# Patient Record
Sex: FEMALE | Race: BLACK OR AFRICAN AMERICAN | Employment: OTHER | URBAN - METROPOLITAN AREA
[De-identification: names, ages, dates, MRNs, and addresses within clinical notes are randomized per-mention and may not be internally consistent; named-entity substitution may affect disease eponyms.]

---

## 2017-01-13 ENCOUNTER — TELEPHONE (OUTPATIENT)
Dept: CT IMAGING | Facility: HOSPITAL | Age: 47
End: 2017-01-13

## 2017-01-13 NOTE — TELEPHONE ENCOUNTER
Spoke w/pt re: inhaler use and upcoming MRI w/contrast. She states she was admitted to the hospital in Oct 2016 and dx with atelectasis and pneumonia but feeling better now.   Presently she is using maintenance inhaler-Dulera and Albuterol neb 2 times/week

## 2017-01-16 ENCOUNTER — HOSPITAL ENCOUNTER (OUTPATIENT)
Dept: PERIOP | Facility: HOSPITAL | Age: 47
End: 2017-01-16
Attending: INTERNAL MEDICINE
Payer: MEDICARE

## 2017-01-16 PROCEDURE — 81001 URINALYSIS AUTO W/SCOPE: CPT | Performed by: UROLOGY

## 2017-01-17 ENCOUNTER — HOSPITAL ENCOUNTER (OUTPATIENT)
Dept: PERIOP | Facility: HOSPITAL | Age: 47
Discharge: HOME | End: 2017-01-17
Attending: INTERNAL MEDICINE
Payer: MEDICARE

## 2017-01-17 ENCOUNTER — TELEPHONE (OUTPATIENT)
Dept: NEPHROLOGY | Facility: CLINIC | Age: 47
End: 2017-01-17

## 2017-01-17 ENCOUNTER — MED REC SCAN ONLY (OUTPATIENT)
Dept: NEPHROLOGY | Facility: CLINIC | Age: 47
End: 2017-01-17

## 2017-01-17 ENCOUNTER — APPOINTMENT (OUTPATIENT)
Dept: LAB | Facility: HOSPITAL | Age: 47
End: 2017-01-17
Attending: INTERNAL MEDICINE
Payer: MEDICARE

## 2017-01-17 VITALS
OXYGEN SATURATION: 99 % | DIASTOLIC BLOOD PRESSURE: 80 MMHG | HEART RATE: 83 BPM | TEMPERATURE: 98 F | RESPIRATION RATE: 16 BRPM | SYSTOLIC BLOOD PRESSURE: 113 MMHG

## 2017-01-17 DIAGNOSIS — N35.9 URETHRAL STRICTURE, UNSPECIFIED STRICTURE TYPE: ICD-10-CM

## 2017-01-17 PROCEDURE — A4216 STERILE WATER/SALINE, 10 ML: HCPCS | Performed by: INTERNAL MEDICINE

## 2017-01-17 PROCEDURE — P9045 ALBUMIN (HUMAN), 5%, 250 ML: HCPCS | Performed by: INTERNAL MEDICINE

## 2017-01-17 PROCEDURE — 36514 APHERESIS PLASMA: CPT | Performed by: INTERNAL MEDICINE

## 2017-01-17 PROCEDURE — 36593 DECLOT VASCULAR DEVICE: CPT

## 2017-01-17 RX ORDER — SODIUM CHLORIDE 9 MG/ML
INJECTION, SOLUTION INTRAVENOUS ONCE
Status: DISCONTINUED | OUTPATIENT
Start: 2017-01-17 | End: 2017-01-21

## 2017-01-17 RX ORDER — ALBUMIN, HUMAN INJ 5% 5 %
3000 SOLUTION INTRAVENOUS ONCE
Status: DISCONTINUED | OUTPATIENT
Start: 2017-01-17 | End: 2017-01-21

## 2017-01-17 RX ORDER — HEPARIN SODIUM 1000 [USP'U]/ML
4000 INJECTION, SOLUTION INTRAVENOUS; SUBCUTANEOUS ONCE
Status: DISCONTINUED | OUTPATIENT
Start: 2017-01-17 | End: 2017-01-21

## 2017-01-17 NOTE — OR PREOP
Pt told jaxon RN that she only gets dressing changes and central line flushes every 3 weeks.  TPA now needed for line

## 2017-01-18 PROCEDURE — 88108 CYTOPATH CONCENTRATE TECH: CPT | Performed by: UROLOGY

## 2017-01-19 ENCOUNTER — HOSPITAL ENCOUNTER (OUTPATIENT)
Dept: MRI IMAGING | Facility: HOSPITAL | Age: 47
Discharge: HOME | End: 2017-01-19
Attending: Other
Payer: MEDICARE

## 2017-01-19 DIAGNOSIS — G37.9 DEMYELINATING DISEASE (HCC): ICD-10-CM

## 2017-01-19 LAB — NON GYNE INTERPRETATION: NEGATIVE

## 2017-01-19 PROCEDURE — 70553 MRI BRAIN STEM W/O & W/DYE: CPT

## 2017-01-19 PROCEDURE — A9575 INJ GADOTERATE MEGLUMI 0.1ML: HCPCS | Performed by: OTHER

## 2017-01-24 ENCOUNTER — HOSPITAL ENCOUNTER (OUTPATIENT)
Dept: CT IMAGING | Facility: HOSPITAL | Age: 47
Discharge: HOME | End: 2017-01-24
Attending: UROLOGY
Payer: MEDICARE

## 2017-01-24 DIAGNOSIS — R10.2 PELVIC PAIN IN FEMALE: ICD-10-CM

## 2017-01-24 PROCEDURE — 72193 CT PELVIS W/DYE: CPT

## 2017-02-06 ENCOUNTER — HOSPITAL ENCOUNTER (OUTPATIENT)
Dept: PERIOP | Facility: HOSPITAL | Age: 47
Discharge: HOME | End: 2017-02-06
Attending: INTERNAL MEDICINE
Payer: MEDICARE

## 2017-02-06 VITALS
SYSTOLIC BLOOD PRESSURE: 123 MMHG | TEMPERATURE: 99 F | OXYGEN SATURATION: 100 % | RESPIRATION RATE: 14 BRPM | HEART RATE: 78 BPM | DIASTOLIC BLOOD PRESSURE: 90 MMHG

## 2017-02-06 PROCEDURE — 36514 APHERESIS PLASMA: CPT | Performed by: INTERNAL MEDICINE

## 2017-02-06 PROCEDURE — P9045 ALBUMIN (HUMAN), 5%, 250 ML: HCPCS | Performed by: INTERNAL MEDICINE

## 2017-02-06 RX ORDER — HEPARIN SODIUM 1000 [USP'U]/ML
INJECTION, SOLUTION INTRAVENOUS; SUBCUTANEOUS
Status: DISPENSED
Start: 2017-02-06 | End: 2017-02-07

## 2017-02-06 RX ORDER — SODIUM CHLORIDE 9 MG/ML
INJECTION, SOLUTION INTRAVENOUS ONCE
Status: DISCONTINUED | OUTPATIENT
Start: 2017-02-06 | End: 2017-02-10

## 2017-02-06 RX ORDER — ALBUMIN, HUMAN INJ 5% 5 %
3000 SOLUTION INTRAVENOUS ONCE
Status: DISCONTINUED | OUTPATIENT
Start: 2017-02-06 | End: 2017-02-10

## 2017-02-06 RX ORDER — HEPARIN SODIUM 1000 [USP'U]/ML
5000 INJECTION, SOLUTION INTRAVENOUS; SUBCUTANEOUS ONCE
Status: DISCONTINUED | OUTPATIENT
Start: 2017-02-06 | End: 2017-02-10

## 2017-02-06 NOTE — OR PREOP
Pt d/c in stable condition. Ambulating without assistance. C/o pelvic pain that is chronic per pt report. Not any worse after plasmapharesis. Pt reports she feels \"good\".

## 2017-02-23 ENCOUNTER — OFFICE VISIT (OUTPATIENT)
Dept: NEUROLOGY | Facility: CLINIC | Age: 47
End: 2017-02-23

## 2017-02-23 VITALS
HEART RATE: 76 BPM | BODY MASS INDEX: 26 KG/M2 | WEIGHT: 159 LBS | DIASTOLIC BLOOD PRESSURE: 78 MMHG | RESPIRATION RATE: 20 BRPM | SYSTOLIC BLOOD PRESSURE: 120 MMHG

## 2017-02-23 DIAGNOSIS — G35 MULTIPLE SCLEROSIS (HCC): Primary | ICD-10-CM

## 2017-02-23 PROCEDURE — 99213 OFFICE O/P EST LOW 20 MIN: CPT | Performed by: OTHER

## 2017-02-23 RX ORDER — LEVOTHYROXINE SODIUM 88 UG/1
88 TABLET ORAL DAILY
COMMUNITY
Start: 2017-01-30

## 2017-02-23 RX ORDER — BACLOFEN 20 MG/1
20 TABLET ORAL
COMMUNITY
Start: 2017-01-04 | End: 2017-03-07

## 2017-02-23 RX ORDER — FLUTICASONE FUROATE AND VILANTEROL TRIFENATATE 100; 25 UG/1; UG/1
1 POWDER RESPIRATORY (INHALATION) DAILY
COMMUNITY
Start: 2017-02-21 | End: 2017-08-22

## 2017-02-23 NOTE — PATIENT INSTRUCTIONS
Refill policies:    • Allow 2 business days for refills; controlled substances may take longer.   • Contact your pharmacy at least 5 days prior to running out of medication and have them send an electronic request or submit request through the “request re your physician has recommended that you have a procedure or additional testing performed. [facility]) will contact your insurance carrier to obtain pre-certification or prior authorization.     Unfortunately, MANUEL has seen an increas

## 2017-02-23 NOTE — PROGRESS NOTES
[employer] with Camille Rooney  3/11/1970  Primary Care Provider:  Mainor Riley MD    2/23/2017    48year old yo patient being seen for:  Demyelinating Disease    Has been on PLEX 2 (two) times daily.  Noon, and bedtime , Disp: , Rfl:   •  denosumab (PROLIA) 60 MG/ML Subcutaneous Solution, Inject 60 mg into the skin every 6 (six) months., Disp: , Rfl:   •  Cholecalciferol (VITAMIN D-3) 5000 UNITS Oral Tab, Take 1 tablet by mouth Afte PLANS:  Multiple sclerosis ([facility]  (primary encounter diagnosis)  History of GIORDANO    Discussion on the case:  Seems to be stabilizing on PLEX.   Her balance and gait are still not good enough for her to freely just ambulate around or even independently be ou

## 2017-02-23 NOTE — PROGRESS NOTES
Patient here for follow up appointment. Currently receiving PLEX treatments every 3 weeks, doing well.

## 2017-02-24 ENCOUNTER — NURSE ONLY (OUTPATIENT)
Dept: HEMATOLOGY/ONCOLOGY | Age: 47
End: 2017-02-24
Attending: Other
Payer: MEDICARE

## 2017-02-24 DIAGNOSIS — G35 MULTIPLE SCLEROSIS (HCC): ICD-10-CM

## 2017-02-24 DIAGNOSIS — G37.9 DEMYELINATING DISEASE OF CENTRAL NERVOUS SYSTEM (HCC): Primary | ICD-10-CM

## 2017-02-24 LAB
ALBUMIN SERPL-MCNC: 4.1 G/DL (ref 3.5–4.8)
ALP LIVER SERPL-CCNC: 106 U/L (ref 39–100)
ALT SERPL-CCNC: 184 U/L (ref 14–54)
AST SERPL-CCNC: 120 U/L (ref 15–41)
BASOPHILS # BLD AUTO: 0.02 X10(3) UL (ref 0–0.1)
BASOPHILS NFR BLD AUTO: 0.4 %
BILIRUB SERPL-MCNC: 0.3 MG/DL (ref 0.1–2)
BUN BLD-MCNC: 11 MG/DL (ref 8–20)
CALCIUM BLD-MCNC: 8.9 MG/DL (ref 8.3–10.3)
CHLORIDE: 106 MMOL/L (ref 101–111)
CO2: 30 MMOL/L (ref 22–32)
CREAT BLD-MCNC: 0.96 MG/DL (ref 0.55–1.02)
EOSINOPHIL # BLD AUTO: 0.11 X10(3) UL (ref 0–0.3)
EOSINOPHIL NFR BLD AUTO: 2 %
ERYTHROCYTE [DISTWIDTH] IN BLOOD BY AUTOMATED COUNT: 16.3 % (ref 11.5–16)
GLUCOSE BLD-MCNC: 149 MG/DL (ref 70–99)
HCT VFR BLD AUTO: 36.9 % (ref 34–50)
HGB BLD-MCNC: 11.4 G/DL (ref 12–16)
IMMATURE GRANULOCYTE COUNT: 0.01 X10(3) UL (ref 0–1)
IMMATURE GRANULOCYTE RATIO %: 0.2 %
LYMPHOCYTES # BLD AUTO: 2.04 X10(3) UL (ref 0.9–4)
LYMPHOCYTES NFR BLD AUTO: 36.9 %
M PROTEIN MFR SERPL ELPH: 7.5 G/DL (ref 6.1–8.3)
MCH RBC QN AUTO: 22.1 PG (ref 27–33.2)
MCHC RBC AUTO-ENTMCNC: 30.9 G/DL (ref 31–37)
MCV RBC AUTO: 71.5 FL (ref 81–100)
MONOCYTES # BLD AUTO: 0.36 X10(3) UL (ref 0.1–0.6)
MONOCYTES NFR BLD AUTO: 6.5 %
NEUTROPHIL ABS PRELIM: 2.99 X10 (3) UL (ref 1.3–6.7)
NEUTROPHILS # BLD AUTO: 2.99 X10(3) UL (ref 1.3–6.7)
NEUTROPHILS NFR BLD AUTO: 54 %
PLATELET # BLD AUTO: 213 10(3)UL (ref 150–450)
POTASSIUM SERPL-SCNC: 3.8 MMOL/L (ref 3.6–5.1)
RBC # BLD AUTO: 5.16 X10(6)UL (ref 3.8–5.1)
RED CELL DISTRIBUTION WIDTH-SD: 41.4 FL (ref 35.1–46.3)
SODIUM SERPL-SCNC: 142 MMOL/L (ref 136–144)
WBC # BLD AUTO: 5.5 X10(3) UL (ref 4–13)

## 2017-02-24 PROCEDURE — 85025 COMPLETE CBC W/AUTO DIFF WBC: CPT

## 2017-02-24 PROCEDURE — 80053 COMPREHEN METABOLIC PANEL: CPT

## 2017-02-24 RX ORDER — SODIUM CHLORIDE 0.9 % (FLUSH) 0.9 %
10 SYRINGE (ML) INJECTION ONCE
Status: COMPLETED | OUTPATIENT
Start: 2017-02-24 | End: 2017-02-24

## 2017-02-24 RX ADMIN — SODIUM CHLORIDE 0.9 % (FLUSH) 10 ML: 0.9 % SYRINGE (ML) INJECTION at 14:01:00

## 2017-02-27 ENCOUNTER — HOSPITAL ENCOUNTER (OUTPATIENT)
Dept: PERIOP | Facility: HOSPITAL | Age: 47
Discharge: HOME | End: 2017-02-27
Attending: INTERNAL MEDICINE
Payer: MEDICARE

## 2017-02-27 ENCOUNTER — TELEPHONE (OUTPATIENT)
Dept: NEUROLOGY | Facility: CLINIC | Age: 47
End: 2017-02-27

## 2017-02-27 VITALS
HEART RATE: 76 BPM | SYSTOLIC BLOOD PRESSURE: 129 MMHG | OXYGEN SATURATION: 100 % | TEMPERATURE: 99 F | RESPIRATION RATE: 14 BRPM | DIASTOLIC BLOOD PRESSURE: 87 MMHG

## 2017-02-27 DIAGNOSIS — G35 MS (MULTIPLE SCLEROSIS) (HCC): Primary | ICD-10-CM

## 2017-02-27 DIAGNOSIS — G35 MULTIPLE SCLEROSIS (HCC): Primary | ICD-10-CM

## 2017-02-27 PROCEDURE — P9045 ALBUMIN (HUMAN), 5%, 250 ML: HCPCS | Performed by: INTERNAL MEDICINE

## 2017-02-27 PROCEDURE — 36514 APHERESIS PLASMA: CPT | Performed by: INTERNAL MEDICINE

## 2017-02-27 RX ORDER — SODIUM CHLORIDE 9 MG/ML
INJECTION, SOLUTION INTRAVENOUS ONCE
Status: DISCONTINUED | OUTPATIENT
Start: 2017-02-27 | End: 2017-03-03

## 2017-02-27 RX ORDER — ALBUMIN, HUMAN INJ 5% 5 %
3000 SOLUTION INTRAVENOUS ONCE
Status: DISCONTINUED | OUTPATIENT
Start: 2017-02-27 | End: 2017-03-03

## 2017-02-27 RX ORDER — HEPARIN SODIUM 1000 [USP'U]/ML
4000 INJECTION, SOLUTION INTRAVENOUS; SUBCUTANEOUS ONCE
Status: DISCONTINUED | OUTPATIENT
Start: 2017-02-27 | End: 2017-03-03

## 2017-02-27 NOTE — OR NURSING
See Jessi flowsheet for tolerance of treatment and vital signs. Patient ok to go home at this time per Jessi CRAWFORD.

## 2017-02-27 NOTE — OR NURSING
Name band checked and applied. Vital signs and weight obtained. Patient in chair in locked position with call light within reach. Consent for plasmapheresis signed and witnessed. Rhonaius nurse in room with patient.  See [facility] documentation for patient

## 2017-02-27 NOTE — TELEPHONE ENCOUNTER
Patient currently receiving PLEX treatment. Stated this is her last treatment on this set of orders. Will need new orders.   Last office visit with Dr. Amanda Fry on  2-23-17 with instructions to: Treatment:Continue PLEX at every 3-4 weeks interval    [facility]

## 2017-02-28 ENCOUNTER — TELEPHONE (OUTPATIENT)
Dept: NEUROLOGY | Facility: CLINIC | Age: 47
End: 2017-02-28

## 2017-02-28 DIAGNOSIS — G35 MULTIPLE SCLEROSIS (HCC): Primary | ICD-10-CM

## 2017-02-28 NOTE — TELEPHONE ENCOUNTER
[facility] calling with concerns for home health care for dressing changes. They do not provide any supplies for dressing changes (infusion center would need to provide that).  Furthermore they are concerned with her homebound status as she has been working and pr

## 2017-03-06 NOTE — TELEPHONE ENCOUNTER
Order for dressing supplies faxed [facility] (along with demographics sheet, insurance card, progress note). Lindsay at [facility] informed (Vivi Solorzano was unavailable at that time).

## 2017-03-06 NOTE — TELEPHONE ENCOUNTER
Received phone call from intake RN Cris Reyes, who notes that [facility] bills [facility][1] for supplies as opposed to billing patient's insurance. Due to the cost, [facility][1] would be unable to accept this case with supplies from [employer].  Julianna Patel

## 2017-03-07 RX ORDER — BACLOFEN 20 MG/1
TABLET ORAL
Qty: 120 TABLET | Refills: 5 | Status: ON HOLD | OUTPATIENT
Start: 2017-03-07 | End: 2017-08-02

## 2017-03-07 NOTE — TELEPHONE ENCOUNTER
Medication: Baclofen    Date of last refill: 01/04/17  [place]     Last office visit: 2/23/2017  Due back to clinic per last office note:  RTN in 6-8 months  Date next office visit scheduled:  No future appointments.

## 2017-03-07 NOTE — TELEPHONE ENCOUNTER
Kalina camara to report that [address] [place] [address] is unwilling to bill patient's insurance. She requests that we fax supply order to [address] [place] [address], who she has spoken with and is agreeable to supply. Their Telephone is [phone] and Fax [phone].     Valerie Larson

## 2017-03-07 NOTE — TELEPHONE ENCOUNTER
Spoke to Mariela Browne at [employer]; she has reached out to Dereck Wright regarding the spend-down issue.  Dereck Wright reports that she has paperwork documenting she has stopped working and is in the process of submitting this to Medicaid; once this has been updated there w

## 2017-03-07 NOTE — TELEPHONE ENCOUNTER
Received a call from [address] [place] [address]. They note that from her benefits check, her spend-down has not yet been met.  Once the spend-down is met, she will have full coverage but she needs to discuss with Medicaid  and provide this information to Cor

## 2017-03-17 RX ORDER — HEPARIN SODIUM 1000 [USP'U]/ML
1000 INJECTION, SOLUTION INTRAVENOUS; SUBCUTANEOUS
Status: CANCELLED | OUTPATIENT
Start: 2017-03-20

## 2017-03-20 ENCOUNTER — HOSPITAL ENCOUNTER (OUTPATIENT)
Facility: HOSPITAL | Age: 47
Setting detail: OBSERVATION
LOS: 1 days | Discharge: HOME | End: 2017-03-21
Attending: EMERGENCY MEDICINE | Admitting: SPECIALIST
Payer: MEDICARE

## 2017-03-20 ENCOUNTER — APPOINTMENT (OUTPATIENT)
Dept: GENERAL RADIOLOGY | Facility: HOSPITAL | Age: 47
End: 2017-03-20
Attending: EMERGENCY MEDICINE
Payer: MEDICARE

## 2017-03-20 ENCOUNTER — HOSPITAL ENCOUNTER (OUTPATIENT)
Dept: PERIOP | Facility: HOSPITAL | Age: 47
Discharge: HOME | End: 2017-03-20
Attending: INTERNAL MEDICINE
Payer: MEDICARE

## 2017-03-20 ENCOUNTER — APPOINTMENT (OUTPATIENT)
Dept: GENERAL RADIOLOGY | Facility: HOSPITAL | Age: 47
End: 2017-03-20
Attending: SURGERY
Payer: MEDICARE

## 2017-03-20 VITALS
HEART RATE: 88 BPM | TEMPERATURE: 98 F | OXYGEN SATURATION: 99 % | SYSTOLIC BLOOD PRESSURE: 125 MMHG | RESPIRATION RATE: 16 BRPM | DIASTOLIC BLOOD PRESSURE: 88 MMHG

## 2017-03-20 DIAGNOSIS — R53.1 WEAKNESS GENERALIZED: ICD-10-CM

## 2017-03-20 DIAGNOSIS — E86.0 DEHYDRATION: ICD-10-CM

## 2017-03-20 DIAGNOSIS — G35 MS (MULTIPLE SCLEROSIS) (HCC): ICD-10-CM

## 2017-03-20 DIAGNOSIS — R73.9 HYPERGLYCEMIA: Primary | ICD-10-CM

## 2017-03-20 LAB
ALBUMIN SERPL-MCNC: 3.8 G/DL (ref 3.5–4.8)
ALP LIVER SERPL-CCNC: 120 U/L (ref 39–100)
ALT SERPL-CCNC: 142 U/L (ref 14–54)
AST SERPL-CCNC: 105 U/L (ref 15–41)
ATRIAL RATE: 82 BPM
BASOPHILS # BLD AUTO: 0.02 X10(3) UL (ref 0–0.1)
BASOPHILS NFR BLD AUTO: 0.4 %
BILIRUB SERPL-MCNC: 0.3 MG/DL (ref 0.1–2)
BILIRUB UR QL STRIP.AUTO: NEGATIVE
BUN BLD-MCNC: 15 MG/DL (ref 8–20)
CALCIUM BLD-MCNC: 9.7 MG/DL (ref 8.3–10.3)
CHLORIDE: 96 MMOL/L (ref 101–111)
CLARITY UR REFRACT.AUTO: CLEAR
CO2: 27 MMOL/L (ref 22–32)
COLOR UR AUTO: YELLOW
CREAT BLD-MCNC: 1.22 MG/DL (ref 0.55–1.02)
EOSINOPHIL # BLD AUTO: 0.08 X10(3) UL (ref 0–0.3)
EOSINOPHIL NFR BLD AUTO: 1.4 %
ERYTHROCYTE [DISTWIDTH] IN BLOOD BY AUTOMATED COUNT: 14.7 % (ref 11.5–16)
GLUCOSE BLD-MCNC: 170 MG/DL (ref 65–99)
GLUCOSE BLD-MCNC: 219 MG/DL (ref 65–99)
GLUCOSE BLD-MCNC: 312 MG/DL (ref 65–99)
GLUCOSE BLD-MCNC: 385 MG/DL (ref 65–99)
GLUCOSE BLD-MCNC: 387 MG/DL (ref 65–99)
GLUCOSE BLD-MCNC: 408 MG/DL (ref 70–99)
GLUCOSE BLD-MCNC: 435 MG/DL (ref 65–99)
GLUCOSE UR STRIP.AUTO-MCNC: >=500 MG/DL
HCT VFR BLD AUTO: 35.6 % (ref 34–50)
HGB BLD-MCNC: 11.4 G/DL (ref 12–16)
IMMATURE GRANULOCYTE COUNT: 0.01 X10(3) UL (ref 0–1)
IMMATURE GRANULOCYTE RATIO %: 0.2 %
LEUKOCYTE ESTERASE UR QL STRIP.AUTO: NEGATIVE
LYMPHOCYTES # BLD AUTO: 1.75 X10(3) UL (ref 0.9–4)
LYMPHOCYTES NFR BLD AUTO: 30.8 %
M PROTEIN MFR SERPL ELPH: 7.1 G/DL (ref 6.1–8.3)
MCH RBC QN AUTO: 22.4 PG (ref 27–33.2)
MCHC RBC AUTO-ENTMCNC: 32 G/DL (ref 31–37)
MCV RBC AUTO: 69.9 FL (ref 81–100)
MONOCYTES # BLD AUTO: 0.32 X10(3) UL (ref 0.1–0.6)
MONOCYTES NFR BLD AUTO: 5.6 %
NEUTROPHIL ABS PRELIM: 3.5 X10 (3) UL (ref 1.3–6.7)
NEUTROPHILS # BLD AUTO: 3.5 X10(3) UL (ref 1.3–6.7)
NEUTROPHILS NFR BLD AUTO: 61.6 %
NITRITE UR QL STRIP.AUTO: NEGATIVE
P AXIS: 57 DEGREES
P-R INTERVAL: 126 MS
PH UR STRIP.AUTO: 6 [PH] (ref 4.5–8)
PLATELET # BLD AUTO: 171 10(3)UL (ref 150–450)
POTASSIUM SERPL-SCNC: 3.5 MMOL/L (ref 3.6–5.1)
PROT UR STRIP.AUTO-MCNC: NEGATIVE MG/DL
Q-T INTERVAL: 396 MS
QRS DURATION: 86 MS
QTC CALCULATION (BEZET): 462 MS
R AXIS: 10 DEGREES
RBC # BLD AUTO: 5.09 X10(6)UL (ref 3.8–5.1)
RBC UR QL AUTO: NEGATIVE
RED CELL DISTRIBUTION WIDTH-SD: 36.6 FL (ref 35.1–46.3)
SODIUM SERPL-SCNC: 134 MMOL/L (ref 136–144)
SP GR UR STRIP.AUTO: 1.03 (ref 1–1.03)
T AXIS: 68 DEGREES
TROPONIN: <0.046 NG/ML (ref ?–0.05)
UROBILINOGEN UR STRIP.AUTO-MCNC: <2 MG/DL
VENOUS BASE EXCESS: 2.2
VENOUS BLOOD GAS HCO3: 27.6 MEQ/L (ref 23–27)
VENOUS O2 SAT CALC: 66 % (ref 72–78)
VENOUS O2 SATURATION: 65 % (ref 72–78)
VENOUS PCO2: 47 MM HG (ref 38–50)
VENOUS PH: 7.39 (ref 7.33–7.43)
VENOUS PO2: 35 MM HG (ref 30–50)
VENTRICULAR RATE: 82 BPM
WBC # BLD AUTO: 5.7 X10(3) UL (ref 4–13)

## 2017-03-20 PROCEDURE — 71010 XR CHEST AP PORTABLE  (CPT=71010): CPT

## 2017-03-20 PROCEDURE — 74020 XR ABDOMEN, OBSTRUCTIVE SERIES (CPT=74020): CPT

## 2017-03-20 PROCEDURE — 99214 OFFICE O/P EST MOD 30 MIN: CPT | Performed by: INTERNAL MEDICINE

## 2017-03-20 PROCEDURE — 82962 GLUCOSE BLOOD TEST: CPT

## 2017-03-20 RX ORDER — INSULIN LISPRO 100 [IU]/ML
INJECTION, SOLUTION INTRAVENOUS; SUBCUTANEOUS
COMMUNITY
End: 2017-11-14

## 2017-03-20 RX ORDER — MORPHINE SULFATE 2 MG/ML
1 INJECTION, SOLUTION INTRAMUSCULAR; INTRAVENOUS EVERY 2 HOUR PRN
Status: DISCONTINUED | OUTPATIENT
Start: 2017-03-20 | End: 2017-03-21

## 2017-03-20 RX ORDER — ZOLPIDEM TARTRATE 5 MG/1
5 TABLET ORAL NIGHTLY PRN
Status: DISCONTINUED | OUTPATIENT
Start: 2017-03-20 | End: 2017-03-21

## 2017-03-20 RX ORDER — CALCIUM GLUCONATE 94 MG/ML
4 INJECTION, SOLUTION INTRAVENOUS ONCE
Status: DISCONTINUED | OUTPATIENT
Start: 2017-03-20 | End: 2017-03-20

## 2017-03-20 RX ORDER — SODIUM CHLORIDE 9 MG/ML
INJECTION, SOLUTION INTRAVENOUS CONTINUOUS
Status: DISCONTINUED | OUTPATIENT
Start: 2017-03-20 | End: 2017-03-21

## 2017-03-20 RX ORDER — ATORVASTATIN CALCIUM 20 MG/1
20 TABLET, FILM COATED ORAL NIGHTLY
Status: DISCONTINUED | OUTPATIENT
Start: 2017-03-20 | End: 2017-03-21

## 2017-03-20 RX ORDER — ALBUMIN, HUMAN INJ 5% 5 %
2500 SOLUTION INTRAVENOUS ONCE
Status: COMPLETED | OUTPATIENT
Start: 2017-03-20 | End: 2017-03-20

## 2017-03-20 RX ORDER — SODIUM CHLORIDE 9 MG/ML
INJECTION, SOLUTION INTRAVENOUS CONTINUOUS
Status: ACTIVE | OUTPATIENT
Start: 2017-03-20 | End: 2017-03-20

## 2017-03-20 RX ORDER — DULOXETIN HYDROCHLORIDE 30 MG/1
30 CAPSULE, DELAYED RELEASE ORAL 2 TIMES DAILY
Status: DISCONTINUED | OUTPATIENT
Start: 2017-03-20 | End: 2017-03-21

## 2017-03-20 RX ORDER — DEXTROSE MONOHYDRATE 25 G/50ML
50 INJECTION, SOLUTION INTRAVENOUS
Status: DISCONTINUED | OUTPATIENT
Start: 2017-03-20 | End: 2017-03-21

## 2017-03-20 RX ORDER — SODIUM CHLORIDE 9 MG/ML
INJECTION, SOLUTION INTRAVENOUS ONCE
Status: DISCONTINUED | OUTPATIENT
Start: 2017-03-20 | End: 2017-03-24

## 2017-03-20 RX ORDER — ALBUTEROL SULFATE 90 UG/1
2 AEROSOL, METERED RESPIRATORY (INHALATION) EVERY 6 HOURS PRN
Status: DISCONTINUED | OUTPATIENT
Start: 2017-03-20 | End: 2017-03-21

## 2017-03-20 RX ORDER — MORPHINE SULFATE 2 MG/ML
2 INJECTION, SOLUTION INTRAMUSCULAR; INTRAVENOUS EVERY 2 HOUR PRN
Status: DISCONTINUED | OUTPATIENT
Start: 2017-03-20 | End: 2017-03-21

## 2017-03-20 RX ORDER — PANTOPRAZOLE SODIUM 40 MG/1
40 TABLET, DELAYED RELEASE ORAL
Status: DISCONTINUED | OUTPATIENT
Start: 2017-03-21 | End: 2017-03-21

## 2017-03-20 RX ORDER — MORPHINE SULFATE 4 MG/ML
4 INJECTION, SOLUTION INTRAMUSCULAR; INTRAVENOUS EVERY 2 HOUR PRN
Status: DISCONTINUED | OUTPATIENT
Start: 2017-03-20 | End: 2017-03-21

## 2017-03-20 RX ORDER — ONDANSETRON 4 MG/1
4 TABLET, FILM COATED ORAL EVERY 8 HOURS PRN
Status: DISCONTINUED | OUTPATIENT
Start: 2017-03-20 | End: 2017-03-21

## 2017-03-20 RX ORDER — LEVOTHYROXINE SODIUM 88 UG/1
88 TABLET ORAL DAILY
Status: DISCONTINUED | OUTPATIENT
Start: 2017-03-21 | End: 2017-03-21

## 2017-03-20 RX ORDER — MONTELUKAST SODIUM 10 MG/1
10 TABLET ORAL NIGHTLY
Status: DISCONTINUED | OUTPATIENT
Start: 2017-03-20 | End: 2017-03-21

## 2017-03-20 RX ORDER — BACLOFEN 10 MG/1
10 TABLET ORAL 3 TIMES DAILY
Status: DISCONTINUED | OUTPATIENT
Start: 2017-03-20 | End: 2017-03-21

## 2017-03-20 RX ORDER — ASPIRIN 81 MG/1
81 TABLET ORAL DAILY
Status: DISCONTINUED | OUTPATIENT
Start: 2017-03-21 | End: 2017-03-21

## 2017-03-20 RX ORDER — SODIUM CHLORIDE 9 MG/ML
INJECTION, SOLUTION INTRAVENOUS ONCE
Status: COMPLETED | OUTPATIENT
Start: 2017-03-20 | End: 2017-03-21

## 2017-03-20 RX ORDER — HEPARIN SODIUM 1000 [USP'U]/ML
4000 INJECTION, SOLUTION INTRAVENOUS; SUBCUTANEOUS ONCE
Status: DISCONTINUED | OUTPATIENT
Start: 2017-03-20 | End: 2017-03-24

## 2017-03-20 RX ORDER — ALBUMIN, HUMAN INJ 5% 5 %
3000 SOLUTION INTRAVENOUS ONCE
Status: DISCONTINUED | OUTPATIENT
Start: 2017-03-20 | End: 2017-03-20 | Stop reason: DRUGHIGH

## 2017-03-20 RX ORDER — LIDOCAINE 50 MG/G
1 OINTMENT TOPICAL AS NEEDED
Status: DISCONTINUED | OUTPATIENT
Start: 2017-03-20 | End: 2017-03-21

## 2017-03-20 RX ORDER — POTASSIUM CHLORIDE 20 MEQ/1
40 TABLET, EXTENDED RELEASE ORAL EVERY 4 HOURS
Status: DISCONTINUED | OUTPATIENT
Start: 2017-03-20 | End: 2017-03-20

## 2017-03-20 RX ORDER — ENOXAPARIN SODIUM 100 MG/ML
40 INJECTION SUBCUTANEOUS EVERY 24 HOURS
Status: DISCONTINUED | OUTPATIENT
Start: 2017-03-20 | End: 2017-03-21

## 2017-03-20 RX ORDER — PREGABALIN 100 MG/1
100 CAPSULE ORAL 2 TIMES DAILY
Status: DISCONTINUED | OUTPATIENT
Start: 2017-03-20 | End: 2017-03-21

## 2017-03-20 RX ORDER — INSULIN ASPART 100 [IU]/ML
0.15 INJECTION, SOLUTION INTRAVENOUS; SUBCUTANEOUS ONCE
Status: COMPLETED | OUTPATIENT
Start: 2017-03-20 | End: 2017-03-20

## 2017-03-20 RX ORDER — MELATONIN
325 2 TIMES DAILY WITH MEALS
Status: DISCONTINUED | OUTPATIENT
Start: 2017-03-20 | End: 2017-03-21

## 2017-03-20 NOTE — ED PROVIDER NOTES
Patient Seen in: [facility] Emergency Department    History   Patient presents with:  Hyperglycemia (metabolic)    Stated Complaint: hyperglycemia    HPI    49-year-old female with history of multiple sclerosis, diabetes mellitus, presents emergency ([facility]    • Glaucoma    • Multiple sclerosis ([facility]    • High blood pressure    • High cholesterol    • PONV (postoperative nausea and vomiting)      WOKE UP DURING PROCEDURE   • Heart attack [facility])    • Anxiety state    • Asthma            Past Surgical Hist DULoxetine HCl (CYMBALTA) 30 MG Oral Cap DR Particles,  Take 30 mg by mouth 2 (two) times daily. Noon, and bedtime    denosumab (PROLIA) 60 MG/ML Subcutaneous Solution,  Inject 60 mg into the skin every 6 (six) months.    Cholecalciferol (VITAMIN D-3) 5000 100%        Physical Exam    GENERAL: Patient is awake, alert, well-appearing, in no acute distress. HEENT: Pupils equal round reactive to light, extraocular muscles are intact, there is no scleral icterus.   Mucous membranes are moderately dry, oropharynx All other components within normal limits   CBC W/ DIFFERENTIAL - Abnormal; Notable for the following:     HGB 11.4 (*)     MCV 69.9 (*)     MCH 22.4 (*)     All other components within normal limits   TROPONIN I - Normal   CBC WITH DIFFERENTIAL WITH PL

## 2017-03-20 NOTE — ED NOTES
Pt gets plasmaphoresis every 3 weeks, was supposed to get it this am but sugars were too high, gets it every 3 weeks

## 2017-03-20 NOTE — OR NURSING
0900 Pt. [place] [facility]. Pt c/o not feeling right. Pt. c/o confusion. Pt blood surgar was 454 and pt used the sliding scale. 0905 blood sugar was   387. Pt also c/o sob. Dr. Gigi Mcdaniel called Plasmapheresis is on hold. Pt sent to ED.   Re

## 2017-03-20 NOTE — TELEPHONE ENCOUNTER
Mari calling to report that Sarah Bianchi continues to have a spend-down she has to meet. She has not been seen or had dressing flushed due to insurance coverage issues. Noting that patient is currently in house with plans to receive PLEX while inpatient.    Taz garcia

## 2017-03-20 NOTE — CONSULTS
[facility]  Report of Consultation    Jenn Adhikari Patient Status:  Inpatient    3/11/1970 MRN [id number]   [facility] 5NW-A Attending Lesly Eason MD   Hosp Day # 0 PCP Johny Quigley MD     Reason for Consultation:  Need for p CHOLECYSTECTOMY      ANGIOGRAM       N/A 9/16/2015    Comment Procedure: ESOPHAGOGASTRODUODENOSCOPY (EGD);   Surgeon: Jenny Can DO;  Location: [facility] ENDOSCOPY    COLONOSCOPY N/A 9/18/2015    Comment Procedure: COLONOSCOPY;  Surgeon: DANIEL Coronel mg, 325 mg, Oral, BID with meals  •  insulin detemir (LEVEMIR) 100 UNIT/ML flextouch 20 Units, 20 Units, Subcutaneous, BID  •  [START ON 3/21/2017] Levothyroxine Sodium (SYNTHROID, LEVOTHROID) tab 88 mcg, 88 mcg, Oral, Daily  •  lidocaine (XYLOCAINE) 5 % o 100%  Temp (24hrs), Av.1 °F (36.7 °C), Min:98 °F (36.7 °C), Max:98.2 °F (36.8 °C)     No intake or output data in the 24 hours ending 17 1458  Last 3 Weights  17 1450 : 156 lb 12.8 oz  17 0948 : 147 lb  17 1337 : 159 lb   PGLU  387*  435*  312*           Impression/Plan:    #1. Hyperglycemia- pt notes severely elevated blood glucose levels of late. Adm for further eval and mgmt per primary service    #2.   MS- refractory to med mgmt and has been on q 3 week PLEX under th

## 2017-03-20 NOTE — PLAN OF CARE
Diabetes/Glucose Control    • Glucose maintained within prescribed range Progressing        Patient/Family Goals    • Patient/Family Long Term Goal Progressing    • Patient/Family Short Term Goal Progressing        DX: HYPERGLYCEMIA  PLAN OF CARE: ACCUCHEC

## 2017-03-21 ENCOUNTER — PRIOR ORIGINAL RECORDS (OUTPATIENT)
Dept: OTHER | Age: 47
End: 2017-03-21

## 2017-03-21 VITALS
DIASTOLIC BLOOD PRESSURE: 85 MMHG | TEMPERATURE: 98 F | WEIGHT: 159.63 LBS | BODY MASS INDEX: 25.66 KG/M2 | HEART RATE: 73 BPM | HEIGHT: 66 IN | SYSTOLIC BLOOD PRESSURE: 114 MMHG | RESPIRATION RATE: 18 BRPM | OXYGEN SATURATION: 100 %

## 2017-03-21 LAB
ALBUMIN SERPL-MCNC: 4 G/DL (ref 3.5–4.8)
ALP LIVER SERPL-CCNC: 59 U/L (ref 39–100)
ALT SERPL-CCNC: 67 U/L (ref 14–54)
AST SERPL-CCNC: 60 U/L (ref 15–41)
BASOPHILS # BLD AUTO: 0.02 X10(3) UL (ref 0–0.1)
BASOPHILS NFR BLD AUTO: 0.4 %
BILIRUB SERPL-MCNC: 0.2 MG/DL (ref 0.1–2)
BUN BLD-MCNC: 10 MG/DL (ref 8–20)
CALCIUM BLD-MCNC: 8.1 MG/DL (ref 8.3–10.3)
CHLORIDE: 105 MMOL/L (ref 101–111)
CO2: 30 MMOL/L (ref 22–32)
CREAT BLD-MCNC: 0.77 MG/DL (ref 0.55–1.02)
EOSINOPHIL # BLD AUTO: 0.13 X10(3) UL (ref 0–0.3)
EOSINOPHIL NFR BLD AUTO: 2.6 %
ERYTHROCYTE [DISTWIDTH] IN BLOOD BY AUTOMATED COUNT: 15.2 % (ref 11.5–16)
GLUCOSE BLD-MCNC: 309 MG/DL (ref 65–99)
GLUCOSE BLD-MCNC: 347 MG/DL (ref 65–99)
GLUCOSE BLD-MCNC: 355 MG/DL (ref 70–99)
HAV IGM SER QL: 2.5 MG/DL (ref 1.7–3)
HCT VFR BLD AUTO: 31.5 % (ref 34–50)
HGB BLD-MCNC: 9.5 G/DL (ref 12–16)
IMMATURE GRANULOCYTE COUNT: 0.01 X10(3) UL (ref 0–1)
IMMATURE GRANULOCYTE RATIO %: 0.2 %
LYMPHOCYTES # BLD AUTO: 1.75 X10(3) UL (ref 0.9–4)
LYMPHOCYTES NFR BLD AUTO: 35.4 %
M PROTEIN MFR SERPL ELPH: 5.2 G/DL (ref 6.1–8.3)
MCH RBC QN AUTO: 21.9 PG (ref 27–33.2)
MCHC RBC AUTO-ENTMCNC: 30.2 G/DL (ref 31–37)
MCV RBC AUTO: 72.7 FL (ref 81–100)
MONOCYTES # BLD AUTO: 0.3 X10(3) UL (ref 0.1–0.6)
MONOCYTES NFR BLD AUTO: 6.1 %
NEUTROPHIL ABS PRELIM: 2.73 X10 (3) UL (ref 1.3–6.7)
NEUTROPHILS # BLD AUTO: 2.73 X10(3) UL (ref 1.3–6.7)
NEUTROPHILS NFR BLD AUTO: 55.3 %
PLATELET # BLD AUTO: 153 10(3)UL (ref 150–450)
POTASSIUM SERPL-SCNC: 4.1 MMOL/L (ref 3.6–5.1)
RBC # BLD AUTO: 4.33 X10(6)UL (ref 3.8–5.1)
RED CELL DISTRIBUTION WIDTH-SD: 39.5 FL (ref 35.1–46.3)
SODIUM SERPL-SCNC: 140 MMOL/L (ref 136–144)
TSI SER-ACNC: 1.59 MIU/ML (ref 0.35–5.5)
WBC # BLD AUTO: 4.9 X10(3) UL (ref 4–13)

## 2017-03-21 PROCEDURE — 99213 OFFICE O/P EST LOW 20 MIN: CPT | Performed by: INTERNAL MEDICINE

## 2017-03-21 RX ORDER — POLYETHYLENE GLYCOL 3350 17 G/17G
17 POWDER, FOR SOLUTION ORAL DAILY
Status: DISCONTINUED | OUTPATIENT
Start: 2017-03-21 | End: 2017-03-21

## 2017-03-21 RX ORDER — POLYETHYLENE GLYCOL 3350 17 G/17G
17 POWDER, FOR SOLUTION ORAL DAILY
Qty: 30 EACH | Refills: 0 | Status: SHIPPED | OUTPATIENT
Start: 2017-03-21 | End: 2018-03-19

## 2017-03-21 RX ORDER — DOCUSATE SODIUM 100 MG/1
100 CAPSULE, LIQUID FILLED ORAL 2 TIMES DAILY
Status: DISCONTINUED | OUTPATIENT
Start: 2017-03-21 | End: 2017-03-21

## 2017-03-21 RX ORDER — DOCUSATE SODIUM 100 MG/1
100 CAPSULE, LIQUID FILLED ORAL 2 TIMES DAILY
COMMUNITY

## 2017-03-21 NOTE — CONSULTS
[facility]  Report of Consultation    Inga Mckenzie Patient Status:  Observation    3/11/1970 MRN [id number]   [facility] 5NW-A Attending Ruy Olvera MD   Hosp Day # 1 PCP Liliana Roque MD     Reason for Consultation:  Lower ab experiencing now is the worst it has been. It is radiating to her back. She does experience nausea with her severe episodes of pain. She denies any fevers or chills.   She denies any blood in the stool, black or tarry stools, or peripheral blood per rect TOTAL ABDOM HYSTERECTOMY      OTHER SURGICAL HISTORY      Comment  x 2    THYROIDECTOMY  2013    Comment benign MNG    UPPER GI ENDOSCOPY PERFORMED  May 2014    CATH ANGIO  May 2014    CHOLECYSTECTOMY      ANGIOGRAM       N/A 2015 MCG/INH inhaler 1 puff, 1 puff, Inhalation, Daily  •  Albuterol Sulfate  (90 Base) MCG/ACT inhaler 2 puff, 2 puff, Inhalation, Q6H PRN  •  aspirin EC tab 81 mg, 81 mg, Oral, Daily  •  baclofen (LIORESAL) tab 10 mg, 10 mg, Oral, TID  •  DULoxetine HC Intravenous, Once  •  calcium gluconate 4 g in sodium chloride 0.9 % 100 mL IVPB, 4 g, Intravenous, Once    Review of Systems:    Allergic/Immuno:  Review of patient's allergies indicates allergies which have been reviewed  Cardiovascular:  Negative for co laparoscopic incisions are all well-healed without appreciable hernia. No rebound or guarding. No signs of peritonitis. Extremities:  No lower extremity edema noted. Without clubbing or cyanosis. Skin: Normal texture and turgor.       Laboratory back as 2015. Also seen on previous x-ray. The joint space does not appear significantly narrowed however. Findings may reflect previous infection, AVN, other etiologies.      =====  CONCLUSION:  Moderate stool in colon. No signs of bowel obstruction.   also has a bowel habit that favors constipation, she has a bowel movement every 3 days. She takes colace daily. Currently, the patient continues to complain of her pain. Her abdominal x-ray demonstrates stool within the colon but no signs of obstruction.  H of my own, if necessary.     Antonia Hopkins MD

## 2017-03-21 NOTE — CM/SW NOTE
Patient was screened during rounds and no needs are identified at this time. RN to contact SW/CM if needs arise. 03/21/17 1200   CM/SW Screening   Referral Source Social Work (self-referral);    [facility] rounds   Patient's

## 2017-03-21 NOTE — PROGRESS NOTES
[facility]  Nephrology Progress Note    Nicole Colon Patient Status:  Observation    3/11/1970 MRN [id number]   [facility] 5NW-A Attending Kwadwo Fagan MD   Hosp Day # 1 PCP Josue Gan MD       SUBJECTIVE:  Feels stronger an Meds:     Current Facility-Administered Medications:  insulin detemir (LEVEMIR) 100 UNIT/ML flextouch 28 Units 28 Units Subcutaneous BID   docusate sodium (COLACE) cap 100 mg 100 mg Oral BID   PEG 3350 (MIRALAX) powder packet 17 g 17 g Oral Daily   F Subcutaneous TID CC and HS     Facility-Administered Medications Ordered in Other Encounters:  0.9%  NaCl infusion  Intravenous Once   Heparin Sodium (Porcine) 1000 UNIT/ML injection 4,000 Units 4,000 Units Intravenous Once   calcium gluconate 4 g in sodiu

## 2017-03-21 NOTE — PROGRESS NOTES
NURSING DISCHARGE NOTE    Discharged Home via Wheelchair. Accompanied by Family member and Support staff  Belongings Taken by patient/family.     Port de-accessed, discharge instructions reviewed with patient, instructed on new medications, instructed

## 2017-03-21 NOTE — H&P
[address] [place]    PATIENT'S NAME: PERICO LUDWIG JULIUS   ATTENDING PHYSICIAN: Johny Quigley M.D.    PATIENT ACCOUNT#:   [de-identified]    LOCATION:  [address] [place] [address]  MEDICAL RECORD #:   [id number]       YOB: 1970  ADMISSION DATE:       03/20/2017 dry.  Her blood sugar is in the 300 range from 400 range. Denies any chest pain. She has chronic shortness of breath and pain in the legs and abdomen. She has some constipation also.       PHYSICAL EXAMINATION:    GENERAL:  She is awake, alert, oriented

## 2017-03-21 NOTE — CM/SW NOTE
Patient failed inpatient criteria. Second level of review completed and supports observation. UR committee in agreement. Discussed with Dr. Landy Anthony  who approves observation status. Observation letter given to the patient and order written.

## 2017-03-23 ENCOUNTER — TELEPHONE (OUTPATIENT)
Dept: NEUROLOGY | Facility: CLINIC | Age: 47
End: 2017-03-23

## 2017-03-23 NOTE — TELEPHONE ENCOUNTER
[facility] pharmacy calling for clarification of heparin dosage. Informed pharmacist that dose is 1000 u/ml/ instill 3 cc per lumen weekly. All questions answered, will provide dressing changes and flushes.

## 2017-03-31 ENCOUNTER — TELEPHONE (OUTPATIENT)
Dept: NEUROLOGY | Facility: CLINIC | Age: 47
End: 2017-03-31

## 2017-03-31 NOTE — TELEPHONE ENCOUNTER
Spoke with patient. Patient states [facility] sent her heparin flushes but did not sent saline flushes. Patient states she does not have any saline flushes for her change on Monday. Patient states [facility] needs a verbal order to ship saline flushes today .

## 2017-04-10 ENCOUNTER — OFFICE VISIT (OUTPATIENT)
Dept: SURGERY | Facility: CLINIC | Age: 47
End: 2017-04-10

## 2017-04-10 ENCOUNTER — TELEPHONE (OUTPATIENT)
Dept: NEUROLOGY | Facility: CLINIC | Age: 47
End: 2017-04-10

## 2017-04-10 ENCOUNTER — HOSPITAL ENCOUNTER (OUTPATIENT)
Dept: PERIOP | Facility: HOSPITAL | Age: 47
Discharge: HOME | End: 2017-04-10
Attending: INTERNAL MEDICINE
Payer: MEDICARE

## 2017-04-10 VITALS — RESPIRATION RATE: 18 BRPM | BODY MASS INDEX: 24.59 KG/M2 | WEIGHT: 153 LBS | HEIGHT: 66 IN

## 2017-04-10 VITALS
SYSTOLIC BLOOD PRESSURE: 120 MMHG | OXYGEN SATURATION: 100 % | DIASTOLIC BLOOD PRESSURE: 86 MMHG | TEMPERATURE: 99 F | HEART RATE: 68 BPM | RESPIRATION RATE: 16 BRPM

## 2017-04-10 DIAGNOSIS — K58.2 IRRITABLE BOWEL SYNDROME WITH CONSTIPATION AND DIARRHEA: ICD-10-CM

## 2017-04-10 DIAGNOSIS — R10.31 ABDOMINAL PAIN, ACUTE, BILATERAL LOWER QUADRANT: Primary | ICD-10-CM

## 2017-04-10 DIAGNOSIS — R10.32 ABDOMINAL PAIN, ACUTE, BILATERAL LOWER QUADRANT: Primary | ICD-10-CM

## 2017-04-10 PROCEDURE — P9045 ALBUMIN (HUMAN), 5%, 250 ML: HCPCS | Performed by: INTERNAL MEDICINE

## 2017-04-10 PROCEDURE — 99215 OFFICE O/P EST HI 40 MIN: CPT | Performed by: COLON & RECTAL SURGERY

## 2017-04-10 PROCEDURE — 82962 GLUCOSE BLOOD TEST: CPT

## 2017-04-10 PROCEDURE — 36514 APHERESIS PLASMA: CPT

## 2017-04-10 RX ORDER — ALBUMIN, HUMAN INJ 5% 5 %
3000 SOLUTION INTRAVENOUS ONCE
Status: DISCONTINUED | OUTPATIENT
Start: 2017-04-10 | End: 2017-04-14

## 2017-04-10 RX ORDER — HEPARIN SODIUM 1000 [USP'U]/ML
4000 INJECTION, SOLUTION INTRAVENOUS; SUBCUTANEOUS ONCE
Status: DISCONTINUED | OUTPATIENT
Start: 2017-04-10 | End: 2017-04-14

## 2017-04-10 RX ORDER — SODIUM CHLORIDE 9 MG/ML
INJECTION, SOLUTION INTRAVENOUS ONCE
Status: DISCONTINUED | OUTPATIENT
Start: 2017-04-10 | End: 2017-04-14

## 2017-04-10 RX ORDER — HEPARIN SODIUM 1000 [USP'U]/ML
INJECTION, SOLUTION INTRAVENOUS; SUBCUTANEOUS
Status: DISPENSED
Start: 2017-04-10 | End: 2017-04-10

## 2017-04-10 NOTE — OR NURSING
Name band checked and applied. Vital signs and weight obtained. Patient in chair in locked position with call light in reach. Consent for plasmapheresis signed and witnessed. Jessi nurse in room with patient.  See Jessi documentation for tolerance a

## 2017-04-10 NOTE — OR NURSING
Treatment completed. See [facility] note for complete treatment details. Pt ambulated off unit without difficulty.

## 2017-04-10 NOTE — TELEPHONE ENCOUNTER
Home health orders received from [facility] for physician review and signature. Patient receives skilled nurses for catheter care. Paperwork signed and faxed with receipt confirmation.

## 2017-04-10 NOTE — H&P
New Patient Visit Note       Active Problems      1. Abdominal pain, acute, bilateral lower quadrant    2.  Irritable bowel syndrome with constipation and diarrhea        Chief Complaint   Patient presents with:  Abdominal Pain: New patient- c/o abdominal p 10/10. Her normal cycle is to go 3 days without a bowel movement. This is followed by diarrhea for 2 days. She points to her lower abdomen. She appears in distress in my office. She winces with crampy abdominal symptoms.     In the past she has tri HISTORY      Comment  x 2    THYROIDECTOMY  2013    Comment benign MNG    UPPER GI ENDOSCOPY PERFORMED  May 2014    CATH ANGIO  May 2014    CHOLECYSTECTOMY      ANGIOGRAM       N/A 2015    Comment Procedure: ESOPHAGOGASTRODUODENOSCOPY (E TAKE ONE TABLET BY MOUTH 4 TIMES DAILY Disp: 120 tablet Rfl: 5   BREO ELLIPTA 100-25 MCG/INH Inhalation Aerosol Powder, Breath Activated Inhale 1 puff into the lungs daily. Disp:  Rfl:    Levothyroxine Sodium 88 MCG Oral Tab Take 88 mcg by mouth daily.  D for fever, chills, diaphoresis, fatigue and unexpected weight change. HENT: Negative for hearing loss, nosebleeds, sore throat and trouble swallowing. Respiratory: Negative for apnea, cough, shortness of breath and wheezing.     Cardiovascular: Negativ lower quadrant. There is guarding. There is no rigidity, no rebound, no CVA tenderness, no tenderness at McBurney's point and negative Hernandez's sign. No hernia.  Hernia confirmed negative in the ventral area, confirmed negative in the right inguinal area an bilateral salpingo-oophorectomy for abnormal uterine bleeding. She has had 6 separate laparoscopy evaluations of her abdomen for her chronic pelvic pain. She has never had endometriosis. Her last colonoscopy was on September 18, 2015.   It was nonrevea pelvic trouble including interstitial cystitis, previous hysterectomy, and irritable bowel, she needs a regular bowel habit, even if they are loose. She should only alter her MiraLAX if she has greater than 6 bowel movements consistently day after day.

## 2017-04-11 PROBLEM — R10.31 ABDOMINAL PAIN, ACUTE, BILATERAL LOWER QUADRANT: Status: ACTIVE | Noted: 2017-04-11

## 2017-04-11 PROBLEM — K58.2 IRRITABLE BOWEL SYNDROME WITH CONSTIPATION AND DIARRHEA: Status: ACTIVE | Noted: 2017-04-11

## 2017-04-11 PROBLEM — R10.32 ABDOMINAL PAIN, ACUTE, BILATERAL LOWER QUADRANT: Status: ACTIVE | Noted: 2017-04-11

## 2017-04-11 NOTE — PATIENT INSTRUCTIONS
I am seeing this patient in consultation from the primary care service for acute and chronic bilateral lower quadrant abdominal pain. This patient has multiple medical problems including multiple sclerosis.   She has a PowerPort in place for chronic veno evidence of ascites. Her liver and spleen are not palpable. This patient is in moderate acute distress. I have asked her to take fleets enemas twice today at least 1 hour apart.   She should also take MiraLAX to adult dose is today, and then once every

## 2017-05-01 ENCOUNTER — HOSPITAL ENCOUNTER (OUTPATIENT)
Dept: PERIOP | Facility: HOSPITAL | Age: 47
Discharge: HOME | End: 2017-05-01
Attending: INTERNAL MEDICINE
Payer: MEDICARE

## 2017-05-01 VITALS
RESPIRATION RATE: 17 BRPM | DIASTOLIC BLOOD PRESSURE: 76 MMHG | OXYGEN SATURATION: 98 % | HEART RATE: 73 BPM | TEMPERATURE: 99 F | SYSTOLIC BLOOD PRESSURE: 109 MMHG

## 2017-05-01 PROCEDURE — P9045 ALBUMIN (HUMAN), 5%, 250 ML: HCPCS | Performed by: INTERNAL MEDICINE

## 2017-05-01 PROCEDURE — 36514 APHERESIS PLASMA: CPT

## 2017-05-01 RX ORDER — ALBUMIN, HUMAN INJ 5% 5 %
3000 SOLUTION INTRAVENOUS ONCE
Status: DISCONTINUED | OUTPATIENT
Start: 2017-05-01 | End: 2017-05-05

## 2017-05-01 RX ORDER — PIOGLITAZONEHYDROCHLORIDE 15 MG/1
15 TABLET ORAL DAILY
Status: ON HOLD | COMMUNITY
End: 2019-07-07

## 2017-05-01 RX ORDER — SODIUM CHLORIDE 9 MG/ML
INJECTION, SOLUTION INTRAVENOUS ONCE
Status: DISCONTINUED | OUTPATIENT
Start: 2017-05-01 | End: 2017-05-05

## 2017-05-01 RX ORDER — HEPARIN SODIUM 1000 [USP'U]/ML
4000 INJECTION, SOLUTION INTRAVENOUS; SUBCUTANEOUS ONCE
Status: DISCONTINUED | OUTPATIENT
Start: 2017-05-01 | End: 2017-05-05

## 2017-05-04 ENCOUNTER — OFFICE VISIT (OUTPATIENT)
Dept: SURGERY | Facility: CLINIC | Age: 47
End: 2017-05-04

## 2017-05-04 VITALS
HEART RATE: 76 BPM | RESPIRATION RATE: 18 BRPM | BODY MASS INDEX: 24.11 KG/M2 | TEMPERATURE: 99 F | SYSTOLIC BLOOD PRESSURE: 120 MMHG | HEIGHT: 66 IN | DIASTOLIC BLOOD PRESSURE: 81 MMHG | WEIGHT: 150 LBS

## 2017-05-04 DIAGNOSIS — G57.93 NEUROPATHIC PAIN OF BOTH LEGS: ICD-10-CM

## 2017-05-04 DIAGNOSIS — M79.7 FIBROMYALGIA SYNDROME: ICD-10-CM

## 2017-05-04 DIAGNOSIS — M87.00 AVN (AVASCULAR NECROSIS OF BONE) (HCC): ICD-10-CM

## 2017-05-04 DIAGNOSIS — G35 MS (MULTIPLE SCLEROSIS) (HCC): ICD-10-CM

## 2017-05-04 DIAGNOSIS — N17.9 AKI (ACUTE KIDNEY INJURY) (HCC): ICD-10-CM

## 2017-05-04 DIAGNOSIS — E11.42 TYPE 2 DIABETES MELLITUS WITH DIABETIC POLYNEUROPATHY, WITH LONG-TERM CURRENT USE OF INSULIN (HCC): ICD-10-CM

## 2017-05-04 DIAGNOSIS — IMO0001 IDDM (INSULIN DEPENDENT DIABETES MELLITUS): ICD-10-CM

## 2017-05-04 DIAGNOSIS — Z79.4 TYPE 2 DIABETES MELLITUS WITH DIABETIC POLYNEUROPATHY, WITH LONG-TERM CURRENT USE OF INSULIN (HCC): ICD-10-CM

## 2017-05-04 DIAGNOSIS — R10.84 GENERALIZED ABDOMINAL PAIN: ICD-10-CM

## 2017-05-04 DIAGNOSIS — R10.30 LOWER ABDOMINAL PAIN: Primary | ICD-10-CM

## 2017-05-04 PROCEDURE — 99214 OFFICE O/P EST MOD 30 MIN: CPT | Performed by: COLON & RECTAL SURGERY

## 2017-05-04 RX ORDER — DICYCLOMINE HYDROCHLORIDE 10 MG/1
10 CAPSULE ORAL 3 TIMES DAILY
Qty: 90 CAPSULE | Refills: 3 | Status: SHIPPED | OUTPATIENT
Start: 2017-05-04 | End: 2017-06-03

## 2017-05-04 NOTE — PROGRESS NOTES
Follow Up Visit Note       Active Problems      1. Lower abdominal pain    2. MS (multiple sclerosis) ([address]    3. IDDM (insulin dependent diabetes mellitus) ([address]    4. Generalized abdominal pain    5. CATIA (acute kidney injury) ([address]    6.  AVN (avascular n reviewed by me today.     Past Medical History   Diagnosis Date   • Extrinsic asthma, unspecified    • Osteoporosis      KAMALJIT in her 35s   • Fibromyalgia    • IBS (irritable bowel syndrome)    • H/O  section    • Unspecified disorder of thyroid 30'S   • Ovarian Cancer Maternal Grandmother      30'S   • Breast Cancer Maternal Aunt      60'S   • Other[other] [OTHER] Sister      rectal CA\     Social History    Marital Status: Single              Spouse Name:                       Years of Educ capsule (100 mg total) by mouth 2 (two) times daily. Disp: 60 capsule Rfl: 1   HYDROcodone-acetaminophen  MG Oral Tab Take 1 tablet by mouth 2 (two) times daily as needed for Pain.  Disp:  Rfl:    Montelukast Sodium (SINGULAIR) 10 MG Oral Tab Take 10 Negative for myalgias and arthralgias. Skin: Negative for color change and rash. Neurological: Negative for tremors, syncope and weakness. Hematological: Negative for adenopathy. Does not bruise/bleed easily.    Psychiatric/Behavioral: Negative for be that she is \"having normal bowel movements\". She has no change in her appetite, she continues to eat well. This patient suffers from multiple sclerosis. She has insulin-dependent diabetes mellitus. She has peripheral neuropathy.   She has fibromyalg in a few weeks to make sure that she is improving on her current regimen of therapy. Obviously if there are findings that are acute noted on the CT scan of the abdomen pelvis, we will react urgently.          No orders of the defined types were placed in

## 2017-05-07 PROBLEM — R10.30 LOWER ABDOMINAL PAIN: Status: ACTIVE | Noted: 2017-05-07

## 2017-05-07 PROBLEM — R10.32 ABDOMINAL PAIN, ACUTE, BILATERAL LOWER QUADRANT: Status: RESOLVED | Noted: 2017-04-11 | Resolved: 2017-05-07

## 2017-05-07 PROBLEM — R10.31 ABDOMINAL PAIN, ACUTE, BILATERAL LOWER QUADRANT: Status: RESOLVED | Noted: 2017-04-11 | Resolved: 2017-05-07

## 2017-05-07 PROBLEM — E86.0 DEHYDRATION: Status: RESOLVED | Noted: 2017-03-20 | Resolved: 2017-05-07

## 2017-05-07 NOTE — PATIENT INSTRUCTIONS
Patient continues with abdominal pain. She was noted to have severe and acute lower abdominal pain 3 weeks ago. It was crampy. It was associated with constipation almost to the point of fecal impaction of the entire colon.     Since her last visit, she r medication. She should no longer rely on her body's ability to tell her when the colon is full. She may have multiple bowel movements in 1 day.   I believe she remains with a large fecal load which is causing her problems of severe beltline crampy abdomin

## 2017-05-19 ENCOUNTER — TELEPHONE (OUTPATIENT)
Dept: NEUROLOGY | Facility: CLINIC | Age: 47
End: 2017-05-19

## 2017-05-19 NOTE — TELEPHONE ENCOUNTER
Orders received for home RN care anticipating to last 9 weeks for IV dressing changes and flushes to central line. Endorsed to Dr. Katty Fernandez for signature.

## 2017-05-22 ENCOUNTER — HOSPITAL ENCOUNTER (OUTPATIENT)
Dept: PERIOP | Facility: HOSPITAL | Age: 47
Discharge: HOME | End: 2017-05-22
Attending: INTERNAL MEDICINE
Payer: MEDICARE

## 2017-05-22 VITALS
DIASTOLIC BLOOD PRESSURE: 83 MMHG | OXYGEN SATURATION: 100 % | SYSTOLIC BLOOD PRESSURE: 111 MMHG | TEMPERATURE: 99 F | RESPIRATION RATE: 20 BRPM | HEART RATE: 75 BPM

## 2017-05-22 PROCEDURE — 82962 GLUCOSE BLOOD TEST: CPT

## 2017-05-22 PROCEDURE — 36514 APHERESIS PLASMA: CPT | Performed by: INTERNAL MEDICINE

## 2017-05-22 PROCEDURE — P9045 ALBUMIN (HUMAN), 5%, 250 ML: HCPCS | Performed by: INTERNAL MEDICINE

## 2017-05-22 RX ORDER — HEPARIN SODIUM 1000 [USP'U]/ML
4000 INJECTION, SOLUTION INTRAVENOUS; SUBCUTANEOUS ONCE
Status: DISCONTINUED | OUTPATIENT
Start: 2017-05-22 | End: 2017-05-26

## 2017-05-22 RX ORDER — ALBUMIN, HUMAN INJ 5% 5 %
3000 SOLUTION INTRAVENOUS ONCE
Status: DISCONTINUED | OUTPATIENT
Start: 2017-05-22 | End: 2017-05-26

## 2017-05-23 ENCOUNTER — HOSPITAL ENCOUNTER (OUTPATIENT)
Dept: CV DIAGNOSTICS | Facility: HOSPITAL | Age: 47
Discharge: HOME | End: 2017-05-23
Attending: INTERNAL MEDICINE

## 2017-05-23 ENCOUNTER — MYAURORA ACCOUNT LINK (OUTPATIENT)
Dept: OTHER | Age: 47
End: 2017-05-23

## 2017-05-23 DIAGNOSIS — I42.9 CARDIOMYOPATHY, IDIOPATHIC (HCC): ICD-10-CM

## 2017-05-23 DIAGNOSIS — R06.00 DYSPNEA, UNSPECIFIED TYPE: ICD-10-CM

## 2017-05-25 ENCOUNTER — PRIOR ORIGINAL RECORDS (OUTPATIENT)
Dept: OTHER | Age: 47
End: 2017-05-25

## 2017-06-01 ENCOUNTER — TELEPHONE (OUTPATIENT)
Dept: NEUROLOGY | Facility: CLINIC | Age: 47
End: 2017-06-01

## 2017-06-01 NOTE — TELEPHONE ENCOUNTER
Plan of care received from [address] for physician review and signature. Patient receives nursing care for catheter care. Patient receives PLEX treatments. Certification period 5/27/2017 to 7/25/2017.   Paperwork signed and faxed with tami

## 2017-06-05 ENCOUNTER — PRIOR ORIGINAL RECORDS (OUTPATIENT)
Dept: OTHER | Age: 47
End: 2017-06-05

## 2017-06-06 ENCOUNTER — PRIOR ORIGINAL RECORDS (OUTPATIENT)
Dept: OTHER | Age: 47
End: 2017-06-06

## 2017-06-06 ENCOUNTER — NURSE ONLY (OUTPATIENT)
Dept: HEMATOLOGY/ONCOLOGY | Facility: HOSPITAL | Age: 47
End: 2017-06-06
Attending: INTERNAL MEDICINE
Payer: MEDICARE

## 2017-06-06 DIAGNOSIS — R42 DIZZINESS AND GIDDINESS: ICD-10-CM

## 2017-06-06 DIAGNOSIS — G37.9 DEMYELINATING DISEASE OF CENTRAL NERVOUS SYSTEM (HCC): ICD-10-CM

## 2017-06-06 DIAGNOSIS — E11.9 DIABETES MELLITUS (HCC): ICD-10-CM

## 2017-06-06 DIAGNOSIS — R06.03 ACUTE RESPIRATORY DISTRESS: Primary | ICD-10-CM

## 2017-06-06 DIAGNOSIS — R53.83 FATIGUE: ICD-10-CM

## 2017-06-06 DIAGNOSIS — Z79.4 TYPE 2 DIABETES MELLITUS WITH DIABETIC POLYNEUROPATHY, WITH LONG-TERM CURRENT USE OF INSULIN (HCC): ICD-10-CM

## 2017-06-06 DIAGNOSIS — E78.00 PURE HYPERCHOLESTEROLEMIA: ICD-10-CM

## 2017-06-06 DIAGNOSIS — E11.42 TYPE 2 DIABETES MELLITUS WITH DIABETIC POLYNEUROPATHY, WITH LONG-TERM CURRENT USE OF INSULIN (HCC): ICD-10-CM

## 2017-06-06 PROCEDURE — 85025 COMPLETE CBC W/AUTO DIFF WBC: CPT

## 2017-06-06 PROCEDURE — 82728 ASSAY OF FERRITIN: CPT

## 2017-06-06 PROCEDURE — 80061 LIPID PANEL: CPT

## 2017-06-06 PROCEDURE — 83036 HEMOGLOBIN GLYCOSYLATED A1C: CPT

## 2017-06-06 PROCEDURE — 84443 ASSAY THYROID STIM HORMONE: CPT

## 2017-06-06 PROCEDURE — 80053 COMPREHEN METABOLIC PANEL: CPT

## 2017-06-06 PROCEDURE — 36591 DRAW BLOOD OFF VENOUS DEVICE: CPT

## 2017-06-06 RX ORDER — SODIUM CHLORIDE 0.9 % (FLUSH) 0.9 %
10 SYRINGE (ML) INJECTION ONCE
Status: COMPLETED | OUTPATIENT
Start: 2017-06-06 | End: 2017-06-06

## 2017-06-06 RX ADMIN — SODIUM CHLORIDE 0.9 % (FLUSH) 10 ML: 0.9 % SYRINGE (ML) INJECTION at 11:30:00

## 2017-06-06 NOTE — PROGRESS NOTES
Lab results faxed to Dr. Rajeev Rogers at [phone] and per patient's request to dr. Josue Gan at [phone].

## 2017-06-08 LAB
ALBUMIN: 4 G/DL
ALKALINE PHOSPHATATE(ALK PHOS): 59 IU/L
BILIRUBIN TOTAL: 0.2 MG/DL
BUN: 10 MG/DL
CALCIUM: 8.1 MG/DL
CHLORIDE: 105 MEQ/L
CREATININE, SERUM: 0.77 MG/DL
GLUCOSE: 355 MG/DL
HEMATOCRIT: 31.5 %
HEMOGLOBIN: 9.5 G/DL
MAGNESIUM: 2.5 MG/DL
PLATELETS: 153 K/UL
POTASSIUM, SERUM: 4.1 MEQ/L
PROTEIN, TOTAL: 5.2 G/DL
RED BLOOD COUNT: 4.33 X 10-6/U
SGOT (AST): 60 IU/L
SGPT (ALT): 67 IU/L
SODIUM: 140 MEQ/L
THYROID STIMULATING HORMONE: 1.59 MLU/L
WHITE BLOOD COUNT: 4.9 X 10-3/U

## 2017-06-12 ENCOUNTER — HOSPITAL ENCOUNTER (OUTPATIENT)
Dept: PERIOP | Facility: HOSPITAL | Age: 47
Discharge: HOME | End: 2017-06-12
Attending: INTERNAL MEDICINE
Payer: MEDICARE

## 2017-06-12 VITALS
OXYGEN SATURATION: 99 % | DIASTOLIC BLOOD PRESSURE: 88 MMHG | RESPIRATION RATE: 16 BRPM | TEMPERATURE: 98 F | HEART RATE: 72 BPM | SYSTOLIC BLOOD PRESSURE: 112 MMHG

## 2017-06-12 LAB
ALBUMIN: 3.6 G/DL
ALKALINE PHOSPHATATE(ALK PHOS): 101 IU/L
BILIRUBIN TOTAL: 0.1 MG/DL
BUN: 15 MG/DL
CALCIUM: 8.8 MG/DL
CHLORIDE: 106 MEQ/L
CHOLESTEROL, TOTAL: 155 MG/DL
CREATININE, SERUM: 1.03 MG/DL
GLUCOSE: 80 MG/DL
HDL CHOLESTEROL: 43 MG/DL
HEMATOCRIT: 33.1 %
HEMOGLOBIN A1C: 8.1 %
HEMOGLOBIN: 10.2 G/DL
LDL CHOLESTEROL: 100 MG/DL
PLATELETS: 185 K/UL
POTASSIUM, SERUM: 4.3 MEQ/L
PROTEIN, TOTAL: 7.3 G/DL
RED BLOOD COUNT: 4.53 X 10-6/U
SGOT (AST): 48 IU/L
SGPT (ALT): 98 IU/L
SODIUM: 142 MEQ/L
THYROID STIMULATING HORMONE: 0.63 MLU/L
TRIGLYCERIDES: 62 MG/DL
WHITE BLOOD COUNT: 6.4 X 10-3/U

## 2017-06-12 PROCEDURE — 82962 GLUCOSE BLOOD TEST: CPT

## 2017-06-12 PROCEDURE — P9045 ALBUMIN (HUMAN), 5%, 250 ML: HCPCS | Performed by: INTERNAL MEDICINE

## 2017-06-12 PROCEDURE — 36514 APHERESIS PLASMA: CPT | Performed by: INTERNAL MEDICINE

## 2017-06-12 RX ORDER — ALBUMIN, HUMAN INJ 5% 5 %
3000 SOLUTION INTRAVENOUS ONCE
Status: DISCONTINUED | OUTPATIENT
Start: 2017-06-12 | End: 2017-06-16

## 2017-06-12 RX ORDER — HEPARIN SODIUM 1000 [USP'U]/ML
4000 INJECTION, SOLUTION INTRAVENOUS; SUBCUTANEOUS ONCE
Status: DISCONTINUED | OUTPATIENT
Start: 2017-06-12 | End: 2017-06-16

## 2017-06-12 RX ORDER — SODIUM CHLORIDE 9 MG/ML
INJECTION, SOLUTION INTRAVENOUS ONCE
Status: DISCONTINUED | OUTPATIENT
Start: 2017-06-12 | End: 2017-06-16

## 2017-06-13 ENCOUNTER — HOSPITAL ENCOUNTER (OUTPATIENT)
Dept: MAMMOGRAPHY | Facility: HOSPITAL | Age: 47
Discharge: HOME | End: 2017-06-13
Attending: SPECIALIST
Payer: MEDICARE

## 2017-06-13 ENCOUNTER — PRIOR ORIGINAL RECORDS (OUTPATIENT)
Dept: OTHER | Age: 47
End: 2017-06-13

## 2017-06-13 DIAGNOSIS — Z12.31 VISIT FOR SCREENING MAMMOGRAM: ICD-10-CM

## 2017-06-13 PROCEDURE — 77067 SCR MAMMO BI INCL CAD: CPT | Performed by: SPECIALIST

## 2017-06-19 ENCOUNTER — PRIOR ORIGINAL RECORDS (OUTPATIENT)
Dept: OTHER | Age: 47
End: 2017-06-19

## 2017-06-19 ENCOUNTER — TELEPHONE (OUTPATIENT)
Dept: NEPHROLOGY | Facility: CLINIC | Age: 47
End: 2017-06-19

## 2017-06-19 NOTE — IMAGING NOTE
DR CARPENTER OFFICE AND DR [facility] OFFICE CONSULTED FOR THIS CASE. PLEASE DO CT ABD PELVIS WITH ORAL AND IV PER DR BARRON   AND DR CARPENTER OFFICE. SPOKE TO TY Dumont CHECKED MD NOTES AND CONSULTED MD. THEY WILL CANCEL CTA ORDER.  ONLY TO DO CT A/P W IV AND ORAL

## 2017-06-20 ENCOUNTER — HOSPITAL ENCOUNTER (OUTPATIENT)
Dept: CT IMAGING | Facility: HOSPITAL | Age: 47
Discharge: HOME | End: 2017-06-20
Attending: INTERNAL MEDICINE
Payer: MEDICARE

## 2017-06-20 DIAGNOSIS — R10.30 LOWER ABDOMINAL PAIN: ICD-10-CM

## 2017-06-20 PROCEDURE — 74177 CT ABD & PELVIS W/CONTRAST: CPT | Performed by: COLON & RECTAL SURGERY

## 2017-06-22 ENCOUNTER — HOSPITAL ENCOUNTER (OUTPATIENT)
Dept: MAMMOGRAPHY | Facility: HOSPITAL | Age: 47
Discharge: HOME | End: 2017-06-22
Attending: SPECIALIST
Payer: MEDICARE

## 2017-06-22 DIAGNOSIS — R92.8 ABNORMAL MAMMOGRAM OF RIGHT BREAST: ICD-10-CM

## 2017-06-22 PROCEDURE — 76642 ULTRASOUND BREAST LIMITED: CPT | Performed by: SPECIALIST

## 2017-06-22 PROCEDURE — 77061 BREAST TOMOSYNTHESIS UNI: CPT | Performed by: SPECIALIST

## 2017-06-22 PROCEDURE — 77065 DX MAMMO INCL CAD UNI: CPT | Performed by: SPECIALIST

## 2017-06-25 NOTE — PROGRESS NOTES
This patient's X-ray has been completed. If patient had an appointment when X-ray results were available, no action is necessary. If there was no appointment when results were obtained, please schedule an appointment.

## 2017-07-03 ENCOUNTER — HOSPITAL ENCOUNTER (OUTPATIENT)
Dept: PERIOP | Facility: HOSPITAL | Age: 47
Discharge: HOME | End: 2017-07-03
Attending: INTERNAL MEDICINE
Payer: MEDICARE

## 2017-07-03 VITALS
TEMPERATURE: 99 F | SYSTOLIC BLOOD PRESSURE: 101 MMHG | HEART RATE: 80 BPM | DIASTOLIC BLOOD PRESSURE: 72 MMHG | RESPIRATION RATE: 16 BRPM | OXYGEN SATURATION: 98 %

## 2017-07-03 LAB — GLUCOSE BLD-MCNC: 134 MG/DL (ref 65–99)

## 2017-07-03 PROCEDURE — 36514 APHERESIS PLASMA: CPT | Performed by: INTERNAL MEDICINE

## 2017-07-03 PROCEDURE — P9045 ALBUMIN (HUMAN), 5%, 250 ML: HCPCS | Performed by: INTERNAL MEDICINE

## 2017-07-03 PROCEDURE — 82962 GLUCOSE BLOOD TEST: CPT

## 2017-07-03 RX ORDER — SODIUM CHLORIDE 9 MG/ML
INJECTION, SOLUTION INTRAVENOUS ONCE
Status: DISCONTINUED | OUTPATIENT
Start: 2017-07-03 | End: 2017-07-07

## 2017-07-03 RX ORDER — HEPARIN SODIUM 1000 [USP'U]/ML
4000 INJECTION, SOLUTION INTRAVENOUS; SUBCUTANEOUS ONCE
Status: DISCONTINUED | OUTPATIENT
Start: 2017-07-03 | End: 2017-07-07

## 2017-07-03 RX ORDER — ALBUMIN, HUMAN INJ 5% 5 %
3000 SOLUTION INTRAVENOUS ONCE
Status: DISCONTINUED | OUTPATIENT
Start: 2017-07-03 | End: 2017-07-07

## 2017-07-17 ENCOUNTER — OFFICE VISIT (OUTPATIENT)
Dept: SURGERY | Facility: CLINIC | Age: 47
End: 2017-07-17

## 2017-07-17 VITALS
WEIGHT: 150 LBS | BODY MASS INDEX: 24.11 KG/M2 | TEMPERATURE: 99 F | SYSTOLIC BLOOD PRESSURE: 126 MMHG | HEART RATE: 76 BPM | HEIGHT: 66 IN | RESPIRATION RATE: 16 BRPM | DIASTOLIC BLOOD PRESSURE: 85 MMHG

## 2017-07-17 DIAGNOSIS — E11.42 TYPE 2 DIABETES MELLITUS WITH DIABETIC POLYNEUROPATHY, WITH LONG-TERM CURRENT USE OF INSULIN (HCC): ICD-10-CM

## 2017-07-17 DIAGNOSIS — K21.9 GASTROESOPHAGEAL REFLUX DISEASE, ESOPHAGITIS PRESENCE NOT SPECIFIED: ICD-10-CM

## 2017-07-17 DIAGNOSIS — G35 MS (MULTIPLE SCLEROSIS) (HCC): ICD-10-CM

## 2017-07-17 DIAGNOSIS — N17.9 AKI (ACUTE KIDNEY INJURY) (HCC): ICD-10-CM

## 2017-07-17 DIAGNOSIS — Z79.4 TYPE 2 DIABETES MELLITUS WITH DIABETIC POLYNEUROPATHY, WITH LONG-TERM CURRENT USE OF INSULIN (HCC): ICD-10-CM

## 2017-07-17 DIAGNOSIS — R10.30 LOWER ABDOMINAL PAIN: Primary | ICD-10-CM

## 2017-07-17 DIAGNOSIS — R10.84 GENERALIZED ABDOMINAL PAIN: ICD-10-CM

## 2017-07-17 DIAGNOSIS — M79.7 FIBROMYALGIA SYNDROME: ICD-10-CM

## 2017-07-17 DIAGNOSIS — R29.898 WEAKNESS OF BOTH LOWER EXTREMITIES: ICD-10-CM

## 2017-07-17 DIAGNOSIS — G57.93 NEUROPATHIC PAIN OF BOTH LEGS: ICD-10-CM

## 2017-07-17 PROCEDURE — 99215 OFFICE O/P EST HI 40 MIN: CPT | Performed by: COLON & RECTAL SURGERY

## 2017-07-17 RX ORDER — METRONIDAZOLE 500 MG/1
TABLET ORAL
Qty: 3 TABLET | Refills: 0 | Status: ON HOLD | OUTPATIENT
Start: 2017-07-17 | End: 2017-08-02

## 2017-07-17 RX ORDER — POLYETHYLENE GLYCOL 3350, SODIUM CHLORIDE, SODIUM BICARBONATE, POTASSIUM CHLORIDE 420; 11.2; 5.72; 1.48 G/4L; G/4L; G/4L; G/4L
POWDER, FOR SOLUTION ORAL
Qty: 1 BOTTLE | Refills: 0 | Status: ON HOLD | OUTPATIENT
Start: 2017-07-17 | End: 2017-08-02

## 2017-07-17 RX ORDER — NEOMYCIN SULFATE 500 MG/1
TABLET ORAL
Qty: 6 TABLET | Refills: 0 | Status: ON HOLD | OUTPATIENT
Start: 2017-07-17 | End: 2017-08-02

## 2017-07-17 NOTE — PATIENT INSTRUCTIONS
This patient presents for further care treatment regarding her lower abdominal pain, right greater than left. The patient's presenting history as listed below: This patient has multiple medical problems including multiple sclerosis.   She has a PowerP of abdominal pain and severe constipation followed by diarrhea.     Since her last visit, she has obtained a CT scan of the abdomen and pelvis with oral and IV contrast.  Reveals some nonspecific inflammatory process in the left colon near the descending si procedure at [facility].    All risks, benefits, complications and alternatives to the proposed operation were fully discussed with the patient. All questions from the patient were answered in detail.  A description of the procedure and it's possible o

## 2017-07-17 NOTE — PROGRESS NOTES
Follow Up Visit Note       Active Problems      1. Lower abdominal pain    2. MS (multiple sclerosis) ([facility] [address]    3. Neuropathic pain of both legs    4. Type 2 diabetes mellitus with diabetic polyneuropathy, with long-term current use of insulin ([address]    5.  G severe and more acute within the last week. She has chronic symptoms as well. It is associated with her irritable bowel syndrome. She gets severe lower abdominal pain after bowel movements. They are worse after she urinates at 7/10.  They are most sever 18:26.  JOCY , CT PELVIS(CONTRAST ONLY) (CPT=72193), 1/24/2017, 8:57.      INDICATIONS:  R10.30 Lower abdominal pain, unspecified     TECHNIQUE:  CT scanning was performed from the dome of the diaphragm to the pubic symphysis with non-ionic intravenous co focal wall thickening, lesion, or calculus. PELVIC NODES:  No adenopathy. PELVIC ORGANS:  Surgical removal of the uterus. No adnexal mass. No free fluid. BONES:  Stable including appearance of the left femoral head suggesting avascular necrosis. trait ([facility]    • Type II or unspecified type diabetes mellitus without mention of complication, not stated as uncontrolled    • Unspecified disorder of thyroid     total thyroidectomy   • Visual impairment     GLASSES     Past Surgical History:  No date: A (FLAGYL) 500 MG Oral Tab Take 1 tablet at 1pm, 2pm and 11pm Disp: 3 tablet Rfl: 0   Pioglitazone HCl 15 MG Oral Tab Take 15 mg by mouth daily. Disp:  Rfl:    MetFORMIN HCl 500 MG Oral Tab Take 500 mg by mouth daily.  Disp:  Rfl:    docusate sodium 100 MG Or by mouth After lunch. Disp:  Rfl:    Pravastatin Sodium (PRAVACHOL) 80 MG Oral Tab Take 80 mg by mouth nightly. Disp:  Rfl:    lidocaine (XYLOCAINE) 5 % Apply Externally Ointment Apply 1 Dose topically as needed (for feet).    Disp:  Rfl:    omeprazole Normocephalic and atraumatic. Eyes: Conjunctivae are normal. No scleral icterus. Neck: Trachea normal. No JVD present. Carotid bruit is not present. No tracheal deviation present. No thyroid mass and no thyromegaly present.    Cardiovascular: Normal rat spleen are not palpable. Patient's BMI is 24.21. Lymphadenopathy:     She has no cervical adenopathy. Right cervical: No superficial cervical and no deep cervical adenopathy present.        Left cervical: No superficial cervical and no deep ce was on June 17, 2016, and was within normal limits. On October 7, 2013 she underwent liver biopsy which showed 1/3 inflammation, and 1/4 fibrosis. This led to her diagnosis of nonalcoholic steatorrheic hepatitis.     This patient states that her abdomina quadrant that extends into the right groin above the right groin crease. There are some findings on the left lower quadrant that are much diminished over the right lower quadrant. Patient's bowel sounds have normal activity normal pitch.   There are no in

## 2017-07-18 ENCOUNTER — TELEPHONE (OUTPATIENT)
Dept: SURGERY | Facility: CLINIC | Age: 47
End: 2017-07-18

## 2017-07-18 DIAGNOSIS — K66.0 ABDOMINAL ADHESIONS: Primary | ICD-10-CM

## 2017-07-24 ENCOUNTER — HOSPITAL ENCOUNTER (OUTPATIENT)
Dept: PERIOP | Facility: HOSPITAL | Age: 47
Setting detail: HOSPITAL OUTPATIENT SURGERY
Discharge: HOME | End: 2017-07-24
Attending: INTERNAL MEDICINE
Payer: MEDICARE

## 2017-07-24 VITALS
OXYGEN SATURATION: 100 % | HEART RATE: 89 BPM | SYSTOLIC BLOOD PRESSURE: 104 MMHG | DIASTOLIC BLOOD PRESSURE: 73 MMHG | TEMPERATURE: 99 F | RESPIRATION RATE: 18 BRPM

## 2017-07-24 DIAGNOSIS — R10.2 PELVIC PAIN: ICD-10-CM

## 2017-07-24 DIAGNOSIS — R10.9 ABDOMINAL PAIN: ICD-10-CM

## 2017-07-24 LAB — GLUCOSE BLD-MCNC: 146 MG/DL (ref 65–99)

## 2017-07-24 PROCEDURE — 36514 APHERESIS PLASMA: CPT

## 2017-07-24 PROCEDURE — 82962 GLUCOSE BLOOD TEST: CPT

## 2017-07-24 PROCEDURE — 6A550Z3 PHERESIS OF PLASMA, SINGLE: ICD-10-PCS | Performed by: INTERNAL MEDICINE

## 2017-07-24 PROCEDURE — P9045 ALBUMIN (HUMAN), 5%, 250 ML: HCPCS | Performed by: INTERNAL MEDICINE

## 2017-07-24 RX ORDER — ALBUMIN, HUMAN INJ 5% 5 %
3000 SOLUTION INTRAVENOUS ONCE
Status: DISCONTINUED | OUTPATIENT
Start: 2017-07-24 | End: 2017-07-28

## 2017-07-24 RX ORDER — SODIUM CHLORIDE 9 MG/ML
INJECTION, SOLUTION INTRAVENOUS ONCE
Status: DISCONTINUED | OUTPATIENT
Start: 2017-07-24 | End: 2017-07-28

## 2017-07-24 RX ORDER — HEPARIN SODIUM 1000 [USP'U]/ML
4000 INJECTION, SOLUTION INTRAVENOUS; SUBCUTANEOUS ONCE
Status: DISCONTINUED | OUTPATIENT
Start: 2017-07-24 | End: 2017-07-28

## 2017-07-24 NOTE — OR NURSING
Pt tolerated treatment well. Ambulated herself out for discharge. Pt made aware that 8/14/17 is her last scheduled treatment and we will need new orders for any further treatments after that date.

## 2017-07-24 NOTE — OR NURSING
Pt here for plasmaphersis treatment. Pt ID'd and appropiate band placed. Consent signed. See [facility] flowsheet/charting for details of tolerance of treatment, vitals obtained and meds administered.

## 2017-07-25 ENCOUNTER — APPOINTMENT (OUTPATIENT)
Dept: LAB | Facility: HOSPITAL | Age: 47
End: 2017-07-25
Payer: MEDICARE

## 2017-07-25 DIAGNOSIS — Z01.818 PREOP EXAMINATION: ICD-10-CM

## 2017-07-25 LAB
ATRIAL RATE: 81 BPM
P AXIS: 47 DEGREES
P-R INTERVAL: 128 MS
Q-T INTERVAL: 384 MS
QRS DURATION: 74 MS
QTC CALCULATION (BEZET): 446 MS
R AXIS: 41 DEGREES
T AXIS: 86 DEGREES
VENTRICULAR RATE: 81 BPM

## 2017-07-25 PROCEDURE — 93010 ELECTROCARDIOGRAM REPORT: CPT | Performed by: INTERNAL MEDICINE

## 2017-07-25 PROCEDURE — 93005 ELECTROCARDIOGRAM TRACING: CPT

## 2017-07-27 ENCOUNTER — ANESTHESIA EVENT (OUTPATIENT)
Dept: SURGERY | Facility: HOSPITAL | Age: 47
End: 2017-07-27
Payer: MEDICARE

## 2017-08-02 ENCOUNTER — ANESTHESIA (OUTPATIENT)
Dept: SURGERY | Facility: HOSPITAL | Age: 47
End: 2017-08-02
Payer: MEDICARE

## 2017-08-02 ENCOUNTER — SURGERY (OUTPATIENT)
Age: 47
End: 2017-08-02

## 2017-08-02 ENCOUNTER — HOSPITAL ENCOUNTER (OUTPATIENT)
Facility: HOSPITAL | Age: 47
Discharge: HOME | End: 2017-08-03
Attending: COLON & RECTAL SURGERY | Admitting: COLON & RECTAL SURGERY
Payer: MEDICARE

## 2017-08-02 DIAGNOSIS — Z01.818 PREOP EXAMINATION: Primary | ICD-10-CM

## 2017-08-02 DIAGNOSIS — K66.0 ABDOMINAL ADHESIONS: ICD-10-CM

## 2017-08-02 PROBLEM — Z98.890 S/P LAPAROSCOPIC SURGERY: Status: ACTIVE | Noted: 2017-08-02

## 2017-08-02 LAB
ERYTHROCYTE [DISTWIDTH] IN BLOOD BY AUTOMATED COUNT: 14.8 % (ref 11.5–16)
EST. AVERAGE GLUCOSE BLD GHB EST-MCNC: 128 MG/DL (ref 68–126)
GLUCOSE BLD-MCNC: 100 MG/DL (ref 65–99)
GLUCOSE BLD-MCNC: 159 MG/DL (ref 65–99)
GLUCOSE BLD-MCNC: 172 MG/DL (ref 65–99)
GLUCOSE BLD-MCNC: 94 MG/DL (ref 65–99)
HBA1C MFR BLD HPLC: 6.1 % (ref ?–5.7)
HCT VFR BLD AUTO: 29.2 % (ref 34–50)
HGB BLD-MCNC: 8.8 G/DL (ref 12–16)
MCH RBC QN AUTO: 21.7 PG (ref 27–33.2)
MCHC RBC AUTO-ENTMCNC: 30.1 G/DL (ref 31–37)
MCV RBC AUTO: 72.1 FL (ref 81–100)
PLATELET # BLD AUTO: 179 10(3)UL (ref 150–450)
RBC # BLD AUTO: 4.05 X10(6)UL (ref 3.8–5.1)
RED CELL DISTRIBUTION WIDTH-SD: 38.8 FL (ref 35.1–46.3)
WBC # BLD AUTO: 8.4 X10(3) UL (ref 4–13)

## 2017-08-02 PROCEDURE — 0DN84ZZ RELEASE SMALL INTESTINE, PERCUTANEOUS ENDOSCOPIC APPROACH: ICD-10-PCS | Performed by: COLON & RECTAL SURGERY

## 2017-08-02 PROCEDURE — 0DNN4ZZ RELEASE SIGMOID COLON, PERCUTANEOUS ENDOSCOPIC APPROACH: ICD-10-PCS | Performed by: COLON & RECTAL SURGERY

## 2017-08-02 PROCEDURE — 82962 GLUCOSE BLOOD TEST: CPT

## 2017-08-02 PROCEDURE — 0DNM4ZZ RELEASE DESCENDING COLON, PERCUTANEOUS ENDOSCOPIC APPROACH: ICD-10-PCS | Performed by: COLON & RECTAL SURGERY

## 2017-08-02 PROCEDURE — 36415 COLL VENOUS BLD VENIPUNCTURE: CPT | Performed by: COLON & RECTAL SURGERY

## 2017-08-02 PROCEDURE — 83036 HEMOGLOBIN GLYCOSYLATED A1C: CPT | Performed by: SPECIALIST

## 2017-08-02 PROCEDURE — 0DNS4ZZ RELEASE GREATER OMENTUM, PERCUTANEOUS ENDOSCOPIC APPROACH: ICD-10-PCS | Performed by: COLON & RECTAL SURGERY

## 2017-08-02 PROCEDURE — 0DNW4ZZ RELEASE PERITONEUM, PERCUTANEOUS ENDOSCOPIC APPROACH: ICD-10-PCS | Performed by: COLON & RECTAL SURGERY

## 2017-08-02 PROCEDURE — 85027 COMPLETE CBC AUTOMATED: CPT | Performed by: COLON & RECTAL SURGERY

## 2017-08-02 RX ORDER — BUPIVACAINE HYDROCHLORIDE 5 MG/ML
INJECTION, SOLUTION EPIDURAL; INTRACAUDAL AS NEEDED
Status: DISCONTINUED | OUTPATIENT
Start: 2017-08-02 | End: 2017-08-02 | Stop reason: HOSPADM

## 2017-08-02 RX ORDER — PANTOPRAZOLE SODIUM 40 MG/1
40 TABLET, DELAYED RELEASE ORAL
Status: DISCONTINUED | OUTPATIENT
Start: 2017-08-03 | End: 2017-08-03

## 2017-08-02 RX ORDER — HYDROCODONE BITARTRATE AND ACETAMINOPHEN 10; 325 MG/1; MG/1
1 TABLET ORAL EVERY 4 HOURS PRN
Status: DISCONTINUED | OUTPATIENT
Start: 2017-08-02 | End: 2017-08-03

## 2017-08-02 RX ORDER — CLINDAMYCIN PHOSPHATE 900 MG/50ML
900 INJECTION INTRAVENOUS ONCE
Status: DISCONTINUED | OUTPATIENT
Start: 2017-08-02 | End: 2017-08-02 | Stop reason: HOSPADM

## 2017-08-02 RX ORDER — SODIUM CHLORIDE, SODIUM LACTATE, POTASSIUM CHLORIDE, CALCIUM CHLORIDE 600; 310; 30; 20 MG/100ML; MG/100ML; MG/100ML; MG/100ML
INJECTION, SOLUTION INTRAVENOUS CONTINUOUS
Status: DISCONTINUED | OUTPATIENT
Start: 2017-08-02 | End: 2017-08-02

## 2017-08-02 RX ORDER — ATORVASTATIN CALCIUM 20 MG/1
20 TABLET, FILM COATED ORAL NIGHTLY
Status: DISCONTINUED | OUTPATIENT
Start: 2017-08-02 | End: 2017-08-03

## 2017-08-02 RX ORDER — HYDROMORPHONE HYDROCHLORIDE 1 MG/ML
0.4 INJECTION, SOLUTION INTRAMUSCULAR; INTRAVENOUS; SUBCUTANEOUS EVERY 5 MIN PRN
Status: DISCONTINUED | OUTPATIENT
Start: 2017-08-02 | End: 2017-08-02 | Stop reason: HOSPADM

## 2017-08-02 RX ORDER — DEXTROSE MONOHYDRATE 25 G/50ML
50 INJECTION, SOLUTION INTRAVENOUS
Status: DISCONTINUED | OUTPATIENT
Start: 2017-08-02 | End: 2017-08-02 | Stop reason: HOSPADM

## 2017-08-02 RX ORDER — DOCUSATE SODIUM 100 MG/1
100 CAPSULE, LIQUID FILLED ORAL 2 TIMES DAILY
Status: DISCONTINUED | OUTPATIENT
Start: 2017-08-02 | End: 2017-08-03

## 2017-08-02 RX ORDER — LEVOTHYROXINE SODIUM 88 UG/1
88 TABLET ORAL
Status: DISCONTINUED | OUTPATIENT
Start: 2017-08-03 | End: 2017-08-03

## 2017-08-02 RX ORDER — MORPHINE SULFATE 4 MG/ML
1 INJECTION, SOLUTION INTRAMUSCULAR; INTRAVENOUS EVERY 2 HOUR PRN
Status: DISCONTINUED | OUTPATIENT
Start: 2017-08-02 | End: 2017-08-03

## 2017-08-02 RX ORDER — HYDROCODONE BITARTRATE AND ACETAMINOPHEN 5; 325 MG/1; MG/1
1-2 TABLET ORAL
Qty: 30 TABLET | Refills: 0 | Status: SHIPPED | OUTPATIENT
Start: 2017-08-02 | End: 2017-08-22

## 2017-08-02 RX ORDER — HYDROMORPHONE HYDROCHLORIDE 1 MG/ML
INJECTION, SOLUTION INTRAMUSCULAR; INTRAVENOUS; SUBCUTANEOUS
Status: COMPLETED
Start: 2017-08-02 | End: 2017-08-02

## 2017-08-02 RX ORDER — MORPHINE SULFATE 4 MG/ML
2 INJECTION, SOLUTION INTRAMUSCULAR; INTRAVENOUS EVERY 2 HOUR PRN
Status: DISCONTINUED | OUTPATIENT
Start: 2017-08-02 | End: 2017-08-03

## 2017-08-02 RX ORDER — LIDOCAINE 50 MG/G
1 OINTMENT TOPICAL AS NEEDED
Status: DISCONTINUED | OUTPATIENT
Start: 2017-08-02 | End: 2017-08-03

## 2017-08-02 RX ORDER — PIOGLITAZONEHYDROCHLORIDE 15 MG/1
15 TABLET ORAL DAILY
Status: DISCONTINUED | OUTPATIENT
Start: 2017-08-02 | End: 2017-08-03

## 2017-08-02 RX ORDER — PREGABALIN 100 MG/1
100 CAPSULE ORAL 2 TIMES DAILY
Status: DISCONTINUED | OUTPATIENT
Start: 2017-08-02 | End: 2017-08-03

## 2017-08-02 RX ORDER — HYDROCODONE BITARTRATE AND ACETAMINOPHEN 5; 325 MG/1; MG/1
2 TABLET ORAL AS NEEDED
Status: DISCONTINUED | OUTPATIENT
Start: 2017-08-02 | End: 2017-08-02 | Stop reason: HOSPADM

## 2017-08-02 RX ORDER — MORPHINE SULFATE 4 MG/ML
4 INJECTION, SOLUTION INTRAMUSCULAR; INTRAVENOUS EVERY 2 HOUR PRN
Status: DISCONTINUED | OUTPATIENT
Start: 2017-08-02 | End: 2017-08-03

## 2017-08-02 RX ORDER — ONDANSETRON 4 MG/1
4 TABLET, FILM COATED ORAL EVERY 8 HOURS PRN
Status: DISCONTINUED | OUTPATIENT
Start: 2017-08-02 | End: 2017-08-03

## 2017-08-02 RX ORDER — METOCLOPRAMIDE HYDROCHLORIDE 5 MG/ML
10 INJECTION INTRAMUSCULAR; INTRAVENOUS AS NEEDED
Status: DISCONTINUED | OUTPATIENT
Start: 2017-08-02 | End: 2017-08-02 | Stop reason: HOSPADM

## 2017-08-02 RX ORDER — ASPIRIN 81 MG/1
81 TABLET ORAL DAILY
Status: DISCONTINUED | OUTPATIENT
Start: 2017-08-02 | End: 2017-08-03

## 2017-08-02 RX ORDER — MIDAZOLAM HYDROCHLORIDE 1 MG/ML
1 INJECTION INTRAMUSCULAR; INTRAVENOUS EVERY 5 MIN PRN
Status: DISCONTINUED | OUTPATIENT
Start: 2017-08-02 | End: 2017-08-02 | Stop reason: HOSPADM

## 2017-08-02 RX ORDER — ALBUTEROL SULFATE 90 UG/1
2 AEROSOL, METERED RESPIRATORY (INHALATION) EVERY 6 HOURS PRN
Status: DISCONTINUED | OUTPATIENT
Start: 2017-08-02 | End: 2017-08-03

## 2017-08-02 RX ORDER — CLINDAMYCIN PHOSPHATE 900 MG/50ML
INJECTION INTRAVENOUS
Status: DISCONTINUED | OUTPATIENT
Start: 2017-08-02 | End: 2017-08-02

## 2017-08-02 RX ORDER — MEPERIDINE HYDROCHLORIDE 25 MG/ML
12.5 INJECTION INTRAMUSCULAR; INTRAVENOUS; SUBCUTANEOUS AS NEEDED
Status: DISCONTINUED | OUTPATIENT
Start: 2017-08-02 | End: 2017-08-02 | Stop reason: HOSPADM

## 2017-08-02 RX ORDER — ONDANSETRON 2 MG/ML
4 INJECTION INTRAMUSCULAR; INTRAVENOUS EVERY 6 HOURS PRN
Status: DISCONTINUED | OUTPATIENT
Start: 2017-08-02 | End: 2017-08-03

## 2017-08-02 RX ORDER — HEPARIN SODIUM 5000 [USP'U]/ML
5000 INJECTION, SOLUTION INTRAVENOUS; SUBCUTANEOUS ONCE
Status: COMPLETED | OUTPATIENT
Start: 2017-08-02 | End: 2017-08-02

## 2017-08-02 RX ORDER — ONDANSETRON 2 MG/ML
4 INJECTION INTRAMUSCULAR; INTRAVENOUS AS NEEDED
Status: DISCONTINUED | OUTPATIENT
Start: 2017-08-02 | End: 2017-08-02 | Stop reason: HOSPADM

## 2017-08-02 RX ORDER — DEXTROSE MONOHYDRATE 25 G/50ML
50 INJECTION, SOLUTION INTRAVENOUS
Status: DISCONTINUED | OUTPATIENT
Start: 2017-08-02 | End: 2017-08-03

## 2017-08-02 RX ORDER — SODIUM CHLORIDE 9 MG/ML
INJECTION, SOLUTION INTRAVENOUS CONTINUOUS
Status: DISCONTINUED | OUTPATIENT
Start: 2017-08-02 | End: 2017-08-03

## 2017-08-02 RX ORDER — DEXTROSE MONOHYDRATE 25 G/50ML
50 INJECTION, SOLUTION INTRAVENOUS
Status: DISCONTINUED | OUTPATIENT
Start: 2017-08-02 | End: 2017-08-02

## 2017-08-02 RX ORDER — MONTELUKAST SODIUM 10 MG/1
10 TABLET ORAL NIGHTLY
Status: DISCONTINUED | OUTPATIENT
Start: 2017-08-02 | End: 2017-08-03

## 2017-08-02 RX ORDER — NALOXONE HYDROCHLORIDE 0.4 MG/ML
80 INJECTION, SOLUTION INTRAMUSCULAR; INTRAVENOUS; SUBCUTANEOUS AS NEEDED
Status: DISCONTINUED | OUTPATIENT
Start: 2017-08-02 | End: 2017-08-02 | Stop reason: HOSPADM

## 2017-08-02 RX ORDER — HYDROCODONE BITARTRATE AND ACETAMINOPHEN 5; 325 MG/1; MG/1
1 TABLET ORAL AS NEEDED
Status: DISCONTINUED | OUTPATIENT
Start: 2017-08-02 | End: 2017-08-02 | Stop reason: HOSPADM

## 2017-08-02 RX ORDER — MELATONIN
325 2 TIMES DAILY WITH MEALS
Status: DISCONTINUED | OUTPATIENT
Start: 2017-08-02 | End: 2017-08-03

## 2017-08-02 RX ORDER — DULOXETIN HYDROCHLORIDE 30 MG/1
30 CAPSULE, DELAYED RELEASE ORAL 2 TIMES DAILY
Status: DISCONTINUED | OUTPATIENT
Start: 2017-08-02 | End: 2017-08-03

## 2017-08-02 RX ORDER — POLYETHYLENE GLYCOL 3350 17 G/17G
17 POWDER, FOR SOLUTION ORAL DAILY
Status: DISCONTINUED | OUTPATIENT
Start: 2017-08-02 | End: 2017-08-03

## 2017-08-02 NOTE — OR NURSING
CHRISTELLE Jay at bedside to place another 2 stitches to umbilical incision. Gauze placed over site. Will monitor. Ok per [employer] for patient to be discharged to home once feels ok to do so. Patient tolerated well, aware of plan of care.

## 2017-08-02 NOTE — OPERATIVE REPORT
[facility]  Operative Note    Esperanza Khanna Location: OR   [facility] [id number] MRN [id number]   Admission Date 8/2/2017 Operation Date 8/2/2017   Attending Physician Ronaldo Encarnacion MD Operating Physician Warrick Dancer, MD     Pre-Operative Diagnosis: Abdomin evidence of ascites. There is no evidence of malignancy throughout the entire examination. The liver appears normal despite a previous history of steatosis. Gallbladder surgically absent.   Anterior surface the intestine omentum all appeared normal. undyed Vicryl in a subcuticular fashion.             EBL: 5 cc    Pathologic Specimen: None    Drains: None    Jorge Luis Arroyo MD  8/2/2017  12:50 PM

## 2017-08-02 NOTE — OR NURSING
Paged and updated Dr Herminio Mckeon regarding moderate amount of sang. Drainage from umbilical incision. Per Dr Herminio Mckeon it is not a concern, reinforce dressing.

## 2017-08-02 NOTE — H&P
Active Problems      1. Lower abdominal pain    2. MS (multiple sclerosis) ([facility] [address]    3. Neuropathic pain of both legs    4. Type 2 diabetes mellitus with diabetic polyneuropathy, with long-term current use of insulin ([facility]    5.  Gastroesophageal reflux dise acute within the last week. She has chronic symptoms as well. It is associated with her irritable bowel syndrome. She gets severe lower abdominal pain after bowel movements. They are worse after she urinates at 7/10.  They are most severe after bowel mo techniques were used. Dose information is   transmitted to the [facility] of [facility]) NRDR ([address]) which includes the Dose Index Registry.      PATIENT STATED HISTORY:(As transcribed by Technologist)  Patient complain surgical changes. 2. Trace amount of nonspecific infiltration of the fat along the anterior lateral aspect of the mid descending colon as detailed above.  This is in similar location to prior epiploic appendigitis noted 9/14/15 with a decrease of inflammat date: CHOLECYSTECTOMY  2015: COLONOSCOPY N/A      Comment: Procedure: COLONOSCOPY;  Surgeon: Alicia Walsh DO;  Location: [facility] ENDOSCOPY  No date: OTHER SURGICAL HISTORY      Comment:  x 2  No date:  [facility] [address] Cap Take 100 mg by mouth 2 (two) times daily. Disp:  Rfl:    Insulin Glargine (TOUJEO SOLOSTAR) 300 UNIT/ML Subcutaneous Solution Pen-injector Inject 55 Units into the skin daily. Disp: 6 pen Rfl: 0   PEG 3350 Oral Powd Pack Take 17 g by mouth daily.  Disp: (PRILOSEC) 20 MG Oral Capsule Delayed Release Take 20 mg by mouth 2 (two) times daily before meals.    Disp:  Rfl:    Albuterol Sulfate HFA (VENTOLIN HFA) 108 (90 BASE) MCG/ACT Inhalation Aero Soln Inhale 2 puffs into the lungs every 6 (six) hours as needed No accessory muscle usage. No tachypnea. No respiratory distress. She has no decreased breath sounds. She has no wheezes. She has no rhonchi. She has no rales. She exhibits no mass. A plasmapheresis catheter is present in the left subclavian region.    Ab adenopathy present. Neurological: She is alert and oriented to person, place, and time. Skin: She is not diaphoretic. Nursing note and vitals reviewed.      Assessment   Lower abdominal pain  (primary encounter diagnosis)  MS (multiple sclerosis) ([facility] [address] severe lower abdominal pain after bowel movements. They are worse after she urinates at 7/10. They are most severe after bowel movement at 10/10. Her normal cycle is to go 3 days without a bowel movement. This is followed by diarrhea for 2 days. and spleen are not palpable. Patient's BMI is 24.21. We will be taking this patient to the operating room for laparoscopic lysis of adhesions. She has had multiple procedures in the past with some good results apparently from lysis of adhesions.   We

## 2017-08-02 NOTE — OR NURSING
Dr. Liyah Valverde called and made aware pt lethargic, weak. She states she \"just doesn't feel right\". Glucose was checked and was 172. Vitals have been stable. Site to umbilicus remains clean and intact, no new bleeding noted.  Orders received to admit patient

## 2017-08-02 NOTE — OR NURSING
Report called to Mary Salguero RN. Awaiting transport to room 310. Patient and family aware. Patient resting comfortably.

## 2017-08-02 NOTE — OR NURSING
Dressing noted to have drainage when pt is in bathroom changing. Dressing changed abdominal binder placed. Pt states she's feeling warm pt brought back to reclining chair. Cool towel placed on neck and forehead, provided with water.  Pt's SBP 97/66 per tren

## 2017-08-02 NOTE — ANESTHESIA POSTPROCEDURE EVALUATION
[facility]:  [facility]   Age/Gender 50year old female MRN [id number]   [facility] SURGERY Attending Haritha Simons MD   Hosp Day # 0 PCP Trent Martines MD       Anesthesia Post-op Note

## 2017-08-02 NOTE — ANESTHESIA PREPROCEDURE EVALUATION
PRE-OP EVALUATION    Patient Name: Inga Mckenzie    Pre-op Diagnosis: Abdominal adhesions [K66.0]    Procedure(s):  LAPAROSCOPIC LYSIS OF ABDOMINAL ADHESIONS    Surgeon(s) and Role:     Richy Gayle MD - Primary    Pre-op vitals reviewed.   Temp: 97.8 Rfl: 0   PEG 3350 Oral Powd Pack Take 17 g by mouth daily. Disp: 30 each Rfl: 0   Insulin Lispro 100 UNIT/ML Subcutaneous Solution Inject into the skin 3 (three) times daily before meals.  Disp:  Rfl:    BACLOFEN 20 MG Oral Tab TAKE ONE TABLET BY MOUTH 4 TI PONV       GI/Hepatic/Renal      (+) GERD          (+) liver disease                 Cardiovascular  Comment: Normal sinus rhythm  T wave abnormality, consider anterior ischemia  Abnormal ECG  When compared with ECG of 20-MAR-2017 10:25,  high lateral t wa 50%).    ----------------------------------------------------------------------------  Measurements     Left ventricle                      Value        Reference   LV ID, ED, PLAX                     4.4   cm     3.9 - 5.3   LV ID, ES, PLAX                ---------   Mitral peak gradient, D             2     mm Hg  ---------   Mitral E/A ratio, peak              1.2          ---------  Legend:  (L)  and  (H)  mary values outside specified reference range.    ------------------------------------------------ GALLBLADDER  JULY 2013: THYROIDECTOMY      Comment: benign MNG  No date: TOTAL ABDOM HYSTERECTOMY  May 2014: UPPER GI ENDOSCOPY PERFORMED     Smoking status: Never Smoker    Smokeless tobacco: Never Used    Alcohol use No       Drug use: No     Available p

## 2017-08-03 VITALS
TEMPERATURE: 99 F | SYSTOLIC BLOOD PRESSURE: 99 MMHG | BODY MASS INDEX: 25.22 KG/M2 | DIASTOLIC BLOOD PRESSURE: 68 MMHG | HEART RATE: 89 BPM | RESPIRATION RATE: 18 BRPM | HEIGHT: 66 IN | WEIGHT: 156.94 LBS | OXYGEN SATURATION: 95 %

## 2017-08-03 LAB
GLUCOSE BLD-MCNC: 111 MG/DL (ref 65–99)
GLUCOSE BLD-MCNC: 119 MG/DL (ref 65–99)

## 2017-08-03 PROCEDURE — 82962 GLUCOSE BLOOD TEST: CPT

## 2017-08-03 NOTE — CM/SW NOTE
08/03/17 1100   /SW Referral Data   Referral Source Physician;Social Work (self-referral)   Reason for Referral Discharge planning   Informant Patient   Pertinent Medical Hx   Primary Care Physician Name [facility]   Patient Info   Patient's Mental Statu

## 2017-08-03 NOTE — PROGRESS NOTES
PT RESTING IN BED, EASY NON LABORED BREATHING ON RA. VS WNL. CPOX AND TELEMETRY IN PLACE. PT STARTED ON CLEAR LIQUIDS. UP WITH ASSIST. LAP SITES X4 WITH DRAINAGE ON THE EBLI LAP SITE, DRESSING INTACT SOME OLD DRY DRAINAGE NOTED.  PORT A CATH ACCESSED FLUIDS

## 2017-08-03 NOTE — PROGRESS NOTES
NURSING DISCHARGE NOTE    Discharged Home via Wheelchair. Accompanied by Family member  Belongings Taken by patient/family. PATIENT DISCHARGED HOME IN STABLE CONDITION. PATIENT LEFT FLOOR PER WHEELCHAIR AND WAS ACCOMPANIED BY FAMILY MEMBER.  Manuel Regalado

## 2017-08-03 NOTE — PROGRESS NOTES
[facility]  Progress Note    Nicole Colon Patient Status:  Inpatient    3/11/1970 MRN [id number]   [facility] 3NW-A Attending Davy Wyatt MD   Hosp Day # 1 PCP Josue Gan MD     Subjective:  No new complaints, incisional pain GERD (gastroesophageal reflux disease)     Fatty liver     IDDM (insulin dependent diabetes mellitus) ([facility] [address]     Generalized abdominal pain     Type 2 diabetes mellitus with diabetic polyneuropathy ([facility] [address]     At risk for falling     Cardiomyopathy in other d

## 2017-08-03 NOTE — PLAN OF CARE
DISCHARGE PLANNING    • Discharge to home or other facility with appropriate resources Completed        PAIN - ADULT    • Verbalizes/displays adequate comfort level or patient's stated pain goal Completed        Patient/Family Goals    • Patient/Family Eladio

## 2017-08-03 NOTE — DISCHARGE SUMMARY
[facility]  Discharge Summary    Valeri Peña Patient Status:  Inpatient    3/11/1970 MRN [id number]   [facility] 3NW-A Attending Maulik [place] MD   Hosp Day # 1 PCP Rajinder Lisa MD     Date of Admission: 2017    Date of Disc erythema.     Consultations: Hospitalist and Deanna    Complications: none     Disposition: Home to self care   Discharge Condition: Good    Discharge Medications: Current Discharge Medication List    START taking these medications    HYDROcodone-acetamino (PRAVACHOL) 80 MG Oral Tab  Take 80 mg by mouth nightly. omeprazole (PRILOSEC) 20 MG Oral Capsule Delayed Release  Take 20 mg by mouth 2 (two) times daily before meals.       Albuterol Sulfate HFA (VENTOLIN HFA) 108 (90 BASE) MCG/ACT Inhalation Aero So

## 2017-08-03 NOTE — PROGRESS NOTES
PAGED DR. ARCE TO INFORM HIM OF DISCHARGE CLEARANCE FROM SURGERY.  STATED PATIENT IS CLEARED FOR DISCHARGE AND WILL NEED TO FOLLOW UP NEXT WEEK.

## 2017-08-03 NOTE — H&P
[address]    PATIENT'S NAME: VANI PERICO A   ATTENDING PHYSICIAN: Kimberly Garcia M.D.    PATIENT ACCOUNT#:   [de-identified]    LOCATION:  [address] [place] [address]  MEDICAL RECORD #:   [id number]       YOB: 1970  ADMISSION DATE:       08/02/2017 Peripheral neuropathy. 4.   Diabetes type 2 with complications. 5.   Gastroesophageal reflux disease. 6.   Fibromyalgia. 7.   Renal insufficiency. 8.   Hypothyroidism. 9.   Osteoporosis. PLAN:  Postoperative care. DVT prophylaxis. Ambulate.

## 2017-08-03 NOTE — PLAN OF CARE
PT RECEIVED FORM SD'S. PT IS AO X4. ON RA. REG HEART SOUNDS. BS HYPO. DENIES FLATUS. 3 LAP SITES WITH SS CDI. 1 SITE BY UMBILICUS WITH SOME SCAT OLD BLOOD,WILL CONTINUE TO MONITOR. PT  C/O ABD DISCOMFORT, DECLINED PAIN MED'S.  POC UPDATED,PT VERBALIZED UNDE

## 2017-08-07 ENCOUNTER — TELEPHONE (OUTPATIENT)
Dept: NEUROLOGY | Facility: CLINIC | Age: 47
End: 2017-08-07

## 2017-08-07 NOTE — TELEPHONE ENCOUNTER
Home health certification and plan of care received from [facility] for physician review and signature. Patient receives therapy and PLEX catheter care. Certification period 7/26/2017 to 9/23/2017.   Paperwork signed and faxed with receipt co

## 2017-08-14 ENCOUNTER — HOSPITAL ENCOUNTER (OUTPATIENT)
Dept: PERIOP | Facility: HOSPITAL | Age: 47
Discharge: HOME | End: 2017-08-14
Attending: INTERNAL MEDICINE
Payer: MEDICARE

## 2017-08-14 VITALS
RESPIRATION RATE: 20 BRPM | OXYGEN SATURATION: 98 % | DIASTOLIC BLOOD PRESSURE: 78 MMHG | HEART RATE: 73 BPM | TEMPERATURE: 99 F | SYSTOLIC BLOOD PRESSURE: 111 MMHG

## 2017-08-14 LAB — GLUCOSE BLD-MCNC: 161 MG/DL (ref 65–99)

## 2017-08-14 PROCEDURE — 82962 GLUCOSE BLOOD TEST: CPT

## 2017-08-14 PROCEDURE — P9045 ALBUMIN (HUMAN), 5%, 250 ML: HCPCS | Performed by: INTERNAL MEDICINE

## 2017-08-14 PROCEDURE — 36514 APHERESIS PLASMA: CPT | Performed by: INTERNAL MEDICINE

## 2017-08-14 RX ORDER — SODIUM CHLORIDE 9 MG/ML
INJECTION, SOLUTION INTRAVENOUS ONCE
Status: DISCONTINUED | OUTPATIENT
Start: 2017-08-14 | End: 2017-08-18

## 2017-08-14 RX ORDER — ALBUMIN, HUMAN INJ 5% 5 %
3000 SOLUTION INTRAVENOUS ONCE
Status: COMPLETED | OUTPATIENT
Start: 2017-08-14 | End: 2017-08-14

## 2017-08-14 RX ORDER — HEPARIN SODIUM 1000 [USP'U]/ML
4000 INJECTION, SOLUTION INTRAVENOUS; SUBCUTANEOUS ONCE
Status: COMPLETED | OUTPATIENT
Start: 2017-08-14 | End: 2017-08-14

## 2017-08-14 RX ORDER — HEPARIN SODIUM 1000 [USP'U]/ML
INJECTION, SOLUTION INTRAVENOUS; SUBCUTANEOUS
Status: COMPLETED
Start: 2017-08-14 | End: 2017-08-14

## 2017-08-14 RX ADMIN — HEPARIN SODIUM 4000 UNITS: 1000 INJECTION, SOLUTION INTRAVENOUS; SUBCUTANEOUS at 09:41:00

## 2017-08-14 RX ADMIN — ALBUMIN, HUMAN INJ 5% 3000 ML: 5 SOLUTION INTRAVENOUS at 09:42:00

## 2017-08-14 NOTE — OR NURSING
Pt here for plasmaphersis treatment. ID band placed. Consent signed. See [facility] flowsheet for treatment details, tolerance of treatment, vitals obtained, medications administered.

## 2017-08-14 NOTE — OR NURSING
Plasmaphersis treatment completed. Pt tolerated well and ambulated herself out for discharge. See [facility] paperwork for details of meds administered, vitals obtained, and tolerance.  Pt aware we will need further orders from her neurologist/nephrologist f

## 2017-08-17 ENCOUNTER — OFFICE VISIT (OUTPATIENT)
Dept: SURGERY | Facility: CLINIC | Age: 47
End: 2017-08-17

## 2017-08-17 VITALS
HEIGHT: 66 IN | BODY MASS INDEX: 25.71 KG/M2 | SYSTOLIC BLOOD PRESSURE: 120 MMHG | HEART RATE: 72 BPM | DIASTOLIC BLOOD PRESSURE: 80 MMHG | WEIGHT: 160 LBS

## 2017-08-17 DIAGNOSIS — R10.30 LOWER ABDOMINAL PAIN: ICD-10-CM

## 2017-08-17 DIAGNOSIS — K58.2 IRRITABLE BOWEL SYNDROME WITH CONSTIPATION AND DIARRHEA: ICD-10-CM

## 2017-08-17 DIAGNOSIS — R10.84 GENERALIZED ABDOMINAL PAIN: ICD-10-CM

## 2017-08-17 DIAGNOSIS — K66.0 ABDOMINAL ADHESIONS: Primary | ICD-10-CM

## 2017-08-17 DIAGNOSIS — Z98.890 S/P LAPAROSCOPIC SURGERY: ICD-10-CM

## 2017-08-17 PROCEDURE — 99024 POSTOP FOLLOW-UP VISIT: CPT | Performed by: PHYSICIAN ASSISTANT

## 2017-08-17 NOTE — PROGRESS NOTES
Post Operative Visit Note       Active Problems  1. Abdominal adhesions    2. Generalized abdominal pain    3. Irritable bowel syndrome with constipation and diarrhea    4. Lower abdominal pain    5.  S/P laparoscopic surgery         Chief Complaint   Patie Multiple sclerosis ([facility] [address]    • Myocardial infarction ([facility] [address]    • Neuropathy (HCC)    • Osteoporosis     KAMALJIT in her 35s   • Other and unspecified hyperlipidemia    • PONV (postoperative nausea and vomiting)     WOKE UP DURING PROCEDURE   • Scoliosis of lumbar Oral Tab Take 1-2 tablets by mouth every 4 to 6 hours as needed. Disp: 30 tablet Rfl: 0   Pioglitazone HCl 15 MG Oral Tab Take 15 mg by mouth daily. Disp:  Rfl:    MetFORMIN HCl 500 MG Oral Tab Take 500 mg by mouth daily.  Disp:  Rfl:    docusate sodium 100 by mouth After lunch. Disp:  Rfl:    Pravastatin Sodium (PRAVACHOL) 80 MG Oral Tab Take 80 mg by mouth nightly. Disp:  Rfl:    lidocaine (XYLOCAINE) 5 % Apply Externally Ointment Apply 1 Dose topically as needed (for feet).    Disp:  Rfl:    omeprazole Neck supple. Pulmonary/Chest: Effort normal and breath sounds normal. No respiratory distress. Abdominal: Soft. Bowel sounds are normal. She exhibits no distension and no mass. There is no tenderness. There is no rebound and no guarding.        Abdomina

## 2017-08-22 ENCOUNTER — OFFICE VISIT (OUTPATIENT)
Dept: NEUROLOGY | Facility: CLINIC | Age: 47
End: 2017-08-22

## 2017-08-22 VITALS — RESPIRATION RATE: 18 BRPM | SYSTOLIC BLOOD PRESSURE: 112 MMHG | DIASTOLIC BLOOD PRESSURE: 66 MMHG | HEART RATE: 80 BPM

## 2017-08-22 DIAGNOSIS — G37.9 DEMYELINATING DISEASE OF CENTRAL NERVOUS SYSTEM (HCC): ICD-10-CM

## 2017-08-22 DIAGNOSIS — G57.93 NEUROPATHIC PAIN OF BOTH LEGS: Primary | ICD-10-CM

## 2017-08-22 PROCEDURE — 99214 OFFICE O/P EST MOD 30 MIN: CPT | Performed by: OTHER

## 2017-08-22 NOTE — PATIENT INSTRUCTIONS
Refill policies:    • Allow 2-3 business days for refills; controlled substances may take longer.   • Contact your pharmacy at least 5 days prior to running out of medication and have them send an electronic request or submit request through the [facility] have a procedure or additional testing performed. [facility]) will contact your insurance carrier to obtain pre-certification or prior authorization.     Unfortunately, MANUEL has seen an increase in denial of payment even though the p

## 2017-08-22 NOTE — PROGRESS NOTES
[facility]  Kai Nascimento  3/11/1970  Primary Care Provider:  Opal Baird MD    8/22/2017    50year old yo patient being seen for:  MS/Demyelinating disease    Last PLEX was (SINGULAIR) 10 MG Oral Tab, Take 10 mg by mouth nightly., Disp: , Rfl:   •  aspirin 81 MG Oral Tab EC, Take 1 tablet by mouth daily.  May use over the counter coated aspirin, Disp: , Rfl: 0  •  DULoxetine HCl (CYMBALTA) 30 MG Oral Cap DR Particles, Take 30 below.      EXAM:  /66   Pulse 80   Resp 18   Looks stated age  Pink conjunctiva anicteric sclerae, moist mucosa  No LAD, no thyromegaly  Lungs clear breath sounds  Heart S1S2, no abnormal sounds  Pink nailbeds no Cyanosis, pulses palpated    Alert w

## 2017-09-05 ENCOUNTER — TELEPHONE (OUTPATIENT)
Dept: NEPHROLOGY | Facility: CLINIC | Age: 47
End: 2017-09-05

## 2017-09-08 ENCOUNTER — TELEPHONE (OUTPATIENT)
Dept: NEPHROLOGY | Facility: CLINIC | Age: 47
End: 2017-09-08

## 2017-09-08 NOTE — TELEPHONE ENCOUNTER
Discussed with pt- to continue pheresis every 3 weeks per neurology indefinitely. Tawanda Quarles- can you send an order to central scheduling / same day surgery?     Jacqueline Deleon

## 2017-09-12 ENCOUNTER — HOSPITAL ENCOUNTER (OUTPATIENT)
Dept: PERIOP | Facility: HOSPITAL | Age: 47
Discharge: HOME | End: 2017-09-12
Attending: INTERNAL MEDICINE
Payer: MEDICARE

## 2017-09-12 VITALS
OXYGEN SATURATION: 100 % | HEART RATE: 69 BPM | RESPIRATION RATE: 16 BRPM | DIASTOLIC BLOOD PRESSURE: 79 MMHG | SYSTOLIC BLOOD PRESSURE: 115 MMHG | TEMPERATURE: 99 F

## 2017-09-12 PROCEDURE — 36514 APHERESIS PLASMA: CPT | Performed by: INTERNAL MEDICINE

## 2017-09-12 PROCEDURE — 80053 COMPREHEN METABOLIC PANEL: CPT | Performed by: INTERNAL MEDICINE

## 2017-09-12 PROCEDURE — P9045 ALBUMIN (HUMAN), 5%, 250 ML: HCPCS | Performed by: INTERNAL MEDICINE

## 2017-09-12 PROCEDURE — 36415 COLL VENOUS BLD VENIPUNCTURE: CPT | Performed by: INTERNAL MEDICINE

## 2017-09-12 PROCEDURE — 85025 COMPLETE CBC W/AUTO DIFF WBC: CPT | Performed by: INTERNAL MEDICINE

## 2017-09-12 PROCEDURE — 90935 HEMODIALYSIS ONE EVALUATION: CPT | Performed by: INTERNAL MEDICINE

## 2017-09-12 RX ORDER — ALBUMIN, HUMAN INJ 5% 5 %
3000 SOLUTION INTRAVENOUS ONCE
Status: DISCONTINUED | OUTPATIENT
Start: 2017-09-12 | End: 2017-09-16

## 2017-09-12 RX ORDER — SODIUM CHLORIDE 9 MG/ML
INJECTION, SOLUTION INTRAVENOUS ONCE
Status: DISCONTINUED | OUTPATIENT
Start: 2017-09-12 | End: 2017-09-16

## 2017-09-12 RX ORDER — HEPARIN SODIUM 1000 [USP'U]/ML
4000 INJECTION, SOLUTION INTRAVENOUS; SUBCUTANEOUS ONCE
Status: DISCONTINUED | OUTPATIENT
Start: 2017-09-12 | End: 2017-09-16

## 2017-09-27 ENCOUNTER — TELEPHONE (OUTPATIENT)
Dept: NEUROLOGY | Facility: CLINIC | Age: 47
End: 2017-09-27

## 2017-09-27 NOTE — TELEPHONE ENCOUNTER
Received home health certification and plan of care. Certification period 9/24/17 - 11/22/17. Patient receiving home health for PLEX access site changed/flushed. Endorsed to Dr. Yasir Blue for review/signature.

## 2017-10-03 ENCOUNTER — TELEPHONE (OUTPATIENT)
Dept: NEUROLOGY | Facility: CLINIC | Age: 47
End: 2017-10-03

## 2017-10-03 ENCOUNTER — HOSPITAL ENCOUNTER (OUTPATIENT)
Dept: PERIOP | Facility: HOSPITAL | Age: 47
Discharge: HOME | End: 2017-10-03
Attending: INTERNAL MEDICINE
Payer: MEDICARE

## 2017-10-03 VITALS
RESPIRATION RATE: 16 BRPM | HEART RATE: 69 BPM | OXYGEN SATURATION: 100 % | DIASTOLIC BLOOD PRESSURE: 83 MMHG | TEMPERATURE: 98 F | SYSTOLIC BLOOD PRESSURE: 112 MMHG

## 2017-10-03 PROCEDURE — 85025 COMPLETE CBC W/AUTO DIFF WBC: CPT | Performed by: INTERNAL MEDICINE

## 2017-10-03 PROCEDURE — P9045 ALBUMIN (HUMAN), 5%, 250 ML: HCPCS | Performed by: INTERNAL MEDICINE

## 2017-10-03 PROCEDURE — 80053 COMPREHEN METABOLIC PANEL: CPT | Performed by: INTERNAL MEDICINE

## 2017-10-03 PROCEDURE — 36514 APHERESIS PLASMA: CPT | Performed by: INTERNAL MEDICINE

## 2017-10-03 PROCEDURE — 82962 GLUCOSE BLOOD TEST: CPT

## 2017-10-03 RX ORDER — HEPARIN SODIUM 1000 [USP'U]/ML
4000 INJECTION, SOLUTION INTRAVENOUS; SUBCUTANEOUS ONCE
Status: DISCONTINUED | OUTPATIENT
Start: 2017-10-03 | End: 2017-10-07

## 2017-10-03 RX ORDER — GLIPIZIDE 10 MG/1
10 TABLET ORAL DAILY
Status: ON HOLD | COMMUNITY
End: 2019-07-07

## 2017-10-03 RX ORDER — ALBUMIN, HUMAN INJ 5% 5 %
3000 SOLUTION INTRAVENOUS ONCE
Status: DISCONTINUED | OUTPATIENT
Start: 2017-10-03 | End: 2017-10-07

## 2017-10-03 RX ORDER — SODIUM CHLORIDE 9 MG/ML
INJECTION, SOLUTION INTRAVENOUS ONCE
Status: DISCONTINUED | OUTPATIENT
Start: 2017-10-03 | End: 2017-10-07

## 2017-10-03 RX ORDER — BACLOFEN 20 MG/1
20 TABLET ORAL 3 TIMES DAILY
COMMUNITY
End: 2018-02-19 | Stop reason: DRUGHIGH

## 2017-10-03 NOTE — OR NURSING
[address] at 9:30 a.m. To schedule p/u for pt. Spoke with Emili Be who told me I needed to schedule p/u with [facility] and Emili Be transferred me to [facility].   I was connected to Joaquin's voice mail and left a message requesting p/u for pt from [facility]

## 2017-10-03 NOTE — TELEPHONE ENCOUNTER
Home health certification and plan of care received from [id number] [place] [address]. group  for physician review and signature. Patient receives skilled nursing services for plasmaphoresis catheter care and improved functioning at home.   Certification period 9/24/2

## 2017-10-24 ENCOUNTER — HOSPITAL ENCOUNTER (OUTPATIENT)
Dept: PERIOP | Facility: HOSPITAL | Age: 47
Discharge: HOME | End: 2017-10-24
Attending: INTERNAL MEDICINE
Payer: MEDICARE

## 2017-10-24 VITALS
HEART RATE: 65 BPM | SYSTOLIC BLOOD PRESSURE: 115 MMHG | OXYGEN SATURATION: 100 % | DIASTOLIC BLOOD PRESSURE: 85 MMHG | RESPIRATION RATE: 16 BRPM | TEMPERATURE: 98 F

## 2017-10-24 PROCEDURE — 85025 COMPLETE CBC W/AUTO DIFF WBC: CPT | Performed by: INTERNAL MEDICINE

## 2017-10-24 PROCEDURE — 80053 COMPREHEN METABOLIC PANEL: CPT | Performed by: INTERNAL MEDICINE

## 2017-10-24 PROCEDURE — P9045 ALBUMIN (HUMAN), 5%, 250 ML: HCPCS | Performed by: INTERNAL MEDICINE

## 2017-10-24 PROCEDURE — 36514 APHERESIS PLASMA: CPT | Performed by: INTERNAL MEDICINE

## 2017-10-24 RX ORDER — SODIUM CHLORIDE 9 MG/ML
INJECTION, SOLUTION INTRAVENOUS ONCE
Status: DISCONTINUED | OUTPATIENT
Start: 2017-10-24 | End: 2017-10-28

## 2017-10-24 RX ORDER — HEPARIN SODIUM 1000 [USP'U]/ML
4000 INJECTION, SOLUTION INTRAVENOUS; SUBCUTANEOUS ONCE
Status: DISCONTINUED | OUTPATIENT
Start: 2017-10-24 | End: 2017-10-28

## 2017-10-24 RX ORDER — HEPARIN SODIUM 1000 [USP'U]/ML
INJECTION, SOLUTION INTRAVENOUS; SUBCUTANEOUS
Status: DISPENSED
Start: 2017-10-24 | End: 2017-10-24

## 2017-10-24 RX ORDER — ALBUMIN, HUMAN INJ 5% 5 %
3000 SOLUTION INTRAVENOUS ONCE
Status: DISCONTINUED | OUTPATIENT
Start: 2017-10-24 | End: 2017-10-28

## 2017-10-24 NOTE — OR NURSING
Name band checked and applied. Vital signs and weight obtained. Patient in chair in locked position with call light within reach. Consent for plasmapheresis signed and witnessed. Awaiting arrival of [facility] nurse.  See [facility] documentation for patient

## 2017-10-31 ENCOUNTER — PRIOR ORIGINAL RECORDS (OUTPATIENT)
Dept: OTHER | Age: 47
End: 2017-10-31

## 2017-10-31 ENCOUNTER — NURSE ONLY (OUTPATIENT)
Dept: HEMATOLOGY/ONCOLOGY | Facility: HOSPITAL | Age: 47
End: 2017-10-31
Attending: SPECIALIST
Payer: MEDICARE

## 2017-10-31 DIAGNOSIS — Z79.4 TYPE 2 DIABETES MELLITUS WITH DIABETIC POLYNEUROPATHY, WITH LONG-TERM CURRENT USE OF INSULIN (HCC): ICD-10-CM

## 2017-10-31 DIAGNOSIS — G37.9 DEMYELINATING DISEASE OF CENTRAL NERVOUS SYSTEM (HCC): ICD-10-CM

## 2017-10-31 DIAGNOSIS — D64.9 ANEMIA: Primary | ICD-10-CM

## 2017-10-31 DIAGNOSIS — I10 ESSENTIAL HYPERTENSION: ICD-10-CM

## 2017-10-31 DIAGNOSIS — E78.2 MIXED HYPERLIPIDEMIA: ICD-10-CM

## 2017-10-31 DIAGNOSIS — E03.9 HYPOTHYROIDISM: ICD-10-CM

## 2017-10-31 DIAGNOSIS — E11.42 TYPE 2 DIABETES MELLITUS WITH DIABETIC POLYNEUROPATHY, WITH LONG-TERM CURRENT USE OF INSULIN (HCC): ICD-10-CM

## 2017-10-31 PROCEDURE — 84439 ASSAY OF FREE THYROXINE: CPT

## 2017-10-31 PROCEDURE — 80053 COMPREHEN METABOLIC PANEL: CPT

## 2017-10-31 PROCEDURE — 80061 LIPID PANEL: CPT

## 2017-10-31 PROCEDURE — 36591 DRAW BLOOD OFF VENOUS DEVICE: CPT

## 2017-10-31 PROCEDURE — 84443 ASSAY THYROID STIM HORMONE: CPT

## 2017-10-31 RX ORDER — SODIUM CHLORIDE 0.9 % (FLUSH) 0.9 %
10 SYRINGE (ML) INJECTION ONCE
Status: CANCELLED | OUTPATIENT
Start: 2017-10-31

## 2017-10-31 RX ORDER — SODIUM CHLORIDE 0.9 % (FLUSH) 0.9 %
10 SYRINGE (ML) INJECTION ONCE
Status: COMPLETED | OUTPATIENT
Start: 2017-10-31 | End: 2017-10-31

## 2017-10-31 RX ADMIN — SODIUM CHLORIDE 0.9 % (FLUSH) 10 ML: 0.9 % SYRINGE (ML) INJECTION at 08:50:00

## 2017-11-02 ENCOUNTER — OFFICE VISIT (OUTPATIENT)
Dept: SLEEP CENTER | Facility: HOSPITAL | Age: 47
End: 2017-11-02
Attending: SPECIALIST
Payer: MEDICARE

## 2017-11-02 PROCEDURE — 95810 POLYSOM 6/> YRS 4/> PARAM: CPT

## 2017-11-07 NOTE — PROCEDURES
[address] [address]       Accredited by the [facility] of Sleep Medicine (AASM)    PATIENT'S NAME:        PERICO LUDWIG  ATTENDING PHYSICIAN:   Steven Santiago M.D. REFERRING PHYSICIAN:   Guanako Mar M.D.   DANK percent. Body position is documented via technician notes every 15 minutes. FINDINGS:  The study had lights out at 10:08 p.m. and lights on at 5:37 a.m. Total sleep time was 422.5 minutes. Sleep efficiency was 94%.   Sleep latency was just 1.7 minute understand the potential dangers associated with reduced daytime vigilance. 6. Consider obtaining a sleep consult. Thank you for your confidence in the [place] [facility]. If you have any questions, please feel free to contact us at [phone].

## 2017-11-14 ENCOUNTER — HOSPITAL ENCOUNTER (OUTPATIENT)
Dept: PERIOP | Facility: HOSPITAL | Age: 47
Discharge: HOME | End: 2017-11-14
Attending: INTERNAL MEDICINE
Payer: MEDICARE

## 2017-11-14 VITALS
TEMPERATURE: 99 F | OXYGEN SATURATION: 100 % | RESPIRATION RATE: 20 BRPM | DIASTOLIC BLOOD PRESSURE: 83 MMHG | SYSTOLIC BLOOD PRESSURE: 118 MMHG | HEART RATE: 68 BPM

## 2017-11-14 PROCEDURE — 82962 GLUCOSE BLOOD TEST: CPT

## 2017-11-14 PROCEDURE — P9045 ALBUMIN (HUMAN), 5%, 250 ML: HCPCS | Performed by: INTERNAL MEDICINE

## 2017-11-14 RX ORDER — HEPARIN SODIUM 1000 [USP'U]/ML
4000 INJECTION, SOLUTION INTRAVENOUS; SUBCUTANEOUS ONCE
Status: DISCONTINUED | OUTPATIENT
Start: 2017-11-14 | End: 2017-11-15

## 2017-11-14 RX ORDER — SODIUM CHLORIDE 9 MG/ML
INJECTION, SOLUTION INTRAVENOUS ONCE
Status: DISCONTINUED | OUTPATIENT
Start: 2017-11-14 | End: 2017-11-15

## 2017-11-14 RX ORDER — ALBUMIN, HUMAN INJ 5% 5 %
3000 SOLUTION INTRAVENOUS ONCE
Status: DISCONTINUED | OUTPATIENT
Start: 2017-11-14 | End: 2017-11-15

## 2017-11-14 NOTE — OR NURSING
Pt here for plasmaphersis treatment. Jessi CRAWFORD has not arrived yet. Spoke with Pau from [address] [place] [address] and she stated she wasn't aware pt was scheduled and they are unable to accommidate pt today. Treatment to be rescheduled for tomorrow at 1300.  Pt is a

## 2017-11-15 ENCOUNTER — HOSPITAL ENCOUNTER (OUTPATIENT)
Dept: PERIOP | Facility: HOSPITAL | Age: 47
Discharge: HOME | End: 2017-11-15
Attending: INTERNAL MEDICINE
Payer: MEDICARE

## 2017-11-15 VITALS
RESPIRATION RATE: 16 BRPM | SYSTOLIC BLOOD PRESSURE: 112 MMHG | DIASTOLIC BLOOD PRESSURE: 81 MMHG | TEMPERATURE: 99 F | OXYGEN SATURATION: 100 % | HEART RATE: 67 BPM

## 2017-11-15 PROCEDURE — 36514 APHERESIS PLASMA: CPT | Performed by: INTERNAL MEDICINE

## 2017-11-15 PROCEDURE — 80053 COMPREHEN METABOLIC PANEL: CPT | Performed by: INTERNAL MEDICINE

## 2017-11-15 PROCEDURE — P9045 ALBUMIN (HUMAN), 5%, 250 ML: HCPCS | Performed by: INTERNAL MEDICINE

## 2017-11-15 PROCEDURE — 85025 COMPLETE CBC W/AUTO DIFF WBC: CPT | Performed by: INTERNAL MEDICINE

## 2017-11-15 RX ORDER — HEPARIN SODIUM 1000 [USP'U]/ML
4000 INJECTION, SOLUTION INTRAVENOUS; SUBCUTANEOUS ONCE
Status: DISCONTINUED | OUTPATIENT
Start: 2017-11-15 | End: 2017-11-16

## 2017-11-15 RX ORDER — ALBUMIN, HUMAN INJ 5% 5 %
3000 SOLUTION INTRAVENOUS ONCE
Status: DISCONTINUED | OUTPATIENT
Start: 2017-11-15 | End: 2017-11-16

## 2017-11-20 ENCOUNTER — TELEPHONE (OUTPATIENT)
Dept: NEUROLOGY | Facility: CLINIC | Age: 47
End: 2017-11-20

## 2017-11-24 ENCOUNTER — APPOINTMENT (OUTPATIENT)
Dept: LAB | Facility: HOSPITAL | Age: 47
End: 2017-11-24
Attending: SPECIALIST
Payer: MEDICARE

## 2017-11-24 PROCEDURE — 85018 HEMOGLOBIN: CPT

## 2017-11-24 PROCEDURE — 82803 BLOOD GASES ANY COMBINATION: CPT

## 2017-11-24 PROCEDURE — 82375 ASSAY CARBOXYHB QUANT: CPT

## 2017-11-24 PROCEDURE — 83050 HGB METHEMOGLOBIN QUAN: CPT

## 2017-11-24 PROCEDURE — 36415 COLL VENOUS BLD VENIPUNCTURE: CPT

## 2017-11-24 PROCEDURE — 36600 WITHDRAWAL OF ARTERIAL BLOOD: CPT

## 2017-11-27 NOTE — TELEPHONE ENCOUNTER
Medicare home health and hospice certification received for signature and review. Signed and faxed with receipt confirmation. Skilled nursing for catheter care for 9 weeks. Patient seen weekly.

## 2017-11-28 NOTE — TELEPHONE ENCOUNTER
Home health certification and plan of care received for patient. Certification period 11/23/2017 through 1/21/2018. Patient receives care through residential home health. Plan of care signed and reviewed, faxed with receipt confirmation.

## 2017-12-05 ENCOUNTER — HOSPITAL ENCOUNTER (OUTPATIENT)
Dept: PERIOP | Facility: HOSPITAL | Age: 47
Discharge: HOME | End: 2017-12-05
Attending: INTERNAL MEDICINE
Payer: MEDICARE

## 2017-12-05 ENCOUNTER — PRIOR ORIGINAL RECORDS (OUTPATIENT)
Dept: OTHER | Age: 47
End: 2017-12-05

## 2017-12-05 VITALS
DIASTOLIC BLOOD PRESSURE: 86 MMHG | OXYGEN SATURATION: 100 % | HEART RATE: 61 BPM | RESPIRATION RATE: 16 BRPM | SYSTOLIC BLOOD PRESSURE: 125 MMHG | TEMPERATURE: 98 F

## 2017-12-05 PROCEDURE — 82962 GLUCOSE BLOOD TEST: CPT

## 2017-12-05 PROCEDURE — 80053 COMPREHEN METABOLIC PANEL: CPT | Performed by: INTERNAL MEDICINE

## 2017-12-05 PROCEDURE — A4216 STERILE WATER/SALINE, 10 ML: HCPCS | Performed by: INTERNAL MEDICINE

## 2017-12-05 PROCEDURE — 36514 APHERESIS PLASMA: CPT | Performed by: INTERNAL MEDICINE

## 2017-12-05 PROCEDURE — P9045 ALBUMIN (HUMAN), 5%, 250 ML: HCPCS | Performed by: INTERNAL MEDICINE

## 2017-12-05 PROCEDURE — 85025 COMPLETE CBC W/AUTO DIFF WBC: CPT | Performed by: INTERNAL MEDICINE

## 2017-12-05 RX ORDER — HEPARIN SODIUM 1000 [USP'U]/ML
4000 INJECTION, SOLUTION INTRAVENOUS; SUBCUTANEOUS ONCE
Status: DISCONTINUED | OUTPATIENT
Start: 2017-12-05 | End: 2017-12-06

## 2017-12-05 RX ORDER — SODIUM CHLORIDE 9 MG/ML
INJECTION, SOLUTION INTRAVENOUS ONCE
Status: DISCONTINUED | OUTPATIENT
Start: 2017-12-05 | End: 2017-12-06

## 2017-12-05 RX ORDER — ALBUMIN, HUMAN INJ 5% 5 %
3000 SOLUTION INTRAVENOUS ONCE
Status: DISCONTINUED | OUTPATIENT
Start: 2017-12-05 | End: 2017-12-06

## 2017-12-11 ENCOUNTER — PRIOR ORIGINAL RECORDS (OUTPATIENT)
Dept: OTHER | Age: 47
End: 2017-12-11

## 2017-12-11 RX ORDER — BACLOFEN 20 MG/1
TABLET ORAL
Qty: 120 TABLET | Refills: 5 | Status: SHIPPED | OUTPATIENT
Start: 2017-12-11 | End: 2018-03-19

## 2017-12-11 NOTE — TELEPHONE ENCOUNTER
Medication: Baclofen 20 mg    Date of last refill: 03/07/17 with 5 addt refills  [place]     Last office visit: 8/22/2017  Due back to clinic per last office note:  RTN in 6-8 months  Date next office visit scheduled:  F Return in about 7 months (around 3/22/2018).

## 2017-12-16 ENCOUNTER — HOSPITAL ENCOUNTER (OUTPATIENT)
Dept: CV DIAGNOSTICS | Facility: HOSPITAL | Age: 47
Discharge: HOME | End: 2017-12-16
Attending: INTERNAL MEDICINE
Payer: MEDICARE

## 2017-12-16 DIAGNOSIS — R00.2 AWARENESS OF HEART BEAT: ICD-10-CM

## 2017-12-16 PROCEDURE — 93227 XTRNL ECG REC<48 HR R&I: CPT | Performed by: INTERNAL MEDICINE

## 2017-12-16 PROCEDURE — 93226 XTRNL ECG REC<48 HR SCAN A/R: CPT | Performed by: INTERNAL MEDICINE

## 2017-12-16 PROCEDURE — 93225 XTRNL ECG REC<48 HRS REC: CPT | Performed by: INTERNAL MEDICINE

## 2017-12-27 ENCOUNTER — HOSPITAL ENCOUNTER (OUTPATIENT)
Dept: PERIOP | Facility: HOSPITAL | Age: 47
Discharge: HOME | End: 2017-12-27
Attending: INTERNAL MEDICINE
Payer: MEDICARE

## 2017-12-27 VITALS
OXYGEN SATURATION: 100 % | RESPIRATION RATE: 16 BRPM | TEMPERATURE: 98 F | SYSTOLIC BLOOD PRESSURE: 112 MMHG | HEART RATE: 71 BPM | DIASTOLIC BLOOD PRESSURE: 79 MMHG

## 2017-12-27 LAB
ALBUMIN SERPL-MCNC: 3.5 G/DL (ref 3.5–4.8)
ALP LIVER SERPL-CCNC: 67 U/L (ref 39–100)
ALT SERPL-CCNC: 43 U/L (ref 14–54)
AST SERPL-CCNC: 29 U/L (ref 15–41)
BASOPHILS # BLD AUTO: 0.02 X10(3) UL (ref 0–0.1)
BASOPHILS NFR BLD AUTO: 0.4 %
BILIRUB SERPL-MCNC: 0.2 MG/DL (ref 0.1–2)
BUN BLD-MCNC: 13 MG/DL (ref 8–20)
CALCIUM BLD-MCNC: 8.7 MG/DL (ref 8.3–10.3)
CHLORIDE: 104 MMOL/L (ref 101–111)
CO2: 29 MMOL/L (ref 22–32)
CREAT BLD-MCNC: 1.16 MG/DL (ref 0.55–1.02)
EOSINOPHIL # BLD AUTO: 0.15 X10(3) UL (ref 0–0.3)
EOSINOPHIL NFR BLD AUTO: 2.9 %
ERYTHROCYTE [DISTWIDTH] IN BLOOD BY AUTOMATED COUNT: 15.6 % (ref 11.5–16)
GLUCOSE BLD-MCNC: 135 MG/DL (ref 65–99)
GLUCOSE BLD-MCNC: 183 MG/DL (ref 70–99)
HCT VFR BLD AUTO: 35 % (ref 34–50)
HGB BLD-MCNC: 10.8 G/DL (ref 12–16)
IMMATURE GRANULOCYTE COUNT: 0.01 X10(3) UL (ref 0–1)
IMMATURE GRANULOCYTE RATIO %: 0.2 %
LYMPHOCYTES # BLD AUTO: 1.8 X10(3) UL (ref 0.9–4)
LYMPHOCYTES NFR BLD AUTO: 34.6 %
M PROTEIN MFR SERPL ELPH: 6.9 G/DL (ref 6.1–8.3)
MCH RBC QN AUTO: 22.3 PG (ref 27–33.2)
MCHC RBC AUTO-ENTMCNC: 30.9 G/DL (ref 31–37)
MCV RBC AUTO: 72.2 FL (ref 81–100)
MONOCYTES # BLD AUTO: 0.29 X10(3) UL (ref 0.1–0.6)
MONOCYTES NFR BLD AUTO: 5.6 %
NEUTROPHIL ABS PRELIM: 2.93 X10 (3) UL (ref 1.3–6.7)
NEUTROPHILS # BLD AUTO: 2.93 X10(3) UL (ref 1.3–6.7)
NEUTROPHILS NFR BLD AUTO: 56.3 %
PLATELET # BLD AUTO: 226 10(3)UL (ref 150–450)
POTASSIUM SERPL-SCNC: 3.7 MMOL/L (ref 3.6–5.1)
RBC # BLD AUTO: 4.85 X10(6)UL (ref 3.8–5.1)
RED CELL DISTRIBUTION WIDTH-SD: 40.1 FL (ref 35.1–46.3)
SODIUM SERPL-SCNC: 139 MMOL/L (ref 136–144)
WBC # BLD AUTO: 5.2 X10(3) UL (ref 4–13)

## 2017-12-27 PROCEDURE — 36514 APHERESIS PLASMA: CPT | Performed by: INTERNAL MEDICINE

## 2017-12-27 PROCEDURE — 85025 COMPLETE CBC W/AUTO DIFF WBC: CPT | Performed by: INTERNAL MEDICINE

## 2017-12-27 PROCEDURE — 82962 GLUCOSE BLOOD TEST: CPT

## 2017-12-27 PROCEDURE — 36514 APHERESIS PLASMA: CPT

## 2017-12-27 PROCEDURE — 80053 COMPREHEN METABOLIC PANEL: CPT | Performed by: INTERNAL MEDICINE

## 2017-12-27 PROCEDURE — P9045 ALBUMIN (HUMAN), 5%, 250 ML: HCPCS | Performed by: INTERNAL MEDICINE

## 2017-12-27 RX ORDER — HEPARIN SODIUM 1000 [USP'U]/ML
4000 INJECTION, SOLUTION INTRAVENOUS; SUBCUTANEOUS ONCE
Status: DISCONTINUED | OUTPATIENT
Start: 2017-12-27 | End: 2017-12-28

## 2017-12-27 RX ORDER — SODIUM CHLORIDE 9 MG/ML
INJECTION, SOLUTION INTRAVENOUS ONCE
Status: DISCONTINUED | OUTPATIENT
Start: 2017-12-27 | End: 2017-12-28

## 2017-12-27 RX ORDER — CALCIUM GLUCONATE 94 MG/ML
4 INJECTION, SOLUTION INTRAVENOUS ONCE
Status: DISCONTINUED | OUTPATIENT
Start: 2017-12-27 | End: 2017-12-27

## 2017-12-27 RX ORDER — ALBUMIN, HUMAN INJ 5% 5 %
3000 SOLUTION INTRAVENOUS ONCE
Status: DISCONTINUED | OUTPATIENT
Start: 2017-12-27 | End: 2017-12-28

## 2017-12-28 LAB
ALBUMIN: 3.5 G/DL
ALKALINE PHOSPHATATE(ALK PHOS): 66 IU/L
BILIRUBIN TOTAL: 0.2 MG/DL
BUN: 14 MG/DL
CALCIUM: 8.9 MG/DL
CHLORIDE: 107 MEQ/L
CHOLESTEROL, TOTAL: 162 MG/DL
CREATININE, SERUM: 0.93 MG/DL
FREE T4: 1 MG/DL
GLUCOSE: 111 MG/DL
HDL CHOLESTEROL: 41 MG/DL
HEMATOCRIT: 35.8 %
HEMOGLOBIN: 10.9 G/DL
LDL CHOLESTEROL: 106 MG/DL
PLATELETS: 218 K/UL
POTASSIUM, SERUM: 3.7 MEQ/L
PROTEIN, TOTAL: 6.7 G/DL
RED BLOOD COUNT: 4.94 X 10-6/U
SGOT (AST): 28 IU/L
SGPT (ALT): 43 IU/L
SODIUM: 142 MEQ/L
THYROID STIMULATING HORMONE: 0.61 MLU/L
TRIGLYCERIDES: 75 MG/DL
WHITE BLOOD COUNT: 5.4 X 10-3/U

## 2018-01-11 ENCOUNTER — PRIOR ORIGINAL RECORDS (OUTPATIENT)
Dept: OTHER | Age: 48
End: 2018-01-11

## 2018-01-15 ENCOUNTER — TELEPHONE (OUTPATIENT)
Dept: NEUROLOGY | Facility: CLINIC | Age: 48
End: 2018-01-15

## 2018-01-16 ENCOUNTER — HOSPITAL ENCOUNTER (OUTPATIENT)
Dept: PERIOP | Facility: HOSPITAL | Age: 48
Discharge: HOME | End: 2018-01-16
Attending: INTERNAL MEDICINE
Payer: MEDICARE

## 2018-01-16 VITALS
DIASTOLIC BLOOD PRESSURE: 78 MMHG | RESPIRATION RATE: 20 BRPM | TEMPERATURE: 99 F | HEART RATE: 70 BPM | SYSTOLIC BLOOD PRESSURE: 111 MMHG | OXYGEN SATURATION: 100 %

## 2018-01-16 LAB
ALBUMIN SERPL-MCNC: 3.3 G/DL (ref 3.5–4.8)
ALP LIVER SERPL-CCNC: 69 U/L (ref 39–100)
ALT SERPL-CCNC: 29 U/L (ref 14–54)
AST SERPL-CCNC: 20 U/L (ref 15–41)
BASOPHILS # BLD AUTO: 0.02 X10(3) UL (ref 0–0.1)
BASOPHILS NFR BLD AUTO: 0.3 %
BILIRUB SERPL-MCNC: 0.2 MG/DL (ref 0.1–2)
BUN BLD-MCNC: 12 MG/DL (ref 8–20)
CALCIUM BLD-MCNC: 8.7 MG/DL (ref 8.3–10.3)
CHLORIDE: 110 MMOL/L (ref 101–111)
CO2: 28 MMOL/L (ref 22–32)
CREAT BLD-MCNC: 1.09 MG/DL (ref 0.55–1.02)
EOSINOPHIL # BLD AUTO: 0.18 X10(3) UL (ref 0–0.3)
EOSINOPHIL NFR BLD AUTO: 2.7 %
ERYTHROCYTE [DISTWIDTH] IN BLOOD BY AUTOMATED COUNT: 15.4 % (ref 11.5–16)
GLUCOSE BLD-MCNC: 97 MG/DL (ref 70–99)
HCT VFR BLD AUTO: 35.1 % (ref 34–50)
HGB BLD-MCNC: 10.6 G/DL (ref 12–16)
IMMATURE GRANULOCYTE COUNT: 0.02 X10(3) UL (ref 0–1)
IMMATURE GRANULOCYTE RATIO %: 0.3 %
LYMPHOCYTES # BLD AUTO: 2.39 X10(3) UL (ref 0.9–4)
LYMPHOCYTES NFR BLD AUTO: 35.8 %
M PROTEIN MFR SERPL ELPH: 6.6 G/DL (ref 6.1–8.3)
MCH RBC QN AUTO: 22.1 PG (ref 27–33.2)
MCHC RBC AUTO-ENTMCNC: 30.2 G/DL (ref 31–37)
MCV RBC AUTO: 73.3 FL (ref 81–100)
MONOCYTES # BLD AUTO: 0.52 X10(3) UL (ref 0.1–0.6)
MONOCYTES NFR BLD AUTO: 7.8 %
NEUTROPHIL ABS PRELIM: 3.55 X10 (3) UL (ref 1.3–6.7)
NEUTROPHILS # BLD AUTO: 3.55 X10(3) UL (ref 1.3–6.7)
NEUTROPHILS NFR BLD AUTO: 53.1 %
PLATELET # BLD AUTO: 201 10(3)UL (ref 150–450)
POTASSIUM SERPL-SCNC: 3.8 MMOL/L (ref 3.6–5.1)
RBC # BLD AUTO: 4.79 X10(6)UL (ref 3.8–5.1)
RED CELL DISTRIBUTION WIDTH-SD: 40.3 FL (ref 35.1–46.3)
SODIUM SERPL-SCNC: 145 MMOL/L (ref 136–144)
WBC # BLD AUTO: 6.7 X10(3) UL (ref 4–13)

## 2018-01-16 PROCEDURE — 85025 COMPLETE CBC W/AUTO DIFF WBC: CPT | Performed by: INTERNAL MEDICINE

## 2018-01-16 PROCEDURE — 80053 COMPREHEN METABOLIC PANEL: CPT | Performed by: INTERNAL MEDICINE

## 2018-01-16 PROCEDURE — 36514 APHERESIS PLASMA: CPT

## 2018-01-16 PROCEDURE — P9045 ALBUMIN (HUMAN), 5%, 250 ML: HCPCS | Performed by: INTERNAL MEDICINE

## 2018-01-16 RX ORDER — ALBUMIN, HUMAN INJ 5% 5 %
3000 SOLUTION INTRAVENOUS ONCE
Status: DISCONTINUED | OUTPATIENT
Start: 2018-01-16 | End: 2018-02-06

## 2018-01-16 RX ORDER — HEPARIN SODIUM 1000 [USP'U]/ML
4000 INJECTION, SOLUTION INTRAVENOUS; SUBCUTANEOUS ONCE
Status: DISCONTINUED | OUTPATIENT
Start: 2018-01-16 | End: 2018-02-16

## 2018-01-16 RX ORDER — SODIUM CHLORIDE 9 MG/ML
INJECTION, SOLUTION INTRAVENOUS ONCE
Status: DISCONTINUED | OUTPATIENT
Start: 2018-01-16 | End: 2018-02-16

## 2018-01-16 NOTE — TELEPHONE ENCOUNTER
Left detailed message on confidential voice mail. Have not yet received fax, please fax again, number provided.

## 2018-01-16 NOTE — OR NURSING
Pt here for plasmaphersis treatment. Pt aware that Jessi CRAWFORD is running late. Pt properly ID'd and band placed. Consent obtained. Pt resting in recliner with call light w/in reach.  See [facility] flowsheet for details of treatment tolerance, vitals obtai

## 2018-01-21 ENCOUNTER — OFFICE VISIT (OUTPATIENT)
Dept: SLEEP CENTER | Facility: HOSPITAL | Age: 48
End: 2018-01-21
Attending: SPECIALIST
Payer: MEDICARE

## 2018-01-21 PROCEDURE — 95811 POLYSOM 6/>YRS CPAP 4/> PARM: CPT

## 2018-01-23 NOTE — PROCEDURES
[address]       Accredited by the [facility] of Sleep Medicine (AASM)    PATIENT'S NAME:        PERICO LUDWIG  ATTENDING PHYSICIAN:   Raul Schilling M.D. REFERRING PHYSICIAN:   Corie Baxter M.D.   DANK saturation more than or equal to 4 percent. Body position is documented via technician notes every 15 minutes. There is an additional channel for measurement of CPAP flow for the titration of positive airway pressure.     FINDINGS:  The study had lights out preserved LV function per recent echocardiogram.  3.   If daytime sleepiness is a complaint, the patient needs to understand the potential dangers associated with reduced daytime vigilance. Thank you for your confidence in the [place] [facility].   If y

## 2018-01-29 ENCOUNTER — HOSPITAL ENCOUNTER (OUTPATIENT)
Dept: MRI IMAGING | Facility: HOSPITAL | Age: 48
Discharge: HOME | End: 2018-01-29
Attending: SPECIALIST
Payer: MEDICARE

## 2018-01-29 ENCOUNTER — TELEPHONE (OUTPATIENT)
Dept: NEUROLOGY | Facility: CLINIC | Age: 48
End: 2018-01-29

## 2018-01-29 DIAGNOSIS — G35 MULTIPLE SCLEROSIS (HCC): ICD-10-CM

## 2018-01-29 PROCEDURE — A9575 INJ GADOTERATE MEGLUMI 0.1ML: HCPCS | Performed by: SPECIALIST

## 2018-01-29 PROCEDURE — 70553 MRI BRAIN STEM W/O & W/DYE: CPT | Performed by: SPECIALIST

## 2018-01-29 NOTE — TELEPHONE ENCOUNTER
Paperwork received for skilled nursing care in conjunction with PLEX therapy and . Certification period 1/22/18-3-22-18. Paperwork signed and returned via fax.

## 2018-02-06 ENCOUNTER — HOSPITAL ENCOUNTER (OUTPATIENT)
Dept: PERIOP | Facility: HOSPITAL | Age: 48
Discharge: HOME | End: 2018-02-06
Attending: INTERNAL MEDICINE
Payer: MEDICARE

## 2018-02-06 VITALS
DIASTOLIC BLOOD PRESSURE: 81 MMHG | HEART RATE: 66 BPM | SYSTOLIC BLOOD PRESSURE: 114 MMHG | RESPIRATION RATE: 16 BRPM | TEMPERATURE: 99 F | OXYGEN SATURATION: 100 %

## 2018-02-06 LAB
ALBUMIN SERPL-MCNC: 3.6 G/DL (ref 3.5–4.8)
ALP LIVER SERPL-CCNC: 71 U/L (ref 39–100)
ALT SERPL-CCNC: 40 U/L (ref 14–54)
AST SERPL-CCNC: 19 U/L (ref 15–41)
BASOPHILS # BLD AUTO: 0.01 X10(3) UL (ref 0–0.1)
BASOPHILS NFR BLD AUTO: 0.2 %
BILIRUB SERPL-MCNC: 0.1 MG/DL (ref 0.1–2)
BUN BLD-MCNC: 15 MG/DL (ref 8–20)
CALCIUM BLD-MCNC: 8.8 MG/DL (ref 8.3–10.3)
CHLORIDE: 105 MMOL/L (ref 101–111)
CO2: 30 MMOL/L (ref 22–32)
CREAT BLD-MCNC: 1.05 MG/DL (ref 0.55–1.02)
EOSINOPHIL # BLD AUTO: 0.14 X10(3) UL (ref 0–0.3)
EOSINOPHIL NFR BLD AUTO: 2.8 %
ERYTHROCYTE [DISTWIDTH] IN BLOOD BY AUTOMATED COUNT: 15.4 % (ref 11.5–16)
GLUCOSE BLD-MCNC: 124 MG/DL (ref 70–99)
HCT VFR BLD AUTO: 34.8 % (ref 34–50)
HGB BLD-MCNC: 10.8 G/DL (ref 12–16)
IMMATURE GRANULOCYTE COUNT: 0.01 X10(3) UL (ref 0–1)
IMMATURE GRANULOCYTE RATIO %: 0.2 %
LYMPHOCYTES # BLD AUTO: 1.85 X10(3) UL (ref 0.9–4)
LYMPHOCYTES NFR BLD AUTO: 37.1 %
M PROTEIN MFR SERPL ELPH: 7 G/DL (ref 6.1–8.3)
MCH RBC QN AUTO: 22.8 PG (ref 27–33.2)
MCHC RBC AUTO-ENTMCNC: 31 G/DL (ref 31–37)
MCV RBC AUTO: 73.4 FL (ref 81–100)
MONOCYTES # BLD AUTO: 0.32 X10(3) UL (ref 0.1–0.6)
MONOCYTES NFR BLD AUTO: 6.4 %
NEUTROPHIL ABS PRELIM: 2.66 X10 (3) UL (ref 1.3–6.7)
NEUTROPHILS # BLD AUTO: 2.66 X10(3) UL (ref 1.3–6.7)
NEUTROPHILS NFR BLD AUTO: 53.3 %
PLATELET # BLD AUTO: 198 10(3)UL (ref 150–450)
POTASSIUM SERPL-SCNC: 3.7 MMOL/L (ref 3.6–5.1)
RBC # BLD AUTO: 4.74 X10(6)UL (ref 3.8–5.1)
RED CELL DISTRIBUTION WIDTH-SD: 39.9 FL (ref 35.1–46.3)
SODIUM SERPL-SCNC: 142 MMOL/L (ref 136–144)
WBC # BLD AUTO: 5 X10(3) UL (ref 4–13)

## 2018-02-06 PROCEDURE — 85025 COMPLETE CBC W/AUTO DIFF WBC: CPT | Performed by: INTERNAL MEDICINE

## 2018-02-06 PROCEDURE — 36514 APHERESIS PLASMA: CPT

## 2018-02-06 PROCEDURE — 80053 COMPREHEN METABOLIC PANEL: CPT | Performed by: INTERNAL MEDICINE

## 2018-02-06 PROCEDURE — P9045 ALBUMIN (HUMAN), 5%, 250 ML: HCPCS | Performed by: INTERNAL MEDICINE

## 2018-02-06 RX ORDER — ALBUMIN, HUMAN INJ 5% 5 %
3000 SOLUTION INTRAVENOUS ONCE
Status: DISCONTINUED | OUTPATIENT
Start: 2018-02-06 | End: 2018-02-16

## 2018-02-06 RX ORDER — SODIUM CHLORIDE 9 MG/ML
INJECTION, SOLUTION INTRAVENOUS ONCE
Status: DISCONTINUED | OUTPATIENT
Start: 2018-02-06 | End: 2018-02-16

## 2018-02-06 RX ORDER — HEPARIN SODIUM 1000 [USP'U]/ML
4000 INJECTION, SOLUTION INTRAVENOUS; SUBCUTANEOUS ONCE
Status: DISCONTINUED | OUTPATIENT
Start: 2018-02-06 | End: 2018-02-16

## 2018-02-12 ENCOUNTER — TELEPHONE (OUTPATIENT)
Dept: NEPHROLOGY | Facility: CLINIC | Age: 48
End: 2018-02-12

## 2018-02-20 ENCOUNTER — HOSPITAL ENCOUNTER (OUTPATIENT)
Dept: INTERVENTIONAL RADIOLOGY/VASCULAR | Facility: HOSPITAL | Age: 48
Discharge: HOME | End: 2018-02-20
Attending: SPECIALIST | Admitting: SPECIALIST
Payer: MEDICARE

## 2018-02-20 VITALS
RESPIRATION RATE: 19 BRPM | WEIGHT: 175 LBS | DIASTOLIC BLOOD PRESSURE: 85 MMHG | HEART RATE: 76 BPM | BODY MASS INDEX: 28.13 KG/M2 | HEIGHT: 66 IN | SYSTOLIC BLOOD PRESSURE: 132 MMHG | OXYGEN SATURATION: 95 % | TEMPERATURE: 98 F

## 2018-02-20 DIAGNOSIS — G35 MULTIPLE SCLEROSIS (HCC): ICD-10-CM

## 2018-02-20 LAB — INR: 0.9 (ref 0.8–1.3)

## 2018-02-20 PROCEDURE — 82962 GLUCOSE BLOOD TEST: CPT

## 2018-02-20 PROCEDURE — 77001 FLUOROGUIDE FOR VEIN DEVICE: CPT

## 2018-02-20 PROCEDURE — 99152 MOD SED SAME PHYS/QHP 5/>YRS: CPT

## 2018-02-20 PROCEDURE — 87070 CULTURE OTHR SPECIMN AEROBIC: CPT | Performed by: RADIOLOGY

## 2018-02-20 PROCEDURE — 87040 BLOOD CULTURE FOR BACTERIA: CPT | Performed by: SPECIALIST

## 2018-02-20 PROCEDURE — 02PY33Z REMOVAL OF INFUSION DEVICE FROM GREAT VESSEL, PERCUTANEOUS APPROACH: ICD-10-PCS | Performed by: RADIOLOGY

## 2018-02-20 PROCEDURE — 36589 REMOVAL TUNNELED CV CATH: CPT

## 2018-02-20 RX ORDER — CLINDAMYCIN PHOSPHATE 150 MG/ML
INJECTION, SOLUTION INTRAVENOUS
Status: COMPLETED
Start: 2018-02-20 | End: 2018-02-20

## 2018-02-20 RX ORDER — MIDAZOLAM HYDROCHLORIDE 1 MG/ML
INJECTION INTRAMUSCULAR; INTRAVENOUS
Status: COMPLETED
Start: 2018-02-20 | End: 2018-02-20

## 2018-02-20 RX ORDER — SODIUM CHLORIDE 9 MG/ML
INJECTION, SOLUTION INTRAVENOUS CONTINUOUS
Status: DISCONTINUED | OUTPATIENT
Start: 2018-02-20 | End: 2018-02-20

## 2018-02-20 RX ORDER — DIPHENHYDRAMINE HYDROCHLORIDE 50 MG/ML
INJECTION INTRAMUSCULAR; INTRAVENOUS
Status: COMPLETED
Start: 2018-02-20 | End: 2018-02-20

## 2018-02-20 RX ORDER — HEPARIN SODIUM 5000 [USP'U]/ML
INJECTION, SOLUTION INTRAVENOUS; SUBCUTANEOUS
Status: DISCONTINUED
Start: 2018-02-20 | End: 2018-02-20 | Stop reason: WASHOUT

## 2018-02-20 RX ORDER — LIDOCAINE HYDROCHLORIDE 10 MG/ML
INJECTION, SOLUTION INFILTRATION; PERINEURAL
Status: COMPLETED
Start: 2018-02-20 | End: 2018-02-20

## 2018-02-20 RX ADMIN — SODIUM CHLORIDE: 9 INJECTION, SOLUTION INTRAVENOUS at 13:15:00

## 2018-02-20 NOTE — PROCEDURES
Lt tunneled HD catheter removed in toto. Tip sent for cx. No signs of infection along tract. Pt also had blood cx. Will await results before proceeding w/ placement of new catheter. Comp-none.

## 2018-02-20 NOTE — PLAN OF CARE
Pt post perm-a-cath removal from left upper chest. Blood cultures x2 drawn from line prior to removal. Site c/d/i on arrival back to unit. Dressing in place and intact. Pt now A&O x4, tolerating PO.  Discharge instructions explained to pt & , Janey hTomas

## 2018-02-21 LAB — GLUCOSE BLD-MCNC: 98 MG/DL (ref 65–99)

## 2018-02-27 ENCOUNTER — HOSPITAL ENCOUNTER (OUTPATIENT)
Dept: INTERVENTIONAL RADIOLOGY/VASCULAR | Facility: HOSPITAL | Age: 48
Discharge: OTHER INSTITUTION | End: 2018-02-27
Attending: SPECIALIST | Admitting: SPECIALIST
Payer: MEDICARE

## 2018-02-27 ENCOUNTER — HOSPITAL ENCOUNTER (OUTPATIENT)
Dept: PERIOP | Facility: HOSPITAL | Age: 48
Discharge: HOME | End: 2018-02-27
Attending: INTERNAL MEDICINE
Payer: MEDICARE

## 2018-02-27 VITALS
HEART RATE: 75 BPM | WEIGHT: 175 LBS | OXYGEN SATURATION: 96 % | TEMPERATURE: 98 F | HEIGHT: 66 IN | DIASTOLIC BLOOD PRESSURE: 98 MMHG | BODY MASS INDEX: 28.13 KG/M2 | SYSTOLIC BLOOD PRESSURE: 167 MMHG | RESPIRATION RATE: 14 BRPM

## 2018-02-27 VITALS
DIASTOLIC BLOOD PRESSURE: 84 MMHG | HEART RATE: 73 BPM | RESPIRATION RATE: 16 BRPM | SYSTOLIC BLOOD PRESSURE: 136 MMHG | OXYGEN SATURATION: 97 % | TEMPERATURE: 98 F

## 2018-02-27 DIAGNOSIS — G35 MULTIPLE SCLEROSIS (HCC): ICD-10-CM

## 2018-02-27 LAB
ALBUMIN SERPL-MCNC: 3.4 G/DL (ref 3.5–4.8)
ALP LIVER SERPL-CCNC: 80 U/L (ref 39–100)
ALT SERPL-CCNC: 77 U/L (ref 14–54)
AST SERPL-CCNC: 37 U/L (ref 15–41)
BASOPHILS # BLD AUTO: 0.01 X10(3) UL (ref 0–0.1)
BASOPHILS NFR BLD AUTO: 0.2 %
BILIRUB SERPL-MCNC: 0.3 MG/DL (ref 0.1–2)
BUN BLD-MCNC: 11 MG/DL (ref 8–20)
CALCIUM BLD-MCNC: 8.6 MG/DL (ref 8.3–10.3)
CHLORIDE: 108 MMOL/L (ref 101–111)
CO2: 27 MMOL/L (ref 22–32)
CREAT BLD-MCNC: 1.07 MG/DL (ref 0.55–1.02)
EOSINOPHIL # BLD AUTO: 0.13 X10(3) UL (ref 0–0.3)
EOSINOPHIL NFR BLD AUTO: 2.5 %
ERYTHROCYTE [DISTWIDTH] IN BLOOD BY AUTOMATED COUNT: 15.1 % (ref 11.5–16)
GLUCOSE BLD-MCNC: 120 MG/DL (ref 65–99)
GLUCOSE BLD-MCNC: 212 MG/DL (ref 70–99)
HCT VFR BLD AUTO: 34.1 % (ref 34–50)
HGB BLD-MCNC: 10.5 G/DL (ref 12–16)
IMMATURE GRANULOCYTE COUNT: 0.01 X10(3) UL (ref 0–1)
IMMATURE GRANULOCYTE RATIO %: 0.2 %
INR: 1 (ref 0.8–1.3)
LYMPHOCYTES # BLD AUTO: 1.74 X10(3) UL (ref 0.9–4)
LYMPHOCYTES NFR BLD AUTO: 33 %
M PROTEIN MFR SERPL ELPH: 6.7 G/DL (ref 6.1–8.3)
MCH RBC QN AUTO: 23 PG (ref 27–33.2)
MCHC RBC AUTO-ENTMCNC: 30.8 G/DL (ref 31–37)
MCV RBC AUTO: 74.6 FL (ref 81–100)
MONOCYTES # BLD AUTO: 0.3 X10(3) UL (ref 0.1–1)
MONOCYTES NFR BLD AUTO: 5.7 %
NEUTROPHIL ABS PRELIM: 3.08 X10 (3) UL (ref 1.3–6.7)
NEUTROPHILS # BLD AUTO: 3.08 X10(3) UL (ref 1.3–6.7)
NEUTROPHILS NFR BLD AUTO: 58.4 %
PLATELET # BLD AUTO: 206 10(3)UL (ref 150–450)
POTASSIUM SERPL-SCNC: 3.3 MMOL/L (ref 3.6–5.1)
RBC # BLD AUTO: 4.57 X10(6)UL (ref 3.8–5.1)
RED CELL DISTRIBUTION WIDTH-SD: 39.9 FL (ref 35.1–46.3)
SODIUM SERPL-SCNC: 141 MMOL/L (ref 136–144)
WBC # BLD AUTO: 5.3 X10(3) UL (ref 4–13)

## 2018-02-27 PROCEDURE — 80053 COMPREHEN METABOLIC PANEL: CPT | Performed by: INTERNAL MEDICINE

## 2018-02-27 PROCEDURE — 77001 FLUOROGUIDE FOR VEIN DEVICE: CPT

## 2018-02-27 PROCEDURE — 85025 COMPLETE CBC W/AUTO DIFF WBC: CPT | Performed by: INTERNAL MEDICINE

## 2018-02-27 PROCEDURE — A4216 STERILE WATER/SALINE, 10 ML: HCPCS | Performed by: INTERNAL MEDICINE

## 2018-02-27 PROCEDURE — P9045 ALBUMIN (HUMAN), 5%, 250 ML: HCPCS | Performed by: INTERNAL MEDICINE

## 2018-02-27 PROCEDURE — 82962 GLUCOSE BLOOD TEST: CPT

## 2018-02-27 PROCEDURE — 85610 PROTHROMBIN TIME: CPT

## 2018-02-27 PROCEDURE — 99152 MOD SED SAME PHYS/QHP 5/>YRS: CPT

## 2018-02-27 PROCEDURE — 36558 INSERT TUNNELED CV CATH: CPT

## 2018-02-27 PROCEDURE — 36514 APHERESIS PLASMA: CPT | Performed by: INTERNAL MEDICINE

## 2018-02-27 PROCEDURE — 05HY33Z INSERTION OF INFUSION DEVICE INTO UPPER VEIN, PERCUTANEOUS APPROACH: ICD-10-PCS | Performed by: RADIOLOGY

## 2018-02-27 RX ORDER — SODIUM CHLORIDE 9 MG/ML
INJECTION, SOLUTION INTRAVENOUS ONCE
Status: DISCONTINUED | OUTPATIENT
Start: 2018-02-27 | End: 2018-02-28

## 2018-02-27 RX ORDER — HEPARIN SODIUM 5000 [USP'U]/ML
INJECTION, SOLUTION INTRAVENOUS; SUBCUTANEOUS
Status: COMPLETED
Start: 2018-02-27 | End: 2018-02-27

## 2018-02-27 RX ORDER — LIDOCAINE HYDROCHLORIDE 10 MG/ML
INJECTION, SOLUTION INFILTRATION; PERINEURAL
Status: COMPLETED
Start: 2018-02-27 | End: 2018-02-27

## 2018-02-27 RX ORDER — HEPARIN SODIUM 1000 [USP'U]/ML
4000 INJECTION, SOLUTION INTRAVENOUS; SUBCUTANEOUS ONCE
Status: DISCONTINUED | OUTPATIENT
Start: 2018-02-27 | End: 2018-02-28

## 2018-02-27 RX ORDER — MIDAZOLAM HYDROCHLORIDE 1 MG/ML
INJECTION INTRAMUSCULAR; INTRAVENOUS
Status: COMPLETED
Start: 2018-02-27 | End: 2018-02-27

## 2018-02-27 RX ORDER — ALBUMIN, HUMAN INJ 5% 5 %
3000 SOLUTION INTRAVENOUS ONCE
Status: DISCONTINUED | OUTPATIENT
Start: 2018-02-27 | End: 2018-02-28

## 2018-02-27 RX ORDER — DIPHENHYDRAMINE HYDROCHLORIDE 50 MG/ML
INJECTION INTRAMUSCULAR; INTRAVENOUS
Status: COMPLETED
Start: 2018-02-27 | End: 2018-02-27

## 2018-02-27 RX ORDER — SODIUM CHLORIDE 9 MG/ML
INJECTION, SOLUTION INTRAVENOUS CONTINUOUS
Status: DISCONTINUED | OUTPATIENT
Start: 2018-02-27 | End: 2018-02-27

## 2018-02-27 RX ADMIN — SODIUM CHLORIDE: 9 INJECTION, SOLUTION INTRAVENOUS at 11:15:00

## 2018-02-27 NOTE — PROGRESS NOTES
Rc'd pt from IR in stable. VSS. Vancomycin infusing. Pt sleepy, but easily arousable. Son at bedside. Denies c/o pain or discomfort. 13:00: Pt awake. Reviewed d/c instructions with pt and son, verbalizes understanding.  Port flushed with 500u heparin an

## 2018-02-27 NOTE — PROCEDURES
[facility]  Procedure Note    Darshan Gudino Patient Status:  Outpatient in a Bed    3/11/1970 MRN [id number]   Location [address] [facility] Attending Ina Marroquin MD   Hosp Day # 0 PCP Katlin Suggs MD     Procedure:  Roberto Logan

## 2018-02-27 NOTE — H&P
[address] Patient Status:  Outpatient in a Bed    3/11/1970 MRN [id number]   Location [facility] [address] [facility] Attending Lauren Pate MD   Hosp Day # 0 PCP Jose Stern MD     Admitting Raisa ENDOSCOPY PERFORMED    Social History:     Smoking status: Never Smoker    Smokeless tobacco: Never Used    Alcohol use No        Family History:  Family History   Problem Relation Age of Onset   • Other [OTHER] Father      COPD, emphysema   • Hypertension achieving goals; and the potential problems that might occur during recuperation.   I discussed reasonable alternatives to the procedure, including risks, benefits and side effects related to the alternatives, and risks related to not receiving this procedu

## 2018-03-19 ENCOUNTER — OFFICE VISIT (OUTPATIENT)
Dept: NEUROLOGY | Facility: CLINIC | Age: 48
End: 2018-03-19

## 2018-03-19 VITALS
DIASTOLIC BLOOD PRESSURE: 77 MMHG | WEIGHT: 175 LBS | RESPIRATION RATE: 16 BRPM | SYSTOLIC BLOOD PRESSURE: 128 MMHG | HEART RATE: 88 BPM | BODY MASS INDEX: 28.13 KG/M2 | HEIGHT: 66 IN

## 2018-03-19 DIAGNOSIS — G57.93 NEUROPATHIC PAIN OF BOTH LEGS: Primary | ICD-10-CM

## 2018-03-19 DIAGNOSIS — G35 MS (MULTIPLE SCLEROSIS) (HCC): ICD-10-CM

## 2018-03-19 DIAGNOSIS — G37.9 DEMYELINATING DISEASE OF CENTRAL NERVOUS SYSTEM (HCC): ICD-10-CM

## 2018-03-19 PROCEDURE — 99214 OFFICE O/P EST MOD 30 MIN: CPT | Performed by: OTHER

## 2018-03-19 RX ORDER — PREGABALIN 150 MG/1
150 CAPSULE ORAL 2 TIMES DAILY
Qty: 60 CAPSULE | Refills: 5 | Status: SHIPPED | OUTPATIENT
Start: 2018-03-19 | End: 2018-11-12

## 2018-03-19 RX ORDER — PREGABALIN 100 MG/1
100 CAPSULE ORAL 2 TIMES DAILY
Qty: 60 CAPSULE | Refills: 1 | Status: ON HOLD | OUTPATIENT
Start: 2018-03-19 | End: 2018-09-07

## 2018-03-19 NOTE — PROGRESS NOTES
[facility] with [facility]  Tiana Callejas  3/11/1970  Primary Care Provider:  Kennedy Das MD    3/19/2018    46year old yo patient being seen for:  Demyelinating disease    Port changed bec (two) times daily as needed for Pain., Disp: , Rfl:   •  Montelukast Sodium (SINGULAIR) 10 MG Oral Tab, Take 10 mg by mouth nightly., Disp: , Rfl:   •  aspirin 81 MG Oral Tab EC, Take 1 tablet by mouth daily.  May use over the counter coated aspirin, Disp: anicteric sclerae, moist mucosa  No LAD, neck supple  Lungs clear breath sounds  Heart S1S2, no abnormal sounds  Pink nailbeds no Cyanosis, pulses palpated    Slightly broad based gait  Cannot tandem      IMPRESSION & PLANS:  Neuropathic pain of both legs

## 2018-03-19 NOTE — PATIENT INSTRUCTIONS
Refill policies:    • Allow 2-3 business days for refills; controlled substances may take longer.   • Contact your pharmacy at least 5 days prior to running out of medication and have them send an electronic request or submit request through the [facility] for the entire amount billed. Precertification and Prior Authorizations  If your physician has recommended that you have a procedure or additional testing performed.   [employer] ([employer]) will contact your insurance carrier to obtain pr

## 2018-03-20 ENCOUNTER — HOSPITAL ENCOUNTER (OUTPATIENT)
Dept: PERIOP | Facility: HOSPITAL | Age: 48
Discharge: HOME | End: 2018-03-20
Attending: INTERNAL MEDICINE
Payer: MEDICARE

## 2018-03-20 VITALS
HEART RATE: 75 BPM | SYSTOLIC BLOOD PRESSURE: 113 MMHG | RESPIRATION RATE: 16 BRPM | OXYGEN SATURATION: 100 % | TEMPERATURE: 98 F | DIASTOLIC BLOOD PRESSURE: 84 MMHG

## 2018-03-20 LAB
ALBUMIN SERPL-MCNC: 3.4 G/DL (ref 3.5–4.8)
ALP LIVER SERPL-CCNC: 83 U/L (ref 39–100)
ALT SERPL-CCNC: 46 U/L (ref 14–54)
AST SERPL-CCNC: 34 U/L (ref 15–41)
BILIRUB SERPL-MCNC: 0.3 MG/DL (ref 0.1–2)
BUN BLD-MCNC: 12 MG/DL (ref 8–20)
CALCIUM BLD-MCNC: 8.8 MG/DL (ref 8.3–10.3)
CHLORIDE: 109 MMOL/L (ref 101–111)
CO2: 27 MMOL/L (ref 22–32)
CREAT BLD-MCNC: 0.88 MG/DL (ref 0.55–1.02)
ERYTHROCYTE [DISTWIDTH] IN BLOOD BY AUTOMATED COUNT: 14.9 % (ref 11.5–16)
GLUCOSE BLD-MCNC: 113 MG/DL (ref 70–99)
HCT VFR BLD AUTO: 34.6 % (ref 34–50)
HGB BLD-MCNC: 10.5 G/DL (ref 12–16)
M PROTEIN MFR SERPL ELPH: 6.7 G/DL (ref 6.1–8.3)
MCH RBC QN AUTO: 22.2 PG (ref 27–33.2)
MCHC RBC AUTO-ENTMCNC: 30.3 G/DL (ref 31–37)
MCV RBC AUTO: 73.3 FL (ref 81–100)
PLATELET # BLD AUTO: 196 10(3)UL (ref 150–450)
POTASSIUM SERPL-SCNC: 3.7 MMOL/L (ref 3.6–5.1)
RBC # BLD AUTO: 4.72 X10(6)UL (ref 3.8–5.1)
RED CELL DISTRIBUTION WIDTH-SD: 39.3 FL (ref 35.1–46.3)
SODIUM SERPL-SCNC: 144 MMOL/L (ref 136–144)
WBC # BLD AUTO: 5.8 X10(3) UL (ref 4–13)

## 2018-03-20 PROCEDURE — P9045 ALBUMIN (HUMAN), 5%, 250 ML: HCPCS | Performed by: INTERNAL MEDICINE

## 2018-03-20 PROCEDURE — 80053 COMPREHEN METABOLIC PANEL: CPT | Performed by: INTERNAL MEDICINE

## 2018-03-20 PROCEDURE — 85027 COMPLETE CBC AUTOMATED: CPT | Performed by: INTERNAL MEDICINE

## 2018-03-20 PROCEDURE — 36514 APHERESIS PLASMA: CPT | Performed by: INTERNAL MEDICINE

## 2018-03-20 PROCEDURE — A4216 STERILE WATER/SALINE, 10 ML: HCPCS | Performed by: INTERNAL MEDICINE

## 2018-03-20 RX ORDER — HEPARIN SODIUM 1000 [USP'U]/ML
4000 INJECTION, SOLUTION INTRAVENOUS; SUBCUTANEOUS
Status: DISCONTINUED | OUTPATIENT
Start: 2018-03-20 | End: 2018-03-23

## 2018-03-20 RX ORDER — HEPARIN SODIUM 1000 [USP'U]/ML
INJECTION, SOLUTION INTRAVENOUS; SUBCUTANEOUS
Status: DISPENSED
Start: 2018-03-20 | End: 2018-03-20

## 2018-03-20 RX ORDER — ALBUMIN, HUMAN INJ 5% 5 %
3000 SOLUTION INTRAVENOUS ONCE
Status: DISCONTINUED | OUTPATIENT
Start: 2018-03-20 | End: 2018-03-23

## 2018-03-27 ENCOUNTER — TELEPHONE (OUTPATIENT)
Dept: NEUROLOGY | Facility: CLINIC | Age: 48
End: 2018-03-27

## 2018-03-27 NOTE — TELEPHONE ENCOUNTER
Pharmacy [facility] infusion services calling to renew Heparin prescription for . Will speak with Dr. Loc Whitlock and call back.

## 2018-03-27 NOTE — TELEPHONE ENCOUNTER
Left message for Neha Lincoln Pharmacist on secure voice mail. Heparin for double lumen central line.       Refer to [address] for delivery of the following supplies (please note patient has a double-lumen central line):                            - hepar

## 2018-03-30 ENCOUNTER — TELEPHONE (OUTPATIENT)
Dept: NEUROLOGY | Facility: CLINIC | Age: 48
End: 2018-03-30

## 2018-03-30 NOTE — TELEPHONE ENCOUNTER
Faxed signed plan of care to [facility], [phone], fax receipt confirmed.

## 2018-04-02 NOTE — TELEPHONE ENCOUNTER
Received fax for patient's permcath care from [facility] specialty infusion services. Form signed and dated by provider, faxed with receipt confirmation.

## 2018-04-10 ENCOUNTER — HOSPITAL ENCOUNTER (OUTPATIENT)
Dept: PERIOP | Facility: HOSPITAL | Age: 48
Discharge: HOME | End: 2018-04-10
Attending: INTERNAL MEDICINE
Payer: MEDICARE

## 2018-04-10 VITALS
HEART RATE: 73 BPM | RESPIRATION RATE: 20 BRPM | OXYGEN SATURATION: 100 % | DIASTOLIC BLOOD PRESSURE: 84 MMHG | TEMPERATURE: 98 F | SYSTOLIC BLOOD PRESSURE: 131 MMHG

## 2018-04-10 PROCEDURE — 85025 COMPLETE CBC W/AUTO DIFF WBC: CPT | Performed by: INTERNAL MEDICINE

## 2018-04-10 PROCEDURE — P9045 ALBUMIN (HUMAN), 5%, 250 ML: HCPCS | Performed by: INTERNAL MEDICINE

## 2018-04-10 PROCEDURE — 80053 COMPREHEN METABOLIC PANEL: CPT | Performed by: INTERNAL MEDICINE

## 2018-04-10 PROCEDURE — 6A550Z3 PHERESIS OF PLASMA, SINGLE: ICD-10-PCS | Performed by: INTERNAL MEDICINE

## 2018-04-10 PROCEDURE — 36514 APHERESIS PLASMA: CPT | Performed by: INTERNAL MEDICINE

## 2018-04-10 RX ORDER — ALBUMIN, HUMAN INJ 5% 5 %
3000 SOLUTION INTRAVENOUS ONCE
Status: COMPLETED | OUTPATIENT
Start: 2018-04-10 | End: 2018-04-10

## 2018-04-10 RX ORDER — HEPARIN SODIUM 1000 [USP'U]/ML
4000 INJECTION, SOLUTION INTRAVENOUS; SUBCUTANEOUS ONCE
Status: COMPLETED | OUTPATIENT
Start: 2018-04-10 | End: 2018-04-10

## 2018-04-10 RX ADMIN — HEPARIN SODIUM 4000 UNITS: 1000 INJECTION, SOLUTION INTRAVENOUS; SUBCUTANEOUS at 10:00:00

## 2018-04-10 RX ADMIN — ALBUMIN, HUMAN INJ 5% 3000 ML: 5 SOLUTION INTRAVENOUS at 10:00:00

## 2018-04-10 NOTE — OR NURSING
Pt here for plasmaphersis treatment. ID band placed and consent signed. See Jessi' separate paperwork for details of tolerance of treatment, medications administered, and vitals obtained. Call light w/in reach.

## 2018-04-10 NOTE — OR NURSING
Treatment completed. See Jessi summary paperwork for details of tolerance of treatment, vitals obtained and meds administered during treatment. Pt ambulated herself out for discharge.

## 2018-04-18 ENCOUNTER — TELEPHONE (OUTPATIENT)
Dept: NEUROLOGY | Facility: CLINIC | Age: 48
End: 2018-04-18

## 2018-05-01 ENCOUNTER — HOSPITAL ENCOUNTER (OUTPATIENT)
Dept: PERIOP | Facility: HOSPITAL | Age: 48
Discharge: HOME | End: 2018-05-01
Attending: INTERNAL MEDICINE
Payer: MEDICARE

## 2018-05-01 VITALS
SYSTOLIC BLOOD PRESSURE: 123 MMHG | HEART RATE: 79 BPM | RESPIRATION RATE: 16 BRPM | DIASTOLIC BLOOD PRESSURE: 79 MMHG | OXYGEN SATURATION: 100 % | TEMPERATURE: 98 F

## 2018-05-01 PROCEDURE — P9045 ALBUMIN (HUMAN), 5%, 250 ML: HCPCS | Performed by: INTERNAL MEDICINE

## 2018-05-01 PROCEDURE — 36514 APHERESIS PLASMA: CPT | Performed by: INTERNAL MEDICINE

## 2018-05-01 PROCEDURE — 80053 COMPREHEN METABOLIC PANEL: CPT | Performed by: INTERNAL MEDICINE

## 2018-05-01 PROCEDURE — 6A550Z3 PHERESIS OF PLASMA, SINGLE: ICD-10-PCS | Performed by: INTERNAL MEDICINE

## 2018-05-01 PROCEDURE — 85025 COMPLETE CBC W/AUTO DIFF WBC: CPT | Performed by: INTERNAL MEDICINE

## 2018-05-01 RX ORDER — SODIUM CHLORIDE 9 MG/ML
INJECTION, SOLUTION INTRAVENOUS ONCE
Status: DISCONTINUED | OUTPATIENT
Start: 2018-05-01 | End: 2018-05-03

## 2018-05-01 RX ORDER — ALBUMIN, HUMAN INJ 5% 5 %
3000 SOLUTION INTRAVENOUS ONCE
Status: DISCONTINUED | OUTPATIENT
Start: 2018-05-01 | End: 2018-05-03

## 2018-05-01 RX ORDER — HEPARIN SODIUM 1000 [USP'U]/ML
4000 INJECTION, SOLUTION INTRAVENOUS; SUBCUTANEOUS ONCE
Status: DISCONTINUED | OUTPATIENT
Start: 2018-05-01 | End: 2018-05-03

## 2018-05-01 NOTE — OR NURSING
Pt c/o pain at catheter insertion site since placement on 2/27/18. Called IR and requested that Dr. Rachael Santiago or someone else come look at site. Spoke with Lona. Dr. Rachael Santiago in room talking with patient and assessing site.

## 2018-05-01 NOTE — PROGRESS NOTES
[facility]  Progress Note      Gely Sanon Patient Status:  Outpatient in a Bed    3/11/1970 MRN [id number]   Location [address] [place] [address] Attending Maureen Graves MD   [facility] Day # 0 PCP Porfirio Fernandez MD     Called b

## 2018-05-15 ENCOUNTER — TELEPHONE (OUTPATIENT)
Dept: NEUROLOGY | Facility: CLINIC | Age: 48
End: 2018-05-15

## 2018-05-17 ENCOUNTER — TELEPHONE (OUTPATIENT)
Dept: NEUROLOGY | Facility: CLINIC | Age: 48
End: 2018-05-17

## 2018-05-17 NOTE — TELEPHONE ENCOUNTER
Fax received from [facility]. Patient re-certification anticipated 5/21/2018. Skilled services per RN,  Times weeks, off 1 week for 9 weeks. Patient with port for PLEX treatments. Follow up from verbal order.     Form signed and fax

## 2018-05-20 ENCOUNTER — OFFICE VISIT (OUTPATIENT)
Dept: SLEEP CENTER | Facility: HOSPITAL | Age: 48
End: 2018-05-20
Attending: INTERNAL MEDICINE
Payer: MEDICARE

## 2018-05-20 DIAGNOSIS — G47.31 PRIMARY CENTRAL SLEEP APNEA: ICD-10-CM

## 2018-05-20 PROCEDURE — 95811 POLYSOM 6/>YRS CPAP 4/> PARM: CPT

## 2018-05-22 ENCOUNTER — HOSPITAL ENCOUNTER (OUTPATIENT)
Dept: PERIOP | Facility: HOSPITAL | Age: 48
Discharge: HOME | End: 2018-05-22
Attending: INTERNAL MEDICINE
Payer: MEDICARE

## 2018-05-22 VITALS
DIASTOLIC BLOOD PRESSURE: 76 MMHG | OXYGEN SATURATION: 100 % | HEART RATE: 88 BPM | TEMPERATURE: 98 F | SYSTOLIC BLOOD PRESSURE: 111 MMHG | RESPIRATION RATE: 16 BRPM

## 2018-05-22 PROCEDURE — 80053 COMPREHEN METABOLIC PANEL: CPT | Performed by: INTERNAL MEDICINE

## 2018-05-22 PROCEDURE — 85025 COMPLETE CBC W/AUTO DIFF WBC: CPT | Performed by: INTERNAL MEDICINE

## 2018-05-22 PROCEDURE — P9045 ALBUMIN (HUMAN), 5%, 250 ML: HCPCS | Performed by: INTERNAL MEDICINE

## 2018-05-22 PROCEDURE — 36514 APHERESIS PLASMA: CPT | Performed by: INTERNAL MEDICINE

## 2018-05-22 PROCEDURE — 6A550Z3 PHERESIS OF PLASMA, SINGLE: ICD-10-PCS | Performed by: INTERNAL MEDICINE

## 2018-05-22 RX ORDER — HEPARIN SODIUM 1000 [USP'U]/ML
4000 INJECTION, SOLUTION INTRAVENOUS; SUBCUTANEOUS ONCE
Status: DISCONTINUED | OUTPATIENT
Start: 2018-05-22 | End: 2018-05-24

## 2018-05-22 RX ORDER — SODIUM CHLORIDE 9 MG/ML
INJECTION, SOLUTION INTRAVENOUS ONCE
Status: DISCONTINUED | OUTPATIENT
Start: 2018-05-22 | End: 2018-05-24

## 2018-05-22 RX ORDER — ALBUMIN, HUMAN INJ 5% 5 %
3000 SOLUTION INTRAVENOUS ONCE
Status: DISCONTINUED | OUTPATIENT
Start: 2018-05-22 | End: 2018-05-24

## 2018-05-23 NOTE — PROCEDURES
[address] [address],[facility] [address]       Accredited by the [facility] of [facility] (AASM)    PATIENT'S NAME:        PERICO LUDWIG  ATTENDING PHYSICIAN:   Rafa Ochoa M.D.   REFERRING PHYSICIAN:   AFUA Chamberlain ECG, chin EMG, left and right tibialis EMG, snore microphone, respiratory inductance plethysmography, and oxygen saturation via continuous pulse oximetry.  In accordance with AASM recommendations, hypopnea events are scored based on an oxygen saturation mor central apneas. DIAGNOSIS:  Central sleep apnea, ICD code G47. 31.    RECOMMENDATIONS:    1. We will confer with the patient regarding the results of the study and make treatment recommendations.   2.   The patient can be initiated on ASV with settings

## 2018-06-12 ENCOUNTER — PRIOR ORIGINAL RECORDS (OUTPATIENT)
Dept: OTHER | Age: 48
End: 2018-06-12

## 2018-06-12 ENCOUNTER — HOSPITAL ENCOUNTER (OUTPATIENT)
Dept: PERIOP | Facility: HOSPITAL | Age: 48
Discharge: HOME | End: 2018-06-12
Attending: INTERNAL MEDICINE
Payer: MEDICARE

## 2018-06-12 VITALS
HEART RATE: 72 BPM | OXYGEN SATURATION: 99 % | DIASTOLIC BLOOD PRESSURE: 93 MMHG | SYSTOLIC BLOOD PRESSURE: 124 MMHG | RESPIRATION RATE: 16 BRPM | TEMPERATURE: 98 F

## 2018-06-12 PROCEDURE — 85025 COMPLETE CBC W/AUTO DIFF WBC: CPT | Performed by: INTERNAL MEDICINE

## 2018-06-12 PROCEDURE — 36514 APHERESIS PLASMA: CPT | Performed by: INTERNAL MEDICINE

## 2018-06-12 PROCEDURE — 6A550Z3 PHERESIS OF PLASMA, SINGLE: ICD-10-PCS | Performed by: INTERNAL MEDICINE

## 2018-06-12 PROCEDURE — 80053 COMPREHEN METABOLIC PANEL: CPT | Performed by: INTERNAL MEDICINE

## 2018-06-12 PROCEDURE — A4216 STERILE WATER/SALINE, 10 ML: HCPCS | Performed by: INTERNAL MEDICINE

## 2018-06-12 PROCEDURE — 82962 GLUCOSE BLOOD TEST: CPT

## 2018-06-12 PROCEDURE — P9045 ALBUMIN (HUMAN), 5%, 250 ML: HCPCS | Performed by: INTERNAL MEDICINE

## 2018-06-12 RX ORDER — ALBUMIN, HUMAN INJ 5% 5 %
3000 SOLUTION INTRAVENOUS ONCE
Status: DISCONTINUED | OUTPATIENT
Start: 2018-06-12 | End: 2018-06-14

## 2018-06-12 RX ORDER — HEPARIN SODIUM 1000 [USP'U]/ML
4000 INJECTION, SOLUTION INTRAVENOUS; SUBCUTANEOUS ONCE
Status: COMPLETED | OUTPATIENT
Start: 2018-06-12 | End: 2018-06-12

## 2018-06-12 RX ORDER — HEPARIN SODIUM 1000 [USP'U]/ML
INJECTION, SOLUTION INTRAVENOUS; SUBCUTANEOUS
Status: COMPLETED
Start: 2018-06-12 | End: 2018-06-12

## 2018-06-12 RX ORDER — SODIUM CHLORIDE 9 MG/ML
INJECTION, SOLUTION INTRAVENOUS ONCE
Status: DISCONTINUED | OUTPATIENT
Start: 2018-06-12 | End: 2018-06-14

## 2018-06-12 RX ADMIN — HEPARIN SODIUM 4200 UNITS: 1000 INJECTION, SOLUTION INTRAVENOUS; SUBCUTANEOUS at 12:34:00

## 2018-06-25 ENCOUNTER — PRIOR ORIGINAL RECORDS (OUTPATIENT)
Dept: OTHER | Age: 48
End: 2018-06-25

## 2018-06-26 LAB
ALBUMIN: 3.8 G/DL
ALKALINE PHOSPHATATE(ALK PHOS): 87 IU/L
BILIRUBIN TOTAL: 0.2 MG/DL
BUN: 13 MG/DL
CALCIUM: 9.1 MG/DL
CHLORIDE: 105 MEQ/L
CREATININE, SERUM: 1.16 MG/DL
GLUCOSE: 161 MG/DL
HEMATOCRIT: 37 %
HEMOGLOBIN: 11.1 G/DL
PLATELETS: 214 K/UL
POTASSIUM, SERUM: 3.8 MEQ/L
PROTEIN, TOTAL: 7.2 G/DL
RED BLOOD COUNT: 5.08 X 10-6/U
SGOT (AST): 44 IU/L
SGPT (ALT): 65 IU/L
SODIUM: 141 MEQ/L
WHITE BLOOD COUNT: 6.2 X 10-3/U

## 2018-07-03 ENCOUNTER — HOSPITAL ENCOUNTER (OUTPATIENT)
Dept: PERIOP | Facility: HOSPITAL | Age: 48
Discharge: HOME | End: 2018-07-03
Attending: INTERNAL MEDICINE
Payer: MEDICARE

## 2018-07-03 VITALS
DIASTOLIC BLOOD PRESSURE: 84 MMHG | TEMPERATURE: 98 F | OXYGEN SATURATION: 99 % | HEART RATE: 80 BPM | SYSTOLIC BLOOD PRESSURE: 120 MMHG | RESPIRATION RATE: 16 BRPM

## 2018-07-03 LAB
ALBUMIN SERPL-MCNC: 3.5 G/DL (ref 3.5–4.8)
ALP LIVER SERPL-CCNC: 101 U/L (ref 39–100)
ALT SERPL-CCNC: 51 U/L (ref 14–54)
AST SERPL-CCNC: 32 U/L (ref 15–41)
BASOPHILS # BLD AUTO: 0.01 X10(3) UL (ref 0–0.1)
BASOPHILS NFR BLD AUTO: 0.2 %
BILIRUB SERPL-MCNC: 0.2 MG/DL (ref 0.1–2)
BUN BLD-MCNC: 15 MG/DL (ref 8–20)
CALCIUM BLD-MCNC: 8.8 MG/DL (ref 8.3–10.3)
CHLORIDE: 107 MMOL/L (ref 101–111)
CO2: 28 MMOL/L (ref 22–32)
CREAT BLD-MCNC: 1.08 MG/DL (ref 0.55–1.02)
EOSINOPHIL # BLD AUTO: 0.14 X10(3) UL (ref 0–0.3)
EOSINOPHIL NFR BLD AUTO: 3 %
ERYTHROCYTE [DISTWIDTH] IN BLOOD BY AUTOMATED COUNT: 15 % (ref 11.5–16)
GLUCOSE BLD-MCNC: 142 MG/DL (ref 70–99)
HCT VFR BLD AUTO: 35.8 % (ref 34–50)
HGB BLD-MCNC: 10.6 G/DL (ref 12–16)
IMMATURE GRANULOCYTE COUNT: 0.01 X10(3) UL (ref 0–1)
IMMATURE GRANULOCYTE RATIO %: 0.2 %
LYMPHOCYTES # BLD AUTO: 1.65 X10(3) UL (ref 0.9–4)
LYMPHOCYTES NFR BLD AUTO: 35.2 %
M PROTEIN MFR SERPL ELPH: 7 G/DL (ref 6.1–8.3)
MCH RBC QN AUTO: 21.8 PG (ref 27–33.2)
MCHC RBC AUTO-ENTMCNC: 29.6 G/DL (ref 31–37)
MCV RBC AUTO: 73.5 FL (ref 81–100)
MONOCYTES # BLD AUTO: 0.34 X10(3) UL (ref 0.1–1)
MONOCYTES NFR BLD AUTO: 7.2 %
NEUTROPHIL ABS PRELIM: 2.54 X10 (3) UL (ref 1.3–6.7)
NEUTROPHILS # BLD AUTO: 2.54 X10(3) UL (ref 1.3–6.7)
NEUTROPHILS NFR BLD AUTO: 54.2 %
PLATELET # BLD AUTO: 163 10(3)UL (ref 150–450)
POTASSIUM SERPL-SCNC: 3.9 MMOL/L (ref 3.6–5.1)
RBC # BLD AUTO: 4.87 X10(6)UL (ref 3.8–5.1)
RED CELL DISTRIBUTION WIDTH-SD: 39.3 FL (ref 35.1–46.3)
SODIUM SERPL-SCNC: 143 MMOL/L (ref 136–144)
WBC # BLD AUTO: 4.7 X10(3) UL (ref 4–13)

## 2018-07-03 PROCEDURE — 6A550Z3 PHERESIS OF PLASMA, SINGLE: ICD-10-PCS | Performed by: INTERNAL MEDICINE

## 2018-07-03 PROCEDURE — P9045 ALBUMIN (HUMAN), 5%, 250 ML: HCPCS | Performed by: INTERNAL MEDICINE

## 2018-07-03 PROCEDURE — 36514 APHERESIS PLASMA: CPT | Performed by: INTERNAL MEDICINE

## 2018-07-03 PROCEDURE — 80053 COMPREHEN METABOLIC PANEL: CPT | Performed by: INTERNAL MEDICINE

## 2018-07-03 PROCEDURE — 85025 COMPLETE CBC W/AUTO DIFF WBC: CPT | Performed by: INTERNAL MEDICINE

## 2018-07-03 RX ORDER — HEPARIN SODIUM 1000 [USP'U]/ML
4000 INJECTION, SOLUTION INTRAVENOUS; SUBCUTANEOUS ONCE
Status: DISCONTINUED | OUTPATIENT
Start: 2018-07-03 | End: 2018-07-05

## 2018-07-03 RX ORDER — ALBUMIN, HUMAN INJ 5% 5 %
3000 SOLUTION INTRAVENOUS ONCE
Status: DISCONTINUED | OUTPATIENT
Start: 2018-07-03 | End: 2018-07-05

## 2018-07-05 ENCOUNTER — HOSPITAL ENCOUNTER (OUTPATIENT)
Dept: GENERAL RADIOLOGY | Facility: HOSPITAL | Age: 48
Discharge: HOME | End: 2018-07-05
Attending: INTERNAL MEDICINE
Payer: MEDICARE

## 2018-07-05 ENCOUNTER — HOSPITAL ENCOUNTER (OUTPATIENT)
Dept: CV DIAGNOSTICS | Facility: HOSPITAL | Age: 48
Discharge: HOME | End: 2018-07-05
Attending: INTERNAL MEDICINE
Payer: MEDICARE

## 2018-07-05 DIAGNOSIS — R06.00 DYSPNEA: ICD-10-CM

## 2018-07-05 DIAGNOSIS — R00.2 PALPITATIONS: ICD-10-CM

## 2018-07-05 PROCEDURE — 71046 X-RAY EXAM CHEST 2 VIEWS: CPT | Performed by: INTERNAL MEDICINE

## 2018-07-05 PROCEDURE — 93225 XTRNL ECG REC<48 HRS REC: CPT | Performed by: INTERNAL MEDICINE

## 2018-07-05 PROCEDURE — 93226 XTRNL ECG REC<48 HR SCAN A/R: CPT | Performed by: INTERNAL MEDICINE

## 2018-07-05 PROCEDURE — 93227 XTRNL ECG REC<48 HR R&I: CPT | Performed by: INTERNAL MEDICINE

## 2018-07-09 ENCOUNTER — NURSE ONLY (OUTPATIENT)
Dept: HEMATOLOGY/ONCOLOGY | Facility: HOSPITAL | Age: 48
End: 2018-07-09
Attending: INTERNAL MEDICINE
Payer: MEDICARE

## 2018-07-09 ENCOUNTER — PRIOR ORIGINAL RECORDS (OUTPATIENT)
Dept: OTHER | Age: 48
End: 2018-07-09

## 2018-07-09 DIAGNOSIS — G37.9 DEMYELINATING DISEASE OF CENTRAL NERVOUS SYSTEM (HCC): Primary | ICD-10-CM

## 2018-07-09 DIAGNOSIS — E11.42 TYPE 2 DIABETES MELLITUS WITH DIABETIC POLYNEUROPATHY, WITH LONG-TERM CURRENT USE OF INSULIN (HCC): ICD-10-CM

## 2018-07-09 DIAGNOSIS — Z79.4 TYPE 2 DIABETES MELLITUS WITH DIABETIC POLYNEUROPATHY, WITH LONG-TERM CURRENT USE OF INSULIN (HCC): ICD-10-CM

## 2018-07-09 LAB
BUN BLD-MCNC: 14 MG/DL (ref 8–20)
CALCIUM BLD-MCNC: 9 MG/DL (ref 8.3–10.3)
CHLORIDE: 107 MMOL/L (ref 101–111)
CO2: 30 MMOL/L (ref 22–32)
CREAT BLD-MCNC: 1.03 MG/DL (ref 0.55–1.02)
GLUCOSE BLD-MCNC: 76 MG/DL (ref 70–99)
POTASSIUM SERPL-SCNC: 3.7 MMOL/L (ref 3.6–5.1)
PRO-BETA NATRIURETIC PEPTIDE: 52 PG/ML (ref ?–125)
SODIUM SERPL-SCNC: 141 MMOL/L (ref 136–144)

## 2018-07-09 PROCEDURE — 36593 DECLOT VASCULAR DEVICE: CPT

## 2018-07-09 PROCEDURE — 83880 ASSAY OF NATRIURETIC PEPTIDE: CPT | Performed by: INTERNAL MEDICINE

## 2018-07-09 PROCEDURE — 80048 BASIC METABOLIC PNL TOTAL CA: CPT | Performed by: INTERNAL MEDICINE

## 2018-07-09 PROCEDURE — 36415 COLL VENOUS BLD VENIPUNCTURE: CPT

## 2018-07-09 RX ORDER — SODIUM CHLORIDE 0.9 % (FLUSH) 0.9 %
10 SYRINGE (ML) INJECTION ONCE
Status: COMPLETED | OUTPATIENT
Start: 2018-07-09 | End: 2018-07-09

## 2018-07-09 RX ORDER — SODIUM CHLORIDE 0.9 % (FLUSH) 0.9 %
10 SYRINGE (ML) INJECTION ONCE
Status: CANCELLED | OUTPATIENT
Start: 2018-07-09

## 2018-07-09 RX ADMIN — SODIUM CHLORIDE 0.9 % (FLUSH) 10 ML: 0.9 % SYRINGE (ML) INJECTION at 13:40:00

## 2018-07-09 NOTE — PROGRESS NOTES
Patient here for CLL - per patient, has not had port used since January or so. No blood return noted - flushed frequently with saline, patient says she can taste the saline. Alteplase administered x1 @1351.  Checked for blood return at 1425 & 1540, no blood

## 2018-07-10 ENCOUNTER — HOSPITAL ENCOUNTER (OUTPATIENT)
Dept: CV DIAGNOSTICS | Facility: HOSPITAL | Age: 48
Discharge: HOME | End: 2018-07-10
Attending: INTERNAL MEDICINE

## 2018-07-10 ENCOUNTER — MYAURORA ACCOUNT LINK (OUTPATIENT)
Dept: OTHER | Age: 48
End: 2018-07-10

## 2018-07-10 ENCOUNTER — PRIOR ORIGINAL RECORDS (OUTPATIENT)
Dept: OTHER | Age: 48
End: 2018-07-10

## 2018-07-10 ENCOUNTER — NURSE ONLY (OUTPATIENT)
Dept: HEMATOLOGY/ONCOLOGY | Facility: HOSPITAL | Age: 48
End: 2018-07-10
Attending: INTERNAL MEDICINE
Payer: MEDICARE

## 2018-07-10 DIAGNOSIS — G37.9 DEMYELINATING DISEASE OF CENTRAL NERVOUS SYSTEM (HCC): Primary | ICD-10-CM

## 2018-07-10 DIAGNOSIS — E11.42 TYPE 2 DIABETES MELLITUS WITH DIABETIC POLYNEUROPATHY, WITH LONG-TERM CURRENT USE OF INSULIN (HCC): ICD-10-CM

## 2018-07-10 DIAGNOSIS — I42.9 CARDIOMYOPATHY, IDIOPATHIC (HCC): ICD-10-CM

## 2018-07-10 DIAGNOSIS — Z79.4 TYPE 2 DIABETES MELLITUS WITH DIABETIC POLYNEUROPATHY, WITH LONG-TERM CURRENT USE OF INSULIN (HCC): ICD-10-CM

## 2018-07-10 DIAGNOSIS — R00.2 PALPITATIONS: ICD-10-CM

## 2018-07-10 DIAGNOSIS — R06.00 DYSPNEA, UNSPECIFIED TYPE: ICD-10-CM

## 2018-07-10 LAB
BUN: 14 MG/DL
CALCIUM: 9 MG/DL
CHLORIDE: 107 MEQ/L
CREATININE, SERUM: 1.03 MG/DL
GLUCOSE: 76 MG/DL
POTASSIUM, SERUM: 3.7 MEQ/L
PROBNP: 52 PG/ML
SODIUM: 141 MEQ/L

## 2018-07-10 PROCEDURE — 96374 THER/PROPH/DIAG INJ IV PUSH: CPT

## 2018-07-10 PROCEDURE — 36593 DECLOT VASCULAR DEVICE: CPT

## 2018-07-10 PROCEDURE — 96376 TX/PRO/DX INJ SAME DRUG ADON: CPT

## 2018-07-10 RX ORDER — SODIUM CHLORIDE 0.9 % (FLUSH) 0.9 %
10 SYRINGE (ML) INJECTION ONCE
Status: CANCELLED | OUTPATIENT
Start: 2018-07-10

## 2018-07-10 RX ORDER — SODIUM CHLORIDE 0.9 % (FLUSH) 0.9 %
10 SYRINGE (ML) INJECTION ONCE
Status: COMPLETED | OUTPATIENT
Start: 2018-07-10 | End: 2018-07-10

## 2018-07-10 RX ADMIN — SODIUM CHLORIDE 0.9 % (FLUSH) 10 ML: 0.9 % SYRINGE (ML) INJECTION at 13:08:00

## 2018-07-10 NOTE — PROGRESS NOTES
After two additional doses of activase instilled and patient waited for one hour after each instillation without blood return noted. Flushes easily without pain or swelling. Instructed patient to notify dr Varghese Riojas for next steps regarding port.   Patient

## 2018-07-12 ENCOUNTER — TELEPHONE (OUTPATIENT)
Dept: NEUROLOGY | Facility: CLINIC | Age: 48
End: 2018-07-12

## 2018-07-12 NOTE — TELEPHONE ENCOUNTER
Spoke with [facility], verbal ok given to continue home health. Verbalized understanding. They will send form later to have signature.

## 2018-07-13 ENCOUNTER — MYAURORA ACCOUNT LINK (OUTPATIENT)
Dept: OTHER | Age: 48
End: 2018-07-13

## 2018-07-13 ENCOUNTER — PRIOR ORIGINAL RECORDS (OUTPATIENT)
Dept: OTHER | Age: 48
End: 2018-07-13

## 2018-07-18 ENCOUNTER — TELEPHONE (OUTPATIENT)
Dept: NEUROLOGY | Facility: CLINIC | Age: 48
End: 2018-07-18

## 2018-07-18 NOTE — TELEPHONE ENCOUNTER
[facility] received for home health order signature. Patient to continue skilled nursing for 3 times weekly for 2 months. Paperwork signed and returned via fax.

## 2018-07-23 ENCOUNTER — TELEPHONE (OUTPATIENT)
Dept: NEUROLOGY | Facility: CLINIC | Age: 48
End: 2018-07-23

## 2018-07-23 NOTE — TELEPHONE ENCOUNTER
Home health certification and plan of care received by office for physician review and signature. Patient seen for dressing changes r/t plasmaphoresis treatments.     Patient receives skilled nursing services for port care/dressing changes and monitoring

## 2018-07-24 ENCOUNTER — HOSPITAL ENCOUNTER (OUTPATIENT)
Dept: PERIOP | Facility: HOSPITAL | Age: 48
Discharge: HOME | End: 2018-07-24
Attending: INTERNAL MEDICINE
Payer: MEDICARE

## 2018-07-24 VITALS
SYSTOLIC BLOOD PRESSURE: 105 MMHG | TEMPERATURE: 99 F | DIASTOLIC BLOOD PRESSURE: 80 MMHG | RESPIRATION RATE: 16 BRPM | HEART RATE: 73 BPM | OXYGEN SATURATION: 100 %

## 2018-07-24 LAB
ALBUMIN SERPL-MCNC: 3.4 G/DL (ref 3.5–4.8)
ALBUMIN/GLOB SERPL: 1 {RATIO} (ref 1–2)
ALP LIVER SERPL-CCNC: 85 U/L (ref 39–100)
ALT SERPL-CCNC: 42 U/L (ref 14–54)
ANION GAP SERPL CALC-SCNC: 8 MMOL/L (ref 0–18)
AST SERPL-CCNC: 28 U/L (ref 15–41)
BASOPHILS # BLD AUTO: 0.02 X10(3) UL (ref 0–0.1)
BASOPHILS NFR BLD AUTO: 0.3 %
BILIRUB SERPL-MCNC: 0.2 MG/DL (ref 0.1–2)
BUN BLD-MCNC: 10 MG/DL (ref 8–20)
BUN/CREAT SERPL: 9.7 (ref 10–20)
CALCIUM BLD-MCNC: 8.1 MG/DL (ref 8.3–10.3)
CHLORIDE SERPL-SCNC: 107 MMOL/L (ref 101–111)
CO2 SERPL-SCNC: 28 MMOL/L (ref 22–32)
CREAT BLD-MCNC: 1.03 MG/DL (ref 0.55–1.02)
EOSINOPHIL # BLD AUTO: 0.15 X10(3) UL (ref 0–0.3)
EOSINOPHIL NFR BLD AUTO: 2.5 %
ERYTHROCYTE [DISTWIDTH] IN BLOOD BY AUTOMATED COUNT: 15.5 % (ref 11.5–16)
GLOBULIN PLAS-MCNC: 3.5 G/DL (ref 2.5–3.7)
GLUCOSE BLD-MCNC: 149 MG/DL (ref 70–99)
HCT VFR BLD AUTO: 35 % (ref 34–50)
HGB BLD-MCNC: 10.6 G/DL (ref 12–16)
IMMATURE GRANULOCYTE COUNT: 0.01 X10(3) UL (ref 0–1)
IMMATURE GRANULOCYTE RATIO %: 0.2 %
LYMPHOCYTES # BLD AUTO: 1.69 X10(3) UL (ref 0.9–4)
LYMPHOCYTES NFR BLD AUTO: 28.6 %
M PROTEIN MFR SERPL ELPH: 6.9 G/DL (ref 6.1–8.3)
MCH RBC QN AUTO: 21.9 PG (ref 27–33.2)
MCHC RBC AUTO-ENTMCNC: 30.3 G/DL (ref 31–37)
MCV RBC AUTO: 72.3 FL (ref 81–100)
MONOCYTES # BLD AUTO: 0.38 X10(3) UL (ref 0.1–1)
MONOCYTES NFR BLD AUTO: 6.4 %
NEUTROPHIL ABS PRELIM: 3.66 X10 (3) UL (ref 1.3–6.7)
NEUTROPHILS # BLD AUTO: 3.66 X10(3) UL (ref 1.3–6.7)
NEUTROPHILS NFR BLD AUTO: 62 %
OSMOLALITY SERPL CALC.SUM OF ELEC: 298 MOSM/KG (ref 275–295)
PLATELET # BLD AUTO: 149 10(3)UL (ref 150–450)
POTASSIUM SERPL-SCNC: 3.7 MMOL/L (ref 3.6–5.1)
RBC # BLD AUTO: 4.84 X10(6)UL (ref 3.8–5.1)
RED CELL DISTRIBUTION WIDTH-SD: 39.8 FL (ref 35.1–46.3)
SODIUM SERPL-SCNC: 143 MMOL/L (ref 136–144)
WBC # BLD AUTO: 5.9 X10(3) UL (ref 4–13)

## 2018-07-24 PROCEDURE — 80053 COMPREHEN METABOLIC PANEL: CPT | Performed by: INTERNAL MEDICINE

## 2018-07-24 PROCEDURE — P9045 ALBUMIN (HUMAN), 5%, 250 ML: HCPCS | Performed by: INTERNAL MEDICINE

## 2018-07-24 PROCEDURE — 6A550Z3 PHERESIS OF PLASMA, SINGLE: ICD-10-PCS | Performed by: INTERNAL MEDICINE

## 2018-07-24 PROCEDURE — 36593 DECLOT VASCULAR DEVICE: CPT

## 2018-07-24 PROCEDURE — A4216 STERILE WATER/SALINE, 10 ML: HCPCS | Performed by: INTERNAL MEDICINE

## 2018-07-24 PROCEDURE — 36514 APHERESIS PLASMA: CPT | Performed by: INTERNAL MEDICINE

## 2018-07-24 PROCEDURE — 85025 COMPLETE CBC W/AUTO DIFF WBC: CPT | Performed by: INTERNAL MEDICINE

## 2018-07-24 RX ORDER — ALBUMIN, HUMAN INJ 5% 5 %
3000 SOLUTION INTRAVENOUS ONCE
Status: DISCONTINUED | OUTPATIENT
Start: 2018-07-24 | End: 2018-07-26

## 2018-07-24 RX ORDER — SODIUM CHLORIDE 9 MG/ML
INJECTION, SOLUTION INTRAVENOUS ONCE
Status: DISCONTINUED | OUTPATIENT
Start: 2018-07-24 | End: 2018-07-26

## 2018-07-24 RX ORDER — HEPARIN SODIUM 1000 [USP'U]/ML
4000 INJECTION, SOLUTION INTRAVENOUS; SUBCUTANEOUS ONCE
Status: DISCONTINUED | OUTPATIENT
Start: 2018-07-24 | End: 2018-07-26

## 2018-07-24 NOTE — OR NURSING
[facility] here. Plasmapheresis done. Meds given and [facility]. TPA requested and given by [facility].

## 2018-07-27 ENCOUNTER — RT VISIT (OUTPATIENT)
Dept: RESPIRATORY THERAPY | Facility: HOSPITAL | Age: 48
End: 2018-07-27
Attending: INTERNAL MEDICINE
Payer: MEDICARE

## 2018-07-27 DIAGNOSIS — R06.00 DYSPNEA, PAROXYSMAL NOCTURNAL: ICD-10-CM

## 2018-07-27 PROCEDURE — 94010 BREATHING CAPACITY TEST: CPT

## 2018-07-27 PROCEDURE — 94729 DIFFUSING CAPACITY: CPT

## 2018-07-27 PROCEDURE — 94726 PLETHYSMOGRAPHY LUNG VOLUMES: CPT

## 2018-08-13 ENCOUNTER — HOSPITAL ENCOUNTER (OUTPATIENT)
Dept: PERIOP | Facility: HOSPITAL | Age: 48
Discharge: HOME | End: 2018-08-13
Attending: INTERNAL MEDICINE
Payer: MEDICARE

## 2018-08-13 VITALS — DIASTOLIC BLOOD PRESSURE: 80 MMHG | TEMPERATURE: 98 F | RESPIRATION RATE: 16 BRPM | SYSTOLIC BLOOD PRESSURE: 108 MMHG

## 2018-08-13 LAB
ALBUMIN SERPL-MCNC: 3.5 G/DL (ref 3.5–4.8)
ALBUMIN/GLOB SERPL: 1.1 {RATIO} (ref 1–2)
ALP LIVER SERPL-CCNC: 95 U/L (ref 39–100)
ALT SERPL-CCNC: 55 U/L (ref 14–54)
ANION GAP SERPL CALC-SCNC: 6 MMOL/L (ref 0–18)
AST SERPL-CCNC: 34 U/L (ref 15–41)
BILIRUB SERPL-MCNC: 0.2 MG/DL (ref 0.1–2)
BUN BLD-MCNC: 11 MG/DL (ref 8–20)
BUN/CREAT SERPL: 10.6 (ref 10–20)
CALCIUM BLD-MCNC: 8.4 MG/DL (ref 8.3–10.3)
CHLORIDE SERPL-SCNC: 108 MMOL/L (ref 101–111)
CO2 SERPL-SCNC: 30 MMOL/L (ref 22–32)
CREAT BLD-MCNC: 1.04 MG/DL (ref 0.55–1.02)
ERYTHROCYTE [DISTWIDTH] IN BLOOD BY AUTOMATED COUNT: 15.9 % (ref 11.5–16)
GLOBULIN PLAS-MCNC: 3.3 G/DL (ref 2.5–3.7)
GLUCOSE BLD-MCNC: 115 MG/DL (ref 65–99)
GLUCOSE BLD-MCNC: 127 MG/DL (ref 70–99)
HCT VFR BLD AUTO: 35.1 % (ref 34–50)
HGB BLD-MCNC: 10.6 G/DL (ref 12–16)
M PROTEIN MFR SERPL ELPH: 6.8 G/DL (ref 6.1–8.3)
MCH RBC QN AUTO: 21.9 PG (ref 27–33.2)
MCHC RBC AUTO-ENTMCNC: 30.2 G/DL (ref 31–37)
MCV RBC AUTO: 72.5 FL (ref 81–100)
OSMOLALITY SERPL CALC.SUM OF ELEC: 299 MOSM/KG (ref 275–295)
PLATELET # BLD AUTO: 163 10(3)UL (ref 150–450)
POTASSIUM SERPL-SCNC: 3.8 MMOL/L (ref 3.6–5.1)
RBC # BLD AUTO: 4.84 X10(6)UL (ref 3.8–5.1)
RED CELL DISTRIBUTION WIDTH-SD: 40.8 FL (ref 35.1–46.3)
SODIUM SERPL-SCNC: 144 MMOL/L (ref 136–144)
WBC # BLD AUTO: 6 X10(3) UL (ref 4–13)

## 2018-08-13 PROCEDURE — 82962 GLUCOSE BLOOD TEST: CPT

## 2018-08-13 PROCEDURE — 85027 COMPLETE CBC AUTOMATED: CPT | Performed by: INTERNAL MEDICINE

## 2018-08-13 PROCEDURE — 36514 APHERESIS PLASMA: CPT | Performed by: INTERNAL MEDICINE

## 2018-08-13 PROCEDURE — P9045 ALBUMIN (HUMAN), 5%, 250 ML: HCPCS | Performed by: INTERNAL MEDICINE

## 2018-08-13 PROCEDURE — 80053 COMPREHEN METABOLIC PANEL: CPT | Performed by: INTERNAL MEDICINE

## 2018-08-13 RX ORDER — HEPARIN SODIUM 1000 [USP'U]/ML
4000 INJECTION, SOLUTION INTRAVENOUS; SUBCUTANEOUS ONCE
Status: DISCONTINUED | OUTPATIENT
Start: 2018-08-13 | End: 2018-08-15

## 2018-08-13 RX ORDER — ALBUMIN, HUMAN INJ 5% 5 %
3000 SOLUTION INTRAVENOUS ONCE
Status: DISCONTINUED | OUTPATIENT
Start: 2018-08-13 | End: 2018-08-15

## 2018-08-13 RX ORDER — SODIUM CHLORIDE 9 MG/ML
INJECTION, SOLUTION INTRAVENOUS ONCE
Status: DISCONTINUED | OUTPATIENT
Start: 2018-08-13 | End: 2018-08-15

## 2018-08-30 ENCOUNTER — HOSPITAL ENCOUNTER (OUTPATIENT)
Age: 48
Discharge: ANOTHER HOSPITAL | End: 2018-08-30
Attending: FAMILY MEDICINE
Payer: MEDICARE

## 2018-08-30 ENCOUNTER — HOSPITAL ENCOUNTER (EMERGENCY)
Facility: HOSPITAL | Age: 48
Discharge: HOME | End: 2018-08-31
Attending: EMERGENCY MEDICINE
Payer: MEDICARE

## 2018-08-30 ENCOUNTER — APPOINTMENT (OUTPATIENT)
Dept: CT IMAGING | Facility: HOSPITAL | Age: 48
End: 2018-08-30
Attending: EMERGENCY MEDICINE
Payer: MEDICARE

## 2018-08-30 VITALS
OXYGEN SATURATION: 99 % | TEMPERATURE: 98 F | HEART RATE: 86 BPM | DIASTOLIC BLOOD PRESSURE: 78 MMHG | WEIGHT: 173 LBS | SYSTOLIC BLOOD PRESSURE: 123 MMHG | BODY MASS INDEX: 28 KG/M2 | RESPIRATION RATE: 17 BRPM

## 2018-08-30 DIAGNOSIS — K59.00 CONSTIPATION, UNSPECIFIED CONSTIPATION TYPE: ICD-10-CM

## 2018-08-30 DIAGNOSIS — R10.31 ABDOMINAL PAIN, RIGHT LOWER QUADRANT: Primary | ICD-10-CM

## 2018-08-30 DIAGNOSIS — R10.9 RIGHT FLANK PAIN: ICD-10-CM

## 2018-08-30 DIAGNOSIS — R10.30 LOWER ABDOMINAL PAIN: ICD-10-CM

## 2018-08-30 LAB
ANION GAP SERPL CALC-SCNC: 4 MMOL/L (ref 0–18)
BASOPHILS # BLD AUTO: 0.03 X10(3) UL (ref 0–0.1)
BASOPHILS NFR BLD AUTO: 0.5 %
BILIRUB UR QL STRIP.AUTO: NEGATIVE
BUN BLD-MCNC: 15 MG/DL (ref 8–20)
BUN/CREAT SERPL: 12.9 (ref 10–20)
CALCIUM BLD-MCNC: 9.3 MG/DL (ref 8.3–10.3)
CHLORIDE SERPL-SCNC: 105 MMOL/L (ref 101–111)
CLARITY UR REFRACT.AUTO: CLEAR
CO2 SERPL-SCNC: 32 MMOL/L (ref 22–32)
COLOR UR AUTO: YELLOW
CREAT BLD-MCNC: 1.16 MG/DL (ref 0.55–1.02)
EOSINOPHIL # BLD AUTO: 0.13 X10(3) UL (ref 0–0.3)
EOSINOPHIL NFR BLD AUTO: 2 %
ERYTHROCYTE [DISTWIDTH] IN BLOOD BY AUTOMATED COUNT: 15.9 % (ref 11.5–16)
GLUCOSE BLD-MCNC: 107 MG/DL (ref 70–99)
GLUCOSE UR STRIP.AUTO-MCNC: NEGATIVE MG/DL
HCT VFR BLD AUTO: 36.5 % (ref 34–50)
HGB BLD-MCNC: 11.2 G/DL (ref 12–16)
IMMATURE GRANULOCYTE COUNT: 0.01 X10(3) UL (ref 0–1)
IMMATURE GRANULOCYTE RATIO %: 0.2 %
KETONES UR STRIP.AUTO-MCNC: NEGATIVE MG/DL
LYMPHOCYTES # BLD AUTO: 2.6 X10(3) UL (ref 0.9–4)
LYMPHOCYTES NFR BLD AUTO: 40.6 %
MCH RBC QN AUTO: 22 PG (ref 27–33.2)
MCHC RBC AUTO-ENTMCNC: 30.7 G/DL (ref 31–37)
MCV RBC AUTO: 71.7 FL (ref 81–100)
MONOCYTES # BLD AUTO: 0.55 X10(3) UL (ref 0.1–1)
MONOCYTES NFR BLD AUTO: 8.6 %
NEUTROPHIL ABS PRELIM: 3.09 X10 (3) UL (ref 1.3–6.7)
NEUTROPHILS # BLD AUTO: 3.09 X10(3) UL (ref 1.3–6.7)
NEUTROPHILS NFR BLD AUTO: 48.1 %
NITRITE UR QL STRIP.AUTO: NEGATIVE
OSMOLALITY SERPL CALC.SUM OF ELEC: 293 MOSM/KG (ref 275–295)
PH UR STRIP.AUTO: 6 [PH] (ref 4.5–8)
PLATELET # BLD AUTO: 190 10(3)UL (ref 150–450)
POCT BILIRUBIN URINE: NEGATIVE
POCT BLOOD URINE: NEGATIVE
POCT GLUCOSE URINE: NEGATIVE MG/DL
POCT KETONE URINE: NEGATIVE MG/DL
POCT NITRITE URINE: NEGATIVE
POCT PH URINE: 6 (ref 5–8)
POCT PROTEIN URINE: NEGATIVE MG/DL
POCT SPECIFIC GRAVITY URINE: 1.02
POCT URINE CLARITY: CLEAR
POCT URINE COLOR: YELLOW
POCT UROBILINOGEN URINE: 0.2 MG/DL
POTASSIUM SERPL-SCNC: 4 MMOL/L (ref 3.6–5.1)
PROT UR STRIP.AUTO-MCNC: NEGATIVE MG/DL
RBC # BLD AUTO: 5.09 X10(6)UL (ref 3.8–5.1)
RBC UR QL AUTO: NEGATIVE
RED CELL DISTRIBUTION WIDTH-SD: 40.1 FL (ref 35.1–46.3)
SODIUM SERPL-SCNC: 141 MMOL/L (ref 136–144)
SP GR UR STRIP.AUTO: 1.01 (ref 1–1.03)
UROBILINOGEN UR STRIP.AUTO-MCNC: <2 MG/DL
WBC # BLD AUTO: 6.4 X10(3) UL (ref 4–13)

## 2018-08-30 PROCEDURE — 87086 URINE CULTURE/COLONY COUNT: CPT | Performed by: FAMILY MEDICINE

## 2018-08-30 PROCEDURE — 99212 OFFICE O/P EST SF 10 MIN: CPT

## 2018-08-30 PROCEDURE — 99285 EMERGENCY DEPT VISIT HI MDM: CPT

## 2018-08-30 PROCEDURE — 80048 BASIC METABOLIC PNL TOTAL CA: CPT | Performed by: EMERGENCY MEDICINE

## 2018-08-30 PROCEDURE — 96374 THER/PROPH/DIAG INJ IV PUSH: CPT

## 2018-08-30 PROCEDURE — 99284 EMERGENCY DEPT VISIT MOD MDM: CPT

## 2018-08-30 PROCEDURE — 85025 COMPLETE CBC W/AUTO DIFF WBC: CPT | Performed by: EMERGENCY MEDICINE

## 2018-08-30 PROCEDURE — 81001 URINALYSIS AUTO W/SCOPE: CPT | Performed by: EMERGENCY MEDICINE

## 2018-08-30 PROCEDURE — 99202 OFFICE O/P NEW SF 15 MIN: CPT

## 2018-08-30 PROCEDURE — 81002 URINALYSIS NONAUTO W/O SCOPE: CPT | Performed by: FAMILY MEDICINE

## 2018-08-30 PROCEDURE — 74176 CT ABD & PELVIS W/O CONTRAST: CPT | Performed by: EMERGENCY MEDICINE

## 2018-08-30 RX ORDER — KETOROLAC TROMETHAMINE 30 MG/ML
30 INJECTION, SOLUTION INTRAMUSCULAR; INTRAVENOUS ONCE
Status: COMPLETED | OUTPATIENT
Start: 2018-08-30 | End: 2018-08-30

## 2018-08-30 NOTE — ED INITIAL ASSESSMENT (HPI)
x1 wk Pt c/o R>L intermittent sharp lower abd pain that radiates to the back and into groin, +freq/urgancy with urination, denies fevers.   +nausea

## 2018-08-31 VITALS
SYSTOLIC BLOOD PRESSURE: 107 MMHG | BODY MASS INDEX: 27.8 KG/M2 | OXYGEN SATURATION: 98 % | WEIGHT: 173 LBS | DIASTOLIC BLOOD PRESSURE: 72 MMHG | HEART RATE: 71 BPM | TEMPERATURE: 99 F | RESPIRATION RATE: 16 BRPM | HEIGHT: 66 IN

## 2018-08-31 NOTE — ED PROVIDER NOTES
Patient Seen in: [facility] Emergency Department    History   Patient presents with:  Abdomen/Flank Pain (GI/)    Stated Complaint: abd pain     HPI    This is a 42-year-old female with past medical history of multiple sclerosis, autoimmune diseas ANGIO  No date: CHOLECYSTECTOMY  9/18/2015: COLONOSCOPY N/A      Comment: Procedure: COLONOSCOPY;  Surgeon: Sunday DO Yves;  Location: [address] [place] [address] ENDOSCOPY  No date: LYSIS OF ADHESIONS  No date: OTHER      Comment: Diabetes catherer  No date: other components within normal limits   URINALYSIS WITH CULTURE REFLEX - Abnormal; Notable for the following:     Leukocyte Esterase Urine Small (*)     WBC Urine 11-20 (*)     Mucous Urine 1+ (*)     All other components within normal limits   CBC W/ DIFF to her back, urinary frequency, and dysuria since yesterday.     FINDINGS:  KIDNEYS:  There is a stable 1 x 1 cm exophytic lesion projecting from the upper pole the right kidney stable in size slightly higher in density suggesting slight hemorrhagic change blood cell count 6.4. Hemoglobin 11.2. Platelet 438. CMP: BUN 15. Creatinine 1.1. Glucose 107. Urinalysis showed small leukocytes with 11-20 WBCs. No nitrites and no bacteria. Would await culture.   CT scan of the abdomen was obtained which showed n

## 2018-08-31 NOTE — ED PROVIDER NOTES
Patient Seen in: [address] [facility]    History   Patient presents with:  Abdomen/Flank Pain (GI/)  Urinary Symptoms (urologic)    Stated Complaint: abdominal pain traveling around right side and down leg    HPI  42-year-old female presents unspecified type diabetes mellitus without mention of complication, not stated as uncontrolled    • Unspecified disorder of thyroid     total thyroidectomy   • Visual impairment     GLASSES       Past Surgical History:  No date: ANGIOGRAM  May 2014: [facility] moist: yes  EYES: PERRLA, EOMI, normal optic disk,conjunctiva are clear  NECK: supple, no adenopathy, no bruits  CHEST: no chest tenderness  LUNGS: clear to auscultation  CARDIO: RRR without murmur  GI: soft, non distended. No visible peristalsis.  No simeon diagnosis)  Right flank pain  Lower abdominal pain    Disposition:   Ic to ed  8/30/2018  7:59 pm    [facility]  [place]  [phone]  Go today  For re-check, for higher level of care and further

## 2018-09-04 ENCOUNTER — HOSPITAL ENCOUNTER (EMERGENCY)
Facility: HOSPITAL | Age: 48
Discharge: LEFT BEFORE DISCHARGE | DRG: 059 | End: 2018-09-04
Payer: MEDICARE

## 2018-09-04 ENCOUNTER — HOSPITAL ENCOUNTER (INPATIENT)
Facility: HOSPITAL | Age: 48
LOS: 3 days | Discharge: HOME HEALTH | DRG: 059 | End: 2018-09-08
Attending: EMERGENCY MEDICINE | Admitting: SPECIALIST
Payer: MEDICARE

## 2018-09-04 ENCOUNTER — HOSPITAL ENCOUNTER (OUTPATIENT)
Dept: PERIOP | Facility: HOSPITAL | Age: 48
Discharge: HOME | DRG: 059 | End: 2018-09-04
Attending: INTERNAL MEDICINE
Payer: MEDICARE

## 2018-09-04 VITALS
OXYGEN SATURATION: 98 % | SYSTOLIC BLOOD PRESSURE: 115 MMHG | HEART RATE: 88 BPM | RESPIRATION RATE: 20 BRPM | DIASTOLIC BLOOD PRESSURE: 76 MMHG | TEMPERATURE: 100 F

## 2018-09-04 DIAGNOSIS — G35 MULTIPLE SCLEROSIS EXACERBATION (HCC): Primary | ICD-10-CM

## 2018-09-04 LAB
ALBUMIN SERPL-MCNC: 4.4 G/DL (ref 3.5–4.8)
ALBUMIN/GLOB SERPL: 2.8 {RATIO} (ref 1–2)
ALP LIVER SERPL-CCNC: 43 U/L (ref 39–100)
ALT SERPL-CCNC: 28 U/L (ref 14–54)
ANION GAP SERPL CALC-SCNC: 9 MMOL/L (ref 0–18)
AST SERPL-CCNC: 15 U/L (ref 15–41)
BASOPHILS # BLD AUTO: 0.02 X10(3) UL (ref 0–0.1)
BASOPHILS NFR BLD AUTO: 0.3 %
BILIRUB SERPL-MCNC: 0.2 MG/DL (ref 0.1–2)
BILIRUB UR QL STRIP.AUTO: NEGATIVE
BUN BLD-MCNC: 11 MG/DL (ref 8–20)
BUN/CREAT SERPL: 10.5 (ref 10–20)
CALCIUM BLD-MCNC: 8.9 MG/DL (ref 8.3–10.3)
CHLORIDE SERPL-SCNC: 109 MMOL/L (ref 101–111)
CLARITY UR REFRACT.AUTO: CLEAR
CO2 SERPL-SCNC: 24 MMOL/L (ref 22–32)
CREAT BLD-MCNC: 1.05 MG/DL (ref 0.55–1.02)
EOSINOPHIL # BLD AUTO: 0.14 X10(3) UL (ref 0–0.3)
EOSINOPHIL NFR BLD AUTO: 2 %
ERYTHROCYTE [DISTWIDTH] IN BLOOD BY AUTOMATED COUNT: 16.1 % (ref 11.5–16)
GLOBULIN PLAS-MCNC: 1.6 G/DL (ref 2.5–4)
GLUCOSE BLD-MCNC: 164 MG/DL (ref 65–99)
GLUCOSE BLD-MCNC: 192 MG/DL (ref 70–99)
GLUCOSE UR STRIP.AUTO-MCNC: NEGATIVE MG/DL
HAV IGM SER QL: 1.9 MG/DL (ref 1.8–2.5)
HCT VFR BLD AUTO: 36.8 % (ref 34–50)
HGB BLD-MCNC: 11.2 G/DL (ref 12–16)
IMMATURE GRANULOCYTE COUNT: 0.02 X10(3) UL (ref 0–1)
IMMATURE GRANULOCYTE RATIO %: 0.3 %
KETONES UR STRIP.AUTO-MCNC: NEGATIVE MG/DL
LYMPHOCYTES # BLD AUTO: 2.57 X10(3) UL (ref 0.9–4)
LYMPHOCYTES NFR BLD AUTO: 36.2 %
M PROTEIN MFR SERPL ELPH: 6 G/DL (ref 6.1–8.3)
MCH RBC QN AUTO: 22.3 PG (ref 27–33.2)
MCHC RBC AUTO-ENTMCNC: 30.4 G/DL (ref 31–37)
MCV RBC AUTO: 73.2 FL (ref 81–100)
MONOCYTES # BLD AUTO: 0.55 X10(3) UL (ref 0.1–1)
MONOCYTES NFR BLD AUTO: 7.8 %
NEUTROPHIL ABS PRELIM: 3.79 X10 (3) UL (ref 1.3–6.7)
NEUTROPHILS # BLD AUTO: 3.79 X10(3) UL (ref 1.3–6.7)
NEUTROPHILS NFR BLD AUTO: 53.4 %
NITRITE UR QL STRIP.AUTO: NEGATIVE
OSMOLALITY SERPL CALC.SUM OF ELEC: 299 MOSM/KG (ref 275–295)
PH UR STRIP.AUTO: 5 [PH] (ref 4.5–8)
PLATELET # BLD AUTO: 193 10(3)UL (ref 150–450)
POTASSIUM SERPL-SCNC: 3.9 MMOL/L (ref 3.6–5.1)
PROT UR STRIP.AUTO-MCNC: NEGATIVE MG/DL
RBC # BLD AUTO: 5.03 X10(6)UL (ref 3.8–5.1)
RBC UR QL AUTO: NEGATIVE
RED CELL DISTRIBUTION WIDTH-SD: 41.6 FL (ref 35.1–46.3)
SODIUM SERPL-SCNC: 142 MMOL/L (ref 136–144)
SP GR UR STRIP.AUTO: 1.01 (ref 1–1.03)
TROPONIN I SERPL-MCNC: <0.046 NG/ML (ref ?–0.05)
UROBILINOGEN UR STRIP.AUTO-MCNC: <2 MG/DL
WBC # BLD AUTO: 7.1 X10(3) UL (ref 4–13)

## 2018-09-04 PROCEDURE — 82962 GLUCOSE BLOOD TEST: CPT

## 2018-09-04 PROCEDURE — P9045 ALBUMIN (HUMAN), 5%, 250 ML: HCPCS | Performed by: INTERNAL MEDICINE

## 2018-09-04 RX ORDER — ALBUMIN, HUMAN INJ 5% 5 %
3000 SOLUTION INTRAVENOUS ONCE
Status: DISCONTINUED | OUTPATIENT
Start: 2018-09-04 | End: 2018-09-06

## 2018-09-04 RX ORDER — LEVOFLOXACIN 5 MG/ML
750 INJECTION, SOLUTION INTRAVENOUS ONCE
Status: COMPLETED | OUTPATIENT
Start: 2018-09-04 | End: 2018-09-05

## 2018-09-04 RX ORDER — HEPARIN SODIUM 1000 [USP'U]/ML
4000 INJECTION, SOLUTION INTRAVENOUS; SUBCUTANEOUS ONCE
Status: DISCONTINUED | OUTPATIENT
Start: 2018-09-04 | End: 2018-09-06

## 2018-09-04 NOTE — OR NURSING
Plasmaphersis treatment completed. Pt assisted to bathroom by Katelyn Lanza RN and pt very weak and shaky, reports she just feels \"so tired\". Pt very emotional and crying.   Stated she has not been feeling good for quite a few weeks and can not get into see

## 2018-09-04 NOTE — PROGRESS NOTES
Pt reports recently went to ER w/ \"severe\" abd pain and it remains unresolved. Pt was told to f/u with her PCP Dr. Moris Byrne which she has yet to call. Re-inforced to pt importance of following up w/ PCP about recent ER visit. Pt verbalizes understanding.

## 2018-09-04 NOTE — OR NURSING
Pt admitted into room, consent for plasmaphersis obtained and signed. ID band placed and call light w/in reach. [facility] for details of vitals obtained, medications administered and tolerance of treatment.

## 2018-09-05 LAB
ANION GAP SERPL CALC-SCNC: 7 MMOL/L (ref 0–18)
ATRIAL RATE: 78 BPM
BILIRUB UR QL STRIP.AUTO: NEGATIVE
BUN BLD-MCNC: 10 MG/DL (ref 8–20)
BUN/CREAT SERPL: 9.9 (ref 10–20)
CALCIUM BLD-MCNC: 8.4 MG/DL (ref 8.3–10.3)
CHLORIDE SERPL-SCNC: 109 MMOL/L (ref 101–111)
CLARITY UR REFRACT.AUTO: CLEAR
CO2 SERPL-SCNC: 28 MMOL/L (ref 22–32)
COLOR UR AUTO: COLORLESS
CREAT BLD-MCNC: 1.01 MG/DL (ref 0.55–1.02)
GLUCOSE BLD-MCNC: 107 MG/DL (ref 65–99)
GLUCOSE BLD-MCNC: 117 MG/DL (ref 65–99)
GLUCOSE BLD-MCNC: 132 MG/DL (ref 65–99)
GLUCOSE BLD-MCNC: 144 MG/DL (ref 65–99)
GLUCOSE BLD-MCNC: 93 MG/DL (ref 65–99)
GLUCOSE BLD-MCNC: 95 MG/DL (ref 70–99)
GLUCOSE UR STRIP.AUTO-MCNC: NEGATIVE MG/DL
KETONES UR STRIP.AUTO-MCNC: NEGATIVE MG/DL
LEUKOCYTE ESTERASE UR QL STRIP.AUTO: NEGATIVE
NITRITE UR QL STRIP.AUTO: NEGATIVE
OSMOLALITY SERPL CALC.SUM OF ELEC: 297 MOSM/KG (ref 275–295)
P AXIS: 55 DEGREES
P-R INTERVAL: 130 MS
PH UR STRIP.AUTO: 6 [PH] (ref 4.5–8)
POTASSIUM SERPL-SCNC: 4 MMOL/L (ref 3.6–5.1)
PROT UR STRIP.AUTO-MCNC: NEGATIVE MG/DL
Q-T INTERVAL: 394 MS
QRS DURATION: 82 MS
QTC CALCULATION (BEZET): 449 MS
R AXIS: 22 DEGREES
RBC UR QL AUTO: NEGATIVE
SODIUM SERPL-SCNC: 144 MMOL/L (ref 136–144)
SP GR UR STRIP.AUTO: <1.005 (ref 1–1.03)
T AXIS: 75 DEGREES
UROBILINOGEN UR STRIP.AUTO-MCNC: <2 MG/DL
VENTRICULAR RATE: 78 BPM

## 2018-09-05 PROCEDURE — 99223 1ST HOSP IP/OBS HIGH 75: CPT | Performed by: OTHER

## 2018-09-05 PROCEDURE — 99222 1ST HOSP IP/OBS MODERATE 55: CPT | Performed by: INTERNAL MEDICINE

## 2018-09-05 PROCEDURE — 6A550Z3 PHERESIS OF PLASMA, SINGLE: ICD-10-PCS | Performed by: INTERNAL MEDICINE

## 2018-09-05 RX ORDER — DOCUSATE SODIUM 100 MG/1
100 CAPSULE, LIQUID FILLED ORAL 2 TIMES DAILY
Status: DISCONTINUED | OUTPATIENT
Start: 2018-09-05 | End: 2018-09-08

## 2018-09-05 RX ORDER — DULOXETIN HYDROCHLORIDE 30 MG/1
30 CAPSULE, DELAYED RELEASE ORAL 2 TIMES DAILY
Status: DISCONTINUED | OUTPATIENT
Start: 2018-09-05 | End: 2018-09-08

## 2018-09-05 RX ORDER — ENOXAPARIN SODIUM 100 MG/ML
40 INJECTION SUBCUTANEOUS DAILY
Status: DISCONTINUED | OUTPATIENT
Start: 2018-09-05 | End: 2018-09-08

## 2018-09-05 RX ORDER — LEVOTHYROXINE SODIUM 88 UG/1
88 TABLET ORAL
Status: DISCONTINUED | OUTPATIENT
Start: 2018-09-05 | End: 2018-09-08

## 2018-09-05 RX ORDER — PRAVASTATIN SODIUM 20 MG
20 TABLET ORAL NIGHTLY
Status: DISCONTINUED | OUTPATIENT
Start: 2018-09-05 | End: 2018-09-05

## 2018-09-05 RX ORDER — LEVOFLOXACIN 5 MG/ML
750 INJECTION, SOLUTION INTRAVENOUS EVERY 24 HOURS
Status: DISCONTINUED | OUTPATIENT
Start: 2018-09-06 | End: 2018-09-06

## 2018-09-05 RX ORDER — MONTELUKAST SODIUM 10 MG/1
10 TABLET ORAL NIGHTLY
Status: DISCONTINUED | OUTPATIENT
Start: 2018-09-05 | End: 2018-09-08

## 2018-09-05 RX ORDER — MELATONIN
325 2 TIMES DAILY WITH MEALS
Status: DISCONTINUED | OUTPATIENT
Start: 2018-09-05 | End: 2018-09-08

## 2018-09-05 RX ORDER — ACETAMINOPHEN 500 MG
500 TABLET ORAL EVERY 6 HOURS PRN
Status: DISCONTINUED | OUTPATIENT
Start: 2018-09-05 | End: 2018-09-08

## 2018-09-05 RX ORDER — KETOROLAC TROMETHAMINE 15 MG/ML
15 INJECTION, SOLUTION INTRAMUSCULAR; INTRAVENOUS EVERY 6 HOURS PRN
Status: DISPENSED | OUTPATIENT
Start: 2018-09-05 | End: 2018-09-07

## 2018-09-05 RX ORDER — PHENAZOPYRIDINE HYDROCHLORIDE 100 MG/1
100 TABLET, FILM COATED ORAL 3 TIMES DAILY PRN
Status: DISCONTINUED | OUTPATIENT
Start: 2018-09-05 | End: 2018-09-08

## 2018-09-05 RX ORDER — ALBUTEROL SULFATE 90 UG/1
2 AEROSOL, METERED RESPIRATORY (INHALATION) EVERY 6 HOURS PRN
Status: DISCONTINUED | OUTPATIENT
Start: 2018-09-05 | End: 2018-09-08

## 2018-09-05 RX ORDER — PREGABALIN 75 MG/1
150 CAPSULE ORAL 2 TIMES DAILY
Status: DISCONTINUED | OUTPATIENT
Start: 2018-09-05 | End: 2018-09-08

## 2018-09-05 RX ORDER — GLIPIZIDE 5 MG/1
10 TABLET ORAL DAILY
Status: DISCONTINUED | OUTPATIENT
Start: 2018-09-05 | End: 2018-09-08

## 2018-09-05 RX ORDER — ATORVASTATIN CALCIUM 20 MG/1
20 TABLET, FILM COATED ORAL NIGHTLY
Status: DISCONTINUED | OUTPATIENT
Start: 2018-09-05 | End: 2018-09-08

## 2018-09-05 RX ORDER — PIOGLITAZONEHYDROCHLORIDE 15 MG/1
15 TABLET ORAL DAILY
Status: DISCONTINUED | OUTPATIENT
Start: 2018-09-05 | End: 2018-09-08

## 2018-09-05 RX ORDER — HYDROCODONE BITARTRATE AND ACETAMINOPHEN 10; 325 MG/1; MG/1
1 TABLET ORAL 2 TIMES DAILY PRN
Status: DISCONTINUED | OUTPATIENT
Start: 2018-09-05 | End: 2018-09-08

## 2018-09-05 RX ORDER — PHENAZOPYRIDINE HYDROCHLORIDE 100 MG/1
100 TABLET, FILM COATED ORAL 3 TIMES DAILY PRN
Status: DISCONTINUED | OUTPATIENT
Start: 2018-09-05 | End: 2018-09-05

## 2018-09-05 RX ORDER — ASPIRIN 81 MG/1
81 TABLET ORAL DAILY
Status: DISCONTINUED | OUTPATIENT
Start: 2018-09-05 | End: 2018-09-08

## 2018-09-05 RX ORDER — CLONAZEPAM 0.5 MG/1
0.25 TABLET ORAL 2 TIMES DAILY PRN
Status: DISCONTINUED | OUTPATIENT
Start: 2018-09-05 | End: 2018-09-08

## 2018-09-05 RX ORDER — MORPHINE SULFATE 4 MG/ML
4 INJECTION, SOLUTION INTRAMUSCULAR; INTRAVENOUS ONCE
Status: COMPLETED | OUTPATIENT
Start: 2018-09-05 | End: 2018-09-05

## 2018-09-05 RX ORDER — ONDANSETRON 4 MG/1
8 TABLET, FILM COATED ORAL ONCE
Status: COMPLETED | OUTPATIENT
Start: 2018-09-05 | End: 2018-09-05

## 2018-09-05 RX ORDER — PANTOPRAZOLE SODIUM 40 MG/1
40 TABLET, DELAYED RELEASE ORAL
Status: DISCONTINUED | OUTPATIENT
Start: 2018-09-05 | End: 2018-09-08

## 2018-09-05 RX ORDER — ALBUMIN, HUMAN INJ 5% 5 %
3000 SOLUTION INTRAVENOUS ONCE
Status: COMPLETED | OUTPATIENT
Start: 2018-09-05 | End: 2018-09-05

## 2018-09-05 RX ORDER — MORPHINE SULFATE 4 MG/ML
INJECTION, SOLUTION INTRAMUSCULAR; INTRAVENOUS
Status: COMPLETED
Start: 2018-09-05 | End: 2018-09-05

## 2018-09-05 RX ORDER — ONDANSETRON 4 MG/1
4 TABLET, FILM COATED ORAL EVERY 8 HOURS PRN
Status: DISCONTINUED | OUTPATIENT
Start: 2018-09-05 | End: 2018-09-08

## 2018-09-05 NOTE — PLAN OF CARE
PAIN - ADULT    • Verbalizes/displays adequate comfort level or patient's stated pain goal Not Progressing          Diabetes/Glucose Control    • Glucose maintained within prescribed range Progressing        Impaired Activities of Daily Living    • Achieve

## 2018-09-05 NOTE — ED NOTES
Report called to [facility]. Pt updated with room number and resting comfortably on stretcher.

## 2018-09-05 NOTE — PLAN OF CARE
NURSING ADMISSION NOTE      Patient admitted via Cart  Oriented to room. Safety precautions initiated. Bed in low position. Call light in reach. Pt A/O x 4. Rm air, cpap worn overnight. Reports pain to BLE, abd and lower back.  Norco given w/moder

## 2018-09-05 NOTE — CONSULTS
[address] Patient Status:  Inpatient    3/11/1970 MRN [id number]   [facility] 4NW-A Attending Bridget Day MD   Hosp Day # 0 PCP Trent Martines MD     Patient Identification  Jeff Marcos is a 46year old fem and make appropriate recommendations. Urinalysis with negative nitrite and negative leukocytes. urology consulted and outpatient follow-up recommended for cystoscopy ureteroscopy and possible hydrodistention.   Her ongoing complaints are of generalized we thyroidectomy   • Visual impairment     GLASSES       Past Surgical History:  No date: ANGIOGRAM  May 2014: CATH ANGIO  No date: CHOLECYSTECTOMY  9/18/2015: COLONOSCOPY N/A      Comment: Procedure: COLONOSCOPY;  Surgeon: Segundo Baxter DO MG/0.4ML injection 40 mg 40 mg Subcutaneous Daily   atorvastatin (LIPITOR) tab 20 mg 20 mg Oral Nightly   [START ON 9/6/2018] LevoFLOXacin in D5W (LEVAQUIN) IVPB premix 750 mg 750 mg Intravenous Q24H   Heparin Lock Flush 100 UNIT/ML lock flush 500 Units 5 test kit.           Lab Results  Component Value Date   WBC 7.1 09/04/2018   HGB 11.2 09/04/2018   HCT 36.8 09/04/2018   .0 09/04/2018   CREATSERUM 1.01 09/05/2018   BUN 10 09/05/2018    09/05/2018   K 4.0 09/05/2018    09/05/2018   CO2 2 Extremity:  Strength  is hip flexors 1 knee extensors 4. Ankle dorsi and plantar flexors were 5. ROM WNL. Left Lower Extremity: Strength  is hip flexors 1, knee extensors 4 and ankle dorsi and plantar flexors were 5.. ROM WNL.           Neuro: [facility] PT and OT. 24 hr rehab nursing also beneficial for medication management, pressure sore prevention, bowel and bladder management, skin management.      Physiatric medical oversight to monitor kidney function, cardiac function, pulmona

## 2018-09-05 NOTE — ED PROVIDER NOTES
Patient Seen in: [facility] Emergency Department    History   Patient presents with:  Abnormal Result (metabolic, cardiac)    Stated Complaint: abnormal labs     HPI    49-year-old woman with MS, autoimmune disease, who presented here about 5 days a catherer  No date: OTHER SURGICAL HISTORY      Comment:  x 2  No date: REMOVAL GALLBLADDER  2013: THYROIDECTOMY      Comment: benign MNG  No date: TOTAL ABDOM HYSTERECTOMY  May 2014: UPPER GI ENDOSCOPY PERFORMED        Smoking status: Never S the following components:       Result Value    Glucose 192 (*)     Creatinine 1.05 (*)     Calculated Osmolality 299 (*)     Total Protein 6.0 (*)     Globulin  1.6 (*)     A/G Ratio 2.8 (*)     All other components within normal limits   URINALYSIS WITH GLUCOSE - Abnormal; Notable for the following components:    POC Glucose 105 (*)     All other components within normal limits   POCT GLUCOSE - Abnormal; Notable for the following components:    POC Glucose 141 (*)     All other components within normal li tests on the individual orders. CBC WITH DIFFERENTIAL WITH PLATELET    Narrative: The following orders were created for panel order CBC WITH DIFFERENTIAL WITH PLATELET.   Procedure                               Abnormality         Status

## 2018-09-05 NOTE — CONSULTS
[facility] Urology   Consultation Note    Johnny Sanchez Patient Status:  Inpatient    3/11/1970 MRN [id number]   [facility] 4NW-A Attending Israel Rojas MD   Hosp Day # 0 PCP Dexter Palmer MD     Reason for Consultation:  B nausea and vomiting)     WOKE UP DURING PROCEDURE   • Scoliosis of lumbar spine    • Sickle cell trait (HCC)    • Type II or unspecified type diabetes mellitus without mention of complication, not stated as uncontrolled    • Unspecified disorder of thyroid puff, Inhalation, Q6H PRN  •  aspirin EC tab 81 mg, 81 mg, Oral, Daily  •  cholecalciferol (VITAMIN D3) cap/tab 5,000 Units, 5,000 Units, Oral, After lunch  •  docusate sodium (COLACE) cap 100 mg, 100 mg, Oral, BID  •  DULoxetine HCl (CYMBALTA) DR contreras 250 mL IVPB, , Intravenous, Once  •  heparin sodium 1000 UNIT/ML injection 4,000 Units, 4,000 Units, Intercatheter, Once    Review of Systems:  Pertinent items are noted in HPI.     Physical Exam:  /61 (BP Location: Left arm)   Pulse 72   Temp 98 °F ( focal mass  BILIARY:  Status post cholecystectomy. No biliary tree dilation. PANCREAS:  No lesion, fluid collection, ductal dilatation, or atrophy. SPLEEN:  No enlargement or focal lesion. AORTA/VASCULAR:  No aneurysm.     RETROPERITONEUM:  No mass ([address]     Hypokalemia     MS (multiple sclerosis) (HCC)     Anemia     Weakness generalized     Essential hypertension     Irritable bowel syndrome with constipation and diarrhea     Lower abdominal pain     S/P laparoscopic surgery     Abdominal adhesions

## 2018-09-05 NOTE — CONSULTS
[address] [facility] with [facility]  9/5/2018    12:26 PM      Patient seen and examined and history reviewed  History of predominantly brain lesion burden who cannot tolerate DMT due to liver problem and is no COLONOSCOPY N/A      Comment: Procedure: COLONOSCOPY;  Surgeon: Madeline Stanton DO;  Location:  ENDOSCOPY  No date: LYSIS OF ADHESIONS  No date: OTHER      Comment: Diabetes catherer  No date: OTHER SURGICAL HISTORY      Comment: C-secti MD  Vascular & General Neurology  Director, Multiple Sclerosis Program  [facility]  9/5/2018, Time completed 12:39 PM

## 2018-09-05 NOTE — H&P
[address]    PATIENT'S NAME: VANI PERICO A   ATTENDING PHYSICIAN: Johny Quigley M.D.    PATIENT ACCOUNT#:   [de-identified]    LOCATION:  [address] [place] [address]  MEDICAL RECORD #:   [id number]       YOB: 1970  ADMISSION DATE:       09/04/2018 levothyroxine, Singulair, pantoprazole, Actos, Lyrica. ALLERGIES:  Epinephrine, latex, penicillin, Solu-Medrol, urea. FAMILY HISTORY:  Noncontributory. SOCIAL HISTORY:  No current smoking, alcohol, or drug abuse.      REVIEW OF SYSTEMS:  Positive worker.     Dictated By Raymond Olguin M.D.  d: 09/05/2018 11:33:32  t: 09/05/2018 11:41:48  Job [id number]  FX/

## 2018-09-05 NOTE — CM/SW NOTE
09/05/18 1000   CM/SW Referral Data   Referral Source    Reason for Referral Discharge planning   Informant Patient   Patient Info   Patient's Mental Status Alert;Oriented   Patient's [address]   Patient lives with Art Anderson

## 2018-09-05 NOTE — OCCUPATIONAL THERAPY NOTE
OCCUPATIONAL THERAPY EVALUATION - INPATIENT     Room Number: 489/602-C  Evaluation Date: 9/5/2018  Type of Evaluation: Initial  Presenting Problem: MS exacerbation; severe abd pain; weakness    Physician Order: IP Consult to Occupational Therapy  Reason fo Procedure: COLONOSCOPY;  Surgeon: Komal Garcia DO;  Location:  ENDOSCOPY  No date: LYSIS OF ADHESIONS  No date: OTHER      Comment: Diabetes catherer  No date: OTHER SURGICAL HISTORY      Comment:  x 2  No date:  5/5  Increased time to release         ADDITIONAL TESTS                                    NEUROLOGICAL FINDINGS  Neurological Findings: Coordination - Finger to Nose;Coordination - Rapid Alternating Movement; Coordination - Finger Opposition  Coordinat hand placement. Pt was educated on safety precautions. Pt was left in chair with questions answered, needs met and call light within reach. Patient End of Session: Up in chair;Needs met;Call light within reach; All patient questions and concerns addressed deficits impacting engagement in ADL/IADL LOW  1 - 3 performance deficits    Client Assessment/Performance Deficits MODERATE - Comorbidities and min to mod modifications of tasks    Clinical Decision Making MODERATE - Analysis of occupational profile, deta

## 2018-09-05 NOTE — PHYSICAL THERAPY NOTE
PHYSICAL THERAPY EVALUATION - INPATIENT     Room Number: 603/276-N  Evaluation Date: 9/5/2018  Type of Evaluation: Initial  Physician Order: PT Eval and Treat    Presenting Problem: Multiple Sclerosis exacerbation  Reason for Therapy: Mobility Dysfun not stated as uncontrolled    • Unspecified disorder of thyroid     total thyroidectomy   • Visual impairment     GLASSES       Past Surgical History  Past Surgical History:  No date: ANGIOGRAM  May 2014: CATH ANGIO  No date: CHOLECYSTECTOMY  9/18/2015: CO NEUROLOGICAL FINDINGS                      ACTIVITY TOLERANCE  Room air    AM-PAC '6-Clicks' INPATIENT SHORT FORM - BASIC MOBILITY  How much difficulty does the patient currently have. ..  -   Turning over in bed (including adjusting bedclothes, staff. Pt was provided with RW for in-house use. Exercise/Education Provided: Body mechanics  Energy conservation  Functional activity tolerated  Gait training  Posture  Transfer training    Patient End of Session: Up in chair; With [address] [facility] staff;Needs met;C independent  met   Goal #2 Patient is able to demonstrate transfers EOB to/from [facility] at assistance level: modified independent     Goal #3 Patient is able to ambulate 100 feet with assist device: walker - rolling at assistance level: supervision     Goal #4

## 2018-09-05 NOTE — ED INITIAL ASSESSMENT (HPI)
Patient arrives with c/o increased weakness and back pain. Patient underwent plasmaphoresis earlier today and was noted to be hypotensive, came to ED for increased weakness but left before being seen.  Patient states she rested at home but has continued to

## 2018-09-05 NOTE — CONSULTS
[facility]  Report of Consultation    Aracelis Cummings Patient Status:  Inpatient    3/11/1970 MRN [id number]   [place] 4NW-A Attending Buddy Amos MD   Hosp Day # 0 PCP Vanessa Norton MD     Reason for Consultation:  MS with ne THYROIDECTOMY      Comment: benign MNG  No date: TOTAL ABDOM HYSTERECTOMY  May 2014: UPPER GI ENDOSCOPY PERFORMED  Family History   Problem Relation Age of Onset   • Other [OTHER] Father      COPD, emphysema   • Hypertension Mother    • Cancer Mother (SYNTHROID, LEVOTHROID) tab 88 mcg, 88 mcg, Oral, Before breakfast  •  Montelukast Sodium (SINGULAIR) tab 10 mg, 10 mg, Oral, Nightly  •  Pantoprazole Sodium (PROTONIX) EC tab 40 mg, 40 mg, Oral, QAM AC  •  Ondansetron HCl (ZOFRAN) tab 4 mg, 4 mg, Oral, Q8 (36.7 °C) (Oral)   Resp 18   Ht 66\"   Wt 178 lb 11.2 oz   SpO2 97%   BMI 28.84 kg/m²   Temp (24hrs), Av.8 °F (36.6 °C), Min:97.3 °F (36.3 °C), Max:98.2 °F (36.8 °C)       Intake/Output Summary (Last 24 hours) at 18 4859  Last data filed at  HGB  11.2*  11.2*   MCV  71.7*  73.2*   PLT  190.0  193.0       Recent Labs   Lab  08/30/18   2206  09/04/18   2324  09/05/18   0626   NA  141  142  144   K  4.0  3.9  4.0   CL  105  109  109   CO2  32.0  24.0  28.0   BUN  15  11  10   CREATSERUM  1.16*

## 2018-09-05 NOTE — PROGRESS NOTES
[facility] Dosing Service  Antibiotic Dosing    Jerel Mancera is a 46year old female for whom pharmacy is dosing Levaquin for treatment of  UTI.      Allergies: is allergic to epinephrine; latex; other; pcn [penicillamine]; penicillins; solu-medrol [met

## 2018-09-05 NOTE — H&P (VIEW-ONLY)
[address] [place]    PATIENT'S NAME: VANI PERICO A   ATTENDING PHYSICIAN: Jun Robles M.D.    PATIENT ACCOUNT#:   [de-identified]    LOCATION:  [address] [place] [address]  MEDICAL RECORD #:   [id number]       YOB: 1970  ADMISSION DATE:       09/04/2018 levothyroxine, Singulair, pantoprazole, Actos, Lyrica. ALLERGIES:  Epinephrine, latex, penicillin, Solu-Medrol, urea. FAMILY HISTORY:  Noncontributory. SOCIAL HISTORY:  No current smoking, alcohol, or drug abuse.      REVIEW OF SYSTEMS:  Positive worker.     Dictated By César Mcguire M.D.  d: 09/05/2018 11:33:32  t: 09/05/2018 11:41:48  Job [id number]  LEVI/

## 2018-09-06 ENCOUNTER — APPOINTMENT (OUTPATIENT)
Dept: ULTRASOUND IMAGING | Facility: HOSPITAL | Age: 48
DRG: 059 | End: 2018-09-06
Attending: PHYSICIAN ASSISTANT
Payer: MEDICARE

## 2018-09-06 LAB
GLUCOSE BLD-MCNC: 105 MG/DL (ref 65–99)
GLUCOSE BLD-MCNC: 138 MG/DL (ref 65–99)
GLUCOSE BLD-MCNC: 141 MG/DL (ref 65–99)
GLUCOSE BLD-MCNC: 167 MG/DL (ref 65–99)
GLUCOSE BLD-MCNC: 61 MG/DL (ref 65–99)
GLUCOSE BLD-MCNC: 91 MG/DL (ref 65–99)

## 2018-09-06 PROCEDURE — 99233 SBSQ HOSP IP/OBS HIGH 50: CPT | Performed by: OTHER

## 2018-09-06 PROCEDURE — 76770 US EXAM ABDO BACK WALL COMP: CPT | Performed by: PHYSICIAN ASSISTANT

## 2018-09-06 PROCEDURE — 99232 SBSQ HOSP IP/OBS MODERATE 35: CPT | Performed by: INTERNAL MEDICINE

## 2018-09-06 PROCEDURE — 05H533Z INSERTION OF INFUSION DEVICE INTO RIGHT SUBCLAVIAN VEIN, PERCUTANEOUS APPROACH: ICD-10-PCS | Performed by: INTERNAL MEDICINE

## 2018-09-06 RX ORDER — DEXTROSE MONOHYDRATE 25 G/50ML
INJECTION, SOLUTION INTRAVENOUS
Status: COMPLETED
Start: 2018-09-06 | End: 2018-09-06

## 2018-09-06 RX ORDER — SODIUM CHLORIDE 0.9 % (FLUSH) 0.9 %
10 SYRINGE (ML) INJECTION EVERY 12 HOURS
Status: DISCONTINUED | OUTPATIENT
Start: 2018-09-06 | End: 2018-09-08

## 2018-09-06 RX ORDER — POLYETHYLENE GLYCOL 3350 17 G/17G
17 POWDER, FOR SOLUTION ORAL DAILY
Status: DISCONTINUED | OUTPATIENT
Start: 2018-09-06 | End: 2018-09-08

## 2018-09-06 NOTE — PROGRESS NOTES
[address] [facility] Neurology Progress Note    Nate S[place]rna Patient Status:  Inpatient    3/11/1970 MRN [id number]   [facility] 4NW-A Attending Ethel Franklin MD   Hosp Day # 1 PCP Cheryle Mulligan MD     CC: MS len Cruz comments/additions/deletions are as below (If none listed-I concur with overall assessment/plan)        S/O:   Legs feels stronger but only complains of generalized fatigue  Had PLEX last night    During today's visit, the following items were reviewed: me Case/studies discussed with other caregivers - 2  (   ) Fam meetings - patient not present --non F2F  Non Face to Face CPT code 12951/88342 applies as documented above    High risk    (   ) Drug monitoring    (   ) PCA    (   ) Organ failure    (   ) De-es

## 2018-09-06 NOTE — CM/SW NOTE
Pt accepted to [facility]. Pt debating [facility] vs returning home w/[facility]. Pt was current w/them prior to admission. Pt wants to see how she does closer to dc. SW to follow p regarding dc plan.

## 2018-09-06 NOTE — PHYSICAL THERAPY NOTE
PHYSICAL THERAPY TREATMENT NOTE - INPATIENT    Room Number: 705/751-C     Session: 1   Number of Visits to Meet Established Goals: 5    Presenting Problem: Multiple Sclerosis exacerbation      History related to current admission: Pt is 46year old female Unspecified disorder of thyroid     total thyroidectomy   • Visual impairment     GLASSES       Past Surgical History  Past Surgical History:  No date: ANGIOGRAM  May 2014: CATH ANGIO  No date: CHOLECYSTECTOMY  9/18/2015: COLONOSCOPY N/A      Comment: Proc Degree of Impairment Score: 54.16%   Standardized Score (AM-PAC Scale): 40.78   CMS Modifier (G-Code): CK    FUNCTIONAL ABILITY STATUS  Gait Assessment   Gait Assistance: Dependent assistance (actual min A)  Distance (ft): 30,15,25  Assistive Device: Rolli mechanics; Coordination; Endurance; Energy conservation;Patient education; Family education;Gait training;Strengthening;Stoop training;Transfer training;Balance training  Rehab Potential : Fair  Frequency (Obs): 5x/week    CURRENT GOALS   Goal #1 Patient is ab

## 2018-09-06 NOTE — PROGRESS NOTES
[facility]  Nephrology Progress Note    Aracelis Cummings Patient Status:  Inpatient    3/11/1970 MRN [id number]   [facility] 4NW-A Attending Buddy Amos MD   Hosp Day # 1 PCP Vanessa Norton MD       SUBJECTIVE:  Feels somewhat bett 09/06/18   1015   [facility]  [facility]  144*  61*  138*  167*       Meds:     Current Facility-Administered Medications:  Normal Saline Flush 0.9 % injection 10 mL 10 mL Intravenous Q12H   Albuterol Sulfate  (90 Base) MCG/ACT inhaler 2 puff 2 puff Inhalation Impression/Plan:       #1. Weakness- pt with hx MS and is on PLEX every 3 weeks. presented with worsening weakness and PLEX requested and was done yesterday. Response to therapy, next PLEX and further eval to be determined by neurology service.

## 2018-09-06 NOTE — PROGRESS NOTES
[facility]  Urology Progress Note    Margy Pickard Patient Status:  Inpatient    3/11/1970 MRN [id number]   [facility] 4NW-A Attending Tom Bee MD   Hosp Day # 1 PCP Espinoza Verma MD     Subjective:  Margy Pickard is a(n) 5 Nonobstructing 5 mm left renal calculus. BLADDER:  No bladder wall thickening. Bilateral ureteral jets are identified. Prevoid bladder volume of 487 mL. Postvoid residual 29 mL.   OTHER:  Negative.     =====  CONCLUSION:  Nonobstructing bilateral mikayla

## 2018-09-06 NOTE — PLAN OF CARE
Diabetes/Glucose Control    • Glucose maintained within prescribed range Progressing        Impaired Activities of Daily Living    • Achieve highest/safest level of independence in self care Progressing        PAIN - ADULT    • Verbalizes/displays adequate

## 2018-09-06 NOTE — PROGRESS NOTES
[facility]  Progress Note    Raven Smith Patient Status:  Inpatient    3/11/1970 MRN [id number]   [facility] 4NW-A Attending Josh Hathaway MD   Hosp Day # 1 PCP Guanako aMr MD         SUBJECTIVE:  Subjective:  Raven Smith GLU  107*  192*  95       Recent Labs   Lab  09/04/18   2324   ALT  28   AST  15   ALB  4.4       Recent Labs   Lab  09/05/18   2147  09/06/18   0835  09/06/18   0908  09/06/18   1015  09/06/18   1219   PGLU  144*  61*  138*  167*  91                   M and/or OT  Mainor Riley MD  9/6/2018  12:42 PM

## 2018-09-07 ENCOUNTER — APPOINTMENT (OUTPATIENT)
Dept: INTERVENTIONAL RADIOLOGY/VASCULAR | Facility: HOSPITAL | Age: 48
DRG: 059 | End: 2018-09-07
Attending: SPECIALIST
Payer: MEDICARE

## 2018-09-07 LAB
GLUCOSE BLD-MCNC: 100 MG/DL (ref 65–99)
GLUCOSE BLD-MCNC: 120 MG/DL (ref 65–99)
GLUCOSE BLD-MCNC: 121 MG/DL (ref 65–99)
GLUCOSE BLD-MCNC: 147 MG/DL (ref 65–99)
GLUCOSE BLD-MCNC: 62 MG/DL (ref 65–99)
GLUCOSE BLD-MCNC: 74 MG/DL (ref 65–99)

## 2018-09-07 PROCEDURE — 99233 SBSQ HOSP IP/OBS HIGH 50: CPT | Performed by: OTHER

## 2018-09-07 PROCEDURE — B5181ZZ FLUOROSCOPY OF SUPERIOR VENA CAVA USING LOW OSMOLAR CONTRAST: ICD-10-PCS | Performed by: RADIOLOGY

## 2018-09-07 NOTE — PHYSICAL THERAPY NOTE
PHYSICAL THERAPY TREATMENT NOTE - INPATIENT    Room Number: 482/048-Y     Session: 2   Number of Visits to Meet Established Goals: 5    Presenting Problem: Multiple Sclerosis exacerbation      History related to current admission: Pt is 46year old female unspecified hyperlipidemia    • PONV (postoperative nausea and vomiting)     WOKE UP DURING PROCEDURE   • Scoliosis of lumbar spine    • Sickle cell trait (HCC)    • Type II or unspecified type diabetes mellitus without mention of complication, not stated the bed?: A Little   How much help from another person does the patient currently need. ..   -   Moving to and from a bed to a chair (including a wheelchair)?: A Little   -   Need to walk in hospital room?: A Lot   -   Climbing 3-5 steps with a railing?: To Discharge Recommendations: Acute rehabilitation     PLAN  PT Treatment Plan: Body mechanics; Coordination; Endurance; Energy conservation;Patient education; Family education;Gait training;Strengthening;Stoop training;Transfer training;Balance training  Rehab P

## 2018-09-07 NOTE — H&P (VIEW-ONLY)
[facility]  Report of Consultation    Durga Soto Patient Status:  Inpatient    3/11/1970 MRN [id number]   [facility] 4NW-A Attending Lisa Ortiz MD   Hosp Day # 2 PCP Danielle Gaines MD     Reason for Consultation:    Surgical [address] [facility] ENDOSCOPY  No date: LYSIS OF ADHESIONS  No date: OTHER      Comment: Diabetes catherer  No date: OTHER SURGICAL HISTORY      Comment:  x 2  No date: REMOVAL GALLBLADDER  2013: THYROIDECTOMY      Comment: benign MNG  No date: TOTAL ABDOM HY meals  •  Fluticasone Furoate-Vilanterol (BREO ELLIPTA) 100-25 MCG/INH inhaler 1 puff, 1 puff, Inhalation, Daily  •  glipiZIDE (GLUCOTROL) tab 10 mg, 10 mg, Oral, Daily  •  HYDROcodone-acetaminophen (NORCO)  MG per tab 1 tablet, 1 tablet, Oral, BID P Take 100 mg by mouth 2 (two) times daily. Disp:  Rfl:    Levothyroxine Sodium 88 MCG Oral Tab Take 88 mcg by mouth daily. Disp:  Rfl:    Ondansetron HCl (ZOFRAN) 4 mg tablet Take 1 tablet (4 mg total) by mouth every 8 (eight) hours as needed for Nausea.  Ankita Donis central line without signs of infection. RESPIRATORY: non labored breathing  CARDIOVASCULAR: RRR  ABDOMEN: soft, NT/ND  LYMPHATIC: no lymphadenopathy  EXTREMITIES: no cyanosis, clubbing or edema    .     Laboratory Data:  Recent Labs   Lab  09/04/18   232 ABDOMEN/PELVIS KIDNEYSTONE 2D RNDR (NO IV,NO ORAL) (CPT=74176)  COMPARISON:  [facility] , XR CHEST PA + LAT CHEST (CPT=71046), 7/05/2018, 9:54. [facility] , CT ABDOMEN PELVIS IV AND ORAL CONTRAST (ER), 6/20/2017, 16:30.   INDICATIONS:  abd pain  TECHNIQUE:  Nikolay Conti No bony lesion or fracture. PELVIC ORGANS:  Status post hysterectomy. LUNG BASES:  No visible pulmonary or pleural disease. Tip of a venous catheter is noted in the right atrium. OTHER:  Negative. CONCLUSION:  1.   No acute intra-abdominal or pelvic procedure well without immediate complication. Routine post procedure instructions were communicated to the patient and nursing staff and documented in the chart. ESTIMATED BLOOD LOSS: Less than 5cc.  FLUOROSCOPY TIME/DOSE:  0.4 minutes AIR KERMA: 13.71 mG questions answered        CHRISTELLE Real [address] [place] [address]  General Surgery  9/7/2018

## 2018-09-07 NOTE — PROGRESS NOTES
[facility]  Progress Note    Tiana Callejas Patient Status:  Inpatient    3/11/1970 MRN [id number]   [facility] 4NW-A Attending Padmini Chen MD   Hosp Day # 2 PCP Kennedy Das MD         SUBJECTIVE:  Subjective:  Tiana Callejas Labs   Lab  09/06/18   1015  09/06/18   1219  09/06/18   1713  09/06/18   2116  09/07/18   0712   PGLU  167*  91  105*  141*  100*                   Meds:     • Normal Saline Flush  10 mL Intravenous Q12H   • PEG 3350  17 g Oral Daily   • aspirin  81 mg Or

## 2018-09-07 NOTE — CONSULTS
[facility]  Report of Consultation    Alfredo Barry Patient Status:  Inpatient    3/11/1970 MRN [id number]   [facility] 4NW-A Attending Carol Anton MD   Hosp Day # 2 PCP Ashley Lofton MD     Reason for Consultation:    Surgical 1404 [facility] ENDOSCOPY  No date: LYSIS OF ADHESIONS  No date: OTHER      Comment: Diabetes catherer  No date: OTHER SURGICAL HISTORY      Comment:  x 2  No date: REMOVAL GALLBLADDER  2013: THYROIDECTOMY      Comment: benign MNG  No date: TOTAL ABDOM HY meals  •  Fluticasone Furoate-Vilanterol (BREO ELLIPTA) 100-25 MCG/INH inhaler 1 puff, 1 puff, Inhalation, Daily  •  glipiZIDE (GLUCOTROL) tab 10 mg, 10 mg, Oral, Daily  •  HYDROcodone-acetaminophen (NORCO)  MG per tab 1 tablet, 1 tablet, Oral, BID P Take 100 mg by mouth 2 (two) times daily. Disp:  Rfl:    Levothyroxine Sodium 88 MCG Oral Tab Take 88 mcg by mouth daily. Disp:  Rfl:    Ondansetron HCl (ZOFRAN) 4 mg tablet Take 1 tablet (4 mg total) by mouth every 8 (eight) hours as needed for Nausea.  Doretha Jack central line without signs of infection. RESPIRATORY: non labored breathing  CARDIOVASCULAR: RRR  ABDOMEN: soft, NT/ND  LYMPHATIC: no lymphadenopathy  EXTREMITIES: no cyanosis, clubbing or edema    .     Laboratory Data:  Recent Labs   Lab  09/04/18   232 ABDOMEN/PELVIS KIDNEYSTONE 2D RNDR (NO IV,NO ORAL) (CPT=74176)  COMPARISON:  [facility] , XR CHEST PA + LAT CHEST (CPT=71046), 7/05/2018, 9:54. [facility] , CT ABDOMEN PELVIS IV AND ORAL CONTRAST (ER), 6/20/2017, 16:30.   INDICATIONS:  abd pain  TECHNIQUE:  Ezra Guidry No bony lesion or fracture. PELVIC ORGANS:  Status post hysterectomy. LUNG BASES:  No visible pulmonary or pleural disease. Tip of a venous catheter is noted in the right atrium. OTHER:  Negative. CONCLUSION:  1.   No acute intra-abdominal or pelvic procedure well without immediate complication. Routine post procedure instructions were communicated to the patient and nursing staff and documented in the chart. ESTIMATED BLOOD LOSS: Less than 5cc.  FLUOROSCOPY TIME/DOSE:  0.4 minutes AIR KERMA: 13.71 mG questions answered        CHRISTELLE Jones [address] [place] [address]  General Surgery  9/7/2018

## 2018-09-07 NOTE — PROGRESS NOTES
00024 S[facility] Le[facility] Neurology Progress Note    Gely Sanon Patient Status:  Inpatient    3/11/1970 MRN [id number]   [facility] 4NW-A Attending Cachorro Amaral MD   Hosp Day # 2 PCP Porfirio Fernandez MD         Subjective:  Gely Sanon hrs:   BP Temp Temp src Pulse Resp SpO2 Weight   09/07/18 1601 96/62 97.9 °F (36.6 °C) Oral 78 16 100 % -   09/07/18 1147 106/65 98.4 °F (36.9 °C) Oral 74 18 - -   09/07/18 0814 104/78 98.7 °F (37.1 °C) Oral 79 17 98 % -   09/07/18 0345 104/64 98.1 °F (36. A1C 8.1 (H) 06/06/2017   A1C 6.7 (H) 10/27/2016    (H) 08/02/2017       Cholesterol:     Lab Results  Component Value Date   CHOLEST 162 10/31/2017   CHOLEST 155 06/06/2017   CHOLEST 158 12/12/2016     Lab Results  Component Value Date   HDL 41 (L

## 2018-09-07 NOTE — PROCEDURES
[facility]  Procedure Note    Honor Dilan Patient Status:  Inpatient    3/11/1970 MRN [id number]   [facility] 4NW-A Attending Gen Mckeon MD   Hosp Day # 2 PCP Ever Teague MD     Procedure: [facility] check    Pre-Procedure Diagn

## 2018-09-07 NOTE — PLAN OF CARE
GASTROINTESTINAL - ADULT    • Maintains or returns to baseline bowel function Not Progressing          Diabetes/Glucose Control    • Glucose maintained within prescribed range Progressing        GENITOURINARY - ADULT    • Absence of urinary retention Progr

## 2018-09-07 NOTE — CM/SW NOTE
RAKESH spoke Brad Section who stated the pt does not want to go to . Eryn Handley stated the pt wants to go home and [facility] w[facility]. RAKESH called Nahed Leonard from [facility] and informed her the pt will dc later today.

## 2018-09-07 NOTE — OCCUPATIONAL THERAPY NOTE
OCCUPATIONAL THERAPY TREATMENT NOTE - INPATIENT     Room Number: 480/759-P  Session: 1   Number of Visits to Meet Established Goals: 5    Presenting Problem: MS exacerbation; severe abd pain; weakness    History related to current admission: Pt is 48 year thyroid     total thyroidectomy   • Visual impairment     GLASSES       Past Surgical History  Past Surgical History:  No date: ANGIOGRAM  May 2014: CATH ANGIO  No date: CHOLECYSTECTOMY  9/18/2015: COLONOSCOPY N/A      Comment: Procedure: COLONOSCOPY;  Nolberto performed a sit><stand with RW with supervision. Pt performed functional mobility with RW and CG assist x approximately 5ft>bathroom. Pt performed a toilet T/F with min assist and grab bar use to a standard toilet.  Pt performed all aspects of toileting inc currently below her PLOF.  Acute rehab would be beneficial to address neuromuscular reeducation including therapeutic exercise to improve range of motion, strength, and coordination; functional mobility training; self-care training; caregiver instruction; a

## 2018-09-08 VITALS
BODY MASS INDEX: 28.84 KG/M2 | SYSTOLIC BLOOD PRESSURE: 109 MMHG | WEIGHT: 179.44 LBS | TEMPERATURE: 98 F | DIASTOLIC BLOOD PRESSURE: 63 MMHG | OXYGEN SATURATION: 100 % | HEIGHT: 66 IN | RESPIRATION RATE: 18 BRPM | HEART RATE: 74 BPM

## 2018-09-08 LAB
ANION GAP SERPL CALC-SCNC: 7 MMOL/L (ref 0–18)
BASOPHILS # BLD AUTO: 0.02 X10(3) UL (ref 0–0.1)
BASOPHILS NFR BLD AUTO: 0.3 %
BUN BLD-MCNC: 16 MG/DL (ref 8–20)
BUN/CREAT SERPL: 15 (ref 10–20)
CALCIUM BLD-MCNC: 8.7 MG/DL (ref 8.3–10.3)
CHLORIDE SERPL-SCNC: 108 MMOL/L (ref 101–111)
CO2 SERPL-SCNC: 28 MMOL/L (ref 22–32)
CREAT BLD-MCNC: 1.07 MG/DL (ref 0.55–1.02)
EOSINOPHIL # BLD AUTO: 0.22 X10(3) UL (ref 0–0.3)
EOSINOPHIL NFR BLD AUTO: 3.8 %
ERYTHROCYTE [DISTWIDTH] IN BLOOD BY AUTOMATED COUNT: 15.3 % (ref 11.5–16)
GLUCOSE BLD-MCNC: 118 MG/DL (ref 70–99)
GLUCOSE BLD-MCNC: 79 MG/DL (ref 65–99)
GLUCOSE BLD-MCNC: 99 MG/DL (ref 65–99)
HCT VFR BLD AUTO: 31.1 % (ref 34–50)
HGB BLD-MCNC: 9.8 G/DL (ref 12–16)
IMMATURE GRANULOCYTE COUNT: 0.01 X10(3) UL (ref 0–1)
IMMATURE GRANULOCYTE RATIO %: 0.2 %
INR BLD: 0.97 (ref 0.9–1.1)
LYMPHOCYTES # BLD AUTO: 1.97 X10(3) UL (ref 0.9–4)
LYMPHOCYTES NFR BLD AUTO: 34 %
MCH RBC QN AUTO: 22.4 PG (ref 27–33.2)
MCHC RBC AUTO-ENTMCNC: 31.5 G/DL (ref 31–37)
MCV RBC AUTO: 71 FL (ref 81–100)
MONOCYTES # BLD AUTO: 0.44 X10(3) UL (ref 0.1–1)
MONOCYTES NFR BLD AUTO: 7.6 %
NEUTROPHIL ABS PRELIM: 3.13 X10 (3) UL (ref 1.3–6.7)
NEUTROPHILS # BLD AUTO: 3.13 X10(3) UL (ref 1.3–6.7)
NEUTROPHILS NFR BLD AUTO: 54.1 %
OSMOLALITY SERPL CALC.SUM OF ELEC: 298 MOSM/KG (ref 275–295)
PLATELET # BLD AUTO: 157 10(3)UL (ref 150–450)
POTASSIUM SERPL-SCNC: 4.1 MMOL/L (ref 3.6–5.1)
PSA SERPL DL<=0.01 NG/ML-MCNC: 13.3 SECONDS (ref 12.4–14.7)
RBC # BLD AUTO: 4.38 X10(6)UL (ref 3.8–5.1)
RED CELL DISTRIBUTION WIDTH-SD: 37.6 FL (ref 35.1–46.3)
SODIUM SERPL-SCNC: 143 MMOL/L (ref 136–144)
WBC # BLD AUTO: 5.8 X10(3) UL (ref 4–13)

## 2018-09-08 PROCEDURE — 99233 SBSQ HOSP IP/OBS HIGH 50: CPT | Performed by: OTHER

## 2018-09-08 RX ORDER — DEXTROSE AND SODIUM CHLORIDE 5; .45 G/100ML; G/100ML
INJECTION, SOLUTION INTRAVENOUS CONTINUOUS
Status: DISCONTINUED | OUTPATIENT
Start: 2018-09-08 | End: 2018-09-08

## 2018-09-08 NOTE — PLAN OF CARE
Pt is alert and oriented  X4. Vitals stable. No C/o pain or discomfort. NPO maintained until this afternoon. Spoke to  about IR consult for port Today. Per  POrt can be placed as outpt on Monday.  Informed  about

## 2018-09-08 NOTE — PLAN OF CARE
Glucose maintained within prescribed range Progressing      Maintains or returns to baseline bowel function Progressing      Absence of urinary retention Progressing      Verbalizes/displays adequate comfort level or patient's stated pain goal Progressing

## 2018-09-08 NOTE — PROGRESS NOTES
[facility]  Progress Note    Bjdiego Toribio Patient Status:  Inpatient    3/11/1970 MRN [id number]   [facility] 4NW-A Attending Julieth Ponce MD   Hosp Day # 3 PCP Mainor Riley MD         SUBJECTIVE:  Subjective:  Nicole Rooney 1.07*   CA  8.9  8.4  8.7   MG  1.9   --    --    GLU  192*  95  118*       Recent Labs   Lab  09/04/18   2324   ALT  28   AST  15   ALB  4.4       Recent Labs   Lab  09/07/18   1717  09/07/18   1741  09/07/18   1851  09/07/18   2057  09/08/18   0651   PGL reviewed  PT and/or Nirmal Negrete MD

## 2018-09-08 NOTE — PROGRESS NOTES
Several add on surgeries today in OR limiting access in timely fashion.  Will ask IR to exchange port in radiology

## 2018-09-08 NOTE — PLAN OF CARE
NURSING DISCHARGE NOTE    Discharged Home via Wheelchair. Accompanied by Support staff  Belongings Taken by patient/family. Midline left in place with good working condition per 's orders.   Midline dressing is clean, dry

## 2018-09-08 NOTE — PROGRESS NOTES
84624 [facility] Neurology Progress Note    Valeri Peña Patient Status:  Inpatient    3/11/1970 MRN [id number]   [facility] 4NW-A Attending Adryan Neal MD   Hosp Day # 3 PCP Rajinder iLsa MD         Subjective:  Valeri Peña Lab Results   Component Value Date    BUN 16 09/08/2018    BUN 10 09/05/2018    BUN 11 09/04/2018     Lab Results   Component Value Date    CREATSERUM 1.07 (H) 09/08/2018    CREATSERUM 1.01 09/05/2018    CREATSERUM 1.05 (H) 09/04/2018       CBC:    Lab [phone]  9/8/2018

## 2018-09-08 NOTE — CM/SW NOTE
Per TY Carlos, pt has been cleared for discharge today. Notified Carito Garduno with [facility] that pt will discharge today.      Renu Charles RN, [facility]  Spectra [phone]

## 2018-09-08 NOTE — PROGRESS NOTES
Patient has access. Will send home today and plan for port exchange as outpatient next Wednesday.  May have diet and be discharged

## 2018-09-10 ENCOUNTER — TELEPHONE (OUTPATIENT)
Dept: NEUROLOGY | Facility: CLINIC | Age: 48
End: 2018-09-10

## 2018-09-10 NOTE — TELEPHONE ENCOUNTER
[facility] will be faxing over a form to extend her P.T. This will be different from the P.T. Request received earlier today.

## 2018-09-10 NOTE — TELEPHONE ENCOUNTER
Received fax from  for physician signature and review. Patient will resume home health services on 9/9/2018. Form faxed with receipt confirmation.

## 2018-09-12 ENCOUNTER — APPOINTMENT (OUTPATIENT)
Dept: GENERAL RADIOLOGY | Facility: HOSPITAL | Age: 48
End: 2018-09-12
Attending: SURGERY
Payer: MEDICARE

## 2018-09-12 ENCOUNTER — HOSPITAL ENCOUNTER (OUTPATIENT)
Facility: HOSPITAL | Age: 48
Setting detail: HOSPITAL OUTPATIENT SURGERY
Discharge: HOME | End: 2018-09-12
Attending: SURGERY | Admitting: SURGERY
Payer: MEDICARE

## 2018-09-12 ENCOUNTER — ANESTHESIA EVENT (OUTPATIENT)
Dept: SURGERY | Facility: HOSPITAL | Age: 48
End: 2018-09-12
Payer: MEDICARE

## 2018-09-12 ENCOUNTER — ANESTHESIA (OUTPATIENT)
Dept: SURGERY | Facility: HOSPITAL | Age: 48
End: 2018-09-12
Payer: MEDICARE

## 2018-09-12 VITALS
BODY MASS INDEX: 28.45 KG/M2 | HEIGHT: 66 IN | RESPIRATION RATE: 18 BRPM | HEART RATE: 67 BPM | WEIGHT: 177 LBS | DIASTOLIC BLOOD PRESSURE: 85 MMHG | SYSTOLIC BLOOD PRESSURE: 114 MMHG | OXYGEN SATURATION: 98 % | TEMPERATURE: 98 F

## 2018-09-12 DIAGNOSIS — G35 MULTIPLE SCLEROSIS (HCC): ICD-10-CM

## 2018-09-12 LAB
ALBUMIN SERPL-MCNC: 4.2 G/DL (ref 3.5–4.8)
ALBUMIN/GLOB SERPL: 1.6 {RATIO} (ref 1–2)
ALP LIVER SERPL-CCNC: 69 U/L (ref 39–100)
ALT SERPL-CCNC: 65 U/L (ref 14–54)
ANION GAP SERPL CALC-SCNC: 5 MMOL/L (ref 0–18)
AST SERPL-CCNC: 44 U/L (ref 15–41)
BILIRUB SERPL-MCNC: 0.2 MG/DL (ref 0.1–2)
BUN BLD-MCNC: 13 MG/DL (ref 8–20)
BUN/CREAT SERPL: 11.5 (ref 10–20)
CALCIUM BLD-MCNC: 9.2 MG/DL (ref 8.3–10.3)
CHLORIDE SERPL-SCNC: 108 MMOL/L (ref 101–111)
CO2 SERPL-SCNC: 32 MMOL/L (ref 22–32)
CREAT BLD-MCNC: 1.13 MG/DL (ref 0.55–1.02)
GLOBULIN PLAS-MCNC: 2.7 G/DL (ref 2.5–4)
GLUCOSE BLD-MCNC: 113 MG/DL (ref 65–99)
GLUCOSE BLD-MCNC: 167 MG/DL (ref 65–99)
GLUCOSE BLD-MCNC: 58 MG/DL (ref 65–99)
GLUCOSE BLD-MCNC: 84 MG/DL (ref 70–99)
GLUCOSE BLD-MCNC: 87 MG/DL (ref 65–99)
M PROTEIN MFR SERPL ELPH: 6.9 G/DL (ref 6.1–8.3)
OSMOLALITY SERPL CALC.SUM OF ELEC: 299 MOSM/KG (ref 275–295)
POTASSIUM SERPL-SCNC: 4.2 MMOL/L (ref 3.6–5.1)
SODIUM SERPL-SCNC: 145 MMOL/L (ref 136–144)

## 2018-09-12 PROCEDURE — 0JH63XZ INSERTION OF TUNNELED VASCULAR ACCESS DEVICE INTO CHEST SUBCUTANEOUS TISSUE AND FASCIA, PERCUTANEOUS APPROACH: ICD-10-PCS | Performed by: SURGERY

## 2018-09-12 PROCEDURE — 76000 FLUOROSCOPY <1 HR PHYS/QHP: CPT | Performed by: SURGERY

## 2018-09-12 PROCEDURE — 80053 COMPREHEN METABOLIC PANEL: CPT

## 2018-09-12 PROCEDURE — B5161ZA FLUOROSCOPY OF RIGHT SUBCLAVIAN VEIN USING LOW OSMOLAR CONTRAST, GUIDANCE: ICD-10-PCS | Performed by: SURGERY

## 2018-09-12 PROCEDURE — 82962 GLUCOSE BLOOD TEST: CPT

## 2018-09-12 RX ORDER — DEXTROSE MONOHYDRATE 25 G/50ML
50 INJECTION, SOLUTION INTRAVENOUS
Status: DISCONTINUED | OUTPATIENT
Start: 2018-09-12 | End: 2018-09-12

## 2018-09-12 RX ORDER — NALOXONE HYDROCHLORIDE 0.4 MG/ML
80 INJECTION, SOLUTION INTRAMUSCULAR; INTRAVENOUS; SUBCUTANEOUS AS NEEDED
Status: DISCONTINUED | OUTPATIENT
Start: 2018-09-12 | End: 2018-09-12

## 2018-09-12 RX ORDER — MIDAZOLAM HYDROCHLORIDE 1 MG/ML
1 INJECTION INTRAMUSCULAR; INTRAVENOUS EVERY 5 MIN PRN
Status: DISCONTINUED | OUTPATIENT
Start: 2018-09-12 | End: 2018-09-12

## 2018-09-12 RX ORDER — ONDANSETRON 2 MG/ML
4 INJECTION INTRAMUSCULAR; INTRAVENOUS AS NEEDED
Status: DISCONTINUED | OUTPATIENT
Start: 2018-09-12 | End: 2018-09-12

## 2018-09-12 RX ORDER — METOCLOPRAMIDE HYDROCHLORIDE 5 MG/ML
10 INJECTION INTRAMUSCULAR; INTRAVENOUS AS NEEDED
Status: DISCONTINUED | OUTPATIENT
Start: 2018-09-12 | End: 2018-09-12

## 2018-09-12 RX ORDER — BUPIVACAINE HYDROCHLORIDE 5 MG/ML
INJECTION, SOLUTION EPIDURAL; INTRACAUDAL AS NEEDED
Status: DISCONTINUED | OUTPATIENT
Start: 2018-09-12 | End: 2018-09-12 | Stop reason: HOSPADM

## 2018-09-12 RX ORDER — ACETAMINOPHEN 500 MG
1000 TABLET ORAL ONCE
COMMUNITY
End: 2018-09-14 | Stop reason: ALTCHOICE

## 2018-09-12 RX ORDER — MORPHINE SULFATE 4 MG/ML
2 INJECTION, SOLUTION INTRAMUSCULAR; INTRAVENOUS EVERY 5 MIN PRN
Status: DISCONTINUED | OUTPATIENT
Start: 2018-09-12 | End: 2018-09-12

## 2018-09-12 RX ORDER — ACETAMINOPHEN 500 MG
1000 TABLET ORAL ONCE
Status: DISCONTINUED | OUTPATIENT
Start: 2018-09-12 | End: 2018-09-12 | Stop reason: HOSPADM

## 2018-09-12 RX ORDER — HYDROCODONE BITARTRATE AND ACETAMINOPHEN 5; 325 MG/1; MG/1
1 TABLET ORAL AS NEEDED
Status: COMPLETED | OUTPATIENT
Start: 2018-09-12 | End: 2018-09-12

## 2018-09-12 RX ORDER — LABETALOL HYDROCHLORIDE 5 MG/ML
5 INJECTION, SOLUTION INTRAVENOUS EVERY 5 MIN PRN
Status: DISCONTINUED | OUTPATIENT
Start: 2018-09-12 | End: 2018-09-12

## 2018-09-12 RX ORDER — SODIUM CHLORIDE, SODIUM LACTATE, POTASSIUM CHLORIDE, CALCIUM CHLORIDE 600; 310; 30; 20 MG/100ML; MG/100ML; MG/100ML; MG/100ML
INJECTION, SOLUTION INTRAVENOUS CONTINUOUS
Status: DISCONTINUED | OUTPATIENT
Start: 2018-09-12 | End: 2018-09-12

## 2018-09-12 RX ORDER — DEXAMETHASONE SODIUM PHOSPHATE 4 MG/ML
4 VIAL (ML) INJECTION AS NEEDED
Status: DISCONTINUED | OUTPATIENT
Start: 2018-09-12 | End: 2018-09-12

## 2018-09-12 RX ORDER — TRAMADOL HYDROCHLORIDE 50 MG/1
TABLET ORAL EVERY 4 HOURS PRN
Qty: 30 TABLET | Refills: 0 | Status: SHIPPED | OUTPATIENT
Start: 2018-09-12 | End: 2018-09-28 | Stop reason: ALTCHOICE

## 2018-09-12 RX ORDER — LIDOCAINE HYDROCHLORIDE AND EPINEPHRINE 10; 10 MG/ML; UG/ML
INJECTION, SOLUTION INFILTRATION; PERINEURAL AS NEEDED
Status: DISCONTINUED | OUTPATIENT
Start: 2018-09-12 | End: 2018-09-12 | Stop reason: HOSPADM

## 2018-09-12 RX ORDER — CLINDAMYCIN PHOSPHATE 900 MG/50ML
900 INJECTION INTRAVENOUS ONCE
Status: COMPLETED | OUTPATIENT
Start: 2018-09-12 | End: 2018-09-12

## 2018-09-12 RX ORDER — CLINDAMYCIN PHOSPHATE 900 MG/50ML
INJECTION INTRAVENOUS
Status: DISCONTINUED
Start: 2018-09-12 | End: 2018-09-12

## 2018-09-12 RX ORDER — IBUPROFEN 200 MG
600 TABLET ORAL ONCE AS NEEDED
Status: DISCONTINUED | OUTPATIENT
Start: 2018-09-12 | End: 2018-09-12

## 2018-09-12 RX ORDER — DEXTROSE MONOHYDRATE 25 G/50ML
50 INJECTION, SOLUTION INTRAVENOUS
Status: DISCONTINUED | OUTPATIENT
Start: 2018-09-12 | End: 2018-09-12 | Stop reason: HOSPADM

## 2018-09-12 RX ORDER — HYDROCODONE BITARTRATE AND ACETAMINOPHEN 5; 325 MG/1; MG/1
2 TABLET ORAL AS NEEDED
Status: COMPLETED | OUTPATIENT
Start: 2018-09-12 | End: 2018-09-12

## 2018-09-12 NOTE — ANESTHESIA PREPROCEDURE EVALUATION
PRE-OP EVALUATION    Patient Name: Aracelis Cummings    Pre-op Diagnosis: Multiple sclerosis ([address] Dora Diaz    Procedure(s):  REMOVAL CATHETER AND  INSERTION OF PORT A CATHETER  WITH FLUOROSCOPY    Surgeon(s) and Role:     Shae Valenzuela MD - Primary    Pre-o (VITAMIN D-3) 5000 UNITS Oral Tab Take 1 tablet by mouth daily. Disp:  Rfl:    Pravastatin Sodium (PRAVACHOL) 80 MG Oral Tab Take 80 mg by mouth nightly.    Disp:  Rfl:    omeprazole (PRILOSEC) 20 MG Oral Capsule Delayed Release Take 20 mg by mouth 2 (two 09/08/2018     09/08/2018    CO2 28.0 09/08/2018    BUN 16 09/08/2018    CREATSERUM 1.07 (H) 09/08/2018     (H) 09/08/2018    CA 8.7 09/08/2018     Lab Results   Component Value Date    INR 0.97 09/08/2018         Airway      Mallampati: II

## 2018-09-12 NOTE — BRIEF OP NOTE
Pre-Operative Diagnosis: Multiple sclerosis ([facility] [G35]     Post-Operative Diagnosis: same as pre-op      Procedure Performed:   Procedure(s):  REMOVAL CATHETER AND  INSERTION OF PORT A CATHETER  WITH FLUOROSCOPY    Surgeon(s) and Role:     Troy Gómez

## 2018-09-12 NOTE — INTERVAL H&P NOTE
Pre-op Diagnosis: Multiple sclerosis ([facility] [G35]    The above referenced H&P was reviewed by Hailey Wooten MD on 9/12/2018, the patient was examined and no significant changes have occurred in the patient's condition since the H&P was performed.   I discus

## 2018-09-12 NOTE — INTERVAL H&P NOTE
Pre-op Diagnosis: Multiple sclerosis ([facility] [G35]    The above referenced H&P was reviewed by Samantha Fischer MD on 9/12/2018, the patient was examined and no significant changes have occurred in the patient's condition since the H&P was performed.   I discus

## 2018-09-12 NOTE — OR NURSING
Patient requesting to have BS tested d/t concerns of running low. BS 58. An amp of D50 given to patient. 15 minutes later BS recheck 167. Patient not symptomatic with either bs.

## 2018-09-12 NOTE — ANESTHESIA POSTPROCEDURE EVALUATION
[facility]    Darshanharry Gudino Patient Status:  Hospital Outpatient Surgery   Age/Gender 46year old female MRN [id number]   Location [address] [facility] Attending Demario Will MD   Hosp Day # 0 PCP MD Tho Simons

## 2018-09-13 ENCOUNTER — TELEPHONE (OUTPATIENT)
Dept: NEUROLOGY | Facility: CLINIC | Age: 48
End: 2018-09-13

## 2018-09-13 NOTE — TELEPHONE ENCOUNTER
Home health orders received from [facility] for physician review and signature. Patient receives Nursing services for port care. Frequency: SN 3 times per week, PT 1 time per week and OT 1 time per week. Plan of Care:  To provide skill

## 2018-09-14 ENCOUNTER — OFFICE VISIT (OUTPATIENT)
Dept: NEUROLOGY | Facility: CLINIC | Age: 48
End: 2018-09-14
Payer: MEDICARE

## 2018-09-14 VITALS
HEIGHT: 66 IN | RESPIRATION RATE: 16 BRPM | WEIGHT: 177 LBS | BODY MASS INDEX: 28.45 KG/M2 | DIASTOLIC BLOOD PRESSURE: 71 MMHG | SYSTOLIC BLOOD PRESSURE: 106 MMHG | HEART RATE: 70 BPM

## 2018-09-14 DIAGNOSIS — G37.9 DEMYELINATING DISEASE OF CENTRAL NERVOUS SYSTEM (HCC): ICD-10-CM

## 2018-09-14 DIAGNOSIS — G57.93 NEUROPATHIC PAIN OF BOTH LEGS: Primary | ICD-10-CM

## 2018-09-14 DIAGNOSIS — M79.7 FIBROMYALGIA SYNDROME: ICD-10-CM

## 2018-09-14 PROCEDURE — 99213 OFFICE O/P EST LOW 20 MIN: CPT | Performed by: OTHER

## 2018-09-14 NOTE — PROGRESS NOTES
[facility] with [facility]  Nieves Brooks  3/11/1970  Primary Care Provider:  Devendra Aragon MD    9/14/2018  Accompanied visit:  (n) No ( ) yes    46year old yo patient being seen for:  MS on aspirin 81 MG Oral Tab EC, Take 1 tablet by mouth daily. May use over the counter coated aspirin, Disp: , Rfl: 0  •  DULoxetine HCl (CYMBALTA) 30 MG Oral Cap DR Particles, Take 30 mg by mouth 2 (two) times daily.  Noon, and bedtime , Disp: , Rfl:   •  carlos different\" to light touch in her legs  Hyporeflexic in legs  Rest of neuro unchanged    Problem/s Identified this visit:   Neuropathic pain of both legs  (primary encounter diagnosis)  Fibromyalgia syndrome  Demyelinating disease of central nervous system

## 2018-09-14 NOTE — PATIENT INSTRUCTIONS
Refill policies:    • Allow 2-3 business days for refills; controlled substances may take longer.   • Contact your pharmacy at least 5 days prior to running out of medication and have them send an electronic request or submit request through the “request re entire amount billed. Precertification and Prior Authorizations: If your physician has recommended that you have a procedure or additional testing performed.   [facility]) will contact your insurance carrier to obtain pre-certi

## 2018-09-15 NOTE — OPERATIVE REPORT
[address]                                                         Operative Note    Tiana [facility] Location: [facility] Perioperative   CSN [id number] MRN [id number]   Admission Date 9/12/2018 Procedure Date 9/15/2018   Attending Physician No att. providers f without difficulty. The wounds were closed with absorbable suture.   Sterile dressings were applied the patient was taken to the recovery room in satisfactory condition        Jono Charlton MD  9/15/2018  12:15 PM

## 2018-09-17 ENCOUNTER — TELEPHONE (OUTPATIENT)
Dept: NEUROLOGY | Facility: CLINIC | Age: 48
End: 2018-09-17

## 2018-09-17 NOTE — TELEPHONE ENCOUNTER
OT plan of care received from [facility] for physician review and signature. Patient receives Skilled nursing and is now adding OT services.     Frequency: OT services weekly    Duration: two weeks    Plan of Care: to be developed    Paperwo

## 2018-09-24 ENCOUNTER — TELEPHONE (OUTPATIENT)
Dept: NEUROLOGY | Facility: CLINIC | Age: 48
End: 2018-09-24

## 2018-09-24 NOTE — TELEPHONE ENCOUNTER
Fax received for physician review and signature for continued home health services. Patient receives  services for permacath care for her plasmapheresis. Forms signed and faxed with receipt confirmation.

## 2018-09-25 ENCOUNTER — HOSPITAL ENCOUNTER (OUTPATIENT)
Dept: PERIOP | Facility: HOSPITAL | Age: 48
Discharge: HOME | End: 2018-09-25
Attending: INTERNAL MEDICINE
Payer: MEDICARE

## 2018-09-25 VITALS
RESPIRATION RATE: 20 BRPM | DIASTOLIC BLOOD PRESSURE: 74 MMHG | HEART RATE: 78 BPM | SYSTOLIC BLOOD PRESSURE: 99 MMHG | TEMPERATURE: 98 F | OXYGEN SATURATION: 98 %

## 2018-09-25 PROCEDURE — 80053 COMPREHEN METABOLIC PANEL: CPT | Performed by: INTERNAL MEDICINE

## 2018-09-25 PROCEDURE — P9045 ALBUMIN (HUMAN), 5%, 250 ML: HCPCS | Performed by: INTERNAL MEDICINE

## 2018-09-25 PROCEDURE — 85025 COMPLETE CBC W/AUTO DIFF WBC: CPT | Performed by: INTERNAL MEDICINE

## 2018-09-25 PROCEDURE — 36514 APHERESIS PLASMA: CPT | Performed by: INTERNAL MEDICINE

## 2018-09-25 RX ORDER — SODIUM CHLORIDE 9 MG/ML
INJECTION, SOLUTION INTRAVENOUS ONCE
Status: DISCONTINUED | OUTPATIENT
Start: 2018-09-25 | End: 2018-09-27

## 2018-09-25 RX ORDER — ALBUMIN, HUMAN INJ 5% 5 %
3000 SOLUTION INTRAVENOUS ONCE
Status: DISCONTINUED | OUTPATIENT
Start: 2018-09-25 | End: 2018-09-27

## 2018-09-25 RX ORDER — HEPARIN SODIUM 1000 [USP'U]/ML
4000 INJECTION, SOLUTION INTRAVENOUS; SUBCUTANEOUS ONCE
Status: DISCONTINUED | OUTPATIENT
Start: 2018-09-25 | End: 2018-09-27

## 2018-09-25 NOTE — OR NURSING
Plasmaphersis treatment completed. See Johny Higuera paperwork for details of treatment and tolerance. Pt ambulated self out accompanied by significant other.

## 2018-09-25 NOTE — OR NURSING
Pt admitted into room, consent for plasmaphersis obtained and signed. ID band placed and call light w/in reach. Significant other at chairside. [facility] for details of vitals obtained, medications administered and tolerance of treatment.  Pt

## 2018-09-26 ENCOUNTER — TELEPHONE (OUTPATIENT)
Dept: NEUROLOGY | Facility: CLINIC | Age: 48
End: 2018-09-26

## 2018-09-26 NOTE — TELEPHONE ENCOUNTER
S: PT alerting of reported fall by patient. B: LOV 9/14 with notes:  Discussion on the case:  Continue with Plasma Exchange every 3 weeks    Diagnostics:  none     Treatment:  PLEX    A: Patient reported fall last week. Called patient to clarify;     Lady Acevedo

## 2018-09-27 ENCOUNTER — TELEPHONE (OUTPATIENT)
Dept: NEUROLOGY | Facility: CLINIC | Age: 48
End: 2018-09-27

## 2018-09-28 PROCEDURE — 87086 URINE CULTURE/COLONY COUNT: CPT | Performed by: UROLOGY

## 2018-10-03 ENCOUNTER — TELEPHONE (OUTPATIENT)
Dept: NEUROLOGY | Facility: CLINIC | Age: 48
End: 2018-10-03

## 2018-10-03 NOTE — DISCHARGE SUMMARY
[facility]  Discharge Summary    Kai Nascimento Patient Status:  Inpatient    3/11/1970 MRN [id number]   [facility] 4NW-A Attending No att. providers found   Hosp Day # 3 PCP Opal Baird MD     Date of Admission: 2018    Date o Solution Pen-injector  Inject 45 Units into the skin nightly., Historical    pregabalin 150 MG Oral Cap  Take 1 capsule (150 mg total) by mouth 2 (two) times daily. , Normal, Disp-60 capsule, R-5    glipiZIDE 10 MG Oral Tab  Take 10 mg by mouth daily.   , Hi Historical          Follow up Visits:  Follow-up with all MD's as per their recommendation/ see pcp in three days    Dexter Palmer  10/3/2018  9:29 AM

## 2018-10-03 NOTE — TELEPHONE ENCOUNTER
Plan of care received from [facility] for physician review and signature. Orders signed and faxed with receipt confirmation.

## 2018-10-08 ENCOUNTER — PRIOR ORIGINAL RECORDS (OUTPATIENT)
Dept: OTHER | Age: 48
End: 2018-10-08

## 2018-10-08 ENCOUNTER — TELEPHONE (OUTPATIENT)
Dept: NEUROLOGY | Facility: CLINIC | Age: 48
End: 2018-10-08

## 2018-10-08 NOTE — TELEPHONE ENCOUNTER
Fax received from [facility] regarding order for shower chair. Order signed and faxed with receipt confirmation.

## 2018-10-15 ENCOUNTER — HOSPITAL ENCOUNTER (OUTPATIENT)
Dept: PERIOP | Facility: HOSPITAL | Age: 48
Discharge: HOME | End: 2018-10-15
Attending: INTERNAL MEDICINE
Payer: MEDICARE

## 2018-10-15 ENCOUNTER — PRIOR ORIGINAL RECORDS (OUTPATIENT)
Dept: OTHER | Age: 48
End: 2018-10-15

## 2018-10-15 VITALS
HEART RATE: 77 BPM | RESPIRATION RATE: 16 BRPM | SYSTOLIC BLOOD PRESSURE: 111 MMHG | OXYGEN SATURATION: 100 % | TEMPERATURE: 98 F | DIASTOLIC BLOOD PRESSURE: 78 MMHG

## 2018-10-15 PROCEDURE — 85027 COMPLETE CBC AUTOMATED: CPT | Performed by: INTERNAL MEDICINE

## 2018-10-15 PROCEDURE — P9045 ALBUMIN (HUMAN), 5%, 250 ML: HCPCS | Performed by: INTERNAL MEDICINE

## 2018-10-15 PROCEDURE — 80053 COMPREHEN METABOLIC PANEL: CPT | Performed by: INTERNAL MEDICINE

## 2018-10-15 PROCEDURE — 36514 APHERESIS PLASMA: CPT | Performed by: INTERNAL MEDICINE

## 2018-10-15 RX ORDER — HEPARIN SODIUM 1000 [USP'U]/ML
INJECTION, SOLUTION INTRAVENOUS; SUBCUTANEOUS
Status: DISPENSED
Start: 2018-10-15 | End: 2018-10-15

## 2018-10-15 RX ORDER — ALBUMIN, HUMAN INJ 5% 5 %
3000 SOLUTION INTRAVENOUS ONCE
Status: DISCONTINUED | OUTPATIENT
Start: 2018-10-15 | End: 2018-10-17

## 2018-10-15 RX ORDER — SODIUM CHLORIDE 9 MG/ML
INJECTION, SOLUTION INTRAVENOUS ONCE
Status: DISCONTINUED | OUTPATIENT
Start: 2018-10-15 | End: 2018-10-17

## 2018-10-15 RX ORDER — HEPARIN SODIUM 1000 [USP'U]/ML
4000 INJECTION, SOLUTION INTRAVENOUS; SUBCUTANEOUS ONCE
Status: DISCONTINUED | OUTPATIENT
Start: 2018-10-15 | End: 2018-10-17

## 2018-10-15 NOTE — OR NURSING
0900 Patient is not feeling well. States she is feeling shaky on her way to the hospital in the car. Would like us to start Plasmapheresis and see how she feels. Page put in for Dr. Rock Tiwari at this time.

## 2018-10-17 NOTE — TELEPHONE ENCOUNTER
Order received for shower chair from [address] [place] [address]. Order signed and faxed with receipt confirmation.

## 2018-10-22 ENCOUNTER — TELEPHONE (OUTPATIENT)
Dept: NEUROLOGY | Facility: CLINIC | Age: 48
End: 2018-10-22

## 2018-10-22 NOTE — TELEPHONE ENCOUNTER
Home health nurse and patient calling to provide Dr. Amanda Sotomayor with a condition update. Patient states she is experiencing extreme bilateral leg and back pain. Feels like she never recovered from her last relapse which was trigger by a UTI.   Has comple

## 2018-10-23 NOTE — TELEPHONE ENCOUNTER
Spoke with Dr. Asuncion López. Would recommend patient present to office for follow up visit to discuss treatment plan and current symptoms. Patient scheduled for appointment 10/25/2018.

## 2018-10-25 ENCOUNTER — OFFICE VISIT (OUTPATIENT)
Dept: NEUROLOGY | Facility: CLINIC | Age: 48
End: 2018-10-25
Payer: MEDICARE

## 2018-10-25 VITALS
SYSTOLIC BLOOD PRESSURE: 127 MMHG | HEART RATE: 74 BPM | HEIGHT: 66 IN | RESPIRATION RATE: 16 BRPM | BODY MASS INDEX: 28.61 KG/M2 | DIASTOLIC BLOOD PRESSURE: 73 MMHG | WEIGHT: 178 LBS

## 2018-10-25 DIAGNOSIS — G35 MULTIPLE SCLEROSIS (HCC): Primary | ICD-10-CM

## 2018-10-25 DIAGNOSIS — R29.898 LEFT LEG WEAKNESS: ICD-10-CM

## 2018-10-25 PROCEDURE — 99214 OFFICE O/P EST MOD 30 MIN: CPT | Performed by: OTHER

## 2018-10-25 NOTE — PATIENT INSTRUCTIONS
Refill policies:    • Allow 2-3 business days for refills; controlled substances may take longer.   • Contact your pharmacy at least 5 days prior to running out of medication and have them send an electronic request or submit request through the “request re entire amount billed. Precertification and Prior Authorizations: If your physician has recommended that you have a procedure or additional testing performed.   [facility]) will contact your insurance carrier to obtain pre-certi

## 2018-10-25 NOTE — PROGRESS NOTES
[facility] with [facility]  Jenn Adhikari  3/11/1970  Primary Care Provider:  Johny Quigley MD    10/25/2018  Accompanied visit:  ( ) No (x) yes, by:      46year old yo patient being seen for (SINGULAIR) 10 MG Oral Tab, Take 10 mg by mouth nightly., Disp: , Rfl:   •  aspirin 81 MG Oral Tab EC, Take 1 tablet by mouth daily.  May use over the counter coated aspirin, Disp: , Rfl: 0  •  DULoxetine HCl (CYMBALTA) 30 MG Oral Cap DR Particles, Take 30 hesitation with gait initiation  Rest of exam same  Can feel with legs  Uses Cane and gait is broad based as well      During today's visit, the following items were reviewed: lab results and radiology results, and the following relevant information are as present --non F2F  Non Face to Face CPT code 74310/09247 applies as documented above    PROCEDURE DONE     (   ) see notes  Visits are done with \"open doors and windows\" exam rooms, except when patient requests privacy

## 2018-10-29 ENCOUNTER — MYAURORA ACCOUNT LINK (OUTPATIENT)
Dept: OTHER | Age: 48
End: 2018-10-29

## 2018-10-29 ENCOUNTER — PRIOR ORIGINAL RECORDS (OUTPATIENT)
Dept: OTHER | Age: 48
End: 2018-10-29

## 2018-10-29 ENCOUNTER — HOSPITAL ENCOUNTER (OUTPATIENT)
Dept: MRI IMAGING | Facility: HOSPITAL | Age: 48
Discharge: HOME | End: 2018-10-29
Attending: Other
Payer: MEDICARE

## 2018-10-29 DIAGNOSIS — G35 MULTIPLE SCLEROSIS (HCC): ICD-10-CM

## 2018-10-29 DIAGNOSIS — R29.898 LEFT LEG WEAKNESS: ICD-10-CM

## 2018-10-29 PROCEDURE — 70553 MRI BRAIN STEM W/O & W/DYE: CPT | Performed by: OTHER

## 2018-10-29 PROCEDURE — A9575 INJ GADOTERATE MEGLUMI 0.1ML: HCPCS | Performed by: OTHER

## 2018-10-30 LAB
ALBUMIN: 3.6 G/DL
ALKALINE PHOSPHATATE(ALK PHOS): 96 IU/L
BILIRUBIN TOTAL: 0.3 MG/DL
BUN: 12 MG/DL
CALCIUM: 8.8 MG/DL
CHLORIDE: 105 MEQ/L
CREATININE, SERUM: 1.24 MG/DL
GLOBULIN: 3.5 G/DL
GLUCOSE: 169 MG/DL
HEMATOCRIT: 35.1 %
HEMOGLOBIN: 10.5 G/DL
PLATELETS: 193 K/UL
POTASSIUM, SERUM: 3.8 MEQ/L
PROTEIN, TOTAL: 7.1 G/DL
RED BLOOD COUNT: 4.71 X 10-6/U
SGOT (AST): 48 IU/L
SGPT (ALT): 74 IU/L
SODIUM: 141 MEQ/L
WHITE BLOOD COUNT: 5 X 10-3/U

## 2018-11-07 ENCOUNTER — HOSPITAL ENCOUNTER (OUTPATIENT)
Dept: PERIOP | Facility: HOSPITAL | Age: 48
Discharge: HOME | End: 2018-11-07
Attending: INTERNAL MEDICINE
Payer: MEDICARE

## 2018-11-07 VITALS
RESPIRATION RATE: 18 BRPM | OXYGEN SATURATION: 100 % | SYSTOLIC BLOOD PRESSURE: 110 MMHG | HEART RATE: 88 BPM | TEMPERATURE: 98 F | DIASTOLIC BLOOD PRESSURE: 75 MMHG

## 2018-11-07 PROCEDURE — 85025 COMPLETE CBC W/AUTO DIFF WBC: CPT | Performed by: INTERNAL MEDICINE

## 2018-11-07 PROCEDURE — 80053 COMPREHEN METABOLIC PANEL: CPT | Performed by: INTERNAL MEDICINE

## 2018-11-07 PROCEDURE — 36514 APHERESIS PLASMA: CPT | Performed by: INTERNAL MEDICINE

## 2018-11-07 PROCEDURE — P9045 ALBUMIN (HUMAN), 5%, 250 ML: HCPCS | Performed by: INTERNAL MEDICINE

## 2018-11-07 RX ORDER — SODIUM CHLORIDE 9 MG/ML
INJECTION, SOLUTION INTRAVENOUS ONCE
Status: DISCONTINUED | OUTPATIENT
Start: 2018-11-07 | End: 2018-11-09

## 2018-11-07 RX ORDER — ALBUMIN, HUMAN INJ 5% 5 %
3000 SOLUTION INTRAVENOUS ONCE
Status: DISCONTINUED | OUTPATIENT
Start: 2018-11-07 | End: 2018-11-09

## 2018-11-07 RX ORDER — HEPARIN SODIUM 1000 [USP'U]/ML
4000 INJECTION, SOLUTION INTRAVENOUS; SUBCUTANEOUS ONCE
Status: DISCONTINUED | OUTPATIENT
Start: 2018-11-07 | End: 2018-11-09

## 2018-11-12 NOTE — TELEPHONE ENCOUNTER
Relayed recommendations to increase Lyrica to 150 mg TID to [facility] APOLINAR PARADA. Recommendations to increase Lyrica given to Fely Horan. She verbalized understanding, requested a new prescription sent to her pharmacy.

## 2018-11-12 NOTE — TELEPHONE ENCOUNTER
Per Dr. Debby Snyder, [facility] to increase to Lyrica 150 mg TID. LMTCB to discuss with Parish Quinn, TY case manager at [facility].

## 2018-11-13 RX ORDER — PREGABALIN 150 MG/1
150 CAPSULE ORAL 3 TIMES DAILY
Qty: 60 CAPSULE | Refills: 5 | Status: SHIPPED
Start: 2018-11-13 | End: 2019-03-12 | Stop reason: DRUGHIGH

## 2018-11-19 ENCOUNTER — HOSPITAL ENCOUNTER (EMERGENCY)
Facility: HOSPITAL | Age: 48
Discharge: HOME | End: 2018-11-19
Attending: EMERGENCY MEDICINE
Payer: MEDICARE

## 2018-11-19 ENCOUNTER — APPOINTMENT (OUTPATIENT)
Dept: GENERAL RADIOLOGY | Facility: HOSPITAL | Age: 48
End: 2018-11-19
Attending: EMERGENCY MEDICINE
Payer: MEDICARE

## 2018-11-19 ENCOUNTER — APPOINTMENT (OUTPATIENT)
Dept: ULTRASOUND IMAGING | Facility: HOSPITAL | Age: 48
End: 2018-11-19
Attending: EMERGENCY MEDICINE
Payer: MEDICARE

## 2018-11-19 VITALS
TEMPERATURE: 97 F | OXYGEN SATURATION: 96 % | HEART RATE: 73 BPM | SYSTOLIC BLOOD PRESSURE: 113 MMHG | DIASTOLIC BLOOD PRESSURE: 77 MMHG | RESPIRATION RATE: 18 BRPM

## 2018-11-19 DIAGNOSIS — M79.601 RIGHT ARM PAIN: Primary | ICD-10-CM

## 2018-11-19 PROCEDURE — 87086 URINE CULTURE/COLONY COUNT: CPT | Performed by: EMERGENCY MEDICINE

## 2018-11-19 PROCEDURE — 93010 ELECTROCARDIOGRAM REPORT: CPT

## 2018-11-19 PROCEDURE — 93005 ELECTROCARDIOGRAM TRACING: CPT

## 2018-11-19 PROCEDURE — 99285 EMERGENCY DEPT VISIT HI MDM: CPT

## 2018-11-19 PROCEDURE — 84484 ASSAY OF TROPONIN QUANT: CPT | Performed by: EMERGENCY MEDICINE

## 2018-11-19 PROCEDURE — 93971 EXTREMITY STUDY: CPT | Performed by: EMERGENCY MEDICINE

## 2018-11-19 PROCEDURE — 81001 URINALYSIS AUTO W/SCOPE: CPT | Performed by: EMERGENCY MEDICINE

## 2018-11-19 PROCEDURE — 80053 COMPREHEN METABOLIC PANEL: CPT | Performed by: EMERGENCY MEDICINE

## 2018-11-19 PROCEDURE — 96374 THER/PROPH/DIAG INJ IV PUSH: CPT

## 2018-11-19 PROCEDURE — 85025 COMPLETE CBC W/AUTO DIFF WBC: CPT | Performed by: EMERGENCY MEDICINE

## 2018-11-19 PROCEDURE — 71046 X-RAY EXAM CHEST 2 VIEWS: CPT | Performed by: EMERGENCY MEDICINE

## 2018-11-19 RX ORDER — HYDROCODONE BITARTRATE AND ACETAMINOPHEN 5; 325 MG/1; MG/1
1-2 TABLET ORAL EVERY 4 HOURS PRN
Qty: 10 TABLET | Refills: 0 | Status: SHIPPED | OUTPATIENT
Start: 2018-11-19 | End: 2018-11-26

## 2018-11-19 RX ORDER — MORPHINE SULFATE 4 MG/ML
4 INJECTION, SOLUTION INTRAMUSCULAR; INTRAVENOUS ONCE
Status: COMPLETED | OUTPATIENT
Start: 2018-11-19 | End: 2018-11-19

## 2018-11-19 RX ORDER — HYDROCODONE BITARTRATE AND ACETAMINOPHEN 5; 325 MG/1; MG/1
2 TABLET ORAL ONCE
Status: COMPLETED | OUTPATIENT
Start: 2018-11-19 | End: 2018-11-19

## 2018-11-19 NOTE — ED PROVIDER NOTES
Patient Seen in: [facility] Emergency Department    History   Patient presents with:  Pain (neurologic)  Dizziness (neurologic)    Stated Complaint: RIGHT ARM PAIN X 1 WEEK, DIZZINESS     HPI    64-year-old female with history of multiple sclerosis, UP DURING PROCEDURE   • Scoliosis of lumbar spine    • Sickle cell trait (HCC)    • Sleep apnea     CPAP   • Type II or unspecified type diabetes mellitus without mention of complication, not stated as uncontrolled    • Unspecified disorder of thyroid °C)   Temp src Temporal   SpO2 97 %   O2 Device None (Room air)       Current:/83   Pulse 76   Temp 96.8 °F (36 °C) (Temporal)   Resp 18   SpO2 100%         Physical Exam    General: Alert, oriented, no apparent distress  HEENT: Atraumatic, normoceph as noted on EKG Report. Rate: 68  Rhythm: Sinus Rhythm  Reading: Small T wave inversions in V1 and V2 are unchanged from September 4, 2018.   No ST elevation or depression               MDM       Patient is neurovascular intact to the right upper extremity

## 2018-11-23 RX ORDER — HEPARIN SODIUM 1000 [USP'U]/ML
4 INJECTION, SOLUTION INTRAVENOUS; SUBCUTANEOUS ONCE
Status: CANCELLED | OUTPATIENT
Start: 2018-11-26

## 2018-11-23 RX ORDER — ALBUMIN, HUMAN INJ 5% 5 %
3000 SOLUTION INTRAVENOUS ONCE
Status: CANCELLED | OUTPATIENT
Start: 2018-11-26

## 2018-11-23 NOTE — TELEPHONE ENCOUNTER
Spoke to Patient and she states that Dr Hailey Mar prescribes Lyrica 200 mg and Dr Nicole Dunne gives 150 mg.  Patient will stayed with Lyrica 150 mg TID

## 2018-11-26 ENCOUNTER — HOSPITAL ENCOUNTER (OUTPATIENT)
Dept: PERIOP | Facility: HOSPITAL | Age: 48
Discharge: HOME | End: 2018-11-26
Attending: INTERNAL MEDICINE
Payer: MEDICARE

## 2018-11-28 ENCOUNTER — TELEPHONE (OUTPATIENT)
Dept: NEUROLOGY | Facility: CLINIC | Age: 48
End: 2018-11-28

## 2018-11-28 NOTE — TELEPHONE ENCOUNTER
Order for pt to have dressing change was sent to office for Dr. Lisa Kessler approval.   Plan of Care was also printed and signed by doctor. All paper order was printed, signed, and faxed to [facility].    Confirmation received

## 2018-12-10 ENCOUNTER — HOSPITAL ENCOUNTER (OUTPATIENT)
Dept: GENERAL RADIOLOGY | Facility: HOSPITAL | Age: 48
Discharge: HOME | End: 2018-12-10
Attending: SPECIALIST
Payer: MEDICARE

## 2018-12-10 ENCOUNTER — HOSPITAL ENCOUNTER (OUTPATIENT)
Dept: BONE DENSITY | Age: 48
Discharge: HOME | End: 2018-12-10
Attending: SPECIALIST
Payer: MEDICARE

## 2018-12-10 DIAGNOSIS — E03.9 MYXEDEMA HEART DISEASE: Primary | ICD-10-CM

## 2018-12-10 DIAGNOSIS — D64.9 ANEMIA, UNSPECIFIED: ICD-10-CM

## 2018-12-10 DIAGNOSIS — M81.0 OSTEOPOROSIS: ICD-10-CM

## 2018-12-10 DIAGNOSIS — N39.0 URINARY TRACT INFECTION, SITE NOT SPECIFIED: ICD-10-CM

## 2018-12-10 DIAGNOSIS — E78.00 PURE HYPERCHOLESTEROLEMIA: ICD-10-CM

## 2018-12-10 DIAGNOSIS — E55.9 VITAMIN D DEFICIENCY: ICD-10-CM

## 2018-12-10 DIAGNOSIS — M81.0 SENILE OSTEOPOROSIS: ICD-10-CM

## 2018-12-10 DIAGNOSIS — E11.65 TYPE II DIABETES MELLITUS, UNCONTROLLED (HCC): ICD-10-CM

## 2018-12-10 DIAGNOSIS — I51.9 MYXEDEMA HEART DISEASE: Primary | ICD-10-CM

## 2018-12-10 LAB
CHOLEST SERPL-MCNC: 201 MG/DL
CHOLEST/HDLC SERPL: NORMAL {RATIO}
HDLC SERPL-MCNC: 41 MG/DL
LDLC SERPL CALC-MCNC: 143 MG/DL
LENGTH OF FAST TIME PATIENT: NORMAL H
NONHDLC SERPL-MCNC: 160 MG/DL
TRIGL SERPL-MCNC: 85 MG/DL
VLDLC SERPL CALC-MCNC: NORMAL MG/DL

## 2018-12-10 PROCEDURE — 77080 DXA BONE DENSITY AXIAL: CPT | Performed by: SPECIALIST

## 2018-12-10 PROCEDURE — 36591 DRAW BLOOD OFF VENOUS DEVICE: CPT

## 2018-12-10 PROCEDURE — 81001 URINALYSIS AUTO W/SCOPE: CPT | Performed by: SPECIALIST

## 2018-12-10 PROCEDURE — 87086 URINE CULTURE/COLONY COUNT: CPT | Performed by: SPECIALIST

## 2018-12-10 PROCEDURE — 84443 ASSAY THYROID STIM HORMONE: CPT | Performed by: SPECIALIST

## 2018-12-10 PROCEDURE — 82306 VITAMIN D 25 HYDROXY: CPT | Performed by: SPECIALIST

## 2018-12-10 PROCEDURE — 84425 ASSAY OF VITAMIN B-1: CPT | Performed by: SPECIALIST

## 2018-12-10 PROCEDURE — 80053 COMPREHEN METABOLIC PANEL: CPT | Performed by: SPECIALIST

## 2018-12-10 PROCEDURE — 80061 LIPID PANEL: CPT | Performed by: SPECIALIST

## 2018-12-10 PROCEDURE — 82746 ASSAY OF FOLIC ACID SERUM: CPT | Performed by: SPECIALIST

## 2018-12-10 PROCEDURE — 85025 COMPLETE CBC W/AUTO DIFF WBC: CPT | Performed by: SPECIALIST

## 2018-12-10 PROCEDURE — 82607 VITAMIN B-12: CPT | Performed by: SPECIALIST

## 2018-12-10 PROCEDURE — 84439 ASSAY OF FREE THYROXINE: CPT | Performed by: SPECIALIST

## 2018-12-17 ENCOUNTER — HOSPITAL ENCOUNTER (OUTPATIENT)
Dept: PERIOP | Facility: HOSPITAL | Age: 48
Discharge: HOME | End: 2018-12-17
Attending: INTERNAL MEDICINE
Payer: MEDICARE

## 2018-12-17 VITALS
HEART RATE: 82 BPM | DIASTOLIC BLOOD PRESSURE: 78 MMHG | OXYGEN SATURATION: 96 % | TEMPERATURE: 97 F | SYSTOLIC BLOOD PRESSURE: 112 MMHG | RESPIRATION RATE: 16 BRPM

## 2018-12-17 PROCEDURE — P9045 ALBUMIN (HUMAN), 5%, 250 ML: HCPCS | Performed by: INTERNAL MEDICINE

## 2018-12-17 PROCEDURE — 36514 APHERESIS PLASMA: CPT | Performed by: INTERNAL MEDICINE

## 2018-12-17 RX ORDER — SODIUM CHLORIDE 9 MG/ML
INJECTION, SOLUTION INTRAVENOUS ONCE
Status: DISCONTINUED | OUTPATIENT
Start: 2018-12-17 | End: 2018-12-19

## 2018-12-17 RX ORDER — ALBUMIN, HUMAN INJ 5% 5 %
3000 SOLUTION INTRAVENOUS ONCE
Status: DISCONTINUED | OUTPATIENT
Start: 2018-12-17 | End: 2018-12-19

## 2018-12-17 RX ORDER — HEPARIN SODIUM 1000 [USP'U]/ML
4000 INJECTION, SOLUTION INTRAVENOUS; SUBCUTANEOUS ONCE
Status: DISCONTINUED | OUTPATIENT
Start: 2018-12-17 | End: 2018-12-19

## 2018-12-17 NOTE — PROGRESS NOTES
Amie CRAWFORD stated she cannot finish treatment d/t issue with the machine. Gave approx. 1/2 of treatment to patient. Dr. Abdias Houser called and notified of this issue. Amie RN will disconnect patient and will send patient home.  Patient knows that if she has

## 2018-12-31 ENCOUNTER — TELEPHONE (OUTPATIENT)
Dept: NEUROLOGY | Facility: CLINIC | Age: 48
End: 2018-12-31

## 2018-12-31 ENCOUNTER — HOSPITAL ENCOUNTER (INPATIENT)
Facility: HOSPITAL | Age: 48
LOS: 6 days | Discharge: HOME | DRG: 059 | End: 2019-01-06
Attending: EMERGENCY MEDICINE | Admitting: SPECIALIST
Payer: MEDICARE

## 2018-12-31 DIAGNOSIS — G35 MULTIPLE SCLEROSIS EXACERBATION (HCC): Primary | ICD-10-CM

## 2018-12-31 LAB
ALBUMIN SERPL-MCNC: 3.7 G/DL (ref 3.1–4.5)
ALBUMIN/GLOB SERPL: 1 {RATIO} (ref 1–2)
ALP LIVER SERPL-CCNC: 88 U/L (ref 39–100)
ALT SERPL-CCNC: 91 U/L (ref 14–54)
ANION GAP SERPL CALC-SCNC: 6 MMOL/L (ref 0–18)
AST SERPL-CCNC: 65 U/L (ref 15–41)
BASOPHILS # BLD AUTO: 0.02 X10(3) UL (ref 0–0.1)
BASOPHILS NFR BLD AUTO: 0.3 %
BILIRUB SERPL-MCNC: 0.2 MG/DL (ref 0.1–2)
BILIRUB UR QL STRIP.AUTO: NEGATIVE
BUN BLD-MCNC: 15 MG/DL (ref 8–20)
BUN/CREAT SERPL: 11.9 (ref 10–20)
CALCIUM BLD-MCNC: 8.6 MG/DL (ref 8.3–10.3)
CHLORIDE SERPL-SCNC: 107 MMOL/L (ref 101–111)
CLARITY UR REFRACT.AUTO: CLEAR
CO2 SERPL-SCNC: 29 MMOL/L (ref 22–32)
COLOR UR AUTO: YELLOW
CREAT BLD-MCNC: 1.26 MG/DL (ref 0.55–1.02)
EOSINOPHIL # BLD AUTO: 0.13 X10(3) UL (ref 0–0.3)
EOSINOPHIL NFR BLD AUTO: 2.2 %
ERYTHROCYTE [DISTWIDTH] IN BLOOD BY AUTOMATED COUNT: 14.8 % (ref 11.5–16)
GLOBULIN PLAS-MCNC: 3.8 G/DL (ref 2.8–4.4)
GLUCOSE BLD-MCNC: 151 MG/DL (ref 65–99)
GLUCOSE BLD-MCNC: 160 MG/DL (ref 70–99)
GLUCOSE BLD-MCNC: 93 MG/DL (ref 65–99)
GLUCOSE UR STRIP.AUTO-MCNC: NEGATIVE MG/DL
HCT VFR BLD AUTO: 38 % (ref 34–50)
HGB BLD-MCNC: 11.7 G/DL (ref 12–16)
IMMATURE GRANULOCYTE COUNT: 0.01 X10(3) UL (ref 0–1)
IMMATURE GRANULOCYTE RATIO %: 0.2 %
KETONES UR STRIP.AUTO-MCNC: NEGATIVE MG/DL
LYMPHOCYTES # BLD AUTO: 2.48 X10(3) UL (ref 0.9–4)
LYMPHOCYTES NFR BLD AUTO: 42.3 %
M PROTEIN MFR SERPL ELPH: 7.5 G/DL (ref 6.4–8.2)
MCH RBC QN AUTO: 22.2 PG (ref 27–33.2)
MCHC RBC AUTO-ENTMCNC: 30.8 G/DL (ref 31–37)
MCV RBC AUTO: 72.2 FL (ref 81–100)
MONOCYTES # BLD AUTO: 0.38 X10(3) UL (ref 0.1–1)
MONOCYTES NFR BLD AUTO: 6.5 %
NEUTROPHIL ABS PRELIM: 2.84 X10 (3) UL (ref 1.3–6.7)
NEUTROPHILS # BLD AUTO: 2.84 X10(3) UL (ref 1.3–6.7)
NEUTROPHILS NFR BLD AUTO: 48.5 %
NITRITE UR QL STRIP.AUTO: NEGATIVE
OSMOLALITY SERPL CALC.SUM OF ELEC: 298 MOSM/KG (ref 275–295)
PH UR STRIP.AUTO: 5 [PH] (ref 4.5–8)
PLATELET # BLD AUTO: 185 10(3)UL (ref 150–450)
POTASSIUM SERPL-SCNC: 3.9 MMOL/L (ref 3.6–5.1)
PROT UR STRIP.AUTO-MCNC: NEGATIVE MG/DL
RBC # BLD AUTO: 5.26 X10(6)UL (ref 3.8–5.1)
RBC UR QL AUTO: NEGATIVE
RED CELL DISTRIBUTION WIDTH-SD: 37.7 FL (ref 35.1–46.3)
SODIUM SERPL-SCNC: 142 MMOL/L (ref 136–144)
SP GR UR STRIP.AUTO: 1.02 (ref 1–1.03)
UROBILINOGEN UR STRIP.AUTO-MCNC: <2 MG/DL
WBC # BLD AUTO: 5.9 X10(3) UL (ref 4–13)

## 2018-12-31 PROCEDURE — 99223 1ST HOSP IP/OBS HIGH 75: CPT | Performed by: OTHER

## 2018-12-31 PROCEDURE — 99222 1ST HOSP IP/OBS MODERATE 55: CPT | Performed by: INTERNAL MEDICINE

## 2018-12-31 PROCEDURE — 6A551Z3 PHERESIS OF PLASMA, MULTIPLE: ICD-10-PCS | Performed by: SPECIALIST

## 2018-12-31 RX ORDER — HYDROCODONE BITARTRATE AND ACETAMINOPHEN 10; 325 MG/1; MG/1
1 TABLET ORAL 2 TIMES DAILY PRN
Status: DISCONTINUED | OUTPATIENT
Start: 2018-12-31 | End: 2019-01-06

## 2018-12-31 RX ORDER — FLUTICASONE FUROATE AND VILANTEROL TRIFENATATE 100; 25 UG/1; UG/1
1 POWDER RESPIRATORY (INHALATION) DAILY
COMMUNITY
Start: 2018-12-03 | End: 2020-11-05 | Stop reason: ALTCHOICE

## 2018-12-31 RX ORDER — PRAVASTATIN SODIUM 20 MG
20 TABLET ORAL NIGHTLY
Status: DISCONTINUED | OUTPATIENT
Start: 2018-12-31 | End: 2019-01-06

## 2018-12-31 RX ORDER — HYDROCODONE BITARTRATE AND ACETAMINOPHEN 5; 325 MG/1; MG/1
2 TABLET ORAL ONCE
Status: DISCONTINUED | OUTPATIENT
Start: 2018-12-31 | End: 2019-01-06

## 2018-12-31 RX ORDER — DIPHENHYDRAMINE HYDROCHLORIDE 50 MG/ML
25 INJECTION INTRAMUSCULAR; INTRAVENOUS ONCE
Status: COMPLETED | OUTPATIENT
Start: 2018-12-31 | End: 2018-12-31

## 2018-12-31 RX ORDER — SODIUM PHOSPHATE, DIBASIC AND SODIUM PHOSPHATE, MONOBASIC 7; 19 G/133ML; G/133ML
1 ENEMA RECTAL ONCE AS NEEDED
Status: DISCONTINUED | OUTPATIENT
Start: 2018-12-31 | End: 2019-01-06

## 2018-12-31 RX ORDER — MELATONIN
325 2 TIMES DAILY WITH MEALS
Status: DISCONTINUED | OUTPATIENT
Start: 2018-12-31 | End: 2019-01-06

## 2018-12-31 RX ORDER — PANTOPRAZOLE SODIUM 40 MG/1
40 TABLET, DELAYED RELEASE ORAL
Status: DISCONTINUED | OUTPATIENT
Start: 2019-01-01 | End: 2019-01-06

## 2018-12-31 RX ORDER — DULOXETIN HYDROCHLORIDE 30 MG/1
30 CAPSULE, DELAYED RELEASE ORAL 2 TIMES DAILY
Status: DISCONTINUED | OUTPATIENT
Start: 2018-12-31 | End: 2019-01-06

## 2018-12-31 RX ORDER — PREGABALIN 75 MG/1
150 CAPSULE ORAL 3 TIMES DAILY
Status: DISCONTINUED | OUTPATIENT
Start: 2018-12-31 | End: 2019-01-06

## 2018-12-31 RX ORDER — CIPROFLOXACIN 500 MG/1
500 TABLET, FILM COATED ORAL EVERY 12 HOURS SCHEDULED
Status: DISCONTINUED | OUTPATIENT
Start: 2018-12-31 | End: 2019-01-02

## 2018-12-31 RX ORDER — MONTELUKAST SODIUM 10 MG/1
10 TABLET ORAL NIGHTLY
Status: DISCONTINUED | OUTPATIENT
Start: 2018-12-31 | End: 2019-01-06

## 2018-12-31 RX ORDER — ALBUMIN, HUMAN INJ 5% 5 %
3000 SOLUTION INTRAVENOUS ONCE
Status: COMPLETED | OUTPATIENT
Start: 2018-12-31 | End: 2018-12-31

## 2018-12-31 RX ORDER — DIPHENHYDRAMINE HCL 25 MG
25 CAPSULE ORAL ONCE
Status: COMPLETED | OUTPATIENT
Start: 2018-12-31 | End: 2018-12-31

## 2018-12-31 RX ORDER — ZOLPIDEM TARTRATE 5 MG/1
5 TABLET ORAL NIGHTLY PRN
Status: DISCONTINUED | OUTPATIENT
Start: 2018-12-31 | End: 2019-01-06

## 2018-12-31 RX ORDER — POLYETHYLENE GLYCOL 3350 17 G/17G
17 POWDER, FOR SOLUTION ORAL DAILY PRN
Status: DISCONTINUED | OUTPATIENT
Start: 2018-12-31 | End: 2019-01-06

## 2018-12-31 RX ORDER — ASPIRIN 81 MG/1
81 TABLET ORAL DAILY
Status: DISCONTINUED | OUTPATIENT
Start: 2018-12-31 | End: 2019-01-06

## 2018-12-31 RX ORDER — ONDANSETRON 2 MG/ML
4 INJECTION INTRAMUSCULAR; INTRAVENOUS EVERY 6 HOURS PRN
Status: DISCONTINUED | OUTPATIENT
Start: 2018-12-31 | End: 2019-01-03

## 2018-12-31 RX ORDER — HEPARIN SODIUM 1000 [USP'U]/ML
1500 INJECTION, SOLUTION INTRAVENOUS; SUBCUTANEOUS
Status: DISCONTINUED | OUTPATIENT
Start: 2018-12-31 | End: 2019-01-06

## 2018-12-31 RX ORDER — ALBUTEROL SULFATE 90 UG/1
2 AEROSOL, METERED RESPIRATORY (INHALATION) EVERY 6 HOURS PRN
Status: DISCONTINUED | OUTPATIENT
Start: 2018-12-31 | End: 2019-01-06

## 2018-12-31 RX ORDER — LEVOTHYROXINE SODIUM 88 UG/1
88 TABLET ORAL
Status: DISCONTINUED | OUTPATIENT
Start: 2019-01-01 | End: 2019-01-06

## 2018-12-31 RX ORDER — DOCUSATE SODIUM 100 MG/1
100 CAPSULE, LIQUID FILLED ORAL 2 TIMES DAILY
Status: DISCONTINUED | OUTPATIENT
Start: 2018-12-31 | End: 2019-01-06

## 2018-12-31 RX ORDER — ENOXAPARIN SODIUM 100 MG/ML
40 INJECTION SUBCUTANEOUS NIGHTLY
Status: DISCONTINUED | OUTPATIENT
Start: 2018-12-31 | End: 2019-01-06

## 2018-12-31 RX ORDER — METOCLOPRAMIDE HYDROCHLORIDE 5 MG/ML
10 INJECTION INTRAMUSCULAR; INTRAVENOUS EVERY 8 HOURS PRN
Status: DISCONTINUED | OUTPATIENT
Start: 2018-12-31 | End: 2019-01-06

## 2018-12-31 RX ORDER — ONDANSETRON 4 MG/1
4 TABLET, FILM COATED ORAL EVERY 8 HOURS PRN
Status: DISCONTINUED | OUTPATIENT
Start: 2018-12-31 | End: 2019-01-03

## 2018-12-31 RX ORDER — LEVOTHYROXINE SODIUM 88 UG/1
88 TABLET ORAL DAILY
Status: DISCONTINUED | OUTPATIENT
Start: 2018-12-31 | End: 2018-12-31 | Stop reason: SDUPTHER

## 2018-12-31 RX ORDER — PIOGLITAZONEHYDROCHLORIDE 15 MG/1
15 TABLET ORAL DAILY
Status: DISCONTINUED | OUTPATIENT
Start: 2018-12-31 | End: 2018-12-31

## 2018-12-31 RX ORDER — DEXTROSE MONOHYDRATE 25 G/50ML
50 INJECTION, SOLUTION INTRAVENOUS
Status: DISCONTINUED | OUTPATIENT
Start: 2018-12-31 | End: 2019-01-06

## 2018-12-31 RX ORDER — BISACODYL 10 MG
10 SUPPOSITORY, RECTAL RECTAL
Status: DISCONTINUED | OUTPATIENT
Start: 2018-12-31 | End: 2019-01-06

## 2018-12-31 NOTE — PROGRESS NOTES
Received call from Dr Oracio Zuniga regarding admitting orders.  stated give him a little while and he will put orders in.

## 2018-12-31 NOTE — CERTIFICATION
Initial Certification      PHYSICIAN Certification of Need for Inpatient Hospitalization - initial     Patient will require inpatient services that will reasonably be expected to span two midnight's based on the clinical documentation in H+P.    Based on pa

## 2018-12-31 NOTE — CONSULTS
[facility]  Report of Consultation    Esperanza Khanna Patient Status:  Emergency    3/11/1970 MRN [id number]   Location [address] [facility] Attending Raymon Jerry MD   Hosp Day # 0 PCP Evelia Rendon MD     Reason for [facility] • COLONOSCOPY N/A 9/18/2015    Performed by Jessica Gonzalez DO at [facility] ENDOSCOPY   • DIAGNOSTIC LAPAROSCOPY-GENERAL N/A 8/2/2017    Performed by Ethan Salcido MD at [facility] MAIN OR   • ESOPHAGOGASTRODUODENOSCOPY (EGD) N/A 6/17/2016    Performed by Woodrow Loja Medications:  No current facility-administered medications for this encounter. Home Medications:    Prior to Admission Medications:  BREO ELLIPTA 100-25 MCG/INH Inhalation Aerosol Powder, Breath Activated Inhale 1 puff into the lungs 2 (two) times daily. Pain.       Review of Systems:  See HPI; A total of 12 systems reviewed and otherwise unremarkable. Physical Exam:  Vital signs: Blood pressure 115/79, pulse 77, temperature 98.2 °F (36.8 °C), temperature source Temporal, resp. rate 16, SpO2 97 %.   Gene neurology service    2. Pyuria - no UTI symptoms - await cultures - will start emperic abx    Thank you for allowing me to participate in this patient's care. Please feel free to call me with any questions or concerns.     Jenny Vincent MD  12/31/2018  1:3

## 2018-12-31 NOTE — ED INITIAL ASSESSMENT (HPI)
Pt here with c/o MS exacerbation, worsening over the last week. States she is having pain to BLE, BUE, weakness, trouble with her speech, stuttering, and a headache. No acute distress noted. Patient alert and oriented X 4 at time of assessment.

## 2018-12-31 NOTE — PROGRESS NOTES
Pt arrived from ER, pt is alert and oriented x 4.has trouble getting words out, and stuttering, pt with MS exacerbation . Pt can ambulate with assist. RA, MOI with c-pap.  marcela called and they are coming to do the plasmaphereses, also Dr Anila Hickey was pa

## 2018-12-31 NOTE — TELEPHONE ENCOUNTER
Nisha Gil received a partial PLEX 2 weeks ago due to machine malfunction. She missed prior infusion due to weather. Headaches, speech, gait abnormality, increased pain. Sherrie Agarwal suspects patient is in need of repeat PLEX.     Per Dr. Tracie Babinski, alert neurology

## 2018-12-31 NOTE — ED PROVIDER NOTES
Patient Seen in: [facility] Emergency Department    History   Patient presents with:  Multiple Sclerosis    Stated Complaint: MS exacerbation. States she cannot take steroids.  Dr Riky Parr RN states they *    HPI      Is a 49-year-old woman with a REMOVAL Right 9/12/2018    Performed by Radha Rodriguez MD at [address] [place] [address]     • COLONOSCOPY N/A 9/18/2015    Performed by Meredith Allred DO at [facility] ENDOSCOPY   • DIAGNOSTIC LAPAROSCOPY-GENERAL N/A 8/2/2017    Performed by Yandel Mendoza, and atraumatic. Eyes: Conjunctivae are normal. No scleral icterus. Neck: Normal range of motion. Neck supple. Cardiovascular: Normal rate and intact distal pulses. Pulmonary/Chest: Effort normal. No respiratory distress. Abdominal: Soft.  She exhi to consult nephrology. Patient stable for the floor upon transfer.         Disposition and Plan     Clinical Impression:  [facility] ([facility]  (primary encounter diagnosis)    Disposition:  Admit  12/31/2018 12:38 pm    Follow-up:  No f

## 2019-01-01 LAB
ANION GAP SERPL CALC-SCNC: 8 MMOL/L (ref 0–18)
BASOPHILS # BLD AUTO: 0.02 X10(3) UL (ref 0–0.1)
BASOPHILS NFR BLD AUTO: 0.3 %
BUN BLD-MCNC: 11 MG/DL (ref 8–20)
BUN/CREAT SERPL: 9 (ref 10–20)
CALCIUM BLD-MCNC: 7.9 MG/DL (ref 8.3–10.3)
CHLORIDE SERPL-SCNC: 111 MMOL/L (ref 101–111)
CO2 SERPL-SCNC: 25 MMOL/L (ref 22–32)
CREAT BLD-MCNC: 1.22 MG/DL (ref 0.55–1.02)
EOSINOPHIL # BLD AUTO: 0.13 X10(3) UL (ref 0–0.3)
EOSINOPHIL NFR BLD AUTO: 2.2 %
ERYTHROCYTE [DISTWIDTH] IN BLOOD BY AUTOMATED COUNT: 15.2 % (ref 11.5–16)
EST. AVERAGE GLUCOSE BLD GHB EST-MCNC: 166 MG/DL (ref 68–126)
GLUCOSE BLD-MCNC: 107 MG/DL (ref 65–99)
GLUCOSE BLD-MCNC: 120 MG/DL (ref 65–99)
GLUCOSE BLD-MCNC: 130 MG/DL (ref 65–99)
GLUCOSE BLD-MCNC: 139 MG/DL (ref 65–99)
GLUCOSE BLD-MCNC: 151 MG/DL (ref 70–99)
HBA1C MFR BLD HPLC: 7.4 % (ref ?–5.7)
HCT VFR BLD AUTO: 39.3 % (ref 34–50)
HGB BLD-MCNC: 11.7 G/DL (ref 12–16)
IMMATURE GRANULOCYTE COUNT: 0.01 X10(3) UL (ref 0–1)
IMMATURE GRANULOCYTE RATIO %: 0.2 %
LYMPHOCYTES # BLD AUTO: 2.22 X10(3) UL (ref 0.9–4)
LYMPHOCYTES NFR BLD AUTO: 37.5 %
MCH RBC QN AUTO: 22.2 PG (ref 27–33.2)
MCHC RBC AUTO-ENTMCNC: 29.8 G/DL (ref 31–37)
MCV RBC AUTO: 74.7 FL (ref 81–100)
MONOCYTES # BLD AUTO: 0.44 X10(3) UL (ref 0.1–1)
MONOCYTES NFR BLD AUTO: 7.4 %
NEUTROPHIL ABS PRELIM: 3.1 X10 (3) UL (ref 1.3–6.7)
NEUTROPHILS # BLD AUTO: 3.1 X10(3) UL (ref 1.3–6.7)
NEUTROPHILS NFR BLD AUTO: 52.4 %
OSMOLALITY SERPL CALC.SUM OF ELEC: 300 MOSM/KG (ref 275–295)
PLATELET # BLD AUTO: 173 10(3)UL (ref 150–450)
POTASSIUM SERPL-SCNC: 4.1 MMOL/L (ref 3.6–5.1)
RBC # BLD AUTO: 5.26 X10(6)UL (ref 3.8–5.1)
RED CELL DISTRIBUTION WIDTH-SD: 40.6 FL (ref 35.1–46.3)
SODIUM SERPL-SCNC: 144 MMOL/L (ref 136–144)
TSI SER-ACNC: 0.43 MIU/ML (ref 0.35–5.5)
WBC # BLD AUTO: 5.9 X10(3) UL (ref 4–13)

## 2019-01-01 PROCEDURE — 99233 SBSQ HOSP IP/OBS HIGH 50: CPT | Performed by: OTHER

## 2019-01-01 PROCEDURE — 99232 SBSQ HOSP IP/OBS MODERATE 35: CPT | Performed by: INTERNAL MEDICINE

## 2019-01-01 PROCEDURE — 5A09357 ASSISTANCE WITH RESPIRATORY VENTILATION, LESS THAN 24 CONSECUTIVE HOURS, CONTINUOUS POSITIVE AIRWAY PRESSURE: ICD-10-PCS | Performed by: SPECIALIST

## 2019-01-01 RX ORDER — DIPHENHYDRAMINE HYDROCHLORIDE 50 MG/ML
25 INJECTION INTRAMUSCULAR; INTRAVENOUS ONCE
Status: DISCONTINUED | OUTPATIENT
Start: 2019-01-02 | End: 2019-01-02 | Stop reason: DRUGHIGH

## 2019-01-01 RX ORDER — ALBUMIN, HUMAN INJ 5% 5 %
3000 SOLUTION INTRAVENOUS ONCE
Status: COMPLETED | OUTPATIENT
Start: 2019-01-02 | End: 2019-01-02

## 2019-01-01 RX ORDER — DIPHENHYDRAMINE HCL 25 MG
25 CAPSULE ORAL ONCE
Status: DISCONTINUED | OUTPATIENT
Start: 2019-01-02 | End: 2019-01-02 | Stop reason: DRUGHIGH

## 2019-01-01 RX ORDER — ALBUMIN, HUMAN INJ 5% 5 %
3000 SOLUTION INTRAVENOUS ONCE
Status: DISCONTINUED | OUTPATIENT
Start: 2019-01-01 | End: 2019-01-01

## 2019-01-01 NOTE — PLAN OF CARE
Diabetes/Glucose Control    • Glucose maintained within prescribed range Progressing        Impaired Communication    • Patient will achieve maximal communication potential Progressing        Impaired Functional Mobility    • Achieve highest/safest level o

## 2019-01-01 NOTE — PHYSICAL THERAPY NOTE
PHYSICAL THERAPY EVALUATION - INPATIENT     Room Number: 522/522-A  Evaluation Date: 1/1/2019  Type of Evaluation: Initial  Physician Order: PT Eval and Treat    Presenting Problem: MS exacerbation  Reason for Therapy: Mobility Dysfunction and Discha Performed by Marj Salinas DO at [facility] ENDOSCOPY   • DIAGNOSTIC LAPAROSCOPY-GENERAL N/A 8/2/2017    Performed by Mouna Resendiz MD at [facility] MAIN OR   • ESOPHAGOGASTRODUODENOSCOPY (EGD) N/A 6/17/2016    Performed by Marj Salinas DO at [facility] ENDOSCOPY   • ES Impaired  · Orientation Level:  oriented x4  · Following Commands:  follows multistep commands with increased time and follows multistep commands with repetition  · Motor Planning: impaired  · Safety Judgement:  decreased awareness of need for assistance a Score:  Raw Score: 15   Approx Degree of Impairment: 57.7%   Standardized Score (AM-PAC Scale): 39.45   CMS Modifier (G-Code): CK    FUNCTIONAL ABILITY STATUS  Gait Assessment   Gait Assistance: Maximum assistance(FIM due to distance actual mod a at times) deficits to assist patient in returning to prior to level of function. DISCHARGE RECOMMENDATIONS  PT Discharge Recommendations: Acute rehabilitation    PLAN  PT Treatment Plan: Bed mobility; Coordination; Endurance; Energy conservation;Patient education; Fami

## 2019-01-01 NOTE — PLAN OF CARE
Impaired Functional Mobility    • Achieve highest/safest level of mobility/gait Not Progressing        MUSCULOSKELETAL - ADULT    • Return mobility to safest level of function Not Progressing        Patient/Family Goals    • Patient/Family Long Term Goal N

## 2019-01-01 NOTE — CONSULTS
[facility]  Report of Consultation    Amna Woodward Patient Status:  Inpatient    3/11/1970 MRN [id number]   [facility] 5NW-A Attending Jan Lennon MD   Hosp Day # 0 PCP Choco Núñez MD     Reason for Consultation:  Multiple s WOKE UP DURING PROCEDURE   • Scoliosis of lumbar spine    • Sickle cell trait (HCC)    • Sleep apnea     CPAP   • Type II or unspecified type diabetes mellitus without mention of complication, not stated as uncontrolled    • Unspecified disorder of thyroid OTHER (SEE COMMENTS)    Comment:Cardiac tripcity  Latex                       Comment:WELTS AND SHORTNESS OF BREATH  Other                   SHORTNESS OF BREATH    Comment:ENVIRONMENTAL,AASTHMA EXACERBATION  Pcn [Penicillamine]       Penicillins Rfl:  12/30/2018 at Unknown time   aspirin 81 MG Oral Tab EC Take 1 tablet by mouth daily.  May use over the counter coated aspirin Disp:  Rfl: 0 12/31/2018 at Unknown time   DULoxetine HCl (CYMBALTA) 30 MG Oral Cap DR Particles Take 30 mg by mouth 2 (two) Oral, Q15 Min PRN  •  Insulin Aspart Pen (NOVOLOG) 100 UNIT/ML flexpen 2-10 Units, 2-10 Units, Subcutaneous, TID CC and HS  •  ondansetron HCl (ZOFRAN) injection 4 mg, 4 mg, Intravenous, Q6H PRN  •  Albuterol Sulfate  (90 Base) MCG/ACT inhaler 2 puf negatives are noted in HPI. Physical Exam:  Blood pressure 120/76, pulse 85, temperature 98.3 °F (36.8 °C), temperature source Oral, resp. rate 16, weight 170 lb 1.6 oz, SpO2 97 %.     Gen: well developed, well nourished, no acute distress  HEENT: norm There are bilateral stable foci of T2/FLAIR hyperintensity in the   periventricular deep white matter bilaterally. The larger foci show low signal intensity on the T1 weighted images.   The largest focus is in the posterior right coronal radiata measuring

## 2019-01-01 NOTE — PROGRESS NOTES
[id number] Progress Note    Randell Ellis Patient Status:  Inpatient    3/11/1970 MRN [id number]   [facility] 5NW-A Attending Ellyn Gross MD   Hosp Day # 1 PCP Pascual Garcia MD     Subjective:  Randell Ellis is deferred           Labs:  Reviewed in EPIC;   Lab Results   Component Value Date    WBC 5.9 01/01/2019    HGB 11.7 01/01/2019    HCT 39.3 01/01/2019    .0 01/01/2019    CREATSERUM 1.22 01/01/2019    BUN 11 01/01/2019     01/01/2019    K 4.1 01 Imaging:   No new imaging    Assessment:       This is a 46year old F patient with PMHx significant for MS, as well as liver disease, with steroid allergy, who presents with slurred speech, weakness and gait instability - chronically on scheduled PLE

## 2019-01-01 NOTE — H&P
[facility]    PATIENT'S NAME: VANI PERICO A   ATTENDING PHYSICIAN: Espinoza Verma M.D.    PATIENT ACCOUNT#:   [de-identified]    LOCATION:  [address] [place] [address]  MEDICAL RECORD #:   [id number]       YOB: 1970  ADMISSION DATE:       12/31/2018 DATA:  Hemoglobin 11.7, creatinine 1.26, platelets 136, glucose 160. MRI of the brain with and without contrast:  Stable exam.    ASSESSMENT:    1.   Multiple sclerosis flare-up. 2.   Fatty liver disease. 3.   Diabetes type 2, uncontrolled.   4.   Hy

## 2019-01-01 NOTE — PROGRESS NOTES
[facility]  Progress Note    Darshan Gudino Patient Status:  Emergency    3/11/1970 MRN [id number]   Location [address] [facility] Attending Thais Blas MD   Hosp Day # 1 PCP Katlin Suggs MD     Feels better today - speech sti Tartrate (AMBIEN) tab 5 mg 5 mg Oral Nightly PRN   Enoxaparin Sodium (LOVENOX) 40 MG/0.4ML injection 40 mg 40 mg Subcutaneous Nightly   docusate sodium (COLACE) cap 100 mg 100 mg Oral BID   PEG 3350 (MIRALAX) powder packet 17 g 17 g Oral Daily PRN   moniquees neurology following   - 2nd PLEX tomorrow- albumin for replacement fluid   - will await further guidance regarding # of treatments/frequency from neurology service    2.  Pyuria - no UTI symptoms - await cultures - will start emperic abx    Thank you for al

## 2019-01-02 ENCOUNTER — TELEPHONE (OUTPATIENT)
Dept: NEUROLOGY | Facility: CLINIC | Age: 49
End: 2019-01-02

## 2019-01-02 LAB
ANION GAP SERPL CALC-SCNC: 7 MMOL/L (ref 0–18)
BUN BLD-MCNC: 13 MG/DL (ref 8–20)
BUN/CREAT SERPL: 11 (ref 10–20)
CALCIUM BLD-MCNC: 7.9 MG/DL (ref 8.3–10.3)
CHLORIDE SERPL-SCNC: 110 MMOL/L (ref 101–111)
CO2 SERPL-SCNC: 26 MMOL/L (ref 22–32)
CREAT BLD-MCNC: 1.18 MG/DL (ref 0.55–1.02)
GLUCOSE BLD-MCNC: 102 MG/DL (ref 70–99)
GLUCOSE BLD-MCNC: 104 MG/DL (ref 65–99)
GLUCOSE BLD-MCNC: 119 MG/DL (ref 65–99)
GLUCOSE BLD-MCNC: 130 MG/DL (ref 65–99)
GLUCOSE BLD-MCNC: 90 MG/DL (ref 65–99)
OSMOLALITY SERPL CALC.SUM OF ELEC: 296 MOSM/KG (ref 275–295)
POTASSIUM SERPL-SCNC: 4 MMOL/L (ref 3.6–5.1)
SODIUM SERPL-SCNC: 143 MMOL/L (ref 136–144)

## 2019-01-02 PROCEDURE — 99233 SBSQ HOSP IP/OBS HIGH 50: CPT | Performed by: OTHER

## 2019-01-02 RX ORDER — DIPHENHYDRAMINE HYDROCHLORIDE 50 MG/ML
25 INJECTION INTRAMUSCULAR; INTRAVENOUS ONCE
Status: COMPLETED | OUTPATIENT
Start: 2019-01-02 | End: 2019-01-02

## 2019-01-02 RX ORDER — DIPHENHYDRAMINE HCL 25 MG
25 CAPSULE ORAL ONCE
Status: COMPLETED | OUTPATIENT
Start: 2019-01-02 | End: 2019-01-02

## 2019-01-02 NOTE — CM/SW NOTE
01/02/19 1400   CM/SW Referral Data   Referral Source Physician;Social Work (self-referral)   Reason for Referral Discharge planning   Informant Patient   Pertinent Medical Hx   Primary Care Physician Name Hernandez Joellen   Patient Info   Patient's Mental Statu

## 2019-01-02 NOTE — PLAN OF CARE
Problem: Impaired Communication  Goal: Patient will achieve maximal communication potential  Interventions:  Encourage patient to relax, speak slowly, and use gentle onsets in speech  Outcome: Progressing

## 2019-01-02 NOTE — PROGRESS NOTES
01/02/19 0947   Clinical Encounter Type   Visited With Patient   Anabaptist Encounters   Anabaptist Needs Prayer   Patient Spiritual Encounters   Spiritual Interventions  provided emotional/spiritual care with patient.   to remain available

## 2019-01-02 NOTE — PROGRESS NOTES
[id number] Progress Note    Verdia Eisenmenger Patient Status:  Inpatient    3/11/1970 MRN [id number]   [facility] 5NW-A Attending Ania Rogers MD   Hosp Day # 2 PCP Marisabel Granados MD         Subjective:  Verdia Eisenmenger

## 2019-01-02 NOTE — PROGRESS NOTES
[id number] Progress Note    Nate Wakefield Patient Status:  Inpatient    3/11/1970 MRN [id number]   [facility] 5NW-A Attending Ethel Franklin MD   Hosp Day # 2 PCP Cheryle Mulligan MD     Subjective:  Nate Wakefield is deferred           Labs:  Reviewed in EPIC;   Lab Results   Component Value Date    CREATSERUM 1.18 01/02/2019    BUN 13 01/02/2019     01/02/2019    K 4.0 01/02/2019     01/02/2019    CO2 26.0 01/02/2019     01/02/2019    CA 7.9 01/02/2 well as liver disease, with steroid allergy, who presents with slurred speech, weakness and gait instability - chronically on scheduled PLEX every 3 weeks with last session incomplete      Plan:  - PLEX x1 session thus far with continued symptoms - session

## 2019-01-02 NOTE — PROGRESS NOTES
[facility]  Progress Note    Nicole Rooney Patient Status:  Emergency    3/11/1970 MRN [id number]   Location [facility] [facility] Attending Tello Jimenez MD   Hosp Day # 2 PCP Mainor Riley MD     No acute events overnight  Repo tablet Oral BID PRN   Montelukast Sodium (SINGULAIR) tab 10 mg 10 mg Oral Nightly   Pantoprazole Sodium (PROTONIX) EC tab 40 mg 40 mg Oral QAM AC   Ondansetron HCl (ZOFRAN) tab 4 mg 4 mg Oral Q8H PRN   Pravastatin Sodium (PRAVACHOL) tab 20 mg 20 mg Oral Ni 01/01/19   0756  01/02/19   0749   NA  142  144  143   K  3.9  4.1  4.0   CL  107  111  110   CO2  29.0  25.0  26.0   BUN  15  11  13   CREATSERUM  1.26*  1.22*  1.18*   CA  8.6  7.9*  7.9*       Recent Labs      12/31/18   1202   ALT  91*   AST  65*   AL

## 2019-01-02 NOTE — SLP NOTE
SPEECH/LANGUAGE/COGNITIVE EVALUATION - INPATIENT    Admission Date: 12/31/2018  Evaluation Date: 01/02/19    Reason for Referral: Other (Comment)(speech disturbance)    ASSESSMENT & PLAN   ASSESSMENT & IMPRESSION  Pt referred for SLP evaluation due to new speaking impairment most similar to neurogenic stuttering and would benefit from skilled SLP services to improve her ability to communicate wants and needs in conversation. Pt would also benefit from evaluation of cognitive-communication skills.      Pt par Plan/Recommendations: Dysarthria therapy(Cognitive communication evaluation)  Number of Visits to Meet Established Goals: 3  Follow Up Needed: Yes  SLP Follow-up Date: 01/03/19    Thank you for your referral.  If you have any questions please contact [address]

## 2019-01-02 NOTE — PLAN OF CARE
Diabetes/Glucose Control    • Glucose maintained within prescribed range Progressing        Impaired Communication    • Patient will achieve maximal communication potential Progressing        Impaired Functional Mobility    • Achieve highest/safest level o [facility])     Generalized abdominal pain     Type 2 diabetes mellitus with diabetic polyneuropathy ([facility]     At risk for falling     Cardiomyopathy in other disease     Weakness of both lower extremities     [facility]acute kidney [facility]) ([facility]     Hypokalemia

## 2019-01-02 NOTE — PROGRESS NOTES
[facility]  Progress Note    Esperanza Khanna Patient Status:  Inpatient    3/11/1970 MRN [id number]   [facility] 5NW-A Attending Fran Colon MD   Hosp Day # 2 PCP Evelia Rendon MD         SUBJECTIVE:  Subjective:  Esperanza Khanna 01/02/19   0750   PGLU  130*  107*  120*  139*  104*                   Meds:     • Electrolyte bag for plasmapheresis   Intravenous Once   • DiphenhydrAMINE HCl  25 mg Intravenous Once    Or   • diphenhydrAMINE  25 mg Oral Once   • Albumin Human  3,000 mL

## 2019-01-02 NOTE — TELEPHONE ENCOUNTER
Per Epic review patient is currently in hospital with PT notes indicating inpatient rehab may be appropriate post-discharge.     LM updating [facility] with this information; any new orders would be coming from EDW discharge planning team.

## 2019-01-02 NOTE — HOME CARE LIAISON
PTNT CURRENT WITH [facility] SN EOE 1/16/19 WILL NEED A CORETTA ORDER ON ORE BEFORE DC    THANKS  Meg Arias

## 2019-01-02 NOTE — PROGRESS NOTES
01/02/19 0352   BiPAP   $ RT Standby Charge (per 15 min) 1   $ MOI Follow up charge Yes   Device RemStar - Omnilab   BiPAP / CPAP CE# OM1   Mode (ASV)   Interface Nasal mask   Mask Size Medium   Control Settings   Set Rate 10 breaths/min   Set EPAP 7

## 2019-01-02 NOTE — CM/SW NOTE
MD orders obtained for \" PM&R eval\" and referral initiated for PM&R evaluation via [facility], liaison alerted. QD completed. SW to follow.

## 2019-01-02 NOTE — PHYSICAL THERAPY NOTE
PHYSICAL THERAPY TREATMENT NOTE - INPATIENT    Room Number: 522/522-A     Session: 1   Number of Visits to Meet Established Goals: 4    Presenting Problem: MS exacerbation    History related to current admission: admitted with MS exacerbation, weakness X impairment     GLASSES       Past Surgical History  Past Surgical History:   Procedure Laterality Date   • CATH ANGIO  May 2014   • CATHETER REMOVAL Right 9/12/2018    Performed by Tam Colon MD at [facility] MAIN OR   • CHOLECYSTECTOMY     • COLONOSCOPY N/A have...  -   Turning over in bed (including adjusting bedclothes, sheets and blankets)?: None   -   Sitting down on and standing up from a chair with arms (e.g., wheelchair, bedside commode, etc.): A Little   -   Moving from lying on back to sitting on the Sitting     Repetitions   10-20     Sets   1       Patient End of Session: Up in chair;Needs met;Call light within reach;RN aware of session/findings; All patient questions and concerns addressed    ASSESSMENT   Pt seen for gait training, active flexibility

## 2019-01-03 ENCOUNTER — APPOINTMENT (OUTPATIENT)
Dept: GENERAL RADIOLOGY | Facility: HOSPITAL | Age: 49
DRG: 059 | End: 2019-01-03
Attending: SPECIALIST
Payer: MEDICARE

## 2019-01-03 LAB
ANION GAP SERPL CALC-SCNC: 4 MMOL/L (ref 0–18)
BUN BLD-MCNC: 13 MG/DL (ref 8–20)
BUN/CREAT SERPL: 12.5 (ref 10–20)
CALCIUM BLD-MCNC: 8.1 MG/DL (ref 8.3–10.3)
CHLORIDE SERPL-SCNC: 114 MMOL/L (ref 101–111)
CO2 SERPL-SCNC: 27 MMOL/L (ref 22–32)
CREAT BLD-MCNC: 1.04 MG/DL (ref 0.55–1.02)
GLUCOSE BLD-MCNC: 100 MG/DL (ref 65–99)
GLUCOSE BLD-MCNC: 108 MG/DL (ref 65–99)
GLUCOSE BLD-MCNC: 115 MG/DL (ref 65–99)
GLUCOSE BLD-MCNC: 130 MG/DL (ref 70–99)
GLUCOSE BLD-MCNC: 145 MG/DL (ref 65–99)
OSMOLALITY SERPL CALC.SUM OF ELEC: 302 MOSM/KG (ref 275–295)
POTASSIUM SERPL-SCNC: 4.5 MMOL/L (ref 3.6–5.1)
SODIUM SERPL-SCNC: 145 MMOL/L (ref 136–144)

## 2019-01-03 PROCEDURE — 99233 SBSQ HOSP IP/OBS HIGH 50: CPT | Performed by: OTHER

## 2019-01-03 PROCEDURE — 99232 SBSQ HOSP IP/OBS MODERATE 35: CPT | Performed by: INTERNAL MEDICINE

## 2019-01-03 PROCEDURE — 74018 RADEX ABDOMEN 1 VIEW: CPT | Performed by: SPECIALIST

## 2019-01-03 RX ORDER — DIPHENHYDRAMINE HYDROCHLORIDE 50 MG/ML
25 INJECTION INTRAMUSCULAR; INTRAVENOUS DAILY
Status: DISCONTINUED | OUTPATIENT
Start: 2019-01-04 | End: 2019-01-05

## 2019-01-03 RX ORDER — ALBUMIN, HUMAN INJ 5% 5 %
3000 SOLUTION INTRAVENOUS DAILY
Status: DISPENSED | OUTPATIENT
Start: 2019-01-04 | End: 2019-01-06

## 2019-01-03 RX ORDER — LACTULOSE 10 G/15ML
20 SOLUTION ORAL 3 TIMES DAILY
Status: DISCONTINUED | OUTPATIENT
Start: 2019-01-03 | End: 2019-01-03

## 2019-01-03 RX ORDER — LACTULOSE 10 G/15ML
20 SOLUTION ORAL EVERY 6 HOURS
Status: DISCONTINUED | OUTPATIENT
Start: 2019-01-03 | End: 2019-01-06

## 2019-01-03 RX ORDER — DIPHENHYDRAMINE HCL 25 MG
25 CAPSULE ORAL DAILY
Status: DISCONTINUED | OUTPATIENT
Start: 2019-01-04 | End: 2019-01-05

## 2019-01-03 RX ORDER — ONDANSETRON 2 MG/ML
4 INJECTION INTRAMUSCULAR; INTRAVENOUS EVERY 6 HOURS PRN
Status: DISCONTINUED | OUTPATIENT
Start: 2019-01-03 | End: 2019-01-06

## 2019-01-03 RX ORDER — ONDANSETRON 4 MG/1
4 TABLET, ORALLY DISINTEGRATING ORAL EVERY 6 HOURS PRN
Status: DISCONTINUED | OUTPATIENT
Start: 2019-01-03 | End: 2019-01-06

## 2019-01-03 NOTE — CONSULTS
Laura [address] JULIUS Crook Patient Status:  Inpatient    3/11/1970 MRN [id number]   [facility] 5NW-A Attending Padmini Chen MD   Hosp Day # 2 PCP Kennedy Das MD     Patient Identification  Tiana Callejas is a 46year old fem and son. Her son is an adult and teaches. Between her significant other and son she has 24-hour supervision. Home Environment / Accessibility: 1-story house/ trailer  Current Functional Status: Minimal assist for gait 10 feet followed by 100 feet.   Sit at [address] [facility] ENDOSCOPY   • ESOPHAGOGASTRODUODENOSCOPY (EGD) N/A 5/3/2014    Performed by Emir Chandler MD at [facility] ENDOSCOPY   • IR PORT A CATH INSERTION [address] [address] [address]  09/12/2018   • LITHOTRIPSY      x 2   • LYSIS OF ADHESIONS     • OTHER      dialysis catheter Units Subcutaneous TID CC and HS   ondansetron HCl (ZOFRAN) injection 4 mg 4 mg Intravenous Q6H PRN   Albuterol Sulfate  (90 Base) MCG/ACT inhaler 2 puff 2 puff Inhalation Q6H PRN   aspirin EC tab 81 mg 81 mg Oral Daily   Fluticasone Furoate-Vilante (Other) Sister         rectal CA\       Allergies:    Epinephrine             OTHER (SEE COMMENTS)    Comment:Cardiac tripcity  Latex                       Comment:WELTS AND SHORTNESS OF BREATH  Other                   SHORTNESS OF BREATH    Comment:ENVIRO Musculoskeletal:     Right Upper Extremity:  Strength is 4–5. ROM WNL. Left Upper Extremity:  Strength is 4–5. ROM WNL. Right Lower Extremity:  Strength  is 4–5. ROM WNL. Left Lower Extremity: Strength  is 4–5. ROM WNL.           Neuro: CNII-XII assistive device needs. If she does require several more days of hospitalization for plasmapheresis then we can reassess closer to discharge.   When she progresses to supervision of level for mobility and self-care skills she can transition to home with JOCY MCFADDEN

## 2019-01-03 NOTE — CM/SW NOTE
Spoke with Sanford Curling from [facility] who confirmed they have accepted pt for admission to their acute unit. Pt is receiving course of plasmapheresis. She confirmed they are unable to provide this at their facility.   Anticipate discharge to [facility]  acute rehab once

## 2019-01-03 NOTE — OCCUPATIONAL THERAPY NOTE
OCCUPATIONAL THERAPY EVALUATION - INPATIENT     Room Number: 522/522-A  Evaluation Date: 1/2/2019  Type of Evaluation: Initial  Presenting Problem: MS exacerbation     Physician Order: IP Consult to Occupational Therapy  Reason for Therapy: ADL/IADL Dysfun complication, not stated as uncontrolled    • Unspecified disorder of thyroid     total thyroidectomy   • Visual impairment     GLASSES       Past Surgical History  Past Surgical History:   Procedure Laterality Date   • CATH ANGIO  May 2014   • CATHETER RE BADL/IADL at home. SUBJECTIVE   \"I want to be able to clean like I could before. \"     Patient self-stated goal is to help out around the house at home      OBJECTIVE  Precautions: None  Fall Risk: High fall risk    WEIGHT BEARING RESTRICTION  Weigh assist    Skilled Therapy Provided: Pt was found sitting up in a chair. Pt was educated on safety. Pt performed a sit><stand with RW and CG assist. Pt performed functional mobility with RW and CG assist x approximately 10ft>bathroom.  Pt performed a toilet review of medical or therapy record   Specific performance deficits impacting engagement in ADL/IADL MODERATE  3 - 5 performance deficits   Client Assessment/Performance Deficits MODERATE - Comorbidities and min to mod modifications of tasks    Clinical Moris Begum

## 2019-01-03 NOTE — PLAN OF CARE
Diabetes/Glucose Control    • Glucose maintained within prescribed range Progressing        HEMATOLOGIC - ADULT    • Free from bleeding injury Progressing        Impaired Communication    • Patient will achieve maximal communication potential Progressing

## 2019-01-03 NOTE — PROGRESS NOTES
[id number] [facility] Neurology Progress Note    Inga Pillar Patient Status:  Inpatient    3/11/1970 MRN [id number]   [facility] 5NW-A Attending Ruy Olvera MD   Hosp Day # 3 PCP Liliana Roque MD     Subjective:  Inga Mckenzie is bilaterally  Romberg:deferred  Gait: deferred           Labs:  Reviewed in EPIC;   Lab Results   Component Value Date    CREATSERUM 1.04 01/03/2019    BUN 13 01/03/2019     01/03/2019    K 4.5 01/03/2019     01/03/2019    CO2 27.0 01/03/2019 with PMHx significant for MS, as well as liver disease, with steroid allergy, who presents with slurred speech, weakness and gait instability - chronically on scheduled PLEX every 3 weeks with last session incomplete      Plan:  - PLEX x2 sessions thus far

## 2019-01-03 NOTE — PROGRESS NOTES
[facility]  Progress Note    Radames Spencer Patient Status:  Inpatient    3/11/1970 MRN [id number]   [facility] 5NW-A Attending Jaden Schofield MD   Hosp Day # 3 PCP Deepak Johnson MD         SUBJECTIVE:  Subjective:  Radames Spencer Recent Labs   Lab  12/31/18   1202   ALT  91*   AST  65*   ALB  3.7       Recent Labs   Lab  01/02/19   0750  01/02/19   1302  01/02/19   1800  01/02/19   2106  01/03/19   0815   PGLU  104*  90  130*  119*  115*                   Meds:     • lactulos

## 2019-01-03 NOTE — PROGRESS NOTES
[facility]  Progress Note    Alisson Garcia Patient Status:  Emergency    3/11/1970 MRN [id number]   Location [address] [facility] Attending Chaim Sosa MD   Hosp Day # 3 PCP Marla Becker MD     Feels better today      Current Enoxaparin Sodium (LOVENOX) 40 MG/0.4ML injection 40 mg 40 mg Subcutaneous Nightly   docusate sodium (COLACE) cap 100 mg 100 mg Oral BID   PEG 3350 (MIRALAX) powder packet 17 g 17 g Oral Daily PRN   magnesium hydroxide (MILK OF MAGNESIA) 400 MG/5ML suspe Slurred spech/weakness  - c/w with prior reported MS exacerbations   - neurology following   - plan for PLEX # 3 and 4 on 1/4, 1/5. Patient has a scheduled o/p PLEX on 1/7  - discussed care with Dr. Quentin Javier    2.  Pyuria - no UTI symptoms - culture negativ

## 2019-01-03 NOTE — PROGRESS NOTES
[id number] [facility] Lei Neurology Progress Note    Arch Banister Patient Status:  Inpatient    3/11/1970 MRN [id number]   [facility] 5NW-A Attending Elías Sabillon MD   Hosp Day # 3 PCP Bryson Gosselin, MD     CC: MS exacerbation    Jessica Rojas day- 2/5 completed  · Nephrology following  · Continue Lyrica   · PT/OT to follow  · Liver disease  · UA with WBC  · Primary following    Patient with MS exacerbation on PLEX. Long discussion with patient regarding missed sessions and her frustration.   Nina Loco

## 2019-01-03 NOTE — PLAN OF CARE
Diabetes/Glucose Control    • Glucose maintained within prescribed range Progressing        HEMATOLOGIC - ADULT    • Free from bleeding injury Progressing        Impaired Activities of Daily Living    • Achieve highest/safest level of independence in self (insulin dependent diabetes mellitus) (HCC)     Generalized abdominal pain     Type 2 diabetes mellitus with diabetic polyneuropathy (HCC)     At risk for falling     Cardiomyopathy in other disease     Weakness of both lower extremities     CATIA (acute kid

## 2019-01-04 LAB
GLUCOSE BLD-MCNC: 118 MG/DL (ref 65–99)
GLUCOSE BLD-MCNC: 152 MG/DL (ref 65–99)
GLUCOSE BLD-MCNC: 155 MG/DL (ref 65–99)
GLUCOSE BLD-MCNC: 86 MG/DL (ref 65–99)

## 2019-01-04 PROCEDURE — 99232 SBSQ HOSP IP/OBS MODERATE 35: CPT | Performed by: INTERNAL MEDICINE

## 2019-01-04 RX ORDER — ALBUMIN, HUMAN INJ 5% 5 %
3000 SOLUTION INTRAVENOUS ONCE
Status: CANCELLED | OUTPATIENT
Start: 2019-01-07

## 2019-01-04 RX ORDER — HEPARIN SODIUM 1000 [USP'U]/ML
4000 INJECTION, SOLUTION INTRAVENOUS; SUBCUTANEOUS ONCE
Status: CANCELLED | OUTPATIENT
Start: 2019-01-07

## 2019-01-04 RX ORDER — MELATONIN
100 DAILY
Status: DISCONTINUED | OUTPATIENT
Start: 2019-01-04 | End: 2019-01-06

## 2019-01-04 NOTE — PROGRESS NOTES
[facility]  Progress Note    Duana Expose Patient Status:  Emergency    3/11/1970 MRN [id number]   Location [address] Attending Aniyah Thomson MD   [facility] Day # 4 PCP Jose Stern MD     Awaiting PLEX tx today  No comp particles cap 30 mg 30 mg Oral BID   ferrous sulfate EC tab 325 mg 325 mg Oral BID with meals   HYDROcodone-acetaminophen (NORCO)  MG per tab 1 tablet 1 tablet Oral BID PRN   Montelukast Sodium (SINGULAIR) tab 10 mg 10 mg Oral Nightly   Pantoprazole last 72 hours.     Invalid input(s): LYM#, MONO#, BASOS#, EOSIN#    Recent Labs      01/02/19   0749  01/03/19   0655   NA  143  145*   K  4.0  4.5   CL  110  114*   CO2  26.0  27.0   BUN  13  13   CREATSERUM  1.18*  1.04*   CA  7.9*  8.1*       No results

## 2019-01-04 NOTE — PHYSICAL THERAPY NOTE
PHYSICAL THERAPY TREATMENT NOTE - INPATIENT    Room Number: 522/522-A     Session: 2  Number of Visits to Meet Established Goals: 4    Presenting Problem: MS exacerbation    History related to current admission: admitted with MS exacerbation, weakness X 1 impairment     GLASSES       Past Surgical History  Past Surgical History:   Procedure Laterality Date   • CATH ANGIO  May 2014   • CATHETER REMOVAL Right 9/12/2018    Performed by Luci Kowalski MD at [facility] MAIN OR   • CHOLECYSTECTOMY     • COLONOSCOPY N/A patient currently have. ..  -   Turning over in bed (including adjusting bedclothes, sheets and blankets)?: None   -   Sitting down on and standing up from a chair with arms (e.g., wheelchair, bedside commode, etc.): None   -   Moving from lying on back to Hospital admission for MS exacerbation. Results of the AM-PAC \"6 clicks\" Inpatient Daily Mobility Short Form for the patient is 35.83% degree of basic mobility impairment.   Research supports that patients with this level of impairment often benefit fr

## 2019-01-04 NOTE — PROGRESS NOTES
01/04/19 0047   Provider Notification   Reason for Communication Critical value  (BP 89/50)   Provider Name Susan Francis MD   Method of Communication Call   Response Phone call   Notification Time [id number]       Doctor aware of blood pressure, no additi

## 2019-01-04 NOTE — PLAN OF CARE
Diabetes/Glucose Control    • Glucose maintained within prescribed range Progressing        HEMATOLOGIC - ADULT    • Free from bleeding injury Progressing        Impaired Activities of Daily Living    • Achieve highest/safest level of independence in self

## 2019-01-04 NOTE — PROGRESS NOTES
Pt is alert and oriented, still stutters, states she feels better. Hand grasps equal and firm. Pt still c/o being weak. RA lungs clear. NSR on tele, right emily cath and left hemodialysis cath intact. Plasmapheresis coming at 230p today.  Will monitor

## 2019-01-04 NOTE — SLP NOTE
SPEECH/LANGUAGE/COGNITIVE EVALUATION - INPATIENT    Admission Date: 12/31/2018  Evaluation Date: 01/02/19    Reason for Referral: Other (Comment)(speech disturbance)    ASSESSMENT & PLAN   ASSESSMENT & IMPRESSION  Pt presents with a speaking impairment mos by completing deductive reasoning puzzles given mild verbal assistance with 80% accuracy across 2 sessions. New goal           Imaging Results: No relevant recent imaging    Patient/Family Goals:  To improve speech and reduce stuttering and increase speed

## 2019-01-04 NOTE — PLAN OF CARE
Problem: Impaired Communication  Goal: Patient will achieve maximal communication potential  Interventions:  - Allow additional time for processing after asking questions or providing instructions   -allow additional time for pt to answer questions and alie

## 2019-01-04 NOTE — PROGRESS NOTES
[facility]  Progress Note    Skip Jc Patient Status:  Inpatient    3/11/1970 MRN [id number]   [facility] 5NW-A Attending Natan Villanueva MD   Hosp Day # 4 PCP Giana Lauren MD         SUBJECTIVE:  Subjective:  Skip Greene 3.7       Recent Labs   Lab  01/03/19   0815  01/03/19   1231  01/03/19   1613  01/03/19   2030  01/04/19   0748   PGLU  115*  100*  108*  145*  86                   Meds:     • Thiamine HCl  100 mg Oral Daily   • lactulose  20 g Oral Q6H   • Albumin Human thiamine   See tests ordered,  Available and radiology reviewed    dvt prophylaxis reviewed  PT and/or OT  Giana Lauren MD

## 2019-01-04 NOTE — PROGRESS NOTES
[id number] [facility] Neurology Progress Note    Karyna Patrick Patient Status:  Inpatient    3/11/1970 MRN [id number]   [facility] 5NW-A Attending Jose Heredia MD   Hosp Day # 4 PCP Elizabeth Montiel MD         Subjective:  Karyna Patrick assist her to arrange transportation to [facility] from rehab     Dr. Cee Washington to follow.     CHRISTELLE Silver  [facility]  1/4/2019, 7:51 AM  Spectre [phone]

## 2019-01-05 LAB
ANION GAP SERPL CALC-SCNC: 6 MMOL/L (ref 0–18)
ANTIBODY SCREEN: NEGATIVE
BUN BLD-MCNC: 10 MG/DL (ref 8–20)
BUN/CREAT SERPL: 11.2 (ref 10–20)
CALCIUM BLD-MCNC: 7.6 MG/DL (ref 8.3–10.3)
CHLORIDE SERPL-SCNC: 112 MMOL/L (ref 101–111)
CO2 SERPL-SCNC: 25 MMOL/L (ref 22–32)
CREAT BLD-MCNC: 0.89 MG/DL (ref 0.55–1.02)
FIBRINOGEN: 88 MG/DL (ref 200–446)
GLUCOSE BLD-MCNC: 116 MG/DL (ref 70–99)
GLUCOSE BLD-MCNC: 123 MG/DL (ref 65–99)
GLUCOSE BLD-MCNC: 176 MG/DL (ref 65–99)
GLUCOSE BLD-MCNC: 87 MG/DL (ref 65–99)
GLUCOSE BLD-MCNC: 97 MG/DL (ref 65–99)
HAV IGM SER QL: 2.5 MG/DL (ref 1.8–2.5)
OSMOLALITY SERPL CALC.SUM OF ELEC: 296 MOSM/KG (ref 275–295)
POTASSIUM SERPL-SCNC: 4.3 MMOL/L (ref 3.6–5.1)
RH BLOOD TYPE: POSITIVE
SODIUM SERPL-SCNC: 143 MMOL/L (ref 136–144)

## 2019-01-05 PROCEDURE — 30233K1 TRANSFUSION OF NONAUTOLOGOUS FROZEN PLASMA INTO PERIPHERAL VEIN, PERCUTANEOUS APPROACH: ICD-10-PCS | Performed by: SPECIALIST

## 2019-01-05 PROCEDURE — 99233 SBSQ HOSP IP/OBS HIGH 50: CPT | Performed by: OTHER

## 2019-01-05 PROCEDURE — 99232 SBSQ HOSP IP/OBS MODERATE 35: CPT | Performed by: INTERNAL MEDICINE

## 2019-01-05 RX ORDER — DIPHENHYDRAMINE HYDROCHLORIDE 50 MG/ML
25 INJECTION INTRAMUSCULAR; INTRAVENOUS EVERY 6 HOURS PRN
Status: DISCONTINUED | OUTPATIENT
Start: 2019-01-05 | End: 2019-01-06

## 2019-01-05 RX ORDER — DIPHENHYDRAMINE HYDROCHLORIDE 50 MG/ML
50 INJECTION INTRAMUSCULAR; INTRAVENOUS ONCE
Status: COMPLETED | OUTPATIENT
Start: 2019-01-06 | End: 2019-01-06

## 2019-01-05 RX ORDER — ALBUMIN, HUMAN INJ 5% 5 %
1500 SOLUTION INTRAVENOUS DAILY
Status: DISCONTINUED | OUTPATIENT
Start: 2019-01-06 | End: 2019-01-06

## 2019-01-05 RX ORDER — DIPHENHYDRAMINE HYDROCHLORIDE 50 MG/ML
50 INJECTION INTRAMUSCULAR; INTRAVENOUS ONCE
Status: DISCONTINUED | OUTPATIENT
Start: 2019-01-05 | End: 2019-01-05

## 2019-01-05 RX ORDER — DIPHENHYDRAMINE HCL 25 MG
25 CAPSULE ORAL EVERY 6 HOURS PRN
Status: DISCONTINUED | OUTPATIENT
Start: 2019-01-05 | End: 2019-01-06

## 2019-01-05 RX ORDER — SODIUM CHLORIDE 9 MG/ML
INJECTION, SOLUTION INTRAVENOUS ONCE
Status: COMPLETED | OUTPATIENT
Start: 2019-01-05 | End: 2019-01-06

## 2019-01-05 NOTE — PROGRESS NOTES
[facility]  Progress Note    Darwin Roman Patient Status:  Emergency    3/11/1970 MRN [id number]   Location [address] [facility] Attending Cesia Valdovinos MD   Hosp Day # 5 PCP Francois Lee MD     [place] ill after Plasmaphresis tx (NOVOLOG) 100 UNIT/ML flexpen 2-10 Units 2-10 Units Subcutaneous TID CC and HS   Albuterol Sulfate  (90 Base) MCG/ACT inhaler 2 puff 2 puff Inhalation Q6H PRN   aspirin EC tab 81 mg 81 mg Oral Daily   Fluticasone Furoate-Vilanterol (BREO ELLIPTA) 10 bowel sounds. No rebound tenderness   Neurologic: general weakness; No focal abnormalities noted ; slurred speech  Musculoskeletal: Full range of motion of all extremities. No swelling noted. Integument: No lesions. No erythema.   Psychiatric: [employer]

## 2019-01-05 NOTE — RESPIRATORY THERAPY NOTE
Patient just received plasmapheresis and stated she feels nauseous and usually feels nauseous until the next day. States she usually ends up throwing up too. Agreed to hold off on BiPAP for the night for risk of aspiration.

## 2019-01-05 NOTE — PROGRESS NOTES
[id number] Sonia Lei Neurology Progress Note    Duana Expose Patient Status:  Inpatient    3/11/1970 MRN [id number]   [facility] 5NW-A Attending Lauren Pate MD   Hosp Day # 5 PCP Jose Stern MD       Neurology Attending note dysfunction, LFTs are mildly elevated  UA with Pyuria, cx NGTD        Assessment:   MS exacerbation, presented with slurred speech, weakness and difficulty walking.     MS, chronically on scheduled PLEX every 3 weeks with last session incomplete   Hx liver

## 2019-01-05 NOTE — PLAN OF CARE
MUSCULOSKELETAL - ADULT    • Return mobility to safest level of function Progressing        PAIN - ADULT    • Verbalizes/displays adequate comfort level or patient's stated pain goal Progressing        Patient/Family Goals    • Patient/Family [address] [facility]

## 2019-01-05 NOTE — CM/SW NOTE
CM received a page from [facility] regarding pt possible D/C. The plan had been for pt to complete treatment and D/C to [facility] 1/8 pending  reeval Monday. Pt may potentially complete treatment and D/C tomorrow.   There are other issue that would factor in t

## 2019-01-05 NOTE — PROGRESS NOTES
[facility]  Progress Note    Inga Mckenzie Patient Status:  Inpatient    3/11/1970 MRN [id number]   [facility] 5NW-A Attending Ruy Olvera MD   Hosp Day # 5 PCP Liliana Roque MD         SUBJECTIVE:  Subjective:  Inga Mckenzie 1.18*  1.04*  0.89   CA  8.6  7.9*  7.9*  8.1*  7.6*   MG   --    --    --    --   2.5   GLU  160*  151*  102*  130*  116*       Recent Labs   Lab  12/31/18   1202   ALT  91*   AST  65*   ALB  3.7       Recent Labs   Lab  01/04/19   0748  01/04/19   1113 Anemia  severe thiamine def         Plan:  Continue present management,plsma phresis today cont ss insulin pt ot b/s better to [facility] at d/c   Supplement thiamine   See tests ordered,  Available and radiology reviewed    dvt prophylaxis reviewed  PT and/or OT

## 2019-01-06 VITALS
TEMPERATURE: 98 F | SYSTOLIC BLOOD PRESSURE: 107 MMHG | RESPIRATION RATE: 20 BRPM | WEIGHT: 180.19 LBS | DIASTOLIC BLOOD PRESSURE: 65 MMHG | BODY MASS INDEX: 29 KG/M2 | OXYGEN SATURATION: 100 % | HEART RATE: 82 BPM

## 2019-01-06 LAB
ANION GAP SERPL CALC-SCNC: 3 MMOL/L (ref 0–18)
BUN BLD-MCNC: 8 MG/DL (ref 8–20)
BUN/CREAT SERPL: 8.9 (ref 10–20)
CALCIUM BLD-MCNC: 7.8 MG/DL (ref 8.3–10.3)
CHLORIDE SERPL-SCNC: 111 MMOL/L (ref 101–111)
CO2 SERPL-SCNC: 29 MMOL/L (ref 22–32)
CREAT BLD-MCNC: 0.9 MG/DL (ref 0.55–1.02)
GLUCOSE BLD-MCNC: 101 MG/DL (ref 70–99)
GLUCOSE BLD-MCNC: 136 MG/DL (ref 65–99)
GLUCOSE BLD-MCNC: 94 MG/DL (ref 65–99)
HAV IGM SER QL: 2.3 MG/DL (ref 1.8–2.5)
OSMOLALITY SERPL CALC.SUM OF ELEC: 294 MOSM/KG (ref 275–295)
POTASSIUM SERPL-SCNC: 3.9 MMOL/L (ref 3.6–5.1)
SODIUM SERPL-SCNC: 143 MMOL/L (ref 136–144)

## 2019-01-06 PROCEDURE — 99233 SBSQ HOSP IP/OBS HIGH 50: CPT | Performed by: OTHER

## 2019-01-06 PROCEDURE — 99232 SBSQ HOSP IP/OBS MODERATE 35: CPT | Performed by: INTERNAL MEDICINE

## 2019-01-06 RX ORDER — MELATONIN
100 DAILY
Qty: 30 TABLET | Refills: 3 | Status: SHIPPED | OUTPATIENT
Start: 2019-01-07

## 2019-01-06 RX ORDER — DIPHENHYDRAMINE HYDROCHLORIDE 50 MG/ML
50 INJECTION INTRAMUSCULAR; INTRAVENOUS ONCE AS NEEDED
Status: DISCONTINUED | OUTPATIENT
Start: 2019-01-06 | End: 2019-01-06

## 2019-01-06 RX ORDER — HEPARIN SODIUM 1000 [USP'U]/ML
5000 INJECTION, SOLUTION INTRAVENOUS; SUBCUTANEOUS
Status: DISCONTINUED | OUTPATIENT
Start: 2019-01-06 | End: 2019-01-06

## 2019-01-06 RX ORDER — LACTULOSE 10 G/15ML
10 SOLUTION ORAL EVERY 6 HOURS
Qty: 300 ML | Refills: 3 | Status: SHIPPED | OUTPATIENT
Start: 2019-01-06 | End: 2019-07-01 | Stop reason: CLARIF

## 2019-01-06 NOTE — PROGRESS NOTES
[id number] Sonia Lei Neurology Progress Note    Johnny Sanchez Patient Status:  Inpatient    3/11/1970 MRN [id number]   [facility] 5NW-A Attending Israel Rojas MD   Hosp Day # 6 PCP Dexter Palmer MD       Neurology Attending note    and difficulty walking.    MS, chronically on scheduled PLEX every 3 weeks with last session incomplete   Hx liver disease     Plan:  PLEX x 5 doses, dose 4 today, can get last dose outpatient on 1/8  Nephrology following for PLEX, appreciate recs  Stopped

## 2019-01-06 NOTE — CM/SW NOTE
01/06/19 1300   Discharge disposition   Expected discharge disposition Home or Self   RAKESH spoke w/TY Peterson regarding pt. She stated PT has changed their recommendation to OPPT. RN stating the pt would like to go home today.  Informed her PT would enter t

## 2019-01-06 NOTE — PLAN OF CARE
MUSCULOSKELETAL - ADULT    • Return mobility to safest level of function Progressing        PAIN - ADULT    • Verbalizes/displays adequate comfort level or patient's stated pain goal Progressing        Patient/Family Goals    • Patient/Family [address]

## 2019-01-06 NOTE — PROGRESS NOTES
[facility]  Progress Note    Alisson Garcia Patient Status:  Inpatient    3/11/1970 MRN [id number]   [facility] 5NW-A Attending Dilshad Tucker MD   Hosp Day # 6 PCP Marla Becker MD         SUBJECTIVE:  Subjective:  Alisson Garcia CREATSERUM  1.22*  1.18*  1.04*  0.89  0.90   CA  7.9*  7.9*  8.1*  7.6*  7.8*   MG   --    --    --   2.5  2.3   GLU  151*  102*  130*  116*  101*       Recent Labs   Lab  12/31/18   1202   ALT  91*   AST  65*   ALB  3.7       Recent Labs   Lab  01/05/1 lower extremities    Anemia  severe thiamine def         Plan:  Continue present management,plsma phresis today cont ss insulin pt ot b/s better home with [facility] today will follow   Supplement thiamine   See tests ordered,  Available and radiology reviewed

## 2019-01-06 NOTE — CERTIFICATION
Initial  Certification      PHYSICIAN Certification of Need for Inpatient Hospitalization - Initial Certification    Patient will require inpatient services that will reasonably be expected to span two midnight's based on the clinical documentation in H+P.

## 2019-01-06 NOTE — PROGRESS NOTES
[facility]  Progress Note    Alfredo Barry Patient Status:  Emergency    3/11/1970 MRN [id number]   Location [address] Attending Ricky Monique MD   Hosp Day # 6 PCP Ashley Lofton MD     [facility] ([facility]TA[facility] DR particles cap 30 mg 30 mg Oral BID   ferrous sulfate EC tab 325 mg 325 mg Oral BID with meals   HYDROcodone-acetaminophen (NORCO)  MG per tab 1 tablet 1 tablet Oral BID PRN   Montelukast Sodium (SINGULAIR) tab 10 mg 10 mg Oral Nightly BAND, INR in the last 72 hours.     Invalid input(s): LYM#, MONO#, BASOS#, EOSIN#    Recent Labs      01/03/19   0655  01/05/19   0513   NA  145*  143   K  4.5  4.3   CL  114*  112*   CO2  27.0  25.0   BUN  13  10   CREATSERUM  1.04*  0.89   CA  8.1*  7.6*

## 2019-01-07 ENCOUNTER — HOSPITAL ENCOUNTER (OUTPATIENT)
Dept: PERIOP | Facility: HOSPITAL | Age: 49
Discharge: HOME | End: 2019-01-07
Payer: MEDICARE

## 2019-01-07 LAB — BLOOD TYPE BARCODE: 5100

## 2019-01-08 ENCOUNTER — HOSPITAL ENCOUNTER (OUTPATIENT)
Dept: PERIOP | Facility: HOSPITAL | Age: 49
Discharge: HOME | End: 2019-01-08
Attending: INTERNAL MEDICINE
Payer: MEDICARE

## 2019-01-08 VITALS
OXYGEN SATURATION: 100 % | RESPIRATION RATE: 16 BRPM | SYSTOLIC BLOOD PRESSURE: 117 MMHG | DIASTOLIC BLOOD PRESSURE: 81 MMHG | TEMPERATURE: 99 F | HEART RATE: 84 BPM

## 2019-01-08 LAB
ALBUMIN SERPL-MCNC: 4 G/DL (ref 3.1–4.5)
ALBUMIN/GLOB SERPL: 1.5 {RATIO} (ref 1–2)
ALP LIVER SERPL-CCNC: 67 U/L (ref 39–100)
ALT SERPL-CCNC: 49 U/L (ref 14–54)
ANION GAP SERPL CALC-SCNC: 5 MMOL/L (ref 0–18)
AST SERPL-CCNC: 30 U/L (ref 15–41)
BASOPHILS # BLD AUTO: 0.02 X10(3) UL (ref 0–0.1)
BASOPHILS NFR BLD AUTO: 0.3 %
BILIRUB SERPL-MCNC: 0.2 MG/DL (ref 0.1–2)
BUN BLD-MCNC: 8 MG/DL (ref 8–20)
BUN/CREAT SERPL: 7.4 (ref 10–20)
CALCIUM BLD-MCNC: 8 MG/DL (ref 8.3–10.3)
CHLORIDE SERPL-SCNC: 106 MMOL/L (ref 101–111)
CO2 SERPL-SCNC: 30 MMOL/L (ref 22–32)
CREAT BLD-MCNC: 1.08 MG/DL (ref 0.55–1.02)
EOSINOPHIL # BLD AUTO: 0.17 X10(3) UL (ref 0–0.3)
EOSINOPHIL NFR BLD AUTO: 2.8 %
ERYTHROCYTE [DISTWIDTH] IN BLOOD BY AUTOMATED COUNT: 15.4 % (ref 11.5–16)
GLOBULIN PLAS-MCNC: 2.6 G/DL (ref 2.8–4.4)
GLUCOSE BLD-MCNC: 208 MG/DL (ref 70–99)
GLUCOSE BLD-MCNC: 212 MG/DL (ref 65–99)
HCT VFR BLD AUTO: 30.5 % (ref 34–50)
HGB BLD-MCNC: 9.6 G/DL (ref 12–16)
IMMATURE GRANULOCYTE COUNT: 0.02 X10(3) UL (ref 0–1)
IMMATURE GRANULOCYTE RATIO %: 0.3 %
LYMPHOCYTES # BLD AUTO: 1.49 X10(3) UL (ref 0.9–4)
LYMPHOCYTES NFR BLD AUTO: 24.3 %
M PROTEIN MFR SERPL ELPH: 6.6 G/DL (ref 6.4–8.2)
MCH RBC QN AUTO: 22.5 PG (ref 27–33.2)
MCHC RBC AUTO-ENTMCNC: 31.5 G/DL (ref 31–37)
MCV RBC AUTO: 71.4 FL (ref 81–100)
MONOCYTES # BLD AUTO: 0.38 X10(3) UL (ref 0.1–1)
MONOCYTES NFR BLD AUTO: 6.2 %
NEUTROPHIL ABS PRELIM: 4.06 X10 (3) UL (ref 1.3–6.7)
NEUTROPHILS # BLD AUTO: 4.06 X10(3) UL (ref 1.3–6.7)
NEUTROPHILS NFR BLD AUTO: 66.1 %
OSMOLALITY SERPL CALC.SUM OF ELEC: 296 MOSM/KG (ref 275–295)
PLATELET # BLD AUTO: 154 10(3)UL (ref 150–450)
POTASSIUM SERPL-SCNC: 3.5 MMOL/L (ref 3.6–5.1)
RBC # BLD AUTO: 4.27 X10(6)UL (ref 3.8–5.1)
RED CELL DISTRIBUTION WIDTH-SD: 38.4 FL (ref 35.1–46.3)
SODIUM SERPL-SCNC: 141 MMOL/L (ref 136–144)
WBC # BLD AUTO: 6.1 X10(3) UL (ref 4–13)

## 2019-01-08 PROCEDURE — P9045 ALBUMIN (HUMAN), 5%, 250 ML: HCPCS | Performed by: INTERNAL MEDICINE

## 2019-01-08 PROCEDURE — 36514 APHERESIS PLASMA: CPT | Performed by: INTERNAL MEDICINE

## 2019-01-08 PROCEDURE — 82962 GLUCOSE BLOOD TEST: CPT

## 2019-01-08 PROCEDURE — 85025 COMPLETE CBC W/AUTO DIFF WBC: CPT | Performed by: INTERNAL MEDICINE

## 2019-01-08 PROCEDURE — 80053 COMPREHEN METABOLIC PANEL: CPT | Performed by: INTERNAL MEDICINE

## 2019-01-08 RX ORDER — SODIUM CHLORIDE 9 MG/ML
INJECTION, SOLUTION INTRAVENOUS ONCE
Status: DISCONTINUED | OUTPATIENT
Start: 2019-01-08 | End: 2019-01-10

## 2019-01-08 RX ORDER — HEPARIN SODIUM 1000 [USP'U]/ML
4000 INJECTION, SOLUTION INTRAVENOUS; SUBCUTANEOUS ONCE
Status: DISCONTINUED | OUTPATIENT
Start: 2019-01-08 | End: 2019-01-10

## 2019-01-08 RX ORDER — ALBUMIN, HUMAN INJ 5% 5 %
3000 SOLUTION INTRAVENOUS ONCE
Status: DISCONTINUED | OUTPATIENT
Start: 2019-01-08 | End: 2019-01-10

## 2019-01-21 ENCOUNTER — TELEPHONE (OUTPATIENT)
Dept: NEUROLOGY | Facility: CLINIC | Age: 49
End: 2019-01-21

## 2019-01-21 DIAGNOSIS — G35 MULTIPLE SCLEROSIS EXACERBATION (HCC): ICD-10-CM

## 2019-01-21 DIAGNOSIS — Z92.89 HISTORY OF PLASMAPHERESIS: ICD-10-CM

## 2019-01-21 DIAGNOSIS — G37.9 DEMYELINATING DISEASE OF CENTRAL NERVOUS SYSTEM (HCC): Primary | ICD-10-CM

## 2019-01-21 DIAGNOSIS — G35 MS (MULTIPLE SCLEROSIS) (HCC): ICD-10-CM

## 2019-01-21 NOTE — TELEPHONE ENCOUNTER
Luh Ackerman from [facility] reports that pt called [facility] looking for Nursing and Speech Therapy home health services. No home health orders currently exist.      B: Pt was hospitalized 12/31/18 through 1/6/19 with MS exacerbation.   Pt presented at that time with

## 2019-01-29 ENCOUNTER — HOSPITAL ENCOUNTER (OUTPATIENT)
Dept: PERIOP | Facility: HOSPITAL | Age: 49
Discharge: HOME | End: 2019-01-29
Attending: INTERNAL MEDICINE
Payer: MEDICARE

## 2019-01-29 VITALS
SYSTOLIC BLOOD PRESSURE: 109 MMHG | RESPIRATION RATE: 16 BRPM | OXYGEN SATURATION: 99 % | TEMPERATURE: 98 F | HEART RATE: 66 BPM | DIASTOLIC BLOOD PRESSURE: 74 MMHG

## 2019-01-29 LAB
ALBUMIN SERPL-MCNC: 3.8 G/DL (ref 3.1–4.5)
ALBUMIN/GLOB SERPL: 1.2 {RATIO} (ref 1–2)
ALP LIVER SERPL-CCNC: 80 U/L (ref 39–100)
ALT SERPL-CCNC: 68 U/L (ref 14–54)
ANION GAP SERPL CALC-SCNC: 6 MMOL/L (ref 0–18)
AST SERPL-CCNC: 43 U/L (ref 15–41)
BILIRUB SERPL-MCNC: 0.2 MG/DL (ref 0.1–2)
BILIRUB UR QL STRIP.AUTO: NEGATIVE
BUN BLD-MCNC: 12 MG/DL (ref 8–20)
BUN/CREAT SERPL: 9 (ref 10–20)
CALCIUM BLD-MCNC: 8.7 MG/DL (ref 8.3–10.3)
CHLORIDE SERPL-SCNC: 108 MMOL/L (ref 101–111)
CLARITY UR REFRACT.AUTO: CLEAR
CO2 SERPL-SCNC: 27 MMOL/L (ref 22–32)
COLOR UR AUTO: COLORLESS
CREAT BLD-MCNC: 1.33 MG/DL (ref 0.55–1.02)
DEPRECATED RDW RBC AUTO: 42.3 FL (ref 35.1–46.3)
ERYTHROCYTE [DISTWIDTH] IN BLOOD BY AUTOMATED COUNT: 15.8 % (ref 11–15)
GLOBULIN PLAS-MCNC: 3.3 G/DL (ref 2.8–4.4)
GLUCOSE BLD-MCNC: 239 MG/DL (ref 70–99)
GLUCOSE UR STRIP.AUTO-MCNC: NEGATIVE MG/DL
HCT VFR BLD AUTO: 35 % (ref 35–48)
HGB BLD-MCNC: 10.8 G/DL (ref 12–16)
KETONES UR STRIP.AUTO-MCNC: NEGATIVE MG/DL
LEUKOCYTE ESTERASE UR QL STRIP.AUTO: NEGATIVE
M PROTEIN MFR SERPL ELPH: 7.1 G/DL (ref 6.4–8.2)
MCH RBC QN AUTO: 23 PG (ref 26–34)
MCHC RBC AUTO-ENTMCNC: 30.9 G/DL (ref 31–37)
MCV RBC AUTO: 74.5 FL (ref 80–100)
NITRITE UR QL STRIP.AUTO: NEGATIVE
OSMOLALITY SERPL CALC.SUM OF ELEC: 300 MOSM/KG (ref 275–295)
PH UR STRIP.AUTO: 5 [PH] (ref 4.5–8)
PLATELET # BLD AUTO: 164 10(3)UL (ref 150–450)
POTASSIUM SERPL-SCNC: 3.5 MMOL/L (ref 3.6–5.1)
PROT UR STRIP.AUTO-MCNC: NEGATIVE MG/DL
RBC # BLD AUTO: 4.7 X10(6)UL (ref 3.8–5.3)
RBC UR QL AUTO: NEGATIVE
SODIUM SERPL-SCNC: 141 MMOL/L (ref 136–144)
SP GR UR STRIP.AUTO: 1.01 (ref 1–1.03)
UROBILINOGEN UR STRIP.AUTO-MCNC: <2 MG/DL
WBC # BLD AUTO: 5.3 X10(3) UL (ref 4–11)

## 2019-01-29 PROCEDURE — 85027 COMPLETE CBC AUTOMATED: CPT | Performed by: INTERNAL MEDICINE

## 2019-01-29 PROCEDURE — 36593 DECLOT VASCULAR DEVICE: CPT

## 2019-01-29 PROCEDURE — A4216 STERILE WATER/SALINE, 10 ML: HCPCS | Performed by: INTERNAL MEDICINE

## 2019-01-29 PROCEDURE — 87086 URINE CULTURE/COLONY COUNT: CPT | Performed by: SPECIALIST

## 2019-01-29 PROCEDURE — P9045 ALBUMIN (HUMAN), 5%, 250 ML: HCPCS | Performed by: INTERNAL MEDICINE

## 2019-01-29 PROCEDURE — 80053 COMPREHEN METABOLIC PANEL: CPT | Performed by: INTERNAL MEDICINE

## 2019-01-29 PROCEDURE — 81003 URINALYSIS AUTO W/O SCOPE: CPT | Performed by: SPECIALIST

## 2019-01-29 RX ORDER — ALBUMIN, HUMAN INJ 5% 5 %
3000 SOLUTION INTRAVENOUS ONCE
Status: DISCONTINUED | OUTPATIENT
Start: 2019-01-29 | End: 2019-01-31

## 2019-01-29 RX ORDER — HEPARIN SODIUM 1000 [USP'U]/ML
4000 INJECTION, SOLUTION INTRAVENOUS; SUBCUTANEOUS ONCE
Status: DISCONTINUED | OUTPATIENT
Start: 2019-01-29 | End: 2019-01-31

## 2019-01-29 NOTE — PROGRESS NOTES
Pt arrived to post op for plasmapheresis, pt ambulating with cane. Speech slightly hesitant with slight stutter when talking. Able to express clear thoughts, but with stutter.  Pt says she is aware of this and was recently hospitalized for exacerbation and

## 2019-01-31 ENCOUNTER — TELEPHONE (OUTPATIENT)
Dept: NEUROLOGY | Facility: CLINIC | Age: 49
End: 2019-01-31

## 2019-01-31 NOTE — TELEPHONE ENCOUNTER
Orders received for patient's home health certification. Skilled nursing services for sternal catheter care. Monitor for signs/symptoms of pain. Orders signed and faxed with receipt confirmation.

## 2019-02-01 ENCOUNTER — TELEPHONE (OUTPATIENT)
Dept: NEUROLOGY | Facility: CLINIC | Age: 49
End: 2019-02-01

## 2019-02-09 NOTE — DISCHARGE SUMMARY
[facility]  Discharge Summary    Darwin Roman Patient Status:  Inpatient    3/11/1970 MRN [id number]   [facility] 5NW-A Attending No att. providers found   Hosp Day # 6 PCP Francois Lee MD     Date of Admission: 2018    Date mouth every 6 (six) hours. , Normal, Disp-300 mL, R-3    Thiamine HCl 100 MG Oral Tab  Take 1 tablet (100 mg total) by mouth daily. , Normal, Disp-30 tablet, R-3      CONTINUE these medications which have NOT CHANGED    BREO ELLIPTA 100-25 MCG/INH Inhalation Historical    Pravastatin Sodium (PRAVACHOL) 80 MG Oral Tab  Take 80 mg by mouth nightly.  , Historical    omeprazole (PRILOSEC) 20 MG Oral Capsule Delayed Release  Take 20 mg by mouth 2 (two) times daily before meals.   , Historical    Albuterol Sulfate HF

## 2019-02-13 ENCOUNTER — TELEPHONE (OUTPATIENT)
Dept: NEUROLOGY | Facility: CLINIC | Age: 49
End: 2019-02-13

## 2019-02-13 NOTE — TELEPHONE ENCOUNTER
PT home evaluation  received from  health for physician review and signature. Patient receives PT for decline in condition. Frequency: 2x/week    Duration: 4 weeks at home    Plan of Care:  Increase strength and endurance, fall prevent

## 2019-02-18 ENCOUNTER — HOSPITAL ENCOUNTER (OUTPATIENT)
Dept: PERIOP | Facility: HOSPITAL | Age: 49
Discharge: HOME | End: 2019-02-18
Attending: INTERNAL MEDICINE
Payer: MEDICARE

## 2019-02-18 ENCOUNTER — TELEPHONE (OUTPATIENT)
Dept: NEUROLOGY | Facility: CLINIC | Age: 49
End: 2019-02-18

## 2019-02-18 VITALS
HEART RATE: 70 BPM | TEMPERATURE: 98 F | SYSTOLIC BLOOD PRESSURE: 118 MMHG | DIASTOLIC BLOOD PRESSURE: 85 MMHG | OXYGEN SATURATION: 100 % | RESPIRATION RATE: 18 BRPM

## 2019-02-18 LAB
ALBUMIN SERPL-MCNC: 3.8 G/DL (ref 3.4–5)
ALBUMIN/GLOB SERPL: 1.2 {RATIO} (ref 1–2)
ALP LIVER SERPL-CCNC: 89 U/L (ref 39–100)
ALT SERPL-CCNC: 81 U/L (ref 13–56)
ANION GAP SERPL CALC-SCNC: 7 MMOL/L (ref 0–18)
AST SERPL-CCNC: 53 U/L (ref 15–37)
BILIRUB SERPL-MCNC: 0.2 MG/DL (ref 0.1–2)
BUN BLD-MCNC: 14 MG/DL (ref 7–18)
BUN/CREAT SERPL: 11.9 (ref 10–20)
CALCIUM BLD-MCNC: 8.3 MG/DL (ref 8.5–10.1)
CHLORIDE SERPL-SCNC: 109 MMOL/L (ref 98–107)
CO2 SERPL-SCNC: 28 MMOL/L (ref 21–32)
CREAT BLD-MCNC: 1.18 MG/DL (ref 0.55–1.02)
DEPRECATED RDW RBC AUTO: 41.7 FL (ref 35.1–46.3)
ERYTHROCYTE [DISTWIDTH] IN BLOOD BY AUTOMATED COUNT: 15.5 % (ref 11–15)
GLOBULIN PLAS-MCNC: 3.2 G/DL (ref 2.8–4.4)
GLUCOSE BLD-MCNC: 213 MG/DL (ref 70–99)
HCT VFR BLD AUTO: 35.7 % (ref 35–48)
HGB BLD-MCNC: 10.7 G/DL (ref 12–16)
M PROTEIN MFR SERPL ELPH: 7 G/DL (ref 6.4–8.2)
MCH RBC QN AUTO: 22.3 PG (ref 26–34)
MCHC RBC AUTO-ENTMCNC: 30 G/DL (ref 31–37)
MCV RBC AUTO: 74.5 FL (ref 80–100)
OSMOLALITY SERPL CALC.SUM OF ELEC: 305 MOSM/KG (ref 275–295)
PLATELET # BLD AUTO: 209 10(3)UL (ref 150–450)
POTASSIUM SERPL-SCNC: 3.8 MMOL/L (ref 3.5–5.1)
RBC # BLD AUTO: 4.79 X10(6)UL (ref 3.8–5.3)
SODIUM SERPL-SCNC: 144 MMOL/L (ref 136–145)
WBC # BLD AUTO: 4.9 X10(3) UL (ref 4–11)

## 2019-02-18 PROCEDURE — 85027 COMPLETE CBC AUTOMATED: CPT | Performed by: INTERNAL MEDICINE

## 2019-02-18 PROCEDURE — A4216 STERILE WATER/SALINE, 10 ML: HCPCS | Performed by: INTERNAL MEDICINE

## 2019-02-18 PROCEDURE — 36514 APHERESIS PLASMA: CPT | Performed by: INTERNAL MEDICINE

## 2019-02-18 PROCEDURE — P9045 ALBUMIN (HUMAN), 5%, 250 ML: HCPCS | Performed by: INTERNAL MEDICINE

## 2019-02-18 PROCEDURE — 80053 COMPREHEN METABOLIC PANEL: CPT | Performed by: INTERNAL MEDICINE

## 2019-02-18 RX ORDER — ALBUMIN, HUMAN INJ 5% 5 %
3000 SOLUTION INTRAVENOUS ONCE
Status: DISCONTINUED | OUTPATIENT
Start: 2019-02-18 | End: 2019-02-20

## 2019-02-18 RX ORDER — HEPARIN SODIUM 1000 [USP'U]/ML
4000 INJECTION, SOLUTION INTRAVENOUS; SUBCUTANEOUS ONCE
Status: DISCONTINUED | OUTPATIENT
Start: 2019-02-18 | End: 2019-02-20

## 2019-02-18 RX ORDER — HEPARIN SODIUM 1000 [USP'U]/ML
INJECTION, SOLUTION INTRAVENOUS; SUBCUTANEOUS
Status: DISPENSED
Start: 2019-02-18 | End: 2019-02-18

## 2019-02-18 NOTE — TELEPHONE ENCOUNTER
Signed by provider. Faxed back to [facility], confirmation received. Also received orders from Garth Ryan for commode order that was reviewed and signed by provider as well.

## 2019-02-27 ENCOUNTER — TELEPHONE (OUTPATIENT)
Dept: NEUROLOGY | Facility: CLINIC | Age: 49
End: 2019-02-27

## 2019-02-28 VITALS
BODY MASS INDEX: 28.45 KG/M2 | WEIGHT: 177 LBS | HEIGHT: 66 IN | HEART RATE: 68 BPM | SYSTOLIC BLOOD PRESSURE: 110 MMHG | DIASTOLIC BLOOD PRESSURE: 60 MMHG

## 2019-02-28 VITALS
SYSTOLIC BLOOD PRESSURE: 104 MMHG | BODY MASS INDEX: 28.45 KG/M2 | WEIGHT: 177 LBS | HEART RATE: 76 BPM | DIASTOLIC BLOOD PRESSURE: 70 MMHG | HEIGHT: 66 IN

## 2019-02-28 VITALS
HEART RATE: 70 BPM | SYSTOLIC BLOOD PRESSURE: 112 MMHG | BODY MASS INDEX: 27.62 KG/M2 | DIASTOLIC BLOOD PRESSURE: 60 MMHG | HEIGHT: 67 IN | WEIGHT: 176 LBS

## 2019-02-28 VITALS
DIASTOLIC BLOOD PRESSURE: 80 MMHG | WEIGHT: 167 LBS | BODY MASS INDEX: 26.84 KG/M2 | HEART RATE: 72 BPM | SYSTOLIC BLOOD PRESSURE: 128 MMHG | HEIGHT: 66 IN

## 2019-02-28 VITALS
DIASTOLIC BLOOD PRESSURE: 60 MMHG | BODY MASS INDEX: 25.76 KG/M2 | SYSTOLIC BLOOD PRESSURE: 102 MMHG | HEIGHT: 68 IN | HEART RATE: 72 BPM | WEIGHT: 170 LBS

## 2019-03-01 VITALS — SYSTOLIC BLOOD PRESSURE: 114 MMHG | DIASTOLIC BLOOD PRESSURE: 68 MMHG | HEIGHT: 66 IN | HEART RATE: 81 BPM

## 2019-03-07 RX ORDER — ALBUMIN, HUMAN INJ 5% 5 %
3000 SOLUTION INTRAVENOUS ONCE
Status: CANCELLED | OUTPATIENT
Start: 2019-03-13

## 2019-03-07 RX ORDER — HEPARIN SODIUM 1000 [USP'U]/ML
4000 INJECTION, SOLUTION INTRAVENOUS; SUBCUTANEOUS
Status: CANCELLED | OUTPATIENT
Start: 2019-03-13

## 2019-03-12 ENCOUNTER — TELEPHONE (OUTPATIENT)
Dept: NEUROLOGY | Facility: CLINIC | Age: 49
End: 2019-03-12

## 2019-03-12 ENCOUNTER — OFFICE VISIT (OUTPATIENT)
Dept: NEUROLOGY | Facility: CLINIC | Age: 49
End: 2019-03-12
Payer: MEDICARE

## 2019-03-12 VITALS
WEIGHT: 180 LBS | RESPIRATION RATE: 16 BRPM | HEIGHT: 66 IN | SYSTOLIC BLOOD PRESSURE: 124 MMHG | DIASTOLIC BLOOD PRESSURE: 74 MMHG | BODY MASS INDEX: 28.93 KG/M2 | HEART RATE: 78 BPM

## 2019-03-12 DIAGNOSIS — G35 MS (MULTIPLE SCLEROSIS) (HCC): Primary | ICD-10-CM

## 2019-03-12 DIAGNOSIS — G37.9 DEMYELINATING DISEASE OF CENTRAL NERVOUS SYSTEM (HCC): ICD-10-CM

## 2019-03-12 PROBLEM — R10.30 LOWER ABDOMINAL PAIN: Status: RESOLVED | Noted: 2017-05-07 | Resolved: 2019-03-12

## 2019-03-12 PROCEDURE — 99214 OFFICE O/P EST MOD 30 MIN: CPT | Performed by: OTHER

## 2019-03-12 RX ORDER — PREGABALIN 150 MG/1
150 CAPSULE ORAL 2 TIMES DAILY
COMMUNITY
End: 2019-05-20

## 2019-03-12 RX ORDER — CARVEDILOL 3.12 MG/1
3.12 TABLET ORAL 2 TIMES DAILY WITH MEALS
Status: ON HOLD | COMMUNITY
End: 2021-03-05

## 2019-03-12 NOTE — TELEPHONE ENCOUNTER
Per Dr. Demarcus Pyle patient to initiate Agosto Satchel. All questions answered, start form signed. Patient will pursue Agosto Satchel through [employer]. Hep B panel ordered, patient states she will be able to get labs with upcoming PLEX.

## 2019-03-12 NOTE — PROGRESS NOTES
[facility]  Angie Aquino  3/11/1970  Primary Care Provider:  Arnoldo Vera MD    3/12/2019  Accompanied visit:  (x) No ( ) yes, by:      44year old yo patient being seen for: 15 mg by mouth daily. , Disp: , Rfl:   •  docusate sodium 100 MG Oral Cap, Take 100 mg by mouth 2 (two) times daily. , Disp: , Rfl:   •  Levothyroxine Sodium 88 MCG Oral Tab, Take 88 mcg by mouth daily. , Disp: , Rfl:   •  Ondansetron HCl (ZOFRAN) 4 mg tablet COMMENTS)    Comment:Swelling of neck, throat and tongue  Urea                    Tightness in Throat    Comment:Urea C-13 in Pranactin for H. Pylori test kit.          EXAM:  /74 (BP Location: Left arm, Patient Position: Sitting, Cuff Size: adult) or authorized [employer]  (  ) other records reviewed --non F2F  (  ) Fam meetings - patient not present --non F2F  Non Face to Face CPT code 10165/96018 applies as documented above    PROCEDURE DONE     (   ) see notes  Visits are done with \"ope

## 2019-03-12 NOTE — TELEPHONE ENCOUNTER
Pt reported to [address] [place] [address] Nick Jones Yoselyn that she fell last Saturday, March 9th. The fall was not witnessed, just reported by pt. Pt also reported her legs seem weaker and \"give out\".

## 2019-03-13 ENCOUNTER — HOSPITAL ENCOUNTER (OUTPATIENT)
Dept: PERIOP | Facility: HOSPITAL | Age: 49
Discharge: HOME | End: 2019-03-13
Attending: INTERNAL MEDICINE
Payer: MEDICARE

## 2019-03-13 VITALS
HEART RATE: 71 BPM | SYSTOLIC BLOOD PRESSURE: 107 MMHG | TEMPERATURE: 99 F | OXYGEN SATURATION: 98 % | RESPIRATION RATE: 20 BRPM | DIASTOLIC BLOOD PRESSURE: 76 MMHG

## 2019-03-13 LAB
ALBUMIN SERPL-MCNC: 3.5 G/DL (ref 3.4–5)
ALBUMIN/GLOB SERPL: 1.1 {RATIO} (ref 1–2)
ALP LIVER SERPL-CCNC: 92 U/L (ref 41–108)
ALT SERPL-CCNC: 74 U/L (ref 13–56)
ANION GAP SERPL CALC-SCNC: 8 MMOL/L (ref 0–18)
AST SERPL-CCNC: 50 U/L (ref 15–37)
BASOPHILS # BLD AUTO: 0.01 X10(3) UL (ref 0–0.2)
BASOPHILS NFR BLD AUTO: 0.2 %
BILIRUB SERPL-MCNC: 0.2 MG/DL (ref 0.1–2)
BUN BLD-MCNC: 15 MG/DL (ref 7–18)
BUN/CREAT SERPL: 12 (ref 10–20)
CALCIUM BLD-MCNC: 8.9 MG/DL (ref 8.5–10.1)
CHLORIDE SERPL-SCNC: 107 MMOL/L (ref 98–107)
CO2 SERPL-SCNC: 26 MMOL/L (ref 21–32)
CREAT BLD-MCNC: 1.25 MG/DL (ref 0.55–1.02)
DEPRECATED RDW RBC AUTO: 39.9 FL (ref 35.1–46.3)
EOSINOPHIL # BLD AUTO: 0.15 X10(3) UL (ref 0–0.7)
EOSINOPHIL NFR BLD AUTO: 2.7 %
ERYTHROCYTE [DISTWIDTH] IN BLOOD BY AUTOMATED COUNT: 14.9 % (ref 11–15)
GLOBULIN PLAS-MCNC: 3.3 G/DL (ref 2.8–4.4)
GLUCOSE BLD-MCNC: 243 MG/DL (ref 70–99)
HBV CORE AB SERPL QL IA: NONREACTIVE
HBV CORE IGM SER QL: NONREACTIVE
HBV SURFACE AB SER QL: REACTIVE
HBV SURFACE AB SERPL IA-ACNC: 14.35 MIU/ML
HBV SURFACE AG SER-ACNC: <0.1 [IU]/L
HBV SURFACE AG SERPL QL IA: NONREACTIVE
HCT VFR BLD AUTO: 35.7 % (ref 35–48)
HGB BLD-MCNC: 10.7 G/DL (ref 12–16)
IMM GRANULOCYTES # BLD AUTO: 0.01 X10(3) UL (ref 0–1)
IMM GRANULOCYTES NFR BLD: 0.2 %
LYMPHOCYTES # BLD AUTO: 1.46 X10(3) UL (ref 1–4)
LYMPHOCYTES NFR BLD AUTO: 26.7 %
M PROTEIN MFR SERPL ELPH: 6.8 G/DL (ref 6.4–8.2)
MCH RBC QN AUTO: 22.3 PG (ref 26–34)
MCHC RBC AUTO-ENTMCNC: 30 G/DL (ref 31–37)
MCV RBC AUTO: 74.4 FL (ref 80–100)
MONOCYTES # BLD AUTO: 0.38 X10(3) UL (ref 0.1–1)
MONOCYTES NFR BLD AUTO: 7 %
NEUTROPHILS # BLD AUTO: 3.45 X10 (3) UL (ref 1.5–7.7)
NEUTROPHILS # BLD AUTO: 3.45 X10(3) UL (ref 1.5–7.7)
NEUTROPHILS NFR BLD AUTO: 63.2 %
OSMOLALITY SERPL CALC.SUM OF ELEC: 301 MOSM/KG (ref 275–295)
PLATELET # BLD AUTO: 147 10(3)UL (ref 150–450)
POTASSIUM SERPL-SCNC: 3.9 MMOL/L (ref 3.5–5.1)
RBC # BLD AUTO: 4.8 X10(6)UL (ref 3.8–5.3)
SODIUM SERPL-SCNC: 141 MMOL/L (ref 136–145)
WBC # BLD AUTO: 5.5 X10(3) UL (ref 4–11)

## 2019-03-13 PROCEDURE — 80053 COMPREHEN METABOLIC PANEL: CPT | Performed by: INTERNAL MEDICINE

## 2019-03-13 PROCEDURE — 86704 HEP B CORE ANTIBODY TOTAL: CPT | Performed by: INTERNAL MEDICINE

## 2019-03-13 PROCEDURE — 86706 HEP B SURFACE ANTIBODY: CPT | Performed by: INTERNAL MEDICINE

## 2019-03-13 PROCEDURE — 85025 COMPLETE CBC W/AUTO DIFF WBC: CPT | Performed by: INTERNAL MEDICINE

## 2019-03-13 PROCEDURE — A4216 STERILE WATER/SALINE, 10 ML: HCPCS | Performed by: INTERNAL MEDICINE

## 2019-03-13 PROCEDURE — 36514 APHERESIS PLASMA: CPT

## 2019-03-13 PROCEDURE — 87340 HEPATITIS B SURFACE AG IA: CPT | Performed by: INTERNAL MEDICINE

## 2019-03-13 PROCEDURE — 86705 HEP B CORE ANTIBODY IGM: CPT | Performed by: INTERNAL MEDICINE

## 2019-03-13 PROCEDURE — P9045 ALBUMIN (HUMAN), 5%, 250 ML: HCPCS | Performed by: INTERNAL MEDICINE

## 2019-03-13 RX ORDER — SODIUM CHLORIDE 9 MG/ML
INJECTION, SOLUTION INTRAVENOUS ONCE
Status: DISCONTINUED | OUTPATIENT
Start: 2019-03-13 | End: 2019-03-15

## 2019-03-13 RX ORDER — HEPARIN SODIUM 1000 [USP'U]/ML
4000 INJECTION, SOLUTION INTRAVENOUS; SUBCUTANEOUS ONCE
Status: DISCONTINUED | OUTPATIENT
Start: 2019-03-13 | End: 2019-03-15

## 2019-03-13 RX ORDER — ALBUMIN, HUMAN INJ 5% 5 %
3000 SOLUTION INTRAVENOUS ONCE
Status: DISCONTINUED | OUTPATIENT
Start: 2019-03-13 | End: 2019-03-15

## 2019-03-19 NOTE — TELEPHONE ENCOUNTER
Start up forms completed and faxed to [facility] with fax confirmation. Referral to [facility] to obtain prior authorization. Case pending. Preliminary labs completed, reflected in Epic.

## 2019-03-25 RX ORDER — FUROSEMIDE 20 MG/1
TABLET ORAL
COMMUNITY
Start: 2018-06-25 | End: 2019-11-01 | Stop reason: ALTCHOICE

## 2019-03-25 RX ORDER — MONTELUKAST SODIUM 10 MG/1
1 TABLET ORAL DAILY
COMMUNITY
Start: 2015-11-30 | End: 2019-05-09 | Stop reason: SDUPTHER

## 2019-03-25 RX ORDER — PIOGLITAZONEHYDROCHLORIDE 15 MG/1
1 TABLET ORAL DAILY
COMMUNITY
Start: 2017-05-06 | End: 2019-05-09 | Stop reason: SDUPTHER

## 2019-03-25 RX ORDER — HYDROCODONE BITARTRATE AND ACETAMINOPHEN 10; 325 MG/1; MG/1
TABLET ORAL
COMMUNITY
Start: 2017-05-06 | End: 2019-05-09 | Stop reason: SDUPTHER

## 2019-03-25 RX ORDER — DOCUSATE SODIUM 100 MG/1
CAPSULE, LIQUID FILLED ORAL
COMMUNITY
Start: 2017-05-06 | End: 2019-05-09 | Stop reason: SDUPTHER

## 2019-03-25 RX ORDER — PREGABALIN 150 MG/1
1 CAPSULE ORAL 2 TIMES DAILY
COMMUNITY
Start: 2018-06-25 | End: 2019-05-09 | Stop reason: SDUPTHER

## 2019-03-25 RX ORDER — DULOXETIN HYDROCHLORIDE 30 MG/1
1 CAPSULE, DELAYED RELEASE ORAL 2 TIMES DAILY
COMMUNITY
Start: 2015-11-30 | End: 2019-05-09 | Stop reason: SDUPTHER

## 2019-03-25 RX ORDER — FLUTICASONE FUROATE AND VILANTEROL 100; 25 UG/1; UG/1
POWDER RESPIRATORY (INHALATION)
COMMUNITY
Start: 2017-05-06 | End: 2019-05-09 | Stop reason: SDUPTHER

## 2019-03-25 RX ORDER — PRAVASTATIN SODIUM 80 MG/1
1 TABLET ORAL DAILY
COMMUNITY
Start: 2015-11-30 | End: 2019-05-09 | Stop reason: SDUPTHER

## 2019-03-25 RX ORDER — LEVOTHYROXINE SODIUM 0.07 MG/1
TABLET ORAL
COMMUNITY
Start: 2016-04-28 | End: 2019-05-09 | Stop reason: SDUPTHER

## 2019-03-25 RX ORDER — FERROUS SULFATE 325(65) MG
1 TABLET ORAL DAILY
COMMUNITY
Start: 2016-11-28 | End: 2019-05-09 | Stop reason: SDUPTHER

## 2019-03-25 RX ORDER — OMEPRAZOLE 20 MG/1
1 CAPSULE, DELAYED RELEASE ORAL DAILY
COMMUNITY
Start: 2017-05-06 | End: 2019-05-09 | Stop reason: SDUPTHER

## 2019-03-26 ENCOUNTER — TELEPHONE (OUTPATIENT)
Dept: NEUROLOGY | Facility: CLINIC | Age: 49
End: 2019-03-26

## 2019-03-26 NOTE — TELEPHONE ENCOUNTER
Fax received from [facility] for continuation of skilled nursing services. Patient to continue to receive nursing once weekly for 9 weeks for catheter care/dressing changes. Faxed with physician signature. Fax confirmation received.

## 2019-03-27 NOTE — TELEPHONE ENCOUNTER
Called and spoke with Lissett Edgar NP at [facility] infusion. When calling to schedule patient for her infusion for Ocrevus, patient voiced that she is allergic to solumedrol. Patient with tongue swelling reaction with solumedrol.     Calling for alternate pre medi

## 2019-03-27 NOTE — TELEPHONE ENCOUNTER
KAREN Costa stated pt has concerns with infusion and worried about allergies. Call Dena Jacob back.     Phone:  [phone]  If not available call:  [phone]

## 2019-03-28 NOTE — TELEPHONE ENCOUNTER
Called and spoke with Nasreen Perry NP. Provided information for premedication. Asked to fax orders to [phone]. Orders printed and faxed as requested.

## 2019-03-28 NOTE — TELEPHONE ENCOUNTER
Discussed allergy with Dr Hang Choudhary. Will premedicate with Benadryl in addition to the dexamethasone. Attempted to call and speak with KAREN Costa concerning premedication.     Provider would like patient to receive:    Benadryl 50 mg IVP    Dexamethason

## 2019-03-29 RX ORDER — DIPHENHYDRAMINE HYDROCHLORIDE 50 MG/ML
INJECTION INTRAMUSCULAR; INTRAVENOUS
Qty: 1 ML | Refills: 2 | Status: SHIPPED
Start: 2019-03-29 | End: 2019-04-01

## 2019-04-01 RX ORDER — DIPHENHYDRAMINE HYDROCHLORIDE 50 MG/ML
INJECTION INTRAMUSCULAR; INTRAVENOUS
Qty: 1 ML | Refills: 2 | Status: SHIPPED
Start: 2019-04-01 | End: 2019-07-01 | Stop reason: CLARIF

## 2019-04-01 NOTE — TELEPHONE ENCOUNTER
Julissa JONES never received premedication orders. Confirmed fax # [phone]. Please resend orders.

## 2019-04-03 ENCOUNTER — NURSE ONLY (OUTPATIENT)
Dept: HEMATOLOGY/ONCOLOGY | Facility: HOSPITAL | Age: 49
End: 2019-04-03
Attending: Other
Payer: MEDICARE

## 2019-04-03 DIAGNOSIS — G37.9 DEMYELINATING DISEASE OF CENTRAL NERVOUS SYSTEM (HCC): Primary | ICD-10-CM

## 2019-04-03 PROCEDURE — 96523 IRRIG DRUG DELIVERY DEVICE: CPT

## 2019-04-03 RX ORDER — SODIUM CHLORIDE 0.9 % (FLUSH) 0.9 %
10 SYRINGE (ML) INJECTION ONCE
Status: CANCELLED | OUTPATIENT
Start: 2019-04-03

## 2019-04-03 RX ORDER — SODIUM CHLORIDE 0.9 % (FLUSH) 0.9 %
10 SYRINGE (ML) INJECTION ONCE
Status: COMPLETED | OUTPATIENT
Start: 2019-04-03 | End: 2019-04-03

## 2019-04-03 RX ADMIN — SODIUM CHLORIDE 0.9 % (FLUSH) 10 ML: 0.9 % SYRINGE (ML) INJECTION at 13:25:00

## 2019-04-04 ENCOUNTER — HOSPITAL ENCOUNTER (OUTPATIENT)
Dept: PERIOP | Facility: HOSPITAL | Age: 49
Discharge: HOME | End: 2019-04-04
Attending: INTERNAL MEDICINE
Payer: MEDICARE

## 2019-04-04 VITALS — OXYGEN SATURATION: 100 % | SYSTOLIC BLOOD PRESSURE: 118 MMHG | HEART RATE: 67 BPM | DIASTOLIC BLOOD PRESSURE: 88 MMHG

## 2019-04-04 PROCEDURE — P9045 ALBUMIN (HUMAN), 5%, 250 ML: HCPCS | Performed by: INTERNAL MEDICINE

## 2019-04-04 PROCEDURE — A4216 STERILE WATER/SALINE, 10 ML: HCPCS | Performed by: INTERNAL MEDICINE

## 2019-04-04 PROCEDURE — 80053 COMPREHEN METABOLIC PANEL: CPT | Performed by: INTERNAL MEDICINE

## 2019-04-04 PROCEDURE — 85027 COMPLETE CBC AUTOMATED: CPT | Performed by: INTERNAL MEDICINE

## 2019-04-04 PROCEDURE — 36514 APHERESIS PLASMA: CPT

## 2019-04-04 RX ORDER — ALBUMIN, HUMAN INJ 5% 5 %
3000 SOLUTION INTRAVENOUS ONCE
Status: DISCONTINUED | OUTPATIENT
Start: 2019-04-04 | End: 2019-04-06

## 2019-04-04 RX ORDER — HEPARIN SODIUM 1000 [USP'U]/ML
4000 INJECTION, SOLUTION INTRAVENOUS; SUBCUTANEOUS ONCE
Status: DISCONTINUED | OUTPATIENT
Start: 2019-04-04 | End: 2019-04-06

## 2019-04-15 ENCOUNTER — TELEPHONE (OUTPATIENT)
Dept: NEUROLOGY | Facility: CLINIC | Age: 49
End: 2019-04-15

## 2019-04-15 DIAGNOSIS — G35 MULTIPLE SCLEROSIS (HCC): Primary | ICD-10-CM

## 2019-04-16 ENCOUNTER — TELEPHONE (OUTPATIENT)
Dept: NEUROLOGY | Facility: CLINIC | Age: 49
End: 2019-04-16

## 2019-04-16 NOTE — TELEPHONE ENCOUNTER
Patient initiated treatment with Samuella Bitters on 4/15/2019. Ocrevus infusion record received from [facility] [place] infusion center for physician review. No signs/symptoms of infusion reaction noted. Patient infused on 4/15/2019 for 1st half of dosage.   Lot Number [id number]

## 2019-04-29 ENCOUNTER — APPOINTMENT (OUTPATIENT)
Dept: CARDIOLOGY | Age: 49
End: 2019-04-29

## 2019-04-29 ENCOUNTER — TELEPHONE (OUTPATIENT)
Dept: NEUROLOGY | Facility: CLINIC | Age: 49
End: 2019-04-29

## 2019-04-29 DIAGNOSIS — G35 MULTIPLE SCLEROSIS (HCC): Primary | ICD-10-CM

## 2019-04-29 NOTE — TELEPHONE ENCOUNTER
Received telephone call from [address] at [facility]. Patient is infusing 2nd 1/2 (loading) dose Ocrevus. She is complaining of SOB, scratchy/itchy throat and feeling of swelling. VS are all WNL.  She has received Ocrevus for approximately 1/2 hour at 30 ml/h

## 2019-04-30 ENCOUNTER — LAB REQUISITION (OUTPATIENT)
Dept: LAB | Facility: HOSPITAL | Age: 49
End: 2019-04-30
Payer: MEDICARE

## 2019-04-30 DIAGNOSIS — N39.0 URINARY TRACT INFECTION: ICD-10-CM

## 2019-04-30 PROCEDURE — 87086 URINE CULTURE/COLONY COUNT: CPT | Performed by: SPECIALIST

## 2019-04-30 PROCEDURE — 81001 URINALYSIS AUTO W/SCOPE: CPT | Performed by: SPECIALIST

## 2019-05-01 NOTE — TELEPHONE ENCOUNTER
Pt stopped Ocrevus per Dr. Driscoll People instructions. She is wants to know what she should do next.

## 2019-05-02 NOTE — TELEPHONE ENCOUNTER
I might consider referring her outside to [facility]. Dr Lilibeth New.   I recall she went to [facility]

## 2019-05-03 NOTE — TELEPHONE ENCOUNTER
D/W Dr. Erna Jain; due to multiple medical issues / comorbidities he suggests tertiary care center may be a better handle on her complicated course.

## 2019-05-08 NOTE — TELEPHONE ENCOUNTER
Per [facility] request, prescription advising to hold Ouroboros until future notice generated and faxed with confirmation receipt.

## 2019-05-09 PROCEDURE — 87086 URINE CULTURE/COLONY COUNT: CPT | Performed by: UROLOGY

## 2019-05-09 RX ORDER — HYDROCODONE BITARTRATE AND ACETAMINOPHEN 10; 325 MG/1; MG/1
1 TABLET ORAL PRN
COMMUNITY

## 2019-05-09 RX ORDER — MONTELUKAST SODIUM 10 MG/1
10 TABLET ORAL NIGHTLY
COMMUNITY

## 2019-05-09 RX ORDER — DIPHENHYDRAMINE HYDROCHLORIDE 50 MG/ML
INJECTION INTRAMUSCULAR; INTRAVENOUS
COMMUNITY
Start: 2019-04-01 | End: 2021-04-12

## 2019-05-09 RX ORDER — PIOGLITAZONEHYDROCHLORIDE 15 MG/1
15 TABLET ORAL
COMMUNITY
End: 2019-11-01 | Stop reason: ALTCHOICE

## 2019-05-09 RX ORDER — TRIMETHOPRIM 100 MG/1
100 TABLET ORAL DAILY
COMMUNITY
Start: 2018-09-28 | End: 2020-11-09

## 2019-05-09 RX ORDER — CARVEDILOL 3.12 MG/1
3.12 TABLET ORAL 2 TIMES DAILY WITH MEALS
COMMUNITY
End: 2021-04-12

## 2019-05-09 RX ORDER — DOCUSATE SODIUM 100 MG/1
100 CAPSULE, LIQUID FILLED ORAL 2 TIMES DAILY
COMMUNITY

## 2019-05-09 RX ORDER — FLUTICASONE FUROATE AND VILANTEROL 100; 25 UG/1; UG/1
1 POWDER RESPIRATORY (INHALATION)
COMMUNITY
Start: 2018-12-03 | End: 2020-11-09

## 2019-05-09 RX ORDER — LEVOTHYROXINE SODIUM 88 UG/1
88 TABLET ORAL DAILY
COMMUNITY
Start: 2017-01-30

## 2019-05-09 RX ORDER — DULOXETIN HYDROCHLORIDE 60 MG/1
60 CAPSULE, DELAYED RELEASE ORAL DAILY
COMMUNITY

## 2019-05-09 RX ORDER — OMEPRAZOLE 20 MG/1
20 CAPSULE, DELAYED RELEASE ORAL 2 TIMES DAILY
COMMUNITY

## 2019-05-09 RX ORDER — ALBUTEROL SULFATE 90 UG/1
2 AEROSOL, METERED RESPIRATORY (INHALATION)
COMMUNITY

## 2019-05-09 RX ORDER — PREGABALIN 150 MG/1
200 CAPSULE ORAL 3 TIMES DAILY
COMMUNITY

## 2019-05-09 RX ORDER — LACTULOSE 10 G/15ML
10 SOLUTION ORAL
COMMUNITY
Start: 2019-01-06 | End: 2019-11-01 | Stop reason: ALTCHOICE

## 2019-05-09 RX ORDER — LANOLIN ALCOHOL/MO/W.PET/CERES
325 CREAM (GRAM) TOPICAL
COMMUNITY

## 2019-05-09 RX ORDER — GLIPIZIDE 10 MG/1
10 TABLET ORAL DAILY
COMMUNITY
End: 2020-05-18

## 2019-05-09 RX ORDER — LANOLIN ALCOHOL/MO/W.PET/CERES
100 CREAM (GRAM) TOPICAL DAILY
COMMUNITY
Start: 2019-01-07

## 2019-05-09 RX ORDER — PRAVASTATIN SODIUM 80 MG/1
80 TABLET ORAL
COMMUNITY
Start: 2014-12-18 | End: 2019-11-18 | Stop reason: ALTCHOICE

## 2019-05-13 ENCOUNTER — OFFICE VISIT (OUTPATIENT)
Dept: CARDIOLOGY | Age: 49
End: 2019-05-13

## 2019-05-13 VITALS
HEART RATE: 76 BPM | BODY MASS INDEX: 28.61 KG/M2 | WEIGHT: 178 LBS | HEIGHT: 66 IN | DIASTOLIC BLOOD PRESSURE: 70 MMHG | SYSTOLIC BLOOD PRESSURE: 108 MMHG

## 2019-05-13 DIAGNOSIS — G35 MULTIPLE SCLEROSIS (CMD): ICD-10-CM

## 2019-05-13 DIAGNOSIS — E78.00 PURE HYPERCHOLESTEROLEMIA: ICD-10-CM

## 2019-05-13 DIAGNOSIS — E03.9 HYPOTHYROIDISM, UNSPECIFIED TYPE: ICD-10-CM

## 2019-05-13 DIAGNOSIS — R06.02 SHORTNESS OF BREATH: ICD-10-CM

## 2019-05-13 DIAGNOSIS — D64.9 ANEMIA, UNSPECIFIED TYPE: ICD-10-CM

## 2019-05-13 DIAGNOSIS — G47.30 SLEEP APNEA, UNSPECIFIED TYPE: ICD-10-CM

## 2019-05-13 DIAGNOSIS — K21.9 GASTROESOPHAGEAL REFLUX DISEASE WITHOUT ESOPHAGITIS: ICD-10-CM

## 2019-05-13 DIAGNOSIS — I42.0 DILATED IDIOPATHIC CARDIOMYOPATHY (CMD): ICD-10-CM

## 2019-05-13 DIAGNOSIS — Z82.49 FAMILY HISTORY OF CORONARY ARTERIOSCLEROSIS: ICD-10-CM

## 2019-05-13 DIAGNOSIS — K58.9 IRRITABLE BOWEL SYNDROME, UNSPECIFIED TYPE: ICD-10-CM

## 2019-05-13 DIAGNOSIS — R53.81 CHRONIC FATIGUE AND MALAISE: ICD-10-CM

## 2019-05-13 DIAGNOSIS — R53.82 CHRONIC FATIGUE AND MALAISE: ICD-10-CM

## 2019-05-13 DIAGNOSIS — D57.3 SICKLE CELL TRAIT (CMD): ICD-10-CM

## 2019-05-13 DIAGNOSIS — M79.7 FIBROMYALGIA: Primary | ICD-10-CM

## 2019-05-13 DIAGNOSIS — E11.9 TYPE 2 DIABETES MELLITUS WITHOUT COMPLICATION, WITHOUT LONG-TERM CURRENT USE OF INSULIN (CMD): ICD-10-CM

## 2019-05-13 DIAGNOSIS — R42 DIZZINESS: ICD-10-CM

## 2019-05-13 PROCEDURE — 99214 OFFICE O/P EST MOD 30 MIN: CPT | Performed by: INTERNAL MEDICINE

## 2019-05-13 SDOH — HEALTH STABILITY: PHYSICAL HEALTH: ON AVERAGE, HOW MANY DAYS PER WEEK DO YOU ENGAGE IN MODERATE TO STRENUOUS EXERCISE (LIKE A BRISK WALK)?: 5 DAYS

## 2019-05-13 SDOH — HEALTH STABILITY: PHYSICAL HEALTH: ON AVERAGE, HOW MANY MINUTES DO YOU ENGAGE IN EXERCISE AT THIS LEVEL?: 20 MIN

## 2019-05-20 NOTE — TELEPHONE ENCOUNTER
Medication: Lyrica 150 mg    Date of last refill: 11/13/18 with 5 addt refills  [place]     Last office visit: 3/12/2019  Due back to clinic per last office note: RTN in 6 months  Date next office visit scheduled:     Fut Female patient visits were with done with NP, PA or MA/PSRs during the entirety of the visit                           Other Notes      All notes         Instructions         Return in about 6 months (around 9/12/2019).

## 2019-05-28 ENCOUNTER — APPOINTMENT (OUTPATIENT)
Dept: CT IMAGING | Facility: HOSPITAL | Age: 49
End: 2019-05-28
Attending: EMERGENCY MEDICINE
Payer: MEDICARE

## 2019-05-28 ENCOUNTER — HOSPITAL ENCOUNTER (EMERGENCY)
Facility: HOSPITAL | Age: 49
Discharge: HOME | End: 2019-05-28
Attending: EMERGENCY MEDICINE
Payer: MEDICARE

## 2019-05-28 ENCOUNTER — TELEPHONE (OUTPATIENT)
Dept: NEUROLOGY | Facility: CLINIC | Age: 49
End: 2019-05-28

## 2019-05-28 VITALS
TEMPERATURE: 97 F | HEIGHT: 66 IN | OXYGEN SATURATION: 98 % | BODY MASS INDEX: 28.61 KG/M2 | SYSTOLIC BLOOD PRESSURE: 120 MMHG | DIASTOLIC BLOOD PRESSURE: 81 MMHG | WEIGHT: 178 LBS | HEART RATE: 78 BPM | RESPIRATION RATE: 18 BRPM

## 2019-05-28 DIAGNOSIS — R30.0 DYSURIA: ICD-10-CM

## 2019-05-28 DIAGNOSIS — R10.9 ABDOMINAL PAIN, ACUTE: Primary | ICD-10-CM

## 2019-05-28 PROCEDURE — 81001 URINALYSIS AUTO W/SCOPE: CPT | Performed by: EMERGENCY MEDICINE

## 2019-05-28 PROCEDURE — 87086 URINE CULTURE/COLONY COUNT: CPT | Performed by: EMERGENCY MEDICINE

## 2019-05-28 PROCEDURE — 85610 PROTHROMBIN TIME: CPT | Performed by: EMERGENCY MEDICINE

## 2019-05-28 PROCEDURE — 96361 HYDRATE IV INFUSION ADD-ON: CPT

## 2019-05-28 PROCEDURE — 96374 THER/PROPH/DIAG INJ IV PUSH: CPT

## 2019-05-28 PROCEDURE — 80053 COMPREHEN METABOLIC PANEL: CPT | Performed by: EMERGENCY MEDICINE

## 2019-05-28 PROCEDURE — 85730 THROMBOPLASTIN TIME PARTIAL: CPT | Performed by: EMERGENCY MEDICINE

## 2019-05-28 PROCEDURE — 74177 CT ABD & PELVIS W/CONTRAST: CPT | Performed by: EMERGENCY MEDICINE

## 2019-05-28 PROCEDURE — 85025 COMPLETE CBC W/AUTO DIFF WBC: CPT | Performed by: EMERGENCY MEDICINE

## 2019-05-28 PROCEDURE — 99284 EMERGENCY DEPT VISIT MOD MDM: CPT

## 2019-05-28 PROCEDURE — 96376 TX/PRO/DX INJ SAME DRUG ADON: CPT

## 2019-05-28 RX ORDER — SODIUM CHLORIDE 9 MG/ML
1000 INJECTION, SOLUTION INTRAVENOUS ONCE
Status: COMPLETED | OUTPATIENT
Start: 2019-05-28 | End: 2019-05-28

## 2019-05-28 RX ORDER — NITROFURANTOIN 25; 75 MG/1; MG/1
100 CAPSULE ORAL 2 TIMES DAILY
Qty: 20 CAPSULE | Refills: 0 | Status: SHIPPED | OUTPATIENT
Start: 2019-05-28 | End: 2019-06-07

## 2019-05-28 RX ORDER — MORPHINE SULFATE 4 MG/ML
2 INJECTION, SOLUTION INTRAMUSCULAR; INTRAVENOUS ONCE
Status: COMPLETED | OUTPATIENT
Start: 2019-05-28 | End: 2019-05-28

## 2019-05-28 NOTE — TELEPHONE ENCOUNTER
[facility] home health orders received for physician review and signature. Medicare home health certification faxed with receipt confirmation. Patient to receive SN services 1 time weekly for 8 weeks.     Home health to monitor condition, medication

## 2019-05-29 NOTE — ED PROVIDER NOTES
Patient Seen in: [facility] Emergency Department    History   Patient presents with:  Abdomen/Flank Pain (GI/)  Back Pain (musculoskeletal)    Stated Complaint: abd and back pain for over 2 wks    HPI    Patient is a 33-year-old female who present of thyroid     total thyroidectomy   • Visual impairment     GLASSES       Past Surgical History:   Procedure Laterality Date   • CATH ANGIO  May 2014   • CATHETER REMOVAL Right 9/12/2018    Performed by Valentín Chandler MD at [facility] Exam    GENERAL: Well-developed, well-nourished female sitting up breathing easily in no apparent distress. Patient is nontoxic in appearance. HEENT: Head is normocephalic, atraumatic. Pupils are 4 mm equally round and reactive to light.  Oropharynx is cl ACTIVATED - Abnormal; Notable for the following components:    PTT 92.3 (*)     All other components within normal limits   CBC W/ DIFFERENTIAL - Abnormal; Notable for the following components:    HGB 10.8 (*)     HCT 34.7 (*)     MCV 70.7 (*)     MCH 22. 0 sitting back in breathing easily in no apparent distress. Patient states she has been seen by urologist multiple times in the past for the symptoms and was told at one point that she had a narrow urethra and may need a procedure done.   Patient has no evid

## 2019-05-29 NOTE — ED INITIAL ASSESSMENT (HPI)
Patient presents to ED with lower to mid abdominal pain for a \"few weeks\". Pt states she was recently seen by doctor for bladder spasms and had to be straight cathed for urine specimen. Pt and  at bedside deny any urinary retention.  Pt states she

## 2019-06-17 ENCOUNTER — TELEPHONE (OUTPATIENT)
Dept: NEUROLOGY | Facility: CLINIC | Age: 49
End: 2019-06-17

## 2019-06-17 NOTE — TELEPHONE ENCOUNTER
Residential home health calling with condition update on patient. Current MS patient who previously received plasmapheresis for her symptom management. Attempted to change DMT to [address] [place] [address] but patient with adverse allergic reaction.     Was referred to

## 2019-06-18 ENCOUNTER — HOSPITAL ENCOUNTER (OUTPATIENT)
Dept: MAMMOGRAPHY | Facility: HOSPITAL | Age: 49
Discharge: HOME | End: 2019-06-18
Attending: SPECIALIST
Payer: MEDICARE

## 2019-06-18 DIAGNOSIS — Z12.31 ENCOUNTER FOR SCREENING MAMMOGRAM FOR MALIGNANT NEOPLASM OF BREAST: ICD-10-CM

## 2019-06-18 PROCEDURE — 77067 SCR MAMMO BI INCL CAD: CPT | Performed by: SPECIALIST

## 2019-06-18 PROCEDURE — 77063 BREAST TOMOSYNTHESIS BI: CPT | Performed by: SPECIALIST

## 2019-06-19 NOTE — TELEPHONE ENCOUNTER
[facility] RN notified. Will update nephrology (Dr. Herbert Millan) who places orders for PLEX.

## 2019-06-21 NOTE — TELEPHONE ENCOUNTER
Pt called to see if orders placed for orders from Dr. Naheed Becerril to Dr. Tanner Whitley. Call pt.

## 2019-06-21 NOTE — TELEPHONE ENCOUNTER
Notified Debbie Shepard, Dr Sheron Rivera okay with doing Plex. Asked Debbie Shepard to call Dr Vicky Leigh office to get started with Plex. All the questions answered and patient verbalized the understanding.

## 2019-06-24 ENCOUNTER — TELEPHONE (OUTPATIENT)
Dept: NEPHROLOGY | Facility: CLINIC | Age: 49
End: 2019-06-24

## 2019-07-01 ENCOUNTER — HOSPITAL ENCOUNTER (INPATIENT)
Facility: HOSPITAL | Age: 49
LOS: 5 days | Discharge: HOME HEALTH | DRG: 392 | End: 2019-07-07
Attending: EMERGENCY MEDICINE | Admitting: SPECIALIST
Payer: MEDICARE

## 2019-07-01 ENCOUNTER — APPOINTMENT (OUTPATIENT)
Dept: CT IMAGING | Facility: HOSPITAL | Age: 49
DRG: 392 | End: 2019-07-01
Attending: EMERGENCY MEDICINE
Payer: MEDICARE

## 2019-07-01 DIAGNOSIS — R10.13 EPIGASTRIC PAIN: ICD-10-CM

## 2019-07-01 DIAGNOSIS — R10.9 ABDOMINAL PAIN OF UNKNOWN ETIOLOGY: Primary | ICD-10-CM

## 2019-07-01 DIAGNOSIS — R73.9 HYPERGLYCEMIA: ICD-10-CM

## 2019-07-01 PROBLEM — E87.1 HYPONATREMIA: Status: ACTIVE | Noted: 2019-07-01

## 2019-07-01 LAB
ACETONE: NEGATIVE
ALBUMIN SERPL-MCNC: 3.4 G/DL (ref 3.4–5)
ALBUMIN/GLOB SERPL: 0.8 {RATIO} (ref 1–2)
ALP LIVER SERPL-CCNC: 101 U/L (ref 41–108)
ALT SERPL-CCNC: 98 U/L (ref 13–56)
ANION GAP SERPL CALC-SCNC: 8 MMOL/L (ref 0–18)
AST SERPL-CCNC: 62 U/L (ref 15–37)
ATRIAL RATE: 76 BPM
BASOPHILS # BLD AUTO: 0.03 X10(3) UL (ref 0–0.2)
BASOPHILS NFR BLD AUTO: 0.5 %
BILIRUB SERPL-MCNC: 0.4 MG/DL (ref 0.1–2)
BILIRUB UR QL STRIP.AUTO: NEGATIVE
BUN BLD-MCNC: 10 MG/DL (ref 7–18)
BUN/CREAT SERPL: 8.3 (ref 10–20)
CALCIUM BLD-MCNC: 8.5 MG/DL (ref 8.5–10.1)
CHLORIDE SERPL-SCNC: 97 MMOL/L (ref 98–112)
CLARITY UR REFRACT.AUTO: CLEAR
CO2 SERPL-SCNC: 23 MMOL/L (ref 21–32)
COLOR UR AUTO: YELLOW
CREAT BLD-MCNC: 1.21 MG/DL (ref 0.55–1.02)
DEPRECATED RDW RBC AUTO: 39.4 FL (ref 35.1–46.3)
EOSINOPHIL # BLD AUTO: 0.08 X10(3) UL (ref 0–0.7)
EOSINOPHIL NFR BLD AUTO: 1.5 %
ERYTHROCYTE [DISTWIDTH] IN BLOOD BY AUTOMATED COUNT: 15.3 % (ref 11–15)
EST. AVERAGE GLUCOSE BLD GHB EST-MCNC: 237 MG/DL (ref 68–126)
GLOBULIN PLAS-MCNC: 4.1 G/DL (ref 2.8–4.4)
GLUCOSE BLD-MCNC: 180 MG/DL (ref 70–99)
GLUCOSE BLD-MCNC: 228 MG/DL (ref 70–99)
GLUCOSE BLD-MCNC: 276 MG/DL (ref 70–99)
GLUCOSE BLD-MCNC: 484 MG/DL (ref 70–99)
GLUCOSE UR STRIP.AUTO-MCNC: >=500 MG/DL
HBA1C MFR BLD HPLC: 9.9 % (ref ?–5.7)
HCT VFR BLD AUTO: 38.8 % (ref 35–48)
HGB BLD-MCNC: 11.8 G/DL (ref 12–16)
IMM GRANULOCYTES # BLD AUTO: 0.02 X10(3) UL (ref 0–1)
IMM GRANULOCYTES NFR BLD: 0.4 %
KETONES UR STRIP.AUTO-MCNC: 80 MG/DL
LEUKOCYTE ESTERASE UR QL STRIP.AUTO: NEGATIVE
LIPASE SERPL-CCNC: 149 U/L (ref 73–393)
LYMPHOCYTES # BLD AUTO: 1.63 X10(3) UL (ref 1–4)
LYMPHOCYTES NFR BLD AUTO: 29.9 %
M PROTEIN MFR SERPL ELPH: 7.5 G/DL (ref 6.4–8.2)
MCH RBC QN AUTO: 22 PG (ref 26–34)
MCHC RBC AUTO-ENTMCNC: 30.4 G/DL (ref 31–37)
MCV RBC AUTO: 72.4 FL (ref 80–100)
MONOCYTES # BLD AUTO: 0.42 X10(3) UL (ref 0.1–1)
MONOCYTES NFR BLD AUTO: 7.7 %
NEUTROPHILS # BLD AUTO: 3.28 X10 (3) UL (ref 1.5–7.7)
NEUTROPHILS # BLD AUTO: 3.28 X10(3) UL (ref 1.5–7.7)
NEUTROPHILS NFR BLD AUTO: 60 %
NITRITE UR QL STRIP.AUTO: NEGATIVE
OSMOLALITY SERPL CALC.SUM OF ELEC: 286 MOSM/KG (ref 275–295)
P AXIS: 59 DEGREES
P-R INTERVAL: 136 MS
PH UR STRIP.AUTO: 6 [PH] (ref 4.5–8)
PLATELET # BLD AUTO: 179 10(3)UL (ref 150–450)
POTASSIUM SERPL-SCNC: 3.4 MMOL/L (ref 3.5–5.1)
PROT UR STRIP.AUTO-MCNC: NEGATIVE MG/DL
Q-T INTERVAL: 424 MS
QRS DURATION: 84 MS
QTC CALCULATION (BEZET): 477 MS
R AXIS: 20 DEGREES
RBC # BLD AUTO: 5.36 X10(6)UL (ref 3.8–5.3)
RBC UR QL AUTO: NEGATIVE
SODIUM SERPL-SCNC: 128 MMOL/L (ref 136–145)
SP GR UR STRIP.AUTO: 1.03 (ref 1–1.03)
T AXIS: 105 DEGREES
TROPONIN I SERPL-MCNC: <0.045 NG/ML (ref ?–0.04)
UROBILINOGEN UR STRIP.AUTO-MCNC: <2 MG/DL
VENTRICULAR RATE: 76 BPM
WBC # BLD AUTO: 5.5 X10(3) UL (ref 4–11)

## 2019-07-01 PROCEDURE — 74177 CT ABD & PELVIS W/CONTRAST: CPT | Performed by: EMERGENCY MEDICINE

## 2019-07-01 RX ORDER — SODIUM CHLORIDE 9 MG/ML
INJECTION, SOLUTION INTRAVENOUS CONTINUOUS
Status: DISCONTINUED | OUTPATIENT
Start: 2019-07-01 | End: 2019-07-07

## 2019-07-01 RX ORDER — HYDROMORPHONE HYDROCHLORIDE 1 MG/ML
1 INJECTION, SOLUTION INTRAMUSCULAR; INTRAVENOUS; SUBCUTANEOUS EVERY 4 HOURS PRN
Status: DISCONTINUED | OUTPATIENT
Start: 2019-07-01 | End: 2019-07-07

## 2019-07-01 RX ORDER — MORPHINE SULFATE 4 MG/ML
4 INJECTION, SOLUTION INTRAMUSCULAR; INTRAVENOUS EVERY 30 MIN PRN
Status: DISCONTINUED | OUTPATIENT
Start: 2019-07-01 | End: 2019-07-01 | Stop reason: HOSPADM

## 2019-07-01 RX ORDER — ONDANSETRON 4 MG/1
4 TABLET, FILM COATED ORAL EVERY 8 HOURS PRN
Status: DISCONTINUED | OUTPATIENT
Start: 2019-07-01 | End: 2019-07-01

## 2019-07-01 RX ORDER — ALBUTEROL SULFATE 90 UG/1
2 AEROSOL, METERED RESPIRATORY (INHALATION) EVERY 6 HOURS PRN
Status: DISCONTINUED | OUTPATIENT
Start: 2019-07-01 | End: 2019-07-07

## 2019-07-01 RX ORDER — MELATONIN
100 DAILY
Status: DISCONTINUED | OUTPATIENT
Start: 2019-07-01 | End: 2019-07-07

## 2019-07-01 RX ORDER — ONDANSETRON 2 MG/ML
4 INJECTION INTRAMUSCULAR; INTRAVENOUS EVERY 6 HOURS PRN
Status: DISCONTINUED | OUTPATIENT
Start: 2019-07-01 | End: 2019-07-07

## 2019-07-01 RX ORDER — PREGABALIN 150 MG/1
150 CAPSULE ORAL 3 TIMES DAILY
COMMUNITY
End: 2019-12-27

## 2019-07-01 RX ORDER — DULOXETIN HYDROCHLORIDE 30 MG/1
30 CAPSULE, DELAYED RELEASE ORAL 2 TIMES DAILY
Status: DISCONTINUED | OUTPATIENT
Start: 2019-07-01 | End: 2019-07-07

## 2019-07-01 RX ORDER — ONDANSETRON 2 MG/ML
4 INJECTION INTRAMUSCULAR; INTRAVENOUS EVERY 6 HOURS PRN
Status: DISCONTINUED | OUTPATIENT
Start: 2019-07-01 | End: 2019-07-01

## 2019-07-01 RX ORDER — PREGABALIN 75 MG/1
150 CAPSULE ORAL 3 TIMES DAILY
Status: DISCONTINUED | OUTPATIENT
Start: 2019-07-01 | End: 2019-07-07

## 2019-07-01 RX ORDER — HYDROMORPHONE HYDROCHLORIDE 1 MG/ML
0.5 INJECTION, SOLUTION INTRAMUSCULAR; INTRAVENOUS; SUBCUTANEOUS EVERY 4 HOURS PRN
Status: DISCONTINUED | OUTPATIENT
Start: 2019-07-01 | End: 2019-07-07

## 2019-07-01 RX ORDER — CARVEDILOL 3.12 MG/1
3.12 TABLET ORAL 2 TIMES DAILY WITH MEALS
Status: DISCONTINUED | OUTPATIENT
Start: 2019-07-01 | End: 2019-07-07

## 2019-07-01 RX ORDER — SODIUM CHLORIDE 9 MG/ML
INJECTION, SOLUTION INTRAVENOUS CONTINUOUS
Status: ACTIVE | OUTPATIENT
Start: 2019-07-01 | End: 2019-07-01

## 2019-07-01 RX ORDER — TRIMETHOPRIM 100 MG/1
100 TABLET ORAL NIGHTLY
Status: DISCONTINUED | OUTPATIENT
Start: 2019-07-01 | End: 2019-07-07

## 2019-07-01 RX ORDER — PANTOPRAZOLE SODIUM 40 MG/1
40 TABLET, DELAYED RELEASE ORAL
Status: DISCONTINUED | OUTPATIENT
Start: 2019-07-02 | End: 2019-07-01

## 2019-07-01 RX ORDER — MELATONIN
325 2 TIMES DAILY WITH MEALS
Status: DISCONTINUED | OUTPATIENT
Start: 2019-07-01 | End: 2019-07-07

## 2019-07-01 RX ORDER — HYDROCODONE BITARTRATE AND ACETAMINOPHEN 10; 325 MG/1; MG/1
1 TABLET ORAL 2 TIMES DAILY PRN
Status: DISCONTINUED | OUTPATIENT
Start: 2019-07-01 | End: 2019-07-07

## 2019-07-01 RX ORDER — SODIUM CHLORIDE 9 MG/ML
125 INJECTION, SOLUTION INTRAVENOUS CONTINUOUS
Status: DISCONTINUED | OUTPATIENT
Start: 2019-07-01 | End: 2019-07-01

## 2019-07-01 RX ORDER — LEVOTHYROXINE SODIUM 88 UG/1
88 TABLET ORAL
Status: DISCONTINUED | OUTPATIENT
Start: 2019-07-01 | End: 2019-07-07

## 2019-07-01 RX ORDER — ALFUZOSIN HYDROCHLORIDE 10 MG/1
10 TABLET, EXTENDED RELEASE ORAL
Status: DISCONTINUED | OUTPATIENT
Start: 2019-07-02 | End: 2019-07-07

## 2019-07-01 RX ORDER — ATORVASTATIN CALCIUM 20 MG/1
20 TABLET, FILM COATED ORAL NIGHTLY
Status: DISCONTINUED | OUTPATIENT
Start: 2019-07-01 | End: 2019-07-07

## 2019-07-01 RX ORDER — ASPIRIN 81 MG/1
81 TABLET ORAL DAILY
Status: DISCONTINUED | OUTPATIENT
Start: 2019-07-01 | End: 2019-07-07

## 2019-07-01 RX ORDER — MONTELUKAST SODIUM 10 MG/1
10 TABLET ORAL NIGHTLY
Status: DISCONTINUED | OUTPATIENT
Start: 2019-07-01 | End: 2019-07-07

## 2019-07-01 RX ORDER — ONDANSETRON 2 MG/ML
4 INJECTION INTRAMUSCULAR; INTRAVENOUS ONCE
Status: COMPLETED | OUTPATIENT
Start: 2019-07-01 | End: 2019-07-01

## 2019-07-01 RX ORDER — ONDANSETRON 2 MG/ML
4 INJECTION INTRAMUSCULAR; INTRAVENOUS EVERY 4 HOURS PRN
Status: DISCONTINUED | OUTPATIENT
Start: 2019-07-01 | End: 2019-07-01 | Stop reason: HOSPADM

## 2019-07-01 RX ORDER — DEXTROSE MONOHYDRATE 25 G/50ML
50 INJECTION, SOLUTION INTRAVENOUS
Status: DISCONTINUED | OUTPATIENT
Start: 2019-07-01 | End: 2019-07-07

## 2019-07-01 RX ORDER — TRIMETHOPRIM 100 MG/1
100 TABLET ORAL NIGHTLY
COMMUNITY
End: 2020-01-07

## 2019-07-01 RX ORDER — METOCLOPRAMIDE HYDROCHLORIDE 5 MG/ML
10 INJECTION INTRAMUSCULAR; INTRAVENOUS ONCE
Status: COMPLETED | OUTPATIENT
Start: 2019-07-01 | End: 2019-07-01

## 2019-07-01 RX ORDER — INSULIN ASPART 100 [IU]/ML
0.2 INJECTION, SOLUTION INTRAVENOUS; SUBCUTANEOUS ONCE
Status: COMPLETED | OUTPATIENT
Start: 2019-07-01 | End: 2019-07-01

## 2019-07-01 RX ORDER — FAMOTIDINE 10 MG/ML
20 INJECTION, SOLUTION INTRAVENOUS ONCE
Status: COMPLETED | OUTPATIENT
Start: 2019-07-01 | End: 2019-07-01

## 2019-07-01 RX ORDER — DOCUSATE SODIUM 100 MG/1
100 CAPSULE, LIQUID FILLED ORAL 2 TIMES DAILY
Status: DISCONTINUED | OUTPATIENT
Start: 2019-07-01 | End: 2019-07-07

## 2019-07-01 NOTE — PROGRESS NOTES
NURSING ADMISSION NOTE      Patient admitted via Cart  Oriented to room. Safety precautions initiated. Bed in low position. Call light in reach. RECEIVED PATIENT FROM ER . ALERT AND ORIENT X 4 , ON ROOM AIR , CPOX , O2 SAT 96% .  MOI , ON TELE WITH SR

## 2019-07-01 NOTE — CONSULTS
[facility]                       Gastroenterology [address] [facility] Gastroenterology    [facility] Nadya Crook Patient Status:  Emergency    3/11/1970 MRN [id number]   Location [address] [facility] Attending Blanche Bustos MD   [facility] • High blood pressure    • High cholesterol    • IBS (irritable bowel syndrome)    • Liver disease 11/2013    GIORDANO and Stage 1 fibrosis   • Migraines    • Multiple sclerosis (HCC)    • Myocardial infarction [facility])    • Neuropathy    • Osteoporosis     [facility] PRN   [COMPLETED] ondansetron HCl (ZOFRAN) injection 4 mg 4 mg Intravenous Once   [COMPLETED] insulin aspart (NOVOLOG) 100 UNIT/ML vial 12 Units 0.2 Units/kg (Ideal) Subcutaneous Once   [COMPLETED] iohexol (OMNIPAQUE) 350 MG/ML injection 91 mL 91 mL [facility] Angles Oncologic: The patient reports no history of prior solid tumor or hematologic malignancy           ENT: The patient reports no hoarseness of voice, hearing loss, sinus congestion, tinnitus           Neurologic: + MS  PE: /75   Pulse 74   Temp PROCEDURE:  CT ABDOMEN PELVIS IV CONTRAST, NO ORAL (ER)     COMPARISON:  JOCY , CT ABDOMEN PELVIS IV CONTRAST, NO ORAL (ER), 5/28/2019, 21:42. INDICATIONS:  Right and left lower quadrant abdominal pain.   History of multiple sclerosis and sickle fletcher appropriate for patient age. BONES:  No acute osseous abnormalities are noted. There is deformity of the left femoral head with sclerosis and subchondral cystic changes, having the appearance of remote AVN with secondary moderate osteoarthritis.   The a architectural distortion, dysplasia, or malignancy. B- Sigmoid colon polyp:  -Two portions of hyperplastic polyp.  -Negative for dysplasia or malignancy.     C- Left colon:  -Strips of superficial colonic mucosa without diagnostic abnormality.  -No evide with a h/o MS, IBS, prior h/o H.pylori, presenting for evaluation of 2 weeks of epigastric pain worse with movement, also associated with leg weakness and other MS symptoms. No change in bowel pattern. CT with IV contrast of abdomen unremarkable.  On exam,

## 2019-07-01 NOTE — ED PROVIDER NOTES
Patient Seen in: [facility] Emergency Department    History   Patient presents with:  Abdomen/Flank Pain (GI/)    Stated Complaint: abd pain    HPI    This is a 12-year-old female presents with abdominal pain that she has had for several weeks.   Jacqueline Méndez Past Surgical History:   Procedure Laterality Date   • CATH ANGIO  May 2014   • CATHETER REMOVAL Right 9/12/2018    Performed by Taras Rinaldi MD at [address]   • CHOLECYSTECTOMY     • COLONOSCOPY N/A 9/18/2015    Performed by Vianey Newell DO distress. The patient is not septic or toxic    HEENT: Atraumatic, conjunctiva are not pale. There is no icterus. Oral mucosa Is wet. No facial trauma. The neck is supple. LUNGS: Clear to auscultation, there is no wheezing or retraction.   No crackle 180 (*)     All other components within normal limits   POCT GLUCOSE - Abnormal; Notable for the following components:    POC Glucose 228 (*)     All other components within normal limits   POCT GLUCOSE - Abnormal; Notable for the following components: 2D+3D SCREENING BILAT (CPT=77067/84822)  COMPARISON:  JESE SANDRA SCREENING BILAT (CPT=77067), 6/13/2017, 11:32. JESE SANDRA GLENN 2D+3D DIAGNOSTIC ADDL VWS RIGHT (QSJ=32183/08704), 6/22/2017, 12:55.   INDICATIONS:  Z12.31 Encounter for screening mammogra , CT ABDOMEN PELVIS IV CONTRAST, NO ORAL (ER), 5/28/2019, 21:42. INDICATIONS:  Right and left lower quadrant abdominal pain. History of multiple sclerosis and sickle cell disease.   TECHNIQUE:  CT scanning was performed from the dome of the diaphragm to t sclerosis and subchondral cystic changes, having the appearance of remote AVN with secondary moderate osteoarthritis. The appearance is stable relative to prior imaging. LUNG BASES:  No visible pulmonary or pleural disease. OTHER:  Negative.        Galindo Oneil breath. Discussed this case with Dr. Grady Zeng we did want GI called Jose Rojas was consulted.         Disposition and Plan     Clinical Impression:  Abdominal pain of unknown etiology  (primary encounter diagnosis)  Hyperglycemia    Disposition:  Admit

## 2019-07-01 NOTE — ED INITIAL ASSESSMENT (HPI)
HX of MS c/o abd pain all over PAIN sitting and laying down is ok once she starts to work it increases in pain. She stopped her colace because she started to have diarrhea   Hx of scar tissue removal to abd.   Seen here on the 25th of May gave her an [place]

## 2019-07-02 ENCOUNTER — ANESTHESIA EVENT (OUTPATIENT)
Dept: ENDOSCOPY | Facility: HOSPITAL | Age: 49
End: 2019-07-02

## 2019-07-02 ENCOUNTER — ANESTHESIA (OUTPATIENT)
Dept: ENDOSCOPY | Facility: HOSPITAL | Age: 49
End: 2019-07-02

## 2019-07-02 LAB
ANION GAP SERPL CALC-SCNC: 9 MMOL/L (ref 0–18)
BASOPHILS # BLD AUTO: 0.03 X10(3) UL (ref 0–0.2)
BASOPHILS NFR BLD AUTO: 0.6 %
BUN BLD-MCNC: 7 MG/DL (ref 7–18)
BUN/CREAT SERPL: 7.4 (ref 10–20)
CALCIUM BLD-MCNC: 7.5 MG/DL (ref 8.5–10.1)
CHLORIDE SERPL-SCNC: 105 MMOL/L (ref 98–112)
CO2 SERPL-SCNC: 26 MMOL/L (ref 21–32)
CREAT BLD-MCNC: 0.94 MG/DL (ref 0.55–1.02)
DEPRECATED RDW RBC AUTO: 40.1 FL (ref 35.1–46.3)
EOSINOPHIL # BLD AUTO: 0.12 X10(3) UL (ref 0–0.7)
EOSINOPHIL NFR BLD AUTO: 2.6 %
ERYTHROCYTE [DISTWIDTH] IN BLOOD BY AUTOMATED COUNT: 15.6 % (ref 11–15)
GLUCOSE BLD-MCNC: 172 MG/DL (ref 70–99)
GLUCOSE BLD-MCNC: 173 MG/DL (ref 70–99)
GLUCOSE BLD-MCNC: 230 MG/DL (ref 70–99)
GLUCOSE BLD-MCNC: 236 MG/DL (ref 70–99)
GLUCOSE BLD-MCNC: 344 MG/DL (ref 70–99)
HAV IGM SER QL: 2.2 MG/DL (ref 1.6–2.6)
HCT VFR BLD AUTO: 35.2 % (ref 35–48)
HGB BLD-MCNC: 10.6 G/DL (ref 12–16)
IMM GRANULOCYTES # BLD AUTO: 0 X10(3) UL (ref 0–1)
IMM GRANULOCYTES NFR BLD: 0 %
LYMPHOCYTES # BLD AUTO: 1.79 X10(3) UL (ref 1–4)
LYMPHOCYTES NFR BLD AUTO: 38.7 %
MCH RBC QN AUTO: 21.9 PG (ref 26–34)
MCHC RBC AUTO-ENTMCNC: 30.1 G/DL (ref 31–37)
MCV RBC AUTO: 72.9 FL (ref 80–100)
MONOCYTES # BLD AUTO: 0.47 X10(3) UL (ref 0.1–1)
MONOCYTES NFR BLD AUTO: 10.2 %
NEUTROPHILS # BLD AUTO: 2.22 X10 (3) UL (ref 1.5–7.7)
NEUTROPHILS # BLD AUTO: 2.22 X10(3) UL (ref 1.5–7.7)
NEUTROPHILS NFR BLD AUTO: 47.9 %
OSMOLALITY SERPL CALC.SUM OF ELEC: 295 MOSM/KG (ref 275–295)
PLATELET # BLD AUTO: 176 10(3)UL (ref 150–450)
POTASSIUM SERPL-SCNC: 3.4 MMOL/L (ref 3.5–5.1)
RBC # BLD AUTO: 4.83 X10(6)UL (ref 3.8–5.3)
SODIUM SERPL-SCNC: 140 MMOL/L (ref 136–145)
WBC # BLD AUTO: 4.6 X10(3) UL (ref 4–11)

## 2019-07-02 PROCEDURE — 0DB68ZX EXCISION OF STOMACH, VIA NATURAL OR ARTIFICIAL OPENING ENDOSCOPIC, DIAGNOSTIC: ICD-10-PCS | Performed by: INTERNAL MEDICINE

## 2019-07-02 PROCEDURE — 0DB98ZX EXCISION OF DUODENUM, VIA NATURAL OR ARTIFICIAL OPENING ENDOSCOPIC, DIAGNOSTIC: ICD-10-PCS | Performed by: INTERNAL MEDICINE

## 2019-07-02 PROCEDURE — 5A09357 ASSISTANCE WITH RESPIRATORY VENTILATION, LESS THAN 24 CONSECUTIVE HOURS, CONTINUOUS POSITIVE AIRWAY PRESSURE: ICD-10-PCS | Performed by: INTERNAL MEDICINE

## 2019-07-02 RX ORDER — POTASSIUM CHLORIDE 20 MEQ/1
40 TABLET, EXTENDED RELEASE ORAL EVERY 4 HOURS
Status: COMPLETED | OUTPATIENT
Start: 2019-07-02 | End: 2019-07-02

## 2019-07-02 RX ORDER — METOCLOPRAMIDE 5 MG/1
5 TABLET ORAL
Status: DISCONTINUED | OUTPATIENT
Start: 2019-07-02 | End: 2019-07-07

## 2019-07-02 NOTE — H&P
[address]    PATIENT'S NAME: CARMENS PERICO A   ATTENDING PHYSICIAN: Laura Watkins M.D.    PATIENT ACCOUNT#:   [de-identified]    LOCATION:  [address] [place] [address]  MEDICAL RECORD #:   [id number]       YOB: 1970  ADMISSION DATE:       07/01/2019 above, otherwise negative. PHYSICAL EXAMINATION:    GENERAL:  Condition fair. Awake, alert. HEENT:  Head atraumatic. Eyes, EOMI. NECK:  Supple. No JVD. LUNGS:  Clear to auscultation. No rhonchi or wheeze. HEART:  Regular rate and rhythm.   No S

## 2019-07-02 NOTE — ANESTHESIA POSTPROCEDURE EVALUATION
[facility] JULIUS Crook Patient Status:  Observation   Age/Gender 44year old female MRN [id number]   Location [address] [facility]. Attending Roel Mariscal MD   Hosp Day # 0 PCP Johny Quigley MD       Anesthesia Post-op Note    Procedure(

## 2019-07-02 NOTE — ANESTHESIA PREPROCEDURE EVALUATION
PRE-OP EVALUATION    Patient Name: Karyna Patrick    Pre-op Diagnosis: Epigastric pain [R10.13]    Procedure(s):  ESOPHAGOGASTRODUODENOSCOPY (EGD)    Surgeon(s) and Role:     Ed Gordon MD - Primary    Pre-op vitals reviewed.   Temp: 98.2 °F (3 docusate sodium (COLACE) cap 100 mg 100 mg Oral BID   Fluticasone Furoate-Vilanterol (BREO ELLIPTA) 100-25 MCG/INH inhaler 1 puff 1 puff Inhalation Daily   Thiamine HCl tab 100 mg 100 mg Oral Daily   carvedilol (COREG) tab 3.125 mg 3.125 mg Oral BID with (eight) hours as needed for Nausea. Disp: 30 tablet Rfl: 0   ferrous sulfate 325 (65 FE) MG Oral Tab EC Take 325 mg by mouth 2 (two) times daily with meals.    Disp:  Rfl:    HYDROcodone-acetaminophen  MG Oral Tab Take 1 tablet by mouth 2 (two) times to 30mm Hg. 4. Pericardium, extracardiac: A prominent pericardial fat pad was present.     There was no pericardial effusion.     Impressions:  This study is compared with previous dated 05/23/2017:  Compared to the prior study, there has been no significa reviewed.   Lab Results   Component Value Date    WBC 4.6 07/02/2019    RBC 4.83 07/02/2019    HGB 10.6 (L) 07/02/2019    HCT 35.2 07/02/2019    MCV 72.9 (L) 07/02/2019    MCH 21.9 (L) 07/02/2019    MCHC 30.1 (L) 07/02/2019    RDW 15.6 (H) 07/02/2019    PLT

## 2019-07-02 NOTE — CONSULTS
[facility]  Endocrinology Consultation    Jordan Wallis Patient Status:  Observation    3/11/1970 MRN [id number]   [facility] 3NW-A Attending Nick Tucker MD   Hosp Day # 0 PCP Corie Baxter MD     Reason for Consultation:  DM2 m ENDOSCOPY   • DIAGNOSTIC LAPAROSCOPY-GENERAL N/A 8/2/2017    Performed by Maulik Washington MD at [address] [place] [address] MAIN OR   • ESOPHAGOGASTRODUODENOSCOPY (EGD) N/A 6/17/2016    Performed by Griselda Zuluaga DO at [address] [place] [address] ENDOSCOPY   • ESOPHAGOGASTRODUODENOSCOPY (EGD) N/A 9/16/ MCG/ACT inhaler 2 puff, 2 puff, Inhalation, Q6H PRN  •  atorvastatin (LIPITOR) tab 20 mg, 20 mg, Oral, Nightly  •  DULoxetine HCl (CYMBALTA) DR particles cap 30 mg, 30 mg, Oral, BID  •  aspirin EC tab 81 mg, 81 mg, Oral, Daily  •  Montelukast Sodium (SINGU EXACERBATION  Pcn [Penicillamine]       Penicillins             OTHER (SEE COMMENTS)  Solu-Medrol [Methyl*    OTHER (SEE COMMENTS)    Comment:Swelling of neck, throat and tongue  Urea                    Tightness in Throat    Comment:Urea C-13 in Pranactin 07/02/2019    HCT 35.2 07/02/2019    .0 07/02/2019    CREATSERUM 0.94 07/02/2019    BUN 7 07/02/2019     07/02/2019    K 3.4 07/02/2019     07/02/2019    CO2 26.0 07/02/2019     07/02/2019    CA 7.5 07/02/2019    MG 2.2 07/02/2019

## 2019-07-02 NOTE — OPERATIVE REPORT
Operative Report-Esophagogastroduodenoscopy      PREOPERATIVE DIAGNOSIS/INDICATION:  1. Abdominal pain  POSTOPERTATIVE DIAGNOSIS:  1. Mild gastritis  2.  Gastric fluid retention  PROCEDURE PERFORMED: EGD with biopsy  INFORMED CONSEN

## 2019-07-02 NOTE — PROGRESS NOTES
RECEIVED PATIENT FROM ENDOSCOPY . ALERT AND ORIENT X 4 , ON ROOM AIR , ON TELE , SR , DENIES  ANY SOB OR CHEST PAIN . VITALS STABLE . TOLERATED DIET WELL , UPDATED WITH PATIENT .  WILL CONTINUE TO MONITOR

## 2019-07-03 LAB
ALBUMIN SERPL-MCNC: 3.1 G/DL (ref 3.4–5)
ALBUMIN/GLOB SERPL: 0.8 {RATIO} (ref 1–2)
ALP LIVER SERPL-CCNC: 90 U/L (ref 41–108)
ALT SERPL-CCNC: 95 U/L (ref 13–56)
ANION GAP SERPL CALC-SCNC: 4 MMOL/L (ref 0–18)
AST SERPL-CCNC: 63 U/L (ref 15–37)
BASOPHILS # BLD AUTO: 0.01 X10(3) UL (ref 0–0.2)
BASOPHILS NFR BLD AUTO: 0.2 %
BILIRUB SERPL-MCNC: 0.3 MG/DL (ref 0.1–2)
BUN BLD-MCNC: 6 MG/DL (ref 7–18)
BUN/CREAT SERPL: 6.4 (ref 10–20)
CALCIUM BLD-MCNC: 7.5 MG/DL (ref 8.5–10.1)
CHLORIDE SERPL-SCNC: 109 MMOL/L (ref 98–112)
CO2 SERPL-SCNC: 27 MMOL/L (ref 21–32)
CREAT BLD-MCNC: 0.94 MG/DL (ref 0.55–1.02)
DEPRECATED RDW RBC AUTO: 41.7 FL (ref 35.1–46.3)
EOSINOPHIL # BLD AUTO: 0.17 X10(3) UL (ref 0–0.7)
EOSINOPHIL NFR BLD AUTO: 3.9 %
ERYTHROCYTE [DISTWIDTH] IN BLOOD BY AUTOMATED COUNT: 15.9 % (ref 11–15)
GLOBULIN PLAS-MCNC: 3.7 G/DL (ref 2.8–4.4)
GLUCOSE BLD-MCNC: 151 MG/DL (ref 70–99)
GLUCOSE BLD-MCNC: 151 MG/DL (ref 70–99)
GLUCOSE BLD-MCNC: 236 MG/DL (ref 70–99)
GLUCOSE BLD-MCNC: 251 MG/DL (ref 70–99)
GLUCOSE BLD-MCNC: 264 MG/DL (ref 70–99)
HCT VFR BLD AUTO: 34.4 % (ref 35–48)
HGB BLD-MCNC: 10.3 G/DL (ref 12–16)
IMM GRANULOCYTES # BLD AUTO: 0.02 X10(3) UL (ref 0–1)
IMM GRANULOCYTES NFR BLD: 0.5 %
LYMPHOCYTES # BLD AUTO: 1.7 X10(3) UL (ref 1–4)
LYMPHOCYTES NFR BLD AUTO: 39 %
M PROTEIN MFR SERPL ELPH: 6.8 G/DL (ref 6.4–8.2)
MCH RBC QN AUTO: 22.2 PG (ref 26–34)
MCHC RBC AUTO-ENTMCNC: 29.9 G/DL (ref 31–37)
MCV RBC AUTO: 74 FL (ref 80–100)
MONOCYTES # BLD AUTO: 0.39 X10(3) UL (ref 0.1–1)
MONOCYTES NFR BLD AUTO: 8.9 %
NEUTROPHILS # BLD AUTO: 2.07 X10 (3) UL (ref 1.5–7.7)
NEUTROPHILS # BLD AUTO: 2.07 X10(3) UL (ref 1.5–7.7)
NEUTROPHILS NFR BLD AUTO: 47.5 %
OSMOLALITY SERPL CALC.SUM OF ELEC: 291 MOSM/KG (ref 275–295)
PLATELET # BLD AUTO: 213 10(3)UL (ref 150–450)
POTASSIUM SERPL-SCNC: 4.2 MMOL/L (ref 3.5–5.1)
POTASSIUM SERPL-SCNC: 4.2 MMOL/L (ref 3.5–5.1)
RBC # BLD AUTO: 4.65 X10(6)UL (ref 3.8–5.3)
SODIUM SERPL-SCNC: 140 MMOL/L (ref 136–145)
WBC # BLD AUTO: 4.4 X10(3) UL (ref 4–11)

## 2019-07-03 PROCEDURE — 99223 1ST HOSP IP/OBS HIGH 75: CPT | Performed by: OTHER

## 2019-07-03 PROCEDURE — 99222 1ST HOSP IP/OBS MODERATE 55: CPT | Performed by: INTERNAL MEDICINE

## 2019-07-03 RX ORDER — DIPHENHYDRAMINE HCL 25 MG
25 CAPSULE ORAL ONCE
Status: COMPLETED | OUTPATIENT
Start: 2019-07-03 | End: 2019-07-03

## 2019-07-03 RX ORDER — HEPARIN SODIUM 1000 [USP'U]/ML
1.5 INJECTION, SOLUTION INTRAVENOUS; SUBCUTANEOUS ONCE
Status: COMPLETED | OUTPATIENT
Start: 2019-07-03 | End: 2019-07-03

## 2019-07-03 RX ORDER — ALBUMIN, HUMAN INJ 5% 5 %
3000 SOLUTION INTRAVENOUS ONCE
Status: COMPLETED | OUTPATIENT
Start: 2019-07-03 | End: 2019-07-03

## 2019-07-03 RX ORDER — DIPHENHYDRAMINE HYDROCHLORIDE 50 MG/ML
25 INJECTION INTRAMUSCULAR; INTRAVENOUS ONCE
Status: COMPLETED | OUTPATIENT
Start: 2019-07-03 | End: 2019-07-03

## 2019-07-03 NOTE — PHYSICAL THERAPY NOTE
PHYSICAL THERAPY EVALUATION - INPATIENT     Room Number: 332/332-A  Evaluation Date: 7/3/2019  Type of Evaluation: Initial  Physician Order: PT Eval and Treat    Presenting Problem: abdominal pain  Reason for Therapy: Mobility Dysfunction and Dischar Ash Sotomayor DO at [facility] ENDOSCOPY   • DIAGNOSTIC LAPAROSCOPY-GENERAL N/A 8/2/2017    Performed by Charly Santana MD at [facility]   • ESOPHAGOGASTRODUODENOSCOPY (EGD) N/A 6/17/2016    Performed by Ash Sotomayor DO at [place] [address] STRENGTH ASSESSMENT  See OT note for UE assessment      Lower extremity ROM is within functional limits     Lower extremity strength is within functional limits     BALANCE  Static Sitting: Good  Dynamic Sitting: Good  Static Standing: Fair -  Dynamic Roxann Fine Session: Up in chair;Needs met;Call light within reach;RN aware of session/findings; All patient questions and concerns addressed    ASSESSMENT   Patient is a 44year old female admitted on 7/1/2019 for abdominal pain.   Pertinent comorbidities and personal

## 2019-07-03 NOTE — PROGRESS NOTES
Gastroenterology Progress Note  S: Able to tolerate diet, still with some fullness and discomfort post meals.   O: /63 (BP Location: Left arm)   Pulse 84   Temp 98 °F (36.7 °C) (Oral)   Resp 18   Ht 66\"   Wt 172 lb   SpO2 96%   BMI 27.76 kg/m²   Gen:

## 2019-07-03 NOTE — RESPIRATORY THERAPY NOTE
MOI - Equipment Use Daily Summary:                  . Set Mode: AUTO CPAP WITH C-FLEX                . Usage in hours: 5:48                . 90% Pressure (EPAP) level: 12                . 90% Insp. Pressure (IPAP): Jo Ann Morales  AHI: 9.13

## 2019-07-03 NOTE — PLAN OF CARE
Problem: PAIN - ADULT  Goal: Verbalizes/displays adequate comfort level or patient's stated pain goal  Description  INTERVENTIONS:  - Encourage pt to monitor pain and request assistance  - Assess pain using appropriate pain scale  - Administer analgesics and lab values  - Obtain nutritional consult as needed  - Optimize oral hygiene and moisture  - Encourage food from home; allow for food preferences  - Enhance eating environment  Outcome: Progressing     Problem: GENITOURINARY - ADULT  Goal: Absence of ur

## 2019-07-03 NOTE — CONSULTS
[facility]  Report of Consultation    Aracelis Slider Patient Status:  Observation    3/11/1970 MRN [id number]   [facility] 3NW-A Attending Jennifer Davis MD   Hosp Day # 0 PCP Vanessa Norton MD     Reason for Consultation:  MS exacer LAPAROSCOPY-GENERAL N/A 8/2/2017    Performed by Jf Quezada MD at [address]   • ESOPHAGOGASTRODUODENOSCOPY (EGD) N/A 6/17/2016    Performed by Velma Kang DO at [facility] ENDOSCOPY   • ESOPHAGOGASTRODUODENOSCOPY (EGD) N/A 9/16/2015    Performed by Florentin Maynard mg, Oral, TID AC  •  insulin detemir (LEVEMIR) 100 UNIT/ML flextouch 30 Units, 30 Units, Subcutaneous, Nightly  •  Insulin Aspart Pen (NOVOLOG) 100 UNIT/ML flexpen 1-30 Units, 1-30 Units, Subcutaneous, TID AC and HS  •  Pantoprazole Sodium (PROTONIX) 40 mg mL, 50 mL, Intravenous, Q15 Min PRN **OR** glucose (DEX4) oral liquid 30 g, 30 g, Oral, Q15 Min PRN **OR** Glucose-Vitamin C (DEX-4) 4-6 GM-MG chewable tab 8 tablet, 8 tablet, Oral, Q15 Min PRN  •  Insulin Aspart Pen (NOVOLOG) 100 UNIT/ML flexpen 1-68 Unit Creatinine (mg/dL)   Date Value   07/03/2019 0.94   07/02/2019 0.94   07/01/2019 1.21 (H)         Imaging:  Reviewed    Impression:  1. MS exacerbation vs. pseudoexacerbation  - per neurology  - will order plasmaphresis today w/ 1x volume exchange usin

## 2019-07-03 NOTE — PROGRESS NOTES
[facility]  Progress Note    Darwin Bonnerivan Patient Status:  Observation    3/11/1970 MRN [id number]   [facility] 3NW-A Attending Radha Luna MD   Hosp Day # 0 PCP Francois Lee MD         SUBJECTIVE:  Subjective:  Darwin Roman 07/03/19  0732 07/03/19  1204   PGLU 173* 172* 344* 151* 264*       No results for input(s): URINE, CULTI, BLDSMR in the last 168 hours.             Meds:     • Albumin Human  3,000 mL Intravenous Once   • Electrolyte bag for plasmapheresis   Intravenous On Neurology consultation. plasmapharesis planned for flare up  3. Insulin-dependent diabetes mellitus, currently uncontrolled with complications with hyperglycemia. Insulin glargine adjusted. Endocrinology following.   4.       Neurogenic bladder/over

## 2019-07-03 NOTE — PROGRESS NOTES
[facility]  Endocrinology Progress Note    Raven Smith Patient Status:  Observation    3/11/1970 MRN [id number]   [facility] 3NW-A Attending Jeremiah Siegel MD   Hosp Day # 0 PCP Guanako Mar MD     CC: Patient presents with:  Ab with meals   • Alfuzosin HCl ER  10 mg Oral Daily with breakfast   • trimethoprim  100 mg Oral Nightly   • pregabalin  150 mg Oral TID   • Insulin Aspart Pen  1-68 Units Subcutaneous TID CC       Labs  Reviewed in EPIC    Component      Latest Ref Rng & Un questions or concerns.      Car Segundo MD  Endocrinology, Diabetes, and Metabolism  [facility]

## 2019-07-03 NOTE — CONSULTS
[facility]    Report of Consultation    Jordan Wallis Patient Status:  Observation    3/11/1970 MRN [id number]   [facility] 3NW-A Attending Nick Tucker MD   Hosp Day # 0 PCP Corie Baxter MD     Date of Admission:  2019  D CATHETER REMOVAL Right 9/12/2018    Performed by Bogdan Foreman MD at [facility]     • COLONOSCOPY N/A 9/18/2015    Performed by Sheila Canchola DO at [facility] ENDOSCOPY   • DIAGNOSTIC LAPAROSCOPY-GENERAL N/A 8/2/2017    Performed by Angela Foreman Tightness in Throat    Comment:Urea C-13 in Pranactin for H. Pylori test kit.     Medications:    Current Facility-Administered Medications:   •  Metoclopramide HCl (REGLAN) tab 5 mg, 5 mg, Oral, TID AC  •  insulin detemir (LEVEMIR) 100 UNIT/ML fle Oral, TID  •  glucose (DEX4) oral liquid 15 g, 15 g, Oral, Q15 Min PRN **OR** Glucose-Vitamin C (DEX-4) 4-6 GM-MG chewable tab 4 tablet, 4 tablet, Oral, Q15 Min PRN **OR** dextrose 50 % injection 50 mL, 50 mL, Intravenous, Q15 Min PRN **OR** glucose (DEX4) work-up:  MRI brain 10/2018  CONCLUSION:  Stable exam.  Stable Oxly of demyelinating plaques within the cerebrum. No enhancing lesions.       Assessment  Patient Active Problem List:     Neuropathic pain of both legs     Elevated liver enzymes     Hyperpar me to participate in the evaluation of your patient,    Tawnya Rivera, DO  Neuromuscular and General Neurology  [facility]   pager [phone]

## 2019-07-03 NOTE — PLAN OF CARE
Problem: Diabetes/Glucose Control  Goal: Glucose maintained within prescribed range  Description  INTERVENTIONS:  - Monitor Blood Glucose as ordered  - Assess for signs and symptoms of hyperglycemia and hypoglycemia  - Administer ordered medications to m effectiveness of ordered antiemetic medications  - Provide nonpharmacologic comfort measures as appropriate  - Advance diet as tolerated, if ordered  - Obtain nutritional consult as needed  - Evaluate fluid balance  Outcome: Progressing  Goal: Maintains or UP IN ROOM AND AMBULATED IN MARTIN . PLAN OF CARE DISCUSSED AND ANSWERED ALL QUESTIONS . VERBALIZED UNDERSTANDING .  WILL CONTINUE TO MONITOR

## 2019-07-03 NOTE — OCCUPATIONAL THERAPY NOTE
OCCUPATIONAL THERAPY EVALUATION - INPATIENT     Room Number: 332/332-A  Evaluation Date: 7/3/2019  Type of Evaluation: Initial  Presenting Problem: Abdominal pain    Physician Order: IP Consult to Occupational Therapy  Reason for Therapy: ADL/IADL Dysfunct Performed by Yolande Robertson MD at [address] [place] [address]     • COLONOSCOPY N/A 9/18/2015    Performed by Beryle Seat, DO at [facility] ENDOSCOPY   • DIAGNOSTIC LAPAROSCOPY-GENERAL N/A 8/2/2017    Performed by Chidi Castrejon MD at [facility] MAIN OR   • ES OBJECTIVE  Precautions: None  Fall Risk: High fall risk    WEIGHT BEARING RESTRICTION  Weight Bearing Restriction: None                PAIN ASSESSMENT  Rating: Unable to rate  Location: back, legs and abdomen  Management Techniques: Repositioning; Activ on OT role, goals, plan of care, recommendations and safety. Sit to stand from chair: CGA  Walked to/from bathroom w/ CGA and use of walker  Patient noted to have instances where it was difficult to advance legs to take a step.   Twitching occasionally B U MODERATE - Analysis of occupational profile, detailed assessments, several treatment options    Overall Complexity MODERATE     OT Discharge Recommendations: Home with home health PT/OT  OT Device Recommendations: Raised toilet seat; Shower chair;Grab bars

## 2019-07-03 NOTE — PROGRESS NOTES
CALLED  DIALYSIS  REGARDING PLASMA PHRESIS , TECH WILL CALL RN AND LET KNOW THE TIME .  WRITER TALKED TO [phone]

## 2019-07-04 LAB
ANION GAP SERPL CALC-SCNC: 5 MMOL/L (ref 0–18)
BASOPHILS # BLD AUTO: 0.01 X10(3) UL (ref 0–0.2)
BASOPHILS NFR BLD AUTO: 0.3 %
BUN BLD-MCNC: 7 MG/DL (ref 7–18)
BUN/CREAT SERPL: 6.5 (ref 10–20)
CALCIUM BLD-MCNC: 8 MG/DL (ref 8.5–10.1)
CHLORIDE SERPL-SCNC: 113 MMOL/L (ref 98–112)
CO2 SERPL-SCNC: 24 MMOL/L (ref 21–32)
CREAT BLD-MCNC: 1.07 MG/DL (ref 0.55–1.02)
DEPRECATED RDW RBC AUTO: 42.9 FL (ref 35.1–46.3)
EOSINOPHIL # BLD AUTO: 0.14 X10(3) UL (ref 0–0.7)
EOSINOPHIL NFR BLD AUTO: 3.6 %
ERYTHROCYTE [DISTWIDTH] IN BLOOD BY AUTOMATED COUNT: 15.9 % (ref 11–15)
GLUCOSE BLD-MCNC: 259 MG/DL (ref 70–99)
GLUCOSE BLD-MCNC: 270 MG/DL (ref 70–99)
GLUCOSE BLD-MCNC: 285 MG/DL (ref 70–99)
GLUCOSE BLD-MCNC: 314 MG/DL (ref 70–99)
GLUCOSE BLD-MCNC: 341 MG/DL (ref 70–99)
HAV IGM SER QL: 2.1 MG/DL (ref 1.6–2.6)
HCT VFR BLD AUTO: 32 % (ref 35–48)
HGB BLD-MCNC: 9.6 G/DL (ref 12–16)
IMM GRANULOCYTES # BLD AUTO: 0.01 X10(3) UL (ref 0–1)
IMM GRANULOCYTES NFR BLD: 0.3 %
LYMPHOCYTES # BLD AUTO: 1.47 X10(3) UL (ref 1–4)
LYMPHOCYTES NFR BLD AUTO: 37.3 %
MCH RBC QN AUTO: 22.2 PG (ref 26–34)
MCHC RBC AUTO-ENTMCNC: 30 G/DL (ref 31–37)
MCV RBC AUTO: 74.1 FL (ref 80–100)
MONOCYTES # BLD AUTO: 0.31 X10(3) UL (ref 0.1–1)
MONOCYTES NFR BLD AUTO: 7.9 %
NEUTROPHILS # BLD AUTO: 2 X10 (3) UL (ref 1.5–7.7)
NEUTROPHILS # BLD AUTO: 2 X10(3) UL (ref 1.5–7.7)
NEUTROPHILS NFR BLD AUTO: 50.6 %
OSMOLALITY SERPL CALC.SUM OF ELEC: 302 MOSM/KG (ref 275–295)
PLATELET # BLD AUTO: 147 10(3)UL (ref 150–450)
POTASSIUM SERPL-SCNC: 4.2 MMOL/L (ref 3.5–5.1)
RBC # BLD AUTO: 4.32 X10(6)UL (ref 3.8–5.3)
SODIUM SERPL-SCNC: 142 MMOL/L (ref 136–145)
WBC # BLD AUTO: 3.9 X10(3) UL (ref 4–11)

## 2019-07-04 PROCEDURE — 99232 SBSQ HOSP IP/OBS MODERATE 35: CPT | Performed by: INTERNAL MEDICINE

## 2019-07-04 PROCEDURE — 99232 SBSQ HOSP IP/OBS MODERATE 35: CPT | Performed by: OTHER

## 2019-07-04 RX ORDER — ALBUMIN, HUMAN INJ 5% 5 %
3000 SOLUTION INTRAVENOUS DAILY
Status: DISPENSED | OUTPATIENT
Start: 2019-07-04 | End: 2019-07-06

## 2019-07-04 NOTE — PLAN OF CARE
PT RESTING IN CHAIR. C/O OF ABD PAIN, DECLINING PAIN MEDS. PT STATES PAIN MEDS MAKE HER SLEEPY. PT ENCOURAGED  TO ATLEAST TRY PAIN MEDS BEFORE BED TIME , PT  STATES SHE WILL TRY.

## 2019-07-04 NOTE — PROGRESS NOTES
Plasma phresis started  Around 5pm , tolerated well , vitals stable , albumin 3000ml,  Ca gluconate 250ml given , output 2788. Patient stats feels better . Updated with patient .  Will continue to monitor

## 2019-07-04 NOTE — PROGRESS NOTES
[facility]    Progress Note    Skip Greene Patient Status:  Inpatient    3/11/1970 MRN [id number]   [facility] 3NW-A Attending Elia Arvizu MD   [facility] Day # 2 PCP Giana Lauren MD     Subjective:  Skip Greene is a(n 46 yea injection 0.5 mg 0.5 mg Intravenous Q4H PRN   Or      HYDROmorphone HCl (DILAUDID) 1 MG/ML injection 1 mg 1 mg Intravenous Q4H PRN   ondansetron HCl (ZOFRAN) injection 4 mg 4 mg Intravenous Q6H PRN   0.9% NaCl infusion  Intravenous Continuous   Albuterol S 07/04/2019    CO2 24.0 07/04/2019     07/04/2019    CA 8.0 07/04/2019    MG 2.1 07/04/2019    PGLU 270 07/04/2019       Assessment:  Patient Active Problem List:     Neuropathic pain of both legs     Elevated liver enzymes     Hyperparathyroidism (H

## 2019-07-04 NOTE — PROGRESS NOTES
[facility]  Progress Note    Nicole Colon Patient Status:  Observation    3/11/1970 MRN [id number]   [facility] 3NW-A Attending Nikko Flores MD   Hosp Day # 2 PCP Josue Gan MD         SUBJECTIVE:  Subjective:  Nicole Colon Labs   Lab 07/01/19  1153 07/03/19  0511   ALT 98* 95*   AST 62* 63*   ALB 3.4 3.1*       Recent Labs   Lab 07/03/19  0732 07/03/19  1204 07/03/19  1643 07/03/19  2146 07/04/19  0719   PGLU 151* 264* 236* 251* 270*       No results for input(s): URINE, CUL Port-A-Cath. noted Neurology consultation. plasmapharesis planned for flare up  3. Insulin-dependent diabetes mellitus, currently uncontrolled with complications with hyperglycemia. Insulin glargine adjusted. Endocrinology following.   4.       Neur

## 2019-07-04 NOTE — PROGRESS NOTES
BP at 0300 is 84/58, pt resting in bed, pt states she \"feels good\" and denies any symptoms. Dr Yessica Amezquita paged regarding BP at 104 137 041 and 3460 with no return call received at this time. Pt blood pressure currently 96/56. Will monitor.

## 2019-07-04 NOTE — PROGRESS NOTES
ENDOCRINOLOGY PROGRESS NOTE    Typed by Katalina Yang MD on 7/4/2019      Happy 4th of July!!      S:  Pt sitting up in the chair. No complaints.       O:  /70 (BP Location: Right arm)   Pulse 83   Temp 98 °F (36.7 °C) (Oral)   Resp 18   Ht with neuropathy and circulatory complication: uncontrolled with hyperglycemia: HgA1C 9.9. Glucoses are elevated   2.  Long term insulin use    · Adjust regimen as follows:         Levemir 36 Units at bedtime      Novolog 1 Unit for every 7 grams of carbs wi

## 2019-07-04 NOTE — PROGRESS NOTES
Pt assisted to stand at side of bed this morning, pt stood and then dangled at bedside. Panda removed per orders, pt given scheduled toradol for pain.  Encouraged pt to ambulate in halls and then sit in chair for breakfast. Pt states she is going to wait un

## 2019-07-04 NOTE — RESPIRATORY THERAPY NOTE
MOI - Equipment Use Daily Summary:  · Set Mode CFLEX  · Usage in hours: 6:18  · 90% Pressure (EPAP) level:   · 90% Insp Pressure (IPAP): 7  · AHI:  5.4  · Supplemental Oxygen:  · Comments:

## 2019-07-04 NOTE — PROGRESS NOTES
[facility]  Progress Note    Nate Kilmarnock Patient Status:  Observation    3/11/1970 MRN [id number]   [facility] 3NW-A Attending Ricky Brown MD   Hosp Day # 2 PCP Cheryle Mulligan MD     No acute events overnight      Current Facil cap 150 mg 150 mg Oral TID   glucose (DEX4) oral liquid 15 g 15 g Oral Q15 Min PRN   Or      Glucose-Vitamin C (DEX-4) 4-6 GM-MG chewable tab 4 tablet 4 tablet Oral Q15 Min PRN   Or      dextrose 50 % injection 50 mL 50 mL Intravenous Q15 Min PRN   Or regarding additional inpatient PLEX treatment today  - anticipate o/p treatment every 3 weeks       Thank you for allowing me to participate in this patient's care. Please feel free to call me with any questions or concerns.     Kathy Roberson MD  7/4/2019

## 2019-07-04 NOTE — PLAN OF CARE
Problem: Diabetes/Glucose Control  Goal: Glucose maintained within prescribed range  Description  INTERVENTIONS:  - Monitor Blood Glucose as ordered  - Assess for signs and symptoms of hyperglycemia and hypoglycemia  - Administer ordered medications to m mobilization and activity  - Obtain nutritional consult as needed  - Establish a toileting routine/schedule  - Consider collaborating with pharmacy to review patient's medication profile  Outcome: Progressing  Goal: Maintains adequate nutritional intake (u ADULT  Goal: Free from bleeding injury  Description  (Example usage: patient with low platelets)  INTERVENTIONS:  - Avoid intramuscular injections, enemas and rectal medication administration  - Ensure safe mobilization of patient  - Hold pressure on venip

## 2019-07-04 NOTE — PLAN OF CARE
Assumed care of pt at 0100, pt resting comfortably in bed, pt states she feels well tonight, currently denies abdominal pain or nausea, tolerating 1800 ada diet. Pt states she is voiding without difficulty.  Discussed fall precautions with pt r/t weakness, diet as tolerated, if ordered  - Obtain nutritional consult as needed  - Evaluate fluid balance  Outcome: Progressing  Goal: Maintains or returns to baseline bowel function  Description  INTERVENTIONS:  - Assess bowel function  - Maintain adequate hydratio care  Description  Interventions:  - Assess ability and encourage patient to participate in ADLs to maximize function  - Promote sitting position while performing ADLs such as feeding, grooming, and bathing  - Educate and encourage patient/family in [place]

## 2019-07-05 PROBLEM — G47.33 OSA ON CPAP: Status: ACTIVE | Noted: 2019-07-05

## 2019-07-05 PROBLEM — Z99.89 OSA ON CPAP: Status: ACTIVE | Noted: 2019-07-05

## 2019-07-05 LAB
ALBUMIN SERPL-MCNC: 3.7 G/DL (ref 3.4–5)
ALBUMIN/GLOB SERPL: 1.7 {RATIO} (ref 1–2)
ALP LIVER SERPL-CCNC: 48 U/L (ref 41–108)
ALT SERPL-CCNC: 49 U/L (ref 13–56)
ANION GAP SERPL CALC-SCNC: 3 MMOL/L (ref 0–18)
AST SERPL-CCNC: 38 U/L (ref 15–37)
BASOPHILS # BLD AUTO: 0.01 X10(3) UL (ref 0–0.2)
BASOPHILS NFR BLD AUTO: 0.2 %
BILIRUB SERPL-MCNC: 0.3 MG/DL (ref 0.1–2)
BUN BLD-MCNC: 8 MG/DL (ref 7–18)
BUN/CREAT SERPL: 8.1 (ref 10–20)
CALCIUM BLD-MCNC: 8.4 MG/DL (ref 8.5–10.1)
CHLORIDE SERPL-SCNC: 112 MMOL/L (ref 98–112)
CO2 SERPL-SCNC: 28 MMOL/L (ref 21–32)
CREAT BLD-MCNC: 0.99 MG/DL (ref 0.55–1.02)
DEPRECATED RDW RBC AUTO: 41 FL (ref 35.1–46.3)
EOSINOPHIL # BLD AUTO: 0.19 X10(3) UL (ref 0–0.7)
EOSINOPHIL NFR BLD AUTO: 4.5 %
ERYTHROCYTE [DISTWIDTH] IN BLOOD BY AUTOMATED COUNT: 16 % (ref 11–15)
GLOBULIN PLAS-MCNC: 2.2 G/DL (ref 2.8–4.4)
GLUCOSE BLD-MCNC: 119 MG/DL (ref 70–99)
GLUCOSE BLD-MCNC: 119 MG/DL (ref 70–99)
GLUCOSE BLD-MCNC: 268 MG/DL (ref 70–99)
GLUCOSE BLD-MCNC: 322 MG/DL (ref 70–99)
GLUCOSE BLD-MCNC: 364 MG/DL (ref 70–99)
HCT VFR BLD AUTO: 32 % (ref 35–48)
HGB BLD-MCNC: 9.6 G/DL (ref 12–16)
IMM GRANULOCYTES # BLD AUTO: 0.01 X10(3) UL (ref 0–1)
IMM GRANULOCYTES NFR BLD: 0.2 %
LYMPHOCYTES # BLD AUTO: 1.83 X10(3) UL (ref 1–4)
LYMPHOCYTES NFR BLD AUTO: 43.2 %
M PROTEIN MFR SERPL ELPH: 5.9 G/DL (ref 6.4–8.2)
MCH RBC QN AUTO: 21.7 PG (ref 26–34)
MCHC RBC AUTO-ENTMCNC: 30 G/DL (ref 31–37)
MCV RBC AUTO: 72.4 FL (ref 80–100)
MONOCYTES # BLD AUTO: 0.38 X10(3) UL (ref 0.1–1)
MONOCYTES NFR BLD AUTO: 9 %
NEUTROPHILS # BLD AUTO: 1.82 X10 (3) UL (ref 1.5–7.7)
NEUTROPHILS # BLD AUTO: 1.82 X10(3) UL (ref 1.5–7.7)
NEUTROPHILS NFR BLD AUTO: 42.9 %
OSMOLALITY SERPL CALC.SUM OF ELEC: 295 MOSM/KG (ref 275–295)
PLATELET # BLD AUTO: 159 10(3)UL (ref 150–450)
POTASSIUM SERPL-SCNC: 3.6 MMOL/L (ref 3.5–5.1)
RBC # BLD AUTO: 4.42 X10(6)UL (ref 3.8–5.3)
SODIUM SERPL-SCNC: 143 MMOL/L (ref 136–145)
WBC # BLD AUTO: 4.2 X10(3) UL (ref 4–11)

## 2019-07-05 PROCEDURE — 99232 SBSQ HOSP IP/OBS MODERATE 35: CPT | Performed by: INTERNAL MEDICINE

## 2019-07-05 PROCEDURE — 99233 SBSQ HOSP IP/OBS HIGH 50: CPT | Performed by: OTHER

## 2019-07-05 RX ORDER — ALBUTEROL SULFATE 2.5 MG/3ML
SOLUTION RESPIRATORY (INHALATION)
Status: DISCONTINUED
Start: 2019-07-05 | End: 2019-07-05 | Stop reason: WASHOUT

## 2019-07-05 RX ORDER — DIPHENHYDRAMINE HYDROCHLORIDE 50 MG/ML
12.5 INJECTION INTRAMUSCULAR; INTRAVENOUS ONCE
Status: COMPLETED | OUTPATIENT
Start: 2019-07-05 | End: 2019-07-05

## 2019-07-05 RX ORDER — ALBUTEROL SULFATE 2.5 MG/3ML
2.5 SOLUTION RESPIRATORY (INHALATION) EVERY 4 HOURS PRN
Status: DISCONTINUED | OUTPATIENT
Start: 2019-07-05 | End: 2019-07-07

## 2019-07-05 NOTE — PROGRESS NOTES
[facility]  Progress Note    Randell Ellis Patient Status:  Observation    3/11/1970 MRN [id number]   [facility] 3NW-A Attending Leon Gupta MD   Hosp Day # 3 PCP Pascual Garcia MD     No acute events overnight; tolerating plasma ELLIPTA) 100-25 MCG/INH inhaler 1 puff 1 puff Inhalation Daily   Thiamine HCl tab 100 mg 100 mg Oral Daily   carvedilol (COREG) tab 3.125 mg 3.125 mg Oral BID with meals   Alfuzosin HCl ER (UROXATRAL) 24 hr tab 10 mg 10 mg Oral Daily with breakfast   trime 7. 5* 8.0* 8.4*   MG  --  2.1  --        Recent Labs     07/03/19  0511 07/05/19  0645   ALT 95* 49   AST 63* 38*   ALB 3.1* 3.7       Impression:  1. MS exacerbation vs. pseudoexacerbation  - per neurology  - # 2/3 PLEX today  - anticipate o/p treatment ev

## 2019-07-05 NOTE — PLAN OF CARE
PT STATES SHE IS MORE FATIGUED TODAY WITH INCREASE IN BACK AND LEG PAIN. STATES ABD PAIN IS MINIMAL. AMBULATING MARTIN. POC UPDATED, PT VERBALIZED UNDERSTANDING.

## 2019-07-05 NOTE — PROGRESS NOTES
[facility]  Progress Note    Aracelis Cummings Patient Status:  Observation    3/11/1970 MRN [id number]   [facility] [place] 3NW-A Attending Jennifer Davis MD   Hosp Day # 3 PCP Vanessa Norton MD         SUBJECTIVE:  Subjective:  Aracelis Cummings 8.5 7.5* 7.5* 8.0* 8.4*   MG  --  2.2  --  2.1  --    * 230* 151* 285* 119*       Recent Labs   Lab 07/01/19  1153 07/03/19  0511 07/05/19  0645   ALT 98* 95* 49   AST 62* 63* 38*   ALB 3.4 3.1* 3.7       Recent Labs   Lab 07/04/19  0719 07/04/19  1 inhibitor. 2.       Multiple sclerosis with possible flare up. In the past, she has had plasmapheresis. She has a Port-A-Cath. Noted   Neurology consultation. plasmapharesis  for flare up    3.        Insulin-dependent diabetes mellitus, currently unco

## 2019-07-05 NOTE — OCCUPATIONAL THERAPY NOTE
OCCUPATIONAL THERAPY TREATMENT NOTE - INPATIENT     Room Number: 332/332-A  Session: 1   Number of Visits to Meet Established Goals: 5    Presenting Problem: Abdominal pain    History related to current admission: Patient is a 49y/o female admitted 7/1/19 9/18/2015    Performed by Dorothy Sadler DO at [facility] ENDOSCOPY   • DIAGNOSTIC LAPAROSCOPY-GENERAL N/A 8/2/2017    Performed by Alejandra No MD at [facility] MAIN OR   • ESOPHAGOGASTRODUODENOSCOPY (EGD) N/A 7/2/2019    Performed by Andres Tariq MD at [facility] 42.8%  Standardized Score (AM-PAC Scale): 40.22  CMS Modifier (G-Code): CK    FUNCTIONAL TRANSFER ASSESSMENT  Supine to Sit : Modified independent  Sit to Stand: Contact guard assist    Skilled Therapy Provided: Patient educated on OT role, goals, plan of sit:  with supervision  Patient will transfer from sit to stand:  with supervision  Patient will transfer to toilet:  with supervision     UE Exercise Program Goal  Patient will be independent with bilateral AROM HEP (home exercise program).

## 2019-07-05 NOTE — PROGRESS NOTES
ENDOCRINOLOGY PROGRESS NOTE    Typed by Merlin Cheshire, MD on 7/5/2019      S:  Pt sleeping but arousable. No complaints. Family at bedside.       O: /71 (BP Location: Left arm)   Pulse 77   Temp 98.1 °F (36.7 °C) (Oral)   Resp 20   Ht 66\" Rng & Units 7/5/2019   WBC      4.0 - 11.0 x10(3) uL 4.2   RBC      3.80 - 5.30 x10(6)uL 4.42   Hemoglobin      12.0 - 16.0 g/dL 9.6 (L)   Hematocrit      35.0 - 48.0 % 32.0 (L)   Platelet Count      209.2 - 450.0 10(3)uL 159.0         ASSESSMENT AND PLAN:

## 2019-07-05 NOTE — PLAN OF CARE
Problem: GASTROINTESTINAL - ADULT  Goal: Minimal or absence of nausea and vomiting  Description  INTERVENTIONS:  - Maintain adequate hydration with IV or PO as ordered and tolerated  - Nasogastric tube to low intermittent suction as ordered  - Evaluat

## 2019-07-05 NOTE — PROGRESS NOTES
[id number] [facility] Neurology Progress Note    Gely Sanon Patient Status:  Inpatient    3/11/1970 MRN [id number]   [facility] 3NW-A Attending May Guzmán MD   Hosp Day # 3 PCP Porfirio Fernandez MD     CC: MS exacerbation      Subject neuroimaging.     Assessment/Plan:  · MS exacerbation vs pseudoexacerbation  · Plan for total of 3 PLEX treatments - 1/3  · Patient reportedly unable to tolerate steroids  · Plan for outpatient PLEX every 3 weeks  · PT/OT recommends home with home health PT

## 2019-07-06 ENCOUNTER — ORDERS FOR ADMISSION (OUTPATIENT)
Dept: NEPHROLOGY | Facility: CLINIC | Age: 49
End: 2019-07-06

## 2019-07-06 LAB
ANION GAP SERPL CALC-SCNC: 4 MMOL/L (ref 0–18)
BASOPHILS # BLD AUTO: 0.02 X10(3) UL (ref 0–0.2)
BASOPHILS NFR BLD AUTO: 0.4 %
BUN BLD-MCNC: 9 MG/DL (ref 7–18)
BUN/CREAT SERPL: 9.5 (ref 10–20)
CALCIUM BLD-MCNC: 8.9 MG/DL (ref 8.5–10.1)
CHLORIDE SERPL-SCNC: 111 MMOL/L (ref 98–112)
CO2 SERPL-SCNC: 28 MMOL/L (ref 21–32)
CREAT BLD-MCNC: 0.95 MG/DL (ref 0.55–1.02)
DEPRECATED RDW RBC AUTO: 40.4 FL (ref 35.1–46.3)
EOSINOPHIL # BLD AUTO: 0.15 X10(3) UL (ref 0–0.7)
EOSINOPHIL NFR BLD AUTO: 3.2 %
ERYTHROCYTE [DISTWIDTH] IN BLOOD BY AUTOMATED COUNT: 16.1 % (ref 11–15)
GLUCOSE BLD-MCNC: 149 MG/DL (ref 70–99)
GLUCOSE BLD-MCNC: 157 MG/DL (ref 70–99)
GLUCOSE BLD-MCNC: 243 MG/DL (ref 70–99)
GLUCOSE BLD-MCNC: 314 MG/DL (ref 70–99)
GLUCOSE BLD-MCNC: 367 MG/DL (ref 70–99)
GLUCOSE BLD-MCNC: 421 MG/DL (ref 70–99)
HCT VFR BLD AUTO: 30.6 % (ref 35–48)
HGB BLD-MCNC: 9.4 G/DL (ref 12–16)
IMM GRANULOCYTES # BLD AUTO: 0.02 X10(3) UL (ref 0–1)
IMM GRANULOCYTES NFR BLD: 0.4 %
LYMPHOCYTES # BLD AUTO: 1.71 X10(3) UL (ref 1–4)
LYMPHOCYTES NFR BLD AUTO: 37 %
MCH RBC QN AUTO: 22.2 PG (ref 26–34)
MCHC RBC AUTO-ENTMCNC: 30.7 G/DL (ref 31–37)
MCV RBC AUTO: 72.2 FL (ref 80–100)
MONOCYTES # BLD AUTO: 0.4 X10(3) UL (ref 0.1–1)
MONOCYTES NFR BLD AUTO: 8.7 %
NEUTROPHILS # BLD AUTO: 2.32 X10 (3) UL (ref 1.5–7.7)
NEUTROPHILS # BLD AUTO: 2.32 X10(3) UL (ref 1.5–7.7)
NEUTROPHILS NFR BLD AUTO: 50.3 %
OSMOLALITY SERPL CALC.SUM OF ELEC: 297 MOSM/KG (ref 275–295)
PLATELET # BLD AUTO: 147 10(3)UL (ref 150–450)
POTASSIUM SERPL-SCNC: 3.6 MMOL/L (ref 3.5–5.1)
POTASSIUM SERPL-SCNC: 4.1 MMOL/L (ref 3.5–5.1)
RBC # BLD AUTO: 4.24 X10(6)UL (ref 3.8–5.3)
SODIUM SERPL-SCNC: 143 MMOL/L (ref 136–145)
WBC # BLD AUTO: 4.6 X10(3) UL (ref 4–11)

## 2019-07-06 PROCEDURE — 99233 SBSQ HOSP IP/OBS HIGH 50: CPT | Performed by: OTHER

## 2019-07-06 PROCEDURE — 99232 SBSQ HOSP IP/OBS MODERATE 35: CPT | Performed by: INTERNAL MEDICINE

## 2019-07-06 RX ORDER — ALBUMIN, HUMAN INJ 5% 5 %
3000 SOLUTION INTRAVENOUS ONCE
Status: CANCELLED | OUTPATIENT
Start: 2019-07-06 | End: 2019-07-06

## 2019-07-06 RX ORDER — POLYETHYLENE GLYCOL 3350 17 G/17G
17 POWDER, FOR SOLUTION ORAL DAILY PRN
Status: DISCONTINUED | OUTPATIENT
Start: 2019-07-06 | End: 2019-07-07

## 2019-07-06 RX ORDER — POTASSIUM CHLORIDE 20 MEQ/1
40 TABLET, EXTENDED RELEASE ORAL EVERY 4 HOURS
Status: COMPLETED | OUTPATIENT
Start: 2019-07-06 | End: 2019-07-06

## 2019-07-06 NOTE — PROGRESS NOTES
INPATIENT NEUROLOGY PROGRESS NOTE    Date: 7/6/2019    Valeri Peña is a 44year old female at Hospital Day ( LOS: 4 days )    Assessment/Plan:  · MS exacerbation vs pseudoexacerbation  ? Plan for total of 3 PLEX treatments 2/3  ?  Patient reportedly unab Nightly   HYDROcodone-acetaminophen (NORCO)  MG per tab 1 tablet 1 tablet Oral BID PRN   ferrous sulfate EC tab 325 mg 325 mg Oral BID with meals   Levothyroxine Sodium (SYNTHROID, LEVOTHROID) tab 88 mcg 88 mcg Oral Before breakfast   docusate sodium 1:33 PM  Devendra Aragon MD

## 2019-07-06 NOTE — PLAN OF CARE
A/o x3   Denies Chest pain, sob, Lightheadedness, dizziness. Up and walking around with stand by assist and walker. In good spirits this am.   Telemetry.    [place]     Verified plasmapheresis treatment with frensious for tomorrow,     Problem: Diabetes/ vomiting  Description  INTERVENTIONS:  - Maintain adequate hydration with IV or PO as ordered and tolerated  - Nasogastric tube to low intermittent suction as ordered  - Evaluate effectiveness of ordered antiemetic medications  - Provide nonpharmacologic c activity/tolerance for mobility and gait  - Educate and engage patient/family in tolerated activity level and precautions  - Up to chair x 3 daily   Outcome: Progressing     Problem: Impaired Activities of Daily Living  Goal: Achieve highest/safest level o

## 2019-07-06 NOTE — PROGRESS NOTES
ENDOCRINOLOGY PROGRESS NOTE    Typed by Ellyn Martin MD on 7/6/2019      S: Again, pt sleeping but arousable. Family at bedside.       O: /76 (BP Location: Left arm)   Pulse 70   Temp 98 °F (36.7 °C) (Oral)   Resp 20   Ht 66\"   Wt 172 lb uncontrolled with hyperglycemia: HgA1C 9.9%.     2. Long term insulin use      · Continue regimen as follows:         Levemir 36 Units at bedtime      Novolog 1 Unit for every 5 grams of carbs with meals     Novolog correction of 1 Unit for every 15 above 1

## 2019-07-06 NOTE — PROGRESS NOTES
[facility]  Progress Note    Verdia Eisenmenger Patient Status:  Observation    3/11/1970 MRN [id number]   [facility] 3NW-A Attending Jin Portillo MD   [address] Day # 4 PCP Marisabel Granados MD         SUBJECTIVE:  Subjective:  Verdia Eisenmenger MG 2.2  --  2.1  --   --    * 151* 285* 119* 149*       Recent Labs   Lab 07/01/19  1153 07/03/19  0511 07/05/19  0645   ALT 98* 95* 49   AST 62* 63* 38*   ALB 3.4 3.1* 3.7       Recent Labs   Lab 07/05/19  0707 07/05/19  1141 07/05/19  1704 07/05 plasmapheresis. She has a Port-A-Cath. Noted   Neurology consultation. plasmapharesis  for flare up    3. Insulin-dependent diabetes mellitus, currently uncontrolled with complications with hyperglycemia. Insulin glargine adjusted.   Endocrinology f

## 2019-07-06 NOTE — PROGRESS NOTES
PT RESTING IN BED, EASY NON LABORED BREATHING ON RA. VS WNL. UP WITH SB ASSIST. STEADY GAIT. VOIDS WITHOUT DIFFICULTY. CPOX AND TELEMETRY IN PLACE. IV HL. PT TOLERATING DM DIET. PORTACATH HL, DRESSING C/D/I. DIALAYSIS PORT CLAMPED DRESSING, C/D/I.  PM MEDS

## 2019-07-06 NOTE — PROGRESS NOTES
[facility]  Progress Note    Symone Méndze Patient Status:  Observation    3/11/1970 MRN [id number]   [facility] 3NW-A Attending Lily Morales MD   Hosp Day # 4 PCP Adin Joy MD     No acute events    Current Facility-Administ Alfuzosin HCl ER (UROXATRAL) 24 hr tab 10 mg 10 mg Oral Daily with breakfast   trimethoprim (TRIMPEX) tab 100 mg 100 mg Oral Nightly   pregabalin (LYRICA) cap 150 mg 150 mg Oral TID   glucose (DEX4) oral liquid 15 g 15 g Oral Q15 Min PRN   Or      Glucos neurology  - plan for 3/3 tx tomorrow  - anticipate o/p treatment every 3 weeks (next tx will be on July 29 2019)  2. Replace K per protocol    Thank you for allowing me to participate in this patient's care.   Please feel free to call me with any questions

## 2019-07-07 VITALS
BODY MASS INDEX: 27.64 KG/M2 | HEIGHT: 66 IN | TEMPERATURE: 98 F | SYSTOLIC BLOOD PRESSURE: 90 MMHG | DIASTOLIC BLOOD PRESSURE: 59 MMHG | RESPIRATION RATE: 18 BRPM | OXYGEN SATURATION: 98 % | WEIGHT: 172 LBS | HEART RATE: 68 BPM

## 2019-07-07 LAB
BASOPHILS # BLD AUTO: 0.01 X10(3) UL (ref 0–0.2)
BASOPHILS NFR BLD AUTO: 0.2 %
DEPRECATED RDW RBC AUTO: 40 FL (ref 35.1–46.3)
EOSINOPHIL # BLD AUTO: 0.17 X10(3) UL (ref 0–0.7)
EOSINOPHIL NFR BLD AUTO: 3.2 %
ERYTHROCYTE [DISTWIDTH] IN BLOOD BY AUTOMATED COUNT: 16.2 % (ref 11–15)
GLUCOSE BLD-MCNC: 160 MG/DL (ref 70–99)
GLUCOSE BLD-MCNC: 226 MG/DL (ref 70–99)
HCT VFR BLD AUTO: 30.6 % (ref 35–48)
HGB BLD-MCNC: 9.4 G/DL (ref 12–16)
IMM GRANULOCYTES # BLD AUTO: 0.01 X10(3) UL (ref 0–1)
IMM GRANULOCYTES NFR BLD: 0.2 %
LYMPHOCYTES # BLD AUTO: 1.75 X10(3) UL (ref 1–4)
LYMPHOCYTES NFR BLD AUTO: 33.3 %
MCH RBC QN AUTO: 22.3 PG (ref 26–34)
MCHC RBC AUTO-ENTMCNC: 30.7 G/DL (ref 31–37)
MCV RBC AUTO: 72.5 FL (ref 80–100)
MONOCYTES # BLD AUTO: 0.55 X10(3) UL (ref 0.1–1)
MONOCYTES NFR BLD AUTO: 10.5 %
NEUTROPHILS # BLD AUTO: 2.76 X10 (3) UL (ref 1.5–7.7)
NEUTROPHILS # BLD AUTO: 2.76 X10(3) UL (ref 1.5–7.7)
NEUTROPHILS NFR BLD AUTO: 52.6 %
PLATELET # BLD AUTO: 152 10(3)UL (ref 150–450)
RBC # BLD AUTO: 4.22 X10(6)UL (ref 3.8–5.3)
WBC # BLD AUTO: 5.3 X10(3) UL (ref 4–11)

## 2019-07-07 PROCEDURE — 99233 SBSQ HOSP IP/OBS HIGH 50: CPT | Performed by: OTHER

## 2019-07-07 PROCEDURE — 99232 SBSQ HOSP IP/OBS MODERATE 35: CPT | Performed by: INTERNAL MEDICINE

## 2019-07-07 RX ORDER — SODIUM CHLORIDE 9 MG/ML
INJECTION, SOLUTION INTRAVENOUS CONTINUOUS
Status: DISCONTINUED | OUTPATIENT
Start: 2019-07-07 | End: 2019-07-07

## 2019-07-07 RX ORDER — ALBUMIN, HUMAN INJ 5% 5 %
3000 SOLUTION INTRAVENOUS ONCE
Status: COMPLETED | OUTPATIENT
Start: 2019-07-07 | End: 2019-07-07

## 2019-07-07 RX ORDER — DIPHENHYDRAMINE HYDROCHLORIDE 50 MG/ML
INJECTION INTRAMUSCULAR; INTRAVENOUS
Status: DISCONTINUED
Start: 2019-07-07 | End: 2019-07-07

## 2019-07-07 RX ORDER — INSULIN GLARGINE 300 U/ML
INJECTION, SOLUTION SUBCUTANEOUS
Qty: 2 PEN | Refills: 0 | Status: ON HOLD | OUTPATIENT
Start: 2019-07-07 | End: 2019-10-18

## 2019-07-07 RX ORDER — ALBUMIN, HUMAN INJ 5% 5 %
250 SOLUTION INTRAVENOUS ONCE
Status: DISCONTINUED | OUTPATIENT
Start: 2019-07-07 | End: 2019-07-07

## 2019-07-07 RX ORDER — PIOGLITAZONEHYDROCHLORIDE 15 MG/1
TABLET ORAL
Qty: 30 TABLET | Refills: 1 | Status: ON HOLD | OUTPATIENT
Start: 2019-07-07 | End: 2019-10-17

## 2019-07-07 RX ORDER — METOCLOPRAMIDE 5 MG/1
5 TABLET ORAL
Qty: 30 TABLET | Refills: 0 | Status: SHIPPED | OUTPATIENT
Start: 2019-07-07 | End: 2019-07-22

## 2019-07-07 RX ORDER — DIPHENHYDRAMINE HYDROCHLORIDE 50 MG/ML
12.5 INJECTION INTRAMUSCULAR; INTRAVENOUS ONCE
Status: COMPLETED | OUTPATIENT
Start: 2019-07-07 | End: 2019-07-07

## 2019-07-07 RX ORDER — GLIPIZIDE 10 MG/1
TABLET ORAL
Qty: 30 TABLET | Refills: 1 | Status: ON HOLD | OUTPATIENT
Start: 2019-07-07 | End: 2019-10-17

## 2019-07-07 NOTE — CM/SW NOTE
07/07/19 1553   Discharge disposition   Expected discharge disposition Home-Health   Name of Facillity/Home Care/Hospice Residential   Discharge transportation Private car

## 2019-07-07 NOTE — PROGRESS NOTES
INPATIENT NEUROLOGY PROGRESS NOTE    Date: 7/7/2019    Darshan Gudino is a 44year old female at Hospital Day ( LOS: 5 days )    Assessment/Plan:  · MS exacerbation vs pseudoexacerbation  ?  Plan for total of 3 PLEX treatments 3/3, ok to dc after last PLEX inhaler 2 puff 2 puff Inhalation Q6H PRN   atorvastatin (LIPITOR) tab 20 mg 20 mg Oral Nightly   DULoxetine HCl (CYMBALTA) DR particles cap 30 mg 30 mg Oral BID   aspirin EC tab 81 mg 81 mg Oral Daily   Montelukast Sodium (SINGULAIR) tab 10 mg 10 mg Oral N triceps o/w 5/5, LE's HF 5- b/k, KE/KF 4, DF 5-  Finger-to-nose coordination is intact.  Gait deferred.     Labs and imaging/Diagnostic studies on 7/7/2019      Devante Golden MD   Neurology  [facility]  7/7/2019, 11:11 AM  Stephen Ennis

## 2019-07-07 NOTE — PLAN OF CARE
A/o x3   Denies Chest pain, sob, Lightheadedness, dizziness. Relaxing in bed this morning. Plasmapheresis at bedside. Frenscious nurse Lazarus Potter spoke with Dr. Joanne Rain regarding b/p prior to starting tx.  (sbp in 90's) orders given for 500ml bolus over 30 min as appropriate  Outcome: Progressing     Problem: GASTROINTESTINAL - ADULT  Goal: Minimal or absence of nausea and vomiting  Description  INTERVENTIONS:  - Maintain adequate hydration with IV or PO as ordered and tolerated  - Nasogastric tube to low interm mobility/gait  Description  Interventions:  - Assess patient's functional ability and stability  - Promote increasing activity/tolerance for mobility and gait  - Educate and engage patient/family in tolerated activity level and precautions  - Up to chair x

## 2019-07-07 NOTE — CM/SW NOTE
Notified Anna Pitts with [facility] of possible discharge today. They have accepted pt.      Woody Record RN, [address] [facility] [address]

## 2019-07-07 NOTE — PLAN OF CARE
Iv removed, telemetry removed, discharge instructions given with verbal understanding. Patient to be transported by wheelchair by transport. artis Johnson bhatka, and alma delia are all ok with discharge.    Patient aware of discharge instr management  - Manage/alleviate anxiety  - Utilize distraction and/or relaxation techniques  - Monitor for opioid side effects  - Notify MD/LIP if interventions unsuccessful or patient reports new pain  - Anticipate increased pain with activity and pre-medi food preferences  - Enhance eating environment  7/7/2019 1453 by Chrisat Quinteros RN  Outcome: Adequate for Discharge  7/7/2019 1021 by Christa Quinteros RN  Outcome: Progressing     Problem: GENITOURINARY - ADULT  Goal: Absence of urinary retention  Descript administration  - Ensure safe mobilization of patient  - Hold pressure on venipuncture sites to achieve adequate hemostasis  - Assess for signs and symptoms of internal bleeding  - Monitor lab trends  7/7/2019 1453 by Jami Mann, RN  Outcome: Adequate

## 2019-07-07 NOTE — PROGRESS NOTES
[facility]  Progress Note    Nate Boody Patient Status:  Observation    3/11/1970 MRN [id number]   [facility] 3NW-A Attending Ricky Brown MD   Hosp Day # 5 PCP Cheryle Mulligan MD     No acute events    Current Facility-Administ HCl tab 100 mg 100 mg Oral Daily   carvedilol (COREG) tab 3.125 mg 3.125 mg Oral BID with meals   Alfuzosin HCl ER (UROXATRAL) 24 hr tab 10 mg 10 mg Oral Daily with breakfast   trimethoprim (TRIMPEX) tab 100 mg 100 mg Oral Nightly   pregabalin (LYRICA) cap Impression:  1. MS exacerbation vs. pseudoexacerbation  - per neurology  - plan for 3/3 today  - anticipate o/p treatment every 3 weeks (next tx will be on July 29 2019)  2. Replace K per protocol  3.  Hypotension - given fluid bolus prior to the PLEX

## 2019-07-07 NOTE — PROGRESS NOTES
[facility]  Progress Note    Aracelis Cummings Patient Status:  Observation    3/11/1970 MRN [id number]   [facility] 3NW-A Attending Jennifer Davis MD   [address] Day # 5 PCP Vanessa Norton MD         SUBJECTIVE:  Subjective:  Aracelis Cummings 0. 99 0.95  --    CA 7.5* 7.5* 8.0* 8.4* 8.9  --    MG 2.2  --  2.1  --   --   --    * 151* 285* 119* 149*  --        Recent Labs   Lab 07/01/19  1153 07/03/19  0511 07/05/19  0645   ALT 98* 95* 49   AST 62* 63* 38*   ALB 3.4 3.1* 3.7       Recent La etiology; rule out gastritis. EGD noted with gastritis mild and gastroparesis. reglan added  On proton pump inhibitor. 2.       Multiple sclerosis with possible flare up. In the past, she has had plasmapheresis. She has a Port-A-Cath.  Noted   Neurolo

## 2019-07-07 NOTE — PROGRESS NOTES
ENDOCRINOLOGY PROGRESS NOTE    Typed by Kristen Burnett MD on 7/7/2019      S: Pt to be discharged home today      O: BP 90/59 (BP Location: Left arm)   Pulse 68   Temp 98.2 °F (36.8 °C) (Oral)   Resp 18   Ht 66\"   Wt 172 lb (78 kg)   SpO2 98% hyperglycemia: HgA1C 9.9%.     2. Long term insulin use      · Continue regimen as follows while hospitalized:         Levemir 36 Units at bedtime      Novolog 1 Unit for every 5 grams of carbs with meals     Novolog correction of 1 Unit for every 15 above

## 2019-07-07 NOTE — RESPIRATORY THERAPY NOTE
MOI : EQUIPMENT USE: DAILY SUMMARY                                            SET MODE: AUTO CPAP WITH CFLEX                                          USAGE IN HOURS: 5:56                                          90

## 2019-07-07 NOTE — PLAN OF CARE
Assumed care of patient at this time. VSS. Lungs clear, but diminished at bases, encouraged deep breathing exercises. Abdomen softly rounded, bowel sounds hypoactive, patient reports passing more flatus after receiving Miralax but denies bowel movement.

## 2019-07-09 NOTE — PROGRESS NOTES
Results reviewed. Biopsies negative for H.pylori, celiac disease.  Letter or [facility] sent

## 2019-07-11 ENCOUNTER — TELEPHONE (OUTPATIENT)
Dept: NEUROLOGY | Facility: CLINIC | Age: 49
End: 2019-07-11

## 2019-07-11 NOTE — TELEPHONE ENCOUNTER
Received call from [facility] infusion services. Will be resuming plex catheter care. Will fax orders for provider signature.

## 2019-07-11 NOTE — TELEPHONE ENCOUNTER
Call received from residential home health physical therapist for plan of care update. Patient discharged from hospital and services resumed 7/7/2019.     Patient to receive in home therapy:    2 times weekly for 4 weeks    Work on strength and balance

## 2019-07-12 NOTE — DISCHARGE SUMMARY
[facility]  Discharge Summary    Karyna Patrick Patient Status:  Inpatient    3/11/1970 MRN [id number]   [facility] 3NW-A Attending No att. providers found   Hosp Day # 5 PCP Elizabeth Montiel MD     Date of Admission: 2019    Date o (36.8 °C)] 97.8 °F (36.6 °C)  Pulse:  [64-77] 77  Resp:  [18-20] 18  BP: ()/(58-76) 92/58     Intake/Output:     Intake/Output Summary (Last 24 hours) at 7/7/2019 0989  Last data filed at 7/7/2019 0854      Gross per 24 hour   Intake 560 ml   Output hours.                 Meds:      • diphenhydrAMINE HCl         • Albumin Human  3,000 mL Intravenous Once   • Electrolyte bag for plasmapheresis   Intravenous Once   • insulin detemir  36 Units Subcutaneous Nightly   • Metoclopramide HCl  5 mg Oral TID AC glargine adjusted.  Endocrinology following.     4.       Neurogenic bladder/overactive bladder/history of urinary tract infection.  Continue Uroxatral.  UA negative for UTI.     5.       Hypothyroidism.  Continue Synthroid.     6.       Asthma.  Continue Normal, Disp-2 pen, R-0, LAURA      CONTINUE these medications which have NOT CHANGED    trimethoprim 100 MG Oral Tab  Take 100 mg by mouth nightly., Historical    pregabalin 150 MG Oral Cap  Take 150 mg by mouth 3 (three) times daily. , Historical    tamsulo Delayed Release  Take 20 mg by mouth 2 (two) times daily before meals. , Historical    Albuterol Sulfate HFA (VENTOLIN HFA) 108 (90 BASE) MCG/ACT Inhalation Aero Soln  Inhale 2 puffs into the lungs every 6 (six) hours as needed.  PRN wheezing/SOB, Historic

## 2019-07-16 NOTE — TELEPHONE ENCOUNTER
Written copy of plan of care received from [facility] for physician review and signature. Paperwork signed and faxed back with receipt confirmation.

## 2019-07-18 ENCOUNTER — TELEPHONE (OUTPATIENT)
Dept: NEPHROLOGY | Facility: CLINIC | Age: 49
End: 2019-07-18

## 2019-07-18 NOTE — TELEPHONE ENCOUNTER
Additional documentation for home health services received for provider review and signature. Patient to receive skilled nursing services twice weekly for one week. Paperwork signed and faxed with receipt confirmation.

## 2019-07-19 ENCOUNTER — TELEPHONE (OUTPATIENT)
Dept: NEUROLOGY | Facility: CLINIC | Age: 49
End: 2019-07-19

## 2019-07-19 NOTE — TELEPHONE ENCOUNTER
Rec'd incoming fax from [facility] with RN recertification orders that require signature from provider, per Medicare Guidelines. Pt to receive skilled nursing services twice weekly for one week, then once weekly for one week.     Signed by

## 2019-07-19 NOTE — TELEPHONE ENCOUNTER
Pt called; provided with o/p krystina andrade phone #.  I told her her orders are in place already. She will call next week and confirm. If any questions, I am available for assistance.  Plan to continue PLEX q 3 weeks

## 2019-07-22 ENCOUNTER — TELEPHONE (OUTPATIENT)
Dept: NEPHROLOGY | Facility: CLINIC | Age: 49
End: 2019-07-22

## 2019-07-22 NOTE — TELEPHONE ENCOUNTER
Home health nurse called stating they called infusion center and they have not yet received order for plasmapherisis. Please put order in epic.

## 2019-07-29 ENCOUNTER — TELEPHONE (OUTPATIENT)
Dept: NEUROLOGY | Facility: CLINIC | Age: 49
End: 2019-07-29

## 2019-07-29 ENCOUNTER — HOSPITAL ENCOUNTER (OUTPATIENT)
Dept: PERIOP | Facility: HOSPITAL | Age: 49
Setting detail: HOSPITAL OUTPATIENT SURGERY
Discharge: HOME | End: 2019-07-29
Attending: INTERNAL MEDICINE
Payer: MEDICARE

## 2019-07-29 VITALS
SYSTOLIC BLOOD PRESSURE: 116 MMHG | HEART RATE: 79 BPM | TEMPERATURE: 98 F | RESPIRATION RATE: 16 BRPM | OXYGEN SATURATION: 100 % | DIASTOLIC BLOOD PRESSURE: 74 MMHG

## 2019-07-29 LAB
ANION GAP SERPL CALC-SCNC: 7 MMOL/L (ref 0–18)
BASOPHILS # BLD AUTO: 0.02 X10(3) UL (ref 0–0.2)
BASOPHILS NFR BLD AUTO: 0.4 %
BUN BLD-MCNC: 19 MG/DL (ref 7–18)
BUN/CREAT SERPL: 15.3 (ref 10–20)
CALCIUM BLD-MCNC: 9 MG/DL (ref 8.5–10.1)
CHLORIDE SERPL-SCNC: 100 MMOL/L (ref 98–112)
CO2 SERPL-SCNC: 28 MMOL/L (ref 21–32)
CREAT BLD-MCNC: 1.24 MG/DL (ref 0.55–1.02)
DEPRECATED RDW RBC AUTO: 41.1 FL (ref 35.1–46.3)
EOSINOPHIL # BLD AUTO: 0.1 X10(3) UL (ref 0–0.7)
EOSINOPHIL NFR BLD AUTO: 1.9 %
ERYTHROCYTE [DISTWIDTH] IN BLOOD BY AUTOMATED COUNT: 15.9 % (ref 11–15)
GLUCOSE BLD-MCNC: 336 MG/DL (ref 70–99)
GLUCOSE BLD-MCNC: 370 MG/DL (ref 70–99)
HAV IGM SER QL: 2.1 MG/DL (ref 1.6–2.6)
HCT VFR BLD AUTO: 35.8 % (ref 35–48)
HGB BLD-MCNC: 11 G/DL (ref 12–16)
IMM GRANULOCYTES # BLD AUTO: 0.02 X10(3) UL (ref 0–1)
IMM GRANULOCYTES NFR BLD: 0.4 %
LYMPHOCYTES # BLD AUTO: 1.89 X10(3) UL (ref 1–4)
LYMPHOCYTES NFR BLD AUTO: 35.6 %
MCH RBC QN AUTO: 22.4 PG (ref 26–34)
MCHC RBC AUTO-ENTMCNC: 30.7 G/DL (ref 31–37)
MCV RBC AUTO: 73.1 FL (ref 80–100)
MONOCYTES # BLD AUTO: 0.55 X10(3) UL (ref 0.1–1)
MONOCYTES NFR BLD AUTO: 10.4 %
NEUTROPHILS # BLD AUTO: 2.73 X10 (3) UL (ref 1.5–7.7)
NEUTROPHILS # BLD AUTO: 2.73 X10(3) UL (ref 1.5–7.7)
NEUTROPHILS NFR BLD AUTO: 51.3 %
OSMOLALITY SERPL CALC.SUM OF ELEC: 295 MOSM/KG (ref 275–295)
PHOSPHATE SERPL-MCNC: 2.9 MG/DL (ref 2.5–4.9)
PLATELET # BLD AUTO: 166 10(3)UL (ref 150–450)
POTASSIUM SERPL-SCNC: 4 MMOL/L (ref 3.5–5.1)
RBC # BLD AUTO: 4.9 X10(6)UL (ref 3.8–5.3)
SODIUM SERPL-SCNC: 135 MMOL/L (ref 136–145)
WBC # BLD AUTO: 5.3 X10(3) UL (ref 4–11)

## 2019-07-29 PROCEDURE — 82962 GLUCOSE BLOOD TEST: CPT

## 2019-07-29 PROCEDURE — 6A550Z3 PHERESIS OF PLASMA, SINGLE: ICD-10-PCS | Performed by: INTERNAL MEDICINE

## 2019-07-29 PROCEDURE — 36514 APHERESIS PLASMA: CPT | Performed by: INTERNAL MEDICINE

## 2019-07-29 PROCEDURE — 80048 BASIC METABOLIC PNL TOTAL CA: CPT | Performed by: INTERNAL MEDICINE

## 2019-07-29 PROCEDURE — P9045 ALBUMIN (HUMAN), 5%, 250 ML: HCPCS | Performed by: INTERNAL MEDICINE

## 2019-07-29 PROCEDURE — 84100 ASSAY OF PHOSPHORUS: CPT | Performed by: INTERNAL MEDICINE

## 2019-07-29 PROCEDURE — 83735 ASSAY OF MAGNESIUM: CPT | Performed by: INTERNAL MEDICINE

## 2019-07-29 PROCEDURE — 85025 COMPLETE CBC W/AUTO DIFF WBC: CPT | Performed by: INTERNAL MEDICINE

## 2019-07-29 RX ORDER — ALBUMIN, HUMAN INJ 5% 5 %
3000 SOLUTION INTRAVENOUS ONCE
Status: DISCONTINUED | OUTPATIENT
Start: 2019-07-29 | End: 2019-07-31

## 2019-07-29 RX ORDER — DIPHENHYDRAMINE HYDROCHLORIDE 50 MG/ML
25 INJECTION INTRAMUSCULAR; INTRAVENOUS ONCE
Status: DISCONTINUED | OUTPATIENT
Start: 2019-07-29 | End: 2019-07-29

## 2019-07-29 RX ORDER — HEPARIN SODIUM 1000 [USP'U]/ML
4000 INJECTION, SOLUTION INTRAVENOUS; SUBCUTANEOUS ONCE
Status: DISCONTINUED | OUTPATIENT
Start: 2019-07-29 | End: 2019-07-31

## 2019-07-29 RX ORDER — INSULIN LISPRO 100 [IU]/ML
INJECTION, SOLUTION INTRAVENOUS; SUBCUTANEOUS
Status: ON HOLD | COMMUNITY
End: 2019-10-18

## 2019-07-29 NOTE — TELEPHONE ENCOUNTER
[facility] POC for OT;    1 visit for week 1    Followed by 2 times per week for 2 weeks. O2 requirements to be managed by PCP. Routed to provider to inform.

## 2019-07-30 ENCOUNTER — TELEPHONE (OUTPATIENT)
Dept: NEUROLOGY | Facility: CLINIC | Age: 49
End: 2019-07-30

## 2019-07-30 NOTE — TELEPHONE ENCOUNTER
Occupation therapy plan of care  received for physician review and signature. Patient receives therapy from [facility]idential home health therapy. Patient to be seen starting 7/23/2019. One time per week with increase to twice weekly.     Medicare certifica

## 2019-08-02 ENCOUNTER — TELEPHONE (OUTPATIENT)
Dept: NEUROLOGY | Facility: CLINIC | Age: 49
End: 2019-08-02

## 2019-08-19 ENCOUNTER — HOSPITAL ENCOUNTER (OUTPATIENT)
Dept: PERIOP | Facility: HOSPITAL | Age: 49
Discharge: HOME | End: 2019-08-19
Attending: INTERNAL MEDICINE
Payer: MEDICARE

## 2019-08-19 VITALS
DIASTOLIC BLOOD PRESSURE: 78 MMHG | SYSTOLIC BLOOD PRESSURE: 107 MMHG | HEART RATE: 81 BPM | RESPIRATION RATE: 18 BRPM | TEMPERATURE: 98 F | OXYGEN SATURATION: 98 %

## 2019-08-19 LAB
ANION GAP SERPL CALC-SCNC: 7 MMOL/L (ref 0–18)
BASOPHILS # BLD AUTO: 0.02 X10(3) UL (ref 0–0.2)
BASOPHILS NFR BLD AUTO: 0.5 %
BUN BLD-MCNC: 14 MG/DL (ref 7–18)
BUN/CREAT SERPL: 11.9 (ref 10–20)
CALCIUM BLD-MCNC: 8.9 MG/DL (ref 8.5–10.1)
CHLORIDE SERPL-SCNC: 103 MMOL/L (ref 98–112)
CO2 SERPL-SCNC: 28 MMOL/L (ref 21–32)
CREAT BLD-MCNC: 1.18 MG/DL (ref 0.55–1.02)
DEPRECATED RDW RBC AUTO: 41.7 FL (ref 35.1–46.3)
EOSINOPHIL # BLD AUTO: 0.08 X10(3) UL (ref 0–0.7)
EOSINOPHIL NFR BLD AUTO: 1.9 %
ERYTHROCYTE [DISTWIDTH] IN BLOOD BY AUTOMATED COUNT: 15.8 % (ref 11–15)
GLUCOSE BLD-MCNC: 326 MG/DL (ref 70–99)
GLUCOSE BLD-MCNC: 334 MG/DL (ref 70–99)
HAV IGM SER QL: 2.2 MG/DL (ref 1.6–2.6)
HCT VFR BLD AUTO: 36.8 % (ref 35–48)
HGB BLD-MCNC: 11.1 G/DL (ref 12–16)
IMM GRANULOCYTES # BLD AUTO: 0.01 X10(3) UL (ref 0–1)
IMM GRANULOCYTES NFR BLD: 0.2 %
LYMPHOCYTES # BLD AUTO: 1.71 X10(3) UL (ref 1–4)
LYMPHOCYTES NFR BLD AUTO: 40.8 %
MCH RBC QN AUTO: 22.5 PG (ref 26–34)
MCHC RBC AUTO-ENTMCNC: 30.2 G/DL (ref 31–37)
MCV RBC AUTO: 74.5 FL (ref 80–100)
MONOCYTES # BLD AUTO: 0.35 X10(3) UL (ref 0.1–1)
MONOCYTES NFR BLD AUTO: 8.4 %
NEUTROPHILS # BLD AUTO: 2.02 X10 (3) UL (ref 1.5–7.7)
NEUTROPHILS # BLD AUTO: 2.02 X10(3) UL (ref 1.5–7.7)
NEUTROPHILS NFR BLD AUTO: 48.2 %
OSMOLALITY SERPL CALC.SUM OF ELEC: 300 MOSM/KG (ref 275–295)
PLATELET # BLD AUTO: 153 10(3)UL (ref 150–450)
POTASSIUM SERPL-SCNC: 4 MMOL/L (ref 3.5–5.1)
RBC # BLD AUTO: 4.94 X10(6)UL (ref 3.8–5.3)
SODIUM SERPL-SCNC: 138 MMOL/L (ref 136–145)
WBC # BLD AUTO: 4.2 X10(3) UL (ref 4–11)

## 2019-08-19 PROCEDURE — 85025 COMPLETE CBC W/AUTO DIFF WBC: CPT | Performed by: INTERNAL MEDICINE

## 2019-08-19 PROCEDURE — 83735 ASSAY OF MAGNESIUM: CPT | Performed by: INTERNAL MEDICINE

## 2019-08-19 PROCEDURE — P9045 ALBUMIN (HUMAN), 5%, 250 ML: HCPCS | Performed by: INTERNAL MEDICINE

## 2019-08-19 PROCEDURE — A4216 STERILE WATER/SALINE, 10 ML: HCPCS | Performed by: INTERNAL MEDICINE

## 2019-08-19 PROCEDURE — 6A550Z3 PHERESIS OF PLASMA, SINGLE: ICD-10-PCS | Performed by: INTERNAL MEDICINE

## 2019-08-19 PROCEDURE — 80048 BASIC METABOLIC PNL TOTAL CA: CPT | Performed by: INTERNAL MEDICINE

## 2019-08-19 PROCEDURE — 36514 APHERESIS PLASMA: CPT | Performed by: INTERNAL MEDICINE

## 2019-08-19 PROCEDURE — 82962 GLUCOSE BLOOD TEST: CPT

## 2019-08-19 RX ORDER — DIPHENHYDRAMINE HYDROCHLORIDE 50 MG/ML
25 INJECTION INTRAMUSCULAR; INTRAVENOUS ONCE AS NEEDED
Status: ACTIVE | OUTPATIENT
Start: 2019-08-19 | End: 2019-08-19

## 2019-08-19 RX ORDER — HEPARIN SODIUM 1000 [USP'U]/ML
4000 INJECTION, SOLUTION INTRAVENOUS; SUBCUTANEOUS ONCE
Status: DISCONTINUED | OUTPATIENT
Start: 2019-08-19 | End: 2019-08-21

## 2019-08-19 RX ORDER — ALBUMIN, HUMAN INJ 5% 5 %
3000 SOLUTION INTRAVENOUS ONCE
Status: DISCONTINUED | OUTPATIENT
Start: 2019-08-19 | End: 2019-08-21

## 2019-08-19 RX ORDER — SODIUM CHLORIDE 9 MG/ML
INJECTION, SOLUTION INTRAVENOUS ONCE
Status: DISCONTINUED | OUTPATIENT
Start: 2019-08-19 | End: 2019-08-21

## 2019-08-19 NOTE — OR NURSING
See [facility] flowsheet for details of treatment, medications given, and vital signs obtained. Patient tolerated treatment well, ambulated self to car.

## 2019-09-09 ENCOUNTER — TELEPHONE (OUTPATIENT)
Dept: NEUROLOGY | Facility: CLINIC | Age: 49
End: 2019-09-09

## 2019-09-09 NOTE — TELEPHONE ENCOUNTER
Received faxed copy of office visit notes from Dr. Benjie Elam MD/PHD.     Copy placed on Dr. Jayashree Sadler desk and copy sent to scanning.

## 2019-09-16 ENCOUNTER — TELEPHONE (OUTPATIENT)
Dept: NEUROLOGY | Facility: CLINIC | Age: 49
End: 2019-09-16

## 2019-09-16 NOTE — TELEPHONE ENCOUNTER
Anticipated nursing frequency for recert episode: weekly for 2 weeks than skip a week and repeat. Number of visits completed this cert: 4 RN; 6 PT; 4 OT.     Patient requires weekly maintenance of left subclavian miguel catheter and assessment of MS sym

## 2019-09-20 ENCOUNTER — NURSE ONLY (OUTPATIENT)
Dept: HEMATOLOGY/ONCOLOGY | Facility: HOSPITAL | Age: 49
End: 2019-09-20
Attending: SPECIALIST
Payer: MEDICARE

## 2019-09-20 DIAGNOSIS — G37.9 DEMYELINATING DISEASE OF CENTRAL NERVOUS SYSTEM (HCC): Primary | ICD-10-CM

## 2019-09-20 PROCEDURE — 96523 IRRIG DRUG DELIVERY DEVICE: CPT

## 2019-09-20 RX ORDER — SODIUM CHLORIDE 0.9 % (FLUSH) 0.9 %
10 SYRINGE (ML) INJECTION ONCE
Status: COMPLETED | OUTPATIENT
Start: 2019-09-20 | End: 2019-09-20

## 2019-09-20 RX ORDER — SODIUM CHLORIDE 0.9 % (FLUSH) 0.9 %
10 SYRINGE (ML) INJECTION ONCE
Status: CANCELLED | OUTPATIENT
Start: 2019-09-20

## 2019-09-20 RX ADMIN — SODIUM CHLORIDE 0.9 % (FLUSH) 10 ML: 0.9 % SYRINGE (ML) INJECTION at 14:45:00

## 2019-09-23 ENCOUNTER — TELEPHONE (OUTPATIENT)
Dept: NEUROLOGY | Facility: CLINIC | Age: 49
End: 2019-09-23

## 2019-09-23 NOTE — TELEPHONE ENCOUNTER
[facility]idential home health certification and plan of care: 09/21/2019 - 11/19/2019    SN effective 09/22/2019 1 Wk9    Paperwork reviewed and signed by Dr Dion Chacon. Faxed documents. Confirmation received.

## 2019-09-25 ENCOUNTER — OFFICE VISIT (OUTPATIENT)
Dept: NEUROLOGY | Facility: CLINIC | Age: 49
End: 2019-09-25
Payer: MEDICARE

## 2019-09-25 VITALS
HEART RATE: 82 BPM | BODY MASS INDEX: 26.36 KG/M2 | SYSTOLIC BLOOD PRESSURE: 106 MMHG | WEIGHT: 164 LBS | RESPIRATION RATE: 16 BRPM | DIASTOLIC BLOOD PRESSURE: 81 MMHG | HEIGHT: 66 IN

## 2019-09-25 DIAGNOSIS — G35 MS (MULTIPLE SCLEROSIS) (HCC): Primary | ICD-10-CM

## 2019-09-25 DIAGNOSIS — G57.93 NEUROPATHIC PAIN OF BOTH LEGS: ICD-10-CM

## 2019-09-25 PROCEDURE — 99214 OFFICE O/P EST MOD 30 MIN: CPT | Performed by: PHYSICIAN ASSISTANT

## 2019-09-25 NOTE — PROGRESS NOTES
[facility]  3/11/1970  Primary Care Provider:  Deepak Johnson MD    9/25/2019  Accompanied visit: Yes      44year old female patient being seen for: Multiple Scl Pen Needle (BD PEN NEEDLE JASKARAN U/F) 32G X 4 MM Does not apply Misc, For insulin use once a day (may substitute with formulary grand), Disp: 50 each, Rfl: 0  •  trimethoprim 100 MG Oral Tab, Take 100 mg by mouth nightly., Disp: , Rfl:   •  pregabalin 150 MG Delayed Release, Take 20 mg by mouth 2 (two) times daily before meals. , Disp: , Rfl:   •  Albuterol Sulfate HFA (VENTOLIN HFA) 108 (90 BASE) MCG/ACT Inhalation Aero Soln, Inhale 2 puffs into the lungs every 6 (six) hours as needed.  PRN wheezing/SOB, Disp

## 2019-09-27 ENCOUNTER — TELEPHONE (OUTPATIENT)
Dept: NEUROLOGY | Facility: CLINIC | Age: 49
End: 2019-09-27

## 2019-09-27 RX ORDER — DIPHENHYDRAMINE HYDROCHLORIDE 50 MG/ML
25 INJECTION INTRAMUSCULAR; INTRAVENOUS ONCE AS NEEDED
Status: CANCELLED | OUTPATIENT
Start: 2019-09-30 | End: 2019-09-30

## 2019-09-27 RX ORDER — HEPARIN SODIUM 1000 [USP'U]/ML
4000 INJECTION, SOLUTION INTRAVENOUS; SUBCUTANEOUS ONCE
Status: CANCELLED | OUTPATIENT
Start: 2019-09-30

## 2019-09-27 RX ORDER — DIPHENHYDRAMINE HCL 25 MG
25 CAPSULE ORAL ONCE AS NEEDED
Status: CANCELLED | OUTPATIENT
Start: 2019-09-30 | End: 2019-09-30

## 2019-09-27 RX ORDER — ALBUMIN, HUMAN INJ 5% 5 %
3000 SOLUTION INTRAVENOUS ONCE
Status: CANCELLED | OUTPATIENT
Start: 2019-09-30

## 2019-09-30 ENCOUNTER — HOSPITAL ENCOUNTER (OUTPATIENT)
Dept: PERIOP | Facility: HOSPITAL | Age: 49
Discharge: HOME | End: 2019-09-30
Attending: INTERNAL MEDICINE
Payer: MEDICARE

## 2019-10-01 ENCOUNTER — HOSPITAL ENCOUNTER (OUTPATIENT)
Dept: PERIOP | Facility: HOSPITAL | Age: 49
Discharge: HOME | End: 2019-10-01
Attending: INTERNAL MEDICINE
Payer: MEDICARE

## 2019-10-01 VITALS
OXYGEN SATURATION: 99 % | HEART RATE: 63 BPM | TEMPERATURE: 98 F | RESPIRATION RATE: 18 BRPM | SYSTOLIC BLOOD PRESSURE: 105 MMHG | DIASTOLIC BLOOD PRESSURE: 78 MMHG

## 2019-10-01 PROCEDURE — 36514 APHERESIS PLASMA: CPT

## 2019-10-01 PROCEDURE — 83735 ASSAY OF MAGNESIUM: CPT | Performed by: INTERNAL MEDICINE

## 2019-10-01 PROCEDURE — 85027 COMPLETE CBC AUTOMATED: CPT | Performed by: INTERNAL MEDICINE

## 2019-10-01 PROCEDURE — P9045 ALBUMIN (HUMAN), 5%, 250 ML: HCPCS | Performed by: INTERNAL MEDICINE

## 2019-10-01 PROCEDURE — 80048 BASIC METABOLIC PNL TOTAL CA: CPT | Performed by: INTERNAL MEDICINE

## 2019-10-01 RX ORDER — DIPHENHYDRAMINE HYDROCHLORIDE 50 MG/ML
25 INJECTION INTRAMUSCULAR; INTRAVENOUS ONCE
Status: DISCONTINUED | OUTPATIENT
Start: 2019-10-01 | End: 2019-10-03

## 2019-10-01 RX ORDER — ALBUMIN, HUMAN INJ 5% 5 %
3000 SOLUTION INTRAVENOUS ONCE
Status: DISCONTINUED | OUTPATIENT
Start: 2019-10-01 | End: 2019-10-03

## 2019-10-01 RX ORDER — HEPARIN SODIUM 1000 [USP'U]/ML
4000 INJECTION, SOLUTION INTRAVENOUS; SUBCUTANEOUS ONCE
Status: DISCONTINUED | OUTPATIENT
Start: 2019-10-01 | End: 2019-10-03

## 2019-10-07 NOTE — TELEPHONE ENCOUNTER
Please call to inform patient that jcv test is negative. Her creatinine was elevated and there was evidence if anemia on cbc that she should follow-up with pcp for.

## 2019-10-08 NOTE — TELEPHONE ENCOUNTER
Betti Homans notified, JCV is negative and Hgb is low, she may have anemia. She will follow up with PCP for Anemia and elevated Creatinine. Verbalized the understanding.

## 2019-10-10 ENCOUNTER — HOSPITAL ENCOUNTER (OUTPATIENT)
Dept: MRI IMAGING | Facility: HOSPITAL | Age: 49
Discharge: HOME | End: 2019-10-10
Attending: SPECIALIST
Payer: MEDICARE

## 2019-10-10 DIAGNOSIS — G35 MULTIPLE SCLEROSIS (HCC): ICD-10-CM

## 2019-10-10 DIAGNOSIS — M54.16 LUMBAR RADICULOPATHY: ICD-10-CM

## 2019-10-10 DIAGNOSIS — M54.12 CERVICAL RADICULOPATHY: ICD-10-CM

## 2019-10-10 DIAGNOSIS — M54.14 THORACIC RADICULOPATHY: ICD-10-CM

## 2019-10-10 PROCEDURE — 70553 MRI BRAIN STEM W/O & W/DYE: CPT | Performed by: SPECIALIST

## 2019-10-10 PROCEDURE — A9575 INJ GADOTERATE MEGLUMI 0.1ML: HCPCS | Performed by: SPECIALIST

## 2019-10-11 ENCOUNTER — HOSPITAL ENCOUNTER (OUTPATIENT)
Dept: MRI IMAGING | Facility: HOSPITAL | Age: 49
Discharge: HOME | End: 2019-10-11
Attending: SPECIALIST
Payer: MEDICARE

## 2019-10-11 DIAGNOSIS — M54.14 THORACIC RADICULOPATHY: ICD-10-CM

## 2019-10-11 DIAGNOSIS — M54.12 CERVICAL RADICULOPATHY: ICD-10-CM

## 2019-10-11 DIAGNOSIS — M54.16 LUMBAR RADICULOPATHY: ICD-10-CM

## 2019-10-11 DIAGNOSIS — G35 MULTIPLE SCLEROSIS (HCC): ICD-10-CM

## 2019-10-11 PROCEDURE — A9575 INJ GADOTERATE MEGLUMI 0.1ML: HCPCS

## 2019-10-11 PROCEDURE — 72157 MRI CHEST SPINE W/O & W/DYE: CPT | Performed by: SPECIALIST

## 2019-10-11 PROCEDURE — 72158 MRI LUMBAR SPINE W/O & W/DYE: CPT | Performed by: SPECIALIST

## 2019-10-11 PROCEDURE — 72156 MRI NECK SPINE W/O & W/DYE: CPT | Performed by: SPECIALIST

## 2019-10-15 ENCOUNTER — HOSPITAL ENCOUNTER (OUTPATIENT)
Facility: HOSPITAL | Age: 49
Setting detail: OBSERVATION
LOS: 1 days | Discharge: HOME HEALTH | End: 2019-10-18
Attending: EMERGENCY MEDICINE | Admitting: SPECIALIST
Payer: MEDICARE

## 2019-10-15 ENCOUNTER — APPOINTMENT (OUTPATIENT)
Dept: CT IMAGING | Facility: HOSPITAL | Age: 49
End: 2019-10-15
Attending: EMERGENCY MEDICINE
Payer: MEDICARE

## 2019-10-15 DIAGNOSIS — R73.9 HYPERGLYCEMIA: Primary | ICD-10-CM

## 2019-10-15 DIAGNOSIS — E86.0 DEHYDRATION: ICD-10-CM

## 2019-10-15 PROCEDURE — 96372 THER/PROPH/DIAG INJ SC/IM: CPT

## 2019-10-15 PROCEDURE — 74176 CT ABD & PELVIS W/O CONTRAST: CPT | Performed by: EMERGENCY MEDICINE

## 2019-10-15 PROCEDURE — 99285 EMERGENCY DEPT VISIT HI MDM: CPT

## 2019-10-15 PROCEDURE — 80053 COMPREHEN METABOLIC PANEL: CPT | Performed by: EMERGENCY MEDICINE

## 2019-10-15 PROCEDURE — 82962 GLUCOSE BLOOD TEST: CPT

## 2019-10-15 PROCEDURE — 85025 COMPLETE CBC W/AUTO DIFF WBC: CPT | Performed by: EMERGENCY MEDICINE

## 2019-10-15 PROCEDURE — 82803 BLOOD GASES ANY COMBINATION: CPT | Performed by: EMERGENCY MEDICINE

## 2019-10-15 PROCEDURE — 96360 HYDRATION IV INFUSION INIT: CPT

## 2019-10-15 PROCEDURE — 83516 IMMUNOASSAY NONANTIBODY: CPT | Performed by: INTERNAL MEDICINE

## 2019-10-15 PROCEDURE — 96361 HYDRATE IV INFUSION ADD-ON: CPT

## 2019-10-15 RX ORDER — LEVOTHYROXINE SODIUM 88 UG/1
88 TABLET ORAL
Status: DISCONTINUED | OUTPATIENT
Start: 2019-10-16 | End: 2019-10-18

## 2019-10-15 RX ORDER — PANTOPRAZOLE SODIUM 40 MG/1
40 TABLET, DELAYED RELEASE ORAL
Status: DISCONTINUED | OUTPATIENT
Start: 2019-10-16 | End: 2019-10-18

## 2019-10-15 RX ORDER — MONTELUKAST SODIUM 10 MG/1
10 TABLET ORAL NIGHTLY
Status: DISCONTINUED | OUTPATIENT
Start: 2019-10-15 | End: 2019-10-18

## 2019-10-15 RX ORDER — DIPHENHYDRAMINE HYDROCHLORIDE 50 MG/ML
25 INJECTION INTRAMUSCULAR; INTRAVENOUS EVERY 6 HOURS PRN
Status: DISCONTINUED | OUTPATIENT
Start: 2019-10-15 | End: 2019-10-18

## 2019-10-15 RX ORDER — INSULIN ASPART 100 [IU]/ML
0.2 INJECTION, SOLUTION INTRAVENOUS; SUBCUTANEOUS ONCE
Status: COMPLETED | OUTPATIENT
Start: 2019-10-15 | End: 2019-10-15

## 2019-10-15 RX ORDER — ONDANSETRON 4 MG/1
4 TABLET, FILM COATED ORAL EVERY 8 HOURS PRN
Status: DISCONTINUED | OUTPATIENT
Start: 2019-10-15 | End: 2019-10-18

## 2019-10-15 RX ORDER — HYDRALAZINE HYDROCHLORIDE 20 MG/ML
10 INJECTION INTRAMUSCULAR; INTRAVENOUS EVERY 6 HOURS PRN
Status: DISCONTINUED | OUTPATIENT
Start: 2019-10-15 | End: 2019-10-18

## 2019-10-15 RX ORDER — ATORVASTATIN CALCIUM 20 MG/1
20 TABLET, FILM COATED ORAL NIGHTLY
Status: DISCONTINUED | OUTPATIENT
Start: 2019-10-15 | End: 2019-10-15

## 2019-10-15 RX ORDER — CARVEDILOL 3.12 MG/1
3.12 TABLET ORAL 2 TIMES DAILY WITH MEALS
Status: DISCONTINUED | OUTPATIENT
Start: 2019-10-16 | End: 2019-10-18

## 2019-10-15 RX ORDER — DULOXETIN HYDROCHLORIDE 30 MG/1
30 CAPSULE, DELAYED RELEASE ORAL 2 TIMES DAILY
Status: DISCONTINUED | OUTPATIENT
Start: 2019-10-15 | End: 2019-10-15 | Stop reason: SDUPTHER

## 2019-10-15 RX ORDER — DOCUSATE SODIUM 100 MG/1
100 CAPSULE, LIQUID FILLED ORAL 2 TIMES DAILY
Status: DISCONTINUED | OUTPATIENT
Start: 2019-10-15 | End: 2019-10-18

## 2019-10-15 RX ORDER — ACETAMINOPHEN 325 MG/1
650 TABLET ORAL EVERY 6 HOURS PRN
Status: DISCONTINUED | OUTPATIENT
Start: 2019-10-15 | End: 2019-10-18

## 2019-10-15 RX ORDER — METOCLOPRAMIDE 10 MG/1
10 TABLET ORAL EVERY 6 HOURS PRN
Status: DISCONTINUED | OUTPATIENT
Start: 2019-10-15 | End: 2019-10-16

## 2019-10-15 RX ORDER — HEPARIN SODIUM 5000 [USP'U]/ML
5000 INJECTION, SOLUTION INTRAVENOUS; SUBCUTANEOUS EVERY 12 HOURS SCHEDULED
Status: DISCONTINUED | OUTPATIENT
Start: 2019-10-16 | End: 2019-10-18

## 2019-10-15 RX ORDER — MELATONIN
100 DAILY
Status: DISCONTINUED | OUTPATIENT
Start: 2019-10-15 | End: 2019-10-18

## 2019-10-15 RX ORDER — ASPIRIN 81 MG/1
81 TABLET ORAL DAILY
Status: DISCONTINUED | OUTPATIENT
Start: 2019-10-15 | End: 2019-10-18

## 2019-10-15 RX ORDER — INSULIN ASPART 100 [IU]/ML
0.15 INJECTION, SOLUTION INTRAVENOUS; SUBCUTANEOUS ONCE
Status: COMPLETED | OUTPATIENT
Start: 2019-10-15 | End: 2019-10-15

## 2019-10-15 RX ORDER — ATORVASTATIN CALCIUM 20 MG/1
20 TABLET, FILM COATED ORAL NIGHTLY
Status: DISCONTINUED | OUTPATIENT
Start: 2019-10-16 | End: 2019-10-18

## 2019-10-15 RX ORDER — ALBUTEROL SULFATE 90 UG/1
2 AEROSOL, METERED RESPIRATORY (INHALATION) EVERY 6 HOURS PRN
Status: DISCONTINUED | OUTPATIENT
Start: 2019-10-15 | End: 2019-10-18

## 2019-10-15 RX ORDER — PREGABALIN 75 MG/1
150 CAPSULE ORAL 2 TIMES DAILY
Status: DISCONTINUED | OUTPATIENT
Start: 2019-10-15 | End: 2019-10-16

## 2019-10-15 RX ORDER — DULOXETIN HYDROCHLORIDE 30 MG/1
30 CAPSULE, DELAYED RELEASE ORAL 2 TIMES DAILY
Status: DISCONTINUED | OUTPATIENT
Start: 2019-10-16 | End: 2019-10-18

## 2019-10-15 RX ORDER — MELATONIN
325 2 TIMES DAILY WITH MEALS
Status: DISCONTINUED | OUTPATIENT
Start: 2019-10-16 | End: 2019-10-18

## 2019-10-15 RX ORDER — HYDROCODONE BITARTRATE AND ACETAMINOPHEN 10; 325 MG/1; MG/1
1 TABLET ORAL EVERY 6 HOURS PRN
Status: DISCONTINUED | OUTPATIENT
Start: 2019-10-15 | End: 2019-10-18

## 2019-10-15 NOTE — ED PROVIDER NOTES
Patient Seen in: [facility] Emergency Department      History   Patient presents with:  Abdomen/Flank Pain (GI/)    Stated Complaint: ABD pian and ketones in urine    HPI    53-year-old female presents emergency department complaining of abdominal uncontrolled    • Unspecified disorder of thyroid     total thyroidectomy   • Visual impairment     GLASSES              Past Surgical History:   Procedure Laterality Date   • CATH ANGIO  May 2014   • CATHETER REMOVAL Right 9/12/2018    Performed by Ingrid Kaiser, Current:BP 96/66   Pulse 79   Temp 98.2 °F (36.8 °C) (Oral)   Resp 16   Ht 167.6 cm (5' 6\")   Wt 71.7 kg   SpO2 98%   BMI 25.50 kg/m²         Physical Exam    Vital signs reviewed  General appearance: Patient is alert and in no acute distress  HEENT RBC 5.56 (*)     MCV 71.4 (*)     MCH 22.3 (*)     All other components within normal limits   CBC WITH DIFFERENTIAL WITH PLATELET    Narrative: The following orders were created for panel order CBC WITH DIFFERENTIAL WITH PLATELET.   Procedure significant stenosis. 4. No significant disc disease of the thoracic spine or lumbar spine no significant stenosis. 5. Multiple bilateral renal cysts are again noted on the lumbar spine portion of the exam, which are incompletely evaluated on this study. ICD-10-CM Noted POA    Hyperglycemia R73.9 7/1/2019 Unknown Patient

## 2019-10-15 NOTE — ED INITIAL ASSESSMENT (HPI)
Pt came in for abdominal pain that has had pain for a month. Pt was seen and insulin changed. Pt seen PMD and notes ketones in the urine. Pt notes nausea. Pt has hx DM.

## 2019-10-16 LAB
ALBUMIN SERPL-MCNC: 3.2 G/DL
ALBUMIN/GLOB SERPL: NORMAL {RATIO}
ALP SERPL-CCNC: 95 U/L
ALT SERPL-CCNC: 85 U/L
ANION GAP SERPL CALC-SCNC: NORMAL MMOL/L
AST SERPL-CCNC: 70 U/L
BILIRUB SERPL-MCNC: 0.2 MG/DL
BUN SERPL-MCNC: 15 MG/DL
BUN/CREAT SERPL: NORMAL
CALCIUM SERPL-MCNC: 8.2 MG/DL
CHLORIDE SERPL-SCNC: 104 MMOL/L
CO2 SERPL-SCNC: NORMAL MMOL/L
CREAT SERPL-MCNC: 0.99 MG/DL
EST. AVERAGE GLUCOSE BLD GHB EST-MCNC: NORMAL MG/DL
GLOBULIN SER-MCNC: 2.9 G/DL
GLUCOSE SERPL-MCNC: 262 MG/DL
HBA1C MFR BLD: 16.4 %
HBA1C MFR BLD: NORMAL % (ref 4.5–5.6)
LENGTH OF FAST TIME PATIENT: NORMAL H
POTASSIUM SERPL-SCNC: 3.6 MMOL/L
PROT SERPL-MCNC: 6.1 G/DL
SODIUM SERPL-SCNC: 137 MMOL/L

## 2019-10-16 PROCEDURE — 97535 SELF CARE MNGMENT TRAINING: CPT

## 2019-10-16 PROCEDURE — 87086 URINE CULTURE/COLONY COUNT: CPT | Performed by: EMERGENCY MEDICINE

## 2019-10-16 PROCEDURE — 96372 THER/PROPH/DIAG INJ SC/IM: CPT

## 2019-10-16 PROCEDURE — 82962 GLUCOSE BLOOD TEST: CPT

## 2019-10-16 PROCEDURE — 85025 COMPLETE CBC W/AUTO DIFF WBC: CPT | Performed by: INTERNAL MEDICINE

## 2019-10-16 PROCEDURE — 96374 THER/PROPH/DIAG INJ IV PUSH: CPT

## 2019-10-16 PROCEDURE — 80053 COMPREHEN METABOLIC PANEL: CPT | Performed by: INTERNAL MEDICINE

## 2019-10-16 PROCEDURE — 84132 ASSAY OF SERUM POTASSIUM: CPT | Performed by: INTERNAL MEDICINE

## 2019-10-16 PROCEDURE — 81001 URINALYSIS AUTO W/SCOPE: CPT | Performed by: EMERGENCY MEDICINE

## 2019-10-16 PROCEDURE — 97166 OT EVAL MOD COMPLEX 45 MIN: CPT

## 2019-10-16 PROCEDURE — 83036 HEMOGLOBIN GLYCOSYLATED A1C: CPT | Performed by: INTERNAL MEDICINE

## 2019-10-16 RX ORDER — PREGABALIN 75 MG/1
150 CAPSULE ORAL 3 TIMES DAILY
Status: DISCONTINUED | OUTPATIENT
Start: 2019-10-16 | End: 2019-10-18

## 2019-10-16 RX ORDER — METOCLOPRAMIDE HYDROCHLORIDE 5 MG/ML
10 INJECTION INTRAMUSCULAR; INTRAVENOUS
Status: DISCONTINUED | OUTPATIENT
Start: 2019-10-16 | End: 2019-10-16

## 2019-10-16 RX ORDER — POTASSIUM CHLORIDE 20 MEQ/1
40 TABLET, EXTENDED RELEASE ORAL EVERY 4 HOURS
Status: COMPLETED | OUTPATIENT
Start: 2019-10-16 | End: 2019-10-16

## 2019-10-16 RX ORDER — METOCLOPRAMIDE HYDROCHLORIDE 5 MG/ML
10 INJECTION INTRAMUSCULAR; INTRAVENOUS
Status: DISCONTINUED | OUTPATIENT
Start: 2019-10-16 | End: 2019-10-18

## 2019-10-16 NOTE — CONSULTS
[address]    PATIENT'S NAME: VANI PERICO JULIUS   ATTENDING PHYSICIAN: YAHAIRA Beal: Rebecca Ferreira M.D.    PATIENT ACCOUNT#:   [de-identified]    LOCATION:  [address] [place] [address]  MEDICAL RECORD #:   [id number]       DATE OF BIR and emphysema. Sister with rectal CA. Maternal grandmother ovarian CA in her 35s. Maternal aunt breast cancer in her 62s. REVIEW OF SYSTEMS:  A 14-point review of systems unremarkable, otherwise as above.       PHYSICAL EXAMINATION:    GENERAL:  No a

## 2019-10-16 NOTE — ED NOTES
Report given to [place], [facility] Eddi Harper. Patient updated with plan of care, no questions at this time.

## 2019-10-16 NOTE — ED NOTES
Patient updated on plan of care. Provided with warm blanket and pillow at this time. Will continue to monitor.

## 2019-10-16 NOTE — OCCUPATIONAL THERAPY NOTE
OCCUPATIONAL THERAPY EVALUATION - INPATIENT     Room Number: 2628/7195-P  Evaluation Date: 10/16/2019  Type of Evaluation: Initial  Presenting Problem: hyperglycemia; ketones in urine    Physician Order: IP Consult to Occupational Therapy  Reason for [facility] REMOVAL Right 9/12/2018    Performed by Valentín Chandler MD at [address] [place] [address]     • COLONOSCOPY N/A 9/18/2015    Performed by John Ashley DO at [facility] ENDOSCOPY   • DIAGNOSTIC LAPAROSCOPY-GENERAL N/A 8/2/2017    Performed by Romain Montalvo, jennifer caught her. Patient repots no other falls. SUBJECTIVE   \" I have so much pain in the lower stomach area. \"    Patient self-stated goal is to go home     OBJECTIVE  Precautions: (left chest port)  Fall Risk: High fall risk    WEIGHT BEARING REST Stand: Contact guard assist    Skilled Therapy Provided: Patient educated on OT role, goals, plan of care, recommendations and safety.   Donned lower body clothing w/ set-up      Performed supine to/from EOB with supervision    Sit to stand from EOB, chair record   Specific performance deficits impacting engagement in ADL/IADL MODERATE  3 - 5 performance deficits   Client Assessment/Performance Deficits MODERATE - Comorbidities and min to mod modifications of tasks    Clinical Decision Making MODERATE - Anal

## 2019-10-16 NOTE — PROGRESS NOTES
NURSING ADMISSION NOTE      Patient admitted via Cart  Oriented to room. Safety precautions initiated. Bed in low position. Call light in reach. Admission navigator completed.    Patient A&O x 4  Complaints of pain to mid upper abdomen that radi

## 2019-10-16 NOTE — PLAN OF CARE
Assumed care at 0730 [email or web address], RA, VSS, no tele, voids, up w/cane, carb 1800, elec protocol, accuQ4, gave tylenol at 12:30 for pain.  Hyperglycemic, novolog given per protocol for sliding scale and carb coverage, extra 1 time dose of levimir given per endo doc

## 2019-10-16 NOTE — CONSULTS
[facility]                       Gastroenterology [address] [facility] Gastroenterology    Carlitachava Haskins Ambreen Patient Status:  Observation    3/11/1970 MRN [id number]   [facility] 3NE-A Attending Mark Gifford MD   Hosp Day # 0 PCP Austin Barrios infection    • H/O  section    • Heart attack ([facility] [address] 2015   • Herniation of cervical intervertebral disc    • High blood pressure    • High cholesterol    • IBS (irritable bowel syndrome)    • Liver disease 2013    GIORDANO and Stage 1 fibrosis   • M • TOTAL ABDOM HYSTERECTOMY     • UPPER GI ENDOSCOPY PERFORMED  May 2014     Medications: pregabalin (LYRICA) cap 150 mg, 150 mg, Oral, TID  Potassium Chloride ER (K-DUR M20) CR tab 40 mEq, 40 mEq, Oral, Q4H  insulin detemir (LEVEMIR) 100 UNIT/ML flextouc chloride 0.9% IV bolus 1,000 mL, 1,000 mL, Intravenous, Once  [COMPLETED] insulin aspart (NOVOLOG) 100 UNIT/ML vial 11 Units, 0.15 Units/kg, Subcutaneous, Once  DULoxetine HCl (CYMBALTA) DR particles cap 30 mg, 30 mg, Oral, BID  atorvastatin (LIPITOR) tab Neurologic: + MS  PE: BP 96/59 (BP Location: Right arm)   Pulse 73   Temp 98 °F (36.7 °C) (Oral)   Resp 16   Ht 5' 6\" (1.676 m)   Wt 156 lb 8 oz (71 kg)   SpO2 99%   BMI 25.26 kg/m²   Gen: AAO x 3, able to speak in complete sentences  HENT: EOMI, PERRL, o CONTRAST, NO ORAL (ER), 7/01/2019, 14:12. INDICATIONS:  ABD pian and ketones in urine     TECHNIQUE:  Unenhanced multislice CT scanning from above the kidneys to below the urinary bladder.   2D rendering are generated on the CT scanner workstation to lo _______________________________________________________________________    EGD: 7/2019: Dr Fide Tipton DIAGNOSIS/INDICATION:  1. Abdominal pain  POSTOPERTATIVE DIAGNOSIS:  1. Mild gastritis  2.  Gastric fluid retention  Final Diagnosis:   A- distortion, dysplasia, or malignancy.   _______________________________________  EGD: 9/2015: Dr Mt Townsend  INDICATION: Abdominal pain, diarrhea  POSTOPERTATIVE DIAGNOSIS: Normal EGD  Final Diagnosis:  A- Duodenum:  -Strips of superficial duodenal mucosa with Pt has nausea as well.      Assessment and Plan:  - pt's sx are all likely 2/2 gastroparesis, worsened by her current poorly controlled disease  - endo consulted for glycemic control; advised pt that improved glycemic control is the most important intervent

## 2019-10-16 NOTE — H&P
[id number] Quique Crook Patient Status:  Observation    3/11/1970 MRN [id number]   [facility] 3NE-A Attending Nikko Flores MD   Hosp Day # 0 PCP Josue Gan MD     Date:  10/16/2019  Date of Admission: WOKE UP DURING PROCEDURE   • Scoliosis of lumbar spine    • Sickle cell trait (HCC)    • Sleep apnea     CPAP   • Type II or unspecified type diabetes mellitus without mention of complication, not stated as uncontrolled    • Unspecified disorder of thyr Smoking status: Never Smoker      Smokeless tobacco: Never Used    Alcohol use: No      Alcohol/week: 0.0 standard drinks    Drug use: No    Allergies/Medications:    Allergies:   Epinephrine             OTHER (SEE COMMENTS)    Comment:Cardiac tripcity  Lat 1 tablet by mouth 2 (two) times daily as needed for Pain. Montelukast Sodium (SINGULAIR) 10 MG Oral Tab, Take 10 mg by mouth nightly. aspirin 81 MG Oral Tab EC, Take 1 tablet by mouth daily.  May use over the counter coated aspirin  DULoxetine HCl (CYMBAL respirations unlabored       Heart:    Regular rate and rhythm, S1 and S2 normal,    Abdomen:     Soft, non-tender, bowel sounds active all four quadrants,                Extremities:    no cyanosis, icterus or edema         Neurologic : M54.14 Radiculopathy, thoracic region G35 Multiple sclerosis  TECHNIQUE:  MRI of the brain was performed with multi-planar T1, T2-weighted images with FLAIR sequences and diffusion weighted images without and with infusion.   PATIENT STATED HISTORY:(As alfaro 12/26/2014, 17:28. JOCY , MRI SPINE CERVICAL (W+WO) (CPT=72156), 2/25/2016, 6:13.   INDICATIONS:  M54.12 Radiculopathy, cervical region M54.16 Radiculopathy, lumbar region M54.14 Radiculopathy, thoracic region G35 Multiple sclerosis  TECHNIQUE:  JULIUS hazel Normal caliber, contour, and signal.  The conus terminates at a normal level. No abnormal contrast enhancement. BONES:  Normal alignment without significant spondylosis or scoliosis.   DISCS:  No significant disc/facet abnormality, spinal stenosis, or for the left kidney appear to have decreased in size. Dictated by: Riccardo Cole DO on 10/11/2019 at 16:55     Approved by:  Riccardo Cole DO            Ct Abdomen+pelvis Kidneystone 2d Rndr(no Iv,no Oral)(cpt=74176)    Result Date: 10/15/2019  PROCEDURE:  C CONCLUSION:  Redemonstration right kidney stone, within the renal parenchyma, but no sign of hydronephrosis, hydroureter or other urinary tract obstruction. No stone in the bladder. Fatty liver changes.    Dictated by: Cortez Cueto MD on 10/15/2019 following.   Hyperglycemia  Due to uncontrolled iddm  Adjust insulin/ endo f/u  4.       Neurogenic bladder/overactive bladder/history of urinary tract infection.  Continue Uroxatral.      5.       Hypothyroidism.  Continue Synthroid.     6.       Asthma.

## 2019-10-16 NOTE — CM/SW NOTE
COND 44: Patient failed Inpatient criteria. Second level of review completed and supports Observation. UR committee in agreement. Discussed with Dr Madison Chauhan approves.

## 2019-10-16 NOTE — PROGRESS NOTES
0515: Patient stating that abdominal pain is not better after Norco. She does not feel that pain medication will help this kind of pain. Heat pack was provided. Patient stating \" Im just sore. \"    Patient also claiming her right hand is swelling.  Upon as

## 2019-10-17 PROCEDURE — 96372 THER/PROPH/DIAG INJ SC/IM: CPT

## 2019-10-17 PROCEDURE — 82962 GLUCOSE BLOOD TEST: CPT

## 2019-10-17 PROCEDURE — 96376 TX/PRO/DX INJ SAME DRUG ADON: CPT

## 2019-10-17 PROCEDURE — 85025 COMPLETE CBC W/AUTO DIFF WBC: CPT | Performed by: INTERNAL MEDICINE

## 2019-10-17 PROCEDURE — 97161 PT EVAL LOW COMPLEX 20 MIN: CPT

## 2019-10-17 PROCEDURE — 97116 GAIT TRAINING THERAPY: CPT

## 2019-10-17 RX ORDER — BLOOD-GLUCOSE METER
EACH MISCELLANEOUS
Qty: 1 KIT | Refills: 0 | Status: SHIPPED
Start: 2019-10-17 | End: 2019-12-17

## 2019-10-17 RX ORDER — DEXTROSE MONOHYDRATE 25 G/50ML
50 INJECTION, SOLUTION INTRAVENOUS
Status: DISCONTINUED | OUTPATIENT
Start: 2019-10-17 | End: 2019-10-18

## 2019-10-17 RX ORDER — BLOOD SUGAR DIAGNOSTIC
STRIP MISCELLANEOUS
Qty: 100 STRIP | Refills: 1 | Status: SHIPPED | OUTPATIENT
Start: 2019-10-17 | End: 2019-12-17

## 2019-10-17 NOTE — PHYSICAL THERAPY NOTE
PHYSICAL THERAPY QUICK EVALUATION - INPATIENT    Room Number: 5962/0656-C  Evaluation Date: 10/17/2019  Presenting Problem: abdominal pain  Physician Order: PT Eval and Treat    Problem List  Principal Problem:    Hyperglycemia  Active Problems:    Neuro thyroid     total thyroidectomy   • Visual impairment     GLASSES       Past Surgical History  Past Surgical History:   Procedure Laterality Date   • CATH ANGIO  May 2014   • CATHETER REMOVAL Right 9/12/2018    Performed by Kwadwo Byrne MD at [address] [place] [address] PAIN ASSESSMENT     Location: denies pain      RANGE OF MOTION AND STRENGTH ASSESSMENT  Upper extremity ROM and strength are within functional limits     Lower extremity ROM is within functional limits     Lower extremity strength is within functional measures completed include AMPA. Based on this evaluation, patient's clinical presentation is stable and overall evaluation complexity is considered low.   PT Discharge Recommendations: Home    PLAN  Patient has been evaluated and presents with no skilled

## 2019-10-17 NOTE — DIETARY NOTE
Nutrition Short Note    Dietitian consult received for gastroparesis education. Appropriate education and handout provided. All questions answered. RD available PRN.     Elda Melendez MS, RD, LDN

## 2019-10-17 NOTE — PROGRESS NOTES
[facility]  Progress Note    Tiana Callejas Patient Status:  Observation    3/11/1970 MRN [id number]   [facility] 3NE-A Attending Padmini Chen MD   Hosp Day # 1 PCP Kennedy Das MD         SUBJECTIVE:  Subjective:  Juli Aguiar 3.9 3.2*       Recent Labs   Lab 10/16/19  0831 10/16/19  1252 10/16/19  1649 10/16/19  2045 10/17/19  0725   PGLU 250* 374* 307* 314* 80                   Meds:     • insulin detemir  25 Units Subcutaneous Q12H   • pregabalin  150 mg Oral TID   • Insulin D/C TODAY WILL NEED CONTNOUS GLUCOSE MOITOR AND PROB INSULIN PUMP AS OUT PT / HER PAINS ALL SEEMS TO BE FROM DIABETES UNCONTEROLLED     See tests ordered,  Available and radiology reviewed  Discussed with nursing on floor  dvt prophylaxis reviewed  PT and/

## 2019-10-17 NOTE — PLAN OF CARE
Recommend Home upon D/C  Problem: Impaired Functional Mobility  Goal: Achieve highest/safest level of mobility/gait  Description  Interventions:  - Assess patient's functional ability and stability  - Promote increasing activity/tolerance for mobility and

## 2019-10-17 NOTE — PLAN OF CARE
A&Ox4. On room air, lungs clear. Abdomen soft and tender, BS hypoactive. Denies N&V. Tolerating diet. States last bowel movement was two days ago. No c/o pain. Up with walker and standby, voids. Accuchecks QID-Insulin given per MAR. Needs met.  Will monitor and tolerated  - Evaluate effectiveness of GI medications  - Encourage mobilization and activity  - Obtain nutritional consult as needed  - Establish a toileting routine/schedule  - Consider collaborating with pharmacy to review patient's medication profil feeding, grooming, and bathing  - Educate and encourage patient/family in tolerated functional activity level and precautions during self-care     Outcome: Progressing

## 2019-10-17 NOTE — PLAN OF CARE
Assumed care @ 0730. Patient denies abdominal pain, denies nausea. Patient's abdomen soft, non-distended, tender, bowel sounds hypoactive. Blood sugar 80 this morning. Patient's vital signs stable.  1805- Blood sugar @ 1444 was 363, Dr. Maura Mitchell notified, new o

## 2019-10-17 NOTE — PROGRESS NOTES
[facility]  Progress Note    Darwin Roman Patient Status:  Observation    3/11/1970 MRN [id number]   [facility] 3NE-A Attending Radha Luna MD   Hosp Day # 1 PCP Francois Lee MD     Assessment/Plan:  Patient Active Problem Kayli Peterson 80  -currently on: Levemir 30 units bid. Decrease to Levemir 25 units bid given FBS today. Novolog 1:5g and 1:10/140  -on discharge will attempt to streamline her meds.   Would stop pioglitazone and glipizide due to erratic appetite and use basal with meal --   --    CO2 25.0  --  26.0  --   --   --   --    BUN 18  --  15  --   --   --   --    CREATSERUM 1.50*  --  0.99  --   --   --   --    A1C  --   --  >16.4*  --   --   --   --    PGLU  --    < >  --    < >  --    < > 80   CA 8.8  --  8.2*  --   --   --

## 2019-10-18 VITALS
HEART RATE: 85 BPM | TEMPERATURE: 98 F | RESPIRATION RATE: 16 BRPM | HEIGHT: 66 IN | BODY MASS INDEX: 25.15 KG/M2 | SYSTOLIC BLOOD PRESSURE: 105 MMHG | DIASTOLIC BLOOD PRESSURE: 68 MMHG | WEIGHT: 156.5 LBS | OXYGEN SATURATION: 98 %

## 2019-10-18 LAB
ABSOLUTE IMMATURE GRANULOCYTES (OFFPRE24): NORMAL
BASO+EOS+MONOS # BLD: NORMAL 10*3/UL
BASO+EOS+MONOS NFR BLD: NORMAL %
BASOPHILS # BLD: NORMAL 10*3/UL
BASOPHILS NFR BLD: NORMAL %
DIFFERENTIAL METHOD BLD: NORMAL
EOSINOPHIL # BLD: NORMAL 10*3/UL
EOSINOPHIL NFR BLD: NORMAL %
ERYTHROCYTE [DISTWIDTH] IN BLOOD: NORMAL %
HCT VFR BLD CALC: 35 %
HGB BLD-MCNC: 10.7 G/DL
IMMATURE GRANULOCYTES (OFFPRE25): NORMAL
LYMPHOCYTES # BLD: NORMAL 10*3/UL
LYMPHOCYTES NFR BLD: NORMAL %
MCH RBC QN AUTO: NORMAL PG
MCHC RBC AUTO-ENTMCNC: NORMAL G/DL
MCV RBC AUTO: NORMAL FL
MONOCYTES # BLD: NORMAL 10*3/UL
MONOCYTES NFR BLD: NORMAL %
MPV (OFFPRE2): NORMAL
NEUTROPHILS # BLD: NORMAL 10*3/UL
NEUTROPHILS NFR BLD: NORMAL %
NRBC BLD MANUAL-RTO: NORMAL %
PLAT MORPH BLD: NORMAL
PLATELET # BLD: 150 10*3/UL
RBC # BLD: 4.88 10*6/UL
RBC MORPH BLD: NORMAL
WBC # BLD: 5 10*3/UL
WBC MORPH BLD: NORMAL

## 2019-10-18 PROCEDURE — 85025 COMPLETE CBC W/AUTO DIFF WBC: CPT | Performed by: INTERNAL MEDICINE

## 2019-10-18 PROCEDURE — 96376 TX/PRO/DX INJ SAME DRUG ADON: CPT

## 2019-10-18 PROCEDURE — 96372 THER/PROPH/DIAG INJ SC/IM: CPT

## 2019-10-18 PROCEDURE — 82962 GLUCOSE BLOOD TEST: CPT

## 2019-10-18 RX ORDER — INSULIN LISPRO 100 [IU]/ML
INJECTION, SOLUTION INTRAVENOUS; SUBCUTANEOUS
Status: SHIPPED | COMMUNITY
Start: 2019-10-18 | End: 2020-06-15

## 2019-10-18 RX ORDER — INSULIN GLARGINE 300 U/ML
50 INJECTION, SOLUTION SUBCUTANEOUS 2 TIMES DAILY
Status: SHIPPED | COMMUNITY
Start: 2019-10-18 | End: 2020-11-16

## 2019-10-18 NOTE — PROGRESS NOTES
Pt had episode of symptomatic hypoglycemia tonight after dose of scheduled levemir  No sliding scale insulin was given per order  Endocrinology was notified and carb coverage was decreased  Pt eating danny crackers at bedside- if that does not bring up sneed

## 2019-10-18 NOTE — PROGRESS NOTES
ENDOCRINOLOGY PROGRESS NOTE    Typed by Caroline Borja MD on 10/18/2019      S:  Pt will be discharged today. Resting in chair - waiting for us to give her the okay to go.       O:  /68   Pulse 85   Temp 97.7 °F (36.5 °C) (Oral)   Resp 16 0.1 - 2.0 mg/dL 0.2   TOTAL PROTEIN      6.4 - 8.2 g/dL 6.1 (L)   Albumin      3.4 - 5.0 g/dL 3.2 (L)   Globulin      2.8 - 4.4 g/dL 2.9   A/G Ratio      1.0 - 2.0 1.1       Component      Latest Ref Rng & Units 10/18/2019   WBC      4.0 - 11.0 x10(3) uL 5 - take per sliding scale as follows based on the pre-meal sugar level     Blood sugar                   Units  <150                               zero  150-200                          8 Units  201-250                          10 Units  251-300

## 2019-10-18 NOTE — PROGRESS NOTES
Pt refused hypoglycemia drink and insisted to have a glass of gatorade. Will recheck sugar in 20 minutes per hypoglycemia protocol and continue to monitor.

## 2019-10-19 NOTE — DISCHARGE SUMMARY
[facility]  Discharge Summary    Kai Nascimento Patient Status:  Observation    3/11/1970 MRN [id number]   [facility] 3NE-A Attending No att. providers found   Hosp Day # 1 PCP Opal Baird MD     Date of Admission: 10/15/2019    Da (avascular necrosis of bone) (HCC)    GERD (gastroesophageal reflux disease)    Fatty liver    Type 2 diabetes mellitus with diabetic polyneuropathy (HCC)    MS (multiple sclerosis) (HCC)    Weakness generalized    Essential hypertension    Irritable bowel Tab  TAKE 1 TABLET BY MOUTH THREE TIMES DAILY BEFORE A MEAL, Normal, Disp-30 tablet, R-0Please consider 90 day supplies to promote better adherence    Insulin Pen Needle (BD PEN NEEDLE JASKARAN U/F) 32G X 4 MM Does not apply Misc  For insulin use once a day (m Capsule Delayed Release  Take 20 mg by mouth 2 (two) times daily before meals. , Historical    Albuterol Sulfate HFA (VENTOLIN HFA) 108 (90 BASE) MCG/ACT Inhalation Aero Soln  Inhale 2 puffs into the lungs every 6 (six) hours as needed.  PRN wheezing/SOB,

## 2019-10-21 ENCOUNTER — TELEPHONE (OUTPATIENT)
Dept: NEUROLOGY | Facility: CLINIC | Age: 49
End: 2019-10-21

## 2019-10-21 ENCOUNTER — HOSPITAL ENCOUNTER (OUTPATIENT)
Dept: PERIOP | Facility: HOSPITAL | Age: 49
Discharge: HOME | End: 2019-10-21
Attending: INTERNAL MEDICINE
Payer: MEDICARE

## 2019-10-21 VITALS
DIASTOLIC BLOOD PRESSURE: 85 MMHG | SYSTOLIC BLOOD PRESSURE: 118 MMHG | OXYGEN SATURATION: 99 % | HEART RATE: 79 BPM | TEMPERATURE: 99 F | RESPIRATION RATE: 18 BRPM

## 2019-10-21 PROCEDURE — 82962 GLUCOSE BLOOD TEST: CPT

## 2019-10-21 PROCEDURE — P9045 ALBUMIN (HUMAN), 5%, 250 ML: HCPCS | Performed by: INTERNAL MEDICINE

## 2019-10-21 PROCEDURE — 36514 APHERESIS PLASMA: CPT | Performed by: INTERNAL MEDICINE

## 2019-10-21 RX ORDER — DIPHENHYDRAMINE HYDROCHLORIDE 50 MG/ML
25 INJECTION INTRAMUSCULAR; INTRAVENOUS ONCE AS NEEDED
Status: ACTIVE | OUTPATIENT
Start: 2019-10-21 | End: 2019-10-21

## 2019-10-21 RX ORDER — ALBUMIN, HUMAN INJ 5% 5 %
3000 SOLUTION INTRAVENOUS ONCE
Status: DISCONTINUED | OUTPATIENT
Start: 2019-10-21 | End: 2019-10-23

## 2019-10-21 RX ORDER — HEPARIN SODIUM 1000 [USP'U]/ML
4000 INJECTION, SOLUTION INTRAVENOUS; SUBCUTANEOUS ONCE
Status: DISCONTINUED | OUTPATIENT
Start: 2019-10-21 | End: 2019-10-23

## 2019-10-21 NOTE — TELEPHONE ENCOUNTER
Received fax from [facility] with service for   SN effective 10/20/19   1 every 2 weeks on Week 2  1 week 3. Cancel SN visit for week 10/20/19 patient will have plasmaphoresis therapy wand weekly cath maintenance will be done there. Faxed to [facility].  Confirmation

## 2019-10-21 NOTE — PROGRESS NOTES
Pt arrived to [facility], vitals taken. Pt c/o severe nausea. Pt reports blood sugar was ~519 at 0800. Pt took 35 units of short acting insulin. Repeat BG here was 451. Dr. Zarina Wilson paged, awaiting call back.      Dr. Magdaleno Carrasco called back, AYAH mann MD agreed denny

## 2019-10-21 NOTE — TELEPHONE ENCOUNTER
Received fax from [facility],   [address] visits due to ER visit at [facility]. SN effective 10/27/19 no visits requested/Scheduled. Resume RN services post observation stay at [facility].     Fax sent to [facility], Confirmation received.

## 2019-10-30 ENCOUNTER — TELEPHONE (OUTPATIENT)
Dept: NEUROLOGY | Facility: CLINIC | Age: 49
End: 2019-10-30

## 2019-10-30 NOTE — TELEPHONE ENCOUNTER
Received Fax from [address] [place] [address] to evaluate patient and develop plan of care. SN effective: 10/20/2019 1 wk 5. Documents reviewed and signed by Dr Clay Lynne. Documents faxed and confirmation received.

## 2019-11-07 ENCOUNTER — DOCUMENTATION (OUTPATIENT)
Dept: CARDIOLOGY | Age: 49
End: 2019-11-07

## 2019-11-18 ENCOUNTER — OFFICE VISIT (OUTPATIENT)
Dept: CARDIOLOGY | Age: 49
End: 2019-11-18

## 2019-11-18 ENCOUNTER — TELEPHONE (OUTPATIENT)
Dept: NEUROLOGY | Facility: CLINIC | Age: 49
End: 2019-11-18

## 2019-11-18 VITALS
HEIGHT: 66 IN | BODY MASS INDEX: 26.2 KG/M2 | SYSTOLIC BLOOD PRESSURE: 100 MMHG | WEIGHT: 163 LBS | HEART RATE: 70 BPM | DIASTOLIC BLOOD PRESSURE: 62 MMHG

## 2019-11-18 DIAGNOSIS — K21.9 GASTROESOPHAGEAL REFLUX DISEASE WITHOUT ESOPHAGITIS: ICD-10-CM

## 2019-11-18 DIAGNOSIS — I42.0 DILATED IDIOPATHIC CARDIOMYOPATHY (CMD): ICD-10-CM

## 2019-11-18 DIAGNOSIS — E03.9 HYPOTHYROIDISM, UNSPECIFIED TYPE: ICD-10-CM

## 2019-11-18 DIAGNOSIS — R53.82 CHRONIC FATIGUE AND MALAISE: ICD-10-CM

## 2019-11-18 DIAGNOSIS — R42 DIZZINESS: ICD-10-CM

## 2019-11-18 DIAGNOSIS — E11.9 TYPE 2 DIABETES MELLITUS WITHOUT COMPLICATION, WITHOUT LONG-TERM CURRENT USE OF INSULIN (CMD): ICD-10-CM

## 2019-11-18 DIAGNOSIS — M79.7 FIBROMYALGIA: Primary | ICD-10-CM

## 2019-11-18 DIAGNOSIS — Z82.49 FAMILY HISTORY OF CORONARY ARTERIOSCLEROSIS: ICD-10-CM

## 2019-11-18 DIAGNOSIS — E78.00 PURE HYPERCHOLESTEROLEMIA: ICD-10-CM

## 2019-11-18 DIAGNOSIS — G47.30 SLEEP APNEA, UNSPECIFIED TYPE: ICD-10-CM

## 2019-11-18 DIAGNOSIS — G35 MULTIPLE SCLEROSIS (CMD): ICD-10-CM

## 2019-11-18 DIAGNOSIS — R06.02 SHORTNESS OF BREATH: ICD-10-CM

## 2019-11-18 DIAGNOSIS — R53.81 CHRONIC FATIGUE AND MALAISE: ICD-10-CM

## 2019-11-18 DIAGNOSIS — K58.9 IRRITABLE BOWEL SYNDROME, UNSPECIFIED TYPE: ICD-10-CM

## 2019-11-18 DIAGNOSIS — D57.3 SICKLE CELL TRAIT (CMD): ICD-10-CM

## 2019-11-18 DIAGNOSIS — D64.9 ANEMIA, UNSPECIFIED TYPE: ICD-10-CM

## 2019-11-18 PROCEDURE — 99214 OFFICE O/P EST MOD 30 MIN: CPT | Performed by: INTERNAL MEDICINE

## 2019-11-18 RX ORDER — INSULIN LISPRO 100 [IU]/ML
INJECTION, SOLUTION INTRAVENOUS; SUBCUTANEOUS
Refills: 3 | COMMUNITY
Start: 2019-08-15 | End: 2021-04-12

## 2019-11-18 RX ORDER — ROSUVASTATIN CALCIUM 10 MG/1
10 TABLET, COATED ORAL DAILY
Qty: 30 TABLET | Refills: 3 | Status: SHIPPED | OUTPATIENT
Start: 2019-11-18 | End: 2019-12-02 | Stop reason: DRUGHIGH

## 2019-11-18 SDOH — HEALTH STABILITY: PHYSICAL HEALTH: ON AVERAGE, HOW MANY MINUTES DO YOU ENGAGE IN EXERCISE AT THIS LEVEL?: 20 MIN

## 2019-11-18 SDOH — HEALTH STABILITY: PHYSICAL HEALTH: ON AVERAGE, HOW MANY DAYS PER WEEK DO YOU ENGAGE IN MODERATE TO STRENUOUS EXERCISE (LIKE A BRISK WALK)?: 7 DAYS

## 2019-11-18 ASSESSMENT — PATIENT HEALTH QUESTIONNAIRE - PHQ9
2. FEELING DOWN, DEPRESSED OR HOPELESS: NOT AT ALL
1. LITTLE INTEREST OR PLEASURE IN DOING THINGS: NOT AT ALL
SUM OF ALL RESPONSES TO PHQ9 QUESTIONS 1 AND 2: 0
SUM OF ALL RESPONSES TO PHQ9 QUESTIONS 1 AND 2: 0

## 2019-11-18 NOTE — TELEPHONE ENCOUNTER
Received fax from [facility] :  SN effective 11/17/2019 1 WK 1. Dr Patricia Beyer reviewed and signed the paperwork. Paperwork faxed to [facility], confirmation received.

## 2019-11-20 ENCOUNTER — HOSPITAL ENCOUNTER (OUTPATIENT)
Dept: CV DIAGNOSTICS | Facility: HOSPITAL | Age: 49
Discharge: HOME | End: 2019-11-20
Attending: INTERNAL MEDICINE
Payer: MEDICARE

## 2019-11-20 DIAGNOSIS — R06.02 SHORTNESS OF BREATH: ICD-10-CM

## 2019-11-20 DIAGNOSIS — I25.5 ISCHEMIC DILATED CARDIOMYOPATHY (HCC): ICD-10-CM

## 2019-11-20 DIAGNOSIS — E11.9 TYPE 2 DIABETES MELLITUS WITHOUT COMPLICATION, WITHOUT LONG-TERM CURRENT USE OF INSULIN (HCC): ICD-10-CM

## 2019-11-20 DIAGNOSIS — I42.0 ISCHEMIC DILATED CARDIOMYOPATHY (HCC): ICD-10-CM

## 2019-11-20 PROCEDURE — 93306 TTE W/DOPPLER COMPLETE: CPT | Performed by: INTERNAL MEDICINE

## 2019-11-21 PROBLEM — N30.10 INTERSTITIAL CYSTITIS: Status: ACTIVE | Noted: 2019-11-21

## 2019-11-21 PROBLEM — G47.30 SLEEP APNEA: Status: ACTIVE | Noted: 2018-01-11

## 2019-11-22 ENCOUNTER — TELEPHONE (OUTPATIENT)
Dept: NEUROLOGY | Facility: CLINIC | Age: 49
End: 2019-11-22

## 2019-11-22 ENCOUNTER — E-ADVICE (OUTPATIENT)
Dept: CARDIOLOGY | Age: 49
End: 2019-11-22

## 2019-11-22 ENCOUNTER — TELEPHONE (OUTPATIENT)
Dept: CARDIOLOGY | Age: 49
End: 2019-11-22

## 2019-11-22 NOTE — TELEPHONE ENCOUNTER
Received faxed from [facility] for MD to review. Paperwork in the MD folder for review.

## 2019-11-27 ENCOUNTER — CLINICAL ABSTRACT (OUTPATIENT)
Dept: CARDIOLOGY | Age: 49
End: 2019-11-27

## 2019-11-29 ENCOUNTER — TELEPHONE (OUTPATIENT)
Dept: CARDIOLOGY | Age: 49
End: 2019-11-29

## 2019-11-29 ENCOUNTER — HOSPITAL ENCOUNTER (OUTPATIENT)
Dept: GENERAL RADIOLOGY | Facility: HOSPITAL | Age: 49
Discharge: HOME | End: 2019-11-29
Attending: INTERNAL MEDICINE
Payer: MEDICARE

## 2019-11-29 DIAGNOSIS — R06.02 SOB (SHORTNESS OF BREATH): ICD-10-CM

## 2019-11-29 DIAGNOSIS — I42.0 DILATED IDIOPATHIC CARDIOMYOPATHY (HCC): ICD-10-CM

## 2019-11-29 DIAGNOSIS — E11.9 TYPE 2 DIABETES MELLITUS (HCC): ICD-10-CM

## 2019-11-29 LAB
ABSOLUTE IMMATURE GRANULOCYTES (OFFPRE24): NORMAL
ALBUMIN SERPL-MCNC: 3.8 G/DL
ALBUMIN/GLOB SERPL: NORMAL {RATIO}
ALP SERPL-CCNC: 147 U/L
ALT SERPL-CCNC: 124 U/L
ANION GAP SERPL CALC-SCNC: NORMAL MMOL/L
AST SERPL-CCNC: 55 U/L
BASO+EOS+MONOS # BLD: NORMAL 10*3/UL
BASO+EOS+MONOS NFR BLD: NORMAL %
BASOPHILS # BLD: NORMAL 10*3/UL
BASOPHILS NFR BLD: NORMAL %
BILIRUB SERPL-MCNC: 0.3 MG/DL
BUN SERPL-MCNC: 13 MG/DL
BUN/CREAT SERPL: NORMAL
CALCIUM SERPL-MCNC: 8.9 MG/DL
CHLORIDE SERPL-SCNC: 103 MMOL/L
CHOLEST SERPL-MCNC: 271 MG/DL
CHOLEST/HDLC SERPL: NORMAL {RATIO}
CO2 SERPL-SCNC: NORMAL MMOL/L
CREAT SERPL-MCNC: 1.27 MG/DL
DIFFERENTIAL METHOD BLD: NORMAL
EOSINOPHIL # BLD: NORMAL 10*3/UL
EOSINOPHIL NFR BLD: NORMAL %
ERYTHROCYTE [DISTWIDTH] IN BLOOD: NORMAL %
EST. AVERAGE GLUCOSE BLD GHB EST-MCNC: NORMAL MG/DL
GLOBULIN SER-MCNC: 3.9 G/DL
GLUCOSE SERPL-MCNC: 402 MG/DL
HBA1C MFR BLD: 13 %
HBA1C MFR BLD: NORMAL % (ref 4.5–5.6)
HCT VFR BLD CALC: 40 %
HDLC SERPL-MCNC: 32 MG/DL
HGB BLD-MCNC: 12 G/DL
IMMATURE GRANULOCYTES (OFFPRE25): NORMAL
LDLC SERPL CALC-MCNC: 211 MG/DL
LENGTH OF FAST TIME PATIENT: NORMAL H
LENGTH OF FAST TIME PATIENT: NORMAL H
LYMPHOCYTES # BLD: NORMAL 10*3/UL
LYMPHOCYTES NFR BLD: NORMAL %
MCH RBC QN AUTO: NORMAL PG
MCHC RBC AUTO-ENTMCNC: NORMAL G/DL
MCV RBC AUTO: NORMAL FL
MONOCYTES # BLD: NORMAL 10*3/UL
MONOCYTES NFR BLD: NORMAL %
MPV (OFFPRE2): NORMAL
NEUTROPHILS # BLD: NORMAL 10*3/UL
NEUTROPHILS NFR BLD: NORMAL %
NONHDLC SERPL-MCNC: NORMAL MG/DL
NRBC BLD MANUAL-RTO: NORMAL %
PLAT MORPH BLD: NORMAL
PLATELET # BLD: 166 10*3/UL
POTASSIUM SERPL-SCNC: 4 MMOL/L
PROT SERPL-MCNC: 7.7 G/DL
RBC # BLD: 5.5 10*6/UL
RBC MORPH BLD: NORMAL
SODIUM SERPL-SCNC: 138 MMOL/L
TRIGL SERPL-MCNC: 141 MG/DL
TSH SERPL-ACNC: 1.27 M[IU]/L
VLDLC SERPL CALC-MCNC: NORMAL MG/DL
WBC # BLD: 5.1 10*3/UL
WBC MORPH BLD: NORMAL

## 2019-11-29 PROCEDURE — 71046 X-RAY EXAM CHEST 2 VIEWS: CPT | Performed by: INTERNAL MEDICINE

## 2019-11-29 PROCEDURE — 36591 DRAW BLOOD OFF VENOUS DEVICE: CPT

## 2019-11-29 RX ORDER — ALBUMIN, HUMAN INJ 5% 5 %
3000 SOLUTION INTRAVENOUS ONCE
Status: CANCELLED | OUTPATIENT
Start: 2019-12-02

## 2019-11-29 RX ORDER — HEPARIN SODIUM 1000 [USP'U]/ML
4000 INJECTION, SOLUTION INTRAVENOUS; SUBCUTANEOUS ONCE
Status: CANCELLED | OUTPATIENT
Start: 2019-12-02

## 2019-11-29 RX ORDER — DIPHENHYDRAMINE HYDROCHLORIDE 50 MG/ML
25 INJECTION INTRAMUSCULAR; INTRAVENOUS ONCE
Status: CANCELLED | OUTPATIENT
Start: 2019-12-02

## 2019-12-02 ENCOUNTER — TELEPHONE (OUTPATIENT)
Dept: CARDIOLOGY | Age: 49
End: 2019-12-02

## 2019-12-02 ENCOUNTER — HOSPITAL ENCOUNTER (OUTPATIENT)
Dept: PERIOP | Facility: HOSPITAL | Age: 49
Discharge: HOME | End: 2019-12-02
Attending: INTERNAL MEDICINE
Payer: MEDICARE

## 2019-12-02 DIAGNOSIS — E78.00 PURE HYPERCHOLESTEROLEMIA: Primary | ICD-10-CM

## 2019-12-02 RX ORDER — ROSUVASTATIN CALCIUM 20 MG/1
20 TABLET, COATED ORAL DAILY
Qty: 90 TABLET | Refills: 1 | Status: SHIPPED | OUTPATIENT
Start: 2019-12-02 | End: 2020-11-09

## 2019-12-09 ENCOUNTER — TELEPHONE (OUTPATIENT)
Dept: NEUROLOGY | Facility: CLINIC | Age: 49
End: 2019-12-09

## 2019-12-09 ENCOUNTER — OFFICE VISIT (OUTPATIENT)
Dept: NEUROLOGY | Facility: CLINIC | Age: 49
End: 2019-12-09
Payer: MEDICARE

## 2019-12-09 VITALS
BODY MASS INDEX: 26.68 KG/M2 | WEIGHT: 166 LBS | DIASTOLIC BLOOD PRESSURE: 78 MMHG | SYSTOLIC BLOOD PRESSURE: 116 MMHG | RESPIRATION RATE: 16 BRPM | HEIGHT: 66 IN | HEART RATE: 74 BPM

## 2019-12-09 DIAGNOSIS — G35 MS (MULTIPLE SCLEROSIS) (HCC): Primary | ICD-10-CM

## 2019-12-09 PROCEDURE — 99214 OFFICE O/P EST MOD 30 MIN: CPT | Performed by: OTHER

## 2019-12-09 NOTE — TELEPHONE ENCOUNTER
Per Dr Amaya Platt and Belen Bell patient to initiate Audrey Sieving. All the questions answered and start form signed.     JCV: 0.22 done at [facility] on 09/20/2019  CBC done 11/29/2019

## 2019-12-09 NOTE — PROGRESS NOTES
[facility] Progress Note    ANDREW Adhikari is a 44year old female with history of multiple sclerosis, who is curerently on PLEX for therapy      Patient was last seen in 9/2019 by CHRISTELLE Kang and 3/2019 by Dr. Susan arshad High cholesterol    • Hoarseness, chronic    • IBS (irritable bowel syndrome)    • Irregular bowel habits    • Leaking of urine 2019   • Liver disease 11/2013    GIORDANO and Stage 1 fibrosis   • Migraines    • Multiple sclerosis ([facility] [address]    • Myocardial infarcti INDWELLING, IMP     • THYROIDECTOMY  JULY 2013    benign MNG   • THYROIDECTOMY N/A 7/18/2013    Performed by Kassi Zimmerman MD at [address]   • TOTAL ABDOM HYSTERECTOMY     • UPPER GI ENDOSCOPY PERFORMED  May 2014     Family History   Problem Relation Tab, Take 10 mg by mouth., Disp: , Rfl:   •  Continuous Blood Gluc Transmit (DEXCOM G6 TRANSMITTER) Does not apply Misc, 1 each by Does not apply route every 3 (three) months., Disp: 1 each, Rfl: 3  •  Continuous Blood Gluc Sensor (DEXCOM G6 SENSOR) Does n mouth 2 (two) times daily with meals.   , Disp: , Rfl:   •  HYDROcodone-acetaminophen  MG Oral Tab, Take 1 tablet by mouth 2 (two) times daily as needed for Pain., Disp: , Rfl:   •  Montelukast Sodium (SINGULAIR) 10 MG Oral Tab, Take 10 mg by mouth ni throughout, tone increased bilateral LE  DTR: 2+ brisk, but symmetric throughout, toes downgoing bilaterally, no clonus  Sensory: intact to light touch throughout  Coord: FNF intact with minimal tremor L hand ; but no dysmetria; rapid alternating movements to [address] [place] [address]. She has continued to have gradual decline in her disease, despite being on multiple disease modifying agents, and her liver disease limits her options for therapy.     After extensive discussion with patient, as well as her primary neurologist

## 2019-12-10 NOTE — TELEPHONE ENCOUNTER
Referral order for Tysabri infusions faxed to [facility] infusion center with receipt confirmation. Awaiting authorization.

## 2019-12-10 NOTE — TELEPHONE ENCOUNTER
Starter paperwork completed. Called and asked patient where she would like to infuse. Patient requesting to infuse at [address] [facility]. Referral to be faxed to [facility] for authorization after reviewed by provider.     Starter paperwork faxed to [facility]

## 2019-12-12 NOTE — TELEPHONE ENCOUNTER
[facility] infusion center calling to clarify infusion instructions. Patient to infuse Tysabri every 8 weeks.

## 2019-12-17 ENCOUNTER — APPOINTMENT (OUTPATIENT)
Dept: CT IMAGING | Facility: HOSPITAL | Age: 49
DRG: 637 | End: 2019-12-17
Attending: EMERGENCY MEDICINE
Payer: MEDICARE

## 2019-12-17 ENCOUNTER — HOSPITAL ENCOUNTER (INPATIENT)
Facility: HOSPITAL | Age: 49
LOS: 3 days | Discharge: HOME HEALTH | DRG: 637 | End: 2019-12-20
Attending: EMERGENCY MEDICINE | Admitting: SPECIALIST
Payer: MEDICARE

## 2019-12-17 DIAGNOSIS — E10.10 TYPE 1 DIABETES MELLITUS WITH KETOACIDOSIS WITHOUT COMA (HCC): Primary | ICD-10-CM

## 2019-12-17 DIAGNOSIS — N39.0 URINARY TRACT INFECTION WITHOUT HEMATURIA, SITE UNSPECIFIED: ICD-10-CM

## 2019-12-17 PROCEDURE — 74176 CT ABD & PELVIS W/O CONTRAST: CPT | Performed by: EMERGENCY MEDICINE

## 2019-12-17 RX ORDER — HEPARIN SODIUM 5000 [USP'U]/ML
5000 INJECTION, SOLUTION INTRAVENOUS; SUBCUTANEOUS EVERY 8 HOURS SCHEDULED
Status: DISCONTINUED | OUTPATIENT
Start: 2019-12-17 | End: 2019-12-20

## 2019-12-17 RX ORDER — DOCUSATE SODIUM 100 MG/1
100 CAPSULE, LIQUID FILLED ORAL 2 TIMES DAILY
Status: DISCONTINUED | OUTPATIENT
Start: 2019-12-17 | End: 2019-12-20

## 2019-12-17 RX ORDER — SODIUM CHLORIDE 9 MG/ML
INJECTION, SOLUTION INTRAVENOUS CONTINUOUS
Status: DISCONTINUED | OUTPATIENT
Start: 2019-12-17 | End: 2019-12-17

## 2019-12-17 RX ORDER — LEVOFLOXACIN 5 MG/ML
750 INJECTION, SOLUTION INTRAVENOUS ONCE
Status: COMPLETED | OUTPATIENT
Start: 2019-12-17 | End: 2019-12-17

## 2019-12-17 RX ORDER — SODIUM CHLORIDE 9 MG/ML
INJECTION, SOLUTION INTRAVENOUS CONTINUOUS
Status: DISCONTINUED | OUTPATIENT
Start: 2019-12-17 | End: 2019-12-19

## 2019-12-17 RX ORDER — MELATONIN
100 DAILY
Status: DISCONTINUED | OUTPATIENT
Start: 2019-12-17 | End: 2019-12-20

## 2019-12-17 RX ORDER — ALBUTEROL SULFATE 90 UG/1
2 AEROSOL, METERED RESPIRATORY (INHALATION) EVERY 6 HOURS PRN
Status: DISCONTINUED | OUTPATIENT
Start: 2019-12-17 | End: 2019-12-20

## 2019-12-17 RX ORDER — METOCLOPRAMIDE HYDROCHLORIDE 5 MG/ML
5 INJECTION INTRAMUSCULAR; INTRAVENOUS EVERY 8 HOURS PRN
Status: DISCONTINUED | OUTPATIENT
Start: 2019-12-17 | End: 2019-12-20

## 2019-12-17 RX ORDER — ASPIRIN 81 MG/1
81 TABLET ORAL DAILY
Status: DISCONTINUED | OUTPATIENT
Start: 2019-12-17 | End: 2019-12-20

## 2019-12-17 RX ORDER — ONDANSETRON 2 MG/ML
4 INJECTION INTRAMUSCULAR; INTRAVENOUS EVERY 6 HOURS PRN
Status: DISCONTINUED | OUTPATIENT
Start: 2019-12-17 | End: 2019-12-20

## 2019-12-17 RX ORDER — SODIUM PHOSPHATE, DIBASIC AND SODIUM PHOSPHATE, MONOBASIC 7; 19 G/133ML; G/133ML
1 ENEMA RECTAL ONCE AS NEEDED
Status: DISCONTINUED | OUTPATIENT
Start: 2019-12-17 | End: 2019-12-20

## 2019-12-17 RX ORDER — PREGABALIN 75 MG/1
150 CAPSULE ORAL 3 TIMES DAILY
Status: DISCONTINUED | OUTPATIENT
Start: 2019-12-17 | End: 2019-12-17

## 2019-12-17 RX ORDER — ENOXAPARIN SODIUM 100 MG/ML
40 INJECTION SUBCUTANEOUS DAILY
Status: DISCONTINUED | OUTPATIENT
Start: 2019-12-17 | End: 2019-12-17

## 2019-12-17 RX ORDER — ROSUVASTATIN CALCIUM 10 MG/1
10 TABLET, COATED ORAL NIGHTLY
Status: DISCONTINUED | OUTPATIENT
Start: 2019-12-17 | End: 2019-12-20

## 2019-12-17 RX ORDER — ONDANSETRON 2 MG/ML
4 INJECTION INTRAMUSCULAR; INTRAVENOUS ONCE
Status: COMPLETED | OUTPATIENT
Start: 2019-12-17 | End: 2019-12-17

## 2019-12-17 RX ORDER — METOCLOPRAMIDE 5 MG/1
5 TABLET ORAL
Status: DISCONTINUED | OUTPATIENT
Start: 2019-12-17 | End: 2019-12-18

## 2019-12-17 RX ORDER — PANTOPRAZOLE SODIUM 40 MG/1
40 TABLET, DELAYED RELEASE ORAL
Status: DISCONTINUED | OUTPATIENT
Start: 2019-12-18 | End: 2019-12-18

## 2019-12-17 RX ORDER — MORPHINE SULFATE 4 MG/ML
4 INJECTION, SOLUTION INTRAMUSCULAR; INTRAVENOUS EVERY 30 MIN PRN
Status: COMPLETED | OUTPATIENT
Start: 2019-12-17 | End: 2019-12-18

## 2019-12-17 RX ORDER — POLYETHYLENE GLYCOL 3350 17 G/17G
17 POWDER, FOR SOLUTION ORAL DAILY PRN
Status: DISCONTINUED | OUTPATIENT
Start: 2019-12-17 | End: 2019-12-20

## 2019-12-17 RX ORDER — MONTELUKAST SODIUM 10 MG/1
10 TABLET ORAL NIGHTLY
Status: DISCONTINUED | OUTPATIENT
Start: 2019-12-17 | End: 2019-12-20

## 2019-12-17 RX ORDER — DEXTROSE MONOHYDRATE 25 G/50ML
50 INJECTION, SOLUTION INTRAVENOUS
Status: DISCONTINUED | OUTPATIENT
Start: 2019-12-17 | End: 2019-12-20

## 2019-12-17 RX ORDER — ZOLPIDEM TARTRATE 5 MG/1
5 TABLET ORAL NIGHTLY PRN
Status: DISCONTINUED | OUTPATIENT
Start: 2019-12-17 | End: 2019-12-20

## 2019-12-17 RX ORDER — DEXTROSE AND SODIUM CHLORIDE 5; .9 G/100ML; G/100ML
INJECTION, SOLUTION INTRAVENOUS CONTINUOUS PRN
Status: DISCONTINUED | OUTPATIENT
Start: 2019-12-17 | End: 2019-12-18

## 2019-12-17 RX ORDER — LEVOFLOXACIN 5 MG/ML
750 INJECTION, SOLUTION INTRAVENOUS
Status: DISCONTINUED | OUTPATIENT
Start: 2019-12-19 | End: 2019-12-19

## 2019-12-17 RX ORDER — DULOXETIN HYDROCHLORIDE 60 MG/1
60 CAPSULE, DELAYED RELEASE ORAL 2 TIMES DAILY
Status: DISCONTINUED | OUTPATIENT
Start: 2019-12-17 | End: 2019-12-20

## 2019-12-17 RX ORDER — TRIMETHOPRIM 100 MG/1
100 TABLET ORAL NIGHTLY
Status: DISCONTINUED | OUTPATIENT
Start: 2019-12-17 | End: 2019-12-17

## 2019-12-17 RX ORDER — CARVEDILOL 3.12 MG/1
3.12 TABLET ORAL 2 TIMES DAILY WITH MEALS
Status: DISCONTINUED | OUTPATIENT
Start: 2019-12-17 | End: 2019-12-20

## 2019-12-17 RX ORDER — PREGABALIN 75 MG/1
75 CAPSULE ORAL 2 TIMES DAILY
Status: DISCONTINUED | OUTPATIENT
Start: 2019-12-17 | End: 2019-12-20

## 2019-12-17 RX ORDER — BISACODYL 10 MG
10 SUPPOSITORY, RECTAL RECTAL
Status: DISCONTINUED | OUTPATIENT
Start: 2019-12-17 | End: 2019-12-20

## 2019-12-17 RX ORDER — LEVOTHYROXINE SODIUM 88 UG/1
88 TABLET ORAL
Status: DISCONTINUED | OUTPATIENT
Start: 2019-12-17 | End: 2019-12-20

## 2019-12-17 RX ORDER — MELATONIN
325 2 TIMES DAILY WITH MEALS
Status: DISCONTINUED | OUTPATIENT
Start: 2019-12-17 | End: 2019-12-20

## 2019-12-17 RX ORDER — OMEPRAZOLE 20 MG/1
20 CAPSULE, DELAYED RELEASE ORAL
COMMUNITY
End: 2021-07-12

## 2019-12-17 NOTE — PROGRESS NOTES
[facility] Pharmacy Note:  Renal Adjustment for levofloxacin (Renita Almazan)    Karyna Patrick is a 44year old female who has been prescribed levofloxacin (LEVAQUIN) 500 mg once.   CrCl is estimated creatinine clearance is 36 mL/min (A) (based on SCr of 1.73 mg/dL (H)

## 2019-12-17 NOTE — ED INITIAL ASSESSMENT (HPI)
Nausea, weakness, mid abdominal pain x2 months, worsening now. Afebrile. Has catheter for plasmaphoresis. Has port d/t being hard stick.

## 2019-12-17 NOTE — ED PROVIDER NOTES
Patient Seen in: [facility] Emergency Department      History   Patient presents with:  Nausea/Vomiting/Diarrhea    Stated Complaint: nv    HPI    The patient is a 47-year-old insulin-dependent diabetic female, presenting to the emergency department movements 2019   • Painful urination    • PONV (postoperative nausea and vomiting)     WOKE UP DURING PROCEDURE   • Scoliosis of lumbar spine    • Shortness of breath    • Sickle cell trait ([facility] [address]    • Sleep apnea     CPAP   • Type II or unspecified type di Alcohol/week: 0.0 standard drinks    Drug use: No             Review of Systems    Positive for stated complaint: nv, and abdominal pain. She said increased thirst.  No chest pain or shortness of breath. No dysuria. Other systems are as noted in HPI.   C Normal interaction, cooperative with exam         ED Course     Labs Reviewed   COMP METABOLIC PANEL (14) - Abnormal; Notable for the following components:       Result Value    Glucose 543 (*)     Sodium 130 (*)     CO2 11.0 (*)     Anion Gap 21 (*)     B Final result                 Please view results for these tests on the individual orders.    RAINBOW DRAW BLUE   RAINBOW DRAW LAVENDER   RAINBOW DRAW LIGHT GREEN   RAINBOW DRAW GOLD   URINE CULTURE, ROUTINE          Blood was obtained and peripheral IV acc inflammatory changes     involving the bowel. ABDOMINAL WALL:  No mass or hernia. URINARY BLADDER:  Distended urinary bladder is noted. PELVIC NODES:  No adenopathy. PELVIC ORGANS:  Sequelae of hysterectomy is noted.     BONES:  No bony lesi AVN (avascular necrosis of bone) ([facility] M87.00 12/23/2013 Yes    Demyelinating disease of central nervous system ([facility] G37.9 11/6/2013 Yes    Essential hypertension I10 Unknown Yes    Fatty liver K76.0 5/2/2014 Yes    Fibromyalgia M79.7 10/5/2013 Yes

## 2019-12-17 NOTE — PROGRESS NOTES
NON FACE 2 FACE  Pt came to er d/w er doctor, and nurse on floor   Old charts reviewed  Orders written   Spent 30 minutes non face to face   [unfilled]  OBJECTIVE:  Temp:  [98 °F (36.7 °C)] 98 °F (36.7 °C)  Pulse:  [90-97] 90  Resp:  [14-26] 16  BP: (108-137 management,endo .  icu consilt /insulin drip and ivf   She needs isnulin pump   Will see pt in am and dictate h and p   See tests ordered,  Available and radiology reviewed  dvt prophylaxis reviewed    Jun Robles MD  12/17/2019  4:45 PM

## 2019-12-18 RX ORDER — METOCLOPRAMIDE HYDROCHLORIDE 5 MG/ML
10 INJECTION INTRAMUSCULAR; INTRAVENOUS EVERY 8 HOURS
Status: DISCONTINUED | OUTPATIENT
Start: 2019-12-18 | End: 2019-12-20

## 2019-12-18 RX ORDER — DEXTROSE MONOHYDRATE 25 G/50ML
50 INJECTION, SOLUTION INTRAVENOUS
Status: DISCONTINUED | OUTPATIENT
Start: 2019-12-18 | End: 2019-12-18

## 2019-12-18 RX ORDER — POTASSIUM CHLORIDE 14.9 MG/ML
20 INJECTION INTRAVENOUS ONCE
Status: COMPLETED | OUTPATIENT
Start: 2019-12-19 | End: 2019-12-19

## 2019-12-18 RX ORDER — POTASSIUM CHLORIDE 29.8 MG/ML
40 INJECTION INTRAVENOUS ONCE
Status: COMPLETED | OUTPATIENT
Start: 2019-12-18 | End: 2019-12-19

## 2019-12-18 RX ORDER — PROCHLORPERAZINE EDISYLATE 5 MG/ML
10 INJECTION INTRAMUSCULAR; INTRAVENOUS EVERY 6 HOURS PRN
Status: DISCONTINUED | OUTPATIENT
Start: 2019-12-18 | End: 2019-12-20

## 2019-12-18 NOTE — DIETARY MALNUTRITION NOTE
[facility]    NUTRITION INITIAL ASSESSMENT    Pt meets severe malnutrition criteria.     CRITERIA FOR MALNUTRITION DIAGNOSIS:  Criteria for severe malnutrition diagnosis: chronic illness related to wt loss greater than 10% in 6 months and energy intake RELATED HISTORY:  Appetite: Poor  Intake: <50%  Intake Meeting Needs: No  Food Allergies: No  Cultural/Ethnic/Mandaen Preferences Addresses: Yes    NUTRITION RELATED PHYSICAL FINDINGS:     1. Body Fat/Muscle Mass: well nourished per visual exam.     2. F

## 2019-12-18 NOTE — PROGRESS NOTES
12/18/19 0828   Clinical Encounter Type   Visited With Patient not available  (Patient was resting.)   Continue Visiting Yes

## 2019-12-18 NOTE — PLAN OF CARE
Pt received this AM alert & oriented x 4, able to follow commands. Insulin gtt d/c per Endo. Sub Q insulin started. Lungs are diminished bilaterally on room air. NSR/ST. BP stable. Pt complaining of nausea & discomfort, Zofran/reglan/morphine given PRN.  GI toileting routine/schedule  - Consider collaborating with pharmacy to review patient's medication profile  Outcome: Progressing  Goal: Maintains adequate nutritional intake (undernourished)  Description  INTERVENTIONS:  - Monitor percentage of each meal co measures as available)  - Encourage oral intake as appropriate  - Instruct patient on fluid and nutrition restrictions as appropriate  Outcome: Progressing

## 2019-12-18 NOTE — CM/SW NOTE
12/18/19 1100   CM/SW Screening   Referral Source Physician   Information Source Chart review;Nursing rounds   Patient's Mental Status Alert;Oriented   Patient's [address]   Patient lives with Children   Patient Status Prior to Admission

## 2019-12-18 NOTE — PLAN OF CARE
Pt received from ED around 1740. Fatigued, oriented x 4, able to follow commands, placed on ICU monitors & oriented to surroundings. Insulin gtt infusing. NSR/ST. BP stable. SCDs on. Dr. Lee Catching paged-DKA orders. Base algorithm followed. Accucheck q1h.  Pt com

## 2019-12-18 NOTE — PHYSICAL THERAPY NOTE
PHYSICAL THERAPY EVALUATION - INPATIENT     Room Number: 456/456-A  Evaluation Date: 12/18/2019  Type of Evaluation: Initial  Physician Order: PT Eval and Treat    Presenting Problem: DKA, UTI  Reason for Therapy: Mobility Dysfunction and Discharge P Hoarseness, chronic    • IBS (irritable bowel syndrome)    • Irregular bowel habits    • Leaking of urine 2019   • Liver disease 11/2013    GIORDANO and Stage 1 fibrosis   • Migraines    • Multiple sclerosis ([facility]    • Myocardial infarction [facility])    • Neuropat PORT, INDWELLING, IMP     • THYROIDECTOMY  JULY 2013    benign MNG   • THYROIDECTOMY N/A 7/18/2013    Performed by Troy Barnes MD at [facility] MAIN OR   • TOTAL ABDOM HYSTERECTOMY     • UPPER GI ENDOSCOPY PERFORMED  May 2014       HOME SITUATION  Type of H Moving to and from a bed to a chair (including a wheelchair)?: A Little   -   Need to walk in hospital room?: A Little   -   Climbing 3-5 steps with a railing?: A Little       AM-PAC Score:  Raw Score: 20   Approx Degree of Impairment: 35.83%   Standardize presentation is stable and overall the evaluation complexity is considered low. These impairments and comorbidities manifest themselves as functional limitations in independent bed mobility, transfers, and gait.  The patient is below baseline and would mihir

## 2019-12-18 NOTE — PLAN OF CARE
Assumed care after RN report; pt resting in bed. A&Ox4; follows commands. Pt is very fatigued. States that her abdomen is \"uncomfrotable\" at times; other times state's she's in pain; but denies pain medication and lays on her side for relief.  Pt has some

## 2019-12-18 NOTE — PROGRESS NOTES
12/18/19 3115   Clinical Encounter Type   Visited With Patient   Routine Visit   (Per consult)   Continue Visiting   (Encouraged Zaida Chandler to call  anytime through her RN)   Patient's Supportive Strategies/Resources Identified her family support denny

## 2019-12-18 NOTE — CONSULTS
[facility]                       Gastroenterology [address] [facility] Gastroenterology    [facility] Patient Status:  Inpatient    3/11/1970 MRN [id number]   [facility] 4SW-A Attending Padmini Chen MD   Hosp Day # 1 DEZ Oliva kidney    • Constipation    • Decorative tattoo    • Diabetes mellitus ([facility]    • Diarrhea, unspecified    • Easy bruising    • Elevated liver enzymes    • Endocrine disorder    • Esophageal reflux    • Extrinsic asthma, unspecified    • Fatigue    • Fibro ESOPHAGOGASTRODUODENOSCOPY (EGD) N/A 6/17/2016    Performed by Dora Brewer DO at [facility] ENDOSCOPY   • ESOPHAGOGASTRODUODENOSCOPY (EGD) N/A 9/16/2015    Performed by Dora Brewer DO at [facility] ENDOSCOPY   • ESOPHAGOGASTRODUODENOSCOPY (EGD) N/A 5/3/2014 Nightly  Thiamine HCl tab 100 mg, 100 mg, Oral, Daily  Zolpidem Tartrate (AMBIEN) tab 5 mg, 5 mg, Oral, Nightly PRN  docusate sodium (COLACE) cap 100 mg, 100 mg, Oral, BID  PEG 3350 (MIRALAX) powder packet 17 g, 17 g, Oral, Daily PRN  magnesium hydroxide ( Jamil for H. Pylori test kit. SocHx:  No history of smoking;  The patient denies alcohol intake; The patient has no history of IV drug use or other illicit substances.   FamHx: + mother dx with gastric cancer;  No family history of ulcer disease, or in cyanosis  Skin: Warm and dry; chest ports intact   Psychiatric: Flat affect   Labs:   Lab Results   Component Value Date    WBC 7.0 12/18/2019    HGB 13.2 12/18/2019    HCT 44.7 12/18/2019    .0 12/18/2019    CREATSERUM 1.41 12/18/2019    BUN 11 12/ dilatation, or atrophy. SPLEEN:  No enlargement or focal lesion. KIDNEYS:  4 mm calculus in the upper pole the right kidney is stable. Exophytic high density lesion off the posterior lateral upper pole the right kidney measuring 9 mm is stable.   The EGD 7/2019 without PUD/neoplasm, did suggest gastric retention   2. Nausea without vomiting: will resume Reglan and PPI  3. Severely uncontrolled DM currently in DKA: non-compliant   4. Poor PO intake with ongoing wt loss   5.  Chronic iron deficiency anemi 12/18/19, 4:51 PM  [facility]  [phone]

## 2019-12-18 NOTE — RESPIRATORY THERAPY NOTE
Patient has cpap at home that she does not wear because it \"messes up her stomach\".  Refusing during her stay with us

## 2019-12-18 NOTE — H&P
[id number] Quique JULIUS Crook Patient Status:  Inpatient    3/11/1970 MRN [id number]   [facility] 4SW-A Attending Jan Lennon MD   Hosp Day # 1 PCP Choco Núñez MD     Date:  2019  Date of Admission: Calculus of kidney    • Constipation    • Decorative tattoo    • Diabetes mellitus ([facility]    • Diarrhea, unspecified    • Easy bruising    • Elevated liver enzymes    • Endocrine disorder    • Esophageal reflux    • Extrinsic asthma, unspecified    • Fatigu ESOPHAGOGASTRODUODENOSCOPY (EGD) N/A 6/17/2016    Performed by Taylor Puente DO at [facility] ENDOSCOPY   • ESOPHAGOGASTRODUODENOSCOPY (EGD) N/A 9/16/2015    Performed by Taylor Puente DO at [facility] ENDOSCOPY   • ESOPHAGOGASTRODUODENOSCOPY (EGD) N/A 5/3/2014 Throat    Comment:Urea C-13 in Pranactin for H. Pylori test kit. omeprazole 20 MG Oral Capsule Delayed Release, Take 20 mg by mouth 2 (two) times daily before meals.   natalizumab (TYSABRI) 300 MG/15ML Intravenous Conc, Tysabri: Dilute one vial (300 mg/15 daily. Albuterol Sulfate HFA (VENTOLIN HFA) 108 (90 BASE) MCG/ACT Inhalation Aero Soln, Inhale 2 puffs into the lungs every 6 (six) hours as needed.  PRN wheezing/SOB  denosumab (PROLIA) 60 MG/ML Subcutaneous Solution, Inject 60 mg into the skin every 6 12/18/2019    ALKPHO 129 (H) 12/18/2019    BILT 0.5 12/18/2019    TP 7.3 12/18/2019    AST 60 (H) 12/18/2019    ALT 77 (H) 12/18/2019    PTT 92.3 (H) 05/28/2019    INR 0.91 05/28/2019    PT 13.4 05/01/2014    T4F 0.9 12/18/2019    TSH 2.610 12/18/2019    L Dictated by: Pamela Taylor MD on 11/29/2019 at 9:28     Approved by: Pamela Taylor MD on 11/29/2019 at 9:29          Ct Abdomen+pelvis(cpt=74176)    Result Date: 12/17/2019  PROCEDURE:  CT ABDOMEN+PELVIS (CPT=74176)  COMPARISON:  [facility] , CT, CT ABDOME pole the right kidney and exophytic high density lesion are stable. Renal collecting systems are not dilated. 3. Marked fatty infiltration of the liver is again noted.     Dictated by: Bryn Way MD on 12/17/2019 at 16:25     Approved by: Nicole Moore cystitis     Family history of coronary arteriosclerosis     Type 1 diabetes mellitus with ketoacidosis without coma (HCC)     Urinary tract infection without hematuria, site unspecified        Type 1 diabetes mellitus with ketoacidosis without coma ([facility]

## 2019-12-19 PROCEDURE — 99232 SBSQ HOSP IP/OBS MODERATE 35: CPT | Performed by: CLINICAL NURSE SPECIALIST

## 2019-12-19 RX ORDER — LEVOFLOXACIN 5 MG/ML
750 INJECTION, SOLUTION INTRAVENOUS EVERY 24 HOURS
Status: DISCONTINUED | OUTPATIENT
Start: 2019-12-19 | End: 2019-12-20

## 2019-12-19 RX ORDER — LACTULOSE 10 G/15ML
10 SOLUTION ORAL 2 TIMES DAILY
Status: DISCONTINUED | OUTPATIENT
Start: 2019-12-19 | End: 2019-12-20

## 2019-12-19 NOTE — PLAN OF CARE
Up with assist  Accuchecks changed to q4hrs  Advance diet as tolerated  BM today  Refuses cpap  Levaquin for UTI  Transfer to medical bed  [facility] at discharge  OP gastric emptying test with GI

## 2019-12-19 NOTE — CONSULTS
[address]    PATIENT'S NAME: PERICO LUDWIG   ATTENDING PHYSICIAN: Devendra Aragon M.D.   Geofm Givens: Crista Antonio M.D.    PATIENT ACCOUNT#:   [de-identified]    LOCATION:  [address] [place] [address]  MEDICAL RECORD #:   [id number]       DATE OF BIRTH: Noncontributory. SOCIAL HISTORY:  Denies tobacco use. REVIEW OF SYSTEMS:  As above. The remainder was reviewed and is negative. PHYSICAL EXAMINATION:    GENERAL:  She is well developed, well nourished, in no acute distress.   VITAL SIGNS:  Temp

## 2019-12-19 NOTE — PROGRESS NOTES
Report called to transfer to room 3608.    Patient stated she wanted to call her family to notify them

## 2019-12-19 NOTE — PLAN OF CARE
RECEIVED PATIENT ALERT AND ORIENTED X4. GENERALIZED FATIGUE. SR WITH PVC'S ON TELE. DENIES CHEST PAIN AND SOB. ACCU-CHECKS EVERY 4 HOURS. HYPOGLYCEMIA 61/74 NOTED THIS NOC. ENDO MD DR MOTT NOTIFIED. RECHECKS PER PROTOCOL.   C/O NAUSEA AND ABDOMINAL P

## 2019-12-19 NOTE — CDS QUERY
Soraya Ruvalcaba        3/11/1970       DOS 19 – current   [phone]   [facility] [phone]  Potential for Impaired Nutritional Status  DOCUMENTATION CLARIFICATION FORM  Dear Doctor Sanaz Burden information suggests potential for impaired nutritional

## 2019-12-19 NOTE — PROGRESS NOTES
Patient received from ICU, alert X 4. Tele applied, patient oriented to room/unit. Patient verbalized tired and would like to sleep, no c/o pain.     @ 1700, Per Dr. Rodger Tolbert- cover with sliding Novolog 10 units and Novolog 6 units, will co

## 2019-12-19 NOTE — PROGRESS NOTES
[facility] Gastroenterology Progress Note    S: Pt with improved nausea and vomiting today. Hungry and eager to eat. Tolerated cld sips yday.      O: /84   Pulse 80   Temp 97.3 °F (36.3 °C) (Temporal)   Resp 12   Ht 65.98\"   Wt 151 lb 14.4 oz (6 in clinic as OP and will consider GES as OP once off reglan to assess gastric motility   - Full liquid diet ; ADAT     GI will sign off at this time. Please call back with any questions or concerns.        Luc Bartlett, 12/19/19, 11:26 AM  Caryle Mattock

## 2019-12-19 NOTE — CONSULTS
[facility]  Diabetes Clinical Nurse Specialist Consult Note    Nieves Chavezles Patient Status:  Inpatient    3/11/1970 MRN [id number]   [facility] 4SW-A Attending Anahy Gamble MD   Hosp Day # 2 PCP Devendra Aragon MD     Reason for diabetes mellitus with ketoacidosis without coma ([facility]     Urinary tract infection without hematuria, site unspecified    Labs:    Recent Labs   Lab 12/19/19  0049 12/19/19  0142 12/19/19  0347 12/19/19  0540 12/19/19  0850   PGLU 116* 152* 223* 207* 240* it used to be higher. I explained she is in a vicious cycle due to her not taking her insulin, which causes abdominal pain, which causes her to not test her blood glucose, which causes her to miss her plasmapheresis appointments.  She does not seem to un

## 2019-12-19 NOTE — PROGRESS NOTES
[facility]  Progress Note    Alfredo Barry Patient Status:  Inpatient    3/11/1970 MRN [id number]   [facility] 4SW-A Attending Carol Anton MD   Hosp Day # 2 PCP Ashley Lofton MD     Subjective:  Alfredoserena Barry is a(n) 44 year o negative    Radiology:      CT reviewed with clear lung bases      Medications reviewed     Assessment and Plan:   Patient Active Problem List:     Neuropathic pain of both legs     Elevated liver enzymes     Hyperparathyroidism ([facility]     Asthma     Osteop mellitus-poor control in association with chronic illness/pain/inability to eat  Acute kidney injury in association with dehydration and acidosis- improved   Chronic abdominal pain of an unknown etiology-associated weight loss described as 40 pounds GI not

## 2019-12-19 NOTE — PROGRESS NOTES
[facility]  Progress Note    Chelita Robles Patient Status:  Inpatient    3/11/1970 MRN [id number]   [facility] 4SW-A Attending Susan Francis MD   Hosp Day # 2 PCP Jun Robles MD       Assessment/Plan:44year-old woman with mult 112 mmol/L    CO2 19.0 (L) 21.0 - 32.0 mmol/L    Anion Gap 6 0 - 18 mmol/L    BUN 11 7 - 18 mg/dL    Creatinine 1.41 (H) 0.55 - 1.02 mg/dL    BUN/CREA Ratio 7.8 (L) 10.0 - 20.0    Calcium, Total 8.4 (L) 8.5 - 10.1 mg/dL    Calculated Osmolality 290 275 - 2 Ratio 6.5 (L) 10.0 - 20.0    Calcium, Total 8.1 (L) 8.5 - 10.1 mg/dL    Calculated Osmolality 296 (H) 275 - 295 mOsm/kg    GFR, Non- 44 (L) >=60    GFR, -American 51 (L) >=60   POCT GLUCOSE    Collection Time: 12/18/19  4:52 PM   Res Creatinine 1.20 (H) 0.55 - 1.02 mg/dL    Calcium, Total 8.7 8.5 - 10.1 mg/dL    GFR, Non- 52 (L) >=60    GFR, -American 60 >=60    Magnesium 2.0 1.6 - 2.6 mg/dL   PHOSPHORUS    Collection Time: 12/19/19  4:48 AM   Result Value Ref Ra Granulocyte Absolute 0.01 0.00 - 1.00 x10(3) uL    Neutrophil % 57.9 %    Lymphocyte % 32.2 %    Monocyte % 8.8 %    Eosinophil % 0.5 %    Basophil % 0.4 %    Immature Granulocyte % 0.2 %   POCT GLUCOSE    Collection Time: 12/19/19  5:40 AM   Result Value

## 2019-12-19 NOTE — PROGRESS NOTES
Pharmacy Note:  Renal Adjustment for levofloxacin (Lashay Orta)         Angie Aquino is a 44year old female who has been prescribed levofloxacin 750mg q48hr.       Est CrCl: ~53 mL/min    The dose has been adjusted to levofloxacin 750mg q24hr per hospital

## 2019-12-19 NOTE — PROGRESS NOTES
[facility]  Progress Note    Angie Aquino Patient Status:  Inpatient    3/11/1970 MRN [id number]   [facility] 4SW-A Attending Stas Aponte MD   Hosp Day # 2 PCP Arnoldo Vera MD         SUBJECTIVE:  Subjective:  Angie Aquino CREATSERUM 1.37* 1.41* 1.38* 1.38* 1.28* 1.20*  1.20*   CA 8.2* 8.4* 8.4* 8.1* 8.7 8.7  8.7   MG  --   --   --   --   --  2.0   PHOS 2.0*  --   --   --   --  1.3*   * 253* 234* 297* 255* 221*  221*       Recent Labs   Lab 12/17/19  1359 12/18/19 ([address]    Gastroparesis    Fatty liver    Type 2 diabetes mellitus with diabetic polyneuropathy (HCC)    MS (multiple sclerosis) (HCC)    Essential hypertension    Abdominal adhesions    GIORDANO (nonalcoholic steatohepatitis)    Urinary tract infection without

## 2019-12-20 VITALS
TEMPERATURE: 98 F | WEIGHT: 150.19 LBS | OXYGEN SATURATION: 100 % | RESPIRATION RATE: 12 BRPM | DIASTOLIC BLOOD PRESSURE: 65 MMHG | SYSTOLIC BLOOD PRESSURE: 94 MMHG | BODY MASS INDEX: 24.14 KG/M2 | HEART RATE: 85 BPM | HEIGHT: 65.98 IN

## 2019-12-20 RX ORDER — POTASSIUM CHLORIDE 20 MEQ/1
40 TABLET, EXTENDED RELEASE ORAL EVERY 4 HOURS
Status: DISCONTINUED | OUTPATIENT
Start: 2019-12-20 | End: 2019-12-20

## 2019-12-20 NOTE — PROGRESS NOTES
[facility]  Progress Note    Duana Expose Patient Status:  Inpatient    3/11/1970 MRN [id number]   [facility] 3NE-A Attending Lauren Pate MD   Hosp Day # 3 PCP Jose Stern MD     Assessment/Plan:  Patient Active Problem List infection without hematuria, site unspecified      49-year-old woman with multiple medical issues including type 2 diabetes with complications of neuropathy and coronary artery disease admitted for diabetic ketoacidosis     1.  DM 2 uncontrolled/DKA  -A1c w --  141  141  --  139  139  --    K 4.4   < >  --    < > 3.5  --  3.8  --  3.1*  --  4.2  4.2  4.2  --  3.1*  3.1*  --    CL 98   < > 109   < > 110  --  108  --  111  --  110  110  --  104  104  --    CO2 11.0*   < > 16.0*   < > 21.0  --  20.0*  --  22.0

## 2019-12-20 NOTE — PLAN OF CARE
Assumed care at 0730, pt A/Ox4, Room air, NSR on tele, K replaced per protocol, sliding scale insulin given per protocol, meal and basal insulin coverage provided. Pt cleared for discharge. Will continue to monitor.    Problem: RESPIRATORY - ADULT  Goal: Ac (undernourished)  Description  INTERVENTIONS:  - Monitor percentage of each meal consumed  - Identify factors contributing to decreased intake, treat as appropriate  - Assist with meals as needed  - Monitor I&O, WT and lab values  - Obtain nutritional cons Impaired Functional Mobility  Goal: Achieve highest/safest level of mobility/gait  Description  Interventions:  - Assess patient's functional ability and stability  - Promote increasing activity/tolerance for mobility and gait  - Educate and engage patient

## 2019-12-20 NOTE — DIETARY NOTE
[facility]    NUTRITION FOLLOW-UP ASSESSMENT    Pt meets severe malnutrition criteria.     CRITERIA FOR MALNUTRITION DIAGNOSIS:  Criteria for severe malnutrition diagnosis: chronic illness related to wt loss greater than 10% in 6 months and energy int oz)  07/01/19 : 78 kg (172 lb)  05/28/19 : 80.7 kg (178 lb)  01/06/19 : 81.7 kg (180 lb 3.2 oz)     NUTRITION:  Diet: Low Fiber, 60 g Carbohydrate per meal diet  Oral Supplements: Vanilla ensure HP     FOOD/NUTRITION RELATED HISTORY:  Appetite: Poor  Intak

## 2019-12-20 NOTE — PROGRESS NOTES
[facility]  Progress Note    Symone Méndez Patient Status:  Inpatient    3/11/1970 MRN [id number]   [facility] 3NE-A Attending Elana Yadav MD   Hosp Day # 3 PCP Adin Joy MD     Subjective:  Symone Méndez is a(n) 44 year o Lab 12/19/19  0448 12/20/19  0712   MG 2.0 2.0       Lab Results   Component Value Date    PHOS 2.5 12/19/2019        Recent Labs   Lab 12/19/19  1128   INR 1.04       No results for input(s): ABGPHT, RXYUJR1N, OGLQJ0V, ABGHCO3, ABGBE, TEMP, ROXI, SITE, Oral, Daily  Zolpidem Tartrate (AMBIEN) tab 5 mg, 5 mg, Oral, Nightly PRN  docusate sodium (COLACE) cap 100 mg, 100 mg, Oral, BID  PEG 3350 (MIRALAX) powder packet 17 g, 17 g, Oral, Daily PRN  magnesium hydroxide (MILK OF MAGNESIA) 400 MG/5ML suspension 30 Will follow peripherally. Please contact if questions or concerns.     Kemi Montiel MD, [place] [address] [facility]

## 2019-12-20 NOTE — PLAN OF CARE
Received patient a/ox4. No new complaints. Denies nausea. States she hopes to be discharged today because her daughter is getting . She was updated on plan of care and verbalizes understanding.

## 2019-12-21 NOTE — DISCHARGE SUMMARY
[facility]  Discharge Summary    Jeff Marcos Patient Status:  Inpatient    3/11/1970 MRN [id number]   [facility] 3NE-A Attending No att. providers found   Hosp Day # 3 PCP Trent Martines MD     Date of Admission: 2019    Date (nonalcoholic steatohepatitis)     Myalgia and myositis, unspecified     Liver lesion     Interstitial cystitis     Family history of coronary arteriosclerosis     Type 1 diabetes mellitus with ketoacidosis without coma ([facility]     Urinary tract infection wi daily with meals. , Historical    Thiamine HCl 100 MG Oral Tab  Take 1 tablet (100 mg total) by mouth daily. , Normal, Disp-30 tablet, R-3    BREO ELLIPTA 100-25 MCG/INH Inhalation Aerosol Powder, Breath Activated  Inhale 1 puff into the lungs daily.   , Hist

## 2019-12-21 NOTE — PROGRESS NOTES
NURSING DISCHARGE NOTE    Discharged Home via [place]. Accompanied by Family member  Belongings taken by patient/family. Discharge paperwork provided and covered. All questions and concerns addressed. Pt discharged with family via wheelchair.

## 2019-12-23 ENCOUNTER — TELEPHONE (OUTPATIENT)
Dept: NEUROLOGY | Facility: CLINIC | Age: 49
End: 2019-12-23

## 2019-12-23 ENCOUNTER — CLINICAL ABSTRACT (OUTPATIENT)
Dept: CARDIOLOGY | Age: 49
End: 2019-12-23

## 2019-12-23 NOTE — TELEPHONE ENCOUNTER
Received fax from [address] [place] [address] after hospital stay. Reviewed and signed by Dr Bridgette Carey. Faxed [facility] and confirmation received.

## 2019-12-23 NOTE — TELEPHONE ENCOUNTER
Received a call from Lizabeth at [facility] wanting to confirm pt can get back on the schedule for Infusion. Pt was just discharged from the hospital with DKA.

## 2019-12-23 NOTE — TELEPHONE ENCOUNTER
RN spoke to MD and received the order to wait a 1-2 weeks and then she can start the infusion. RN called [facility] and spoke to KENDALL Calhoun and notified her of the MD's order. Dior verbalized understanding and did not have any further questions.

## 2019-12-27 ENCOUNTER — TELEPHONE (OUTPATIENT)
Dept: NEUROLOGY | Facility: CLINIC | Age: 49
End: 2019-12-27

## 2019-12-27 DIAGNOSIS — G35 MULTIPLE SCLEROSIS (HCC): Primary | ICD-10-CM

## 2019-12-27 RX ORDER — PREGABALIN 150 MG/1
CAPSULE ORAL
Qty: 60 CAPSULE | Refills: 5 | Status: SHIPPED | OUTPATIENT
Start: 2019-12-27 | End: 2020-05-26

## 2019-12-27 NOTE — TELEPHONE ENCOUNTER
Medication: Pregabalin 150mg    Date of last refill: 11/08/19 (#60/0)  [place] 32[place]       Last office visit: 12/9/19  Due back to clinic per last office note:   Date next office visit scheduled:    Future Appointment

## 2020-01-02 ENCOUNTER — TELEPHONE (OUTPATIENT)
Dept: NEUROLOGY | Facility: CLINIC | Age: 50
End: 2020-01-02

## 2020-01-02 NOTE — TELEPHONE ENCOUNTER
Tysabri infusion record received from [facility] infusion center for physician review. No signs/symptoms of infusion reaction noted. Patient infused on 1/2/2020. This was patient's first infusion of the new medication.   Lot Number [id number], Exp 02/2022  No ord

## 2020-01-06 ENCOUNTER — TELEPHONE (OUTPATIENT)
Dept: NEUROLOGY | Facility: CLINIC | Age: 50
End: 2020-01-06

## 2020-01-10 ENCOUNTER — HOSPITAL ENCOUNTER (OUTPATIENT)
Dept: NUCLEAR MEDICINE | Facility: HOSPITAL | Age: 50
Discharge: HOME | End: 2020-01-10
Attending: NURSE PRACTITIONER
Payer: MEDICARE

## 2020-01-10 DIAGNOSIS — R10.84 GENERALIZED ABDOMINAL PAIN: ICD-10-CM

## 2020-01-10 DIAGNOSIS — K31.84 GASTROPARESIS: ICD-10-CM

## 2020-01-10 PROCEDURE — 78264 GASTRIC EMPTYING IMG STUDY: CPT | Performed by: NURSE PRACTITIONER

## 2020-01-14 ENCOUNTER — TELEPHONE (OUTPATIENT)
Dept: NEUROLOGY | Facility: CLINIC | Age: 50
End: 2020-01-14

## 2020-01-14 NOTE — TELEPHONE ENCOUNTER
Fax received from [facility] for patient's continued nursing services. Form reviewed and signed. Faxed with receipt confirmation. Nursing services effective for once weekly for one week.     This was a verbal order to [facility].

## 2020-01-27 ENCOUNTER — HOSPITAL ENCOUNTER (OUTPATIENT)
Dept: GENERAL RADIOLOGY | Facility: HOSPITAL | Age: 50
Discharge: HOME | End: 2020-01-27
Attending: INTERNAL MEDICINE
Payer: MEDICARE

## 2020-01-27 ENCOUNTER — TELEPHONE (OUTPATIENT)
Dept: NEUROLOGY | Facility: CLINIC | Age: 50
End: 2020-01-27

## 2020-01-27 DIAGNOSIS — Z79.4 TYPE 2 DIABETES MELLITUS WITH DIABETIC POLYNEUROPATHY, WITH LONG-TERM CURRENT USE OF INSULIN (HCC): ICD-10-CM

## 2020-01-27 DIAGNOSIS — E11.42 TYPE 2 DIABETES MELLITUS WITH DIABETIC POLYNEUROPATHY, WITH LONG-TERM CURRENT USE OF INSULIN (HCC): ICD-10-CM

## 2020-01-27 LAB
GLUCOSE BLD-MCNC: 302 MG/DL (ref 70–99)
PATIENT FASTING Y/N/NP: YES

## 2020-01-27 PROCEDURE — 84681 ASSAY OF C-PEPTIDE: CPT

## 2020-01-27 PROCEDURE — 82947 ASSAY GLUCOSE BLOOD QUANT: CPT

## 2020-01-27 NOTE — TELEPHONE ENCOUNTER
Received Fax from [facility]     Certification period: 01/19/2020 to 03/18/2020    SN 1 wk 8     Orders reviewed and signed by Dr Danny Arellano. Orders faxed, confirmation received.

## 2020-01-28 LAB — C-PEPTIDE, SERUM OR PLASMA: 3.6 NG/ML

## 2020-01-29 NOTE — PROGRESS NOTES
Telephone Information:  Home Phone      [phone]  Mobile          [phone]  Patient informed of Dr. Jose A Marshall result note. Patient verbalized understanding and agrees with plan.    Pt confirms that she is consistently taking insulin as directed   Hardik

## 2020-01-29 NOTE — PROGRESS NOTES
Patient informed of Dr. Nico White result note. Patient verbalized understanding and agrees with plan.

## 2020-02-05 ENCOUNTER — APPOINTMENT (OUTPATIENT)
Dept: CARDIOLOGY | Age: 50
End: 2020-02-05

## 2020-02-14 ENCOUNTER — HOSPITAL ENCOUNTER (OUTPATIENT)
Dept: GENERAL RADIOLOGY | Facility: HOSPITAL | Age: 50
Discharge: HOME | End: 2020-02-14
Attending: INTERNAL MEDICINE
Payer: MEDICARE

## 2020-02-14 DIAGNOSIS — IMO0001 IDDM (INSULIN DEPENDENT DIABETES MELLITUS): ICD-10-CM

## 2020-02-14 LAB
GLUCOSE BLD-MCNC: 174 MG/DL (ref 70–99)
PATIENT FASTING Y/N/NP: YES

## 2020-02-14 PROCEDURE — 36591 DRAW BLOOD OFF VENOUS DEVICE: CPT

## 2020-02-14 PROCEDURE — 84681 ASSAY OF C-PEPTIDE: CPT

## 2020-02-14 PROCEDURE — 82947 ASSAY GLUCOSE BLOOD QUANT: CPT

## 2020-02-15 LAB — C-PEPTIDE, SERUM OR PLASMA: 2.1 NG/ML

## 2020-02-27 ENCOUNTER — TELEPHONE (OUTPATIENT)
Dept: NEUROLOGY | Facility: CLINIC | Age: 50
End: 2020-02-27

## 2020-02-27 NOTE — TELEPHONE ENCOUNTER
Tysabri infusion record received from [facility] infusion center for physician review. No signs/symptoms of infusion reaction noted. Patient infused on 2/27/2020  Lot Number [id number], Exp 11/2022  No new orders requested.   Orders active for 10 additional infusi

## 2020-03-04 ENCOUNTER — CASE MANAGEMENT (OUTPATIENT)
Dept: CARE COORDINATION | Age: 50
End: 2020-03-04

## 2020-03-17 ENCOUNTER — TELEPHONE (OUTPATIENT)
Dept: NEUROLOGY | Facility: CLINIC | Age: 50
End: 2020-03-17

## 2020-03-17 NOTE — TELEPHONE ENCOUNTER
[facility] home [facility] skilled nursing services visit report signed and faxed with receipt confirmation. Patient receives skilled nursing visits once weekly for 9 visits.

## 2020-03-20 ENCOUNTER — HOSPITAL ENCOUNTER (OUTPATIENT)
Dept: CT IMAGING | Age: 50
Discharge: HOME | End: 2020-03-20
Attending: SPECIALIST
Payer: MEDICARE

## 2020-03-20 DIAGNOSIS — S09.90XA HEAD INJURY: ICD-10-CM

## 2020-03-20 DIAGNOSIS — G35 MS (MULTIPLE SCLEROSIS) (HCC): ICD-10-CM

## 2020-03-20 PROCEDURE — 70450 CT HEAD/BRAIN W/O DYE: CPT | Performed by: SPECIALIST

## 2020-03-24 ENCOUNTER — TELEPHONE (OUTPATIENT)
Dept: NEUROLOGY | Facility: CLINIC | Age: 50
End: 2020-03-24

## 2020-03-24 NOTE — TELEPHONE ENCOUNTER
Home health certification and plan of care received from [facility] for physician review and signature.     Patient receives skilled nursing services    Frequency: weekly     Duration: 7 weeks    Plan of Care: skilled nursing to perform IV site

## 2020-04-14 ENCOUNTER — LAB REQUISITION (OUTPATIENT)
Dept: LAB | Facility: HOSPITAL | Age: 50
End: 2020-04-14
Payer: MEDICARE

## 2020-04-14 DIAGNOSIS — O04.88: ICD-10-CM

## 2020-04-14 PROCEDURE — 81003 URINALYSIS AUTO W/O SCOPE: CPT | Performed by: SPECIALIST

## 2020-04-14 PROCEDURE — 87086 URINE CULTURE/COLONY COUNT: CPT | Performed by: SPECIALIST

## 2020-04-23 ENCOUNTER — TELEPHONE (OUTPATIENT)
Dept: NEUROLOGY | Facility: CLINIC | Age: 50
End: 2020-04-23

## 2020-04-23 NOTE — TELEPHONE ENCOUNTER
Tysabri infusion record received from [facility] infusion [place] for physician review. No signs/symptoms of  infusion reaction noted. Patient infused on 4/23/2020. Patient infuses every 8 week, extended dosing.   Lot Number [id number], Exp 11/2022  No new orders

## 2020-05-12 ENCOUNTER — TELEPHONE (OUTPATIENT)
Dept: NEUROLOGY | Facility: CLINIC | Age: 50
End: 2020-05-12

## 2020-05-12 NOTE — TELEPHONE ENCOUNTER
RN received from [facility] for physician review and signature. Patient receives RN    Frequency: weekly     Duration: 9 weeks    Plan of Care: Weekly maintenance of left subclavin catheter and assessment of MS symptoms.      Paperwork liz

## 2020-05-18 ENCOUNTER — OFFICE VISIT (OUTPATIENT)
Dept: CARDIOLOGY | Age: 50
End: 2020-05-18

## 2020-05-18 VITALS
SYSTOLIC BLOOD PRESSURE: 88 MMHG | DIASTOLIC BLOOD PRESSURE: 57 MMHG | WEIGHT: 160 LBS | BODY MASS INDEX: 25.71 KG/M2 | HEIGHT: 66 IN | HEART RATE: 89 BPM

## 2020-05-18 DIAGNOSIS — R42 DIZZINESS: ICD-10-CM

## 2020-05-18 DIAGNOSIS — G47.30 SLEEP APNEA, UNSPECIFIED TYPE: ICD-10-CM

## 2020-05-18 DIAGNOSIS — Z82.49 FAMILY HISTORY OF CORONARY ARTERIOSCLEROSIS: ICD-10-CM

## 2020-05-18 DIAGNOSIS — E03.9 HYPOTHYROIDISM, UNSPECIFIED TYPE: ICD-10-CM

## 2020-05-18 DIAGNOSIS — D57.3 SICKLE CELL TRAIT (CMD): ICD-10-CM

## 2020-05-18 DIAGNOSIS — R53.81 CHRONIC FATIGUE AND MALAISE: ICD-10-CM

## 2020-05-18 DIAGNOSIS — E78.00 PURE HYPERCHOLESTEROLEMIA: ICD-10-CM

## 2020-05-18 DIAGNOSIS — I42.0 DILATED IDIOPATHIC CARDIOMYOPATHY (CMD): ICD-10-CM

## 2020-05-18 DIAGNOSIS — R06.02 SHORTNESS OF BREATH: ICD-10-CM

## 2020-05-18 DIAGNOSIS — G35 MULTIPLE SCLEROSIS (CMD): ICD-10-CM

## 2020-05-18 DIAGNOSIS — K21.9 GASTROESOPHAGEAL REFLUX DISEASE WITHOUT ESOPHAGITIS: ICD-10-CM

## 2020-05-18 DIAGNOSIS — M79.7 FIBROMYALGIA: Primary | ICD-10-CM

## 2020-05-18 DIAGNOSIS — D64.9 ANEMIA, UNSPECIFIED TYPE: ICD-10-CM

## 2020-05-18 DIAGNOSIS — R53.82 CHRONIC FATIGUE AND MALAISE: ICD-10-CM

## 2020-05-18 DIAGNOSIS — E11.9 TYPE 2 DIABETES MELLITUS WITHOUT COMPLICATION, WITHOUT LONG-TERM CURRENT USE OF INSULIN (CMD): ICD-10-CM

## 2020-05-18 DIAGNOSIS — K58.9 IRRITABLE BOWEL SYNDROME, UNSPECIFIED TYPE: ICD-10-CM

## 2020-05-18 DIAGNOSIS — R07.2 PRECORDIAL PAIN: ICD-10-CM

## 2020-05-18 DIAGNOSIS — M79.10 MYALGIA: ICD-10-CM

## 2020-05-18 PROCEDURE — 99215 OFFICE O/P EST HI 40 MIN: CPT | Performed by: INTERNAL MEDICINE

## 2020-05-18 RX ORDER — BACLOFEN 10 MG/1
10 TABLET ORAL DAILY
COMMUNITY
Start: 2020-04-25

## 2020-05-18 RX ORDER — PROCHLORPERAZINE 25 MG/1
SUPPOSITORY RECTAL
COMMUNITY
Start: 2020-05-01

## 2020-05-18 SDOH — HEALTH STABILITY: PHYSICAL HEALTH: ON AVERAGE, HOW MANY MINUTES DO YOU ENGAGE IN EXERCISE AT THIS LEVEL?: 60 MIN

## 2020-05-18 SDOH — HEALTH STABILITY: PHYSICAL HEALTH: ON AVERAGE, HOW MANY DAYS PER WEEK DO YOU ENGAGE IN MODERATE TO STRENUOUS EXERCISE (LIKE A BRISK WALK)?: 7 DAYS

## 2020-05-18 ASSESSMENT — ENCOUNTER SYMPTOMS
HEMATOCHEZIA: 0
WEIGHT GAIN: 0
BRUISES/BLEEDS EASILY: 0
WEIGHT LOSS: 0
FEVER: 0
SUSPICIOUS LESIONS: 0
COUGH: 0
CHILLS: 0
HEMOPTYSIS: 0
ALLERGIC/IMMUNOLOGIC COMMENTS: NO NEW FOOD ALLERGIES

## 2020-05-18 ASSESSMENT — PATIENT HEALTH QUESTIONNAIRE - PHQ9
SUM OF ALL RESPONSES TO PHQ9 QUESTIONS 1 AND 2: 0
1. LITTLE INTEREST OR PLEASURE IN DOING THINGS: NOT AT ALL
SUM OF ALL RESPONSES TO PHQ9 QUESTIONS 1 AND 2: 0
2. FEELING DOWN, DEPRESSED OR HOPELESS: NOT AT ALL

## 2020-05-25 DIAGNOSIS — G35 MULTIPLE SCLEROSIS (HCC): ICD-10-CM

## 2020-05-26 ENCOUNTER — HOSPITAL ENCOUNTER (OUTPATIENT)
Dept: CV DIAGNOSTICS | Facility: HOSPITAL | Age: 50
Discharge: HOME | End: 2020-05-26
Attending: INTERNAL MEDICINE
Payer: MEDICARE

## 2020-05-26 DIAGNOSIS — R07.2 PRECORDIAL PAIN: ICD-10-CM

## 2020-05-26 DIAGNOSIS — R06.02 SHORTNESS OF BREATH: ICD-10-CM

## 2020-05-26 PROCEDURE — 93306 TTE W/DOPPLER COMPLETE: CPT | Performed by: INTERNAL MEDICINE

## 2020-05-26 RX ORDER — PREGABALIN 150 MG/1
CAPSULE ORAL
Qty: 60 CAPSULE | Refills: 0 | Status: SHIPPED | OUTPATIENT
Start: 2020-05-26 | End: 2020-06-25

## 2020-05-26 NOTE — TELEPHONE ENCOUNTER
Medication: PREGABALIN 150 MG Oral Cap    Date of last refill: 12/27/2019 (#60/5)  [place] 6/13/8795    Last office visit: 12/9/2019  Due back to clinic per last office note:  6 months  Date next office visit scheduled:

## 2020-06-08 ENCOUNTER — VIRTUAL PHONE E/M (OUTPATIENT)
Dept: NEUROLOGY | Facility: CLINIC | Age: 50
End: 2020-06-08
Payer: MEDICARE

## 2020-06-08 DIAGNOSIS — M79.7 FIBROMYALGIA SYNDROME: ICD-10-CM

## 2020-06-08 DIAGNOSIS — G57.93 NEUROPATHIC PAIN OF BOTH LEGS: ICD-10-CM

## 2020-06-08 DIAGNOSIS — G35 MULTIPLE SCLEROSIS (HCC): Primary | ICD-10-CM

## 2020-06-08 PROCEDURE — 99441 PHONE E/M BY PHYS 5-10 MIN: CPT | Performed by: OTHER

## 2020-06-08 NOTE — PROGRESS NOTES
Virtual Telephone Check-In    Jordan Wallis verbally consents to a Virtual/Telephone Check-In visit on 06/08/20. Patient has been referred to the [facility] website at [email or web address] to review the yearly Consent to Treat document.     Patient Enocanders Ad Take 1 tablet (100 mg total) by mouth daily. , Disp: 30 tablet, Rfl: 3  •  BREO ELLIPTA 100-25 MCG/INH Inhalation Aerosol Powder, Breath Activated, Inhale 1 puff into the lungs daily.   , Disp: , Rfl:   •  docusate sodium 100 MG Oral Cap, Take 100 mg by mout Other Visit Diagnoses     Fibromyalgia syndrome            From the standpoint of the demyelinating disease he is clinically stable if not slightly improved as far as ambulation is concerned.   We are therefore continuing with Tysabri but she is due for J

## 2020-06-18 ENCOUNTER — TELEPHONE (OUTPATIENT)
Dept: NEUROLOGY | Facility: CLINIC | Age: 50
End: 2020-06-18

## 2020-06-18 DIAGNOSIS — G35 MULTIPLE SCLEROSIS (HCC): Primary | ICD-10-CM

## 2020-06-18 NOTE — TELEPHONE ENCOUNTER
Tysabri infusion record received from [facility] infusion center for physician review. No signs/symptoms of infusion reaction noted. Patient infused on 6/18/2020. Lot Number [id number], Exp 12/2022  No new orders requested.   Orders active for one year of infusio

## 2020-06-25 DIAGNOSIS — G35 MULTIPLE SCLEROSIS (HCC): ICD-10-CM

## 2020-06-25 RX ORDER — PREGABALIN 150 MG/1
CAPSULE ORAL
Qty: 60 CAPSULE | Refills: 5 | Status: ON HOLD | OUTPATIENT
Start: 2020-06-25 | End: 2021-03-03 | Stop reason: DRUGHIGH

## 2020-06-25 NOTE — TELEPHONE ENCOUNTER
Medication: Lyrica    Date of last refill: 5/26/20  [place] 3[place]92/99    Last office visit: 6/8/20  Due back to clinic per last office note:  Not addressed  Date next office visit scheduled:    Future Appointments   Date T

## 2020-06-30 ENCOUNTER — TELEPHONE (OUTPATIENT)
Dept: NEUROLOGY | Facility: CLINIC | Age: 50
End: 2020-06-30

## 2020-06-30 NOTE — TELEPHONE ENCOUNTER
FDA mandated Tysabri reauthorization questionnaire completed online. Patient is JCV Negative, Index = 0.22. Last test date unknown, next test date due now. Has completed 4 Tysabri infusions with 0 courses of steroids in last 6 months.  Patient receives in

## 2020-07-14 ENCOUNTER — TELEPHONE (OUTPATIENT)
Dept: NEUROLOGY | Facility: CLINIC | Age: 50
End: 2020-07-14

## 2020-07-14 NOTE — TELEPHONE ENCOUNTER
Home health recertification [facility]. Patient receives nursing visits weekly for 9 more weeks. Monitor vitals, medications and complications from MS. Signed and dated. Faxed back with receipt confirmation.

## 2020-07-21 ENCOUNTER — TELEPHONE (OUTPATIENT)
Dept: NEUROLOGY | Facility: CLINIC | Age: 50
End: 2020-07-21

## 2020-07-21 NOTE — TELEPHONE ENCOUNTER
Received episode summary report for patient from [facility]. Patient receives home health services d/t homebound status. Paperwork was for review only. No signature required. Reauthorization received.   Goals for patient to remain free

## 2020-07-22 ENCOUNTER — HOSPITAL ENCOUNTER (OUTPATIENT)
Dept: GENERAL RADIOLOGY | Facility: HOSPITAL | Age: 50
Discharge: HOME | End: 2020-07-22
Attending: INTERNAL MEDICINE
Payer: MEDICARE

## 2020-07-22 DIAGNOSIS — Z79.4 UNCONTROLLED TYPE 2 DIABETES MELLITUS WITH HYPERGLYCEMIA, WITH LONG-TERM CURRENT USE OF INSULIN (HCC): ICD-10-CM

## 2020-07-22 DIAGNOSIS — E11.65 UNCONTROLLED TYPE 2 DIABETES MELLITUS WITH HYPERGLYCEMIA, WITH LONG-TERM CURRENT USE OF INSULIN (HCC): ICD-10-CM

## 2020-07-22 LAB
ALBUMIN SERPL-MCNC: 3.4 G/DL (ref 3.4–5)
ALBUMIN/GLOB SERPL: 0.8 {RATIO} (ref 1–2)
ALP LIVER SERPL-CCNC: 127 U/L (ref 41–108)
ALT SERPL-CCNC: 128 U/L (ref 13–56)
ANION GAP SERPL CALC-SCNC: 3 MMOL/L (ref 0–18)
AST SERPL-CCNC: 84 U/L (ref 15–37)
BILIRUB SERPL-MCNC: 0.3 MG/DL (ref 0.1–2)
BUN BLD-MCNC: 14 MG/DL (ref 7–18)
BUN/CREAT SERPL: 12.3 (ref 10–20)
CALCIUM BLD-MCNC: 8.7 MG/DL (ref 8.5–10.1)
CHLORIDE SERPL-SCNC: 105 MMOL/L (ref 98–112)
CHOLEST SMN-MCNC: 196 MG/DL (ref ?–200)
CO2 SERPL-SCNC: 32 MMOL/L (ref 21–32)
CREAT BLD-MCNC: 1.14 MG/DL (ref 0.55–1.02)
CREAT UR-SCNC: 249 MG/DL
GLOBULIN PLAS-MCNC: 4.2 G/DL (ref 2.8–4.4)
GLUCOSE BLD-MCNC: 162 MG/DL (ref 70–99)
HDLC SERPL-MCNC: 38 MG/DL (ref 40–59)
LDLC SERPL CALC-MCNC: 135 MG/DL (ref ?–100)
M PROTEIN MFR SERPL ELPH: 7.6 G/DL (ref 6.4–8.2)
MICROALBUMIN UR-MCNC: 1.65 MG/DL
MICROALBUMIN/CREAT 24H UR-RTO: 6.6 UG/MG (ref ?–30)
NONHDLC SERPL-MCNC: 158 MG/DL (ref ?–130)
OSMOLALITY SERPL CALC.SUM OF ELEC: 294 MOSM/KG (ref 275–295)
PATIENT FASTING Y/N/NP: YES
PATIENT FASTING Y/N/NP: YES
POTASSIUM SERPL-SCNC: 4 MMOL/L (ref 3.5–5.1)
SODIUM SERPL-SCNC: 140 MMOL/L (ref 136–145)
T4 FREE SERPL-MCNC: 1 NG/DL (ref 0.8–1.7)
TRIGL SERPL-MCNC: 117 MG/DL (ref 30–149)
TSI SER-ACNC: 0.41 MIU/ML (ref 0.36–3.74)
VIT D+METAB SERPL-MCNC: 58.8 NG/ML (ref 30–100)
VLDLC SERPL CALC-MCNC: 23 MG/DL (ref 0–30)

## 2020-07-22 PROCEDURE — 80053 COMPREHEN METABOLIC PANEL: CPT

## 2020-07-22 PROCEDURE — 82570 ASSAY OF URINE CREATININE: CPT

## 2020-07-22 PROCEDURE — 82043 UR ALBUMIN QUANTITATIVE: CPT

## 2020-07-22 PROCEDURE — 82306 VITAMIN D 25 HYDROXY: CPT

## 2020-07-22 PROCEDURE — 80061 LIPID PANEL: CPT

## 2020-07-22 PROCEDURE — 84443 ASSAY THYROID STIM HORMONE: CPT

## 2020-07-22 PROCEDURE — 84439 ASSAY OF FREE THYROXINE: CPT

## 2020-07-27 LAB
INDEX VALUE: 0.17
JCV ANTIBODY: NEGATIVE

## 2020-08-11 ENCOUNTER — TELEPHONE (OUTPATIENT)
Dept: NEUROLOGY | Facility: CLINIC | Age: 50
End: 2020-08-11

## 2020-08-11 NOTE — TELEPHONE ENCOUNTER
RN spoke to Ethyl Bio- Pharmacist at [facility] and informed him the provider is aware the pt is being prescribed both medications. Terrell verbalized understanding and did not have any further questions.

## 2020-08-11 NOTE — TELEPHONE ENCOUNTER
Kush Millard is aware pt is receiving hydrocodone from another provider due to pt also taking lyrica.

## 2020-08-13 ENCOUNTER — TELEPHONE (OUTPATIENT)
Dept: NEUROLOGY | Facility: CLINIC | Age: 50
End: 2020-08-13

## 2020-08-13 NOTE — TELEPHONE ENCOUNTER
Tysabri infusion record received from [facility] infusion record for physician review. No signs/symptoms infusion reaction noted. Patient infused on 8/13/2020. Patient stayed for 1 hour observation period after infusion. No reaction noted.   Lot Number

## 2020-09-14 ENCOUNTER — TELEPHONE (OUTPATIENT)
Dept: NEUROLOGY | Facility: CLINIC | Age: 50
End: 2020-09-14

## 2020-10-05 ENCOUNTER — TELEPHONE (OUTPATIENT)
Dept: NEUROLOGY | Facility: CLINIC | Age: 50
End: 2020-10-05

## 2020-10-05 NOTE — TELEPHONE ENCOUNTER
RN received from [facility] for physician review and signature. Patient receives SN    Frequency:Weekly    Duration: 8 weeks    Plan of Care: To Perform IV site Care Using [facility] Dressing and change dressing.    Paperwork signed and fa

## 2020-10-08 ENCOUNTER — TELEPHONE (OUTPATIENT)
Dept: NEUROLOGY | Facility: CLINIC | Age: 50
End: 2020-10-08

## 2020-10-08 NOTE — TELEPHONE ENCOUNTER
Tysabri infusion record received from [facility] infusion center for physician review. No signs/symptoms of infusion reaction noted. Patient infused on 10/8/2020  No reactions during the observation period.   Lot Number [id number], Exp 4/2023  No new orders reques

## 2020-10-14 ENCOUNTER — HOSPITAL ENCOUNTER (OUTPATIENT)
Dept: GENERAL RADIOLOGY | Facility: HOSPITAL | Age: 50
Discharge: HOME | End: 2020-10-14
Attending: SPECIALIST
Payer: MEDICARE

## 2020-10-23 NOTE — OR NURSING
0920 Pt checked blood sugar at home it was 117.
0986 Pt here for Plasmapheresis. Denies any pain or discomfort.  Pt states she feels very tired
Duration Of Freeze Thaw-Cycle (Seconds): 0
Consent: The patient's consent was obtained including but not limited to risks of crusting, scabbing, blistering, scarring, darker or lighter pigmentary change, recurrence, incomplete removal and infection.
Render Post-Care Instructions In Note?: no
Detail Level: Detailed
Post-Care Instructions: I reviewed with the patient in detail post-care instructions. Patient is to wear sunprotection, and avoid picking at any of the treated lesions. Pt may apply Vaseline to crusted or scabbing areas.

## 2020-11-04 ENCOUNTER — TELEPHONE (OUTPATIENT)
Dept: CARDIOLOGY | Age: 50
End: 2020-11-04

## 2020-11-09 ENCOUNTER — OFFICE VISIT (OUTPATIENT)
Dept: CARDIOLOGY | Age: 50
End: 2020-11-09

## 2020-11-09 ENCOUNTER — HOSPITAL ENCOUNTER (EMERGENCY)
Facility: HOSPITAL | Age: 50
Discharge: HOME | End: 2020-11-09
Attending: EMERGENCY MEDICINE
Payer: MEDICARE

## 2020-11-09 VITALS
DIASTOLIC BLOOD PRESSURE: 74 MMHG | WEIGHT: 174 LBS | HEART RATE: 82 BPM | BODY MASS INDEX: 27.97 KG/M2 | HEIGHT: 66 IN | SYSTOLIC BLOOD PRESSURE: 108 MMHG

## 2020-11-09 VITALS
SYSTOLIC BLOOD PRESSURE: 106 MMHG | HEART RATE: 88 BPM | WEIGHT: 174 LBS | DIASTOLIC BLOOD PRESSURE: 78 MMHG | TEMPERATURE: 97 F | HEIGHT: 66 IN | BODY MASS INDEX: 27.97 KG/M2 | RESPIRATION RATE: 20 BRPM | OXYGEN SATURATION: 99 %

## 2020-11-09 DIAGNOSIS — R42 DIZZINESS: ICD-10-CM

## 2020-11-09 DIAGNOSIS — R53.82 CHRONIC FATIGUE AND MALAISE: ICD-10-CM

## 2020-11-09 DIAGNOSIS — G47.30 SLEEP APNEA, UNSPECIFIED TYPE: ICD-10-CM

## 2020-11-09 DIAGNOSIS — G35 MULTIPLE SCLEROSIS (CMD): ICD-10-CM

## 2020-11-09 DIAGNOSIS — E78.00 PURE HYPERCHOLESTEROLEMIA: ICD-10-CM

## 2020-11-09 DIAGNOSIS — R53.81 CHRONIC FATIGUE AND MALAISE: ICD-10-CM

## 2020-11-09 DIAGNOSIS — M79.10 MYALGIA: Primary | ICD-10-CM

## 2020-11-09 DIAGNOSIS — K21.9 GASTROESOPHAGEAL REFLUX DISEASE WITHOUT ESOPHAGITIS: ICD-10-CM

## 2020-11-09 DIAGNOSIS — I42.0 DILATED IDIOPATHIC CARDIOMYOPATHY (CMD): ICD-10-CM

## 2020-11-09 DIAGNOSIS — D57.3 SICKLE CELL TRAIT (CMD): ICD-10-CM

## 2020-11-09 DIAGNOSIS — K58.9 IRRITABLE BOWEL SYNDROME, UNSPECIFIED TYPE: ICD-10-CM

## 2020-11-09 DIAGNOSIS — D64.9 ANEMIA, UNSPECIFIED TYPE: ICD-10-CM

## 2020-11-09 DIAGNOSIS — Z82.49 FAMILY HISTORY OF CORONARY ARTERIOSCLEROSIS: ICD-10-CM

## 2020-11-09 DIAGNOSIS — M79.7 FIBROMYALGIA: Primary | ICD-10-CM

## 2020-11-09 DIAGNOSIS — R79.89 ELEVATED LFTS: ICD-10-CM

## 2020-11-09 DIAGNOSIS — R06.02 SHORTNESS OF BREATH: ICD-10-CM

## 2020-11-09 DIAGNOSIS — E11.9 TYPE 2 DIABETES MELLITUS WITHOUT COMPLICATION, WITHOUT LONG-TERM CURRENT USE OF INSULIN (CMD): ICD-10-CM

## 2020-11-09 DIAGNOSIS — E03.9 HYPOTHYROIDISM, UNSPECIFIED TYPE: ICD-10-CM

## 2020-11-09 PROCEDURE — 99283 EMERGENCY DEPT VISIT LOW MDM: CPT | Performed by: EMERGENCY MEDICINE

## 2020-11-09 PROCEDURE — 86140 C-REACTIVE PROTEIN: CPT | Performed by: PHYSICIAN ASSISTANT

## 2020-11-09 PROCEDURE — 80053 COMPREHEN METABOLIC PANEL: CPT | Performed by: PHYSICIAN ASSISTANT

## 2020-11-09 PROCEDURE — 85025 COMPLETE CBC W/AUTO DIFF WBC: CPT | Performed by: PHYSICIAN ASSISTANT

## 2020-11-09 PROCEDURE — 36415 COLL VENOUS BLD VENIPUNCTURE: CPT | Performed by: EMERGENCY MEDICINE

## 2020-11-09 PROCEDURE — 99215 OFFICE O/P EST HI 40 MIN: CPT | Performed by: INTERNAL MEDICINE

## 2020-11-09 PROCEDURE — 82550 ASSAY OF CK (CPK): CPT | Performed by: PHYSICIAN ASSISTANT

## 2020-11-09 RX ORDER — HYDROCODONE BITARTRATE AND ACETAMINOPHEN 5; 325 MG/1; MG/1
2 TABLET ORAL ONCE
Status: DISCONTINUED | OUTPATIENT
Start: 2020-11-09 | End: 2020-11-09

## 2020-11-09 RX ORDER — FUROSEMIDE 20 MG/1
20 TABLET ORAL PRN
COMMUNITY
Start: 2020-07-30 | End: 2021-04-12

## 2020-11-09 RX ORDER — SULFASALAZINE 500 MG/1
TABLET ORAL
COMMUNITY
Start: 2020-10-23 | End: 2021-04-12

## 2020-11-09 RX ORDER — CYCLOBENZAPRINE HCL 10 MG
10 TABLET ORAL 3 TIMES DAILY PRN
Status: DISCONTINUED | OUTPATIENT
Start: 2020-11-09 | End: 2020-11-09

## 2020-11-09 RX ORDER — BUDESONIDE AND FORMOTEROL FUMARATE DIHYDRATE 160; 4.5 UG/1; UG/1
AEROSOL RESPIRATORY (INHALATION)
COMMUNITY
Start: 2020-08-27

## 2020-11-09 SDOH — HEALTH STABILITY: PHYSICAL HEALTH: ON AVERAGE, HOW MANY MINUTES DO YOU ENGAGE IN EXERCISE AT THIS LEVEL?: 60 MIN

## 2020-11-09 SDOH — HEALTH STABILITY: PHYSICAL HEALTH: ON AVERAGE, HOW MANY DAYS PER WEEK DO YOU ENGAGE IN MODERATE TO STRENUOUS EXERCISE (LIKE A BRISK WALK)?: 7 DAYS

## 2020-11-09 ASSESSMENT — PATIENT HEALTH QUESTIONNAIRE - PHQ9
CLINICAL INTERPRETATION OF PHQ2 SCORE: NO FURTHER SCREENING NEEDED
SUM OF ALL RESPONSES TO PHQ9 QUESTIONS 1 AND 2: 0
1. LITTLE INTEREST OR PLEASURE IN DOING THINGS: NOT AT ALL
2. FEELING DOWN, DEPRESSED OR HOPELESS: NOT AT ALL
SUM OF ALL RESPONSES TO PHQ9 QUESTIONS 1 AND 2: 0
CLINICAL INTERPRETATION OF PHQ9 SCORE: NO FURTHER SCREENING NEEDED

## 2020-11-09 NOTE — ED PROVIDER NOTES
Patient Seen in: [facility] Emergency Department      History   Patient presents with:  Pain    Stated Complaint: chronic body pain x one month, sent by [facility] for pain management    HPI    This is a 68-year-old female who arrives here with chron 2017   • Hemorrhoids    • Herniation of cervical intervertebral disc    • High blood pressure    • High cholesterol    • Hoarseness, chronic    • IBS (irritable bowel syndrome)    • Irregular bowel habits    • Leaking of urine 2019   • Liver disease 11/201 • OTHER      dialysis catheter- left chest for plasmaphoresis   • OTHER SURGICAL HISTORY       x 2   • PORT, INDWELLING, IMP     • THYROIDECTOMY  2013    benign MNG   • THYROIDECTOMY N/A 2013    Performed by Troy Barnes MD at [facility] is anywhere in her legs  NEURO: Alert and oriented x3. Muscle strength and sensory exam is grossly normal.  And the patient is neurologically intact with no focal findings.          ED Course     Labs Reviewed   COMP METABOLIC PANEL (14) - Abnormal; Notabl She was able to ambulate. She felt much better on repeat examination. .  The patient is otherwise neurovascular intact. Patient will be discharged home with close follow-up.   The patient's enzymes are liver enzymes are elevated but it has been elevated

## 2020-11-09 NOTE — ED INITIAL ASSESSMENT (HPI)
Pt presents to ED with c/o of pain, states her doctor sent her here to be admitted for \"uncontrollable pain\" pt reports headache, leg pains, and chest pain, back and hand pain.  Pt reports hx of fibromyalgia, states pain feels like \"something is eating a

## 2020-11-10 ENCOUNTER — TELEPHONE (OUTPATIENT)
Dept: NEUROLOGY | Facility: CLINIC | Age: 50
End: 2020-11-10

## 2020-11-10 NOTE — ED PROVIDER NOTES
Patient Seen in: [facility] Emergency Department      History   Patient presents with:  Pain    Stated Complaint: chronic body pain x one month, sent by Sharon Ryder for pain management    HPI    Patient is a 47-year-old female who arrives to the emerg and unspecified hyperlipidemia    • Pain with bowel movements 2019   • Painful urination    • PONV (postoperative nausea and vomiting)     WOKE UP DURING PROCEDURE   • Scoliosis of lumbar spine    • Shortness of breath    • Sickle cell trait (HCC)    • Sle Smokeless tobacco: Never Used    Alcohol use: Never      Alcohol/week: 0.0 standard drinks    Drug use: Never             Review of Systems    Positive for stated complaint: chronic body pain x one month, sent by Isaiah No for pain management  Other system other components within normal limits   C-REACTIVE PROTEIN - Abnormal; Notable for the following components:    C-Reactive Protein 0.82 (*)     All other components within normal limits   CBC W/ DIFFERENTIAL - Abnormal; Notable for the following components Group  [address], [address] [address] [facility] [address] [phone]              Medications Prescribed:  Current Discharge Medication List

## 2020-11-10 NOTE — TELEPHONE ENCOUNTER
RN received from [address] for physician review and signature.     Patient receives SN    Frequency: Weekly    Duration: Once a week    Plan of Care: Weekly Maintenance of left Subclavian Erick Catheter    Paperwork signed and faxed with

## 2020-11-16 ENCOUNTER — OFFICE VISIT (OUTPATIENT)
Dept: PAIN CLINIC | Facility: CLINIC | Age: 50
End: 2020-11-16
Payer: MEDICARE

## 2020-11-16 VITALS
DIASTOLIC BLOOD PRESSURE: 72 MMHG | HEART RATE: 79 BPM | HEIGHT: 66 IN | OXYGEN SATURATION: 97 % | WEIGHT: 175 LBS | RESPIRATION RATE: 16 BRPM | SYSTOLIC BLOOD PRESSURE: 112 MMHG | BODY MASS INDEX: 28.13 KG/M2

## 2020-11-16 DIAGNOSIS — M79.7 FIBROMYALGIA: ICD-10-CM

## 2020-11-16 DIAGNOSIS — G35 MULTIPLE SCLEROSIS (HCC): ICD-10-CM

## 2020-11-16 DIAGNOSIS — G57.93 NEUROPATHIC PAIN OF BOTH LEGS: Primary | ICD-10-CM

## 2020-11-16 PROCEDURE — 99203 OFFICE O/P NEW LOW 30 MIN: CPT | Performed by: ANESTHESIOLOGY

## 2020-11-16 RX ORDER — MORPHINE SULFATE 15 MG/1
15 TABLET ORAL DAILY
Status: ON HOLD | COMMUNITY
Start: 2020-11-10 | End: 2021-03-03

## 2020-11-16 NOTE — PROGRESS NOTES
.   Location of Pain: whole body    Date Pain Began: months ago          Work Related:   No        Receiving Work Comp/Disability:   No    Numeric Rating Scale:  Pain at [facility]

## 2020-11-16 NOTE — H&P
Name: Jordan Wallis   : 3/11/1970   DOS: 2020     Chief complaint: Chronic body pain    History of present illness:  Jordan Wallis is a 44year old female who presents for evaluation of chronic body pain and fibromyalgia.   The patient has an ext Pain with bowel movements 2019   • Painful urination    • PONV (postoperative nausea and vomiting)     WOKE UP DURING PROCEDURE   • Scoliosis of lumbar spine    • Shortness of breath    • Sickle cell trait (HCC)    • Sleep apnea     CPAP   • Type II or uns meals.       • Montelukast Sodium (SINGULAIR) 10 MG Oral Tab Take 10 mg by mouth nightly. • aspirin 81 MG Oral Tab EC Take 1 tablet by mouth daily.  May use over the counter coated aspirin  0   • DULoxetine HCl (CYMBALTA) 60 MG Oral Cap DR Particles Eugene Pancoast Family History   Problem Relation Age of Onset   • Other (Other) Father         COPD, emphysema   • Heart Attack Father    • Hypertension Mother         Needs to be deleted   • Cancer Mother         stomach cancer   • Ovarian Cancer Mother         28'S her pain is well managed but does report exacerbations which has been more frequent as of late.   I had extensive discussion with the patient that from a management standpoint, she is likely optimized on a mixture of long-acting and short acting narcotic me

## 2020-11-23 ENCOUNTER — TELEPHONE (OUTPATIENT)
Dept: NEUROLOGY | Facility: CLINIC | Age: 50
End: 2020-11-23

## 2020-11-23 NOTE — TELEPHONE ENCOUNTER
RN received from [facility] for physician review and signature. Patient receives SN    Frequency: Weekly    Duration: Every 9 Weeks    Plan of Care:  To teach management promoting a heart healthy lifestyle, blood pressure control, decrease s

## 2020-12-03 ENCOUNTER — TELEPHONE (OUTPATIENT)
Dept: NEUROLOGY | Facility: CLINIC | Age: 50
End: 2020-12-03

## 2020-12-03 DIAGNOSIS — G35 MS (MULTIPLE SCLEROSIS) (HCC): ICD-10-CM

## 2020-12-03 RX ORDER — NATALIZUMAB 300 MG/15ML
INJECTION INTRAVENOUS
Qty: 15 ML | Refills: 5 | Status: SHIPPED
Start: 2020-12-03 | End: 2021-01-20

## 2020-12-03 NOTE — TELEPHONE ENCOUNTER
Received call from [facility] requesting clinical information and new orders for Tysabri. Patient infuses every 8 weeks. Requested clinicals faxed to [phone] with receipt confirmation.

## 2020-12-03 NOTE — TELEPHONE ENCOUNTER
Lucita Diez from [employer] called ([employer]), and advised that she spoke with Fely Horan regarding her Tysabri medication. She states that Fely Horan is concerned because she has noticed hair thinning and thinks it may be a side effect of Tysabri.      Please call jason

## 2020-12-03 NOTE — TELEPHONE ENCOUNTER
Tysabri infusion record received from [facility] infusion center for physician review. No signs/symptoms of infusion reaction noted. Patient infused on 12/3/2020  Patient stable during the observation period. Patient infuses every 8 weeks.   Lot Number [id number]

## 2020-12-03 NOTE — TELEPHONE ENCOUNTER
RN spoke to pt and confirmed that we are not aware  that this is a side effect. Pt is not concerned was just talking with her peers that are in a support group for other people with MS.      Pt verbalized understanding and did not have any further questio

## 2020-12-08 ENCOUNTER — LAB REQUISITION (OUTPATIENT)
Dept: LAB | Facility: HOSPITAL | Age: 50
End: 2020-12-08
Payer: MEDICARE

## 2020-12-08 DIAGNOSIS — G37.9 DEMYELINATING DISEASE OF CENTRAL NERVOUS SYSTEM, UNSPECIFIED (HCC): ICD-10-CM

## 2020-12-08 PROCEDURE — 81003 URINALYSIS AUTO W/O SCOPE: CPT | Performed by: SPECIALIST

## 2020-12-11 ENCOUNTER — VIRTUAL PHONE E/M (OUTPATIENT)
Dept: NEUROLOGY | Facility: CLINIC | Age: 50
End: 2020-12-11
Payer: MEDICARE

## 2020-12-11 DIAGNOSIS — M79.7 FIBROMYALGIA SYNDROME: ICD-10-CM

## 2020-12-11 DIAGNOSIS — G57.93 NEUROPATHIC PAIN OF BOTH LEGS: ICD-10-CM

## 2020-12-11 DIAGNOSIS — G35 MS (MULTIPLE SCLEROSIS) (HCC): Primary | ICD-10-CM

## 2020-12-11 PROCEDURE — 99442 PHONE E/M BY PHYS 11-20 MIN: CPT | Performed by: OTHER

## 2020-12-11 NOTE — PROGRESS NOTES
Virtual Telephone Check-In    Margy Pickard verbally consents to a Virtual/Telephone Check-In visit on 12/11/20. Patient has been referred to the [facility] website at [email or web address] to review the yearly Consent to Treat document.     Patient Andre Cherry

## 2020-12-18 ENCOUNTER — TELEPHONE (OUTPATIENT)
Dept: NEUROLOGY | Facility: CLINIC | Age: 50
End: 2020-12-18

## 2020-12-18 NOTE — TELEPHONE ENCOUNTER
Spoke with patient to inform that Dr is encouraging his patients to get the vaccination as the benefits outweigh the risks and referred [facility] to [facility] org for up to date information. Patient verbalized understanding.

## 2020-12-28 ENCOUNTER — TELEPHONE (OUTPATIENT)
Dept: NEUROLOGY | Facility: CLINIC | Age: 50
End: 2020-12-28

## 2020-12-28 NOTE — TELEPHONE ENCOUNTER
Romain Atkins from [employer] called to verify if tysabri prior authroization being sent to [facility] for next infusion on 1/28/21 since expiring on 1/1/21. Tuesday & Wednesday call:  [phone]  Thursday call:  [phone]    Call [facility] at above numbers.

## 2020-12-29 ENCOUNTER — TELEPHONE (OUTPATIENT)
Dept: NEUROLOGY | Facility: CLINIC | Age: 50
End: 2020-12-29

## 2020-12-29 DIAGNOSIS — G35 MS (MULTIPLE SCLEROSIS) (HCC): Primary | ICD-10-CM

## 2020-12-29 NOTE — TELEPHONE ENCOUNTER
FDA mandated Tysabri reauthorization questionnaire completed online. Patient is JCV negative, Index = 0.17. Last test date 7/23/2020, next test date due Jan 2021. Has completed 7 Tysabri infusions with 0 courses of steroids in last 6 months.   Reauthorize

## 2020-12-29 NOTE — TELEPHONE ENCOUNTER
FDA mandated [place] Touch reauthorization form completed online. Patient is approved through the touch program to receive infusions 1/2/2021 - 7/3/2021. [facility] notified of touch authorization.

## 2021-01-04 ENCOUNTER — TELEPHONE (OUTPATIENT)
Dept: NEUROLOGY | Facility: CLINIC | Age: 51
End: 2021-01-04

## 2021-01-04 NOTE — TELEPHONE ENCOUNTER
[facility] and signature. Patient receives SN    Frequency: 1x     Duration: 9 weeks    Plan of Care: Weekly permacath catheter maintenance/flushing.  Education, VS.     Paperwork signed and faxed with

## 2021-01-05 NOTE — PROCEDURES
Judy Hoover is a 61-year-old -American female who stands 5 feet 6 inches tall and weighs 176 pounds. She underwent standard pulmonary function testing on 7/27/18. She carries a diagnosis of dyspnea. No smoking history is recorded.   Results are a
5

## 2021-01-19 ENCOUNTER — TELEPHONE (OUTPATIENT)
Dept: NEUROLOGY | Facility: CLINIC | Age: 51
End: 2021-01-19

## 2021-01-19 NOTE — TELEPHONE ENCOUNTER
RN received from [facility] for physician review and signature.     Patient receives SN     Frequency: 1 every 8 weeks    Duration: Year    Plan of Care: Perform IV Site care using central line kit every week, Change dressing weekly, Flush IV access with 1

## 2021-01-20 RX ORDER — NATALIZUMAB 300 MG/15ML
INJECTION INTRAVENOUS
Qty: 15 ML | Refills: 5 | Status: SHIPPED
Start: 2021-01-20 | End: 2021-09-13

## 2021-01-20 NOTE — TELEPHONE ENCOUNTER
Continued infusion orders for Tysabri faxed to [facility] infusion center with receipt confirmation. Medication updated in Epic.

## 2021-01-20 NOTE — TELEPHONE ENCOUNTER
Nurse Practitioner Cj Ramsey from [facility] is calling to request continued  order for Tysabri to be faxed to them.  Fax# [phone]

## 2021-01-26 ENCOUNTER — TELEPHONE (OUTPATIENT)
Dept: NEUROLOGY | Facility: CLINIC | Age: 51
End: 2021-01-26

## 2021-01-26 NOTE — TELEPHONE ENCOUNTER
Pt had JCV labs drawn at [facility] on 1/19/21 and is looking for results. Advised we have not received them. Pt will call [facility] to fax results, confirmed our fax number.

## 2021-01-27 LAB
INDEX VALUE: 0.18
JCV ANTIBODY: NEGATIVE

## 2021-02-05 ENCOUNTER — APPOINTMENT (OUTPATIENT)
Dept: GENERAL RADIOLOGY | Facility: HOSPITAL | Age: 51
End: 2021-02-05
Attending: EMERGENCY MEDICINE
Payer: MEDICARE

## 2021-02-05 ENCOUNTER — APPOINTMENT (OUTPATIENT)
Dept: CT IMAGING | Facility: HOSPITAL | Age: 51
End: 2021-02-05
Attending: EMERGENCY MEDICINE
Payer: MEDICARE

## 2021-02-05 ENCOUNTER — HOSPITAL ENCOUNTER (EMERGENCY)
Facility: HOSPITAL | Age: 51
Discharge: HOME | End: 2021-02-05
Attending: EMERGENCY MEDICINE
Payer: MEDICARE

## 2021-02-05 VITALS
HEART RATE: 72 BPM | TEMPERATURE: 97 F | RESPIRATION RATE: 18 BRPM | DIASTOLIC BLOOD PRESSURE: 74 MMHG | SYSTOLIC BLOOD PRESSURE: 110 MMHG | HEIGHT: 66 IN | WEIGHT: 175 LBS | OXYGEN SATURATION: 98 % | BODY MASS INDEX: 28.13 KG/M2

## 2021-02-05 DIAGNOSIS — S06.0X9A CONCUSSION WITH LOSS OF CONSCIOUSNESS, INITIAL ENCOUNTER: ICD-10-CM

## 2021-02-05 DIAGNOSIS — S16.1XXA STRAIN OF NECK MUSCLE, INITIAL ENCOUNTER: ICD-10-CM

## 2021-02-05 DIAGNOSIS — S20.229A CONTUSION OF BACK, UNSPECIFIED LATERALITY, INITIAL ENCOUNTER: ICD-10-CM

## 2021-02-05 DIAGNOSIS — S00.03XA CONTUSION OF SCALP, INITIAL ENCOUNTER: Primary | ICD-10-CM

## 2021-02-05 PROCEDURE — 72040 X-RAY EXAM NECK SPINE 2-3 VW: CPT | Performed by: EMERGENCY MEDICINE

## 2021-02-05 PROCEDURE — 70450 CT HEAD/BRAIN W/O DYE: CPT | Performed by: EMERGENCY MEDICINE

## 2021-02-05 PROCEDURE — 99284 EMERGENCY DEPT VISIT MOD MDM: CPT

## 2021-02-05 PROCEDURE — 72100 X-RAY EXAM L-S SPINE 2/3 VWS: CPT | Performed by: EMERGENCY MEDICINE

## 2021-02-06 NOTE — ED PROVIDER NOTES
Patient Seen in: [facility] Emergency Department      History   Patient presents with:  Fall    Stated Complaint: fall, denies LOC    HPI/Subjective:   HPI    Patient is a 13-year-old female who states that 5 PM today she went down to get her mail a Migraines    • Multiple sclerosis ([address]    • My[facility]    • Neuropathy    • Osteoporosis     KAMALJIT in her 35s   • Other and unspecified hyperlipidemia    • Pain with bowel movements 2019   • Painful urination    • PONV (postoperative nausea • UPPER GI ENDOSCOPY PERFORMED  May 2014                Social History    Tobacco Use      Smoking status: Never Smoker      Smokeless tobacco: Never Used    Alcohol use: Never      Alcohol/week: 0.0 standard drinks    Drug use: Never             Revie the emergency department. Patient's CT brain was negative. X-ray is negative. Patient declined pain medicines. Patient felt comfortable going home. Recommend ice to areas of pain. Tylenol ibuprofen if needed. Activity as tolerated.   Follow-up for fu

## 2021-02-06 NOTE — ED INITIAL ASSESSMENT (HPI)
Patient here with c/o fall. Patient was at the door and on the phone when her dog came by her legs and she fell but does not know exactly what happened. Unsure of LOC. Patient having head and back pain. Patient was able to get back up.

## 2021-02-06 NOTE — ED NOTES
Pt has an insulin pump and a continuous glucose monitor. CT notified, pt requesting if both can be left on, per CT they can shield it. Pt notified.

## 2021-02-06 NOTE — ED NOTES
Jenni Todd is a 47 y.o. female  with history of ESRD on HD MWF, HTN, hyperlipidemia, obesity, CAD, neuropathy, and type 2 diabetes who presents to the ED with a complaint of knee injury. Patient reports she was walking outside of her home when she missed a step and twisted her right knee and fell on her buttock. She now has swelling and pain to her right knee. She denies head pain or back pain. No LOC. She was on her way to the hospital for evaluation of clotted dialysis access. She states she started nocturnal dialysis 5 days ago and her access clotted 1 hr before her treatment was over. She has baseline neuropathy of bilateral LE causing her to have frequent falls. She fractured her left hip in past after a fall which was treated surgically on 9/13/18 by Dr Grullon. She takes Plavix at home (last dose 6am today). She walks with a walker at baseline. Her last dialysis was on Wednesday.   Pt to CT

## 2021-02-24 ENCOUNTER — LAB REQUISITION (OUTPATIENT)
Dept: LAB | Facility: HOSPITAL | Age: 51
End: 2021-02-24
Payer: MEDICARE

## 2021-02-24 DIAGNOSIS — G35 MULTIPLE SCLEROSIS (HCC): ICD-10-CM

## 2021-02-24 LAB
ALBUMIN SERPL-MCNC: 4 G/DL (ref 3.4–5)
ALBUMIN/GLOB SERPL: 1 {RATIO} (ref 1–2)
ALP LIVER SERPL-CCNC: 147 U/L
ALT SERPL-CCNC: 105 U/L
ANION GAP SERPL CALC-SCNC: 12 MMOL/L (ref 0–18)
AST SERPL-CCNC: 50 U/L (ref 15–37)
BASOPHILS # BLD AUTO: 0.05 X10(3) UL (ref 0–0.2)
BASOPHILS NFR BLD AUTO: 0.5 %
BILIRUB SERPL-MCNC: 0.5 MG/DL (ref 0.1–2)
BILIRUB UR QL STRIP.AUTO: NEGATIVE
BUN BLD-MCNC: 14 MG/DL (ref 7–18)
BUN/CREAT SERPL: 8.9 (ref 10–20)
CALCIUM BLD-MCNC: 9.3 MG/DL (ref 8.5–10.1)
CHLORIDE SERPL-SCNC: 95 MMOL/L (ref 98–112)
CLARITY UR REFRACT.AUTO: CLEAR
CO2 SERPL-SCNC: 22 MMOL/L (ref 21–32)
COLOR UR AUTO: YELLOW
CREAT BLD-MCNC: 1.58 MG/DL
DEPRECATED RDW RBC AUTO: 44.6 FL (ref 35.1–46.3)
EOSINOPHIL # BLD AUTO: 0.09 X10(3) UL (ref 0–0.7)
EOSINOPHIL NFR BLD AUTO: 0.9 %
ERYTHROCYTE [DISTWIDTH] IN BLOOD BY AUTOMATED COUNT: 16.6 % (ref 11–15)
GLOBULIN PLAS-MCNC: 3.9 G/DL (ref 2.8–4.4)
GLUCOSE BLD-MCNC: 698 MG/DL (ref 70–99)
GLUCOSE UR STRIP.AUTO-MCNC: >=500 MG/DL
HCT VFR BLD AUTO: 45.8 %
HGB BLD-MCNC: 13.6 G/DL
IMM GRANULOCYTES # BLD AUTO: 0.03 X10(3) UL (ref 0–1)
IMM GRANULOCYTES NFR BLD: 0.3 %
KETONES UR STRIP.AUTO-MCNC: 20 MG/DL
LEUKOCYTE ESTERASE UR QL STRIP.AUTO: NEGATIVE
LYMPHOCYTES # BLD AUTO: 4.2 X10(3) UL (ref 1–4)
LYMPHOCYTES NFR BLD AUTO: 43.4 %
M PROTEIN MFR SERPL ELPH: 7.9 G/DL (ref 6.4–8.2)
MCH RBC QN AUTO: 23.8 PG (ref 26–34)
MCHC RBC AUTO-ENTMCNC: 29.7 G/DL (ref 31–37)
MCV RBC AUTO: 80.1 FL
MONOCYTES # BLD AUTO: 0.62 X10(3) UL (ref 0.1–1)
MONOCYTES NFR BLD AUTO: 6.4 %
NEUTROPHILS # BLD AUTO: 4.68 X10 (3) UL (ref 1.5–7.7)
NEUTROPHILS # BLD AUTO: 4.68 X10(3) UL (ref 1.5–7.7)
NEUTROPHILS NFR BLD AUTO: 48.5 %
NITRITE UR QL STRIP.AUTO: NEGATIVE
OSMOLALITY SERPL CALC.SUM OF ELEC: 302 MOSM/KG (ref 275–295)
PH UR STRIP.AUTO: 6 [PH] (ref 5–8)
PLATELET # BLD AUTO: 158 10(3)UL (ref 150–450)
POTASSIUM SERPL-SCNC: 4.7 MMOL/L (ref 3.5–5.1)
PROT UR STRIP.AUTO-MCNC: NEGATIVE MG/DL
RBC # BLD AUTO: 5.72 X10(6)UL
RBC UR QL AUTO: NEGATIVE
SODIUM SERPL-SCNC: 129 MMOL/L (ref 136–145)
SP GR UR STRIP.AUTO: >1.03 (ref 1–1.03)
UROBILINOGEN UR STRIP.AUTO-MCNC: <2 MG/DL
WBC # BLD AUTO: 9.7 X10(3) UL (ref 4–11)

## 2021-02-24 PROCEDURE — 80053 COMPREHEN METABOLIC PANEL: CPT | Performed by: SPECIALIST

## 2021-02-24 PROCEDURE — 85025 COMPLETE CBC W/AUTO DIFF WBC: CPT | Performed by: SPECIALIST

## 2021-02-24 PROCEDURE — 81001 URINALYSIS AUTO W/SCOPE: CPT | Performed by: SPECIALIST

## 2021-02-28 ENCOUNTER — APPOINTMENT (OUTPATIENT)
Dept: GENERAL RADIOLOGY | Facility: HOSPITAL | Age: 51
DRG: 919 | End: 2021-02-28
Attending: EMERGENCY MEDICINE
Payer: MEDICARE

## 2021-02-28 ENCOUNTER — APPOINTMENT (OUTPATIENT)
Dept: CT IMAGING | Facility: HOSPITAL | Age: 51
DRG: 919 | End: 2021-02-28
Attending: EMERGENCY MEDICINE
Payer: MEDICARE

## 2021-02-28 ENCOUNTER — HOSPITAL ENCOUNTER (INPATIENT)
Facility: HOSPITAL | Age: 51
LOS: 5 days | Discharge: HOME HEALTH | DRG: 919 | End: 2021-03-05
Attending: EMERGENCY MEDICINE | Admitting: STUDENT IN AN ORGANIZED HEALTH CARE EDUCATION/TRAINING PROGRAM
Payer: MEDICARE

## 2021-02-28 DIAGNOSIS — E10.10 TYPE 1 DIABETES MELLITUS WITH KETOACIDOSIS WITHOUT COMA (HCC): Primary | ICD-10-CM

## 2021-02-28 LAB
ALBUMIN SERPL-MCNC: 4.3 G/DL (ref 3.4–5)
ALBUMIN/GLOB SERPL: 0.9 {RATIO} (ref 1–2)
ALP LIVER SERPL-CCNC: 142 U/L
ALT SERPL-CCNC: 129 U/L
ANION GAP SERPL CALC-SCNC: 11 MMOL/L (ref 0–18)
ANION GAP SERPL CALC-SCNC: 12 MMOL/L (ref 0–18)
ANION GAP SERPL CALC-SCNC: 17 MMOL/L (ref 0–18)
AST SERPL-CCNC: 60 U/L (ref 15–37)
BASOPHILS # BLD AUTO: 0.05 X10(3) UL (ref 0–0.2)
BASOPHILS NFR BLD AUTO: 0.4 %
BILIRUB SERPL-MCNC: 0.5 MG/DL (ref 0.1–2)
BILIRUB UR QL STRIP.AUTO: NEGATIVE
BUN BLD-MCNC: 10 MG/DL (ref 7–18)
BUN BLD-MCNC: 12 MG/DL (ref 7–18)
BUN BLD-MCNC: 17 MG/DL (ref 7–18)
BUN/CREAT SERPL: 11 (ref 10–20)
BUN/CREAT SERPL: 13.2 (ref 10–20)
BUN/CREAT SERPL: 13.8 (ref 10–20)
CALCIUM BLD-MCNC: 7 MG/DL (ref 8.5–10.1)
CALCIUM BLD-MCNC: 7.2 MG/DL (ref 8.5–10.1)
CALCIUM BLD-MCNC: 9.3 MG/DL (ref 8.5–10.1)
CHLORIDE SERPL-SCNC: 100 MMOL/L (ref 98–112)
CHLORIDE SERPL-SCNC: 110 MMOL/L (ref 98–112)
CHLORIDE SERPL-SCNC: 112 MMOL/L (ref 98–112)
CLARITY UR REFRACT.AUTO: CLEAR
CO2 SERPL-SCNC: 14 MMOL/L (ref 21–32)
CO2 SERPL-SCNC: 16 MMOL/L (ref 21–32)
CO2 SERPL-SCNC: 16 MMOL/L (ref 21–32)
CREAT BLD-MCNC: 0.87 MG/DL
CREAT BLD-MCNC: 0.91 MG/DL
CREAT BLD-MCNC: 1.29 MG/DL
DEPRECATED RDW RBC AUTO: 44.8 FL (ref 35.1–46.3)
EOSINOPHIL # BLD AUTO: 0.09 X10(3) UL (ref 0–0.7)
EOSINOPHIL NFR BLD AUTO: 0.8 %
ERYTHROCYTE [DISTWIDTH] IN BLOOD BY AUTOMATED COUNT: 17.8 % (ref 11–15)
EST. AVERAGE GLUCOSE BLD GHB EST-MCNC: 280 MG/DL (ref 68–126)
GLOBULIN PLAS-MCNC: 4.8 G/DL (ref 2.8–4.4)
GLUCOSE BLD-MCNC: 102 MG/DL (ref 70–99)
GLUCOSE BLD-MCNC: 174 MG/DL (ref 70–99)
GLUCOSE BLD-MCNC: 176 MG/DL (ref 70–99)
GLUCOSE BLD-MCNC: 183 MG/DL (ref 70–99)
GLUCOSE BLD-MCNC: 186 MG/DL (ref 70–99)
GLUCOSE BLD-MCNC: 229 MG/DL (ref 70–99)
GLUCOSE BLD-MCNC: 232 MG/DL (ref 70–99)
GLUCOSE BLD-MCNC: 248 MG/DL (ref 70–99)
GLUCOSE BLD-MCNC: 370 MG/DL (ref 70–99)
GLUCOSE BLD-MCNC: 394 MG/DL (ref 70–99)
GLUCOSE BLD-MCNC: 421 MG/DL (ref 70–99)
GLUCOSE BLD-MCNC: 442 MG/DL (ref 70–99)
GLUCOSE BLD-MCNC: 88 MG/DL (ref 70–99)
GLUCOSE BLD-MCNC: 90 MG/DL (ref 70–99)
GLUCOSE BLD-MCNC: 93 MG/DL (ref 70–99)
GLUCOSE UR STRIP.AUTO-MCNC: >=500 MG/DL
HAV IGM SER QL: 2.2 MG/DL (ref 1.6–2.6)
HAV IGM SER QL: 2.2 MG/DL (ref 1.6–2.6)
HAV IGM SER QL: 2.7 MG/DL (ref 1.6–2.6)
HBA1C MFR BLD HPLC: 11.4 % (ref ?–5.7)
HCT VFR BLD AUTO: 47 %
HGB BLD-MCNC: 14.5 G/DL
IMM GRANULOCYTES # BLD AUTO: 0.02 X10(3) UL (ref 0–1)
IMM GRANULOCYTES NFR BLD: 0.2 %
KETONES UR STRIP.AUTO-MCNC: 80 MG/DL
LEUKOCYTE ESTERASE UR QL STRIP.AUTO: NEGATIVE
LIPASE SERPL-CCNC: 123 U/L (ref 73–393)
LYMPHOCYTES # BLD AUTO: 4.85 X10(3) UL (ref 1–4)
LYMPHOCYTES NFR BLD AUTO: 42.6 %
M PROTEIN MFR SERPL ELPH: 9.1 G/DL (ref 6.4–8.2)
MCH RBC QN AUTO: 23.6 PG (ref 26–34)
MCHC RBC AUTO-ENTMCNC: 30.9 G/DL (ref 31–37)
MCV RBC AUTO: 76.4 FL
MONOCYTES # BLD AUTO: 0.84 X10(3) UL (ref 0.1–1)
MONOCYTES NFR BLD AUTO: 7.4 %
NEUTROPHILS # BLD AUTO: 5.53 X10 (3) UL (ref 1.5–7.7)
NEUTROPHILS # BLD AUTO: 5.53 X10(3) UL (ref 1.5–7.7)
NEUTROPHILS NFR BLD AUTO: 48.6 %
NITRITE UR QL STRIP.AUTO: NEGATIVE
OSMOLALITY SERPL CALC.SUM OF ELEC: 287 MOSM/KG (ref 275–295)
OSMOLALITY SERPL CALC.SUM OF ELEC: 289 MOSM/KG (ref 275–295)
OSMOLALITY SERPL CALC.SUM OF ELEC: 291 MOSM/KG (ref 275–295)
PH UR STRIP.AUTO: 5 [PH] (ref 5–8)
PHOSPHATE SERPL-MCNC: 1.9 MG/DL (ref 2.5–4.9)
PHOSPHATE SERPL-MCNC: 2.4 MG/DL (ref 2.5–4.9)
PHOSPHATE SERPL-MCNC: 3.3 MG/DL (ref 2.5–4.9)
PLATELET # BLD AUTO: 181 10(3)UL (ref 150–450)
POTASSIUM SERPL-SCNC: 3.6 MMOL/L (ref 3.5–5.1)
POTASSIUM SERPL-SCNC: 3.7 MMOL/L (ref 3.5–5.1)
POTASSIUM SERPL-SCNC: 4.5 MMOL/L (ref 3.5–5.1)
PROT UR STRIP.AUTO-MCNC: 30 MG/DL
RBC # BLD AUTO: 6.15 X10(6)UL
SARS-COV-2 RNA RESP QL NAA+PROBE: NOT DETECTED
SODIUM SERPL-SCNC: 131 MMOL/L (ref 136–145)
SODIUM SERPL-SCNC: 138 MMOL/L (ref 136–145)
SODIUM SERPL-SCNC: 139 MMOL/L (ref 136–145)
SP GR UR STRIP.AUTO: >1.03 (ref 1–1.03)
TSI SER-ACNC: 1.99 MIU/ML (ref 0.36–3.74)
UROBILINOGEN UR STRIP.AUTO-MCNC: <2 MG/DL
VENOUS BASE EXCESS: -15.1
VENOUS BLOOD GAS HCO3: 13.3 MEQ/L (ref 23–27)
VENOUS O2 SAT CALC: 82 % (ref 72–78)
VENOUS O2 SATURATION: 85 % (ref 72–78)
VENOUS PCO2: 40 MM HG (ref 38–50)
VENOUS PH: 7.14 (ref 7.33–7.43)
VENOUS PO2: 65 MM HG (ref 30–50)
WBC # BLD AUTO: 11.4 X10(3) UL (ref 4–11)

## 2021-02-28 PROCEDURE — 71045 X-RAY EXAM CHEST 1 VIEW: CPT | Performed by: EMERGENCY MEDICINE

## 2021-02-28 PROCEDURE — 74177 CT ABD & PELVIS W/CONTRAST: CPT | Performed by: EMERGENCY MEDICINE

## 2021-02-28 RX ORDER — ONDANSETRON 2 MG/ML
4 INJECTION INTRAMUSCULAR; INTRAVENOUS ONCE
Status: COMPLETED | OUTPATIENT
Start: 2021-02-28 | End: 2021-02-28

## 2021-02-28 RX ORDER — SODIUM CHLORIDE 450 MG/100ML
INJECTION, SOLUTION INTRAVENOUS ONCE
Status: COMPLETED | OUTPATIENT
Start: 2021-02-28 | End: 2021-02-28

## 2021-02-28 RX ORDER — DEXTROSE AND SODIUM CHLORIDE 5; .45 G/100ML; G/100ML
150 INJECTION, SOLUTION INTRAVENOUS CONTINUOUS PRN
Status: DISCONTINUED | OUTPATIENT
Start: 2021-02-28 | End: 2021-03-02 | Stop reason: ALTCHOICE

## 2021-02-28 RX ORDER — MONTELUKAST SODIUM 10 MG/1
10 TABLET ORAL NIGHTLY
Status: DISCONTINUED | OUTPATIENT
Start: 2021-02-28 | End: 2021-03-05

## 2021-02-28 RX ORDER — PREGABALIN 75 MG/1
150 CAPSULE ORAL 3 TIMES DAILY
Status: DISCONTINUED | OUTPATIENT
Start: 2021-02-28 | End: 2021-03-03

## 2021-02-28 RX ORDER — ONDANSETRON 2 MG/ML
4 INJECTION INTRAMUSCULAR; INTRAVENOUS EVERY 6 HOURS PRN
Status: DISCONTINUED | OUTPATIENT
Start: 2021-02-28 | End: 2021-03-01

## 2021-02-28 RX ORDER — DULOXETIN HYDROCHLORIDE 30 MG/1
60 CAPSULE, DELAYED RELEASE ORAL 2 TIMES DAILY
Status: DISCONTINUED | OUTPATIENT
Start: 2021-02-28 | End: 2021-03-05

## 2021-02-28 RX ORDER — HYDROMORPHONE HYDROCHLORIDE 1 MG/ML
0.5 INJECTION, SOLUTION INTRAMUSCULAR; INTRAVENOUS; SUBCUTANEOUS ONCE
Status: DISCONTINUED | OUTPATIENT
Start: 2021-02-28 | End: 2021-02-28

## 2021-02-28 RX ORDER — DIPHENHYDRAMINE HYDROCHLORIDE 50 MG/ML
25 INJECTION INTRAMUSCULAR; INTRAVENOUS EVERY 6 HOURS PRN
Status: DISCONTINUED | OUTPATIENT
Start: 2021-02-28 | End: 2021-03-05

## 2021-02-28 RX ORDER — MELATONIN
325 2 TIMES DAILY WITH MEALS
Status: DISCONTINUED | OUTPATIENT
Start: 2021-02-28 | End: 2021-03-05

## 2021-02-28 RX ORDER — MELATONIN
100 DAILY
Status: DISCONTINUED | OUTPATIENT
Start: 2021-02-28 | End: 2021-03-05

## 2021-02-28 RX ORDER — LEVOTHYROXINE SODIUM 88 UG/1
88 TABLET ORAL
Status: DISCONTINUED | OUTPATIENT
Start: 2021-03-01 | End: 2021-03-05

## 2021-02-28 RX ORDER — SODIUM CHLORIDE 450 MG/100ML
INJECTION, SOLUTION INTRAVENOUS CONTINUOUS
Status: DISCONTINUED | OUTPATIENT
Start: 2021-02-28 | End: 2021-02-28

## 2021-02-28 RX ORDER — HEPARIN SODIUM 5000 [USP'U]/ML
5000 INJECTION, SOLUTION INTRAVENOUS; SUBCUTANEOUS EVERY 12 HOURS
Status: DISCONTINUED | OUTPATIENT
Start: 2021-02-28 | End: 2021-03-05

## 2021-02-28 RX ORDER — MORPHINE SULFATE 2 MG/ML
2 INJECTION, SOLUTION INTRAMUSCULAR; INTRAVENOUS ONCE
Status: COMPLETED | OUTPATIENT
Start: 2021-02-28 | End: 2021-02-28

## 2021-02-28 RX ORDER — MORPHINE SULFATE 15 MG/1
15 TABLET, FILM COATED, EXTENDED RELEASE ORAL ONCE
Status: DISCONTINUED | OUTPATIENT
Start: 2021-02-28 | End: 2021-03-05

## 2021-02-28 RX ORDER — PANTOPRAZOLE SODIUM 40 MG/1
40 TABLET, DELAYED RELEASE ORAL
Status: DISCONTINUED | OUTPATIENT
Start: 2021-03-01 | End: 2021-03-05

## 2021-02-28 RX ORDER — ALBUTEROL SULFATE 90 UG/1
2 AEROSOL, METERED RESPIRATORY (INHALATION) EVERY 6 HOURS PRN
Status: DISCONTINUED | OUTPATIENT
Start: 2021-02-28 | End: 2021-03-02

## 2021-02-28 RX ORDER — SODIUM CHLORIDE 450 MG/100ML
INJECTION, SOLUTION INTRAVENOUS CONTINUOUS
Status: DISCONTINUED | OUTPATIENT
Start: 2021-02-28 | End: 2021-03-01

## 2021-02-28 RX ORDER — ACETAMINOPHEN 325 MG/1
650 TABLET ORAL EVERY 6 HOURS PRN
Status: DISCONTINUED | OUTPATIENT
Start: 2021-02-28 | End: 2021-03-05

## 2021-02-28 RX ORDER — MORPHINE SULFATE 2 MG/ML
2 INJECTION, SOLUTION INTRAMUSCULAR; INTRAVENOUS EVERY 2 HOUR PRN
Status: DISCONTINUED | OUTPATIENT
Start: 2021-02-28 | End: 2021-03-05

## 2021-02-28 RX ORDER — BISACODYL 10 MG
10 SUPPOSITORY, RECTAL RECTAL
Status: DISCONTINUED | OUTPATIENT
Start: 2021-02-28 | End: 2021-03-05

## 2021-02-28 RX ORDER — DEXTROSE MONOHYDRATE 25 G/50ML
50 INJECTION, SOLUTION INTRAVENOUS
Status: DISCONTINUED | OUTPATIENT
Start: 2021-02-28 | End: 2021-03-05

## 2021-02-28 RX ORDER — CARVEDILOL 3.12 MG/1
3.12 TABLET ORAL 2 TIMES DAILY WITH MEALS
Status: DISCONTINUED | OUTPATIENT
Start: 2021-02-28 | End: 2021-03-01

## 2021-02-28 RX ORDER — SODIUM CHLORIDE 450 MG/100ML
INJECTION, SOLUTION INTRAVENOUS ONCE
Status: DISCONTINUED | OUTPATIENT
Start: 2021-02-28 | End: 2021-02-28

## 2021-02-28 RX ORDER — INSULIN ASPART 100 [IU]/ML
15 INJECTION, SOLUTION INTRAVENOUS; SUBCUTANEOUS ONCE
Status: COMPLETED | OUTPATIENT
Start: 2021-02-28 | End: 2021-02-28

## 2021-02-28 RX ORDER — ASPIRIN 81 MG/1
81 TABLET ORAL DAILY
Status: DISCONTINUED | OUTPATIENT
Start: 2021-02-28 | End: 2021-03-05

## 2021-02-28 RX ORDER — DOCUSATE SODIUM 100 MG/1
100 CAPSULE, LIQUID FILLED ORAL 2 TIMES DAILY
Status: DISCONTINUED | OUTPATIENT
Start: 2021-02-28 | End: 2021-03-05

## 2021-02-28 RX ORDER — MORPHINE SULFATE 4 MG/ML
4 INJECTION, SOLUTION INTRAMUSCULAR; INTRAVENOUS ONCE
Status: COMPLETED | OUTPATIENT
Start: 2021-02-28 | End: 2021-02-28

## 2021-02-28 NOTE — ED INITIAL ASSESSMENT (HPI)
Patient presents with c/o abdominal pain and nausea for several days. Patient is type I diabetic, sugars have been elevated.

## 2021-02-28 NOTE — CONSULTS
[facility] Note  [facility]  Report of Consultation    Darwin Roman Patient Status:  Inpatient    3/11/1970 MRN [id number]   [facility] 4SW-A Attending Colette Hunt MD   [facility] Day # 0 High cholesterol    • Hoarseness, chronic    • IBS (irritable bowel syndrome)    • Irregular bowel habits    • Leaking of urine 2019   • Liver disease 11/2013    GIORDANO and Stage 1 fibrosis   • Migraines    • Multiple sclerosis ([facility]    • Myocardial infarcti • PORT, INDWELLING, IMP     • THYROIDECTOMY  JULY 2013    benign MNG   • THYROIDECTOMY N/A 7/18/2013    Performed by Roxana Flowers MD at [address]   • TOTAL ABDOM HYSTERECTOMY     • UPPER GI ENDOSCOPY PERFORMED  May 2014     Family History   Proble (Porcine)  5,000 Units Subcutaneous Q12H   • insulin (NOVOLIN R) bolus from bag  0.1 Units/kg Intravenous Once   • sodium chloride   Intravenous Once     Albuterol Sulfate HFA, acetaminophen, ondansetron HCl, diphenhydrAMINE HCl, glucose **OR** Glucose-Vit injection/discharge, nares normal  MOUTH: MMM, good dentition  NECK: Trachea midline, symmetric, no visible masses or scars, no crepitus, normal flexion/extension  CHEST:  Right chest with infuse a port, left upper chest plasmapharesis line  PULMONARY:ctab by (CST): Lisa Oswald MD on 2/28/2021 at 12:38 PM       Ct Abdomen Pelvis Iv Contrast, No Oral (er)    Result Date: 2/28/2021  CONCLUSION:  1. No acute findings. 2. Hepatic steatosis.   3. Nonobstructing 4 mm right renal calculus    Dictated by (CST):

## 2021-02-28 NOTE — ED PROVIDER NOTES
Patient Seen in: [facility] Emergency Department      History   Patient presents with:  Hyperglycemia  Nausea  Abdomen/Flank Pain    Stated Complaint: hyperglycemia, abd pain, nausea    HPI/Subjective:   HPI    Patient is a 18-year-old female here f vomiting)     WOKE UP DURING PROCEDURE   • Scoliosis of lumbar spine    • Shortness of breath    • Sickle cell trait (HCC)    • Sleep apnea     CPAP   • Type II or unspecified type diabetes mellitus without mention of complication, not stated as uncontroll Systems    Positive for stated complaint: hyperglycemia, abd pain, nausea  Other systems are as noted in HPI. Constitutional and vital signs reviewed. All other systems reviewed and negative except as noted above.     Physical Exam     ED Triage Vital components within normal limits   POCT GLUCOSE - Abnormal; Notable for the following components:    POC Glucose 442 (*)     All other components within normal limits   POCT GLUCOSE - Abnormal; Notable for the following components:    POC Glucose 394 (*) with plan. [facility]. Patient will be admitted to the PCP, Dr. More Castañeda.       A total of 35 minutes of critical care time (exclusive of billable procedures) was administered to manage the patient's critical lab values and metabolic instability due t

## 2021-03-01 LAB
ANION GAP SERPL CALC-SCNC: 10 MMOL/L (ref 0–18)
ANION GAP SERPL CALC-SCNC: 7 MMOL/L (ref 0–18)
ANION GAP SERPL CALC-SCNC: 8 MMOL/L (ref 0–18)
ANION GAP SERPL CALC-SCNC: 9 MMOL/L (ref 0–18)
BUN BLD-MCNC: 7 MG/DL (ref 7–18)
BUN BLD-MCNC: 8 MG/DL (ref 7–18)
BUN BLD-MCNC: 8 MG/DL (ref 7–18)
BUN BLD-MCNC: 9 MG/DL (ref 7–18)
BUN/CREAT SERPL: 7.3 (ref 10–20)
BUN/CREAT SERPL: 7.6 (ref 10–20)
BUN/CREAT SERPL: 7.8 (ref 10–20)
BUN/CREAT SERPL: 8.9 (ref 10–20)
CALCIUM BLD-MCNC: 7 MG/DL (ref 8.5–10.1)
CALCIUM BLD-MCNC: 7 MG/DL (ref 8.5–10.1)
CALCIUM BLD-MCNC: 7.2 MG/DL (ref 8.5–10.1)
CALCIUM BLD-MCNC: 7.3 MG/DL (ref 8.5–10.1)
CHLORIDE SERPL-SCNC: 107 MMOL/L (ref 98–112)
CHLORIDE SERPL-SCNC: 108 MMOL/L (ref 98–112)
CHLORIDE SERPL-SCNC: 109 MMOL/L (ref 98–112)
CHLORIDE SERPL-SCNC: 109 MMOL/L (ref 98–112)
CO2 SERPL-SCNC: 21 MMOL/L (ref 21–32)
CO2 SERPL-SCNC: 22 MMOL/L (ref 21–32)
CREAT BLD-MCNC: 0.9 MG/DL
CREAT BLD-MCNC: 0.96 MG/DL
CREAT BLD-MCNC: 1.05 MG/DL
CREAT BLD-MCNC: 1.15 MG/DL
DEPRECATED RDW RBC AUTO: 45.7 FL (ref 35.1–46.3)
ERYTHROCYTE [DISTWIDTH] IN BLOOD BY AUTOMATED COUNT: 16.5 % (ref 11–15)
GLUCOSE BLD-MCNC: 162 MG/DL (ref 70–99)
GLUCOSE BLD-MCNC: 172 MG/DL (ref 70–99)
GLUCOSE BLD-MCNC: 179 MG/DL (ref 70–99)
GLUCOSE BLD-MCNC: 183 MG/DL (ref 70–99)
GLUCOSE BLD-MCNC: 186 MG/DL (ref 70–99)
GLUCOSE BLD-MCNC: 189 MG/DL (ref 70–99)
GLUCOSE BLD-MCNC: 193 MG/DL (ref 70–99)
GLUCOSE BLD-MCNC: 196 MG/DL (ref 70–99)
GLUCOSE BLD-MCNC: 196 MG/DL (ref 70–99)
GLUCOSE BLD-MCNC: 205 MG/DL (ref 70–99)
GLUCOSE BLD-MCNC: 227 MG/DL (ref 70–99)
GLUCOSE BLD-MCNC: 228 MG/DL (ref 70–99)
GLUCOSE BLD-MCNC: 262 MG/DL (ref 70–99)
GLUCOSE BLD-MCNC: 267 MG/DL (ref 70–99)
GLUCOSE BLD-MCNC: 272 MG/DL (ref 70–99)
GLUCOSE BLD-MCNC: 283 MG/DL (ref 70–99)
GLUCOSE BLD-MCNC: 286 MG/DL (ref 70–99)
GLUCOSE BLD-MCNC: 315 MG/DL (ref 70–99)
GLUCOSE BLD-MCNC: 320 MG/DL (ref 70–99)
GLUCOSE BLD-MCNC: 326 MG/DL (ref 70–99)
HAV IGM SER QL: 2.1 MG/DL (ref 1.6–2.6)
HAV IGM SER QL: 2.1 MG/DL (ref 1.6–2.6)
HAV IGM SER QL: 2.2 MG/DL (ref 1.6–2.6)
HAV IGM SER QL: 2.2 MG/DL (ref 1.6–2.6)
HCT VFR BLD AUTO: 35.9 %
HGB BLD-MCNC: 10.9 G/DL
MCH RBC QN AUTO: 23.3 PG (ref 26–34)
MCHC RBC AUTO-ENTMCNC: 30.4 G/DL (ref 31–37)
MCV RBC AUTO: 76.7 FL
OSMOLALITY SERPL CALC.SUM OF ELEC: 287 MOSM/KG (ref 275–295)
OSMOLALITY SERPL CALC.SUM OF ELEC: 289 MOSM/KG (ref 275–295)
OSMOLALITY SERPL CALC.SUM OF ELEC: 295 MOSM/KG (ref 275–295)
OSMOLALITY SERPL CALC.SUM OF ELEC: 298 MOSM/KG (ref 275–295)
PHOSPHATE SERPL-MCNC: 1.3 MG/DL (ref 2.5–4.9)
PHOSPHATE SERPL-MCNC: 1.4 MG/DL (ref 2.5–4.9)
PHOSPHATE SERPL-MCNC: 1.6 MG/DL (ref 2.5–4.9)
PHOSPHATE SERPL-MCNC: 2.3 MG/DL (ref 2.5–4.9)
PLATELET # BLD AUTO: 131 10(3)UL (ref 150–450)
POTASSIUM SERPL-SCNC: 3.3 MMOL/L (ref 3.5–5.1)
POTASSIUM SERPL-SCNC: 3.5 MMOL/L (ref 3.5–5.1)
POTASSIUM SERPL-SCNC: 3.5 MMOL/L (ref 3.5–5.1)
POTASSIUM SERPL-SCNC: 4.1 MMOL/L (ref 3.5–5.1)
RBC # BLD AUTO: 4.68 X10(6)UL
SODIUM SERPL-SCNC: 137 MMOL/L (ref 136–145)
SODIUM SERPL-SCNC: 137 MMOL/L (ref 136–145)
SODIUM SERPL-SCNC: 138 MMOL/L (ref 136–145)
SODIUM SERPL-SCNC: 140 MMOL/L (ref 136–145)
WBC # BLD AUTO: 7.9 X10(3) UL (ref 4–11)

## 2021-03-01 PROCEDURE — 99233 SBSQ HOSP IP/OBS HIGH 50: CPT | Performed by: CLINICAL NURSE SPECIALIST

## 2021-03-01 RX ORDER — SODIUM CHLORIDE 450 MG/100ML
INJECTION, SOLUTION INTRAVENOUS CONTINUOUS
Status: DISCONTINUED | OUTPATIENT
Start: 2021-03-01 | End: 2021-03-02

## 2021-03-01 RX ORDER — METOCLOPRAMIDE HYDROCHLORIDE 5 MG/ML
5 INJECTION INTRAMUSCULAR; INTRAVENOUS
Status: DISCONTINUED | OUTPATIENT
Start: 2021-03-01 | End: 2021-03-05

## 2021-03-01 RX ORDER — SENNOSIDES 8.6 MG
8.6 TABLET ORAL 2 TIMES DAILY
Status: DISCONTINUED | OUTPATIENT
Start: 2021-03-01 | End: 2021-03-05

## 2021-03-01 RX ORDER — POTASSIUM CHLORIDE 14.9 MG/ML
20 INJECTION INTRAVENOUS ONCE
Status: COMPLETED | OUTPATIENT
Start: 2021-03-01 | End: 2021-03-01

## 2021-03-01 RX ORDER — POLYETHYLENE GLYCOL 3350 17 G/17G
17 POWDER, FOR SOLUTION ORAL 2 TIMES DAILY
Status: DISCONTINUED | OUTPATIENT
Start: 2021-03-01 | End: 2021-03-05

## 2021-03-01 RX ORDER — ONDANSETRON 2 MG/ML
4 INJECTION INTRAMUSCULAR; INTRAVENOUS ONCE
Status: COMPLETED | OUTPATIENT
Start: 2021-03-01 | End: 2021-03-01

## 2021-03-01 NOTE — CONSULTS
[facility]  Endocrinology Consultation    Randell Ellis Patient Status:  Inpatient    3/11/1970 MRN [id number]   [facility] 4SW-A Attending Sherryle Rhein, MD   Hosp Day # 1 PCP Pascual Garcia MD     Reason for Consultation:  DKA, D • H. pylori infection    • H/O  section    • Heart attack ([facility] 2015   • Heart palpitations 2017   • Hemorrhoids    • Herniation of cervical intervertebral disc    • High blood pressure    • High cholesterol    • Hoarseness, chronic    • IBS (irr 2018   • LITHOTRIPSY      x 2   • LYSIS OF ADHESIONS     • NEEDLE BIOPSY LIVER     • OTHER      dialysis catheter- left chest for plasmaphoresis   • OTHER SURGICAL HISTORY       x 2   • PORT, INDWELLING, IMP     • THYROIDECTOMY   Oral, Daily  •  Montelukast Sodium (SINGULAIR) tab 10 mg, 10 mg, Oral, Nightly  •  ferrous sulfate EC tab 325 mg, 325 mg, Oral, BID with meals  •  Levothyroxine Sodium tab 88 mcg, 88 mcg, Oral, Before breakfast  •  docusate sodium (COLACE) cap 100 mg, 100 Comment:Swelling of neck, throat and tongue  Urea                    Tightness in Throat    Comment:Urea C-13 in Pranactin for H. Pylori test kit.     ROS  GEN: no new fatigue, no new weight changes  HEENT: no new headaches, no new visual changes  Neck: no PHOS 1.4 03/01/2021    PGLU 205 03/01/2021       Recent Labs     02/28/21  1011 02/28/21  1206 02/28/21  1332 02/28/21  1501 02/28/21  1600 02/28/21  1700 02/28/21  1802 02/28/21  1902 02/28/21  2003 02/28/21  2104 02/28/21  2159 02/28/21  2259 03/01/21  0

## 2021-03-01 NOTE — H&P
[address] [facility] A Cheeks Patient Status:  Inpatient    3/11/1970 MRN [id number]   [facility] 4SW-A Attending Diane Oh MD   Hosp Day # 1 PCP Kennedy Das MD     Date:  3/1/202 hyperlipidemia    • Pain with bowel movements 2019   • Painful urination    • PONV (postoperative nausea and vomiting)     WOKE UP DURING PROCEDURE   • Scoliosis of lumbar spine    • Shortness of breath    • Sickle cell trait ([address]    • Sleep apnea     CPA Attack Father    • Hypertension Mother         Needs to be deleted   • Cancer Mother         stomach cancer   • Ovarian Cancer Mother         28'S   • Colon Polyps Mother    • Ovarian Cancer Maternal Grandmother         30'S   • Colon Polyps Maternal [place] TIMES DAILY    •  TRIMETHOPRIM 100 MG Oral Tab, TAKE 1 TABLET BY MOUTH ONCE DAILY    •  omeprazole 20 MG Oral Capsule Delayed Release, Take 20 mg by mouth 2 (two) times daily before meals.     •  carvedilol 3.125 MG Oral Tab, Take 3.125 mg by mouth 2 (two) guarding or organomegaly. Neurologic: No focal neurological deficits. CNII-XII grossly intact. Musculoskeletal: Moves all extremities. Extremities: No edema or cyanosis. Integument: No rashes or lesions. Psychiatric: Appropriate mood and affect. of DKA   -started on insulin gtt, DKA protocol, bridging to levemir as bicarb and anion gap normalized   -no infectious etiology noted   -IVF   -endo consulted to reprogram insulin pump, outpatient endo follow up for next week     Dehydration/CATIA in [facility]

## 2021-03-01 NOTE — PROGRESS NOTES
[facility]  Progress Note    Tiana Callejas Patient Status:  Inpatient    3/11/1970 MRN [id number]   [facility] 4SW-A Attending Diane Oh MD   Hosp Day # 1 PCP Kennedy Das MD     Subjective:  Tiana Callejas is a(n) 44 year o 129* 131*   < > 140 138 137   K 4.7 4.5   < > 3.5 3.5 3.3*   CL 95* 100   < > 109 109 108   CO2 22.0 14.0*   < > 21.0 22.0 21.0   ALKPHO 147* 142*  --   --   --   --    AST 50* 60*  --   --   --   --    * 129*  --   --   --   --    BILT 0.5 0.5  -- Center/[facility]

## 2021-03-01 NOTE — PLAN OF CARE
Received pt this am following RN report. Pt awake, oriented x 4. Tolerating room air. NSR per monitor. Pt transitioned off insulin drip to subcutaneous insulin. Pt with nausea this am, scheduled Reglan given. Pt tolerating ADA diet.  Voiding per BSC without

## 2021-03-01 NOTE — CM/SW NOTE
sw informed that pt is current with residential c for home nursing only. CORETTA order obtained.  sw following

## 2021-03-01 NOTE — PROGRESS NOTES
Crackers given at approximately 0615, with re-assessment patient reports having nausea and not wanting to eat anything further at this point to order breakfast. Zofran given.

## 2021-03-01 NOTE — PLAN OF CARE
Problem: DKA  Data: Patient alert and oriented overnight, reports intermittent nausea and abd pain, declining pain and nausea medication so far this evening. Patient up with assist to [facility] to void, vital signs stable, tele NSR.    Action: Due medications giv with activity based on assessment  - Modify environment to reduce risk of injury  - Provide assistive devices as appropriate  - Consider OT/PT consult to assist with strengthening/mobility  - Encourage toileting schedule  Outcome: Progressing

## 2021-03-01 NOTE — PROGRESS NOTES
03/01/21 1049   Clinical Encounter Type   Visited With Patient   Continue Visiting Yes   Crisis Visit Critical care   Referral From Patient   Referral To    Denominational Encounters   Denominational Needs Prayer   Patient Spiritual Encounters   Spiritual

## 2021-03-01 NOTE — PROGRESS NOTES
Received pt from ED on room air, a&o x4, insulin gtt infusing. Pt c/o epigastric pain, CT negative. Fluids infusing, see mar. Insulin gtt, on base algorithm. Pt and family updated on plan of care, will continue to monitor closely.

## 2021-03-01 NOTE — HOME CARE LIAISON
Pt current with [facility] (RN). Will need CORETTA upon discharge if returning with [facility].

## 2021-03-01 NOTE — DIETARY NOTE
[facility]  NUTRITION INITIAL ASSESSMENT    Pt does not meet malnutrition criteria. NUTRITION DIAGNOSIS/PROBLEM:  Altered nutrition-related lab values related to endocrine dysfunction as evidenced by high A1c of 11.4%.     NUTRITION INTERVENTION:  1 Accumulation: none per RN documentation.     NUTRITION PRESCRIPTION: 59.1 kg IBW  Calories: 0547-0490 calories/day (25-30 calories per kg)  Protein:  grams protein/day (1.5-2.0 grams protein per kg)  Fluid: ~1 ml/kcal or per MD discretion    MONITOR A

## 2021-03-02 ENCOUNTER — APPOINTMENT (OUTPATIENT)
Dept: CV DIAGNOSTICS | Facility: HOSPITAL | Age: 51
DRG: 919 | End: 2021-03-02
Attending: HOSPITALIST
Payer: MEDICARE

## 2021-03-02 ENCOUNTER — APPOINTMENT (OUTPATIENT)
Dept: ULTRASOUND IMAGING | Facility: HOSPITAL | Age: 51
DRG: 919 | End: 2021-03-02
Attending: HOSPITALIST
Payer: MEDICARE

## 2021-03-02 LAB
ALBUMIN SERPL-MCNC: 3.1 G/DL (ref 3.4–5)
ALBUMIN/GLOB SERPL: 1.1 {RATIO} (ref 1–2)
ALP LIVER SERPL-CCNC: 99 U/L
ALT SERPL-CCNC: 97 U/L
ANION GAP SERPL CALC-SCNC: 7 MMOL/L (ref 0–18)
AST SERPL-CCNC: 62 U/L (ref 15–37)
BASOPHILS # BLD AUTO: 0.02 X10(3) UL (ref 0–0.2)
BASOPHILS NFR BLD AUTO: 0.3 %
BILIRUB SERPL-MCNC: 0.5 MG/DL (ref 0.1–2)
BUN BLD-MCNC: 8 MG/DL (ref 7–18)
BUN/CREAT SERPL: 9.8 (ref 10–20)
CALCIUM BLD-MCNC: 7.9 MG/DL (ref 8.5–10.1)
CHLORIDE SERPL-SCNC: 109 MMOL/L (ref 98–112)
CO2 SERPL-SCNC: 25 MMOL/L (ref 21–32)
CREAT BLD-MCNC: 0.82 MG/DL
DEPRECATED RDW RBC AUTO: 45.3 FL (ref 35.1–46.3)
EOSINOPHIL # BLD AUTO: 0.15 X10(3) UL (ref 0–0.7)
EOSINOPHIL NFR BLD AUTO: 2.4 %
ERYTHROCYTE [DISTWIDTH] IN BLOOD BY AUTOMATED COUNT: 16.1 % (ref 11–15)
GLOBULIN PLAS-MCNC: 2.8 G/DL (ref 2.8–4.4)
GLUCOSE BLD-MCNC: 147 MG/DL (ref 70–99)
GLUCOSE BLD-MCNC: 208 MG/DL (ref 70–99)
GLUCOSE BLD-MCNC: 300 MG/DL (ref 70–99)
GLUCOSE BLD-MCNC: 77 MG/DL (ref 70–99)
GLUCOSE BLD-MCNC: 89 MG/DL (ref 70–99)
HCT VFR BLD AUTO: 36.6 %
HGB BLD-MCNC: 11.2 G/DL
IMM GRANULOCYTES # BLD AUTO: 0.01 X10(3) UL (ref 0–1)
IMM GRANULOCYTES NFR BLD: 0.2 %
LYMPHOCYTES # BLD AUTO: 3.88 X10(3) UL (ref 1–4)
LYMPHOCYTES NFR BLD AUTO: 60.8 %
M PROTEIN MFR SERPL ELPH: 5.9 G/DL (ref 6.4–8.2)
MCH RBC QN AUTO: 23.7 PG (ref 26–34)
MCHC RBC AUTO-ENTMCNC: 30.6 G/DL (ref 31–37)
MCV RBC AUTO: 77.4 FL
MONOCYTES # BLD AUTO: 0.58 X10(3) UL (ref 0.1–1)
MONOCYTES NFR BLD AUTO: 9.1 %
NEUTROPHILS # BLD AUTO: 1.74 X10 (3) UL (ref 1.5–7.7)
NEUTROPHILS # BLD AUTO: 1.74 X10(3) UL (ref 1.5–7.7)
NEUTROPHILS NFR BLD AUTO: 27.2 %
OSMOLALITY SERPL CALC.SUM OF ELEC: 289 MOSM/KG (ref 275–295)
PHOSPHATE SERPL-MCNC: 2 MG/DL (ref 2.5–4.9)
PLATELET # BLD AUTO: 122 10(3)UL (ref 150–450)
POTASSIUM SERPL-SCNC: 3.1 MMOL/L (ref 3.5–5.1)
RBC # BLD AUTO: 4.73 X10(6)UL
SODIUM SERPL-SCNC: 141 MMOL/L (ref 136–145)
WBC # BLD AUTO: 6.4 X10(3) UL (ref 4–11)

## 2021-03-02 PROCEDURE — 93306 TTE W/DOPPLER COMPLETE: CPT | Performed by: HOSPITALIST

## 2021-03-02 PROCEDURE — 93970 EXTREMITY STUDY: CPT | Performed by: HOSPITALIST

## 2021-03-02 RX ORDER — POTASSIUM CHLORIDE 20 MEQ/1
40 TABLET, EXTENDED RELEASE ORAL ONCE
Status: COMPLETED | OUTPATIENT
Start: 2021-03-02 | End: 2021-03-02

## 2021-03-02 RX ORDER — ALBUTEROL SULFATE 90 UG/1
2 AEROSOL, METERED RESPIRATORY (INHALATION) 4 TIMES DAILY
Status: DISCONTINUED | OUTPATIENT
Start: 2021-03-02 | End: 2021-03-02

## 2021-03-02 RX ORDER — ALBUTEROL SULFATE 90 UG/1
2 AEROSOL, METERED RESPIRATORY (INHALATION)
Status: DISCONTINUED | OUTPATIENT
Start: 2021-03-02 | End: 2021-03-03

## 2021-03-02 NOTE — PROGRESS NOTES
[facility]  Progress Note    Margy Pickard Patient Status:  Inpatient    3/11/1970 MRN [id number]   [facility] 4NW-A Attending Randell Mcgee MD   Hosp Day # 2 PCP Espinoza Verma MD         SUBJECTIVE:  Subjective:  Margy Pickard 138 137 137 141   K 3.7 3.5 3.5 3.3* 4.1 3.1*    109 109 108 107 109   CO2 16.0* 21.0 22.0 21.0 21.0 25.0   BUN 10 9 8 7 8 8   CREATSERUM 0.91 1.15* 0.90 0.96 1.05* 0.82   CA 7.0* 7.0* 7.3* 7.0* 7.2* 7.9*   MG 2.2 2.2 2.2 2.1 2.1  --    PHOS 2.4* 1.6 Mitra Johns MD on 2/05/2021 at 9:40 PM       Xr Lumbar Spine (min 2 Views) (cpt=72100)    Result Date: 2/5/2021  PROCEDURE:  XR LUMBAR SPINE (MIN 2 VIEWS) (CPT=72100)  TECHNIQUE:  AP, lateral, and coned down L5-S1 views were obtained.   COMPARISON:  [facility] , XR, XR CHEST PA + LAT CHEST (AYR=79010), 11/29/2019, 9:02 AM.  [facility] , XR, XR CHEST PA + LAT CHEST (CDD=06852), 11/19/2018, 5:33 PM.  INDICATIONS:  hyperglycemia, abd pain, nausea  PATIENT STATED HISTORY: (As transcribed by Technologist)  Patient here w Bilateral renal cysts, largest measuring 1.4 x 1.3 cm. Nonobstructing right renal calculus measuring 4 mm. BOWEL/MESENTERY:  Bowel is normal in caliber. No evidence of obstruction. The appendix is normal in appearance.  PELVIS:  Bladder is unremarkable in Fibromyalgia     Goiter     Hypervitaminosis D     Demyelinating disease of central nervous system (HCC)     Tachycardia     Encephalopathy     AVN (avascular necrosis of bone) (HCC)     Gastroparesis     Fatty liver     IDDM (insulin dependent diabetes me noted   -IVF   -endo consulted–Now stable and transferred to floor and endocrinology managing blood sugars       Dehydration/CATIA in setting of DKA  -c/w IVF, control sugars, daily bmp    Now much improved    Dyspnea–multifactorial, deconditioning, rule out

## 2021-03-02 NOTE — PROGRESS NOTES
Pt arrived to floor and is alert and oriented. Pt has a emily cath that is heplocked and has no complaints of pain. Pt is up to bathroom and placed on tele. Pt has diminished lungs and bowel sounds present.

## 2021-03-02 NOTE — PROGRESS NOTES
03/02/21 0733   Clinical Encounter Type   Visited With Patient   Routine Visit Follow-up   Continue Visiting No  (unless requested)   Patient's Supportive Strategies/Resources Patient stated that she has a strong supportive family and she is supported b

## 2021-03-02 NOTE — PLAN OF CARE
Pt. Transferred to room 432 in wheel chair. She left with belongings, charts and meds. She stated she would notify her family of the transfer.

## 2021-03-02 NOTE — PROGRESS NOTES
[facility]  Endocrinology Progress Note    Lasha Collins Patient Status:  Inpatient    3/11/1970 MRN [id number]   [facility] 4NW-A Attending aSndi Tabor MD   Hosp Day # 2 PCP Dav Hough MD     CC: Patient presents with:  Hyp breakfast   • docusate sodium  100 mg Oral BID   • Thiamine HCl  100 mg Oral Daily   • Pantoprazole Sodium  40 mg Oral QAM AC   • pregabalin  150 mg Oral TID   • morphINE Sulfate ER  15 mg Oral Once   • Heparin Sodium (Porcine)  5,000 Units Subcutaneous Q1 novolog changed to 1 unit for every 4g carbs plus correction 1:10>140 QID   - will continue to monitor and adjust as needed  - upon discharge, pt can continue her current pump settings and she already has follow-up appt with RENE and Dr. Anisha Rousseau of

## 2021-03-02 NOTE — CONSULTS
[facility] Note  [facility]  Report of Consultation    Alfredo Barry Patient Status:  Inpatient    3/11/1970 MRN [id number]   [facility] 4NW-A Attending Artie Maxwell MD   [facility] Day # 2 2019   • Painful urination    • PONV (postoperative nausea and vomiting)     WOKE UP DURING PROCEDURE   • Scoliosis of lumbar spine    • Shortness of breath    • Sickle cell trait (HCC)    • Sleep apnea     CPAP   • Type II or unspecified type diabetes daniel Needs to be deleted   • Cancer Mother         stomach cancer   • Ovarian Cancer Mother         28'S   • Colon Polyps Mother    • Ovarian Cancer Maternal Grandmother         30'S   • Colon Polyps Maternal Grandmother    • Diabetes Maternal Grandmother    • acetaminophen, diphenhydrAMINE HCl, glucose **OR** Glucose-Vitamin C **OR** dextrose **OR** glucose **OR** Glucose-Vitamin C, morphINE sulfate, bisacodyl    Review of Systems:   Constitutional: Negative for anorexia, chills, fatigue, fevers, malaise, n (36.8 °C) Temporal 82 13 93 %   03/01/21 1500 90/58 — — 84 14 94 %   03/01/21 1400 93/48 — — 86 16 95 %   03/01/21 1300 (!) 87/53 — — 75 14 95 %       Intake/Output:  [unfilled]  @IOTHIS SHIFT@         PHYSICAL EXAM  GEN: Appears comfortable on room ai [id number], DSZAH5G, ABGHCO3, ABGBE, TEMP, ROXI, SITE, DEV, THGB in the last 168 hours. Invalid input(s): MRI33TSO, CHOB    Cultures:     No results found for this visit on 02/28/21.   Recent Labs   Lab 02/28/21  1422   COLORUR Straw   CLARITY Clear   SPE

## 2021-03-02 NOTE — PLAN OF CARE
Page to Dr. David Bhakta at approx 0915 as AM glucose 89, pt ate 100% meal (carb coverage = 9units) Levemir 36u ordered. Order on [facility] states to start D5 0.45 @ 100 if glucose less than 150. Writer requesting clarification.  Return call received from Dr. Rm sifuentes

## 2021-03-02 NOTE — PHYSICAL THERAPY NOTE
PHYSICAL THERAPY QUICK EVALUATION - INPATIENT    Room Number: 432/432-A  Evaluation Date: 3/2/2021  Presenting Problem: DKA  Physician Order: PT Eval and Treat    Problem List  Principal Problem:    Type 1 diabetes mellitus with ketoacidosis without coma impairment     GLASSES   • Wears glasses    • Weight loss 2019   • Wheezing        Past Surgical History  Past Surgical History:   Procedure Laterality Date   • CATH ANGIO  May 2014   • CATHETER REMOVAL Right 9/12/2018    Performed by Dwain Paulson MD at PAIN ASSESSMENT  Ratin  Location: head  Management Techniques: Activity promotion; Body mechanics   RANGE OF MOTION AND STRENGTH ASSESSMENT  Upper extremity ROM and strength are within functional limits     Lower extremity ROM is within functional l Patient End of Session: In bed;Needs met;Call light within reach;RN aware of session/findings; All patient questions and concerns addressed    ASSESSMENT   Patient is a 44year old female admitted on 2/28/2021 for abdominal pain and hyperglycemia.  Pt pr

## 2021-03-03 LAB
GLUCOSE BLD-MCNC: 138 MG/DL (ref 70–99)
GLUCOSE BLD-MCNC: 139 MG/DL (ref 70–99)
GLUCOSE BLD-MCNC: 277 MG/DL (ref 70–99)
GLUCOSE BLD-MCNC: 295 MG/DL (ref 70–99)
POTASSIUM SERPL-SCNC: 2.9 MMOL/L (ref 3.5–5.1)

## 2021-03-03 PROCEDURE — 99221 1ST HOSP IP/OBS SF/LOW 40: CPT | Performed by: INTERNAL MEDICINE

## 2021-03-03 RX ORDER — PREGABALIN 100 MG/1
200 CAPSULE ORAL 3 TIMES DAILY
Status: DISCONTINUED | OUTPATIENT
Start: 2021-03-03 | End: 2021-03-05

## 2021-03-03 RX ORDER — ALBUTEROL SULFATE 90 UG/1
2 AEROSOL, METERED RESPIRATORY (INHALATION) EVERY 6 HOURS PRN
Status: DISCONTINUED | OUTPATIENT
Start: 2021-03-03 | End: 2021-03-05

## 2021-03-03 RX ORDER — HYDROCODONE BITARTRATE AND ACETAMINOPHEN 10; 325 MG/1; MG/1
1 TABLET ORAL 2 TIMES DAILY PRN
COMMUNITY

## 2021-03-03 RX ORDER — SODIUM CHLORIDE 450 MG/100ML
INJECTION, SOLUTION INTRAVENOUS CONTINUOUS
Status: DISCONTINUED | OUTPATIENT
Start: 2021-03-04 | End: 2021-03-05

## 2021-03-03 RX ORDER — HYDROCODONE BITARTRATE AND ACETAMINOPHEN 10; 325 MG/1; MG/1
1 TABLET ORAL EVERY 4 HOURS PRN
Status: DISCONTINUED | OUTPATIENT
Start: 2021-03-03 | End: 2021-03-05

## 2021-03-03 RX ORDER — BUDESONIDE AND FORMOTEROL FUMARATE DIHYDRATE 160; 4.5 UG/1; UG/1
2 AEROSOL RESPIRATORY (INHALATION) 2 TIMES DAILY
COMMUNITY

## 2021-03-03 RX ORDER — MORPHINE SULFATE 30 MG/1
30 TABLET, FILM COATED, EXTENDED RELEASE ORAL 2 TIMES DAILY PRN
COMMUNITY
End: 2021-04-27

## 2021-03-03 RX ORDER — BACLOFEN 10 MG/1
10 TABLET ORAL 3 TIMES DAILY PRN
COMMUNITY
End: 2021-04-27

## 2021-03-03 RX ORDER — SODIUM CHLORIDE 9 MG/ML
INJECTION, SOLUTION INTRAVENOUS
Status: ACTIVE | OUTPATIENT
Start: 2021-03-04 | End: 2021-03-04

## 2021-03-03 RX ORDER — PREGABALIN 200 MG/1
200 CAPSULE ORAL 2 TIMES DAILY
COMMUNITY

## 2021-03-03 RX ORDER — POTASSIUM CHLORIDE 14.9 MG/ML
20 INJECTION INTRAVENOUS ONCE
Status: COMPLETED | OUTPATIENT
Start: 2021-03-03 | End: 2021-03-03

## 2021-03-03 NOTE — HISTORICAL OFFICE NOTE
Progress Notes  - documented in this encounter  Gilson Bains MD - 11/09/2020 1:00 PM CST  Formatting of this note might be different from the original.  Subjective   Patient ID: Ms. Randell Ellis is a pleasant 44year old lady    Chief Complaint   Patien Never Used   • Tobacco comment: Never used tobacco. denies smoking   Substance Use Topics   • Alcohol use: Not Currently   • Drug use: Not on file       Allergies:  ALLERGIES:   Allergen Reactions   • Urea THROAT SWELLING   Urea C-13 in Pranactin for H. Py omeprazole (PRILOSEC) 20 MG capsule Take 20 mg by mouth 2 times daily. • pregabalin (LYRICA) 150 MG capsule Take 150 mg by mouth 3 times daily. • montelukast (SINGULAIR) 10 MG tablet Take 10 mg by mouth nightly.    • docusate sodium (COLACE) 100 MG caps hypercholesterolemia   10. Hypothyroidism, unspecified type   11. Anemia, unspecified type   12. Sickle cell trait (CMS/HCC)   13. Dizziness   14. Family history of coronary arteriosclerosis   13.  Chronic fatigue and malaise     Assessment / Plan:    Ms. Nishant Mackenzie

## 2021-03-03 NOTE — PROGRESS NOTES
[facility]  Progress Note    Johnny Sanchez Patient Status:  Inpatient    3/11/1970 MRN [id number]   [facility] 4NW-A Attending Ryder Velazquez MD   Hosp Day # 3 PCP Dexter Palmer MD         SUBJECTIVE:  Subjective:  Johnny Sanchez Abdomen:     Soft, non-tender, bowel sounds active all four quadrants,                Extremities:    no cyanosis, icterus or edema                                  Neurologic :  General weakness, no focal deficits                                      Da consciousness. Complains of head pain. FINDINGS:  VENTRICLES/SULCI:  Ventricles and sulci are normal in size. No extra-axial fluid collection or midline shift. INTRACRANIAL:  Stable mild patchy low attenuation within the cerebral white matter.   No intr PATIENT STATED HISTORY: (As transcribed by Technologist)  Patient fell from standing, posterior head pain radiating down, low back pain. FINDINGS:  No fracture, subluxation or disc space narrowing.   Loss of lordosis and mild scoliosis convex to the rig transmitted to the [facility] [address] [facility]) [address]) which includes the Dose Index Registry. PATIENT STATED HISTORY:(As transcribed by Technologist)  Patient with hyperglycemia, epigastric abdomen pain and nausea. • Montelukast Sodium  10 mg Oral Nightly   • ferrous sulfate  325 mg Oral BID with meals   • Levothyroxine Sodium  88 mcg Oral Before breakfast   • docusate sodium  100 mg Oral BID   • Thiamine HCl  100 mg Oral Daily   • Pantoprazole Sodium  40 mg Oral Q (nonalcoholic steatohepatitis)     Myalgia and myositis, unspecified     Liver lesion     Interstitial cystitis     Family history of coronary arteriosclerosis     Type 1 diabetes mellitus with ketoacidosis without coma ([facility] [address]     Urinary tract infection wi

## 2021-03-03 NOTE — RESPIRATORY THERAPY NOTE
breo given and signed over to rn. Patient refused albuterol states she does not need it.   Change albuterol to prn

## 2021-03-03 NOTE — PLAN OF CARE
Patient received this evening alert and oriented x 4, lungs clear on room air, abdomen soft, bowel sounds present, passing flatus, voiding adequate amounts. Accu checks QID, insulin per scale, patient denies pain.

## 2021-03-03 NOTE — PROGRESS NOTES
[facility]  Endocrinology Progress Note    Tiana Callejas Patient Status:  Inpatient    3/11/1970 MRN [id number]   [facility] 4NW-A Attending Diane Oh MD   Hosp Day # 3 PCP Kennedy Das MD     CC: Patient presents with:  Hyp ferrous sulfate  325 mg Oral BID with meals   • Levothyroxine Sodium  88 mcg Oral Before breakfast   • docusate sodium  100 mg Oral BID   • Thiamine HCl  100 mg Oral Daily   • Pantoprazole Sodium  40 mg Oral QAM AC   • pregabalin  150 mg Oral TID   • morph structure, possibly thrombus  - management per cardiology       Plan of care discussed with patient/family at bedside. All questions/concerns addressed as fully as possible.      Endocrine service will continue to follow closely while the patient is in the

## 2021-03-03 NOTE — CM/SW NOTE
03/03/21 1400   Discharge disposition   Expected discharge disposition Home or Self   Name of Facillity/Home Care/Hospice Residential   Informed [facility] the pt will dc in the next day or two

## 2021-03-03 NOTE — PROGRESS NOTES
03/02/21 1915   Clinical Encounter Type   Visited With Patient   Routine Visit Follow-up   Continue Visiting No   Patient's Supportive Strategies/Resources  shared prayer with the pt   Sacramental Encounters   Sacrament Other  provided e

## 2021-03-03 NOTE — CONSULTS
[facility]  Report of Consultation    Johnny Solomonambrosio Patient Status:  Inpatient    3/11/1970 MRN [id number]   [facility] 4NW-A Attending Ryder Velazquez MD   Hosp Day # 3 PCP Dexter Palmer MD     Reason for Consultation:  Abnormal e (postoperative nausea and vomiting)     WOKE UP DURING PROCEDURE   • Scoliosis of lumbar spine    • Shortness of breath    • Sickle cell trait (HCC)    • Sleep apnea     CPAP   • Type II or unspecified type diabetes mellitus without mention of complication stomach cancer   • Ovarian Cancer Mother         28'S   • Colon Polyps Mother    • Ovarian Cancer Maternal Grandmother         30'S   • Colon Polyps Maternal Grandmother    • Diabetes Maternal Grandmother    • Breast Cancer Maternal Aunt         02'I cholecalciferol (VITAMIN D3) cap/tab 5,000 Units, 5,000 Units, Oral, Daily  •  aspirin EC tab 81 mg, 81 mg, Oral, Daily  •  Montelukast Sodium (SINGULAIR) tab 10 mg, 10 mg, Oral, Nightly  •  ferrous sulfate EC tab 325 mg, 325 mg, Oral, BID with meals  •  L in no apparent distress. HEENT: No focal deficits. Neck: No JVD, carotids 2+ no bruits. Cardiac: Regular rate and rhythm, S1, S2 normal, no murmur, rub or gallop. Lungs: Clear without wheezes, rales, rhonchi or dullness. Normal excursions and effort. CATIA (acute kidney injury) ([facility] [address]     Hypokalemia     MS (multiple sclerosis) (HCC)     Anemia     Weakness generalized     Essential hypertension     Irritable bowel syndrome (IBS)     S/P laparoscopic surgery     Abdominal adhesions     Dyspnea, paroxysma ago).    She denies chest pain, dyspnea, orthopnea, PND or LE swelling. She reports occasional palpitations and has had episodes of lightheadedness, dizziness, presyncope and syncope likely due to hypoglycemic episodes.   She denies fever, chills, nausea/v

## 2021-03-03 NOTE — DIETARY NOTE
[facility]  NUTRITION ASSESSMENT    Pt does not meet malnutrition criteria. NUTRITION DIAGNOSIS/PROBLEM:  Altered nutrition-related lab values related to endocrine dysfunction as evidenced by high A1c of 11.4%. NUTRITION INTERVENTION:  1.  Meal a 03/01/21 0400 76.9 kg (169 lb 8.5 oz)     NUTRITION:  Diet: Orders Placed This Encounter      Carbohydrate controlled diet 1800 kcal/60 grams; Is Patient on Accuchecks?  Yes  Oral Supplements: None at this time    FOOD/NUTRITION RELATED HISTORY:  Appetite

## 2021-03-03 NOTE — PROGRESS NOTES
[facility] Pulmonary/Critical Care Progress Note     SUBJECTIVE/Interval history: All events, procedures, notes reviewed. Pt reports improvement in sob after using inhalers. Blood sugars improved.        Review of System CO2 21.0 21.0 25.0  --      Recent Labs   Lab 02/28/21  1125 03/01/21  0406 03/02/21  0543   RBC 6.15* 4.68 4.73   HGB 14.5 10.9* 11.2*   HCT 47.0 35.9 36.6   MCV 76.4* 76.7* 77.4*   MCH 23.6* 23.3* 23.7*   MCHC 30.9* 30.4* 30.6*   RDW 17.8* 16.5* 16.1* Unremarkable bilateral lower extremity Doppler ultrasound.     Dictated by (CST): Shukri Davies MD on 3/02/2021 at 3:44 PM     Finalized by (CST): Shukri Davies MD on 3/02/2021 at 3:45 PM         • pregabalin  200 mg Oral TID   • [START ON 3/4/2021] sodium

## 2021-03-04 ENCOUNTER — APPOINTMENT (OUTPATIENT)
Dept: INTERVENTIONAL RADIOLOGY/VASCULAR | Facility: HOSPITAL | Age: 51
DRG: 919 | End: 2021-03-04
Attending: INTERNAL MEDICINE
Payer: MEDICARE

## 2021-03-04 ENCOUNTER — APPOINTMENT (OUTPATIENT)
Dept: CV DIAGNOSTICS | Facility: HOSPITAL | Age: 51
DRG: 919 | End: 2021-03-04
Attending: INTERNAL MEDICINE
Payer: MEDICARE

## 2021-03-04 LAB
ALBUMIN SERPL-MCNC: 3 G/DL (ref 3.4–5)
ALBUMIN/GLOB SERPL: 1 {RATIO} (ref 1–2)
ALP LIVER SERPL-CCNC: 118 U/L
ALT SERPL-CCNC: 99 U/L
ANION GAP SERPL CALC-SCNC: 6 MMOL/L (ref 0–18)
AST SERPL-CCNC: 57 U/L (ref 15–37)
BASOPHILS # BLD AUTO: 0.04 X10(3) UL (ref 0–0.2)
BASOPHILS NFR BLD AUTO: 0.6 %
BILIRUB SERPL-MCNC: 0.3 MG/DL (ref 0.1–2)
BUN BLD-MCNC: 12 MG/DL (ref 7–18)
BUN/CREAT SERPL: 12.5 (ref 10–20)
CALCIUM BLD-MCNC: 9 MG/DL (ref 8.5–10.1)
CHLORIDE SERPL-SCNC: 105 MMOL/L (ref 98–112)
CO2 SERPL-SCNC: 31 MMOL/L (ref 21–32)
CREAT BLD-MCNC: 0.96 MG/DL
DEPRECATED RDW RBC AUTO: 42.9 FL (ref 35.1–46.3)
EOSINOPHIL # BLD AUTO: 0.2 X10(3) UL (ref 0–0.7)
EOSINOPHIL NFR BLD AUTO: 3 %
ERYTHROCYTE [DISTWIDTH] IN BLOOD BY AUTOMATED COUNT: 15.9 % (ref 11–15)
GLOBULIN PLAS-MCNC: 3 G/DL (ref 2.8–4.4)
GLUCOSE BLD-MCNC: 140 MG/DL (ref 70–99)
GLUCOSE BLD-MCNC: 174 MG/DL (ref 70–99)
GLUCOSE BLD-MCNC: 175 MG/DL (ref 70–99)
GLUCOSE BLD-MCNC: 181 MG/DL (ref 70–99)
GLUCOSE BLD-MCNC: 305 MG/DL (ref 70–99)
HCT VFR BLD AUTO: 31.9 %
HGB BLD-MCNC: 10 G/DL
IMM GRANULOCYTES # BLD AUTO: 0.03 X10(3) UL (ref 0–1)
IMM GRANULOCYTES NFR BLD: 0.5 %
LYMPHOCYTES # BLD AUTO: 4 X10(3) UL (ref 1–4)
LYMPHOCYTES NFR BLD AUTO: 60.5 %
M PROTEIN MFR SERPL ELPH: 6 G/DL (ref 6.4–8.2)
MCH RBC QN AUTO: 23.8 PG (ref 26–34)
MCHC RBC AUTO-ENTMCNC: 31.3 G/DL (ref 31–37)
MCV RBC AUTO: 75.8 FL
MONOCYTES # BLD AUTO: 0.57 X10(3) UL (ref 0.1–1)
MONOCYTES NFR BLD AUTO: 8.6 %
NEUTROPHILS # BLD AUTO: 1.77 X10 (3) UL (ref 1.5–7.7)
NEUTROPHILS # BLD AUTO: 1.77 X10(3) UL (ref 1.5–7.7)
NEUTROPHILS NFR BLD AUTO: 26.8 %
OSMOLALITY SERPL CALC.SUM OF ELEC: 298 MOSM/KG (ref 275–295)
PLATELET # BLD AUTO: 139 10(3)UL (ref 150–450)
POTASSIUM SERPL-SCNC: 3.2 MMOL/L (ref 3.5–5.1)
POTASSIUM SERPL-SCNC: 3.2 MMOL/L (ref 3.5–5.1)
RBC # BLD AUTO: 4.21 X10(6)UL
SODIUM SERPL-SCNC: 142 MMOL/L (ref 136–145)
WBC # BLD AUTO: 6.6 X10(3) UL (ref 4–11)

## 2021-03-04 PROCEDURE — 99152 MOD SED SAME PHYS/QHP 5/>YRS: CPT | Performed by: INTERNAL MEDICINE

## 2021-03-04 PROCEDURE — 93325 DOPPLER ECHO COLOR FLOW MAPG: CPT | Performed by: INTERNAL MEDICINE

## 2021-03-04 PROCEDURE — B24BZZ4 ULTRASONOGRAPHY OF HEART WITH AORTA, TRANSESOPHAGEAL: ICD-10-PCS | Performed by: INTERNAL MEDICINE

## 2021-03-04 PROCEDURE — 76376 3D RENDER W/INTRP POSTPROCES: CPT | Performed by: INTERNAL MEDICINE

## 2021-03-04 PROCEDURE — 93320 DOPPLER ECHO COMPLETE: CPT | Performed by: INTERNAL MEDICINE

## 2021-03-04 PROCEDURE — 93312 ECHO TRANSESOPHAGEAL: CPT | Performed by: INTERNAL MEDICINE

## 2021-03-04 RX ORDER — MIDAZOLAM HYDROCHLORIDE 1 MG/ML
INJECTION INTRAMUSCULAR; INTRAVENOUS
Status: COMPLETED
Start: 2021-03-04 | End: 2021-03-04

## 2021-03-04 RX ORDER — POTASSIUM CHLORIDE 20 MEQ/1
40 TABLET, EXTENDED RELEASE ORAL ONCE
Status: DISCONTINUED | OUTPATIENT
Start: 2021-03-04 | End: 2021-03-05

## 2021-03-04 RX ORDER — ONDANSETRON 2 MG/ML
4 INJECTION INTRAMUSCULAR; INTRAVENOUS EVERY 6 HOURS PRN
Status: DISCONTINUED | OUTPATIENT
Start: 2021-03-04 | End: 2021-03-05

## 2021-03-04 NOTE — PLAN OF CARE
Update    MANDEEP findings. Catheter noted from SVC into right atrium.   There was suggestion of possible echodensity near the tip of the catheter tip but upon many views and 3D reconstructive views it is actually ultrasound reverberation artifact from the l

## 2021-03-04 NOTE — PROGRESS NOTES
Please have another provider sign in my absence.    attemtped to see pt. She has been away at [place],.  Will followup  Carlos Enrique Ambrocio

## 2021-03-04 NOTE — PROCEDURES
Cardiology 3D Transesophageal Echo Note    PRE-PROCEDURE DIAGNOSIS:  Abnormal echocardiogram    PROCEDURE: Transesophageal Echocardiogram.    SEDATION:   Topical spray x 1  Versed: 4 mg  Fentanyl: 100 mcg  I personally supervised the intravenous administra for evaluation of echodensity in right atrium.     Catie Colon MD  3/4/2021  4:28 PM

## 2021-03-04 NOTE — PLAN OF CARE
Patient had MANDEEP with Dr. Ugo Garzon today. Patient tolerated procedure well. Patient received versed and fentanyl. See bedside procedural flowsheet for specifics. Patient awake, resting comfortably. VSS. Report called to [facility], RN.  Patient transferred back to

## 2021-03-04 NOTE — PROGRESS NOTES
[facility]  Progress Note    Kvng Tai Patient Status:  Inpatient    3/11/1970 MRN [id number]   [facility] 4NW-A Attending Kavita Maher MD   Hosp Day # 4 PCP Bryson Gosselin, MD         SUBJECTIVE:  Subjective:  Kvng Tai symmetrical, trachea midline,        thyroid:      no JVD   Lungs:     Clear to auscultation bilaterally, respirations unlabored       Heart:    Regular rate and rhythm, S1 and S2 normal,     Abdomen:     Soft, non-tender, bowel sounds active all four quad transmitted to the  [facility]) [address]) which includes the Dose Index Registry. PATIENT STATED HISTORY: (As transcribed by Technologist)  Patient fell backwards hitting back of head.  Unknown loss of c Xr Cervical Spine (trauma) Portable  (cpt=72040)    Result Date: 2/5/2021  PROCEDURE:  XR CERVICAL SPINE (TRAUMA) PORTABLE  (CPT=72040)  TECHNIQUE:  AP, lateral and coned down C1-2 views were obtained. COMPARISON:  None.   INDICATIONS:  fall, denies LO pain, nausea  TECHNIQUE:  CT scanning was performed from the dome of the diaphragm to the pubic symphysis with non-ionic intravenous contrast material. Post contrast coronal MPR imaging was performed. Dose reduction techniques were used.  Dose information Insulin Aspart Pen  1-40 Units Subcutaneous TID AC and HS   • PEG 3350  17 g Oral BID   • Metoclopramide HCl  5 mg Intravenous TID AC   • Senna  8.6 mg Oral BID   • DULoxetine HCl  60 mg Oral BID   • cholecalciferol  5,000 Units Oral Daily   • aspirin  81 right upper quadrant     Allergic rhinitis     Chronic fatigue and malaise     Dilated idiopathic cardiomyopathy (HCC)     Dizziness     Dyspnea     Chest pain, unspecified     Urinary calculus, unspecified     Undiagnosed cardiac murmurs     Sickle cell t study, CPAP         Disposition per   DVT prophylaxis–hsq  See tests ordered,  Available and radiology reviewed  All consultant notes reviewed  Discussed with nursing on floor  dvt prophylaxis reviewed  PT and/or OT  Vivian Castañeda MD

## 2021-03-04 NOTE — PLAN OF CARE
Patient received this evening alert and oriented x 4, lungs clear on room air, no cough or SOB noted, abdomen soft, bowel sounds present, passing flatus, voiding adequate amounts, levemir and novolog per [facility], patient denies headache, NPO midnight and 0.45

## 2021-03-04 NOTE — PLAN OF CARE
A&Ox4. VSS, afebrile. No complaints of pain, states headache resolved. NPO for MANDEEP. Accuchecks QID with Novolog and Levemir coverage. Novolog held this AM, Levemir adjusted due to procedure. MANDEEP completed.  Patient tolerated procedure well, returned t on patient safety including physical limitations  - Instruct pt to call for assistance with activity based on assessment  - Modify environment to reduce risk of injury  - Provide assistive devices as appropriate  - Consider OT/PT consult to assist with str

## 2021-03-04 NOTE — PLAN OF CARE
A&Ox4. VSS, afebrile. Patient reporting headache- PRN Crosby, home dose reordered and administered with mild relief. Patient declined Tylenol. She states she needs to get some sleep.    Accuchecks QID with Levemir & Novolog sliding scale along with carb cove

## 2021-03-05 VITALS
TEMPERATURE: 98 F | HEART RATE: 83 BPM | OXYGEN SATURATION: 93 % | RESPIRATION RATE: 21 BRPM | SYSTOLIC BLOOD PRESSURE: 97 MMHG | WEIGHT: 174.81 LBS | BODY MASS INDEX: 28.09 KG/M2 | DIASTOLIC BLOOD PRESSURE: 52 MMHG | HEIGHT: 66 IN

## 2021-03-05 LAB
GLUCOSE BLD-MCNC: 116 MG/DL (ref 70–99)
GLUCOSE BLD-MCNC: 249 MG/DL (ref 70–99)
POTASSIUM SERPL-SCNC: 3.1 MMOL/L (ref 3.5–5.1)

## 2021-03-05 RX ORDER — POTASSIUM CHLORIDE 20 MEQ/1
40 TABLET, EXTENDED RELEASE ORAL ONCE
Status: COMPLETED | OUTPATIENT
Start: 2021-03-05 | End: 2021-03-05

## 2021-03-05 NOTE — PROGRESS NOTES
[facility]  Endocrinology Progress Note    Symone Méndez Patient Status:  Inpatient    3/11/1970 MRN [id number]   [facility] 4NW-A Attending Destiney Lou MD   Hosp Day # 5 PCP Adin Joy MD     CC: Patient presents with:  Hyp 10.4 (A) 07/16/2020    A1C 13.0 (H) 11/29/2019    A1C >16.4 (H) 10/16/2019       Lab Results   Component Value Date    K 3.1 03/05/2021    PGLU 116 03/05/2021       Recent Labs     02/28/21  1011 02/28/21  1206 02/28/21  1332 02/28/21  1501 02/28/21  1600

## 2021-03-05 NOTE — PROGRESS NOTES
NURSING DISCHARGE NOTE    Discharged Home via Wheelchair. Accompanied by Family member  Belongings Taken by patient/family. Pt AOx4. VSS. Ok'd for discharge by all services. Tolerating regular diet. No c/o pain.  Discharge reviewed with pt by yoandy

## 2021-03-05 NOTE — PROGRESS NOTES
[facility]  Progress Note    Jenn Adhikari Patient Status:  Inpatient    3/11/1970 MRN [id number]   [facility] 4NW-A Attending Julius Pedraza MD   Hosp Day # 5 PCP Johny Quigley MD         SUBJECTIVE:  Subjective:  Jenn Adhikari 81  Resp:  [9-21] 21  BP: ()/(46-69) 97/52    Intake/Output:  No intake or output data in the 24 hours ending 03/05/21 1152    Exam  General Appearance:    Alert, cooperative, no distress, appears stated age   Head:    Normocephalic,  atraumatic   Ne 02/28/21.     Ct Brain Or Head (78740)    Result Date: 2/5/2021  PROCEDURE:  CT BRAIN OR HEAD (59588)  COMPARISON:  [facility], CT, CT BRAIN OR HEAD (18976), 3/20/2020, 11:30 AM.  INDICATIONS:  fall, denies LOC  TECHNIQUE:  Noncontrast CT scanning is perfor calcifications correspond to gonadal vein calcifications seen on previous CT scan. Right renal calculus and right upper quadrant surgical clips again noted. CONCLUSION:  Mild dextroscoliosis.    Dictated by (CST): Jennifer Blue MD on 2/05/2021 a Abdomen Pelvis Iv Contrast, No Oral (er)    Result Date: 2/28/2021  PROCEDURE:  CT ABDOMEN PELVIS IV CONTRAST, NO ORAL (ER)  COMPARISON:  [facility] , CT, CT ABDOMEN+PELVIS(CPT=74176), 12/17/2019, 4:15 PM.  [facility] , CT, CT ABDOMEN+PELVIS KIDNEYSTONE 2D RNDR(NO 2/28/2021 at 1:48 PM           Meds:     • pregabalin  200 mg Oral TID   • Fluticasone Furoate-Vilanterol  1 puff Inhalation Daily   • insulin detemir  40 Units Subcutaneous [email or web address]"   • Insulin Aspart Pen  1-68 Units Subcutaneous TID CC   • Insulin A Dyspnea, paroxysmal nocturnal     Multiple sclerosis exacerbation (HCC)     Dysuria     History of plasmapheresis     Hyponatremia     Abdominal pain     Hyperglycemia     Sleep apnea     Dehydration     Abdominal pain, right upper quadrant     Allergic rh improved    Dyspnea–multifactorial, deconditioning, rule out acute pulmonary etiology, chest x-ray negative, pulmonary follow-up    Hypokalemia–replaced per protocol    GIORDANO- Patient GI follow-up    Multiple sclerosis  -well controlled, outpatient follow u

## 2021-03-05 NOTE — PLAN OF CARE
Alert and oriented x 4. VSS. Afebrile. No c/o pain or SOB. C/o nausea when drinking water. Zofran given with adequate relief. States she feels like whatever she eats is getting stuck at the back of her throat.  Able to eat italian ice and swallow pills with

## 2021-03-05 NOTE — DISCHARGE SUMMARY
[facility]  Discharge Summary    Darshan Rogers Patient Status:  Inpatient    3/11/1970 MRN [id number]   [facility] 4NW-A Attending Edwin Hammond MD   Hosp Day # 5 PCP Susannah Arenas MD     Date of Admission: 2021    Date of D without coma ([facility] [address]     Urinary tract infection without hematuria, site unspecified    Ct Brain Or Head (82486)    Result Date: 2/5/2021  PROCEDURE:  CT BRAIN OR HEAD (48952)  COMPARISON:  [place], CT, CT BRAIN OR HEAD (80552), 3/20/2020, 11:30 AM.  Heidi Aguilera posterior tibial veins. PATIENT STATED HISTORY: (As transcribed by Technologist)  Patient offered no additional history at this time. FINDINGS:  SAPHENOFEMORAL JUNCTION:  No reflux. THROMBI:  None visible.  COMPRESSION:  Normal compressibility, phasicit subluxation or disc space narrowing. Loss of lordosis and mild scoliosis convex to the right. Normal prevertebral soft tissues. CONCLUSION:  Curvature straightening consistent with spasm.    Dictated by (CST): Rosalva Celaya MD on 2/05/2021 at 1 ---------------------------------------------------------------------------- Conclusions: 1. Left ventricle: The cavity size was below normal. Wall thickness was    normal. Systolic function was hyperdynamic. The estimated ejection    fraction was 70-75%. normal range. There was mild regurgitation. Pulmonic valve:    Structurally normal valve. Cusp separation was normal. Doppler:  Transvalvular velocity was within the normal range. There was no significant regurgitation.  Pericardium:  There was no pericar peak velocity                     0.55  m/sec  ---------  Mitral A-wave peak velocity                     0.91  m/sec  ---------  Mitral E/A ratio, peak                          0.6          ---------   Pulmonary arteries                              Value (CST): Rosetta Huynh MD on 2/28/2021 at 12:38 PM       Cath Transesophageal Echocardiogram (yessy)    Result Date: 3/4/2021  Yoly Harding MD     3/4/2021  4:34 PM Cardiology 3D Transesophageal Echo Note PRE-PROCEDURE DIAGNOSIS:  Abnormal echocardiogram PROC without evidence for mobile atheromata or thrombus. · No pericardial effusion. 3-Dimensional Echocardiography performed for evaluation of echodensity in right atrium.  Belen Rivers MD 3/4/2021 4:28 PM     Ct Abdomen Pelvis Iv Contrast, No Oral (er)    Result D 1. No acute findings. 2. Hepatic steatosis.   3. Nonobstructing 4 mm right renal calculus    Dictated by (CST): Malachi Holstein, MD on 2/28/2021 at 1:45 PM     Finalized by (CST): Malachi Holstein, MD on 2/28/2021 at 1:48 PM       No results found for this transesophageal echocardiographic evidence of thrombus or vegetation  OBJECTIVE:  Temp:  [97.8 °F (36.6 °C)-98.2 °F (36.8 °C)] 97.8 °F (36.6 °C)  Pulse:  [78-95] 83  Resp:  [9-21] 21  BP: ()/(46-69) 97/52     Intake/Output:  No intake or output data Lab 03/04/21  0721 03/04/21  1200 03/04/21  1622 03/04/21  2125 03/05/21  0801   PGLU 174* 175* 140* 305* 116*         No results for input(s): URINE, CULTI, BLDSMR in the last 168 hours.     Recent Results   No results found for this visit on 02/28/21. STATED HISTORY: (As transcribed by Technologist)  Patient fell from standing, posterior head pain radiating down, low back pain. FINDINGS:  No fracture, subluxation or disc space narrowing. Mild scoliosis convex to the right.   Multiple right paraspinal vasculature are within normal limits. No pleural effusions. No pneumothorax.              CONCLUSION:  No acute findings.     Dictated by (CST): Darwin West MD on 2/28/2021 at 12:38 PM     Finalized by (CST): Darwin West MD on 2/28/2021 at 12:38 P of the aorta. BONES:  No acute fractures.             CONCLUSION:  1. No acute findings. 2. Hepatic steatosis.   3. Nonobstructing 4 mm right renal calculus    Dictated by (CST): Gerri Fleming MD on 2/28/2021 at 1:45 PM     Finalized by (CST): Abel Weakness of both lower extremities     CATIA (acute kidney injury) ([facility] [address]     Hypokalemia     MS (multiple sclerosis) (HCC)     Anemia     Weakness generalized     Essential hypertension     Irritable bowel syndrome (IBS)     S/P laparoscopic surgery     Abdo bridging to levemir as bicarb and anion gap normalized   -no infectious etiology noted   -IVF   -endo consulted–Now stable and transferred to floor and endocrinology managing blood sugars        Dehydration/CATIA in setting of DKA  -c/w IVF, control sugars, needed. aspirin 81 MG Oral Tab EC  Take 1 tablet by mouth daily.  May use over the counter coated aspirin  Refills: 0    DEXCOM G6 SENSOR Does not apply Misc  USE AS DIRECTED EVERY 10 DAYS  Qty: 9 each Refills: 3  Associated Diagnoses:Type 2 diabetes daniel Soln  Inhale 2 puffs into the lungs every 6 (six) hours as needed.  PRN wheezing/SOB      STOP taking these medications    morphINE Sulfate IR 15 MG Oral Tab    Morphine Sulfate ER 30 MG Oral Tablet Extended Release 12 hour Abuse-Deterrent          Follow u

## 2021-03-10 ENCOUNTER — TELEPHONE (OUTPATIENT)
Dept: NEUROLOGY | Facility: CLINIC | Age: 51
End: 2021-03-10

## 2021-03-10 NOTE — TELEPHONE ENCOUNTER
Home health re-certification paperwork received for provider signature. Patient receives home health services for nursing. Anticipated nursing frequency 2 times per week, then 1 time per week for 8 weeks.     Nursing monitoring Agustin catheter and pr

## 2021-03-15 NOTE — TELEPHONE ENCOUNTER
RN faxed reauthorization paperwork to [facility] at [phone]. Fax confirmation received.

## 2021-03-16 PROBLEM — E10.65 CONTROLLED TYPE 1 DIABETES MELLITUS WITH HYPERGLYCEMIA (HCC): Status: ACTIVE | Noted: 2021-03-16

## 2021-03-17 ENCOUNTER — TELEPHONE (OUTPATIENT)
Dept: CARDIOLOGY | Age: 51
End: 2021-03-17

## 2021-03-17 DIAGNOSIS — Z23 NEED FOR VACCINATION: ICD-10-CM

## 2021-03-23 ENCOUNTER — OFFICE VISIT (OUTPATIENT)
Dept: RHEUMATOLOGY | Facility: CLINIC | Age: 51
End: 2021-03-23
Payer: MEDICARE

## 2021-03-23 VITALS
HEIGHT: 66 IN | BODY MASS INDEX: 28.61 KG/M2 | HEART RATE: 84 BPM | SYSTOLIC BLOOD PRESSURE: 114 MMHG | TEMPERATURE: 97 F | RESPIRATION RATE: 16 BRPM | DIASTOLIC BLOOD PRESSURE: 72 MMHG | WEIGHT: 178 LBS

## 2021-03-23 DIAGNOSIS — R53.82 CHRONIC FATIGUE: ICD-10-CM

## 2021-03-23 DIAGNOSIS — R79.89 ABNORMAL LFTS: ICD-10-CM

## 2021-03-23 DIAGNOSIS — M79.7 FIBROMYALGIA: Primary | ICD-10-CM

## 2021-03-23 DIAGNOSIS — G35 HISTORY OF MULTIPLE SCLEROSIS (HCC): ICD-10-CM

## 2021-03-23 DIAGNOSIS — Z82.69 FAMILY HISTORY OF SYSTEMIC LUPUS ERYTHEMATOSUS: ICD-10-CM

## 2021-03-23 DIAGNOSIS — D64.9 ANEMIA, UNSPECIFIED TYPE: ICD-10-CM

## 2021-03-23 PROCEDURE — 99204 OFFICE O/P NEW MOD 45 MIN: CPT | Performed by: INTERNAL MEDICINE

## 2021-03-23 NOTE — PROGRESS NOTES
?  RHEUMATOLOGY NEW PATIENT   Date of visit: 3/23/2021  ? Patient presents with:  Establish Care: new pt. Dr. Sundeep Welch referral. Diagnosed with MS, neuropathy, and fibro. Having overall body pain. Went to ER in november that recommended rheum eval as well. patient/family/caregiver, ordering medications or testing, referring and communicating with other healthcare providers, documenting clinical information in the E HR, independently interpreting results and communicating results to the patient/family/caregiv November to see a rheumatology. Does have pain in her neck, low back and her arms/legs. Did suffer a fall in Feb from a sensation of weakness in there legs and she fell. Has had some worsened neck pain since that time. Thinks she loss consciousness.  Was an allergic reaction.    + dry eyes with burning sensation, saw eye doctor but told exam normal, using OTC eye drops- Refresh   + dry mouth, using OTC mouthwash   + reflux, controlled with omeprazole   + hx of IBS with both constipation and diarrhea, no blo • Frequent urination    • Frequent UTI    • Glaucoma    • H. pylori infection    • H/O  section    • [facility]   • Heart palpitations 2017   • Hemorrhoids    • Herniation of cervical intervertebral disc    • High blood pressure    • Princess Wallace MD at [facility] ENDOSCOPY   • IR PORT A CATH INSERTION [address] [facility] [address]  09/12/2018   • LITHOTRIPSY      x 2   • LYSIS OF ADHESIONS     • NEEDLE BIOPSY LIVER  2016   • OTHER      dialysis catheter- left chest for plasmaphoresis   • OTHER SURGICAL H not apply Misc, USE AS DIRECTED EVERY 10 DAYS, Disp: 9 each, Rfl: 3  DEXCOM G6 TRANSMITTER Does not apply Misc, USE AS DIRECTED EVERY 90 DAYS, Disp: 1 each, Rfl: 3  natalizumab (TYSABRI) 300 MG/15ML Intravenous Conc, Tysabri: Dilute one vial (300 mg/15 ml) AND SHORTNESS OF BREATH  Other                   SHORTNESS OF BREATH    Comment:ENVIRONMENTAL,AASTHMA EXACERBATION  Pcn [Penicillamine]       Penicillins             OTHER (SEE COMMENTS)  Solu-Medrol [Methyl*    OTHER (SEE COMMENTS)    Comment:Swelling of diaphoretic. HENT:      Head: Normocephalic. Right Ear: External ear normal.      Left Ear: External ear normal.   Eyes:      General: No scleral icterus. Extraocular Movements: Extraocular movements intact.       Conjunctiva/sclera: Conjunctivae  EDWARD , CT, CT ABDOMEN+PELVIS KIDNEYSTONE 2D RNDR(NO IV,NO ORAL)(CPT=74176), 10/15/2019, 7:56 PM.       INDICATIONS:  hyperglycemia, abd pain, nausea       TECHNIQUE:  CT scanning was performed from the dome of the diaphragm to the pubic symphysis with n  None.       INDICATIONS:  fall, denies LOC       PATIENT STATED HISTORY: (As transcribed by Technologist)  Patient fell from standing, posterior head pain radiating down, low back pain.            FINDINGS:     No fracture, subluxation or disc space narro 03/04/2021    MOABSO 0.57 03/04/2021    EOABSO 0.20 03/04/2021    BAABSO 0.04 03/04/2021     Lab Results   Component Value Date     (H) 03/04/2021    BUN 12 03/04/2021    BUNCREA 12.5 03/04/2021    CREATSERUM 0.96 03/04/2021    ANIONGAP 6 03/04/2021

## 2021-03-24 ENCOUNTER — HOSPITAL ENCOUNTER (OUTPATIENT)
Dept: GENERAL RADIOLOGY | Facility: HOSPITAL | Age: 51
Discharge: HOME | End: 2021-03-24
Attending: INTERNAL MEDICINE
Payer: MEDICARE

## 2021-03-24 LAB
C3 SERPL-MCNC: 176 MG/DL (ref 90–180)
C4 SERPL-MCNC: 36.8 MG/DL (ref 10–40)
CRP SERPL-MCNC: 1.3 MG/DL (ref ?–0.3)
IGA SERPL-MCNC: 230 MG/DL (ref 70–312)
IGM SERPL-MCNC: 75.9 MG/DL (ref 43–279)
IMMUNOGLOBULIN PNL SER-MCNC: 1000 MG/DL (ref 791–1643)
SED RATE-ML: 25 MM/HR

## 2021-03-24 PROCEDURE — 86140 C-REACTIVE PROTEIN: CPT | Performed by: INTERNAL MEDICINE

## 2021-03-24 PROCEDURE — 83516 IMMUNOASSAY NONANTIBODY: CPT | Performed by: INTERNAL MEDICINE

## 2021-03-24 PROCEDURE — 36415 COLL VENOUS BLD VENIPUNCTURE: CPT | Performed by: INTERNAL MEDICINE

## 2021-03-24 PROCEDURE — 85652 RBC SED RATE AUTOMATED: CPT | Performed by: INTERNAL MEDICINE

## 2021-03-24 PROCEDURE — 86038 ANTINUCLEAR ANTIBODIES: CPT | Performed by: INTERNAL MEDICINE

## 2021-03-24 PROCEDURE — 82784 ASSAY IGA/IGD/IGG/IGM EACH: CPT | Performed by: INTERNAL MEDICINE

## 2021-03-24 PROCEDURE — 86160 COMPLEMENT ANTIGEN: CPT | Performed by: INTERNAL MEDICINE

## 2021-03-25 ENCOUNTER — TELEPHONE (OUTPATIENT)
Dept: NEUROLOGY | Facility: CLINIC | Age: 51
End: 2021-03-25

## 2021-03-25 NOTE — TELEPHONE ENCOUNTER
Received home health certification and plan of care for certification period 3/14/2021 - 5/12/2021. Patient receives home health services for nursing services and management of diabetes and care/flushing of central line weekly.     Forms signed and faxed

## 2021-03-25 NOTE — TELEPHONE ENCOUNTER
Tysabri infusion record received from [facility] infusion [place] for physician review. No signs/symptoms of infusion reaction noted.   Patient infused on 3/25/2021  Patient stable during the observation post infusion period  Lot Number [id number] x1, Exp 04/2024  N

## 2021-03-26 ENCOUNTER — TELEPHONE (OUTPATIENT)
Dept: NEUROLOGY | Facility: CLINIC | Age: 51
End: 2021-03-26

## 2021-03-26 LAB
F-ACTIN (SMOOTH MUSCLE) AB: 5 UNITS
MITOCHONDRIAL M2 AB, IGG: 2.7 UNITS

## 2021-03-26 NOTE — TELEPHONE ENCOUNTER
RN called the patient and informed her that she can get the Covid vaccine. Pt verbalized understanding and did not have any further questions.

## 2021-03-28 ENCOUNTER — IMMUNIZATION (OUTPATIENT)
Dept: LAB | Facility: HOSPITAL | Age: 51
End: 2021-03-28
Attending: HOSPITALIST
Payer: MEDICARE

## 2021-03-28 DIAGNOSIS — Z23 NEED FOR VACCINATION: Primary | ICD-10-CM

## 2021-03-28 PROCEDURE — 0011A SARSCOV2 VAC 100MCG/0.5ML IM: CPT

## 2021-03-29 LAB — ANA SCREEN: NEGATIVE

## 2021-03-31 ENCOUNTER — ORDER TRANSCRIPTION (OUTPATIENT)
Dept: ADMINISTRATIVE | Facility: HOSPITAL | Age: 51
End: 2021-03-31

## 2021-03-31 DIAGNOSIS — Z01.818 PREPROCEDURAL EXAMINATION: ICD-10-CM

## 2021-03-31 DIAGNOSIS — R06.00 DYSPNEA ON EXERTION: Primary | ICD-10-CM

## 2021-03-31 DIAGNOSIS — Z11.59 ENCOUNTER FOR SCREENING FOR OTHER VIRAL DISEASES: ICD-10-CM

## 2021-04-08 ENCOUNTER — ORDER TRANSCRIPTION (OUTPATIENT)
Dept: SLEEP CENTER | Age: 51
End: 2021-04-08

## 2021-04-08 DIAGNOSIS — Z11.59 SCREENING FOR VIRAL DISEASE: ICD-10-CM

## 2021-04-08 DIAGNOSIS — Z01.818 PREOP EXAMINATION: Primary | ICD-10-CM

## 2021-04-08 DIAGNOSIS — G47.31 CENTRAL SLEEP APNEA: Primary | ICD-10-CM

## 2021-04-09 ENCOUNTER — HOSPITAL ENCOUNTER (OUTPATIENT)
Dept: GENERAL RADIOLOGY | Facility: HOSPITAL | Age: 51
Discharge: HOME | End: 2021-04-09
Attending: SPECIALIST
Payer: MEDICARE

## 2021-04-09 ENCOUNTER — TELEPHONE (OUTPATIENT)
Dept: NEUROLOGY | Facility: CLINIC | Age: 51
End: 2021-04-09

## 2021-04-09 RX ORDER — ONDANSETRON 4 MG/1
TABLET, FILM COATED ORAL
COMMUNITY
Start: 2021-01-25

## 2021-04-09 RX ORDER — DAPAGLIFLOZIN 10 MG/1
10 TABLET, FILM COATED ORAL DAILY
COMMUNITY
Start: 2021-03-10

## 2021-04-09 RX ORDER — AMITRIPTYLINE HYDROCHLORIDE 25 MG/1
25 TABLET, FILM COATED ORAL NIGHTLY
COMMUNITY
Start: 2021-03-16

## 2021-04-09 RX ORDER — MORPHINE SULFATE 30 MG/1
30 TABLET, FILM COATED, EXTENDED RELEASE ORAL EVERY 12 HOURS
COMMUNITY
Start: 2021-02-11

## 2021-04-09 NOTE — TELEPHONE ENCOUNTER
Received 4/6/21 OV notes from Dr. David Cramer, allergist.  Placed in Dr. Xin ortiz for review. Copy sent to be scanned.

## 2021-04-12 ENCOUNTER — OFFICE VISIT (OUTPATIENT)
Dept: CARDIOLOGY | Age: 51
End: 2021-04-12

## 2021-04-12 VITALS
BODY MASS INDEX: 28.45 KG/M2 | WEIGHT: 177 LBS | HEIGHT: 66 IN | HEART RATE: 88 BPM | SYSTOLIC BLOOD PRESSURE: 104 MMHG | DIASTOLIC BLOOD PRESSURE: 76 MMHG

## 2021-04-12 DIAGNOSIS — K58.9 IRRITABLE BOWEL SYNDROME, UNSPECIFIED TYPE: ICD-10-CM

## 2021-04-12 DIAGNOSIS — K21.9 GASTROESOPHAGEAL REFLUX DISEASE WITHOUT ESOPHAGITIS: ICD-10-CM

## 2021-04-12 DIAGNOSIS — M79.7 FIBROMYALGIA: Primary | ICD-10-CM

## 2021-04-12 DIAGNOSIS — E03.9 HYPOTHYROIDISM, UNSPECIFIED TYPE: ICD-10-CM

## 2021-04-12 DIAGNOSIS — I42.0 DILATED IDIOPATHIC CARDIOMYOPATHY (CMD): ICD-10-CM

## 2021-04-12 DIAGNOSIS — E11.9 TYPE 2 DIABETES MELLITUS WITHOUT COMPLICATION, WITHOUT LONG-TERM CURRENT USE OF INSULIN (CMD): ICD-10-CM

## 2021-04-12 DIAGNOSIS — G47.30 SLEEP APNEA, UNSPECIFIED TYPE: ICD-10-CM

## 2021-04-12 DIAGNOSIS — R53.82 CHRONIC FATIGUE AND MALAISE: ICD-10-CM

## 2021-04-12 DIAGNOSIS — E78.00 PURE HYPERCHOLESTEROLEMIA: ICD-10-CM

## 2021-04-12 DIAGNOSIS — Z82.49 FAMILY HISTORY OF CORONARY ARTERIOSCLEROSIS: ICD-10-CM

## 2021-04-12 DIAGNOSIS — D64.9 ANEMIA, UNSPECIFIED TYPE: ICD-10-CM

## 2021-04-12 DIAGNOSIS — G35 MULTIPLE SCLEROSIS (CMD): ICD-10-CM

## 2021-04-12 DIAGNOSIS — D57.3 SICKLE CELL TRAIT (CMD): ICD-10-CM

## 2021-04-12 DIAGNOSIS — R53.81 CHRONIC FATIGUE AND MALAISE: ICD-10-CM

## 2021-04-12 DIAGNOSIS — R06.02 SHORTNESS OF BREATH: ICD-10-CM

## 2021-04-12 PROCEDURE — 99214 OFFICE O/P EST MOD 30 MIN: CPT | Performed by: INTERNAL MEDICINE

## 2021-04-12 RX ORDER — NATALIZUMAB 300 MG/15ML
INJECTION INTRAVENOUS
COMMUNITY
Start: 2021-01-20

## 2021-04-12 ASSESSMENT — PATIENT HEALTH QUESTIONNAIRE - PHQ9
1. LITTLE INTEREST OR PLEASURE IN DOING THINGS: NOT AT ALL
SUM OF ALL RESPONSES TO PHQ9 QUESTIONS 1 AND 2: 0
SUM OF ALL RESPONSES TO PHQ9 QUESTIONS 1 AND 2: 0
CLINICAL INTERPRETATION OF PHQ9 SCORE: NO FURTHER SCREENING NEEDED
2. FEELING DOWN, DEPRESSED OR HOPELESS: NOT AT ALL
CLINICAL INTERPRETATION OF PHQ2 SCORE: NO FURTHER SCREENING NEEDED

## 2021-04-15 ENCOUNTER — TELEPHONE (OUTPATIENT)
Dept: NEUROLOGY | Facility: CLINIC | Age: 51
End: 2021-04-15

## 2021-04-15 NOTE — TELEPHONE ENCOUNTER
Patient calling because patient was recently in the hospital and the doctors there stated she should consider having the catheter removed.     Patient advised that she used it for plasmapharesis, but has not used it in order a year because tysabri infusions

## 2021-04-16 ENCOUNTER — LAB ENCOUNTER (OUTPATIENT)
Dept: LAB | Age: 51
End: 2021-04-16
Attending: HOSPITALIST
Payer: MEDICARE

## 2021-04-16 DIAGNOSIS — Z01.818 PREPROCEDURAL EXAMINATION: ICD-10-CM

## 2021-04-16 DIAGNOSIS — Z11.59 ENCOUNTER FOR SCREENING FOR OTHER VIRAL DISEASES: ICD-10-CM

## 2021-04-19 ENCOUNTER — RT VISIT (OUTPATIENT)
Dept: RESPIRATORY THERAPY | Facility: HOSPITAL | Age: 51
End: 2021-04-19
Attending: HOSPITALIST
Payer: MEDICARE

## 2021-04-19 ENCOUNTER — TELEPHONE (OUTPATIENT)
Dept: NEPHROLOGY | Facility: CLINIC | Age: 51
End: 2021-04-19

## 2021-04-19 DIAGNOSIS — G35 MULTIPLE SCLEROSIS (HCC): Primary | ICD-10-CM

## 2021-04-19 DIAGNOSIS — R06.00 DYSPNEA ON EXERTION: ICD-10-CM

## 2021-04-19 PROCEDURE — 94726 PLETHYSMOGRAPHY LUNG VOLUMES: CPT

## 2021-04-19 PROCEDURE — 94010 BREATHING CAPACITY TEST: CPT

## 2021-04-19 PROCEDURE — 94729 DIFFUSING CAPACITY: CPT

## 2021-04-19 NOTE — TELEPHONE ENCOUNTER
Patient called in stating that in 2018 Dr. Ro Brand had put in orders for patient to have a Dialysis catheter placed, however she no longer needs it.  Patient is inquiring if Dr. Ro Brand can put in orders for it to be removed or if he has recommendation of where s

## 2021-04-20 ENCOUNTER — OFFICE VISIT (OUTPATIENT)
Dept: SLEEP CENTER | Age: 51
End: 2021-04-20
Attending: HOSPITALIST
Payer: MEDICARE

## 2021-04-20 DIAGNOSIS — G47.31 CENTRAL SLEEP APNEA: ICD-10-CM

## 2021-04-20 PROCEDURE — 95810 POLYSOM 6/> YRS 4/> PARAM: CPT

## 2021-04-22 ENCOUNTER — HOSPITAL ENCOUNTER (OUTPATIENT)
Dept: CV DIAGNOSTICS | Facility: HOSPITAL | Age: 51
Discharge: HOME | End: 2021-04-22
Attending: INTERNAL MEDICINE
Payer: MEDICARE

## 2021-04-22 DIAGNOSIS — R53.81 CHRONIC FATIGUE AND MALAISE: ICD-10-CM

## 2021-04-22 DIAGNOSIS — D57.3 SICKLE CELL TRAIT (HCC): ICD-10-CM

## 2021-04-22 DIAGNOSIS — R06.02 SHORTNESS OF BREATH: ICD-10-CM

## 2021-04-22 DIAGNOSIS — E03.9 HYPOTHYROIDISM, UNSPECIFIED TYPE: ICD-10-CM

## 2021-04-22 DIAGNOSIS — E78.00 PURE HYPERCHOLESTEROLEMIA: ICD-10-CM

## 2021-04-22 DIAGNOSIS — R53.82 CHRONIC FATIGUE AND MALAISE: ICD-10-CM

## 2021-04-22 DIAGNOSIS — I42.0 DILATED IDIOPATHIC CARDIOMYOPATHY (HCC): ICD-10-CM

## 2021-04-22 DIAGNOSIS — D64.9 ANEMIA, UNSPECIFIED TYPE: ICD-10-CM

## 2021-04-22 DIAGNOSIS — G35 MULTIPLE SCLEROSIS (HCC): ICD-10-CM

## 2021-04-22 DIAGNOSIS — E11.9 TYPE 2 DIABETES MELLITUS WITHOUT COMPLICATION, WITHOUT LONG-TERM CURRENT USE OF INSULIN (HCC): ICD-10-CM

## 2021-04-22 PROCEDURE — 78452 HT MUSCLE IMAGE SPECT MULT: CPT | Performed by: INTERNAL MEDICINE

## 2021-04-22 PROCEDURE — 93018 CV STRESS TEST I&R ONLY: CPT | Performed by: INTERNAL MEDICINE

## 2021-04-22 PROCEDURE — 93017 CV STRESS TEST TRACING ONLY: CPT | Performed by: INTERNAL MEDICINE

## 2021-04-25 ENCOUNTER — LAB ENCOUNTER (OUTPATIENT)
Dept: LAB | Facility: HOSPITAL | Age: 51
End: 2021-04-25
Attending: HOSPITALIST
Payer: MEDICARE

## 2021-04-25 ENCOUNTER — IMMUNIZATION (OUTPATIENT)
Dept: LAB | Facility: HOSPITAL | Age: 51
End: 2021-04-25
Attending: EMERGENCY MEDICINE
Payer: MEDICARE

## 2021-04-25 DIAGNOSIS — Z11.59 SCREENING FOR VIRAL DISEASE: ICD-10-CM

## 2021-04-25 DIAGNOSIS — Z23 NEED FOR VACCINATION: Primary | ICD-10-CM

## 2021-04-25 DIAGNOSIS — Z01.818 PREOP EXAMINATION: ICD-10-CM

## 2021-04-25 PROCEDURE — 0012A SARSCOV2 VAC 100MCG/0.5ML IM: CPT

## 2021-04-26 NOTE — PROCEDURES
[address] [address],[facility] [address]       Accredited by the [facility] of [facility] ([facility])    PATIENT'S NAME:        PERICO LUDWIG  ATTENDING PHYSICIAN:   Duane Hall DO  REFERRING PHYSICIAN:   Diallo Peraza MD  PATIENT ACC oxygen saturation more than or equal to 4 percent. Body position is documented via technician notes every 15 minutes. FINDINGS:  The study had lights out at 10:22 p.m. and lights on at 5:26 a.m. for a total sleep time of 394 minutes.   Her sleep efficie Evaluation of the upper airway by Ear, Nose, Throat specialist and oral appliance therapy. Weight loss would likely have an ameliorating effect on the degree of snoring detected as well.   3.   As daytime vigilance is a complaint, the patient needs to unde

## 2021-04-27 ENCOUNTER — TELEPHONE (OUTPATIENT)
Dept: CARDIOLOGY | Age: 51
End: 2021-04-27

## 2021-04-27 DIAGNOSIS — R07.9 CHEST PAIN, UNSPECIFIED TYPE: Primary | ICD-10-CM

## 2021-04-27 DIAGNOSIS — R94.39 ABNORMAL STRESS TEST: ICD-10-CM

## 2021-04-28 ENCOUNTER — HOSPITAL ENCOUNTER (OUTPATIENT)
Dept: INTERVENTIONAL RADIOLOGY/VASCULAR | Facility: HOSPITAL | Age: 51
Discharge: HOME | End: 2021-04-28
Attending: INTERNAL MEDICINE | Admitting: INTERNAL MEDICINE
Payer: MEDICARE

## 2021-04-28 ENCOUNTER — ORDER TRANSCRIPTION (OUTPATIENT)
Dept: ADMINISTRATIVE | Facility: HOSPITAL | Age: 51
End: 2021-04-28

## 2021-04-28 VITALS
TEMPERATURE: 97 F | BODY MASS INDEX: 28.45 KG/M2 | OXYGEN SATURATION: 95 % | HEART RATE: 78 BPM | RESPIRATION RATE: 9 BRPM | WEIGHT: 177 LBS | HEIGHT: 66 IN | SYSTOLIC BLOOD PRESSURE: 107 MMHG | DIASTOLIC BLOOD PRESSURE: 73 MMHG

## 2021-04-28 DIAGNOSIS — G35 MULTIPLE SCLEROSIS (HCC): ICD-10-CM

## 2021-04-28 DIAGNOSIS — G35 MS (MULTIPLE SCLEROSIS) (HCC): Primary | ICD-10-CM

## 2021-04-28 DIAGNOSIS — R07.9 CHEST PAIN: ICD-10-CM

## 2021-04-28 DIAGNOSIS — R94.39 ABNORMAL STRESS TEST: Primary | ICD-10-CM

## 2021-04-28 PROCEDURE — 99152 MOD SED SAME PHYS/QHP 5/>YRS: CPT

## 2021-04-28 PROCEDURE — 77001 FLUOROGUIDE FOR VEIN DEVICE: CPT

## 2021-04-28 PROCEDURE — 82962 GLUCOSE BLOOD TEST: CPT

## 2021-04-28 PROCEDURE — 0JPT0XZ REMOVAL OF TUNNELED VASCULAR ACCESS DEVICE FROM TRUNK SUBCUTANEOUS TISSUE AND FASCIA, OPEN APPROACH: ICD-10-PCS | Performed by: RADIOLOGY

## 2021-04-28 PROCEDURE — 85610 PROTHROMBIN TIME: CPT

## 2021-04-28 PROCEDURE — 36589 REMOVAL TUNNELED CV CATH: CPT

## 2021-04-28 RX ORDER — MIDAZOLAM HYDROCHLORIDE 1 MG/ML
INJECTION INTRAMUSCULAR; INTRAVENOUS
Status: DISCONTINUED
Start: 2021-04-28 | End: 2021-04-28 | Stop reason: WASHOUT

## 2021-04-28 RX ORDER — DIPHENHYDRAMINE HYDROCHLORIDE 50 MG/ML
INJECTION INTRAMUSCULAR; INTRAVENOUS
Status: DISCONTINUED
Start: 2021-04-28 | End: 2021-04-28 | Stop reason: WASHOUT

## 2021-04-28 RX ORDER — MIDAZOLAM HYDROCHLORIDE 1 MG/ML
INJECTION INTRAMUSCULAR; INTRAVENOUS
Status: COMPLETED
Start: 2021-04-28 | End: 2021-04-28

## 2021-04-28 RX ORDER — METOPROLOL TARTRATE 100 MG/1
TABLET ORAL
Qty: 2 TABLET | Refills: 0 | Status: SHIPPED | OUTPATIENT
Start: 2021-04-28

## 2021-04-28 RX ORDER — SODIUM CHLORIDE 9 MG/ML
INJECTION, SOLUTION INTRAVENOUS CONTINUOUS
Status: DISCONTINUED | OUTPATIENT
Start: 2021-04-28 | End: 2021-04-28

## 2021-04-28 RX ORDER — CLINDAMYCIN PHOSPHATE 150 MG/ML
INJECTION, SOLUTION INTRAVENOUS
Status: COMPLETED
Start: 2021-04-28 | End: 2021-04-28

## 2021-04-28 RX ORDER — LIDOCAINE HYDROCHLORIDE 10 MG/ML
INJECTION, SOLUTION INFILTRATION; PERINEURAL
Status: COMPLETED
Start: 2021-04-28 | End: 2021-04-28

## 2021-05-11 ENCOUNTER — TELEPHONE (OUTPATIENT)
Dept: NEUROLOGY | Facility: CLINIC | Age: 51
End: 2021-05-11

## 2021-05-11 NOTE — TELEPHONE ENCOUNTER
[facility] requesting signature from provider for continued home health services. Patient is receiving PT/SN services for MS. Patient to receive services 1 time weekly for 4 weeks, one time every other week for 4 weeks.     Number of visi

## 2021-05-14 ENCOUNTER — TELEPHONE (OUTPATIENT)
Dept: NEUROLOGY | Facility: CLINIC | Age: 51
End: 2021-05-14

## 2021-05-18 ENCOUNTER — TELEPHONE (OUTPATIENT)
Dept: CARDIOLOGY | Age: 51
End: 2021-05-18

## 2021-05-18 ENCOUNTER — TELEPHONE (OUTPATIENT)
Dept: NEUROLOGY | Facility: CLINIC | Age: 51
End: 2021-05-18

## 2021-05-18 NOTE — TELEPHONE ENCOUNTER
Per Medicare guidelines:  Plan of care: P.T. 1 x/wk for 3 weeks starting today. For lower & upper extremity strengthening and balance to decrease risk of fall. Copy of orders will be faxed for provider signature. No callback required unless there are questions.
lactated ringers

## 2021-05-19 ENCOUNTER — CASE MANAGEMENT (OUTPATIENT)
Dept: CARE COORDINATION | Age: 51
End: 2021-05-19

## 2021-05-19 ENCOUNTER — LAB REQUISITION (OUTPATIENT)
Dept: LAB | Facility: HOSPITAL | Age: 51
End: 2021-05-19
Payer: MEDICARE

## 2021-05-19 DIAGNOSIS — N39.0 URINARY TRACT INFECTION, SITE NOT SPECIFIED: ICD-10-CM

## 2021-05-19 PROCEDURE — 81003 URINALYSIS AUTO W/O SCOPE: CPT | Performed by: SPECIALIST

## 2021-05-19 NOTE — IMAGING NOTE
Call placed to pt regarding CTA Gated coronary arteries study on 05/21/2021. Instructed to arrive at 08:45am. Instructed to drink plenty of fluids a day prior to procedure and day of procedure. May eat a light breakfast/lunch.  Advised to hold caffeine 12 h

## 2021-05-20 ENCOUNTER — TELEPHONE (OUTPATIENT)
Dept: NEUROLOGY | Facility: CLINIC | Age: 51
End: 2021-05-20

## 2021-05-20 SDOH — HEALTH STABILITY: MENTAL HEALTH: DEPRESSION SCREENING SCORE: 0

## 2021-05-20 SDOH — HEALTH STABILITY: MENTAL HEALTH: HOW OFTEN DO YOU HAVE A DRINK CONTAINING ALCOHOL?: NEVER

## 2021-05-20 SDOH — HEALTH STABILITY: MENTAL HEALTH: AUDIT TOTAL SCORE: 0

## 2021-05-20 SDOH — HEALTH STABILITY: MENTAL HEALTH: FEELING DOWN, DEPRESSED OR HOPELESS?: NOT AT ALL

## 2021-05-20 SDOH — ECONOMIC STABILITY: HOUSING INSECURITY: ARE YOU WORRIED ABOUT LOSING YOUR HOUSING?: NO

## 2021-05-20 SDOH — ECONOMIC STABILITY: TRANSPORTATION INSECURITY
IN THE PAST 12 MONTHS, HAS THE LACK OF TRANSPORTATION KEPT YOU FROM MEDICAL APPOINTMENTS OR FROM GETTING MEDICATIONS?: NO

## 2021-05-20 SDOH — ECONOMIC STABILITY: TRANSPORTATION INSECURITY
IN THE PAST 12 MONTHS, HAS LACK OF TRANSPORTATION KEPT YOU FROM MEETINGS, WORK, OR FROM GETTING THINGS NEEDED FOR DAILY LIVING?: NO

## 2021-05-20 SDOH — HEALTH STABILITY: PHYSICAL HEALTH: ON AVERAGE, HOW MANY MINUTES DO YOU ENGAGE IN EXERCISE AT THIS LEVEL?: 20 MIN

## 2021-05-20 SDOH — HEALTH STABILITY: PHYSICAL HEALTH: ON AVERAGE, HOW MANY DAYS PER WEEK DO YOU ENGAGE IN MODERATE TO STRENUOUS EXERCISE (LIKE A BRISK WALK)?: 4 DAYS

## 2021-05-20 SDOH — ECONOMIC STABILITY: FOOD INSECURITY: HOW OFTEN IN THE PAST 12 MONTHS WERE YOU WORRIED OR STRESSED ABOUT HAVING ENOUGH MONEY TO BUY NUTRITIOUS MEALS?: NEVER

## 2021-05-20 SDOH — HEALTH STABILITY: MENTAL HEALTH
STRESS IS WHEN SOMEONE FEELS TENSE, NERVOUS, ANXIOUS, OR CAN'T SLEEP AT NIGHT BECAUSE THEIR MIND IS TROUBLED. HOW STRESSED ARE YOU?: NOT AT ALL

## 2021-05-20 SDOH — HEALTH STABILITY: MENTAL HEALTH: HOW OFTEN DO YOU HAVE 6 OR MORE DRINKS ON ONE OCCASION?: NEVER

## 2021-05-20 SDOH — ECONOMIC STABILITY: GENERAL

## 2021-05-20 SDOH — SOCIAL STABILITY: SOCIAL NETWORK
HOW OFTEN DO YOU SEE OR TALK TO PEOPLE THAT YOU CARE ABOUT AND FEEL CLOSE TO? (FOR EXAMPLE: TALKING TO FRIENDS ON THE PHONE, VISITING FRIENDS OR FAMILY, GOING TO CHURCH OR CLUB MEETINGS): 5 OR MORE TIMES A WEEK

## 2021-05-20 SDOH — ECONOMIC STABILITY: HOUSING INSECURITY: WHAT IS YOUR LIVING SITUATION TODAY?: I HAVE HOUSING

## 2021-05-20 SDOH — HEALTH STABILITY: MENTAL HEALTH: HOW MANY STANDARD DRINKS CONTAINING ALCOHOL DO YOU HAVE ON A TYPICAL DAY?: 0,1 OR 2

## 2021-05-20 SDOH — HEALTH STABILITY: MENTAL HEALTH: LITTLE INTEREST OR PLEASURE IN ACTIVITY?: NOT AT ALL

## 2021-05-20 SDOH — HEALTH STABILITY: MENTAL HEALTH: PHQ2 INTERPRETATION: NO FURTHER SCREENING NEEDED

## 2021-05-20 ASSESSMENT — ACTIVITIES OF DAILY LIVING (ADL)
DME_IN_USE: YES
EATING: INDEPENDENT
DRIVING: INDEPENDENT
GROOMING: INDEPENDENT
TOILETING: INDEPENDENT
BATHING: INDEPENDENT
FUNCTIONAL_EVALUATION: PERFORMS ADLS INDEPENDENTLY
DRESSING: INDEPENDENT
AMBULATION: INDEPENDENT

## 2021-05-20 ASSESSMENT — SLEEP AND FATIGUE QUESTIONNAIRES
SLEEP DESCRIPTORS: MIDDLE OF NIGHT AWAKENING
HOURS OF UNINTERRUPTED SLEEP: 5

## 2021-05-20 ASSESSMENT — PATIENT HEALTH QUESTIONNAIRE - PHQ9
2. FEELING DOWN, DEPRESSED OR HOPELESS: NOT AT ALL
CLINICAL INTERPRETATION OF PHQ2 SCORE: NO FURTHER SCREENING NEEDED
1. LITTLE INTEREST OR PLEASURE IN DOING THINGS: NOT AT ALL
2. FEELING DOWN, DEPRESSED OR HOPELESS: NOT AT ALL
CLINICAL INTERPRETATION OF PHQ2 SCORE: NO FURTHER SCREENING NEEDED
SUM OF ALL RESPONSES TO PHQ9 QUESTIONS 1 AND 2: 0
CLINICAL INTERPRETATION OF PHQ9 SCORE: NO FURTHER SCREENING NEEDED
SUM OF ALL RESPONSES TO PHQ9 QUESTIONS 1 AND 2: 0
SUM OF ALL RESPONSES TO PHQ9 QUESTIONS 1 AND 2: 0
1. LITTLE INTEREST OR PLEASURE IN DOING THINGS: NOT AT ALL

## 2021-05-20 NOTE — TELEPHONE ENCOUNTER
Tysabri infusion record received from [facility] infusion [place] for physician review. No signs/symptoms of infusion reaction noted.   Patient infused on 5/20/2021  Stable without reaction during the post infusion observation period  Lot Number [id number], Exp 04/2

## 2021-05-21 ENCOUNTER — HOSPITAL ENCOUNTER (OUTPATIENT)
Dept: CT IMAGING | Facility: HOSPITAL | Age: 51
Discharge: HOME | End: 2021-05-21
Attending: INTERNAL MEDICINE
Payer: MEDICARE

## 2021-05-21 VITALS — SYSTOLIC BLOOD PRESSURE: 96 MMHG | HEART RATE: 55 BPM | DIASTOLIC BLOOD PRESSURE: 70 MMHG | RESPIRATION RATE: 17 BRPM

## 2021-05-21 DIAGNOSIS — R94.39 ABNORMAL STRESS TEST: ICD-10-CM

## 2021-05-21 DIAGNOSIS — R07.9 CHEST PAIN: ICD-10-CM

## 2021-05-21 PROCEDURE — 75574 CT ANGIO HRT W/3D IMAGE: CPT | Performed by: INTERNAL MEDICINE

## 2021-05-21 PROCEDURE — 82565 ASSAY OF CREATININE: CPT

## 2021-05-21 RX ORDER — SODIUM CHLORIDE 9 MG/ML
INJECTION, SOLUTION INTRAVENOUS ONCE
Status: DISCONTINUED | OUTPATIENT
Start: 2021-05-21 | End: 2021-05-23

## 2021-05-21 RX ORDER — SODIUM CHLORIDE 9 MG/ML
INJECTION, SOLUTION INTRAVENOUS ONCE
Status: COMPLETED | OUTPATIENT
Start: 2021-05-21 | End: 2021-05-21

## 2021-05-21 RX ORDER — NITROGLYCERIN 0.4 MG/1
TABLET SUBLINGUAL
Status: COMPLETED
Start: 2021-05-21 | End: 2021-05-21

## 2021-05-21 RX ADMIN — SODIUM CHLORIDE 500 ML: 9 INJECTION, SOLUTION INTRAVENOUS at 11:15:00

## 2021-05-21 NOTE — IMAGING NOTE
Pt scheduled for CT gated coronary. Denies any contrast allergies. Room # 4 used for scanning. O2 2L NC  HR 58 during scan at 1051  85 of 0.9 NS given. 85 cc of contrast given. Tolerated exam well.  Instructed to hydrate well to help clear contrast.

## 2021-05-21 NOTE — IMAGING NOTE
Called Dr Martha Otero and  notified him of the Vitals post hydration as  Patient 87/69 to 96/70. As per Dr Martha Otero patient can be  Discharged home. Port decannulated  and reassessed BP which was 93/68. Pt discharged home accompanied by her daughter.  Denies

## 2021-05-21 NOTE — IMAGING NOTE
Angie Aquino received at [facility]. For post CTA Gated monitoring. SBP 80s; HR 50s. Patient is a diabetic and was provided some snacks and water to drink. VS was monitored.  IVF hydration was started for sBP Dr Judith Cardoso was here at the bedside an

## 2021-05-24 ENCOUNTER — E-ADVICE (OUTPATIENT)
Dept: CARDIOLOGY | Age: 51
End: 2021-05-24

## 2021-05-24 ENCOUNTER — TELEPHONE (OUTPATIENT)
Dept: NEUROLOGY | Facility: CLINIC | Age: 51
End: 2021-05-24

## 2021-05-28 NOTE — TELEPHONE ENCOUNTER
Received fax from [facility] to sign VO regarding moving initial PT evaluation. Faxed signed paperwork back with confirmation to [facility].

## 2021-06-02 ENCOUNTER — TELEPHONE (OUTPATIENT)
Dept: NEUROLOGY | Facility: CLINIC | Age: 51
End: 2021-06-02

## 2021-06-02 ENCOUNTER — HOSPITAL ENCOUNTER (OUTPATIENT)
Dept: GENERAL RADIOLOGY | Facility: HOSPITAL | Age: 51
Discharge: HOME | End: 2021-06-02
Attending: SPECIALIST
Payer: MEDICARE

## 2021-06-02 DIAGNOSIS — R07.9 CHEST PAIN: ICD-10-CM

## 2021-06-02 PROCEDURE — 71046 X-RAY EXAM CHEST 2 VIEWS: CPT | Performed by: SPECIALIST

## 2021-06-02 NOTE — TELEPHONE ENCOUNTER
Duane Cal Dr. Nena Dickinson  DOS:  6/1/21  OV Notes received  Placed copy in Dr. Parth Dozier bin  Copy to scanning

## 2021-06-10 ENCOUNTER — CASE MANAGEMENT (OUTPATIENT)
Dept: CARE COORDINATION | Age: 51
End: 2021-06-10

## 2021-06-11 ENCOUNTER — OFFICE VISIT (OUTPATIENT)
Dept: NEUROLOGY | Facility: CLINIC | Age: 51
End: 2021-06-11
Payer: MEDICARE

## 2021-06-11 VITALS
BODY MASS INDEX: 28.13 KG/M2 | HEART RATE: 78 BPM | RESPIRATION RATE: 16 BRPM | DIASTOLIC BLOOD PRESSURE: 65 MMHG | SYSTOLIC BLOOD PRESSURE: 101 MMHG | WEIGHT: 175 LBS | HEIGHT: 66 IN

## 2021-06-11 DIAGNOSIS — R74.8 ELEVATED LIVER ENZYMES: ICD-10-CM

## 2021-06-11 DIAGNOSIS — G35 MS (MULTIPLE SCLEROSIS) (HCC): Primary | ICD-10-CM

## 2021-06-11 DIAGNOSIS — G57.93 NEUROPATHIC PAIN OF BOTH LEGS: ICD-10-CM

## 2021-06-11 DIAGNOSIS — M79.7 FIBROMYALGIA SYNDROME: ICD-10-CM

## 2021-06-11 PROCEDURE — 99214 OFFICE O/P EST MOD 30 MIN: CPT | Performed by: OTHER

## 2021-06-11 RX ORDER — BENRALIZUMAB 30 MG/ML
30 INJECTION, SOLUTION SUBCUTANEOUS
COMMUNITY

## 2021-06-11 NOTE — PROGRESS NOTES
[facility] with [facility] [place]  Alisson Garcia  3/11/1970  Primary Care Provider:  Marla Becker MD    6/11/2021  Accompanied visit:      (x) No.      46year old yo patient being seen for:  MS Sodium Chloride and infuse over 1 hour every 8 weeks for 6 infusions. , Disp: 15 mL, Rfl: 5  •  omeprazole 20 MG Oral Capsule Delayed Release, Take 20 mg by mouth 2 (two) times daily before meals. , Disp: , Rfl:   •  Thiamine HCl 100 MG Oral Tab, Take 1 tabl EXAM:  /65 (BP Location: Left arm, Patient Position: Sitting, Cuff Size: adult)   Pulse 78   Resp 16   Ht 66\"   Wt 175 lb (79.4 kg)   BMI 28.25 kg/m²   Looks stated age  General Exam:  HENT:  pink conjunctiva anicteric sclerae  Neck no adenopath process and instructions to the check out desk.   No additional issues relevant to visit were raised to staff at this time interval.        This document is to be interpreted as my current opinion regarding the case as of the stated date of service based on

## 2021-06-29 ENCOUNTER — TELEPHONE (OUTPATIENT)
Dept: NEUROLOGY | Facility: CLINIC | Age: 51
End: 2021-06-29

## 2021-06-29 NOTE — TELEPHONE ENCOUNTER
FDA mandated Tysabri reauthorization questionnaire completed online. Patient is JCV Negative, Index = 0.18. Last test date 1/19/2021, next test date due July 2021. Has completed 10 Tysabri infusions with 0 courses of steroids in last 6 months.   Melany Alcocer

## 2021-06-30 ENCOUNTER — OFFICE VISIT (OUTPATIENT)
Dept: SLEEP CENTER | Age: 51
End: 2021-06-30
Attending: Other
Payer: MEDICARE

## 2021-06-30 DIAGNOSIS — G47.10 HYPERSOMNIA: ICD-10-CM

## 2021-06-30 PROCEDURE — 95810 POLYSOM 6/> YRS 4/> PARAM: CPT

## 2021-07-01 ENCOUNTER — OFFICE VISIT (OUTPATIENT)
Dept: SLEEP CENTER | Age: 51
End: 2021-07-01
Attending: Other
Payer: MEDICARE

## 2021-07-01 ENCOUNTER — ORDER TRANSCRIPTION (OUTPATIENT)
Dept: SLEEP CENTER | Age: 51
End: 2021-07-01

## 2021-07-01 DIAGNOSIS — G47.10 HYPERSOMNIA: Primary | ICD-10-CM

## 2021-07-01 DIAGNOSIS — G47.10 HYPERSOMNIA: ICD-10-CM

## 2021-07-01 LAB
AMPHET UR QL SCN: NEGATIVE
BENZODIAZ UR QL SCN: NEGATIVE
CANNABINOIDS UR QL SCN: NEGATIVE
COCAINE UR QL: NEGATIVE
CREAT UR-SCNC: 99.4 MG/DL
MDMA UR QL SCN: NEGATIVE
OPIATES UR QL SCN: NEGATIVE
OXYCODONE UR QL SCN: NEGATIVE

## 2021-07-01 PROCEDURE — 95805 MULTIPLE SLEEP LATENCY TEST: CPT

## 2021-07-01 PROCEDURE — 80307 DRUG TEST PRSMV CHEM ANLYZR: CPT

## 2021-07-06 ENCOUNTER — TELEPHONE (OUTPATIENT)
Dept: NEUROLOGY | Facility: CLINIC | Age: 51
End: 2021-07-06

## 2021-07-06 NOTE — TELEPHONE ENCOUNTER
Paperwork received from [facility] for certification services for nursing. Patient is seen once weekly. Date of re-certification is 3/4/4161. Faxed with receipt confirmation. Patient seen for her multiple sclerosis.

## 2021-07-13 ENCOUNTER — TELEPHONE (OUTPATIENT)
Dept: NEUROLOGY | Facility: CLINIC | Age: 51
End: 2021-07-13

## 2021-07-15 ENCOUNTER — TELEPHONE (OUTPATIENT)
Dept: NEUROLOGY | Facility: CLINIC | Age: 51
End: 2021-07-15

## 2021-07-15 NOTE — TELEPHONE ENCOUNTER
Tysabri infusion record received from [facility] for physician review. No signs/symptoms of infusion reaction noted. Patient infused on 7/15/2021. Stable during the post infusion observation period.   Lot Number [id number], Exp 07/2024 x 1  No new

## 2021-07-16 ENCOUNTER — APPOINTMENT (OUTPATIENT)
Dept: CT IMAGING | Facility: HOSPITAL | Age: 51
End: 2021-07-16
Attending: EMERGENCY MEDICINE
Payer: MEDICARE

## 2021-07-16 ENCOUNTER — HOSPITAL ENCOUNTER (OUTPATIENT)
Facility: HOSPITAL | Age: 51
Setting detail: OBSERVATION
LOS: 2 days | Discharge: HOME HEALTH | End: 2021-07-20
Attending: EMERGENCY MEDICINE | Admitting: SPECIALIST
Payer: MEDICARE

## 2021-07-16 DIAGNOSIS — R07.9 CHEST PAIN OF UNCERTAIN ETIOLOGY: Primary | ICD-10-CM

## 2021-07-16 DIAGNOSIS — R10.9 ABDOMINAL PAIN OF UNKNOWN ETIOLOGY: ICD-10-CM

## 2021-07-16 LAB
ALBUMIN SERPL-MCNC: 3.6 G/DL (ref 3.4–5)
ALBUMIN/GLOB SERPL: 0.9 {RATIO} (ref 1–2)
ALP LIVER SERPL-CCNC: 106 U/L
ALT SERPL-CCNC: 115 U/L
ANION GAP SERPL CALC-SCNC: 3 MMOL/L (ref 0–18)
AST SERPL-CCNC: 73 U/L (ref 15–37)
BASOPHILS # BLD AUTO: 0.01 X10(3) UL (ref 0–0.2)
BASOPHILS NFR BLD AUTO: 0.1 %
BILIRUB SERPL-MCNC: 0.3 MG/DL (ref 0.1–2)
BILIRUB UR QL STRIP.AUTO: NEGATIVE
BUN BLD-MCNC: 11 MG/DL (ref 7–18)
BUN/CREAT SERPL: 9.9 (ref 10–20)
CALCIUM BLD-MCNC: 9.3 MG/DL (ref 8.5–10.1)
CHLORIDE SERPL-SCNC: 106 MMOL/L (ref 98–112)
CLARITY UR REFRACT.AUTO: CLEAR
CO2 SERPL-SCNC: 30 MMOL/L (ref 21–32)
COLOR UR AUTO: YELLOW
CREAT BLD-MCNC: 1.11 MG/DL
DEPRECATED RDW RBC AUTO: 41.3 FL (ref 35.1–46.3)
EOSINOPHIL # BLD AUTO: 0 X10(3) UL (ref 0–0.7)
EOSINOPHIL NFR BLD AUTO: 0 %
ERYTHROCYTE [DISTWIDTH] IN BLOOD BY AUTOMATED COUNT: 15.2 % (ref 11–15)
GLOBULIN PLAS-MCNC: 3.9 G/DL (ref 2.8–4.4)
GLUCOSE BLD-MCNC: 179 MG/DL (ref 70–99)
GLUCOSE UR STRIP.AUTO-MCNC: >=500 MG/DL
HCT VFR BLD AUTO: 41.2 %
HGB BLD-MCNC: 12.5 G/DL
IMM GRANULOCYTES # BLD AUTO: 0.02 X10(3) UL (ref 0–1)
IMM GRANULOCYTES NFR BLD: 0.2 %
KETONES UR STRIP.AUTO-MCNC: NEGATIVE MG/DL
LEUKOCYTE ESTERASE UR QL STRIP.AUTO: NEGATIVE
LIPASE SERPL-CCNC: 95 U/L (ref 73–393)
LYMPHOCYTES # BLD AUTO: 3.92 X10(3) UL (ref 1–4)
LYMPHOCYTES NFR BLD AUTO: 48.3 %
M PROTEIN MFR SERPL ELPH: 7.5 G/DL (ref 6.4–8.2)
MCH RBC QN AUTO: 22.9 PG (ref 26–34)
MCHC RBC AUTO-ENTMCNC: 30.3 G/DL (ref 31–37)
MCV RBC AUTO: 75.3 FL
MONOCYTES # BLD AUTO: 0.56 X10(3) UL (ref 0.1–1)
MONOCYTES NFR BLD AUTO: 6.9 %
MORPHOLOGY: NORMAL
NEUTROPHILS # BLD AUTO: 3.61 X10 (3) UL (ref 1.5–7.7)
NEUTROPHILS # BLD AUTO: 3.61 X10(3) UL (ref 1.5–7.7)
NEUTROPHILS NFR BLD AUTO: 44.5 %
NITRITE UR QL STRIP.AUTO: NEGATIVE
OSMOLALITY SERPL CALC.SUM OF ELEC: 292 MOSM/KG (ref 275–295)
PH UR STRIP.AUTO: 6 [PH] (ref 5–8)
PLATELET # BLD AUTO: 213 10(3)UL (ref 150–450)
PLATELET MORPHOLOGY: NORMAL
POTASSIUM SERPL-SCNC: 3.9 MMOL/L (ref 3.5–5.1)
PROT UR STRIP.AUTO-MCNC: NEGATIVE MG/DL
RBC # BLD AUTO: 5.47 X10(6)UL
RBC UR QL AUTO: NEGATIVE
SARS-COV-2 RNA RESP QL NAA+PROBE: NOT DETECTED
SODIUM SERPL-SCNC: 139 MMOL/L (ref 136–145)
SP GR UR STRIP.AUTO: 1.03 (ref 1–1.03)
TROPONIN I SERPL-MCNC: <0.045 NG/ML (ref ?–0.04)
TROPONIN I SERPL-MCNC: <0.045 NG/ML (ref ?–0.04)
UROBILINOGEN UR STRIP.AUTO-MCNC: <2 MG/DL
WBC # BLD AUTO: 8.1 X10(3) UL (ref 4–11)

## 2021-07-16 PROCEDURE — 83690 ASSAY OF LIPASE: CPT | Performed by: EMERGENCY MEDICINE

## 2021-07-16 PROCEDURE — 99285 EMERGENCY DEPT VISIT HI MDM: CPT

## 2021-07-16 PROCEDURE — 84484 ASSAY OF TROPONIN QUANT: CPT | Performed by: EMERGENCY MEDICINE

## 2021-07-16 PROCEDURE — 81003 URINALYSIS AUTO W/O SCOPE: CPT | Performed by: EMERGENCY MEDICINE

## 2021-07-16 PROCEDURE — 85025 COMPLETE CBC W/AUTO DIFF WBC: CPT | Performed by: EMERGENCY MEDICINE

## 2021-07-16 PROCEDURE — 93010 ELECTROCARDIOGRAM REPORT: CPT

## 2021-07-16 PROCEDURE — 71275 CT ANGIOGRAPHY CHEST: CPT | Performed by: EMERGENCY MEDICINE

## 2021-07-16 PROCEDURE — 96374 THER/PROPH/DIAG INJ IV PUSH: CPT

## 2021-07-16 PROCEDURE — 80053 COMPREHEN METABOLIC PANEL: CPT | Performed by: EMERGENCY MEDICINE

## 2021-07-16 PROCEDURE — 96361 HYDRATE IV INFUSION ADD-ON: CPT

## 2021-07-16 PROCEDURE — 96360 HYDRATION IV INFUSION INIT: CPT

## 2021-07-16 PROCEDURE — 74177 CT ABD & PELVIS W/CONTRAST: CPT | Performed by: EMERGENCY MEDICINE

## 2021-07-16 PROCEDURE — 93005 ELECTROCARDIOGRAM TRACING: CPT

## 2021-07-16 RX ORDER — HYDROCODONE BITARTRATE AND ACETAMINOPHEN 5; 325 MG/1; MG/1
1 TABLET ORAL ONCE
Status: COMPLETED | OUTPATIENT
Start: 2021-07-16 | End: 2021-07-16

## 2021-07-16 RX ORDER — ASPIRIN 81 MG/1
81 TABLET, CHEWABLE ORAL ONCE
Status: COMPLETED | OUTPATIENT
Start: 2021-07-16 | End: 2021-07-16

## 2021-07-16 RX ORDER — MORPHINE SULFATE 4 MG/ML
4 INJECTION, SOLUTION INTRAMUSCULAR; INTRAVENOUS ONCE
Status: DISCONTINUED | OUTPATIENT
Start: 2021-07-16 | End: 2021-07-20

## 2021-07-16 NOTE — ED PROVIDER NOTES
Patient Seen in: [facility] Emergency Department      History   Patient presents with:  Abdomen/Flank Pain    Stated Complaint: abdominal pain    HPI/Subjective:   HPI    This is a 38-year-old female who states that she has been having abdominal sundeep [facility])    • Myocardial infarction [facility])    • Neuropathy    • Night sweats    • Osteoporosis     KAMALJIT in her 30s   • Other and unspecified hyperlipidemia    • Pain with bowel movements 2019   • Painful urination    • PONV (postoperative nausea and vomiting) src Temporal   SpO2 96 %   O2 Device None (Room air)       Current:/80   Pulse 70   Temp 98.2 °F (36.8 °C) (Temporal)   Resp 16   Ht 167.6 cm (5' 6\")   Wt 79.8 kg   SpO2 98%   BMI 28.41 kg/m²         Physical Exam  General: .   Patient is in no respi normal limits   LIPASE - Normal   TROPONIN I - Normal   TROPONIN I - Normal   RAPID SARS-COV-2 BY PCR - Normal   CBC WITH DIFFERENTIAL WITH PLATELET    Narrative: The following orders were created for panel order CBC With Differential With Platelet.   P reexamined she is still having some lower abdominal pain but there is no rebound, guarding has had this for several weeks and etiology is not 100% known at this time but you still says that the pain is not tolerable at home she is also had this chest pain

## 2021-07-17 PROBLEM — R10.9 ABDOMINAL PAIN OF UNKNOWN ETIOLOGY: Status: ACTIVE | Noted: 2021-07-17

## 2021-07-17 LAB
GLUCOSE BLD-MCNC: 107 MG/DL (ref 70–99)
GLUCOSE BLD-MCNC: 110 MG/DL (ref 70–99)
GLUCOSE BLD-MCNC: 190 MG/DL (ref 70–99)
GLUCOSE BLD-MCNC: 91 MG/DL (ref 70–99)
GLUCOSE BLD-MCNC: 93 MG/DL (ref 70–99)

## 2021-07-17 PROCEDURE — 96375 TX/PRO/DX INJ NEW DRUG ADDON: CPT

## 2021-07-17 PROCEDURE — 96376 TX/PRO/DX INJ SAME DRUG ADON: CPT

## 2021-07-17 PROCEDURE — 94640 AIRWAY INHALATION TREATMENT: CPT

## 2021-07-17 PROCEDURE — 82962 GLUCOSE BLOOD TEST: CPT

## 2021-07-17 RX ORDER — HYDROCODONE BITARTRATE AND ACETAMINOPHEN 10; 325 MG/1; MG/1
1 TABLET ORAL 2 TIMES DAILY PRN
Status: DISCONTINUED | OUTPATIENT
Start: 2021-07-17 | End: 2021-07-20

## 2021-07-17 RX ORDER — LEVOTHYROXINE SODIUM 88 UG/1
88 TABLET ORAL
Status: DISCONTINUED | OUTPATIENT
Start: 2021-07-17 | End: 2021-07-20

## 2021-07-17 RX ORDER — DOCUSATE SODIUM 100 MG/1
100 CAPSULE, LIQUID FILLED ORAL 2 TIMES DAILY
Status: DISCONTINUED | OUTPATIENT
Start: 2021-07-17 | End: 2021-07-20

## 2021-07-17 RX ORDER — ASPIRIN 81 MG/1
81 TABLET ORAL DAILY
Status: DISCONTINUED | OUTPATIENT
Start: 2021-07-17 | End: 2021-07-20

## 2021-07-17 RX ORDER — CARVEDILOL 3.12 MG/1
3.12 TABLET ORAL 2 TIMES DAILY WITH MEALS
Status: DISCONTINUED | OUTPATIENT
Start: 2021-07-17 | End: 2021-07-20

## 2021-07-17 RX ORDER — MELATONIN
325 2 TIMES DAILY WITH MEALS
Status: DISCONTINUED | OUTPATIENT
Start: 2021-07-17 | End: 2021-07-20

## 2021-07-17 RX ORDER — SODIUM CHLORIDE 9 MG/ML
INJECTION, SOLUTION INTRAVENOUS CONTINUOUS
Status: ACTIVE | OUTPATIENT
Start: 2021-07-17 | End: 2021-07-17

## 2021-07-17 RX ORDER — MELATONIN
100 DAILY
Status: DISCONTINUED | OUTPATIENT
Start: 2021-07-17 | End: 2021-07-20

## 2021-07-17 RX ORDER — PANTOPRAZOLE SODIUM 40 MG/1
40 TABLET, DELAYED RELEASE ORAL
Status: DISCONTINUED | OUTPATIENT
Start: 2021-07-17 | End: 2021-07-20

## 2021-07-17 RX ORDER — CALCIUM CARBONATE/VITAMIN D3 600 MG-10
1 TABLET ORAL DAILY
Status: DISCONTINUED | OUTPATIENT
Start: 2021-07-17 | End: 2021-07-17

## 2021-07-17 RX ORDER — EPINEPHRINE 0.15 MG/.3ML
0.15 INJECTION INTRAMUSCULAR AS NEEDED
Status: DISCONTINUED | OUTPATIENT
Start: 2021-07-17 | End: 2021-07-18

## 2021-07-17 RX ORDER — DEXTROSE MONOHYDRATE 25 G/50ML
50 INJECTION, SOLUTION INTRAVENOUS
Status: DISCONTINUED | OUTPATIENT
Start: 2021-07-17 | End: 2021-07-20

## 2021-07-17 RX ORDER — ONDANSETRON 4 MG/1
4 TABLET, FILM COATED ORAL 3 TIMES DAILY PRN
Status: DISCONTINUED | OUTPATIENT
Start: 2021-07-17 | End: 2021-07-20

## 2021-07-17 RX ORDER — MONTELUKAST SODIUM 10 MG/1
10 TABLET ORAL NIGHTLY
Status: DISCONTINUED | OUTPATIENT
Start: 2021-07-17 | End: 2021-07-20

## 2021-07-17 RX ORDER — DULOXETIN HYDROCHLORIDE 30 MG/1
60 CAPSULE, DELAYED RELEASE ORAL 2 TIMES DAILY
Status: DISCONTINUED | OUTPATIENT
Start: 2021-07-17 | End: 2021-07-20

## 2021-07-17 RX ORDER — ALBUTEROL SULFATE 90 UG/1
2 AEROSOL, METERED RESPIRATORY (INHALATION) EVERY 6 HOURS PRN
Status: DISCONTINUED | OUTPATIENT
Start: 2021-07-17 | End: 2021-07-20

## 2021-07-17 RX ORDER — ONDANSETRON 2 MG/ML
4 INJECTION INTRAMUSCULAR; INTRAVENOUS EVERY 4 HOURS PRN
Status: DISPENSED | OUTPATIENT
Start: 2021-07-17 | End: 2021-07-17

## 2021-07-17 RX ORDER — MORPHINE SULFATE 4 MG/ML
4 INJECTION, SOLUTION INTRAMUSCULAR; INTRAVENOUS EVERY 30 MIN PRN
Status: ACTIVE | OUTPATIENT
Start: 2021-07-17 | End: 2021-07-17

## 2021-07-17 RX ORDER — HYDROMORPHONE HYDROCHLORIDE 1 MG/ML
0.5 INJECTION, SOLUTION INTRAMUSCULAR; INTRAVENOUS; SUBCUTANEOUS EVERY 4 HOURS PRN
Status: DISCONTINUED | OUTPATIENT
Start: 2021-07-17 | End: 2021-07-20

## 2021-07-17 RX ORDER — SUMATRIPTAN 50 MG/1
50 TABLET, FILM COATED ORAL ONCE
Status: COMPLETED | OUTPATIENT
Start: 2021-07-17 | End: 2021-07-18

## 2021-07-17 NOTE — PROGRESS NOTES
47 y/o with 2 months of abdominal pain. Hx sicle cell trait, MS, myalgias. Now with some lower CP, atypical.  Recent CTA FFR normal. Enzyme normal.Doubt cardiac. Will check echo for LV fxn.      Thanks  Darleen

## 2021-07-17 NOTE — PROGRESS NOTES
Pt. Alert and o x 4 , not in any form of distress , still c/o lower abdominal pain / pelvic area ; Norco given. Denies any chest pain or discomfort. Tele shows SR on the monitor. Meds given. Cont. Monitor per tele/labs/v/s.  Instructed to call s

## 2021-07-17 NOTE — CONSULTS
[facility]    PATIENT'S NAME: PERICO LUDWIG   ATTENDING PHYSICIAN: YAHAIRA Lynch: Nereida Ahuja M.D.    PATIENT ACCOUNT#:   [de-identified]    LOCATION:  [address] [place] [address]  MEDICAL RECORD #:   [id number]       DATE OF BIRTH: pain.  I really cannot explain her leg pain at all. Will get lower extremity arterial Dopplers to make sure there is no abnormal flow. 4.   Hypertension. 5.   History of multiple sclerosis. 6.   History of sickle cell trait.     Dictated By Dwayne Obrien

## 2021-07-17 NOTE — H&P
[address] [facility] JULIUS Ambreen Patient Status:  Observation    3/11/1970 MRN [id number]   [facility] 8NE-A Attending Ethel Franklin MD   Hosp Day # 0 PCP Cheryle Mulligan MD     Date:  2021  Date of Admission: 2017   • Hemorrhoids    • Herniation of cervical intervertebral disc    • High blood pressure    • High cholesterol    • History of depression    • Hoarseness, chronic    • IBS (irritable bowel syndrome)    • Irregular bowel habits    • Leaking of urine 20 cancer   • Ovarian Cancer Mother         28'S   • Colon Polyps Mother    • Ovarian Cancer Maternal Grandmother         30'S   • Colon Polyps Maternal Grandmother    • Diabetes Maternal Grandmother    • Breast Cancer Maternal Aunt         60'S   • Other (Ot HYDROcodone-acetaminophen  MG Oral Tab, Take 1 tablet by mouth 2 (two) times daily as needed for Pain. Budesonide-Formoterol Fumarate 160-4.5 MCG/ACT Inhalation Aerosol, Inhale 2 puffs into the lungs 2 (two) times daily.   pregabalin 200 MG Oral Ca %.        OBJECTIVE:  Temp:  [98 °F (36.7 °C)-98.2 °F (36.8 °C)] 98.2 °F (36.8 °C)  Pulse:  [70-82] 75  Resp:  [16-20] 18  BP: ()/(64-80) 113/76    Intake/Output:    Intake/Output Summary (Last 24 hours) at 7/17/2021 0951  Last data filed at 7/16/2021 NEGATIVE 10/04/2013    RPR NONREACTIVE 11/05/2013    B12 558 12/10/2018    POCGLU 100 (H) 05/04/2014       CTA CHEST + CT ABD (W) + CT PEL (W) (CPT=71275/07741)    Result Date: 7/16/2021  CONCLUSION:   1. No evidence of pulmonary embolus.   2. Calyceal di ground-glass density in the lingula measures 5 mm (image 128). MEDIASTINUM:  No mass or adenopathy. CECILY:  No mass or adenopathy. CARDIAC:  No enlargement, pericardial thickening, or significant calcification. PLEURA:  No mass or effusion.   CHEST WALL: Hyperparathyroidism ([facility] [address]     Asthma     Osteoporosis     Fibromyalgia     Goiter     Hypervitaminosis D     Demyelinating disease of central nervous system ([address]     Tachycardia     Encephalopathy     AVN (avascular necrosis of bone) ([facility] [address]     Gastropares pointing towards lower abdomen for pain site    Diabetes type 2–farxiga–sliding scale    MS–Tysabri for neurology as outpatient    Sickle cell trait–outpatient rheumatology and PCP management    Reactive airway disease/COPD–Breo Ellipta inhaler, Singulair

## 2021-07-17 NOTE — ED QUICK NOTES
Orders for admission, patient is aware of plan and ready to go upstairs. Any questions, please call ED RN Rose Samuel  at extension [phone]. Vaccinated?    Type of COVID test sent:Rapid  COVID Suspicion level: Low/High LOW      Titratable drug(s) infusing:  Rat

## 2021-07-17 NOTE — PLAN OF CARE
Problem: PAIN - ADULT  Goal: Verbalizes/displays adequate comfort level or patient's stated pain goal  Description: INTERVENTIONS:  - Encourage pt to monitor pain and request assistance  - Assess pain using appropriate pain scale  - Administer analgesic

## 2021-07-18 ENCOUNTER — APPOINTMENT (OUTPATIENT)
Dept: ULTRASOUND IMAGING | Facility: HOSPITAL | Age: 51
End: 2021-07-18
Attending: INTERNAL MEDICINE
Payer: MEDICARE

## 2021-07-18 ENCOUNTER — APPOINTMENT (OUTPATIENT)
Dept: GENERAL RADIOLOGY | Facility: HOSPITAL | Age: 51
End: 2021-07-18
Attending: UROLOGY
Payer: MEDICARE

## 2021-07-18 LAB
ALBUMIN SERPL-MCNC: 2.7 G/DL (ref 3.4–5)
ALBUMIN/GLOB SERPL: 0.9 {RATIO} (ref 1–2)
ALP LIVER SERPL-CCNC: 75 U/L
ALT SERPL-CCNC: 92 U/L
ANION GAP SERPL CALC-SCNC: 4 MMOL/L (ref 0–18)
AST SERPL-CCNC: 57 U/L (ref 15–37)
BASOPHILS # BLD AUTO: 0.02 X10(3) UL (ref 0–0.2)
BASOPHILS NFR BLD AUTO: 0.3 %
BILIRUB SERPL-MCNC: 0.3 MG/DL (ref 0.1–2)
BUN BLD-MCNC: 7 MG/DL (ref 7–18)
BUN/CREAT SERPL: 9.3 (ref 10–20)
CALCIUM BLD-MCNC: 6.9 MG/DL (ref 8.5–10.1)
CHLORIDE SERPL-SCNC: 110 MMOL/L (ref 98–112)
CO2 SERPL-SCNC: 28 MMOL/L (ref 21–32)
CREAT BLD-MCNC: 0.75 MG/DL
DEPRECATED RDW RBC AUTO: 41.7 FL (ref 35.1–46.3)
EOSINOPHIL # BLD AUTO: 0 X10(3) UL (ref 0–0.7)
EOSINOPHIL NFR BLD AUTO: 0 %
ERYTHROCYTE [DISTWIDTH] IN BLOOD BY AUTOMATED COUNT: 15.4 % (ref 11–15)
GLOBULIN PLAS-MCNC: 3.1 G/DL (ref 2.8–4.4)
GLUCOSE BLD-MCNC: 100 MG/DL (ref 70–99)
GLUCOSE BLD-MCNC: 104 MG/DL (ref 70–99)
GLUCOSE BLD-MCNC: 109 MG/DL (ref 70–99)
GLUCOSE BLD-MCNC: 139 MG/DL (ref 70–99)
GLUCOSE BLD-MCNC: 94 MG/DL (ref 70–99)
HCT VFR BLD AUTO: 39.7 %
HGB BLD-MCNC: 12 G/DL
IMM GRANULOCYTES # BLD AUTO: 0.08 X10(3) UL (ref 0–1)
IMM GRANULOCYTES NFR BLD: 1.1 %
LYMPHOCYTES # BLD AUTO: 3.73 X10(3) UL (ref 1–4)
LYMPHOCYTES NFR BLD AUTO: 51.7 %
M PROTEIN MFR SERPL ELPH: 5.8 G/DL (ref 6.4–8.2)
MCH RBC QN AUTO: 23.1 PG (ref 26–34)
MCHC RBC AUTO-ENTMCNC: 30.2 G/DL (ref 31–37)
MCV RBC AUTO: 76.5 FL
MONOCYTES # BLD AUTO: 0.59 X10(3) UL (ref 0.1–1)
MONOCYTES NFR BLD AUTO: 8.2 %
NEUTROPHILS # BLD AUTO: 2.8 X10 (3) UL (ref 1.5–7.7)
NEUTROPHILS # BLD AUTO: 2.8 X10(3) UL (ref 1.5–7.7)
NEUTROPHILS NFR BLD AUTO: 38.7 %
OSMOLALITY SERPL CALC.SUM OF ELEC: 292 MOSM/KG (ref 275–295)
PLATELET # BLD AUTO: 152 10(3)UL (ref 150–450)
POTASSIUM SERPL-SCNC: 3.3 MMOL/L (ref 3.5–5.1)
RBC # BLD AUTO: 5.19 X10(6)UL
SODIUM SERPL-SCNC: 142 MMOL/L (ref 136–145)
WBC # BLD AUTO: 7.2 X10(3) UL (ref 4–11)

## 2021-07-18 PROCEDURE — 83520 IMMUNOASSAY QUANT NOS NONAB: CPT | Performed by: STUDENT IN AN ORGANIZED HEALTH CARE EDUCATION/TRAINING PROGRAM

## 2021-07-18 PROCEDURE — 86161 COMPLEMENT/FUNCTION ACTIVITY: CPT | Performed by: STUDENT IN AN ORGANIZED HEALTH CARE EDUCATION/TRAINING PROGRAM

## 2021-07-18 PROCEDURE — 83497 ASSAY OF 5-HIAA: CPT | Performed by: STUDENT IN AN ORGANIZED HEALTH CARE EDUCATION/TRAINING PROGRAM

## 2021-07-18 PROCEDURE — 74018 RADEX ABDOMEN 1 VIEW: CPT | Performed by: UROLOGY

## 2021-07-18 PROCEDURE — A4216 STERILE WATER/SALINE, 10 ML: HCPCS | Performed by: STUDENT IN AN ORGANIZED HEALTH CARE EDUCATION/TRAINING PROGRAM

## 2021-07-18 PROCEDURE — 80053 COMPREHEN METABOLIC PANEL: CPT | Performed by: STUDENT IN AN ORGANIZED HEALTH CARE EDUCATION/TRAINING PROGRAM

## 2021-07-18 PROCEDURE — 93975 VASCULAR STUDY: CPT | Performed by: INTERNAL MEDICINE

## 2021-07-18 PROCEDURE — 96376 TX/PRO/DX INJ SAME DRUG ADON: CPT

## 2021-07-18 PROCEDURE — 36593 DECLOT VASCULAR DEVICE: CPT

## 2021-07-18 PROCEDURE — 82962 GLUCOSE BLOOD TEST: CPT

## 2021-07-18 PROCEDURE — 76770 US EXAM ABDO BACK WALL COMP: CPT | Performed by: INTERNAL MEDICINE

## 2021-07-18 PROCEDURE — 85025 COMPLETE CBC W/AUTO DIFF WBC: CPT | Performed by: STUDENT IN AN ORGANIZED HEALTH CARE EDUCATION/TRAINING PROGRAM

## 2021-07-18 PROCEDURE — 86160 COMPLEMENT ANTIGEN: CPT | Performed by: STUDENT IN AN ORGANIZED HEALTH CARE EDUCATION/TRAINING PROGRAM

## 2021-07-18 NOTE — PROGRESS NOTES
[facility]     Cardiology Progress Note    Subjective:  No chest pain or shortness of breath.     Objective:  BP 92/67 (BP Location: Left arm)   Pulse 72   Temp 98.4 °F (36.9 °C)   Resp 17   Ht 5' 6\" (1.676 m)   Wt 175 lb 7.8 oz (79.6 kg)   SpO2 91%

## 2021-07-18 NOTE — CONSULTS
[address]  Report of GI Consultation    Alisson Nilayoralia Patient Status:  Observation    3/11/1970 MRN [id number]   [facility] 8NE-A Attending Dilshad Tucker MD   Hosp Day # 0 PCP Marla Becker MD     Date of Admission:  2021 • Calculus of kidney    • Chest pain    • Constipation    • Decorative tattoo    • Diabetes mellitus [facility]    • Diarrhea, unspecified    • Dizziness    • Easy bruising    • Elevated liver enzymes    • Endocrine disorder    • Esophageal reflux    • Extrin LITHOTRIPSY      x 2   • LYSIS OF ADHESIONS     • NEEDLE BIOPSY LIVER  2016   • OTHER      dialysis catheter- left chest for plasmaphoresis   • OTHER SURGICAL HISTORY       x 2   • PORT, INDWELLING, IMP     • THYROIDECTOMY  2013    benign MNG 4 mg, Oral, TID PRN  pregabalin (LYRICA) cap 200 mg, 200 mg, Oral, BID  thiamine (Vitamin B-1) tab 100 mg, 100 mg, Oral, Daily  aspirin EC tab 81 mg, 81 mg, Oral, Daily  INSULIN VIA INSULIN PUMP, , Subcutaneous, TID AC and HS  glucose (DEX4) oral liquid 15 Regular, no murmurs, 2+ radial pulses  ABD: Reproducible focal pain in the stone-umbilical area with guarding. She notes diffuse pain with palpation with even light palpation across the entire abdomen. Normoactive bowel sounds.   The abdomen is soft and no cancer    Pain is non-specific to any particular pathology. CT scan is unrevealing. IBS flare? Neuropathic? Recommendations:  1. Fecal calprotectin  2. Pancreatic elastase  3. EGD and colonoscopy on Monday  4.  If no evidence of inflammation and endos

## 2021-07-18 NOTE — PROGRESS NOTES
[facility]  Report of GI Progress Note    Radames Spencer Patient Status:  Observation    3/11/1970 MRN [id number]   [facility] 8NE-A Attending Jaden Schofield MD   Hosp Day # 0 PCP Deepak Johnson MD     Date of Admission:  2021 INSULIN PUMP, , Subcutaneous, TID AC and HS  glucose (DEX4) oral liquid 15 g, 15 g, Oral, Q15 Min PRN   Or  Glucose-Vitamin C (DEX-4) chewable tab 4 tablet, 4 tablet, Oral, Q15 Min PRN   Or  dextrose 50 % injection 50 mL, 50 mL, Intravenous, Q15 Min PRN 12/19/2019    TSH 0.184 (L) 04/09/2021    LIP 95 07/16/2021    ESRML 25 03/24/2021    CRP 1.29 (H) 04/09/2021    MG 2.1 03/01/2021    PHOS 2.0 (L) 03/02/2021    TROP <0.045 07/16/2021    POCGLU 100 (H) 05/04/2014         No results for input(s): URINE, CUL obtained during colon prep  6.  If no evidence of inflammation and endoscopic eval is unrevealing, consider increasing Cymbalta    Rodger Salguero MD  7/18/2021

## 2021-07-18 NOTE — PLAN OF CARE
Denies c/o cardiac symptoms. C/o headache treated with Imitrex and c/o lower quadrant abdominal pain in addition to a slightly LUQ rigid band. RUQ softer to touch. Dr. Sean Avery aware of pt. Complaints.   Pt. Declines anything further to treat the abdominal

## 2021-07-18 NOTE — PROGRESS NOTES
[address] [facility] INTERNIST  Progress Note     Jenn Adhikari Patient Status:  Observation    3/11/1970 MRN [id number]   [facility] 8NE-A Attending Julius Pedraza MD   Hosp Day # 0 PCP Johny Quiglye MD     Chief Complaint: diffuse pain    S: P 7/18/2021  CONCLUSION:  1. Diffuse increased echogenicity of the liver unchanged. Unremarkable color Doppler imaging as described above.      Dictated by (CST): Sunny Justin MD on 7/18/2021 at 11:50 AM     Finalized by (CST): Sunny Justin MD on planning for EGD and C scope tomorrow     Diabetes type 2–farxiga–sliding scale     MS–Tysabri for neurology as outpatient     Sickle cell trait–outpatient rheumatology and PCP management     Reactive airway disease/COPD–Breo Ellipta inhalerJaguarulair

## 2021-07-18 NOTE — PLAN OF CARE
Assumed care 1900  Patient alert, oriented x4  Placed on 2 L nasal cannula- physician aware per patient request  BP low at times- cardiology aware  Severe headache- imitrex ordered per provider, patient refused dose- headache resolving  Abdomen still tende

## 2021-07-19 ENCOUNTER — APPOINTMENT (OUTPATIENT)
Dept: CV DIAGNOSTICS | Facility: HOSPITAL | Age: 51
End: 2021-07-19
Attending: NURSE PRACTITIONER
Payer: MEDICARE

## 2021-07-19 ENCOUNTER — ANESTHESIA (OUTPATIENT)
Dept: ENDOSCOPY | Facility: HOSPITAL | Age: 51
End: 2021-07-19
Payer: MEDICARE

## 2021-07-19 ENCOUNTER — ANESTHESIA EVENT (OUTPATIENT)
Dept: ENDOSCOPY | Facility: HOSPITAL | Age: 51
End: 2021-07-19
Payer: MEDICARE

## 2021-07-19 LAB
ANION GAP SERPL CALC-SCNC: 4 MMOL/L (ref 0–18)
BASOPHILS # BLD AUTO: 0.02 X10(3) UL (ref 0–0.2)
BASOPHILS NFR BLD AUTO: 0.3 %
BUN BLD-MCNC: 7 MG/DL (ref 7–18)
BUN/CREAT SERPL: 8.9 (ref 10–20)
CALCIUM BLD-MCNC: 7.7 MG/DL (ref 8.5–10.1)
CHLORIDE SERPL-SCNC: 104 MMOL/L (ref 98–112)
CO2 SERPL-SCNC: 31 MMOL/L (ref 21–32)
CREAT BLD-MCNC: 0.79 MG/DL
DEPRECATED HBV CORE AB SER IA-ACNC: 426.3 NG/ML
DEPRECATED RDW RBC AUTO: 43.6 FL (ref 35.1–46.3)
EOSINOPHIL # BLD AUTO: 0 X10(3) UL (ref 0–0.7)
EOSINOPHIL NFR BLD AUTO: 0 %
ERYTHROCYTE [DISTWIDTH] IN BLOOD BY AUTOMATED COUNT: 15.2 % (ref 11–15)
GLUCOSE BLD-MCNC: 104 MG/DL (ref 70–99)
GLUCOSE BLD-MCNC: 113 MG/DL (ref 70–99)
GLUCOSE BLD-MCNC: 114 MG/DL (ref 70–99)
GLUCOSE BLD-MCNC: 169 MG/DL (ref 70–99)
GLUCOSE BLD-MCNC: 82 MG/DL (ref 70–99)
HCT VFR BLD AUTO: 38.7 %
HGB BLD-MCNC: 11.3 G/DL
IMM GRANULOCYTES # BLD AUTO: 0.02 X10(3) UL (ref 0–1)
IMM GRANULOCYTES NFR BLD: 0.3 %
IRON SATURATION: 35 %
IRON SERPL-MCNC: 91 UG/DL
LYMPHOCYTES # BLD AUTO: 3.72 X10(3) UL (ref 1–4)
LYMPHOCYTES NFR BLD AUTO: 52.2 %
MCH RBC QN AUTO: 23.2 PG (ref 26–34)
MCHC RBC AUTO-ENTMCNC: 29.2 G/DL (ref 31–37)
MCV RBC AUTO: 79.5 FL
MONOCYTES # BLD AUTO: 0.59 X10(3) UL (ref 0.1–1)
MONOCYTES NFR BLD AUTO: 8.3 %
NEUTROPHILS # BLD AUTO: 2.78 X10 (3) UL (ref 1.5–7.7)
NEUTROPHILS # BLD AUTO: 2.78 X10(3) UL (ref 1.5–7.7)
NEUTROPHILS NFR BLD AUTO: 38.9 %
OSMOLALITY SERPL CALC.SUM OF ELEC: 287 MOSM/KG (ref 275–295)
PLATELET # BLD AUTO: 176 10(3)UL (ref 150–450)
POTASSIUM SERPL-SCNC: 3.5 MMOL/L (ref 3.5–5.1)
RBC # BLD AUTO: 4.87 X10(6)UL
SODIUM SERPL-SCNC: 139 MMOL/L (ref 136–145)
TOTAL IRON BINDING CAPACITY: 258 UG/DL (ref 240–450)
TRANSFERRIN SERPL-MCNC: 173 MG/DL (ref 200–360)
WBC # BLD AUTO: 7.1 X10(3) UL (ref 4–11)

## 2021-07-19 PROCEDURE — 0DB68ZX EXCISION OF STOMACH, VIA NATURAL OR ARTIFICIAL OPENING ENDOSCOPIC, DIAGNOSTIC: ICD-10-PCS | Performed by: INTERNAL MEDICINE

## 2021-07-19 PROCEDURE — 85025 COMPLETE CBC W/AUTO DIFF WBC: CPT | Performed by: STUDENT IN AN ORGANIZED HEALTH CARE EDUCATION/TRAINING PROGRAM

## 2021-07-19 PROCEDURE — 93306 TTE W/DOPPLER COMPLETE: CPT | Performed by: NURSE PRACTITIONER

## 2021-07-19 PROCEDURE — 0DBP8ZX EXCISION OF RECTUM, VIA NATURAL OR ARTIFICIAL OPENING ENDOSCOPIC, DIAGNOSTIC: ICD-10-PCS | Performed by: INTERNAL MEDICINE

## 2021-07-19 PROCEDURE — 0DB98ZX EXCISION OF DUODENUM, VIA NATURAL OR ARTIFICIAL OPENING ENDOSCOPIC, DIAGNOSTIC: ICD-10-PCS | Performed by: INTERNAL MEDICINE

## 2021-07-19 PROCEDURE — 82728 ASSAY OF FERRITIN: CPT | Performed by: STUDENT IN AN ORGANIZED HEALTH CARE EDUCATION/TRAINING PROGRAM

## 2021-07-19 PROCEDURE — 80048 BASIC METABOLIC PNL TOTAL CA: CPT | Performed by: STUDENT IN AN ORGANIZED HEALTH CARE EDUCATION/TRAINING PROGRAM

## 2021-07-19 PROCEDURE — 83550 IRON BINDING TEST: CPT | Performed by: STUDENT IN AN ORGANIZED HEALTH CARE EDUCATION/TRAINING PROGRAM

## 2021-07-19 PROCEDURE — 83540 ASSAY OF IRON: CPT | Performed by: STUDENT IN AN ORGANIZED HEALTH CARE EDUCATION/TRAINING PROGRAM

## 2021-07-19 PROCEDURE — 88305 TISSUE EXAM BY PATHOLOGIST: CPT | Performed by: INTERNAL MEDICINE

## 2021-07-19 PROCEDURE — 0DBE8ZX EXCISION OF LARGE INTESTINE, VIA NATURAL OR ARTIFICIAL OPENING ENDOSCOPIC, DIAGNOSTIC: ICD-10-PCS | Performed by: INTERNAL MEDICINE

## 2021-07-19 PROCEDURE — 82962 GLUCOSE BLOOD TEST: CPT

## 2021-07-19 RX ORDER — ONDANSETRON 2 MG/ML
4 INJECTION INTRAMUSCULAR; INTRAVENOUS AS NEEDED
Status: DISCONTINUED | OUTPATIENT
Start: 2021-07-19 | End: 2021-07-19 | Stop reason: HOSPADM

## 2021-07-19 RX ORDER — LIDOCAINE HYDROCHLORIDE 10 MG/ML
INJECTION, SOLUTION EPIDURAL; INFILTRATION; INTRACAUDAL; PERINEURAL AS NEEDED
Status: DISCONTINUED | OUTPATIENT
Start: 2021-07-19 | End: 2021-07-19 | Stop reason: SURG

## 2021-07-19 RX ORDER — NALOXONE HYDROCHLORIDE 0.4 MG/ML
80 INJECTION, SOLUTION INTRAMUSCULAR; INTRAVENOUS; SUBCUTANEOUS AS NEEDED
Status: DISCONTINUED | OUTPATIENT
Start: 2021-07-19 | End: 2021-07-19 | Stop reason: HOSPADM

## 2021-07-19 RX ORDER — SODIUM CHLORIDE, SODIUM LACTATE, POTASSIUM CHLORIDE, CALCIUM CHLORIDE 600; 310; 30; 20 MG/100ML; MG/100ML; MG/100ML; MG/100ML
INJECTION, SOLUTION INTRAVENOUS CONTINUOUS
Status: DISCONTINUED | OUTPATIENT
Start: 2021-07-19 | End: 2021-07-20

## 2021-07-19 RX ORDER — DEXTROSE MONOHYDRATE 25 G/50ML
50 INJECTION, SOLUTION INTRAVENOUS
Status: DISCONTINUED | OUTPATIENT
Start: 2021-07-19 | End: 2021-07-19 | Stop reason: HOSPADM

## 2021-07-19 RX ADMIN — LIDOCAINE HYDROCHLORIDE 10 MG: 10 INJECTION, SOLUTION EPIDURAL; INFILTRATION; INTRACAUDAL; PERINEURAL at 15:30:00

## 2021-07-19 NOTE — ANESTHESIA POSTPROCEDURE EVALUATION
Lisa Crook Patient Status:  Inpatient   Age/Gender 46year old female MRN [id number]   Location [id number] [place] [address] Attending Khai Juan, [facility] Se Day # 1 PCP Guanako Mar MD       Anesthesia Post-op Note

## 2021-07-19 NOTE — PROGRESS NOTES
[address] [facility] INTERNIST  Progress Note     Darshan Gudino Patient Status:  Observation    3/11/1970 MRN [id number]   [facility] 8NE-A Attending Minnie Soliman MD   Hosp Day # 0 PCP Katlin Suggs MD     Chief Complaint: diffuse pain    S: P Date: 7/18/2021  CONCLUSION:  1. There are suspected bilateral nonobstructing renal calculi. There is no hydronephrosis. 2. There appear to be a couple of small right renal cysts.   The right upper pole cyst is known to be hyperdense on previous CT which c blood and urine workup requested by GI   -if workup negative will optimize her IBS regimen      Diabetes type 2–farxiga–sliding scale, she shut her insulin pump off, will do low dose levemir 7 units bid with ISS as she has hx of severe hyperglycemia withou

## 2021-07-19 NOTE — PLAN OF CARE
Received patient at 1. Patient A/O x 4. Tele Rhythm NSR/SB. O2 Saturation 98%. On RA. No C/O chest pain or SOB. C/o abdominal pain, more so on the Right Lower Quadrant. Pain controlled with prn pain meds and heat packs.  Although pt complains it mil ADULT  Goal: Free from bleeding injury  Description: (Example usage: patient with low platelets)  INTERVENTIONS:  - Avoid intramuscular injections, enemas and rectal medication administration  - Ensure safe mobilization of patient  - Hold pressure on venip

## 2021-07-19 NOTE — OPERATIVE REPORT
Operative Report-Esophagogastroduodenoscopy      PREOPERATIVE DIAGNOSIS/INDICATION:  Abdominal pain    POSTOPERTATIVE DIAGNOSIS: Gastritis, gastric polyp    PROCEDURE PERFORMED: EGD    INFORMED CONSENT: Once a brief history and phy

## 2021-07-19 NOTE — CONSULTS
Urology consultation     Admitted with abdominal pain. She has had chronic pain over the years and is followed by my partner Dr Michelle Green regarding renal cysts, nonobstructing renal stone and mild incontinence. She uses 3 to 4 pads a day for the incontinence.  Sh

## 2021-07-19 NOTE — HOME CARE LIAISON
[facility] for RN and ST services. Patient will need [facility] order entered on or before date of discharge if returning home with home health.

## 2021-07-19 NOTE — PLAN OF CARE
Attempted to see patient several time this afternoon, but is currently in endoscopy getting procedure. Her echo has reviewed, with preserved lvef.  Dr. Marylen Celestine note reviewed with plans for discharge from cardiac standpoint with diagnosis of atypical ches

## 2021-07-19 NOTE — ANESTHESIA PREPROCEDURE EVALUATION
PRE-OP EVALUATION    Patient Name: Symone Méndez    Admit Diagnosis: Abdominal pain of unknown etiology [R10.9]  Chest pain of uncertain etiology [X97.3]    Pre-op Diagnosis: Abdominal pain    ESOPHAGOGASTRODUODENOSCOPY (EGD) AND COLONOSCOPY    Anesthesia tablet, Oral, BID PRN  Levothyroxine Sodium tab 88 mcg, 88 mcg, Oral, Before breakfast  Montelukast Sodium (SINGULAIR) tab 10 mg, 10 mg, Oral, Nightly  Pantoprazole Sodium (PROTONIX) EC tab 40 mg, 40 mg, Oral, QAM AC  Ondansetron HCl (ZOFRAN) tab 4 mg, 4 m 60 capsule, Rfl: 2, Taking  Benralizumab (FASENRA PEN) 30 MG/ML Subcutaneous Solution Auto-injector, Inject 30 mL into the skin.  Every other month, Disp: , Rfl: , Taking  INSULIN LISPRO 100 UNIT/ML Subcutaneous Solution, USE UP TO 70 UNITS DAILY VIA INSULI , Rfl: , Taking  denosumab (PROLIA) 60 MG/ML Subcutaneous Solution, Inject 60 mg into the skin every 6 (six) months., Disp: , Rfl: , Taking  Cholecalciferol (VITAMIN D-3) 5000 UNITS Oral Tab, Take 5,000 Units by mouth daily.   , Disp: , Rfl: , Taking  Albut (fibromyalgia)             MS      Past Surgical History:   Procedure Laterality Date   • CATH ANGIO  May 2014   • CHOLECYSTECTOMY     • COLONOSCOPY N/A 9/18/2015    Procedure: COLONOSCOPY;  Surgeon: Radha Jane DO;  Location: [address] [place] [address] ENDOSCOPY   • EGD

## 2021-07-19 NOTE — PROGRESS NOTES
[facility]  Urology Progress Note    Jeff Marcos Patient Status:  Observation    3/11/1970 MRN [id number]   [facility] 8NE-A Attending Nain Owen MD   Hosp Day # 0 PCP Trent Martines MD     Subjective:  Jeff Marcos is a(n)

## 2021-07-19 NOTE — OPERATIVE REPORT
Operative Report-Colonoscopy    PREOPERATIVE DIAGNOSIS/INDICATION: Abdominal pain, surveillance    POSTOPERTATIVE DIAGNOSIS: Poor preparation, colon polyp, diverticulosis     PROCEDURE PERFORMED: COLONOSCOPY colonoscopy with 2 day prep as OP for surveillance given poor preparation.      Haroon Ragsdale  7/19/2021  4:14 PM

## 2021-07-20 ENCOUNTER — APPOINTMENT (OUTPATIENT)
Dept: ULTRASOUND IMAGING | Facility: HOSPITAL | Age: 51
End: 2021-07-20
Attending: NURSE PRACTITIONER
Payer: MEDICARE

## 2021-07-20 VITALS
BODY MASS INDEX: 28.21 KG/M2 | HEIGHT: 66 IN | TEMPERATURE: 98 F | OXYGEN SATURATION: 97 % | DIASTOLIC BLOOD PRESSURE: 80 MMHG | WEIGHT: 175.5 LBS | SYSTOLIC BLOOD PRESSURE: 117 MMHG | RESPIRATION RATE: 18 BRPM | HEART RATE: 69 BPM

## 2021-07-20 LAB
ANION GAP SERPL CALC-SCNC: 3 MMOL/L (ref 0–18)
ATRIAL RATE: 72 BPM
BASOPHILS # BLD AUTO: 0.01 X10(3) UL (ref 0–0.2)
BASOPHILS NFR BLD AUTO: 0.2 %
BUN BLD-MCNC: 6 MG/DL (ref 7–18)
BUN/CREAT SERPL: 6.9 (ref 10–20)
CALCIUM BLD-MCNC: 8.1 MG/DL (ref 8.5–10.1)
CHLORIDE SERPL-SCNC: 106 MMOL/L (ref 98–112)
CO2 SERPL-SCNC: 30 MMOL/L (ref 21–32)
CREAT BLD-MCNC: 0.87 MG/DL
DEPRECATED RDW RBC AUTO: 41.5 FL (ref 35.1–46.3)
EOSINOPHIL # BLD AUTO: 0 X10(3) UL (ref 0–0.7)
EOSINOPHIL NFR BLD AUTO: 0 %
ERYTHROCYTE [DISTWIDTH] IN BLOOD BY AUTOMATED COUNT: 15.2 % (ref 11–15)
GLUCOSE BLD-MCNC: 116 MG/DL (ref 70–99)
GLUCOSE BLD-MCNC: 119 MG/DL (ref 70–99)
GLUCOSE BLD-MCNC: 190 MG/DL (ref 70–99)
HCT VFR BLD AUTO: 37.6 %
HGB BLD-MCNC: 11.5 G/DL
IMM GRANULOCYTES # BLD AUTO: 0.04 X10(3) UL (ref 0–1)
IMM GRANULOCYTES NFR BLD: 0.6 %
LYMPHOCYTES # BLD AUTO: 3.13 X10(3) UL (ref 1–4)
LYMPHOCYTES NFR BLD AUTO: 48.3 %
MCH RBC QN AUTO: 23.6 PG (ref 26–34)
MCHC RBC AUTO-ENTMCNC: 30.6 G/DL (ref 31–37)
MCV RBC AUTO: 77.2 FL
MONOCYTES # BLD AUTO: 0.53 X10(3) UL (ref 0.1–1)
MONOCYTES NFR BLD AUTO: 8.2 %
NEUTROPHILS # BLD AUTO: 2.77 X10 (3) UL (ref 1.5–7.7)
NEUTROPHILS # BLD AUTO: 2.77 X10(3) UL (ref 1.5–7.7)
NEUTROPHILS NFR BLD AUTO: 42.7 %
OSMOLALITY SERPL CALC.SUM OF ELEC: 287 MOSM/KG (ref 275–295)
P AXIS: 54 DEGREES
P-R INTERVAL: 134 MS
PLATELET # BLD AUTO: 165 10(3)UL (ref 150–450)
POTASSIUM SERPL-SCNC: 3.5 MMOL/L (ref 3.5–5.1)
Q-T INTERVAL: 436 MS
QRS DURATION: 88 MS
QTC CALCULATION (BEZET): 477 MS
R AXIS: 2 DEGREES
RBC # BLD AUTO: 4.87 X10(6)UL
SODIUM SERPL-SCNC: 139 MMOL/L (ref 136–145)
T AXIS: 56 DEGREES
VENTRICULAR RATE: 72 BPM
WBC # BLD AUTO: 6.5 X10(3) UL (ref 4–11)

## 2021-07-20 PROCEDURE — 86161 COMPLEMENT/FUNCTION ACTIVITY: CPT | Performed by: STUDENT IN AN ORGANIZED HEALTH CARE EDUCATION/TRAINING PROGRAM

## 2021-07-20 PROCEDURE — 93005 ELECTROCARDIOGRAM TRACING: CPT

## 2021-07-20 PROCEDURE — 93010 ELECTROCARDIOGRAM REPORT: CPT | Performed by: INTERNAL MEDICINE

## 2021-07-20 PROCEDURE — 93970 EXTREMITY STUDY: CPT | Performed by: NURSE PRACTITIONER

## 2021-07-20 PROCEDURE — 82962 GLUCOSE BLOOD TEST: CPT

## 2021-07-20 PROCEDURE — 80048 BASIC METABOLIC PNL TOTAL CA: CPT | Performed by: STUDENT IN AN ORGANIZED HEALTH CARE EDUCATION/TRAINING PROGRAM

## 2021-07-20 PROCEDURE — 85025 COMPLETE CBC W/AUTO DIFF WBC: CPT | Performed by: STUDENT IN AN ORGANIZED HEALTH CARE EDUCATION/TRAINING PROGRAM

## 2021-07-20 RX ORDER — NORTRIPTYLINE HYDROCHLORIDE 25 MG/1
25 CAPSULE ORAL NIGHTLY
Qty: 60 CAPSULE | Refills: 0 | Status: ON HOLD | OUTPATIENT
Start: 2021-07-20 | End: 2021-12-08

## 2021-07-20 NOTE — PLAN OF CARE
Assumed care of pt at 0700. Pt A&Ox4. NSR on tele. Lung sounds clear and diminished bilaterally on room air. Pt denies pain and SOB. US BLE completed, negative for DVT. Pt ambulating in room and hallways independently without difficulty.  Pt updated on plan symptoms of internal bleeding  - Monitor lab trends  - Patient is to report abnormal signs of bleeding to staff  - Avoid use of toothpicks and dental floss  - Use electric shaver for shaving  - Use soft bristle tooth brush  - Limit straining and forceful n

## 2021-07-20 NOTE — PROGRESS NOTES
[facility] Gastroenterology Progress Note    CC: abdominal pain    S:  Pt with stable abd pain; wants to go home    O: BP 92/55 (BP Location: Left arm)   Pulse 62   Temp 97.9 °F (36.6 °C) (Oral)   Resp 18   Ht 5' 6\" (1.676 m)   Wt 175 lb 7.8 oz (79.6

## 2021-07-20 NOTE — DISCHARGE SUMMARY
[facility]  Discharge Summary    Cruz Leigh Patient Status:  Observation    3/11/1970 MRN [id number]   [facility] 8NE-A Attending Teresita Lim MD   Hosp Day # 2 PCP Janice Weller MD     Date of Admission: 2021    Date of ketoacidosis without coma (HCC)     Urinary tract infection without hematuria, site unspecified     Controlled type 1 diabetes mellitus with hyperglycemia (HCC)     Chest pain of uncertain etiology     Abdominal pain of unknown etiology    No results found Common, deep, and superficial femoral, popliteal, sapheno-femoral junction, and posterior tibial veins. PATIENT STATED HISTORY: (As transcribed by Technologist)     FINDINGS:  SAPHENOFEMORAL JUNCTION:  No reflux. THROMBI:  None visible.  COMPRESSION:  Norm mm.  LEFT KIDNEY MEASUREMENTS:  9.2 x 5.5 x 5.0 cm ECHOGENICITY:  Normal. HYDRONEPHROSIS:  None. CYSTS/STONES/MASSES:  There is a 6 x 2 x 5 mm echogenic focus seen in the midpole of the left kidney which could reflect a nonobstructing calculus.   BLADDER: ---------------------------------------------------------------------------- Procedure information:  A transthoracic complete 2D study was performed. Additional evaluation included M-mode, complete spectral Doppler, and color Doppler. Inpatient. Bedside.  C velocity was within the normal range. There was no evidence for stenosis. There was no significant regurgitation. Peak gradient (D): 2mm Hg. Aortic valve:   Structurally normal valve. Trileaflet.  Cusp separation was normal.  Doppler:  Transvalvular velo Reference  IVS thickness, ED, PLAX                         0.8   cm     0.6 - 0.9   Aorta                                           Value        Reference  Aortic root ID, ED                              3.0   cm     <4.1  Ascending aorta ID, A-P, ED 07/19/2021 13:27     US ABDOMEN DOPPLER ONLY (CPT=93975)    Result Date: 7/18/2021  PROCEDURE:  US ABDOMEN DOPPLER ONLY (CPT=93975)  COMPARISON:  [facility] , CT, CTA CHEST + CT ABD (W) + CT PEL (W) SH(CPT=71275/13471), 7/16/2021, 7:42 PM.  [facility] , CT, CT ABD pelvis were obtained post- injection of non-ionic intravenous contrast material. Multi-planar reformatted/3-D images were created to optimize visualization of vascular anatomy. Dose reduction techniques were used.  Dose information is transmitted to the [facility] spine.  Mild levoscoliosis of the thoracolumbar junction. CONCLUSION:   1. No evidence of pulmonary embolus. 2. Calyceal diverticulum in the upper pole the right kidney with a 5 mm calcification is again noted.   3. No evidence of an acute infla List    START taking these medications    Nortriptyline HCl 25 MG Oral Cap  Take 1 capsule (25 mg total) by mouth nightly.  Take one tab nightly for 1 week, if tolerating well increase to 2 tabs at bedtime  Qty: 60 capsule Refills: 0      CONTINUE these med mellitus with diabetic polyneuropathy, with long-term current use of insulin (HCC)    natalizumab (TYSABRI) 300 MG/15ML Intravenous Conc  Tysabri: Dilute one vial (300 mg/15 ml) in 100 ml of 0.9% Sodium Chloride and infuse over 1 hour every 8 weeks for 6 i

## 2021-07-20 NOTE — DISCHARGE PLANNING
D/C orders received. Port flushed with heparin per protocol, then removed. Pt tolerated well, site CDI. Needle and tubing intact. Follow up appointments discussed. New meds prescribed to pharmacy. Discharge paperwork given and discussed.    Patient t

## 2021-07-20 NOTE — PLAN OF CARE
Received patient at 1. Patient A/O x 4. Tele Rhythm NSR/SB. O2 Saturation 97%. On RA. Breath sounds clear. No C/O chest pain or SOB. Right lower abdomen pain and back pain noted. Managed with norco and heat packs. Patient voiding with no issue.  A venipuncture sites to achieve adequate hemostasis  - Assess for signs and symptoms of internal bleeding  - Monitor lab trends  - Patient is to report abnormal signs of bleeding to staff  - Avoid use of toothpicks and dental floss  - Use electric shaver for

## 2021-07-20 NOTE — PROGRESS NOTES
232 [facility] INTERNIST  Progress Note     Jerel Mancera Patient Status:  Observation    3/11/1970 MRN [id number]   [facility] 8NE-A Attending Rachel Miller MD   Hosp Day # 2 PCP Svetlana Copeland MD     Chief Complaint: diffuse pain    S: P [unfilled]    Imaging: XR ABDOMEN (1 VIEW) (CPT=74018)    Result Date: 7/18/2021  CONCLUSION:   1. Right-sided 4 mm kidney stone projects over the right kidney.    Dictated by (CST): Alena Rehman MD on 7/18/2021 at 9:05 PM     Finalized by (CST): Bernadette units bid with ISS as she has hx of severe hyperglycemia without basal insulin, hypoglycemia protocol      MS–Tysabri for neurology as outpatient     Sickle cell trait–outpatient rheumatology and PCP management     Reactive airway disease/COPD–Breo Ellipta

## 2021-07-21 LAB
ATRIAL RATE: 61 BPM
P AXIS: 57 DEGREES
P-R INTERVAL: 138 MS
Q-T INTERVAL: 474 MS
QRS DURATION: 86 MS
QTC CALCULATION (BEZET): 477 MS
R AXIS: 21 DEGREES
T AXIS: 69 DEGREES
VENTRICULAR RATE: 61 BPM

## 2021-07-22 LAB
5-HIAA URINE - PER VOLUME: 2.1 MG/L
5-HIAA URINE - RATIO TO CRT: 4 MG/GCR
CREATININE, URINE - PER VOLUME: 47 MG/DL
TOTAL VOLUME: 650 ML

## 2021-07-23 LAB — TRYPTASE: 5.3 UG/L

## 2021-07-24 LAB
C-1-ESTERASE INHIBITOR, FUNCTI: 84 %
C-1-ESTERASE INHIBITOR: 23 MG/DL

## 2021-07-31 ENCOUNTER — HOSPITAL ENCOUNTER (OUTPATIENT)
Age: 51
Discharge: OTHER INSTITUTION | End: 2021-07-31
Payer: MEDICARE

## 2021-07-31 VITALS
HEART RATE: 77 BPM | TEMPERATURE: 97 F | WEIGHT: 174.19 LBS | OXYGEN SATURATION: 99 % | SYSTOLIC BLOOD PRESSURE: 101 MMHG | DIASTOLIC BLOOD PRESSURE: 60 MMHG | BODY MASS INDEX: 28 KG/M2 | RESPIRATION RATE: 26 BRPM

## 2021-07-31 DIAGNOSIS — R06.82 TACHYPNEA: ICD-10-CM

## 2021-07-31 DIAGNOSIS — R07.9 CHEST PAIN OF UNCERTAIN ETIOLOGY: ICD-10-CM

## 2021-07-31 DIAGNOSIS — R06.2 WHEEZING: Primary | ICD-10-CM

## 2021-07-31 DIAGNOSIS — R00.0 TACHYCARDIA: ICD-10-CM

## 2021-07-31 PROCEDURE — 99215 OFFICE O/P EST HI 40 MIN: CPT | Performed by: NURSE PRACTITIONER

## 2021-07-31 PROCEDURE — 94640 AIRWAY INHALATION TREATMENT: CPT | Performed by: NURSE PRACTITIONER

## 2021-07-31 RX ORDER — ALBUTEROL SULFATE 90 UG/1
4 AEROSOL, METERED RESPIRATORY (INHALATION) ONCE
Status: COMPLETED | OUTPATIENT
Start: 2021-07-31 | End: 2021-07-31

## 2021-07-31 RX ORDER — ALBUTEROL SULFATE 90 UG/1
8 AEROSOL, METERED RESPIRATORY (INHALATION) ONCE
Status: DISCONTINUED | OUTPATIENT
Start: 2021-07-31 | End: 2021-07-31

## 2021-07-31 NOTE — ED INITIAL ASSESSMENT (HPI)
Pt c/o intermittant cp for 2 months, just released from hospital, pt sob, struggling to finish sentences, audibly wheezing

## 2021-07-31 NOTE — ED QUICK NOTES
911 called, pt unable to lie flat to obtain ekg, pt becoming less responsive, audibly wheezing, 2 rn's and np at bedside

## 2021-07-31 NOTE — ED PROVIDER NOTES
Patient Seen in: Immediate [facility]      History   Patient presents with:  Chest Pain Angina    Stated Complaint: chest pain/dizziness    HPI/Subjective:   38-year-old female who presents to the IC with complaints of worsening wheezing and chest pain. • Loss of appetite    • Migraines    • Multiple sclerosis (HCC)    • Myocardial infarction (HCC)    • Neuropathy    • Night sweats    • Osteoporosis     KAMALJIT in her 30s   • Other and unspecified hyperlipidemia    • Pain with bowel movements 2019   • Painf of breath and wheezing. Cardiovascular: Positive for chest pain. All other systems reviewed and are negative. Positive for stated complaint: chest pain/dizziness  Other systems are as noted in HPI. Constitutional and vital signs reviewed.       A emergency physician on call for the IC is a new assessment plan that patient should be transferred via 911.          MDM   Patient will go to [facility] emergency room for further evaluation and high-level care                         Disposition and Plan

## 2021-08-05 ENCOUNTER — TELEPHONE (OUTPATIENT)
Dept: NEUROLOGY | Facility: CLINIC | Age: 51
End: 2021-08-05

## 2021-08-05 NOTE — TELEPHONE ENCOUNTER
Patient calling, requesting that we re-fax [facility] orders for jcv labs to [facility]. Confirmed fax number [phone]. Received confirmation.  Nothing further needed

## 2021-08-09 PROBLEM — E11.42 POLYNEUROPATHY DUE TO TYPE 2 DIABETES MELLITUS (HCC): Status: ACTIVE | Noted: 2021-08-09

## 2021-08-09 PROBLEM — G98.8 DISORDER OF NERVOUS SYSTEM DUE TO TYPE 2 DIABETES MELLITUS: Status: ACTIVE | Noted: 2021-08-09

## 2021-08-09 PROBLEM — R51.9 HEADACHE: Status: ACTIVE | Noted: 2021-08-09

## 2021-08-09 PROBLEM — K59.00 CONSTIPATION: Status: ACTIVE | Noted: 2021-08-09

## 2021-08-09 PROBLEM — G98.8 DISORDER OF NERVOUS SYSTEM DUE TO TYPE 2 DIABETES MELLITUS (HCC): Status: ACTIVE | Noted: 2021-08-09

## 2021-08-09 PROBLEM — K21.9 ESOPHAGEAL REFLUX: Status: ACTIVE | Noted: 2021-08-09

## 2021-08-09 PROBLEM — H93.13 BILATERAL TINNITUS: Status: ACTIVE | Noted: 2021-08-09

## 2021-08-09 PROBLEM — R10.11 RIGHT UPPER QUADRANT PAIN: Status: ACTIVE | Noted: 2021-08-09

## 2021-08-09 PROBLEM — J45.31 MILD PERSISTENT ASTHMA WITH ACUTE EXACERBATION (HCC): Status: ACTIVE | Noted: 2021-08-09

## 2021-08-09 PROBLEM — E78.5 HYPERLIPIDEMIA: Status: ACTIVE | Noted: 2021-08-09

## 2021-08-09 PROBLEM — R53.83 FATIGUE: Status: ACTIVE | Noted: 2021-08-09

## 2021-08-09 PROBLEM — E11.69 DISORDER OF NERVOUS SYSTEM DUE TO TYPE 2 DIABETES MELLITUS  (HCC): Status: ACTIVE | Noted: 2021-08-09

## 2021-08-09 PROBLEM — M79.10 MYALGIA: Status: ACTIVE | Noted: 2021-08-09

## 2021-08-09 PROBLEM — D50.0 ANEMIA DUE TO CHRONIC BLOOD LOSS: Status: ACTIVE | Noted: 2021-08-09

## 2021-08-09 PROBLEM — M81.8 IDIOPATHIC OSTEOPOROSIS: Status: ACTIVE | Noted: 2021-08-09

## 2021-08-09 PROBLEM — E11.51 PERIPHERAL VASCULAR DISORDER DUE TO DIABETES MELLITUS (HCC): Status: ACTIVE | Noted: 2021-08-09

## 2021-08-09 PROBLEM — J45.31 MILD PERSISTENT ASTHMA WITH ACUTE EXACERBATION: Status: ACTIVE | Noted: 2021-08-09

## 2021-08-09 PROBLEM — R60.1 GENERALIZED EDEMA: Status: ACTIVE | Noted: 2021-08-09

## 2021-08-09 PROBLEM — G89.4 CHRONIC PAIN DISORDER: Status: ACTIVE | Noted: 2021-08-09

## 2021-08-09 PROBLEM — M48.00 SPINAL STENOSIS: Status: ACTIVE | Noted: 2021-08-09

## 2021-08-09 PROBLEM — R06.02 SHORTNESS OF BREATH: Status: ACTIVE | Noted: 2021-08-09

## 2021-08-09 PROBLEM — M62.81 MUSCLE WEAKNESS: Status: ACTIVE | Noted: 2021-08-09

## 2021-08-09 PROBLEM — E03.9 HYPOTHYROIDISM: Status: ACTIVE | Noted: 2021-08-09

## 2021-08-09 PROBLEM — E11.69 DISORDER OF NERVOUS SYSTEM DUE TO TYPE 2 DIABETES MELLITUS (HCC): Status: ACTIVE | Noted: 2021-08-09

## 2021-08-09 PROBLEM — E11.69 TYPE 2 DIABETES MELLITUS WITH OTHER SPECIFIED COMPLICATION (HCC): Status: ACTIVE | Noted: 2021-08-09

## 2021-08-09 PROBLEM — E11.69 DISORDER OF NERVOUS SYSTEM DUE TO TYPE 2 DIABETES MELLITUS: Status: ACTIVE | Noted: 2021-08-09

## 2021-08-09 PROBLEM — G98.8 DISORDER OF NERVOUS SYSTEM DUE TO TYPE 2 DIABETES MELLITUS  (HCC): Status: ACTIVE | Noted: 2021-08-09

## 2021-08-09 PROBLEM — R10.817 GENERALIZED ABDOMINAL TENDERNESS: Status: ACTIVE | Noted: 2021-08-09

## 2021-08-09 PROBLEM — E55.9 VITAMIN D DEFICIENCY: Status: ACTIVE | Noted: 2021-08-09

## 2021-08-09 PROBLEM — M87.059 ASEPTIC NECROSIS OF HEAD OR NECK OF FEMUR: Status: ACTIVE | Noted: 2021-08-09

## 2021-08-09 LAB
INDEX VALUE: 0.15
JCV ANTIBODY: NEGATIVE

## 2021-08-11 ENCOUNTER — HOSPITAL ENCOUNTER (OUTPATIENT)
Dept: MAMMOGRAPHY | Age: 51
Discharge: HOME | End: 2021-08-11
Attending: SPECIALIST
Payer: MEDICARE

## 2021-08-11 DIAGNOSIS — Z12.31 BREAST CANCER SCREENING BY MAMMOGRAM: ICD-10-CM

## 2021-08-11 PROCEDURE — 77063 BREAST TOMOSYNTHESIS BI: CPT | Performed by: SPECIALIST

## 2021-08-11 PROCEDURE — 77067 SCR MAMMO BI INCL CAD: CPT | Performed by: SPECIALIST

## 2021-08-17 ENCOUNTER — LAB ENCOUNTER (OUTPATIENT)
Dept: LAB | Age: 51
End: 2021-08-17
Attending: INTERNAL MEDICINE
Payer: MEDICARE

## 2021-08-17 DIAGNOSIS — R10.2 PELVIC PAIN: ICD-10-CM

## 2021-08-19 LAB — SARS-COV-2 RNA RESP QL NAA+PROBE: NOT DETECTED

## 2021-09-03 ENCOUNTER — TELEPHONE (OUTPATIENT)
Dept: NEUROLOGY | Facility: CLINIC | Age: 51
End: 2021-09-03

## 2021-09-03 NOTE — TELEPHONE ENCOUNTER
RN received from [facility] for physician review and signature.     Patient receives SN    Frequency: Biweekly    Duration: month    Plan of Care: Requires ongoing assessment of MS/Fibromyalgia symptoms including increased pain, unsteady balance and impaired mobil

## 2021-09-09 ENCOUNTER — TELEPHONE (OUTPATIENT)
Dept: NEUROLOGY | Facility: CLINIC | Age: 51
End: 2021-09-09

## 2021-09-09 DIAGNOSIS — G35 MS (MULTIPLE SCLEROSIS) (HCC): Primary | ICD-10-CM

## 2021-09-09 RX ORDER — NATALIZUMAB 300 MG/15ML
INJECTION INTRAVENOUS
Qty: 15 ML | Refills: 5 | Status: SHIPPED
Start: 2021-09-09 | End: 2022-01-27

## 2021-09-15 ENCOUNTER — TELEPHONE (OUTPATIENT)
Dept: NEUROLOGY | Facility: CLINIC | Age: 51
End: 2021-09-15

## 2021-09-15 NOTE — TELEPHONE ENCOUNTER
[facility] orders and certification papers received for patient home health services. Paperwork signed by provider and faxed back with receipt confirmation. Patient certification period: 9/10/2021 - 11/8/2021.     Patient is considered ho

## 2021-10-05 ENCOUNTER — ANESTHESIA EVENT (OUTPATIENT)
Dept: ENDOSCOPY | Facility: HOSPITAL | Age: 51
End: 2021-10-05
Payer: MEDICARE

## 2021-10-05 ENCOUNTER — LAB ENCOUNTER (OUTPATIENT)
Dept: LAB | Age: 51
End: 2021-10-05
Attending: INTERNAL MEDICINE
Payer: MEDICARE

## 2021-10-05 DIAGNOSIS — Z86.010 PERSONAL HISTORY OF COLONIC POLYPS: ICD-10-CM

## 2021-10-07 ENCOUNTER — CASE MANAGEMENT (OUTPATIENT)
Dept: CARE COORDINATION | Age: 51
End: 2021-10-07

## 2021-10-08 ENCOUNTER — ANESTHESIA (OUTPATIENT)
Dept: ENDOSCOPY | Facility: HOSPITAL | Age: 51
End: 2021-10-08
Payer: MEDICARE

## 2021-10-08 ENCOUNTER — HOSPITAL ENCOUNTER (OUTPATIENT)
Facility: HOSPITAL | Age: 51
Setting detail: HOSPITAL OUTPATIENT SURGERY
Discharge: HOME | End: 2021-10-08
Attending: INTERNAL MEDICINE | Admitting: INTERNAL MEDICINE
Payer: MEDICARE

## 2021-10-08 VITALS
SYSTOLIC BLOOD PRESSURE: 114 MMHG | DIASTOLIC BLOOD PRESSURE: 79 MMHG | TEMPERATURE: 98 F | OXYGEN SATURATION: 99 % | RESPIRATION RATE: 18 BRPM | BODY MASS INDEX: 28.28 KG/M2 | WEIGHT: 176 LBS | HEIGHT: 66 IN | HEART RATE: 79 BPM

## 2021-10-08 DIAGNOSIS — Z01.812 ENCOUNTER FOR PREOPERATIVE SCREENING LABORATORY TESTING FOR COVID-19 VIRUS: Primary | ICD-10-CM

## 2021-10-08 DIAGNOSIS — Z80.0 FAMILY HISTORY OF COLON CANCER: ICD-10-CM

## 2021-10-08 DIAGNOSIS — R10.13 EPIGASTRIC ABDOMINAL PAIN: ICD-10-CM

## 2021-10-08 DIAGNOSIS — Z86.010 PERSONAL HISTORY OF COLONIC POLYPS: ICD-10-CM

## 2021-10-08 DIAGNOSIS — K31.7 GASTRIC POLYP: ICD-10-CM

## 2021-10-08 DIAGNOSIS — Z20.822 ENCOUNTER FOR PREOPERATIVE SCREENING LABORATORY TESTING FOR COVID-19 VIRUS: Primary | ICD-10-CM

## 2021-10-08 PROCEDURE — 0DBL8ZX EXCISION OF TRANSVERSE COLON, VIA NATURAL OR ARTIFICIAL OPENING ENDOSCOPIC, DIAGNOSTIC: ICD-10-PCS | Performed by: INTERNAL MEDICINE

## 2021-10-08 PROCEDURE — 0DB68ZX EXCISION OF STOMACH, VIA NATURAL OR ARTIFICIAL OPENING ENDOSCOPIC, DIAGNOSTIC: ICD-10-PCS | Performed by: INTERNAL MEDICINE

## 2021-10-08 PROCEDURE — 88305 TISSUE EXAM BY PATHOLOGIST: CPT | Performed by: INTERNAL MEDICINE

## 2021-10-08 PROCEDURE — 82962 GLUCOSE BLOOD TEST: CPT

## 2021-10-08 RX ORDER — LIDOCAINE HYDROCHLORIDE 10 MG/ML
INJECTION, SOLUTION EPIDURAL; INFILTRATION; INTRACAUDAL; PERINEURAL AS NEEDED
Status: DISCONTINUED | OUTPATIENT
Start: 2021-10-08 | End: 2021-10-08 | Stop reason: SURG

## 2021-10-08 RX ORDER — SODIUM CHLORIDE, SODIUM LACTATE, POTASSIUM CHLORIDE, CALCIUM CHLORIDE 600; 310; 30; 20 MG/100ML; MG/100ML; MG/100ML; MG/100ML
INJECTION, SOLUTION INTRAVENOUS CONTINUOUS
Status: DISCONTINUED | OUTPATIENT
Start: 2021-10-08 | End: 2021-10-08

## 2021-10-08 RX ORDER — DEXTROSE MONOHYDRATE 25 G/50ML
50 INJECTION, SOLUTION INTRAVENOUS
Status: DISCONTINUED | OUTPATIENT
Start: 2021-10-08 | End: 2021-10-08

## 2021-10-08 RX ADMIN — LIDOCAINE HYDROCHLORIDE 50 MG: 10 INJECTION, SOLUTION EPIDURAL; INFILTRATION; INTRACAUDAL; PERINEURAL at 09:45:00

## 2021-10-08 NOTE — H&P
[id number] Juan Crook Patient Status:  Hospital Outpatient Surgery    3/11/1970 MRN [id number]   Location [address] [place] [address] Attending Karina Gómez MD   Hosp Day # 0 PCP Cheryle Mulligan MD     H Scoliosis of lumbar spine    • Shortness of breath    • Sickle cell trait (HCC)    • Sleep apnea     no cpap   • Stress    • Type II or unspecified type diabetes mellitus without mention of complication, not stated as uncontrolled    • Uncomfortable fullne Comment:WELTS  Methylprednisolone      SWELLING    Comment:Swelling of neck, throat and tongue             Injection, throat and tongue swelling  Other                   SHORTNESS OF BREATH    Comment:ENVIRONMENTAL, ASTHMA EXACERBATION  Epinephrine heart rate/palpitations, high cholesterol or triglycerides, coronary stents. Respiratory: Denies shortness of breath, chronic/frequent hoarseness, wheezing, exposure to tuberculosis, chronic cough, spitting up blood, cough up sputum, sleep apnea.   Genitou rebound, positive BS. Extremity: no edema, no cyanosis   Skin: No rashes or lesions. Neurological: Alert, interactive, no focal deficits    ASA Score: 3-Patient with severe systemic disease    Plan: EGD and Colonoscopy  Risk/Benefits:   The risks a

## 2021-10-08 NOTE — OPERATIVE REPORT
Colon operative report  Patient Name: Cruz Leigh  Procedure: Colonoscopy with snare polypectomy  Date of procedure: 10/8/2021    Indication: Personal history of adenomatous colon polyps, and previously inadequate preparation with an incomplete colonosc endoscope reached the rectum, it was placed in a retroflexed position, and the rectal bulb was thus visualized. The endoscope was righted, and air was suctioned from the colon to the best of my ability, as it was during withdrawn from the colon.   The endo

## 2021-10-08 NOTE — ANESTHESIA PREPROCEDURE EVALUATION
PRE-OP EVALUATION    Patient Name: Martinez Crook    Admit Diagnosis: Personal history of colonic polyps [Z86.010]  Family history of colon cancer [Z80.0]  Gastric polyp [K31.7]  Epigastric abdominal pain [R10.13]    Pre-op Diagnosis: Personal history of c mouth 3 (three) times daily as needed. , Disp: , Rfl:   Benralizumab (FASENRA PEN) 30 MG/ML Subcutaneous Solution Auto-injector, Inject 30 mg into the skin.  Every other month, Disp: , Rfl:   dapagliflozin 5 MG Oral Tab, Take 10 mg by mouth nightly.  , Disp: TRANSMITTER Does not apply Misc, USE AS DIRECTED EVERY 90 DAYS, Disp: 1 each, Rfl: 3        Allergies: Immune Globulin, Latex, Methylprednisolone, Other, Epinephrine, Ocrelizumab, Pcn [Penicillamine], Penicillins, and Urea      Anesthesia Evaluation    Angelique De La Cruz • EGD     • IR PORT A CATH INSERTION EXCHNGE CHECK  2018   • LITHOTRIPSY      x 2   • LYSIS OF ADHESIONS     • NEEDLE BIOPSY LIVER  2016   • OTHER      dialysis catheter- left chest for plasmaphoresis   • OTHER SURGICAL HISTORY       x 2

## 2021-10-08 NOTE — ANESTHESIA POSTPROCEDURE EVALUATION
[facility] Noberto Mayans Patient Status:  Hospital Outpatient Surgery   Age/Gender 46year old female MRN [id number]   Location [id number] [place] [address] Attending Vincent Brown MD   Hosp Day # 0 PCP MD Jose Miguel Jhonson [facility]

## 2021-10-08 NOTE — OPERATIVE REPORT
EGD operative report  Patient Name: Jordan Wallis  Procedure: Esophagogastroduodenoscopy with endoscopic mucosal resection  Date of procedure: 10/8/2021    Indication: Gastric polyp  Attending: Bernardo Naik M.D.   Consent:  The risks, benefits, and al and angularis were normal, without masses, ulcers, erosions, diverticula, or varices. In the antrum, along the lesser curvature, was a 1 cm inflammatory polyp.   After lifting with ORISE, snare cautery resection was performed, using the 13 mm hexagonal sna

## 2021-10-09 ENCOUNTER — CASE MANAGEMENT (OUTPATIENT)
Dept: CARE COORDINATION | Age: 51
End: 2021-10-09

## 2021-10-10 ENCOUNTER — IMMUNIZATION (OUTPATIENT)
Dept: LAB | Facility: HOSPITAL | Age: 51
End: 2021-10-10
Attending: EMERGENCY MEDICINE
Payer: MEDICARE

## 2021-10-10 DIAGNOSIS — Z23 NEED FOR VACCINATION: Primary | ICD-10-CM

## 2021-10-10 PROCEDURE — 0013A SARSCOV2 VAC 100MCG/0.5ML IM: CPT

## 2021-10-11 ENCOUNTER — APPOINTMENT (OUTPATIENT)
Dept: CARDIOLOGY | Age: 51
End: 2021-10-11

## 2021-10-13 ENCOUNTER — CASE MANAGEMENT (OUTPATIENT)
Dept: CARE COORDINATION | Age: 51
End: 2021-10-13

## 2021-10-21 NOTE — PROGRESS NOTES
[facility] Pharmacy Note:  Renal Dose Adjustment for Metoclopramide (REGLAN)    Nate Wakefield has been prescribed Metoclopramide (REGLAN) 10 mg oral 3 times daily before meals. Estimated Creatinine Clearance: 36 mL/min (A) (based on SCr of 1.73 mg/dL (H)). denies pain/discomfort

## 2021-10-30 ENCOUNTER — CASE MANAGEMENT (OUTPATIENT)
Dept: CARE COORDINATION | Age: 51
End: 2021-10-30

## 2021-10-30 NOTE — CONSULTS
[facility]  Report of Consultation    Nicole Rooney Patient Status:  Inpatient    3/11/1970 MRN [id number]   [facility] 4SW-A Attending Julieth Ponce MD   Hosp Day # 1 PCP Mainor Riley MD     Reason for Consultation: Intensive sclerosis previously treated with plasma exchange  · Essential hypertension  · Hyperlipidemia  · GERD/reflux  · MOI-patient refuses therapy  · Status post total thyroidectomy/iatrogenic hypothyroidism  · Woody/fatty liver    Past Surgical History:   Procedu (Other) Sister         rectal CA\      reports that she has never smoked. She has never used smokeless tobacco. She reports that she does not drink alcohol or use drugs.     Medications:  omeprazole 20 MG Oral Capsule Delayed Release, Take 20 mg by mouth 2 Take 100 mg by mouth 2 (two) times daily. , Disp: , Rfl: , Past Week at 2000  Levothyroxine Sodium 88 MCG Oral Tab, Take 88 mcg by mouth daily. , Disp: , Rfl: , Past Week at 0900  ferrous sulfate 325 (65 FE) MG Oral Tab EC, Take 325 mg by mouth 2 (two) times normal.   Throat: Lips, mucosa, and tongue normal.  No thrush noted. Neck: Soft, supple neck; trachea midline, no adenopathy, no thyromegaly. Lungs: Clear to auscultation   Chest wall: No tenderness or deformity.    Heart: Regular rate and rhythm, neo trait  Essential hypertension  Hyperlipidemia  GERD/reflux  MOI-patient refuses therapy  Status post total thyroidectomy/iatrogenic hypothyroidism  Woody/fatty liver    Plan: Continue diabetic/insulin protocol. Follow serial renal function studies.     Lissett Vigil all other ROS negative except as per HPI

## 2021-11-03 ENCOUNTER — CASE MANAGEMENT (OUTPATIENT)
Dept: CARE COORDINATION | Age: 51
End: 2021-11-03

## 2021-11-04 ENCOUNTER — TELEPHONE (OUTPATIENT)
Dept: NEUROLOGY | Facility: CLINIC | Age: 51
End: 2021-11-04

## 2021-11-04 ENCOUNTER — CASE MANAGEMENT (OUTPATIENT)
Dept: CARE COORDINATION | Age: 51
End: 2021-11-04

## 2021-11-04 NOTE — TELEPHONE ENCOUNTER
Tysabri infusion record received from [facility] infusion [place] for physician review. No signs/symptoms of infusion reaction noted. Patient infused on 11/4/2021. Stable during the post infusion observation period.   Lot Number [id number] x1, Exp 08/2024  No new o

## 2021-11-10 ENCOUNTER — TELEPHONE (OUTPATIENT)
Dept: NEUROLOGY | Facility: CLINIC | Age: 51
End: 2021-11-10

## 2021-11-10 NOTE — TELEPHONE ENCOUNTER
Home health certification and plan of care received from [facility] home [facility] for patient. Patient is seen weekly x 1 week, then 1 weekly every 2 weeks for 8 weeks. Patient is considered homebound.  Takes a taxing effort to leave the home due to me

## 2021-11-30 ENCOUNTER — ORDER TRANSCRIPTION (OUTPATIENT)
Dept: ADMINISTRATIVE | Facility: HOSPITAL | Age: 51
End: 2021-11-30

## 2021-11-30 DIAGNOSIS — Z01.818 PREOP EXAMINATION: Primary | ICD-10-CM

## 2021-11-30 DIAGNOSIS — Z11.59 ENCOUNTER FOR SCREENING FOR VIRAL DISEASE: ICD-10-CM

## 2021-12-07 ENCOUNTER — HOSPITAL ENCOUNTER (OUTPATIENT)
Dept: GENERAL RADIOLOGY | Facility: HOSPITAL | Age: 51
Discharge: HOME | End: 2021-12-07
Attending: INTERNAL MEDICINE
Payer: MEDICARE

## 2021-12-07 ENCOUNTER — HOSPITAL ENCOUNTER (OUTPATIENT)
Dept: GENERAL RADIOLOGY | Facility: HOSPITAL | Age: 51
Discharge: HOME | End: 2021-12-07
Attending: HOSPITALIST
Payer: MEDICARE

## 2021-12-07 DIAGNOSIS — M54.9 MID BACK PAIN: ICD-10-CM

## 2021-12-07 DIAGNOSIS — R61 DIAPHORESIS: ICD-10-CM

## 2021-12-07 DIAGNOSIS — I27.21 PULMONARY ARTERIAL HYPERTENSION (HCC): ICD-10-CM

## 2021-12-07 DIAGNOSIS — E10.65 CONTROLLED TYPE 1 DIABETES MELLITUS WITH HYPERGLYCEMIA (HCC): ICD-10-CM

## 2021-12-07 DIAGNOSIS — R10.13 EPIGASTRIC PAIN: ICD-10-CM

## 2021-12-07 DIAGNOSIS — E21.3 HYPERPARATHYROIDISM (HCC): ICD-10-CM

## 2021-12-07 PROCEDURE — 84443 ASSAY THYROID STIM HORMONE: CPT

## 2021-12-07 PROCEDURE — 80053 COMPREHEN METABOLIC PANEL: CPT

## 2021-12-07 PROCEDURE — 80061 LIPID PANEL: CPT

## 2021-12-07 PROCEDURE — 71046 X-RAY EXAM CHEST 2 VIEWS: CPT | Performed by: HOSPITALIST

## 2021-12-07 PROCEDURE — 85025 COMPLETE CBC W/AUTO DIFF WBC: CPT

## 2021-12-07 PROCEDURE — 80061 LIPID PANEL: CPT | Performed by: HOSPITALIST

## 2021-12-07 PROCEDURE — 83690 ASSAY OF LIPASE: CPT

## 2021-12-07 PROCEDURE — 83036 HEMOGLOBIN GLYCOSYLATED A1C: CPT

## 2021-12-08 ENCOUNTER — APPOINTMENT (OUTPATIENT)
Dept: CV DIAGNOSTICS | Facility: HOSPITAL | Age: 51
End: 2021-12-08
Attending: INTERNAL MEDICINE
Payer: MEDICARE

## 2021-12-08 ENCOUNTER — APPOINTMENT (OUTPATIENT)
Dept: ULTRASOUND IMAGING | Facility: HOSPITAL | Age: 51
End: 2021-12-08
Attending: INTERNAL MEDICINE
Payer: MEDICARE

## 2021-12-08 ENCOUNTER — APPOINTMENT (OUTPATIENT)
Dept: GENERAL RADIOLOGY | Facility: HOSPITAL | Age: 51
End: 2021-12-08
Attending: EMERGENCY MEDICINE
Payer: MEDICARE

## 2021-12-08 ENCOUNTER — HOSPITAL ENCOUNTER (OUTPATIENT)
Facility: HOSPITAL | Age: 51
Setting detail: OBSERVATION
LOS: 1 days | Discharge: HOME | End: 2021-12-09
Attending: EMERGENCY MEDICINE | Admitting: HOSPITALIST
Payer: MEDICARE

## 2021-12-08 ENCOUNTER — APPOINTMENT (OUTPATIENT)
Dept: CT IMAGING | Facility: HOSPITAL | Age: 51
End: 2021-12-08
Attending: EMERGENCY MEDICINE
Payer: MEDICARE

## 2021-12-08 DIAGNOSIS — I26.99 PULMONARY EMBOLISM ON LEFT (HCC): ICD-10-CM

## 2021-12-08 DIAGNOSIS — I26.93 SINGLE SUBSEGMENTAL PULMONARY EMBOLISM WITHOUT ACUTE COR PULMONALE (HCC): Primary | ICD-10-CM

## 2021-12-08 PROCEDURE — 93306 TTE W/DOPPLER COMPLETE: CPT | Performed by: INTERNAL MEDICINE

## 2021-12-08 PROCEDURE — 99223 1ST HOSP IP/OBS HIGH 75: CPT | Performed by: INTERNAL MEDICINE

## 2021-12-08 PROCEDURE — 71045 X-RAY EXAM CHEST 1 VIEW: CPT | Performed by: EMERGENCY MEDICINE

## 2021-12-08 PROCEDURE — 71275 CT ANGIOGRAPHY CHEST: CPT | Performed by: EMERGENCY MEDICINE

## 2021-12-08 PROCEDURE — 93970 EXTREMITY STUDY: CPT | Performed by: INTERNAL MEDICINE

## 2021-12-08 PROCEDURE — 99220 INITIAL OBSERVATION CARE,LEVL III: CPT | Performed by: INTERNAL MEDICINE

## 2021-12-08 RX ORDER — HEPARIN SODIUM AND DEXTROSE 10000; 5 [USP'U]/100ML; G/100ML
18 INJECTION INTRAVENOUS ONCE
Status: DISCONTINUED | OUTPATIENT
Start: 2021-12-08 | End: 2021-12-09

## 2021-12-08 RX ORDER — DEXTROSE MONOHYDRATE 25 G/50ML
50 INJECTION, SOLUTION INTRAVENOUS
Status: DISCONTINUED | OUTPATIENT
Start: 2021-12-08 | End: 2021-12-09

## 2021-12-08 RX ORDER — HYDROMORPHONE HYDROCHLORIDE 1 MG/ML
0.2 INJECTION, SOLUTION INTRAMUSCULAR; INTRAVENOUS; SUBCUTANEOUS EVERY 2 HOUR PRN
Status: DISCONTINUED | OUTPATIENT
Start: 2021-12-08 | End: 2021-12-09

## 2021-12-08 RX ORDER — PREGABALIN 100 MG/1
200 CAPSULE ORAL 2 TIMES DAILY
Status: DISCONTINUED | OUTPATIENT
Start: 2021-12-08 | End: 2021-12-09

## 2021-12-08 RX ORDER — SENNOSIDES 8.6 MG
17.2 TABLET ORAL NIGHTLY PRN
Status: DISCONTINUED | OUTPATIENT
Start: 2021-12-08 | End: 2021-12-09

## 2021-12-08 RX ORDER — HEPARIN SODIUM 5000 [USP'U]/ML
80 INJECTION INTRAVENOUS; SUBCUTANEOUS ONCE
Status: COMPLETED | OUTPATIENT
Start: 2021-12-08 | End: 2021-12-08

## 2021-12-08 RX ORDER — MELATONIN
100 DAILY
Status: DISCONTINUED | OUTPATIENT
Start: 2021-12-08 | End: 2021-12-09

## 2021-12-08 RX ORDER — HYDROMORPHONE HYDROCHLORIDE 1 MG/ML
0.4 INJECTION, SOLUTION INTRAMUSCULAR; INTRAVENOUS; SUBCUTANEOUS EVERY 2 HOUR PRN
Status: DISCONTINUED | OUTPATIENT
Start: 2021-12-08 | End: 2021-12-09

## 2021-12-08 RX ORDER — ACETAMINOPHEN 325 MG/1
650 TABLET ORAL EVERY 4 HOURS PRN
Status: DISCONTINUED | OUTPATIENT
Start: 2021-12-08 | End: 2021-12-09

## 2021-12-08 RX ORDER — PROCHLORPERAZINE EDISYLATE 5 MG/ML
5 INJECTION INTRAMUSCULAR; INTRAVENOUS EVERY 8 HOURS PRN
Status: DISCONTINUED | OUTPATIENT
Start: 2021-12-08 | End: 2021-12-09

## 2021-12-08 RX ORDER — NORTRIPTYLINE HYDROCHLORIDE 25 MG/1
25 CAPSULE ORAL NIGHTLY
Status: DISCONTINUED | OUTPATIENT
Start: 2021-12-08 | End: 2021-12-09

## 2021-12-08 RX ORDER — HYDROCODONE BITARTRATE AND ACETAMINOPHEN 5; 325 MG/1; MG/1
1 TABLET ORAL EVERY 4 HOURS PRN
Status: DISCONTINUED | OUTPATIENT
Start: 2021-12-08 | End: 2021-12-09

## 2021-12-08 RX ORDER — PANTOPRAZOLE SODIUM 20 MG/1
20 TABLET, DELAYED RELEASE ORAL
Status: DISCONTINUED | OUTPATIENT
Start: 2021-12-08 | End: 2021-12-09

## 2021-12-08 RX ORDER — ALBUTEROL SULFATE 90 UG/1
2 AEROSOL, METERED RESPIRATORY (INHALATION) EVERY 6 HOURS PRN
Status: DISCONTINUED | OUTPATIENT
Start: 2021-12-08 | End: 2021-12-09

## 2021-12-08 RX ORDER — POLYETHYLENE GLYCOL 3350 17 G/17G
17 POWDER, FOR SOLUTION ORAL DAILY PRN
Status: DISCONTINUED | OUTPATIENT
Start: 2021-12-08 | End: 2021-12-09

## 2021-12-08 RX ORDER — HEPARIN SODIUM AND DEXTROSE 10000; 5 [USP'U]/100ML; G/100ML
INJECTION INTRAVENOUS CONTINUOUS
Status: DISCONTINUED | OUTPATIENT
Start: 2021-12-08 | End: 2021-12-09

## 2021-12-08 RX ORDER — ONDANSETRON 2 MG/ML
4 INJECTION INTRAMUSCULAR; INTRAVENOUS EVERY 6 HOURS PRN
Status: DISCONTINUED | OUTPATIENT
Start: 2021-12-08 | End: 2021-12-09

## 2021-12-08 RX ORDER — CYCLOBENZAPRINE HCL 5 MG
5 TABLET ORAL 3 TIMES DAILY PRN
Status: DISCONTINUED | OUTPATIENT
Start: 2021-12-08 | End: 2021-12-09

## 2021-12-08 RX ORDER — MELATONIN
3 NIGHTLY PRN
Status: DISCONTINUED | OUTPATIENT
Start: 2021-12-08 | End: 2021-12-09

## 2021-12-08 RX ORDER — CARVEDILOL 3.12 MG/1
3.12 TABLET ORAL 2 TIMES DAILY WITH MEALS
Status: DISCONTINUED | OUTPATIENT
Start: 2021-12-08 | End: 2021-12-09

## 2021-12-08 RX ORDER — HYDROCODONE BITARTRATE AND ACETAMINOPHEN 5; 325 MG/1; MG/1
2 TABLET ORAL EVERY 4 HOURS PRN
Status: DISCONTINUED | OUTPATIENT
Start: 2021-12-08 | End: 2021-12-09

## 2021-12-08 RX ORDER — MONTELUKAST SODIUM 10 MG/1
10 TABLET ORAL NIGHTLY
Status: DISCONTINUED | OUTPATIENT
Start: 2021-12-08 | End: 2021-12-09

## 2021-12-08 RX ORDER — HYDROMORPHONE HYDROCHLORIDE 1 MG/ML
0.8 INJECTION, SOLUTION INTRAMUSCULAR; INTRAVENOUS; SUBCUTANEOUS EVERY 2 HOUR PRN
Status: DISCONTINUED | OUTPATIENT
Start: 2021-12-08 | End: 2021-12-09

## 2021-12-08 RX ORDER — ASPIRIN 81 MG/1
81 TABLET ORAL DAILY
Status: DISCONTINUED | OUTPATIENT
Start: 2021-12-08 | End: 2021-12-08

## 2021-12-08 RX ORDER — LEVOTHYROXINE SODIUM 88 UG/1
88 TABLET ORAL
Status: DISCONTINUED | OUTPATIENT
Start: 2021-12-08 | End: 2021-12-09

## 2021-12-08 RX ORDER — DULOXETIN HYDROCHLORIDE 20 MG/1
10 CAPSULE, DELAYED RELEASE ORAL 2 TIMES DAILY
COMMUNITY

## 2021-12-08 NOTE — DIETARY NOTE
[address] [facility]     Admitting diagnosis:  Single subsegmental pulmonary embolism without acute cor pulmonale ([facility] [I26.93]    Ht:  5'6\"  Wt: 76.9 kg (169 lb 9.6 oz). Body mass index is 27.37 kg/m².   IBW: 59.1 kg

## 2021-12-08 NOTE — ED PROVIDER NOTES
Patient Seen in: [facility] Emergency Department      History   Patient presents with:  Chest Pain Angina    Stated Complaint: c/o CP    Subjective:   Patient is a 29-year-old female presents today with chest pain has been going on for a week she wa Constipation    • Decorative tattoo    • Diabetes mellitus (HCC)    • Diarrhea, unspecified    • Dizziness    • Easy bruising    • Elevated liver enzymes    • Endocrine disorder    • Esophageal reflux    • Extrinsic asthma, unspecified    • Fatigue    • Fi Location: [facility] ENDOSCOPY   • EGD     • IR PORT A CATH INSERTION EXCHNGE CHECK  09/12/2018   • LITHOTRIPSY      x 2   • LYSIS OF ADHESIONS     • NEEDLE BIOPSY LIVER  2016   • OTHER      dialysis catheter- left chest for plasmaphoresis   • OTHER SURGICAL HISTO distress. Appearance: She is well-developed. She is obese. She is not ill-appearing or toxic-appearing. HENT:      Head: Normocephalic and atraumatic. Cardiovascular:      Rate and Rhythm: Normal rate and regular rhythm.       Heart sounds: Normal h Abnormal            Final result                 Please view results for these tests on the individual orders.    RAINBOW DRAW LAVENDER   RAINBOW DRAW LIGHT GREEN   RAINBOW DRAW BLUE   RAINBOW DRAW GOLD   RAPID SARS-COV-2 BY PCR     EKG    Rate, inte 11/30/2021          ICD-10-CM Noted POA    Single subsegmental pulmonary embolism without acute cor pulmonale ([facility] I26.93 12/8/2021 Unknown

## 2021-12-08 NOTE — PROGRESS NOTES
NURSING ADMISSION NOTE      Patient admitted via Cart  Oriented to room. Safety precautions initiated. Bed in low position. Call light in reach. Pt received from the ED. Alert, oriented x4. On RA. Heparin gtt infusing.    Reports chest pain a

## 2021-12-08 NOTE — H&P
[facility] HOSPITALIST  History and Physical     Gely Sanon Patient Status:  Emergency    3/11/1970 MRN [id number]   Location [address] [facility] [address] Attending Patt Resendiz, [address] [facility] Day # 0 PCP Porfirio Fernandez MD     Chief Complaint: Letha Bue disorder    • Heart attack ([facility] [address] 2015   • Heart palpitations 2017   • Hemorrhoids    • Herniation of cervical intervertebral disc    • High blood pressure    • High cholesterol    • History of depression    • Hoarseness, chronic    • IBS (irritable bowel s ENDOSCOPY PERFORMED  May 2014     Social History:  reports that she has never smoked. She has never used smokeless tobacco. She reports that she does not drink alcohol and does not use drugs.     Family History:   Family History   Problem Relation Age of On hour before breakfast and dinner., Disp: 60 capsule, Rfl: 2  Nortriptyline HCl 25 MG Oral Cap, Take 1 capsule (25 mg total) by mouth nightly.  Take one tab nightly for 1 week, if tolerating well increase to 2 tabs at bedtime, Disp: 60 capsule, Rfl: 0  ced Subcutaneous Solution, Inject 60 mg into the skin every 6 (six) months., Disp: , Rfl:   Cholecalciferol (VITAMIN D-3) 5000 UNITS Oral Tab, Take 5,000 Units by mouth daily.   , Disp: , Rfl:   Albuterol Sulfate  (90 Base) MCG/ACT Inhalation Aero Soln, Component Value Date    COVID19 Not Detected 12/08/2021    COVID19 Not Detected 10/05/2021    COVID19 Not Detected 08/17/2021     Pro-Calcitonin  No results for input(s): PCT in the last 168 hours.     Cardiac  No results for input(s): TROP, PBNP in the l

## 2021-12-08 NOTE — CONSULTS
Hem/Onc Report of Consultation    Patient Name: Tiana Callejas   YOB: 1970   Medical Record Number: [id number]   CSN: [id number]   Consulting Physician: Dr. Belkis Chavez   Referring Provider(s): Dr. Digna Lopez  Date of Consultation: 12/8/2021     Reason • Elevated liver enzymes    • Endocrine disorder    • Esophageal reflux    • Extrinsic asthma, unspecified    • Fatigue    • Fibromyalgia    • Food intolerance    • Frequent urination    • Frequent UTI    • Glaucoma    • H. pylori infection    • H/O cesa x 2   • LYSIS OF ADHESIONS     • NEEDLE BIOPSY LIVER  2016   • OTHER      dialysis catheter- left chest for plasmaphoresis   • OTHER SURGICAL HISTORY       x 2   • PORT, INDWELLING, IMP     • THYROIDECTOMY  2013    benign MNG   • TOTAL ABDOM Strain: Not on file  Food Insecurity: Not on file  Transportation Needs: Not on file  Physical Activity: Not on file  Stress: Not on file  Social Connections: Not on file  Intimate Partner Violence: Not on file  Housing Stability: Not on file    Allergies: chewable tab 4 tablet, 4 tablet, Oral, Q15 Min PRN   Or  dextrose 50 % injection 50 mL, 50 mL, Intravenous, Q15 Min PRN   Or  glucose (DEX4) oral liquid 30 g, 30 g, Oral, Q15 Min PRN   Or  glucose-vitamin C (DEX-4) chewable tab 8 tablet, 8 tablet, Oral, Q1 every 8 weeks for 6 infusions. omeprazole 20 MG Oral Capsule Delayed Release, Take 1 capsule (20 mg total) by mouth 2 (two) times daily before meals. Please take 1/2 hour before breakfast and dinner.   carvedilol 3.125 MG Oral Tab, Take 3.125 mg by mouth 2 Mirabegron ER 25 MG Oral Tablet 24 Hr, Take 1 tablet (25 mg total) by mouth daily. Review of Systems:  A comprehensive 14 point review of systems was completed. Pertinent positives and negatives noted in the the HPI.     Allergies:  Immune Globulin Q-T Interval 392 ms    QTC Calculation (Bezet) 455 ms    P Axis 69 degrees    R Axis 7 degrees    T Axis 80 degrees   RAINBOW DRAW LAVENDER    Collection Time: 12/08/21 12:35 AM   Result Value Ref Range    Hold Lavender Auto Resulted    RAINBOW DRAW LIGHT .0 150.0 - 450.0 10(3)uL    MCV 75.4 (L) 80.0 - 100.0 fL    MCH 23.1 (L) 26.0 - 34.0 pg    MCHC 30.6 (L) 31.0 - 37.0 g/dL    RDW 15.3 %    Neutrophil Absolute Prelim 4.19 1.50 - 7.70 x10 (3) uL    Neutrophil Absolute 4.19 1.50 - 7.70 x10(3) uL Collection Time: 12/08/21 12:05 PM   Result Value Ref Range    POC Glucose 177 (H) 70 - 99 mg/dL     CBC:    Lab Results   Component Value Date    WBC 8.5 12/08/2021    WBC 9.1 12/08/2021    WBC 10.4 12/07/2021     Lab Results   Component Value Date    HGB Genotype: -a/-a     INTERPRETATION   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  This individual is predicted to   have a single fu started on IV heparin. She reports still feeling dyspneic and with leg pains but no longer with pleuritic chest pain. She also reports history of thalassemia, takes oral iron for possible prior iron deficiency.   She also required blood transfusions for

## 2021-12-08 NOTE — ED INITIAL ASSESSMENT (HPI)
Pt states her lung doctor called her due to abnormal results on blood work and chest xray that was done today. Pt unsure what those results were.  Complains of chest pain that has been there x1 week

## 2021-12-08 NOTE — ED QUICK NOTES
Orders for admission, patient is aware of plan and ready to go upstairs. Any questions, please call ED RN Quan Mckeon  at extension [phone]. Vaccinated?   Type of COVID test sent: Rapid  COVID Suspicion level: Low      Titratable drug(s) infusing: Heparin   Ra

## 2021-12-09 VITALS
OXYGEN SATURATION: 97 % | SYSTOLIC BLOOD PRESSURE: 104 MMHG | HEART RATE: 75 BPM | DIASTOLIC BLOOD PRESSURE: 64 MMHG | RESPIRATION RATE: 18 BRPM | TEMPERATURE: 98 F | BODY MASS INDEX: 27 KG/M2 | WEIGHT: 169.63 LBS

## 2021-12-09 DIAGNOSIS — D50.9 MICROCYTIC ANEMIA: Primary | ICD-10-CM

## 2021-12-09 PROCEDURE — 99232 SBSQ HOSP IP/OBS MODERATE 35: CPT | Performed by: INTERNAL MEDICINE

## 2021-12-09 RX ORDER — NORTRIPTYLINE HYDROCHLORIDE 25 MG/1
25 CAPSULE ORAL NIGHTLY
Qty: 60 CAPSULE | Refills: 0 | Status: SHIPPED | OUTPATIENT
Start: 2021-12-09 | End: 2021-12-17

## 2021-12-09 RX ORDER — HYDROCODONE BITARTRATE AND ACETAMINOPHEN 5; 325 MG/1; MG/1
1-2 TABLET ORAL EVERY 6 HOURS PRN
Qty: 20 TABLET | Refills: 0 | Status: SHIPPED | OUTPATIENT
Start: 2021-12-09 | End: 2021-12-17

## 2021-12-09 NOTE — PLAN OF CARE
NURSING DISCHARGE NOTE    Discharged Home via Wheelchair. Accompanied by Family member  Belongings Taken by patient/family.   Monet deaccessed, tele dc'd  Medication changes, follow up appointments, and DC instructions reviewed  Eliquis education provid

## 2021-12-09 NOTE — CM/SW NOTE
Patient failed inpatient criteria. Second level of review completed and supports observation. UR committee in agreement. Francisco HAMEED RN discussed with Dr. Riccardo Sexton who approves observation status. Observation letter given to the patient and order written.

## 2021-12-09 NOTE — DISCHARGE SUMMARY
[facility] HOSPITAL[facility]  DISCHARGE SUMMARY     Elainerosisusie Adhikari Patient Status:  Inpatient    3/11/1970 MRN [id number]   [facility] 7NE-A Attending Hanny Miranda MD   Hosp Day # 1 PCP Johny Quigley MD     Date of Admission: 2021  Date of [facility].  **Certification    Admission date was 12/8/2021. Inpatient stay was shorter than expected.   Patient's Single subsegmental pulmonary embolism without acute cor pulmonale (HCC) was initially serious enough to expect a more lengthy hospitalization as: COREG      Take 3.125 mg by mouth 2 (two) times daily. Refills: 0     Cholecalciferol 125 MCG (5000 UT) Tabs  Commonly known as: VITAMIN D-3      Take 5,000 Units by mouth daily.    Refills: 0     Cymbalta 20 MG Cpep  Generic drug: DULoxetine      Hang Daniel by mouth daily. Quantity: 30 tablet  Refills: 3     Tysabri 300 MG/15ML Conc  Generic drug: natalizumab      Tysabri: Dilute one vial (300 mg/15 ml) in 100 ml of 0.9% Sodium Chloride and infuse over 1 hour every 8 weeks for 6 infusions.    Quantity: 15 mL with you. Without the order, your test may be delayed or postponed. It is important to bring a list of any medications you are taking, including vitamins and supplements.  For a helpful medication card to carry to your medical visits, please visit [email or web address]    Physical Exam:    General: No acute distress. Respiratory: Clear to auscultation bilaterally. No wheezes. No rhonchi. Cardiovascular: S1, S2. Regular rate and rhythm. No murmurs, rubs or gallops. Abdomen: Soft, nontender, nondistended.   Posit

## 2021-12-09 NOTE — PLAN OF CARE
Assumed care at [address] [facility].    A&Ox4, RA, NSR on tele   Denying pain overnight   Heparin gtt infusing at 6.5 ml/hr  Call light within reach    Patient updated on plan of care

## 2021-12-09 NOTE — PLAN OF CARE
Assumed care at 0730  A&Ox4, VSS, NSR on tele  Pt reporting chest pain with inspiration. Some relief with norco  Echo and venous doppler completed  Pt still reporting severe pain to BLE. Hospitalist notified.  Flexeril added and Mg checked  Heparin infusing

## 2021-12-09 NOTE — PROGRESS NOTES
Hematology/Oncology Progress Note    Patient Name: Darwin Roman  Medical Record Number: [id number]    YOB: 1970     Reason for Consultation:  Darwin Roman was seen today for the diagnosis of pulmonary embolism    Interval events: Less lenora in Conde for H. Pylori test kit.     Vital Signs:  Height: --  Weight: 76.9 kg (169 lb 9.6 oz) (12/08 1500)  BSA (Calculated - sq m): --  Pulse: 72 (12/09 0430)  BP: 95/66 (12/09 0430)  Temp: 97.7 °F (36.5 °C) (12/09 0430)  Do Not Use - Resp Rate: --  S in this interval not displayed.      Lab Results   Component Value Date    REITCPERCENT 1.3 12/08/2021     Lab Results   Component Value Date    RETHE 27.4 (L) 12/08/2021     Lab Results   Component Value Date    DARSHANA 598.6 (H) 12/08/2021    DARSHANA 426.3 (H) 07

## 2021-12-09 NOTE — PROGRESS NOTES
12/09/21 0900   Mobility   O2 walk?  Yes   SPO2% on Room Air at Rest 97   SPO2% Ambulation on Room Air 93

## 2021-12-13 ENCOUNTER — PATIENT OUTREACH (OUTPATIENT)
Dept: CASE MANAGEMENT | Age: 51
End: 2021-12-13

## 2021-12-13 NOTE — PROGRESS NOTES
1st Attempt at scheduling       Fran Colon MD Internal Medicine, IP Consult to Primary Care In 1 week  [address] [phone]        Unable to contact patient     Left voice mail for call back

## 2021-12-14 NOTE — PROGRESS NOTES
2nd Attempt at scheduling       Buddy Amos MD Internal Medicine, IP Consult to Primary Care In 1 week  [facility] [phone]        Unable to contact patient     Left voice mail for call back

## 2021-12-16 NOTE — PROGRESS NOTES
3RD Attempt at scheduling       Adryan Neal MD Internal Medicine, IP Consult to Primary Care In 1 week  [facility] [phone]        Unable to contact patient     Left voice mail for call back

## 2021-12-17 ENCOUNTER — OFFICE VISIT (OUTPATIENT)
Dept: NEUROLOGY | Facility: CLINIC | Age: 51
End: 2021-12-17
Payer: MEDICARE

## 2021-12-17 VITALS
RESPIRATION RATE: 16 BRPM | WEIGHT: 169 LBS | HEART RATE: 82 BPM | DIASTOLIC BLOOD PRESSURE: 74 MMHG | SYSTOLIC BLOOD PRESSURE: 126 MMHG | BODY MASS INDEX: 27.16 KG/M2 | HEIGHT: 66 IN

## 2021-12-17 DIAGNOSIS — G35 MS (MULTIPLE SCLEROSIS) (HCC): Primary | ICD-10-CM

## 2021-12-17 DIAGNOSIS — G57.93 NEUROPATHIC PAIN OF BOTH LEGS: ICD-10-CM

## 2021-12-17 DIAGNOSIS — K76.0 FATTY LIVER: ICD-10-CM

## 2021-12-17 DIAGNOSIS — M79.7 FIBROMYALGIA SYNDROME: ICD-10-CM

## 2021-12-17 PROCEDURE — 99214 OFFICE O/P EST MOD 30 MIN: CPT | Performed by: OTHER

## 2021-12-17 NOTE — PROGRESS NOTES
[facility] with [facility]  Inga Jonah  3/11/1970  Primary Care Provider:  Liliana Roque MD    12/17/2021  Accompanied visit:     (x) No.      46year old yo patient being seen for:  MS carvedilol 3.125 MG Oral Tab, Take 3.125 mg by mouth 2 (two) times daily. , Disp: , Rfl:   •  EPINEPHrine 0.15 MG/0.3ML Injection Solution Auto-injector, USE AS DIRECTED FOR LIFE THREATENING REACTION.  CALL 911 IF USED, Disp: , Rfl:   •  Ondansetron HCl (ZO SHORTNESS OF BREATH, OTHER (SEE                            COMMENTS)    Comment:WELTS  Methylprednisolone      SWELLING    Comment:Swelling of neck, throat and tongue             Injection, throat and tongue swelling  Other                   SHORTNESS OF B wait      (x) Discussed potential side effects of any treatment relevant to above. Includes explanation of tests as necessary. Return in about 6 weeks (around 1/28/2022) for Follow up with Sumanth Coyne.       Patient understands that if needed, based on condit

## 2021-12-23 ENCOUNTER — TELEPHONE (OUTPATIENT)
Dept: NEUROLOGY | Facility: CLINIC | Age: 51
End: 2021-12-23

## 2021-12-23 NOTE — TELEPHONE ENCOUNTER
Paperwork received from [facility] for provider signature and review. Patient receives PT at home. Is seen once weekly for 4 weeks. Paperwork faxed with receipt confirmation.

## 2021-12-28 ENCOUNTER — TELEPHONE (OUTPATIENT)
Dept: NEUROLOGY | Facility: CLINIC | Age: 51
End: 2021-12-28

## 2021-12-28 NOTE — TELEPHONE ENCOUNTER
FDA mandated Tysabri reauthorization questionnaire completed online. Patient is JCV negative, Index = 0.15. Last test date 8/5/2022, next test date due Feb 2022. Patient given JCV index request form at last office visit on 12/17/2021. Zhang Flannery   Has completed 15 T

## 2021-12-29 NOTE — TELEPHONE ENCOUNTER
Spoke with pt she only has one more week left in her started pack. Confirmed dosing with pt. Pt does not have follow up until 1/10. Will send RX to pharmacy pt requested.

## 2021-12-30 ENCOUNTER — TELEPHONE (OUTPATIENT)
Dept: NEUROLOGY | Facility: CLINIC | Age: 51
End: 2021-12-30

## 2021-12-30 NOTE — TELEPHONE ENCOUNTER
Tysabri infusion record received from [facility] infusion center in [facility] for physician review. No signs/symptoms of infusion reaction noted. Patient infused on 12/30/2021. Stable during the post infusion observation period.    Lot Number [id number], Exp 03

## 2022-01-03 ENCOUNTER — TELEPHONE (OUTPATIENT)
Dept: NEUROLOGY | Facility: CLINIC | Age: 52
End: 2022-01-03

## 2022-01-03 NOTE — TELEPHONE ENCOUNTER
Received orders from [facility] for signature and return by 1/10/2021. Order description;   Received verbal order from Dr Nila Clark via Sebastián Rene Nurse on 52/7/9983 @ [facility] to re-certify patient from   1/8/2022-3/8/2022 for diagnosis of Multi

## 2022-01-05 ENCOUNTER — TELEPHONE (OUTPATIENT)
Dept: ORTHOPEDICS CLINIC | Facility: CLINIC | Age: 52
End: 2022-01-05

## 2022-01-05 DIAGNOSIS — M25.552 LEFT HIP PAIN: Primary | ICD-10-CM

## 2022-01-05 NOTE — TELEPHONE ENCOUNTER
Future Appointments   Date Time Provider Monet Talavera   2/9/2022 11:40 AM Zeeshan Cates MD [facility]     Patient is coming for LT Hip Avascular Necrosis. Patient states that she took a CT scan last year.  Please advise if additional vi

## 2022-01-05 NOTE — TELEPHONE ENCOUNTER
X-rays ordered for upcoming appointment. called and informed patient to come 15-20 minutes earlier to have this test completed.  Received no answer

## 2022-01-06 ENCOUNTER — TELEPHONE (OUTPATIENT)
Dept: NEUROLOGY | Facility: CLINIC | Age: 52
End: 2022-01-06

## 2022-01-06 NOTE — TELEPHONE ENCOUNTER
RN received from [address] [place] [address]  for physician review and signature. Patient receives SN and PT     Frequency: Weekly,Weekly    Duration: 9 weeks    Plan of Care: Conduct a comprehensive Assessment, Medication Management, Nutrition.  Instruct

## 2022-01-08 LAB
INDEX VALUE: 0.13
JCV ANTIBODY: NEGATIVE

## 2022-01-10 ENCOUNTER — LAB ENCOUNTER (OUTPATIENT)
Dept: LAB | Facility: HOSPITAL | Age: 52
End: 2022-01-10
Attending: INTERNAL MEDICINE
Payer: MEDICARE

## 2022-01-10 ENCOUNTER — OFFICE VISIT (OUTPATIENT)
Dept: HEMATOLOGY/ONCOLOGY | Facility: HOSPITAL | Age: 52
End: 2022-01-10
Attending: INTERNAL MEDICINE
Payer: MEDICARE

## 2022-01-10 VITALS
SYSTOLIC BLOOD PRESSURE: 112 MMHG | DIASTOLIC BLOOD PRESSURE: 72 MMHG | WEIGHT: 171.19 LBS | BODY MASS INDEX: 28 KG/M2 | OXYGEN SATURATION: 94 % | TEMPERATURE: 97 F | RESPIRATION RATE: 18 BRPM | HEART RATE: 81 BPM

## 2022-01-10 DIAGNOSIS — R91.8 PULMONARY INFILTRATE: ICD-10-CM

## 2022-01-10 DIAGNOSIS — I26.99 PULMONARY EMBOLISM ON LEFT (HCC): Primary | ICD-10-CM

## 2022-01-10 DIAGNOSIS — D50.9 MICROCYTIC ANEMIA: ICD-10-CM

## 2022-01-10 DIAGNOSIS — D56.0 ALPHA-THALASSEMIA (HCC): ICD-10-CM

## 2022-01-10 LAB
BASOPHILS # BLD AUTO: 0.06 X10(3) UL (ref 0–0.2)
BASOPHILS NFR BLD AUTO: 0.7 %
EOSINOPHIL # BLD AUTO: 0.03 X10(3) UL (ref 0–0.7)
EOSINOPHIL NFR BLD AUTO: 0.3 %
ERYTHROCYTE [DISTWIDTH] IN BLOOD BY AUTOMATED COUNT: 16.4 %
HCT VFR BLD AUTO: 41.6 %
HGB BLD-MCNC: 12.5 G/DL
IMM GRANULOCYTES # BLD AUTO: 0.04 X10(3) UL (ref 0–1)
IMM GRANULOCYTES NFR BLD: 0.4 %
LYMPHOCYTES # BLD AUTO: 3.94 X10(3) UL (ref 1–4)
LYMPHOCYTES NFR BLD AUTO: 44.3 %
MCH RBC QN AUTO: 22.8 PG (ref 26–34)
MCHC RBC AUTO-ENTMCNC: 30 G/DL (ref 31–37)
MONOCYTES # BLD AUTO: 0.62 X10(3) UL (ref 0.1–1)
MONOCYTES NFR BLD AUTO: 7 %
NEUTROPHILS # BLD AUTO: 4.21 X10 (3) UL (ref 1.5–7.7)
NEUTROPHILS # BLD AUTO: 4.21 X10(3) UL (ref 1.5–7.7)
NEUTROPHILS NFR BLD AUTO: 47.3 %
PLATELET # BLD AUTO: 197 10(3)UL (ref 150–450)
RBC # BLD AUTO: 5.49 X10(6)UL
WBC # BLD AUTO: 8.9 X10(3) UL (ref 4–11)

## 2022-01-10 PROCEDURE — 85025 COMPLETE CBC W/AUTO DIFF WBC: CPT

## 2022-01-10 PROCEDURE — 36415 COLL VENOUS BLD VENIPUNCTURE: CPT

## 2022-01-10 PROCEDURE — 99215 OFFICE O/P EST HI 40 MIN: CPT | Performed by: INTERNAL MEDICINE

## 2022-01-10 NOTE — PROGRESS NOTES
Education Record    Learner:  Patient    Disease / Diagnosis:PE    Barriers / Limitations:  None   Comments:    Method:  Discussion   Comments:    General Topics:  Plan of care reviewed   Comments:    Outcome:  Shows understanding   Comments:PHFU dx with STAN

## 2022-01-25 ENCOUNTER — TELEPHONE (OUTPATIENT)
Dept: NEUROLOGY | Facility: CLINIC | Age: 52
End: 2022-01-25

## 2022-01-25 NOTE — TELEPHONE ENCOUNTER
RN received from [facility] for physician review and signature. Patient receives PT    Frequency: Weekly    Duration: 1x a week    Plan of Care: Improve understanding of measures that will result in decreased fall risk and a safe home environment. Improve safety, and ability with gait, balance, and functional activities. Paperwork signed and faxed with receipt confirmation.

## 2022-01-27 ENCOUNTER — OFFICE VISIT (OUTPATIENT)
Dept: NEUROLOGY | Facility: CLINIC | Age: 52
End: 2022-01-27
Payer: MEDICARE

## 2022-01-27 ENCOUNTER — TELEPHONE (OUTPATIENT)
Dept: NEUROLOGY | Facility: CLINIC | Age: 52
End: 2022-01-27

## 2022-01-27 VITALS
SYSTOLIC BLOOD PRESSURE: 100 MMHG | DIASTOLIC BLOOD PRESSURE: 72 MMHG | RESPIRATION RATE: 16 BRPM | WEIGHT: 169 LBS | BODY MASS INDEX: 27.16 KG/M2 | HEIGHT: 66 IN | HEART RATE: 77 BPM

## 2022-01-27 DIAGNOSIS — G35 MS (MULTIPLE SCLEROSIS) (HCC): ICD-10-CM

## 2022-01-27 DIAGNOSIS — G35 MS (MULTIPLE SCLEROSIS) (HCC): Primary | ICD-10-CM

## 2022-01-27 DIAGNOSIS — G57.93 NEUROPATHIC PAIN OF BOTH LEGS: ICD-10-CM

## 2022-01-27 PROCEDURE — 99213 OFFICE O/P EST LOW 20 MIN: CPT | Performed by: PHYSICIAN ASSISTANT

## 2022-01-27 RX ORDER — NATALIZUMAB 300 MG/15ML
INJECTION INTRAVENOUS
Qty: 15 ML | Refills: 5 | Status: SHIPPED
Start: 2022-01-27

## 2022-01-27 NOTE — TELEPHONE ENCOUNTER
Patient with follow up appointment today. Provider would like patient to change her Tysabri infusions to every 6 weeks. Patient instructed to call and schedule her next infusions for 6 weeks which would be around 2/14/2022.     Orders to be sent to [facility]

## 2022-01-27 NOTE — PROGRESS NOTES
[facility] Rochelle Bernheim  3/11/1970  Primary Care Provider:  Karyna Barajas MD    1/27/2022  Accompanied visit: No  Previous visit and existing record notes reviewed in prepara 2  •  DULoxetine 20 MG Oral Cap DR Particles, Take 10 mg by mouth 2 (two) times daily. , Disp: , Rfl:   •  Insulin Lispro (HUMALOG) 100 UNIT/ML Subcutaneous Solution, INJECT UP TO 70 UNITS VIA INSULIN PUMP DAILY, Disp: 70 mL, Rfl: 2  •  omeprazole 20 MG Ora Subcutaneous Solution, Inject 60 mg into the skin every 6 (six) months., Disp: , Rfl:   •  Cholecalciferol (VITAMIN D-3) 5000 UNITS Oral Tab, Take 5,000 Units by mouth daily.   , Disp: , Rfl:   •  Albuterol Sulfate  (90 Base) MCG/ACT Inhalation Aero visit: [facility]  (primary encounter diagnosis)      Discussion plus Diagnostics & Treatment Orders:    Patient is a 46year old female that we follow for MS.  She is unable to do solu-medrol or IVIG for MS flare-ups due to severe blu

## 2022-02-03 ENCOUNTER — HOSPITAL ENCOUNTER (EMERGENCY)
Facility: HOSPITAL | Age: 52
Discharge: HOME | End: 2022-02-03
Attending: EMERGENCY MEDICINE
Payer: MEDICARE

## 2022-02-03 ENCOUNTER — APPOINTMENT (OUTPATIENT)
Dept: GENERAL RADIOLOGY | Facility: HOSPITAL | Age: 52
End: 2022-02-03
Attending: EMERGENCY MEDICINE
Payer: MEDICARE

## 2022-02-03 VITALS
TEMPERATURE: 97 F | SYSTOLIC BLOOD PRESSURE: 107 MMHG | OXYGEN SATURATION: 91 % | HEART RATE: 74 BPM | RESPIRATION RATE: 16 BRPM | DIASTOLIC BLOOD PRESSURE: 70 MMHG

## 2022-02-03 DIAGNOSIS — R07.89 ATYPICAL CHEST PAIN: Primary | ICD-10-CM

## 2022-02-03 DIAGNOSIS — R53.83 OTHER FATIGUE: ICD-10-CM

## 2022-02-03 LAB
ALBUMIN SERPL-MCNC: 3 G/DL (ref 3.4–5)
ALBUMIN/GLOB SERPL: 0.8 {RATIO} (ref 1–2)
ALP LIVER SERPL-CCNC: 99 U/L
ALT SERPL-CCNC: 87 U/L
ANION GAP SERPL CALC-SCNC: 6 MMOL/L (ref 0–18)
AST SERPL-CCNC: 50 U/L (ref 15–37)
BASOPHILS # BLD AUTO: 0.04 X10(3) UL (ref 0–0.2)
BASOPHILS NFR BLD AUTO: 0.5 %
BILIRUB SERPL-MCNC: 0.3 MG/DL (ref 0.1–2)
BUN BLD-MCNC: 17 MG/DL (ref 7–18)
CALCIUM BLD-MCNC: 8.9 MG/DL (ref 8.5–10.1)
CHLORIDE SERPL-SCNC: 106 MMOL/L (ref 98–112)
CO2 SERPL-SCNC: 29 MMOL/L (ref 21–32)
CREAT BLD-MCNC: 1.13 MG/DL
D DIMER PPP FEU-MCNC: 0.35 UG/ML FEU (ref ?–0.53)
EOSINOPHIL # BLD AUTO: 0.23 X10(3) UL (ref 0–0.7)
EOSINOPHIL NFR BLD AUTO: 2.7 %
ERYTHROCYTE [DISTWIDTH] IN BLOOD BY AUTOMATED COUNT: 16.1 %
GLOBULIN PLAS-MCNC: 3.9 G/DL (ref 2.8–4.4)
HCT VFR BLD AUTO: 37.1 %
HGB BLD-MCNC: 11.4 G/DL
IMM GRANULOCYTES # BLD AUTO: 0.04 X10(3) UL (ref 0–1)
IMM GRANULOCYTES NFR BLD: 0.5 %
LYMPHOCYTES # BLD AUTO: 4.17 X10(3) UL (ref 1–4)
LYMPHOCYTES NFR BLD AUTO: 49.4 %
MCH RBC QN AUTO: 23.2 PG (ref 26–34)
MCHC RBC AUTO-ENTMCNC: 30.7 G/DL (ref 31–37)
MCV RBC AUTO: 75.6 FL
MONOCYTES # BLD AUTO: 0.59 X10(3) UL (ref 0.1–1)
NEUTROPHILS # BLD AUTO: 3.37 X10 (3) UL (ref 1.5–7.7)
NEUTROPHILS # BLD AUTO: 3.37 X10(3) UL (ref 1.5–7.7)
NEUTROPHILS NFR BLD AUTO: 39.9 %
OSMOLALITY SERPL CALC.SUM OF ELEC: 301 MOSM/KG (ref 275–295)
P-R INTERVAL: 128 MS
PLATELET # BLD AUTO: 151 10(3)UL (ref 150–450)
POTASSIUM SERPL-SCNC: 3.9 MMOL/L (ref 3.5–5.1)
PROT SERPL-MCNC: 6.9 G/DL (ref 6.4–8.2)
Q-T INTERVAL: 366 MS
QRS DURATION: 76 MS
QTC CALCULATION (BEZET): 406 MS
R AXIS: 14 DEGREES
RBC # BLD AUTO: 4.91 X10(6)UL
SARS-COV-2 RNA RESP QL NAA+PROBE: NOT DETECTED
SODIUM SERPL-SCNC: 141 MMOL/L (ref 136–145)
T AXIS: 147 DEGREES
TROPONIN I HIGH SENSITIVITY: 9 NG/L
VENTRICULAR RATE: 74 BPM
WBC # BLD AUTO: 8.4 X10(3) UL (ref 4–11)

## 2022-02-03 PROCEDURE — 96374 THER/PROPH/DIAG INJ IV PUSH: CPT | Performed by: EMERGENCY MEDICINE

## 2022-02-03 PROCEDURE — 85379 FIBRIN DEGRADATION QUANT: CPT | Performed by: EMERGENCY MEDICINE

## 2022-02-03 PROCEDURE — 84484 ASSAY OF TROPONIN QUANT: CPT | Performed by: EMERGENCY MEDICINE

## 2022-02-03 PROCEDURE — 71045 X-RAY EXAM CHEST 1 VIEW: CPT | Performed by: EMERGENCY MEDICINE

## 2022-02-03 PROCEDURE — 96375 TX/PRO/DX INJ NEW DRUG ADDON: CPT | Performed by: EMERGENCY MEDICINE

## 2022-02-03 PROCEDURE — 96376 TX/PRO/DX INJ SAME DRUG ADON: CPT | Performed by: EMERGENCY MEDICINE

## 2022-02-03 PROCEDURE — 99284 EMERGENCY DEPT VISIT MOD MDM: CPT | Performed by: EMERGENCY MEDICINE

## 2022-02-03 PROCEDURE — 85025 COMPLETE CBC W/AUTO DIFF WBC: CPT | Performed by: EMERGENCY MEDICINE

## 2022-02-03 PROCEDURE — 93005 ELECTROCARDIOGRAM TRACING: CPT

## 2022-02-03 PROCEDURE — 80053 COMPREHEN METABOLIC PANEL: CPT | Performed by: EMERGENCY MEDICINE

## 2022-02-03 PROCEDURE — 93010 ELECTROCARDIOGRAM REPORT: CPT | Performed by: EMERGENCY MEDICINE

## 2022-02-03 RX ORDER — ONDANSETRON 2 MG/ML
4 INJECTION INTRAMUSCULAR; INTRAVENOUS ONCE
Status: COMPLETED | OUTPATIENT
Start: 2022-02-03 | End: 2022-02-03

## 2022-02-03 RX ORDER — HYDROCODONE BITARTRATE AND ACETAMINOPHEN 5; 325 MG/1; MG/1
1-2 TABLET ORAL EVERY 6 HOURS PRN
Qty: 20 TABLET | Refills: 0 | Status: SHIPPED | OUTPATIENT
Start: 2022-02-03 | End: 2022-02-08

## 2022-02-03 RX ORDER — HYDROMORPHONE HYDROCHLORIDE 1 MG/ML
0.5 INJECTION, SOLUTION INTRAMUSCULAR; INTRAVENOUS; SUBCUTANEOUS EVERY 30 MIN PRN
Status: DISCONTINUED | OUTPATIENT
Start: 2022-02-03 | End: 2022-02-03

## 2022-02-03 NOTE — ED INITIAL ASSESSMENT (HPI)
Pt has chest pain which she has had intermittently since early December. Pain is currently 8/10 and feels like someone is sitting on her chest, but she has shooting pains in her chest when she talks. Reports she was diagnosed with a PE in early December.

## 2022-02-07 NOTE — TELEPHONE ENCOUNTER
RN received a call from [facility] at [address] our office of change in Tysabri observation. Per [employer] if a patient has had more than 12 Tysabri infusions, the patient does not need to be observed for the post hour observation.      [facility] is goi

## 2022-02-09 ENCOUNTER — HOSPITAL ENCOUNTER (OUTPATIENT)
Dept: GENERAL RADIOLOGY | Age: 52
Discharge: HOME | End: 2022-02-09
Attending: ORTHOPAEDIC SURGERY
Payer: MEDICARE

## 2022-02-09 ENCOUNTER — OFFICE VISIT (OUTPATIENT)
Dept: ORTHOPEDICS CLINIC | Facility: CLINIC | Age: 52
End: 2022-02-09
Payer: MEDICARE

## 2022-02-09 VITALS — OXYGEN SATURATION: 99 % | WEIGHT: 169.38 LBS | BODY MASS INDEX: 26.58 KG/M2 | HEART RATE: 77 BPM | HEIGHT: 67 IN

## 2022-02-09 DIAGNOSIS — M16.12 PRIMARY OSTEOARTHRITIS OF LEFT HIP: Primary | ICD-10-CM

## 2022-02-09 DIAGNOSIS — M25.552 LEFT HIP PAIN: ICD-10-CM

## 2022-02-09 PROCEDURE — 99203 OFFICE O/P NEW LOW 30 MIN: CPT | Performed by: ORTHOPAEDIC SURGERY

## 2022-02-09 PROCEDURE — 73502 X-RAY EXAM HIP UNI 2-3 VIEWS: CPT | Performed by: ORTHOPAEDIC SURGERY

## 2022-02-10 ENCOUNTER — TELEPHONE (OUTPATIENT)
Dept: NEUROLOGY | Facility: CLINIC | Age: 52
End: 2022-02-10

## 2022-02-10 RX ORDER — NATALIZUMAB 300 MG/15ML
INJECTION INTRAVENOUS
Qty: 15 ML | Refills: 5 | Status: SHIPPED
Start: 2022-02-10

## 2022-02-10 NOTE — TELEPHONE ENCOUNTER
Tysabri infusion record received from Jeremy Ortiz in [facility] for physician review. No signs/symptoms of infusion reaction noted. Patient infused on 2/10/2022. Stable during the post infusion observation period of 1 hour. Requesting order to have these observation periods discontinued. Lot Number [id number] x1, Exp 10/2025  New orders requested. Orders to discontinue observation period to be faxed to [facility] as requested. Patient infuses every 6 weeks. Next infusion scheduled for 3/24/2022. Placed in physician's box for review. Per new package insert, patients that have received 12 infusions of Tysabri or more, no longer are required to remain at the infusion center for the one hour post infusion observation period. Orders are required to reflect change.

## 2022-03-07 ENCOUNTER — APPOINTMENT (OUTPATIENT)
Dept: HEMATOLOGY/ONCOLOGY | Facility: HOSPITAL | Age: 52
End: 2022-03-07
Attending: INTERNAL MEDICINE
Payer: MEDICARE

## 2022-03-10 ENCOUNTER — TELEPHONE (OUTPATIENT)
Dept: NEUROLOGY | Facility: CLINIC | Age: 52
End: 2022-03-10

## 2022-03-10 ENCOUNTER — HOSPITAL ENCOUNTER (OUTPATIENT)
Dept: CT IMAGING | Facility: HOSPITAL | Age: 52
Discharge: HOME | End: 2022-03-10
Attending: INTERNAL MEDICINE
Payer: MEDICARE

## 2022-03-10 DIAGNOSIS — R91.8 PULMONARY INFILTRATE: ICD-10-CM

## 2022-03-10 LAB — CREAT BLD-MCNC: 0.9 MG/DL

## 2022-03-10 PROCEDURE — 71260 CT THORAX DX C+: CPT | Performed by: INTERNAL MEDICINE

## 2022-03-10 PROCEDURE — 82565 ASSAY OF CREATININE: CPT

## 2022-03-10 RX ORDER — IOHEXOL 350 MG/ML
75 INJECTION, SOLUTION INTRAVENOUS
Status: COMPLETED | OUTPATIENT
Start: 2022-03-10 | End: 2022-03-10

## 2022-03-10 RX ADMIN — IOHEXOL 75 ML: 350 INJECTION, SOLUTION INTRAVENOUS at 15:33:00

## 2022-03-10 NOTE — TELEPHONE ENCOUNTER
Home Health Certification and plan of care received from [facility]ke[facility] for physician review and signature. Patient receives SN services for deficiencies related to MS. Frequency: 1 time per week    Duration: 8 weeks    Plan of Care: Please see attached forms for details on plan of care for patient. Paperwork to scanning. Paperwork signed and faxed with receipt confirmation.

## 2022-03-11 ENCOUNTER — TELEPHONE (OUTPATIENT)
Dept: NEUROLOGY | Facility: CLINIC | Age: 52
End: 2022-03-11

## 2022-03-11 NOTE — TELEPHONE ENCOUNTER
RN received from [facility] for physician review and signature. Patient receives PT    Frequency: Weekly    Duration: 8 weeks    Plan of Care: Improve Safety and ability with gait, balance, and functional activities. Paperwork signed and faxed with receipt confirmation. Paperwork sent to scan.

## 2022-03-11 NOTE — OR NURSING
Spoke to Dr Laurita Serrano office to clarify if he did indeed want pt to be scheduled for PLEX treatments as per Dr Vinay Eddy recommendations as per Dr Vinay Eddy last note.  RN from Dr Laurita Serrano office stated she spoke w/ Dr Herbert Millan and pt is to continue PLEX treatme FAMILY HISTORY:  Father  Still living? Unknown  Family history of diabetes mellitus, Age at diagnosis: Age Unknown  Family history of prostate cancer, Age at diagnosis: Age Unknown    Mother  Still living? Unknown  Family history of hypertension, Age at diagnosis: Age Unknown  Family history of hypertension, Age at diagnosis: Age Unknown

## 2022-03-21 ENCOUNTER — HOSPITAL ENCOUNTER (OUTPATIENT)
Dept: CT IMAGING | Age: 52
Discharge: HOME | End: 2022-03-21
Attending: INTERNAL MEDICINE
Payer: MEDICARE

## 2022-03-21 DIAGNOSIS — R53.81 CHRONIC FATIGUE AND MALAISE: ICD-10-CM

## 2022-03-21 DIAGNOSIS — R42 DIZZINESS: ICD-10-CM

## 2022-03-21 DIAGNOSIS — R06.00 DYSPNEA: ICD-10-CM

## 2022-03-21 DIAGNOSIS — R53.82 CHRONIC FATIGUE AND MALAISE: ICD-10-CM

## 2022-03-21 DIAGNOSIS — R07.1 CHEST PAIN ON RESPIRATION: ICD-10-CM

## 2022-03-21 DIAGNOSIS — I26.99 PULMONARY EMBOLISM (HCC): ICD-10-CM

## 2022-03-23 ENCOUNTER — OFFICE VISIT (OUTPATIENT)
Dept: HEMATOLOGY/ONCOLOGY | Facility: HOSPITAL | Age: 52
End: 2022-03-23
Attending: INTERNAL MEDICINE
Payer: MEDICARE

## 2022-03-23 VITALS
SYSTOLIC BLOOD PRESSURE: 113 MMHG | DIASTOLIC BLOOD PRESSURE: 72 MMHG | WEIGHT: 179 LBS | RESPIRATION RATE: 18 BRPM | BODY MASS INDEX: 28 KG/M2 | TEMPERATURE: 97 F | OXYGEN SATURATION: 97 % | HEART RATE: 79 BPM

## 2022-03-23 DIAGNOSIS — D56.0 ALPHA-THALASSEMIA (HCC): ICD-10-CM

## 2022-03-23 DIAGNOSIS — D50.9 MICROCYTIC ANEMIA: ICD-10-CM

## 2022-03-23 DIAGNOSIS — Z71.89 ENCOUNTER FOR ANTICOAGULATION DISCUSSION AND COUNSELING: ICD-10-CM

## 2022-03-23 DIAGNOSIS — I26.99 PULMONARY EMBOLISM ON LEFT (HCC): Primary | ICD-10-CM

## 2022-03-23 PROBLEM — I26.93 SINGLE SUBSEGMENTAL PULMONARY EMBOLISM WITHOUT ACUTE COR PULMONALE (HCC): Status: RESOLVED | Noted: 2021-12-08 | Resolved: 2022-03-23

## 2022-03-23 LAB
ALBUMIN SERPL-MCNC: 3.3 G/DL (ref 3.4–5)
ALBUMIN/GLOB SERPL: 0.8 {RATIO} (ref 1–2)
ALP LIVER SERPL-CCNC: 118 U/L
ALT SERPL-CCNC: 114 U/L
ANION GAP SERPL CALC-SCNC: 4 MMOL/L (ref 0–18)
AST SERPL-CCNC: 68 U/L (ref 15–37)
BASOPHILS NFR BLD AUTO: 0.5 %
BILIRUB SERPL-MCNC: 0.4 MG/DL (ref 0.1–2)
BUN BLD-MCNC: 10 MG/DL (ref 7–18)
CALCIUM BLD-MCNC: 9.3 MG/DL (ref 8.5–10.1)
CHLORIDE SERPL-SCNC: 108 MMOL/L (ref 98–112)
CO2 SERPL-SCNC: 28 MMOL/L (ref 21–32)
CREAT BLD-MCNC: 1.14 MG/DL
D DIMER PPP FEU-MCNC: 0.37 UG/ML FEU (ref ?–0.54)
EOSINOPHIL # BLD AUTO: 0.27 X10(3) UL (ref 0–0.7)
EOSINOPHIL NFR BLD AUTO: 3.4 %
ERYTHROCYTE [DISTWIDTH] IN BLOOD BY AUTOMATED COUNT: 17.8 %
FASTING STATUS PATIENT QL REPORTED: NO
GLOBULIN PLAS-MCNC: 4 G/DL (ref 2.8–4.4)
GLUCOSE BLD-MCNC: 239 MG/DL (ref 70–99)
HCT VFR BLD AUTO: 40.2 %
HGB BLD-MCNC: 11.9 G/DL
IMM GRANULOCYTES # BLD AUTO: 0.05 X10(3) UL (ref 0–1)
IMM GRANULOCYTES NFR BLD: 0.6 %
LYMPHOCYTES # BLD AUTO: 3.36 X10(3) UL (ref 1–4)
LYMPHOCYTES NFR BLD AUTO: 42.7 %
MCH RBC QN AUTO: 23.2 PG (ref 26–34)
MCHC RBC AUTO-ENTMCNC: 29.6 G/DL (ref 31–37)
MCV RBC AUTO: 78.4 FL
MONOCYTES # BLD AUTO: 0.52 X10(3) UL (ref 0.1–1)
MONOCYTES NFR BLD AUTO: 6.6 %
NEUTROPHILS # BLD AUTO: 3.63 X10 (3) UL (ref 1.5–7.7)
NEUTROPHILS # BLD AUTO: 3.63 X10(3) UL (ref 1.5–7.7)
NEUTROPHILS NFR BLD AUTO: 46.2 %
OSMOLALITY SERPL CALC.SUM OF ELEC: 297 MOSM/KG (ref 275–295)
PLATELET # BLD AUTO: 167 10(3)UL (ref 150–450)
POTASSIUM SERPL-SCNC: 3.7 MMOL/L (ref 3.5–5.1)
PROT SERPL-MCNC: 7.3 G/DL (ref 6.4–8.2)
RBC # BLD AUTO: 5.13 X10(6)UL
SODIUM SERPL-SCNC: 140 MMOL/L (ref 136–145)
WBC # BLD AUTO: 7.9 X10(3) UL (ref 4–11)

## 2022-03-23 PROCEDURE — 99214 OFFICE O/P EST MOD 30 MIN: CPT | Performed by: INTERNAL MEDICINE

## 2022-03-23 RX ORDER — MELOXICAM 7.5 MG/1
7.5 TABLET ORAL DAILY
COMMUNITY
Start: 2022-03-17

## 2022-03-24 ENCOUNTER — TELEPHONE (OUTPATIENT)
Dept: NEUROLOGY | Facility: CLINIC | Age: 52
End: 2022-03-24

## 2022-03-24 NOTE — TELEPHONE ENCOUNTER
Tysabri infusion record received from [facility] center for physician review. No signs/symptoms of infusion reaction noted. Patient infused on 3/24/2022. Patient is no longer required to stay for the post infusion observation period. Lot Number [id number] x 1, Exp 09/2025  No orders requested. Orders active for 5 additional infusions. Patient infuses every 6 weeks. Next infusion scheduled for 5/5/2022. Placed in physician's box for review.

## 2022-04-05 ENCOUNTER — LAB REQUISITION (OUTPATIENT)
Dept: LAB | Facility: HOSPITAL | Age: 52
End: 2022-04-05
Payer: MEDICARE

## 2022-04-05 LAB
BILIRUB UR QL STRIP.AUTO: NEGATIVE
CLARITY UR REFRACT.AUTO: CLEAR
COLOR UR AUTO: YELLOW
GLUCOSE UR STRIP.AUTO-MCNC: >=500 MG/DL
KETONES UR STRIP.AUTO-MCNC: NEGATIVE MG/DL
LEUKOCYTE ESTERASE UR QL STRIP.AUTO: NEGATIVE
NITRITE UR QL STRIP.AUTO: NEGATIVE
PH UR STRIP.AUTO: 6 [PH] (ref 5–8)
PROT UR STRIP.AUTO-MCNC: NEGATIVE MG/DL
RBC UR QL AUTO: NEGATIVE
SP GR UR STRIP.AUTO: 1.01 (ref 1–1.03)
UROBILINOGEN UR STRIP.AUTO-MCNC: <2 MG/DL

## 2022-04-05 PROCEDURE — 81003 URINALYSIS AUTO W/O SCOPE: CPT | Performed by: SPECIALIST

## 2022-04-05 PROCEDURE — 87086 URINE CULTURE/COLONY COUNT: CPT | Performed by: SPECIALIST

## 2022-04-19 ENCOUNTER — TELEPHONE (OUTPATIENT)
Dept: NEUROLOGY | Facility: CLINIC | Age: 52
End: 2022-04-19

## 2022-04-19 NOTE — TELEPHONE ENCOUNTER
Karie Fleming Abts office visit note dated 43/19/22. Placed in Dr. Jessy Villarreal for review.   Sent to scanning on 4/19/22

## 2022-04-20 ENCOUNTER — OFFICE VISIT (OUTPATIENT)
Dept: NEPHROLOGY | Facility: CLINIC | Age: 52
End: 2022-04-20
Payer: MEDICARE

## 2022-04-20 VITALS — DIASTOLIC BLOOD PRESSURE: 78 MMHG | SYSTOLIC BLOOD PRESSURE: 122 MMHG | BODY MASS INDEX: 28 KG/M2 | WEIGHT: 176 LBS

## 2022-04-20 DIAGNOSIS — N20.0 NEPHROLITHIASIS: ICD-10-CM

## 2022-04-20 DIAGNOSIS — R79.89 ELEVATED SERUM CREATININE: Primary | ICD-10-CM

## 2022-04-20 DIAGNOSIS — N28.1 RENAL CYST: ICD-10-CM

## 2022-04-20 DIAGNOSIS — R10.2 SUPRAPUBIC PAIN: ICD-10-CM

## 2022-04-28 ENCOUNTER — OFFICE VISIT (OUTPATIENT)
Dept: NEUROLOGY | Facility: CLINIC | Age: 52
End: 2022-04-28
Payer: MEDICARE

## 2022-04-28 VITALS
DIASTOLIC BLOOD PRESSURE: 74 MMHG | HEART RATE: 83 BPM | SYSTOLIC BLOOD PRESSURE: 125 MMHG | WEIGHT: 176 LBS | RESPIRATION RATE: 16 BRPM | BODY MASS INDEX: 27.95 KG/M2 | HEIGHT: 66.5 IN

## 2022-04-28 DIAGNOSIS — G57.93 NEUROPATHIC PAIN OF BOTH LEGS: ICD-10-CM

## 2022-04-28 DIAGNOSIS — G35 MS (MULTIPLE SCLEROSIS) (HCC): Primary | ICD-10-CM

## 2022-04-28 DIAGNOSIS — R13.12 OROPHARYNGEAL DYSPHAGIA: ICD-10-CM

## 2022-04-28 PROCEDURE — 99214 OFFICE O/P EST MOD 30 MIN: CPT | Performed by: OTHER

## 2022-05-03 ENCOUNTER — TELEPHONE (OUTPATIENT)
Dept: NEUROLOGY | Facility: CLINIC | Age: 52
End: 2022-05-03

## 2022-05-03 NOTE — TELEPHONE ENCOUNTER
RN received the signed paperwork from the provider and faxed to [facility] at [phone]. Fax confirmation received. Paperwork sent to scan.

## 2022-05-05 ENCOUNTER — TELEPHONE (OUTPATIENT)
Dept: NEUROLOGY | Facility: CLINIC | Age: 52
End: 2022-05-05

## 2022-05-05 NOTE — TELEPHONE ENCOUNTER
Tysabri infusion record received from [address] [address] for physician review. No signs/symptoms of infusion reaction noted. Patient infused on 5/5/2022. No longer required to stay for the 1 hour post infusion observation period. Lot Number [id number] x1, Exp 10/2025  No new orders requested. Orders active for 4 additional infusions. Next infusion scheduled for 6/16/2022. Patient infuses every 6 weeks. Placed in physician's box for review.

## 2022-05-08 ENCOUNTER — HOSPITAL ENCOUNTER (OUTPATIENT)
Dept: MRI IMAGING | Age: 52
End: 2022-05-08
Attending: Other
Payer: MEDICARE

## 2022-05-08 ENCOUNTER — HOSPITAL ENCOUNTER (OUTPATIENT)
Dept: MRI IMAGING | Age: 52
Discharge: HOME | End: 2022-05-08
Attending: Other
Payer: MEDICARE

## 2022-05-08 DIAGNOSIS — G35 MS (MULTIPLE SCLEROSIS) (HCC): ICD-10-CM

## 2022-05-08 PROCEDURE — 70553 MRI BRAIN STEM W/O & W/DYE: CPT | Performed by: OTHER

## 2022-05-08 PROCEDURE — A9575 INJ GADOTERATE MEGLUMI 0.1ML: HCPCS | Performed by: OTHER

## 2022-05-09 ENCOUNTER — TELEPHONE (OUTPATIENT)
Dept: NEUROLOGY | Facility: CLINIC | Age: 52
End: 2022-05-09

## 2022-05-09 NOTE — TELEPHONE ENCOUNTER
[facility] home health certification and plan of care dated 5/8/22 to 7/6/22.  Placed in nurse tray for completion

## 2022-05-13 NOTE — TELEPHONE ENCOUNTER
Signed and dated forms faxed to [facility], confirmation rec'd, copy to scan.

## 2022-05-17 ENCOUNTER — ORDER TRANSCRIPTION (OUTPATIENT)
Dept: ADMINISTRATIVE | Facility: HOSPITAL | Age: 52
End: 2022-05-17

## 2022-05-17 DIAGNOSIS — Z11.59 ENCOUNTER FOR SCREENING FOR OTHER VIRAL DISEASES: ICD-10-CM

## 2022-05-17 DIAGNOSIS — Z01.818 PREOP EXAMINATION: Primary | ICD-10-CM

## 2022-05-19 ENCOUNTER — OFFICE VISIT (OUTPATIENT)
Dept: SURGERY | Facility: CLINIC | Age: 52
End: 2022-05-19
Payer: MEDICARE

## 2022-05-19 ENCOUNTER — HOSPITAL ENCOUNTER (EMERGENCY)
Facility: HOSPITAL | Age: 52
Discharge: HOME | End: 2022-05-20
Attending: STUDENT IN AN ORGANIZED HEALTH CARE EDUCATION/TRAINING PROGRAM
Payer: MEDICARE

## 2022-05-19 VITALS
TEMPERATURE: 97 F | HEIGHT: 66 IN | BODY MASS INDEX: 28.28 KG/M2 | HEART RATE: 73 BPM | WEIGHT: 176 LBS | SYSTOLIC BLOOD PRESSURE: 101 MMHG | DIASTOLIC BLOOD PRESSURE: 68 MMHG

## 2022-05-19 DIAGNOSIS — M79.10 MYALGIA: Primary | ICD-10-CM

## 2022-05-19 DIAGNOSIS — K58.2 IRRITABLE BOWEL SYNDROME WITH BOTH CONSTIPATION AND DIARRHEA: ICD-10-CM

## 2022-05-19 DIAGNOSIS — D57.3 SICKLE CELL TRAIT (HCC): ICD-10-CM

## 2022-05-19 DIAGNOSIS — I10 ESSENTIAL HYPERTENSION: ICD-10-CM

## 2022-05-19 DIAGNOSIS — I42.0 DILATED IDIOPATHIC CARDIOMYOPATHY (HCC): ICD-10-CM

## 2022-05-19 DIAGNOSIS — E03.9 HYPOTHYROIDISM, UNSPECIFIED TYPE: ICD-10-CM

## 2022-05-19 DIAGNOSIS — R10.31 RIGHT LOWER QUADRANT PAIN: Primary | ICD-10-CM

## 2022-05-19 DIAGNOSIS — G35 MULTIPLE SCLEROSIS (HCC): ICD-10-CM

## 2022-05-19 DIAGNOSIS — R07.9 CHEST PAIN OF UNCERTAIN ETIOLOGY: ICD-10-CM

## 2022-05-19 DIAGNOSIS — D56.0 ALPHA-THALASSEMIA (HCC): ICD-10-CM

## 2022-05-19 DIAGNOSIS — E10.65 CONTROLLED TYPE 1 DIABETES MELLITUS WITH HYPERGLYCEMIA (HCC): ICD-10-CM

## 2022-05-19 DIAGNOSIS — K66.0 INTRA-ABDOMINAL ADHESIONS: ICD-10-CM

## 2022-05-19 DIAGNOSIS — J45.909 ASTHMA, UNSPECIFIED ASTHMA SEVERITY, UNSPECIFIED WHETHER COMPLICATED, UNSPECIFIED WHETHER PERSISTENT: ICD-10-CM

## 2022-05-19 DIAGNOSIS — K75.81 NASH (NONALCOHOLIC STEATOHEPATITIS): ICD-10-CM

## 2022-05-19 PROCEDURE — 99284 EMERGENCY DEPT VISIT MOD MDM: CPT

## 2022-05-19 PROCEDURE — 93005 ELECTROCARDIOGRAM TRACING: CPT

## 2022-05-19 PROCEDURE — 99205 OFFICE O/P NEW HI 60 MIN: CPT | Performed by: COLON & RECTAL SURGERY

## 2022-05-19 PROCEDURE — 99285 EMERGENCY DEPT VISIT HI MDM: CPT

## 2022-05-19 PROCEDURE — 93010 ELECTROCARDIOGRAM REPORT: CPT

## 2022-05-19 PROCEDURE — 36415 COLL VENOUS BLD VENIPUNCTURE: CPT

## 2022-05-19 RX ORDER — SODIUM, POTASSIUM,MAG SULFATES 17.5-3.13G
SOLUTION, RECONSTITUTED, ORAL ORAL
Qty: 1 EACH | Refills: 0 | Status: SHIPPED | OUTPATIENT
Start: 2022-05-19

## 2022-05-20 ENCOUNTER — APPOINTMENT (OUTPATIENT)
Dept: CT IMAGING | Facility: HOSPITAL | Age: 52
End: 2022-05-20
Attending: STUDENT IN AN ORGANIZED HEALTH CARE EDUCATION/TRAINING PROGRAM
Payer: MEDICARE

## 2022-05-20 ENCOUNTER — APPOINTMENT (OUTPATIENT)
Dept: GENERAL RADIOLOGY | Facility: HOSPITAL | Age: 52
End: 2022-05-20
Attending: STUDENT IN AN ORGANIZED HEALTH CARE EDUCATION/TRAINING PROGRAM
Payer: MEDICARE

## 2022-05-20 VITALS
BODY MASS INDEX: 29 KG/M2 | RESPIRATION RATE: 11 BRPM | DIASTOLIC BLOOD PRESSURE: 67 MMHG | OXYGEN SATURATION: 95 % | TEMPERATURE: 98 F | WEIGHT: 178 LBS | HEART RATE: 65 BPM | SYSTOLIC BLOOD PRESSURE: 97 MMHG

## 2022-05-20 LAB
ALBUMIN SERPL-MCNC: 3.1 G/DL (ref 3.4–5)
ALBUMIN/GLOB SERPL: 0.8 {RATIO} (ref 1–2)
ALP LIVER SERPL-CCNC: 122 U/L
ALT SERPL-CCNC: 115 U/L
ANION GAP SERPL CALC-SCNC: 5 MMOL/L (ref 0–18)
AST SERPL-CCNC: 54 U/L (ref 15–37)
BASOPHILS # BLD: 0 X10(3) UL (ref 0–0.2)
BASOPHILS NFR BLD: 0 %
BILIRUB SERPL-MCNC: 0.3 MG/DL (ref 0.1–2)
BUN BLD-MCNC: 13 MG/DL (ref 7–18)
CALCIUM BLD-MCNC: 8.4 MG/DL (ref 8.5–10.1)
CHLORIDE SERPL-SCNC: 107 MMOL/L (ref 98–112)
CO2 SERPL-SCNC: 29 MMOL/L (ref 21–32)
CREAT BLD-MCNC: 1.35 MG/DL
EOSINOPHIL # BLD: 0.26 X10(3) UL (ref 0–0.7)
EOSINOPHIL NFR BLD: 3 %
ERYTHROCYTE [DISTWIDTH] IN BLOOD BY AUTOMATED COUNT: 15.9 %
GLOBULIN PLAS-MCNC: 3.7 G/DL (ref 2.8–4.4)
GLUCOSE BLD-MCNC: 233 MG/DL (ref 70–99)
HCT VFR BLD AUTO: 35.2 %
HGB BLD-MCNC: 10.6 G/DL
LYMPHOCYTES NFR BLD: 4.34 X10(3) UL (ref 1–4)
LYMPHOCYTES NFR BLD: 51 %
MCH RBC QN AUTO: 23.3 PG (ref 26–34)
MCHC RBC AUTO-ENTMCNC: 30.1 G/DL (ref 31–37)
MCV RBC AUTO: 77.4 FL
MONOCYTES # BLD: 0.43 X10(3) UL (ref 0.1–1)
MONOCYTES NFR BLD: 5 %
NEUTROPHILS # BLD AUTO: 3.23 X10 (3) UL (ref 1.5–7.7)
NEUTROPHILS NFR BLD: 41 %
NEUTS HYPERSEG # BLD: 3.49 X10(3) UL (ref 1.5–7.7)
NRBC BLD MANUAL-RTO: 10 %
NT-PROBNP SERPL-MCNC: 47 PG/ML (ref ?–125)
OSMOLALITY SERPL CALC.SUM OF ELEC: 300 MOSM/KG (ref 275–295)
PLATELET # BLD AUTO: 160 10(3)UL (ref 150–450)
PLATELET MORPHOLOGY: NORMAL
POTASSIUM SERPL-SCNC: 4 MMOL/L (ref 3.5–5.1)
PROT SERPL-MCNC: 6.8 G/DL (ref 6.4–8.2)
RBC # BLD AUTO: 4.55 X10(6)UL
SARS-COV-2 RNA RESP QL NAA+PROBE: NOT DETECTED
SODIUM SERPL-SCNC: 141 MMOL/L (ref 136–145)
TOTAL CELLS COUNTED BLD: 100
TROPONIN I HIGH SENSITIVITY: 10 NG/L
WBC # BLD AUTO: 8.5 X10(3) UL (ref 4–11)

## 2022-05-20 PROCEDURE — 85025 COMPLETE CBC W/AUTO DIFF WBC: CPT | Performed by: STUDENT IN AN ORGANIZED HEALTH CARE EDUCATION/TRAINING PROGRAM

## 2022-05-20 PROCEDURE — 80053 COMPREHEN METABOLIC PANEL: CPT | Performed by: STUDENT IN AN ORGANIZED HEALTH CARE EDUCATION/TRAINING PROGRAM

## 2022-05-20 PROCEDURE — 85007 BL SMEAR W/DIFF WBC COUNT: CPT | Performed by: STUDENT IN AN ORGANIZED HEALTH CARE EDUCATION/TRAINING PROGRAM

## 2022-05-20 PROCEDURE — 83880 ASSAY OF NATRIURETIC PEPTIDE: CPT | Performed by: STUDENT IN AN ORGANIZED HEALTH CARE EDUCATION/TRAINING PROGRAM

## 2022-05-20 PROCEDURE — 85027 COMPLETE CBC AUTOMATED: CPT | Performed by: STUDENT IN AN ORGANIZED HEALTH CARE EDUCATION/TRAINING PROGRAM

## 2022-05-20 PROCEDURE — 84484 ASSAY OF TROPONIN QUANT: CPT | Performed by: STUDENT IN AN ORGANIZED HEALTH CARE EDUCATION/TRAINING PROGRAM

## 2022-05-20 PROCEDURE — 71045 X-RAY EXAM CHEST 1 VIEW: CPT | Performed by: STUDENT IN AN ORGANIZED HEALTH CARE EDUCATION/TRAINING PROGRAM

## 2022-05-20 PROCEDURE — 71260 CT THORAX DX C+: CPT | Performed by: STUDENT IN AN ORGANIZED HEALTH CARE EDUCATION/TRAINING PROGRAM

## 2022-05-20 RX ORDER — IOHEXOL 350 MG/ML
100 INJECTION, SOLUTION INTRAVENOUS
Status: COMPLETED | OUTPATIENT
Start: 2022-05-20 | End: 2022-05-20

## 2022-05-20 RX ORDER — HYDROCODONE BITARTRATE AND ACETAMINOPHEN 5; 325 MG/1; MG/1
2 TABLET ORAL ONCE
Status: COMPLETED | OUTPATIENT
Start: 2022-05-20 | End: 2022-05-20

## 2022-05-20 NOTE — ED INITIAL ASSESSMENT (HPI)
52YF c/c pain Pt state she been having chest pain and SOB for the last month Pt state that she having swelling in her ankle and hx of heart failure

## 2022-05-23 LAB
ATRIAL RATE: 76 BPM
P AXIS: 58 DEGREES
P-R INTERVAL: 128 MS
Q-T INTERVAL: 404 MS
QRS DURATION: 72 MS
QTC CALCULATION (BEZET): 454 MS
R AXIS: 9 DEGREES
T AXIS: 67 DEGREES
VENTRICULAR RATE: 76 BPM

## 2022-05-25 RX ORDER — PANTOPRAZOLE SODIUM 40 MG/1
40 TABLET, DELAYED RELEASE ORAL
COMMUNITY
End: 2022-06-08

## 2022-05-28 ENCOUNTER — LAB ENCOUNTER (OUTPATIENT)
Dept: LAB | Facility: HOSPITAL | Age: 52
End: 2022-05-28
Attending: UROLOGY
Payer: MEDICARE

## 2022-05-28 DIAGNOSIS — Z01.812 PRE-OPERATIVE LABORATORY EXAMINATION: ICD-10-CM

## 2022-05-28 DIAGNOSIS — N35.919 URETHRAL STRICTURE: ICD-10-CM

## 2022-05-28 DIAGNOSIS — R13.12 OROPHARYNGEAL DYSPHAGIA: ICD-10-CM

## 2022-05-28 DIAGNOSIS — N39.0 URINARY TRACT INFECTION, SITE NOT SPECIFIED: Primary | ICD-10-CM

## 2022-05-28 LAB
ALBUMIN SERPL-MCNC: 3.5 G/DL (ref 3.4–5)
ALBUMIN/GLOB SERPL: 0.9 {RATIO} (ref 1–2)
ALP LIVER SERPL-CCNC: 90 U/L
ALT SERPL-CCNC: 146 U/L
ANION GAP SERPL CALC-SCNC: 5 MMOL/L (ref 0–18)
AST SERPL-CCNC: 85 U/L (ref 15–37)
BASOPHILS # BLD AUTO: 0.05 X10(3) UL (ref 0–0.2)
BASOPHILS NFR BLD AUTO: 0.7 %
BILIRUB SERPL-MCNC: 0.5 MG/DL (ref 0.1–2)
BILIRUB UR QL STRIP.AUTO: NEGATIVE
BUN BLD-MCNC: 10 MG/DL (ref 7–18)
CALCIUM BLD-MCNC: 8.9 MG/DL (ref 8.5–10.1)
CHLORIDE SERPL-SCNC: 106 MMOL/L (ref 98–112)
CLARITY UR REFRACT.AUTO: CLEAR
CO2 SERPL-SCNC: 30 MMOL/L (ref 21–32)
COLOR UR AUTO: YELLOW
CREAT BLD-MCNC: 1.12 MG/DL
EOSINOPHIL # BLD AUTO: 0.24 X10(3) UL (ref 0–0.7)
EOSINOPHIL NFR BLD AUTO: 3.2 %
ERYTHROCYTE [DISTWIDTH] IN BLOOD BY AUTOMATED COUNT: 15.9 %
FASTING STATUS PATIENT QL REPORTED: YES
GLOBULIN PLAS-MCNC: 3.9 G/DL (ref 2.8–4.4)
GLUCOSE BLD-MCNC: 163 MG/DL (ref 70–99)
GLUCOSE UR STRIP.AUTO-MCNC: >=500 MG/DL
HCT VFR BLD AUTO: 40.2 %
HGB BLD-MCNC: 12 G/DL
IMM GRANULOCYTES # BLD AUTO: 0.03 X10(3) UL (ref 0–1)
IMM GRANULOCYTES NFR BLD: 0.4 %
KETONES UR STRIP.AUTO-MCNC: NEGATIVE MG/DL
LEUKOCYTE ESTERASE UR QL STRIP.AUTO: NEGATIVE
LYMPHOCYTES # BLD AUTO: 3.51 X10(3) UL (ref 1–4)
LYMPHOCYTES NFR BLD AUTO: 47 %
MCH RBC QN AUTO: 23.1 PG (ref 26–34)
MCHC RBC AUTO-ENTMCNC: 29.9 G/DL (ref 31–37)
MCV RBC AUTO: 77.3 FL
MONOCYTES # BLD AUTO: 0.5 X10(3) UL (ref 0.1–1)
MONOCYTES NFR BLD AUTO: 6.7 %
NEUTROPHILS # BLD AUTO: 3.14 X10 (3) UL (ref 1.5–7.7)
NEUTROPHILS # BLD AUTO: 3.14 X10(3) UL (ref 1.5–7.7)
NEUTROPHILS NFR BLD AUTO: 42 %
NITRITE UR QL STRIP.AUTO: NEGATIVE
OSMOLALITY SERPL CALC.SUM OF ELEC: 295 MOSM/KG (ref 275–295)
PH UR STRIP.AUTO: 6 [PH] (ref 5–8)
PLATELET # BLD AUTO: 190 10(3)UL (ref 150–450)
POTASSIUM SERPL-SCNC: 3.7 MMOL/L (ref 3.5–5.1)
PROT SERPL-MCNC: 7.4 G/DL (ref 6.4–8.2)
PROT UR STRIP.AUTO-MCNC: NEGATIVE MG/DL
RBC # BLD AUTO: 5.2 X10(6)UL
RBC UR QL AUTO: NEGATIVE
SODIUM SERPL-SCNC: 141 MMOL/L (ref 136–145)
SP GR UR STRIP.AUTO: 1.02 (ref 1–1.03)
UROBILINOGEN UR STRIP.AUTO-MCNC: <2 MG/DL
WBC # BLD AUTO: 7.5 X10(3) UL (ref 4–11)

## 2022-05-28 PROCEDURE — 36415 COLL VENOUS BLD VENIPUNCTURE: CPT

## 2022-05-28 PROCEDURE — 85025 COMPLETE CBC W/AUTO DIFF WBC: CPT

## 2022-05-28 PROCEDURE — 84238 ASSAY NONENDOCRINE RECEPTOR: CPT

## 2022-05-28 PROCEDURE — 81003 URINALYSIS AUTO W/O SCOPE: CPT

## 2022-05-28 PROCEDURE — 83519 RIA NONANTIBODY: CPT

## 2022-05-28 PROCEDURE — 87086 URINE CULTURE/COLONY COUNT: CPT

## 2022-05-28 PROCEDURE — 86255 FLUORESCENT ANTIBODY SCREEN: CPT

## 2022-05-28 PROCEDURE — 80053 COMPREHEN METABOLIC PANEL: CPT

## 2022-05-28 PROCEDURE — 83516 IMMUNOASSAY NONANTIBODY: CPT

## 2022-05-29 LAB — SARS-COV-2 RNA RESP QL NAA+PROBE: NOT DETECTED

## 2022-05-30 ENCOUNTER — ANESTHESIA EVENT (OUTPATIENT)
Dept: SURGERY | Facility: HOSPITAL | Age: 52
End: 2022-05-30
Payer: MEDICARE

## 2022-05-31 ENCOUNTER — HOSPITAL ENCOUNTER (OUTPATIENT)
Facility: HOSPITAL | Age: 52
Setting detail: HOSPITAL OUTPATIENT SURGERY
Discharge: HOME | End: 2022-05-31
Attending: UROLOGY | Admitting: UROLOGY
Payer: MEDICARE

## 2022-05-31 ENCOUNTER — ANESTHESIA (OUTPATIENT)
Dept: SURGERY | Facility: HOSPITAL | Age: 52
End: 2022-05-31
Payer: MEDICARE

## 2022-05-31 VITALS
OXYGEN SATURATION: 95 % | HEART RATE: 64 BPM | DIASTOLIC BLOOD PRESSURE: 74 MMHG | BODY MASS INDEX: 28.15 KG/M2 | WEIGHT: 175.13 LBS | HEIGHT: 66 IN | RESPIRATION RATE: 18 BRPM | SYSTOLIC BLOOD PRESSURE: 111 MMHG | TEMPERATURE: 98 F

## 2022-05-31 DIAGNOSIS — N35.919 URETHRAL STRICTURE: Primary | ICD-10-CM

## 2022-05-31 LAB
ACETYLCHOLINE BINDING AB: 0 NMOL/L
ACETYLCHOLINE BLOCKING AB: 21 %
B-HCG UR QL: NEGATIVE
GLUCOSE BLD-MCNC: 219 MG/DL (ref 70–99)
GLUCOSE BLD-MCNC: 273 MG/DL (ref 70–99)
GLUCOSE BLD-MCNC: 288 MG/DL (ref 70–99)
GLUCOSE BLD-MCNC: 365 MG/DL (ref 70–99)
TITIN ANTIBODY: <0.09 IV

## 2022-05-31 PROCEDURE — 82962 GLUCOSE BLOOD TEST: CPT

## 2022-05-31 PROCEDURE — 0T7D8ZZ DILATION OF URETHRA, VIA NATURAL OR ARTIFICIAL OPENING ENDOSCOPIC: ICD-10-PCS | Performed by: UROLOGY

## 2022-05-31 PROCEDURE — 81025 URINE PREGNANCY TEST: CPT

## 2022-05-31 RX ORDER — INSULIN ASPART 100 [IU]/ML
INJECTION, SOLUTION INTRAVENOUS; SUBCUTANEOUS
Status: DISCONTINUED
Start: 2022-05-31 | End: 2022-05-31

## 2022-05-31 RX ORDER — SODIUM CHLORIDE, SODIUM LACTATE, POTASSIUM CHLORIDE, CALCIUM CHLORIDE 600; 310; 30; 20 MG/100ML; MG/100ML; MG/100ML; MG/100ML
INJECTION, SOLUTION INTRAVENOUS CONTINUOUS
Status: DISCONTINUED | OUTPATIENT
Start: 2022-05-31 | End: 2022-05-31

## 2022-05-31 RX ORDER — ONDANSETRON 2 MG/ML
INJECTION INTRAMUSCULAR; INTRAVENOUS AS NEEDED
Status: DISCONTINUED | OUTPATIENT
Start: 2022-05-31 | End: 2022-05-31 | Stop reason: SURG

## 2022-05-31 RX ORDER — MIDAZOLAM HYDROCHLORIDE 1 MG/ML
INJECTION INTRAMUSCULAR; INTRAVENOUS AS NEEDED
Status: DISCONTINUED | OUTPATIENT
Start: 2022-05-31 | End: 2022-05-31 | Stop reason: SURG

## 2022-05-31 RX ORDER — HYDROMORPHONE HYDROCHLORIDE 1 MG/ML
0.2 INJECTION, SOLUTION INTRAMUSCULAR; INTRAVENOUS; SUBCUTANEOUS EVERY 5 MIN PRN
Status: DISCONTINUED | OUTPATIENT
Start: 2022-05-31 | End: 2022-05-31

## 2022-05-31 RX ORDER — INSULIN ASPART 100 [IU]/ML
INJECTION, SOLUTION INTRAVENOUS; SUBCUTANEOUS ONCE
Status: COMPLETED | OUTPATIENT
Start: 2022-05-31 | End: 2022-05-31

## 2022-05-31 RX ORDER — LIDOCAINE HYDROCHLORIDE 10 MG/ML
INJECTION, SOLUTION EPIDURAL; INFILTRATION; INTRACAUDAL; PERINEURAL AS NEEDED
Status: DISCONTINUED | OUTPATIENT
Start: 2022-05-31 | End: 2022-05-31 | Stop reason: SURG

## 2022-05-31 RX ORDER — CLINDAMYCIN PHOSPHATE 900 MG/50ML
900 INJECTION INTRAVENOUS ONCE
Status: COMPLETED | OUTPATIENT
Start: 2022-05-31 | End: 2022-05-31

## 2022-05-31 RX ORDER — LIDOCAINE HYDROCHLORIDE 20 MG/ML
JELLY TOPICAL AS NEEDED
Status: DISCONTINUED | OUTPATIENT
Start: 2022-05-31 | End: 2022-05-31 | Stop reason: HOSPADM

## 2022-05-31 RX ORDER — MEPERIDINE HYDROCHLORIDE 25 MG/ML
12.5 INJECTION INTRAMUSCULAR; INTRAVENOUS; SUBCUTANEOUS AS NEEDED
Status: DISCONTINUED | OUTPATIENT
Start: 2022-05-31 | End: 2022-05-31

## 2022-05-31 RX ORDER — PROCHLORPERAZINE EDISYLATE 5 MG/ML
5 INJECTION INTRAMUSCULAR; INTRAVENOUS EVERY 8 HOURS PRN
Status: DISCONTINUED | OUTPATIENT
Start: 2022-05-31 | End: 2022-05-31

## 2022-05-31 RX ORDER — ONDANSETRON 2 MG/ML
4 INJECTION INTRAMUSCULAR; INTRAVENOUS EVERY 6 HOURS PRN
Status: DISCONTINUED | OUTPATIENT
Start: 2022-05-31 | End: 2022-05-31

## 2022-05-31 RX ORDER — HYDROMORPHONE HYDROCHLORIDE 1 MG/ML
0.6 INJECTION, SOLUTION INTRAMUSCULAR; INTRAVENOUS; SUBCUTANEOUS EVERY 5 MIN PRN
Status: DISCONTINUED | OUTPATIENT
Start: 2022-05-31 | End: 2022-05-31

## 2022-05-31 RX ORDER — NICOTINE POLACRILEX 4 MG
30 LOZENGE BUCCAL
Status: DISCONTINUED | OUTPATIENT
Start: 2022-05-31 | End: 2022-05-31 | Stop reason: HOSPADM

## 2022-05-31 RX ORDER — INSULIN ASPART 100 [IU]/ML
8 INJECTION, SOLUTION INTRAVENOUS; SUBCUTANEOUS ONCE
Status: COMPLETED | OUTPATIENT
Start: 2022-05-31 | End: 2022-05-31

## 2022-05-31 RX ORDER — NALOXONE HYDROCHLORIDE 0.4 MG/ML
80 INJECTION, SOLUTION INTRAMUSCULAR; INTRAVENOUS; SUBCUTANEOUS AS NEEDED
Status: DISCONTINUED | OUTPATIENT
Start: 2022-05-31 | End: 2022-05-31

## 2022-05-31 RX ORDER — MIDAZOLAM HYDROCHLORIDE 1 MG/ML
1 INJECTION INTRAMUSCULAR; INTRAVENOUS EVERY 5 MIN PRN
Status: DISCONTINUED | OUTPATIENT
Start: 2022-05-31 | End: 2022-05-31

## 2022-05-31 RX ORDER — INSULIN ASPART 100 [IU]/ML
INJECTION, SOLUTION INTRAVENOUS; SUBCUTANEOUS
Status: COMPLETED
Start: 2022-05-31 | End: 2022-05-31

## 2022-05-31 RX ORDER — ALBUTEROL SULFATE 2.5 MG/3ML
2.5 SOLUTION RESPIRATORY (INHALATION) AS NEEDED
Status: DISCONTINUED | OUTPATIENT
Start: 2022-05-31 | End: 2022-05-31

## 2022-05-31 RX ORDER — METOCLOPRAMIDE HYDROCHLORIDE 5 MG/ML
INJECTION INTRAMUSCULAR; INTRAVENOUS AS NEEDED
Status: DISCONTINUED | OUTPATIENT
Start: 2022-05-31 | End: 2022-05-31 | Stop reason: SURG

## 2022-05-31 RX ORDER — NICOTINE POLACRILEX 4 MG
15 LOZENGE BUCCAL
Status: DISCONTINUED | OUTPATIENT
Start: 2022-05-31 | End: 2022-05-31 | Stop reason: HOSPADM

## 2022-05-31 RX ORDER — HYDROMORPHONE HYDROCHLORIDE 1 MG/ML
0.4 INJECTION, SOLUTION INTRAMUSCULAR; INTRAVENOUS; SUBCUTANEOUS EVERY 5 MIN PRN
Status: DISCONTINUED | OUTPATIENT
Start: 2022-05-31 | End: 2022-05-31

## 2022-05-31 RX ORDER — DEXAMETHASONE SODIUM PHOSPHATE 4 MG/ML
VIAL (ML) INJECTION AS NEEDED
Status: DISCONTINUED | OUTPATIENT
Start: 2022-05-31 | End: 2022-05-31 | Stop reason: SURG

## 2022-05-31 RX ORDER — DEXTROSE MONOHYDRATE 25 G/50ML
50 INJECTION, SOLUTION INTRAVENOUS
Status: DISCONTINUED | OUTPATIENT
Start: 2022-05-31 | End: 2022-05-31 | Stop reason: HOSPADM

## 2022-05-31 RX ORDER — SCOLOPAMINE TRANSDERMAL SYSTEM 1 MG/1
1 PATCH, EXTENDED RELEASE TRANSDERMAL ONCE
Status: DISCONTINUED | OUTPATIENT
Start: 2022-05-31 | End: 2022-05-31 | Stop reason: HOSPADM

## 2022-05-31 RX ADMIN — ONDANSETRON 4 MG: 2 INJECTION INTRAMUSCULAR; INTRAVENOUS at 11:35:00

## 2022-05-31 RX ADMIN — DEXAMETHASONE SODIUM PHOSPHATE 4 MG: 4 MG/ML VIAL (ML) INJECTION at 11:35:00

## 2022-05-31 RX ADMIN — LIDOCAINE HYDROCHLORIDE 50 MG: 10 INJECTION, SOLUTION EPIDURAL; INFILTRATION; INTRACAUDAL; PERINEURAL at 11:35:00

## 2022-05-31 RX ADMIN — METOCLOPRAMIDE HYDROCHLORIDE 10 MG: 5 INJECTION INTRAMUSCULAR; INTRAVENOUS at 11:35:00

## 2022-05-31 RX ADMIN — MIDAZOLAM HYDROCHLORIDE 1 MG: 1 INJECTION INTRAMUSCULAR; INTRAVENOUS at 11:25:00

## 2022-05-31 RX ADMIN — SODIUM CHLORIDE, SODIUM LACTATE, POTASSIUM CHLORIDE, CALCIUM CHLORIDE: 600; 310; 30; 20 INJECTION, SOLUTION INTRAVENOUS at 11:25:00

## 2022-05-31 RX ADMIN — MIDAZOLAM HYDROCHLORIDE 1 MG: 1 INJECTION INTRAMUSCULAR; INTRAVENOUS at 11:30:00

## 2022-05-31 RX ADMIN — SODIUM CHLORIDE, SODIUM LACTATE, POTASSIUM CHLORIDE, CALCIUM CHLORIDE: 600; 310; 30; 20 INJECTION, SOLUTION INTRAVENOUS at 12:16:00

## 2022-05-31 RX ADMIN — CLINDAMYCIN PHOSPHATE 900 MG: 900 INJECTION INTRAVENOUS at 11:40:00

## 2022-05-31 NOTE — ANESTHESIA PROCEDURE NOTES
Airway  Date/Time: 5/31/2022 11:36 AM  Urgency: elective      General Information and Staff    Patient location during procedure: OR  Anesthesiologist: Colleen Wolf MD  Performed: anesthesiologist     Indications and Patient Condition  Indications for airway management: anesthesia  Sedation level: deep  Preoxygenated: yes  Patient position: sniffing  Mask difficulty assessment: 0 - not attempted    Final Airway Details  Final airway type: supraglottic airway      Successful airway: classic  Size 4      Number of attempts at approach: 1  Number of other approaches attempted: 0

## 2022-05-31 NOTE — OPERATIVE REPORT
[facility]  Urology Procedure Note  Katerina Persaud Patient Status:  Hospital Outpatient Surgery    3/11/1970 MRN [id number]   [facility] SURGERY Attending Presley Jackson MD   Hosp Day # 0 PCP Kenan Jimenes MD     Procedure: Cystoscopy and urethral dilation    Pre-Op Diagnosis:  Urethral stenosis and pelvic pain    Post-Op Diagnosis: Same    Procedure: The patient was brought to the operating room where satisfactory general anesthesia was obtained. The airway was controlled with the laryngeal mask. The patient was placed in lithotomy position and was prepped and draped in the usual fashion. A time out was performed per protocol. The urethra was dilated with sounds from 14 to 30 [place]. The 25 Khmer cystoscope was placed and the bladder was inspected. The ureteral orifices were normal in position and configuration bilaterally. There were no tumors, stones, or mucosal lesions in the bladder. The bladder was emptied and the cystoscope was removed. Xylocaine jelly was then injected per urethra. The patient tolerated the procedure well. Surgeon: Jerry Cheng    Anesthesia:  General    Findings:  Urethral stricture. Normal bladder and ureteral orifices.     Specimens: None    Blood Loss:  0 mL    Complications:  None    Drains:  None    Secondary Diagnosis:  None    Franchesca Looney MD  2022

## 2022-06-04 ENCOUNTER — HOSPITAL ENCOUNTER (OUTPATIENT)
Dept: CT IMAGING | Facility: HOSPITAL | Age: 52
Discharge: HOME | End: 2022-06-04
Attending: COLON & RECTAL SURGERY
Payer: MEDICARE

## 2022-06-04 DIAGNOSIS — R10.31 RIGHT LOWER QUADRANT PAIN: ICD-10-CM

## 2022-06-04 DIAGNOSIS — K66.0 INTRA-ABDOMINAL ADHESIONS: ICD-10-CM

## 2022-06-04 PROCEDURE — 74177 CT ABD & PELVIS W/CONTRAST: CPT | Performed by: COLON & RECTAL SURGERY

## 2022-06-06 ENCOUNTER — OFFICE VISIT (OUTPATIENT)
Dept: SURGERY | Facility: CLINIC | Age: 52
End: 2022-06-06
Payer: MEDICARE

## 2022-06-06 ENCOUNTER — ANESTHESIA EVENT (OUTPATIENT)
Dept: SURGERY | Facility: HOSPITAL | Age: 52
End: 2022-06-06
Payer: MEDICARE

## 2022-06-06 VITALS
SYSTOLIC BLOOD PRESSURE: 114 MMHG | HEART RATE: 80 BPM | DIASTOLIC BLOOD PRESSURE: 77 MMHG | TEMPERATURE: 97 F | BODY MASS INDEX: 28.57 KG/M2 | WEIGHT: 177.81 LBS | HEIGHT: 66 IN

## 2022-06-06 DIAGNOSIS — R10.31 RIGHT LOWER QUADRANT PAIN: Primary | ICD-10-CM

## 2022-06-06 NOTE — PATIENT INSTRUCTIONS
This patient presents for further care and treatment regarding severe right lower quadrant abdominal pain. She has had multiple abdominal operations, please see her original consultation. Since her last visit she obtained a CT scan of the abdomen and pelvis with oral and IV contrast.  The procedure was performed at [facility] on June 4, 2022. I personally viewed the CT scan myself and provided my own interpretation. There are no current signs of bowel obstruction, free fluid, free air, complications within the pelvis, or any complications within the right lower quadrant. She still has 7/10 pain in the right lower quadrant. She has no nausea, vomiting, constipation, or diarrhea. Clinical exam reveals her abdomen to be soft, tender to right lower quadrant, slight guarding, no rebound. No evidence of peritonitis. Bowel sounds have normal activity normal pitch. There are no inguinal or umbilical hernias present. Liver and spleen are not palpable. We will be performing laparoscopic lysis of adhesions on this patient. The procedure will take place on Eunice 10, 2022. I fully reviewed the operation, its indications, and the conduct of the procedure. All risks, benefits, complications and alternatives to the proposed procedure(s) were fully discussed with the patient. All questions from the patient were answered in detail. A description of the procedure(s) and possible outcomes was fully discussed. The patient seemed to understand the conversation and its details. Consent for the procedure(s) was confirmed with the patient.

## 2022-06-07 ENCOUNTER — LAB ENCOUNTER (OUTPATIENT)
Dept: LAB | Age: 52
End: 2022-06-07
Attending: COLON & RECTAL SURGERY
Payer: MEDICARE

## 2022-06-07 DIAGNOSIS — K66.0 INTRA-ABDOMINAL ADHESIONS: ICD-10-CM

## 2022-06-08 LAB — SARS-COV-2 RNA RESP QL NAA+PROBE: NOT DETECTED

## 2022-06-10 ENCOUNTER — ANESTHESIA (OUTPATIENT)
Dept: SURGERY | Facility: HOSPITAL | Age: 52
End: 2022-06-10
Payer: MEDICARE

## 2022-06-10 ENCOUNTER — HOSPITAL ENCOUNTER (OUTPATIENT)
Facility: HOSPITAL | Age: 52
Setting detail: HOSPITAL OUTPATIENT SURGERY
Discharge: HOME | End: 2022-06-10
Attending: COLON & RECTAL SURGERY | Admitting: COLON & RECTAL SURGERY
Payer: MEDICARE

## 2022-06-10 VITALS
BODY MASS INDEX: 28.23 KG/M2 | WEIGHT: 175.69 LBS | SYSTOLIC BLOOD PRESSURE: 111 MMHG | RESPIRATION RATE: 16 BRPM | TEMPERATURE: 97 F | DIASTOLIC BLOOD PRESSURE: 87 MMHG | OXYGEN SATURATION: 93 % | HEART RATE: 80 BPM | HEIGHT: 66 IN

## 2022-06-10 DIAGNOSIS — R10.31 RIGHT LOWER QUADRANT PAIN: ICD-10-CM

## 2022-06-10 DIAGNOSIS — K66.0 INTRA-ABDOMINAL ADHESIONS: Primary | ICD-10-CM

## 2022-06-10 LAB
GLUCOSE BLD-MCNC: 142 MG/DL (ref 70–99)
GLUCOSE BLD-MCNC: 170 MG/DL (ref 70–99)

## 2022-06-10 PROCEDURE — 82962 GLUCOSE BLOOD TEST: CPT

## 2022-06-10 PROCEDURE — 0DNU4ZZ RELEASE OMENTUM, PERCUTANEOUS ENDOSCOPIC APPROACH: ICD-10-PCS | Performed by: COLON & RECTAL SURGERY

## 2022-06-10 RX ORDER — NICOTINE POLACRILEX 4 MG
30 LOZENGE BUCCAL
Status: DISCONTINUED | OUTPATIENT
Start: 2022-06-10 | End: 2022-06-10 | Stop reason: HOSPADM

## 2022-06-10 RX ORDER — HYDROCODONE BITARTRATE AND ACETAMINOPHEN 5; 325 MG/1; MG/1
1 TABLET ORAL ONCE AS NEEDED
Status: COMPLETED | OUTPATIENT
Start: 2022-06-10 | End: 2022-06-10

## 2022-06-10 RX ORDER — DEXTROSE MONOHYDRATE 25 G/50ML
50 INJECTION, SOLUTION INTRAVENOUS
Status: DISCONTINUED | OUTPATIENT
Start: 2022-06-10 | End: 2022-06-10 | Stop reason: HOSPADM

## 2022-06-10 RX ORDER — SCOLOPAMINE TRANSDERMAL SYSTEM 1 MG/1
1 PATCH, EXTENDED RELEASE TRANSDERMAL ONCE
Status: DISCONTINUED | OUTPATIENT
Start: 2022-06-10 | End: 2022-06-10

## 2022-06-10 RX ORDER — DEXAMETHASONE SODIUM PHOSPHATE 4 MG/ML
VIAL (ML) INJECTION AS NEEDED
Status: DISCONTINUED | OUTPATIENT
Start: 2022-06-10 | End: 2022-06-10 | Stop reason: SURG

## 2022-06-10 RX ORDER — METOPROLOL TARTRATE 5 MG/5ML
2.5 INJECTION INTRAVENOUS ONCE
Status: DISCONTINUED | OUTPATIENT
Start: 2022-06-10 | End: 2022-06-10

## 2022-06-10 RX ORDER — CLINDAMYCIN PHOSPHATE 900 MG/50ML
900 INJECTION INTRAVENOUS ONCE
Status: COMPLETED | OUTPATIENT
Start: 2022-06-10 | End: 2022-06-10

## 2022-06-10 RX ORDER — BUPIVACAINE HYDROCHLORIDE AND EPINEPHRINE 5; 5 MG/ML; UG/ML
INJECTION, SOLUTION EPIDURAL; INTRACAUDAL; PERINEURAL AS NEEDED
Status: DISCONTINUED | OUTPATIENT
Start: 2022-06-10 | End: 2022-06-10 | Stop reason: HOSPADM

## 2022-06-10 RX ORDER — HYDROMORPHONE HYDROCHLORIDE 1 MG/ML
0.6 INJECTION, SOLUTION INTRAMUSCULAR; INTRAVENOUS; SUBCUTANEOUS EVERY 5 MIN PRN
Status: ACTIVE | OUTPATIENT
Start: 2022-06-10 | End: 2022-06-10

## 2022-06-10 RX ORDER — GLYCOPYRROLATE 0.2 MG/ML
INJECTION, SOLUTION INTRAMUSCULAR; INTRAVENOUS AS NEEDED
Status: DISCONTINUED | OUTPATIENT
Start: 2022-06-10 | End: 2022-06-10 | Stop reason: SURG

## 2022-06-10 RX ORDER — NICOTINE POLACRILEX 4 MG
15 LOZENGE BUCCAL
Status: DISCONTINUED | OUTPATIENT
Start: 2022-06-10 | End: 2022-06-10 | Stop reason: HOSPADM

## 2022-06-10 RX ORDER — PHENYLEPHRINE HCL 10 MG/ML
VIAL (ML) INJECTION AS NEEDED
Status: DISCONTINUED | OUTPATIENT
Start: 2022-06-10 | End: 2022-06-10 | Stop reason: SURG

## 2022-06-10 RX ORDER — ONDANSETRON 2 MG/ML
4 INJECTION INTRAMUSCULAR; INTRAVENOUS EVERY 6 HOURS PRN
Status: DISCONTINUED | OUTPATIENT
Start: 2022-06-10 | End: 2022-06-10

## 2022-06-10 RX ORDER — ACETAMINOPHEN 500 MG
1000 TABLET ORAL ONCE
Status: DISCONTINUED | OUTPATIENT
Start: 2022-06-10 | End: 2022-06-10 | Stop reason: HOSPADM

## 2022-06-10 RX ORDER — KETAMINE HYDROCHLORIDE 50 MG/ML
INJECTION, SOLUTION, CONCENTRATE INTRAMUSCULAR; INTRAVENOUS AS NEEDED
Status: DISCONTINUED | OUTPATIENT
Start: 2022-06-10 | End: 2022-06-10 | Stop reason: SURG

## 2022-06-10 RX ORDER — METOCLOPRAMIDE HYDROCHLORIDE 5 MG/ML
INJECTION INTRAMUSCULAR; INTRAVENOUS AS NEEDED
Status: DISCONTINUED | OUTPATIENT
Start: 2022-06-10 | End: 2022-06-10 | Stop reason: SURG

## 2022-06-10 RX ORDER — HYDROMORPHONE HYDROCHLORIDE 1 MG/ML
0.4 INJECTION, SOLUTION INTRAMUSCULAR; INTRAVENOUS; SUBCUTANEOUS EVERY 5 MIN PRN
Status: ACTIVE | OUTPATIENT
Start: 2022-06-10 | End: 2022-06-10

## 2022-06-10 RX ORDER — HYDROCODONE BITARTRATE AND ACETAMINOPHEN 5; 325 MG/1; MG/1
2 TABLET ORAL ONCE AS NEEDED
Status: COMPLETED | OUTPATIENT
Start: 2022-06-10 | End: 2022-06-10

## 2022-06-10 RX ORDER — ROCURONIUM BROMIDE 10 MG/ML
INJECTION, SOLUTION INTRAVENOUS AS NEEDED
Status: DISCONTINUED | OUTPATIENT
Start: 2022-06-10 | End: 2022-06-10 | Stop reason: SURG

## 2022-06-10 RX ORDER — SODIUM CHLORIDE, SODIUM LACTATE, POTASSIUM CHLORIDE, CALCIUM CHLORIDE 600; 310; 30; 20 MG/100ML; MG/100ML; MG/100ML; MG/100ML
INJECTION, SOLUTION INTRAVENOUS CONTINUOUS
Status: DISCONTINUED | OUTPATIENT
Start: 2022-06-10 | End: 2022-06-10

## 2022-06-10 RX ORDER — NEOSTIGMINE METHYLSULFATE 1 MG/ML
INJECTION INTRAVENOUS AS NEEDED
Status: DISCONTINUED | OUTPATIENT
Start: 2022-06-10 | End: 2022-06-10 | Stop reason: SURG

## 2022-06-10 RX ORDER — PROCHLORPERAZINE EDISYLATE 5 MG/ML
5 INJECTION INTRAMUSCULAR; INTRAVENOUS EVERY 8 HOURS PRN
Status: DISCONTINUED | OUTPATIENT
Start: 2022-06-10 | End: 2022-06-10

## 2022-06-10 RX ORDER — NALOXONE HYDROCHLORIDE 0.4 MG/ML
80 INJECTION, SOLUTION INTRAMUSCULAR; INTRAVENOUS; SUBCUTANEOUS AS NEEDED
Status: ACTIVE | OUTPATIENT
Start: 2022-06-10 | End: 2022-06-10

## 2022-06-10 RX ORDER — ACETAMINOPHEN 500 MG
1000 TABLET ORAL ONCE AS NEEDED
Status: COMPLETED | OUTPATIENT
Start: 2022-06-10 | End: 2022-06-10

## 2022-06-10 RX ORDER — LIDOCAINE HYDROCHLORIDE 10 MG/ML
INJECTION, SOLUTION EPIDURAL; INFILTRATION; INTRACAUDAL; PERINEURAL AS NEEDED
Status: DISCONTINUED | OUTPATIENT
Start: 2022-06-10 | End: 2022-06-10 | Stop reason: SURG

## 2022-06-10 RX ORDER — HYDROMORPHONE HYDROCHLORIDE 1 MG/ML
0.2 INJECTION, SOLUTION INTRAMUSCULAR; INTRAVENOUS; SUBCUTANEOUS EVERY 5 MIN PRN
Status: ACTIVE | OUTPATIENT
Start: 2022-06-10 | End: 2022-06-10

## 2022-06-10 RX ORDER — HEPARIN SODIUM 5000 [USP'U]/ML
5000 INJECTION, SOLUTION INTRAVENOUS; SUBCUTANEOUS ONCE
Status: COMPLETED | OUTPATIENT
Start: 2022-06-10 | End: 2022-06-10

## 2022-06-10 RX ORDER — HYDROMORPHONE HYDROCHLORIDE 1 MG/ML
INJECTION, SOLUTION INTRAMUSCULAR; INTRAVENOUS; SUBCUTANEOUS
Status: COMPLETED
Start: 2022-06-10 | End: 2022-06-10

## 2022-06-10 RX ADMIN — KETAMINE HYDROCHLORIDE 25 MG: 50 INJECTION, SOLUTION, CONCENTRATE INTRAMUSCULAR; INTRAVENOUS at 08:15:00

## 2022-06-10 RX ADMIN — NEOSTIGMINE METHYLSULFATE 4 MG: 1 INJECTION INTRAVENOUS at 08:28:00

## 2022-06-10 RX ADMIN — GLYCOPYRROLATE 0.8 MG: 0.2 INJECTION, SOLUTION INTRAMUSCULAR; INTRAVENOUS at 08:28:00

## 2022-06-10 RX ADMIN — PHENYLEPHRINE HCL 100 MCG: 10 MG/ML VIAL (ML) INJECTION at 07:49:00

## 2022-06-10 RX ADMIN — ROCURONIUM BROMIDE 40 MG: 10 INJECTION, SOLUTION INTRAVENOUS at 07:37:00

## 2022-06-10 RX ADMIN — LIDOCAINE HYDROCHLORIDE 50 MG: 10 INJECTION, SOLUTION EPIDURAL; INFILTRATION; INTRACAUDAL; PERINEURAL at 07:37:00

## 2022-06-10 RX ADMIN — DEXAMETHASONE SODIUM PHOSPHATE 4 MG: 4 MG/ML VIAL (ML) INJECTION at 07:50:00

## 2022-06-10 RX ADMIN — METOCLOPRAMIDE HYDROCHLORIDE 10 MG: 5 INJECTION INTRAMUSCULAR; INTRAVENOUS at 07:50:00

## 2022-06-10 RX ADMIN — CLINDAMYCIN PHOSPHATE 900 MG: 900 INJECTION INTRAVENOUS at 07:46:00

## 2022-06-10 NOTE — PACU NOTE
Patient able to doze to sleep; wakes up with c/o pain. Medicated IVP per Anesthesia's orders. Patient sleepy, arousable. Denies any pain relief. States she is normally at a pain level of 10 @ home, and takes [place]  daily (can't remember the frequency per day). Anesthesia Dr. Anne Pelletier notified. Continue to safely medicat for pain as ordered. VS stable. On 3L o2 NC will wean to RA.

## 2022-06-10 NOTE — ANESTHESIA PROCEDURE NOTES
Airway  Date/Time: 6/10/2022 7:39 AM  Urgency: elective    Airway not difficult    General Information and Staff    Patient location during procedure: OR  Anesthesiologist: Emily Prakash DO  Performed: anesthesiologist     Indications and Patient Condition  Indications for airway management: anesthesia  Spontaneous ventilation: present  Sedation level: deep  Preoxygenated: yes  Patient position: sniffing  Mask difficulty assessment: 1 - vent by mask    Final Airway Details  Final airway type: endotracheal airway      Successful airway: ETT  Cuffed: yes   Successful intubation technique: direct laryngoscopy  Endotracheal tube insertion site: oral  Blade: Bhavik  Blade size: #3  ETT size (mm): 7.5    Cormack-Lehane Classification: grade I - full view of glottis  Placement verified by: chest auscultation and capnometry   Measured from: lips  ETT to lips (cm): 21  Number of attempts at approach: 1    Additional Comments  Atraumatic insertion of ETT  Dentition intact as pre-induction

## 2022-06-10 NOTE — OPERATIVE REPORT
[facility]  Op Note    Emily Chow Location: OR   ADIN [id number] MRN [id number]   Admission Date 6/10/2022 Operation Date 6/10/2022   Attending Physician Sandra Mchugh MD Operating Physician Andrea Bethea MD     Pre-Operative Diagnosis: Right lower quadrant pain [R10.31]  Intra-abdominal adhesions [K66.0]    Post-Operative Diagnosis:   Right lower quadrant pain [R10.31]  Intra-abdominal adhesions [K66.0]       Procedure Performed: Laparoscopic lysis of Adhesions    Surgeon(s) and Role:     Pipo Rodriguez MD - Primary  Assistant: Rebeca Jean-Baptiste PA-C, her assistance was absolute essential to the proper conduct of this case. This involved significant lysis of adhesions in the right lower quadrant and the pelvis. Anesthesia: General    History of Present Illness: This patient is known to have abdominal adhesions. She has had a previous laparoscopic evaluation and had extensive adhesions throughout the entire abdomen. She now has severe right-sided abdominal pain. It is centered in the right lower quadrant. The patient presents at this time for diagnostic laparoscopy, laparoscopic lysis of adhesions. Operative Findings:    Liver within normal limits  Gallbladder surgically absent  No significant adhesions to the small bowel  The appendix had some adhesions within the pelvis to the previous hysterectomy site. These were relieved. There were some right lower quadrant adhesions of the cecum to the right pelvic sidewall. These were lysed. The patient had extensive adhesions to the anterior abdominal wall to the right of the anterior midline. These were lysed without complication, involving only the omentum. Once all of these adhesions were freed, the small intestine flowed freely throughout the entire abdomen. The omentum also flowed freely and was easily manipulated without adhesions. There were no interloop adhesions of the small bowel. The sigmoid colon was free.   The entire appendix was well visualized and normal once it was freed from some pelvic adhesions. The patient was delivered to recovery room in stable condition    Operative Summary:  Following adequate general anesthesia, the patient was placed in the supine position on the operating room table. The abdomen was scrubbed with chlorhexidine sterile drapes were placed with wide exposure of the abdomen from xiphoid to pubis. Timeout was performed confirming the patient's identifying data, the procedure to be performed, and the administration of antibiotics. We began with a left upper quadrant puncture site through left subcostal incision. The Veress needle was inserted into the abdominal cavity and allowed insufflate with CO2. A 5 mm trochar was placed via this incision. We have the above listed findings on diagnostic laparoscopy. We placed 2 other ports under direct visualization in the left side of the abdomen in the anterior axillary line, 1 at the level of the umbilicus, the other in the left lower quadrant. We began with lysing adhesions that were stuck to the anterior abdominal wall with the omentum involved. There was an extensive patch of these adhesions in that site. These were easily freed up without complication using scissors dissection. This allowed the omentum and then to flow freely into the upper abdomen. There were significant adhesions of the cecum to the right pelvic sidewall from the prior hysterectomy site. The appendix tip was also adhesed into the pelvis. The appendix itself was normal.  We performed extensive lysis in this region under direct visualization using combination of blunt and sharp scissors dissection. At the conclusion of the procedure, the cecum flowed freely, the appendix flowed freely. Each quadrant of the abdomen was approached separately.  The pelvis was also approached with all portions of the small intestine found to be free from all adhesions and surrounding structures. Following the lysis of adhesions, small intestine was evaluated into the central abdomen. The colon was inspected in its intra-abdominal locations and free from all adhesions. The liver, stomach, and duodenum, were also carefully inspected and free from all adhesions. All instrument counts were found to be correct. The three 5 mm port trocar sites were closed at the skin level with 5-0 undyed Vicryl in a subcuticular fashion. Marcaine 0.5% with epinephrine was injected into the wound margins. Steri-Strips were placed over benzoin adhesive. Sterile dressings were placed, and the patient was delivered to recovery room in stable postoperative condition.             Pathologic Specimen: none  Drains: None      EBL: 5 cc           Kwadwo Esteban MD  6/10/2022  8:34 AM

## 2022-06-10 NOTE — INTERVAL H&P NOTE
Pre-op Diagnosis: Right lower quadrant pain [R10.31]  Intra-abdominal adhesions [K66.0]    The above referenced H&P was reviewed by Pavel Ring MD on 6/10/2022, the patient was examined and no significant changes have occurred in the patient's condition since the H&P was performed. I discussed with the patient and/or legal representative the potential benefits, risks and side effects of this procedure; the likelihood of the patient achieving goals; and potential problems that might occur during recuperation. I discussed reasonable alternatives to the procedure, including risks, benefits and side effects related to the alternatives and risks related to not receiving this procedure. We will proceed with procedure as planned.

## 2022-06-11 ENCOUNTER — HOSPITAL ENCOUNTER (EMERGENCY)
Facility: HOSPITAL | Age: 52
Discharge: HOME | End: 2022-06-11
Attending: EMERGENCY MEDICINE
Payer: MEDICARE

## 2022-06-11 VITALS
RESPIRATION RATE: 16 BRPM | DIASTOLIC BLOOD PRESSURE: 71 MMHG | OXYGEN SATURATION: 100 % | TEMPERATURE: 98 F | HEIGHT: 66 IN | WEIGHT: 177 LBS | HEART RATE: 68 BPM | SYSTOLIC BLOOD PRESSURE: 108 MMHG | BODY MASS INDEX: 28.45 KG/M2

## 2022-06-11 DIAGNOSIS — T14.8XXA BLEEDING FROM WOUND: Primary | ICD-10-CM

## 2022-06-11 LAB
ANION GAP SERPL CALC-SCNC: 4 MMOL/L (ref 0–18)
BASOPHILS # BLD: 0 X10(3) UL (ref 0–0.2)
BASOPHILS NFR BLD: 0 %
BUN BLD-MCNC: 10 MG/DL (ref 7–18)
CALCIUM BLD-MCNC: 8.4 MG/DL (ref 8.5–10.1)
CHLORIDE SERPL-SCNC: 104 MMOL/L (ref 98–112)
CO2 SERPL-SCNC: 32 MMOL/L (ref 21–32)
CREAT BLD-MCNC: 1.11 MG/DL
EOSINOPHIL # BLD: 0 X10(3) UL (ref 0–0.7)
EOSINOPHIL NFR BLD: 0 %
ERYTHROCYTE [DISTWIDTH] IN BLOOD BY AUTOMATED COUNT: 15.8 %
GLUCOSE BLD-MCNC: 171 MG/DL (ref 70–99)
HCT VFR BLD AUTO: 36.8 %
HGB BLD-MCNC: 11.2 G/DL
LYMPHOCYTES NFR BLD: 4.8 X10(3) UL (ref 1–4)
LYMPHOCYTES NFR BLD: 50 %
MCH RBC QN AUTO: 23.4 PG (ref 26–34)
MCHC RBC AUTO-ENTMCNC: 30.4 G/DL (ref 31–37)
MCV RBC AUTO: 76.8 FL
MONOCYTES # BLD: 0.38 X10(3) UL (ref 0.1–1)
MONOCYTES NFR BLD: 4 %
NEUTROPHILS # BLD AUTO: 4.05 X10 (3) UL (ref 1.5–7.7)
NEUTROPHILS NFR BLD: 43 %
NEUTS BAND NFR BLD: 3 %
NEUTS HYPERSEG # BLD: 4.42 X10(3) UL (ref 1.5–7.7)
NRBC BLD MANUAL-RTO: 6 %
OSMOLALITY SERPL CALC.SUM OF ELEC: 293 MOSM/KG (ref 275–295)
PLATELET # BLD AUTO: 159 10(3)UL (ref 150–450)
PLATELET MORPHOLOGY: NORMAL
POTASSIUM SERPL-SCNC: 3.2 MMOL/L (ref 3.5–5.1)
RBC # BLD AUTO: 4.79 X10(6)UL
SODIUM SERPL-SCNC: 140 MMOL/L (ref 136–145)
TOTAL CELLS COUNTED BLD: 100
WBC # BLD AUTO: 9.6 X10(3) UL (ref 4–11)

## 2022-06-11 PROCEDURE — 85027 COMPLETE CBC AUTOMATED: CPT | Performed by: EMERGENCY MEDICINE

## 2022-06-11 PROCEDURE — 99283 EMERGENCY DEPT VISIT LOW MDM: CPT

## 2022-06-11 PROCEDURE — 80048 BASIC METABOLIC PNL TOTAL CA: CPT | Performed by: EMERGENCY MEDICINE

## 2022-06-11 PROCEDURE — 36415 COLL VENOUS BLD VENIPUNCTURE: CPT

## 2022-06-11 PROCEDURE — 85007 BL SMEAR W/DIFF WBC COUNT: CPT | Performed by: EMERGENCY MEDICINE

## 2022-06-11 PROCEDURE — 85025 COMPLETE CBC W/AUTO DIFF WBC: CPT | Performed by: EMERGENCY MEDICINE

## 2022-06-11 RX ORDER — POTASSIUM CHLORIDE 20 MEQ/1
40 TABLET, EXTENDED RELEASE ORAL ONCE
Status: DISCONTINUED | OUTPATIENT
Start: 2022-06-11 | End: 2022-06-11

## 2022-06-11 RX ORDER — POTASSIUM CHLORIDE 1.5 G/1.77G
40 POWDER, FOR SOLUTION ORAL ONCE
Status: COMPLETED | OUTPATIENT
Start: 2022-06-11 | End: 2022-06-11

## 2022-06-11 NOTE — ED INITIAL ASSESSMENT (HPI)
Pt reports she had removal of abdominal adhesions yesterday and today she has been bleeding at incision site on LLQ abdomen. Denies any fevers.

## 2022-06-15 ENCOUNTER — TELEPHONE (OUTPATIENT)
Facility: LOCATION | Age: 52
End: 2022-06-15

## 2022-06-15 NOTE — TELEPHONE ENCOUNTER
Pt states bleeding on Friday after Surgery, that night she changed the dressing and had to change twice. She went to ER and bleeding slowed but she went home and bleeding resumed. Pt is now stating that she's still having bleeding. Offered pt. To come in for an appt. Tomorrow, she declined and states it doesn't work with her infusion schedule. Informed pt. Next available would be 6/27 with Leonardo Pedersen. She confirms this date. Pt. Asked if she can be seen sooner, reiterated to pt. That the opening I have is tomorrow, but she declined again. She asked what she can do. Advised pt. If she cannot come in tomorrow for appt, she can go to [facility] \[facility] [place] [facility]\"" pt asked to go to PCP, advised pt. If she wanted to try to go there, she can. She v/u. Scheduled pt.  PO appt

## 2022-06-15 NOTE — TELEPHONE ENCOUNTER
Patient called in stating that she has been having some bleeding and issues with wound since 6/11/22. Patient seeking medical advice or sooner appointment. Patient was unable to come in for OV with Froilan Grullon tomorrow due to infusion treatment. Please call back to discuss.

## 2022-06-27 ENCOUNTER — OFFICE VISIT (OUTPATIENT)
Facility: LOCATION | Age: 52
End: 2022-06-27

## 2022-06-27 VITALS — SYSTOLIC BLOOD PRESSURE: 108 MMHG | TEMPERATURE: 97 F | HEART RATE: 68 BPM | DIASTOLIC BLOOD PRESSURE: 71 MMHG

## 2022-06-27 DIAGNOSIS — R10.31 RIGHT LOWER QUADRANT PAIN: Primary | ICD-10-CM

## 2022-06-27 DIAGNOSIS — K66.0 INTRA-ABDOMINAL ADHESIONS: ICD-10-CM

## 2022-06-27 NOTE — PATIENT INSTRUCTIONS
The patient presents for continued care and evaluation following a laparoscopic lysis of adhesions on 6/10/2022. The patient states she has been doing well following her operation. She states on the evening of 6/10/2022 she had a significant amount of bleeding from her left lower quadrant incision. She states the bleeding prompted her to go to the [facility] emergency department. By the time she arrived at the emergency department, the bleeding had resolved. She was not given anything for the bleeding at the hospital.    Other than the bleeding from her laparoscopic incision site, the patient has no complaints. She states she has some generalized abdominal soreness near the trocar sites in the left abdomen. She states she is not having any right lower quadrant abdominal pain since the time of her operation. She is tolerating a general diet. She denies nausea or vomiting. She is having regular bowel movements. She denies fevers or chills. Clinical examination of the abdomen reveals it to be soft, nondistended, mild incisional site tenderness to palpation, bowel sounds are normal activity normal pitch. There  is no rebounding tenderness or guarding. There are no signs of ascites or peritonitis. The liver and spleen are nonpalpable. There are no palpable masses. There are no umbilical or inguinal hernias. Her laparoscopic incision sites are clean, dry, intact without surrounding erythema or cellulitis. The laparoscopic incision site in her left lower quadrant has some surrounding ecchymosis and a 6 cm x 7 cm hematoma present. The patient is doing well status post laparoscopic lysis of adhesions on 6/10/2022. I discussed with the patient that they should refrain from any bending, pushing, pulling, twisting, or lifting of a force greater than 20 pounds for 8 weeks post-op.   The patient should avoid submerging their incisions in a bath, hot tub, pool for a total of 2 weeks postoperatively. I informed the patient that her hematoma will continue to resolve. She should call our office with any worsening symptoms. The patient may continue a general diet. She does not need a return to work note. All of the patient's questions were answered. The patient verbalized understanding and agreement with the plan of care. I have no further follow-up scheduled with this patient at this time. This patient can see me or Dr. Daniela Hand on an as-needed basis. This patient should return urgently for any problems or complications related to the surgical intervention.

## 2022-06-29 ENCOUNTER — TELEPHONE (OUTPATIENT)
Dept: NEUROLOGY | Facility: CLINIC | Age: 52
End: 2022-06-29

## 2022-06-29 DIAGNOSIS — G35 MS (MULTIPLE SCLEROSIS) (HCC): Primary | ICD-10-CM

## 2022-06-29 NOTE — TELEPHONE ENCOUNTER
FDA mandated Tysabri reauthorization questionnaire completed online. Patient is JCV negative, Index = 0.13. Last test date Jan 2022, next test date due July 2022. Has completed 17 Tysabri infusions with 0 courses of steroids in last 6 months. Reauthorized to continue receiving infusion; this is not a prior authorization for medication. Patient receives infusions at [facility] in [facility]. Re-authorized from [address] program 7/5/2022 - 1/3/2023. Tysabri authorized through insurance. Infusion center maintains the insurance authorization. Tysabri is buy and bill for patient. Information discussed with Dr. Fer Andrade prior to submission. JCV index request form entered in [employer] and mailed to patient home address for completion. Patient infuses her Tysabri every 6 weeks.

## 2022-06-30 ENCOUNTER — TELEPHONE (OUTPATIENT)
Dept: NEUROLOGY | Facility: CLINIC | Age: 52
End: 2022-06-30

## 2022-06-30 NOTE — TELEPHONE ENCOUNTER
Needs new orders for residential for dressing change and heparin flushes for PLEX catheter. Dr. Marjorie Rodriguez wants catheter to remain.
Order for weekly dressing changes placed and faxed to [facility].
no

## 2022-06-30 NOTE — TELEPHONE ENCOUNTER
Tysabri infusion record received from [facility] in [facility] for physician review. No signs/syptoms of infusion reaction noted. Patient infused on 6/30/2022. No observation period required. Lot Number [id number], Exp 11/2025  No new orders requested. Orders active for 3 additional infusions. Next infusion scheduled for 8/11/2022. Patient infuses every 6 weeks. Placed in physician's box for review.

## 2022-07-05 ENCOUNTER — HOSPITAL ENCOUNTER (EMERGENCY)
Facility: HOSPITAL | Age: 52
Discharge: HOME | End: 2022-07-05
Attending: EMERGENCY MEDICINE
Payer: MEDICARE

## 2022-07-05 ENCOUNTER — APPOINTMENT (OUTPATIENT)
Dept: GENERAL RADIOLOGY | Facility: HOSPITAL | Age: 52
End: 2022-07-05
Attending: EMERGENCY MEDICINE
Payer: MEDICARE

## 2022-07-05 VITALS
TEMPERATURE: 98 F | OXYGEN SATURATION: 97 % | DIASTOLIC BLOOD PRESSURE: 79 MMHG | WEIGHT: 169 LBS | RESPIRATION RATE: 15 BRPM | HEART RATE: 62 BPM | SYSTOLIC BLOOD PRESSURE: 113 MMHG | BODY MASS INDEX: 27 KG/M2

## 2022-07-05 DIAGNOSIS — Z98.890 POST-OPERATIVE STATE: ICD-10-CM

## 2022-07-05 DIAGNOSIS — R73.9 HYPERGLYCEMIA: ICD-10-CM

## 2022-07-05 DIAGNOSIS — R53.83 FATIGUE, UNSPECIFIED TYPE: Primary | ICD-10-CM

## 2022-07-05 LAB
ALBUMIN SERPL-MCNC: 3.8 G/DL (ref 3.4–5)
ALBUMIN/GLOB SERPL: 0.9 {RATIO} (ref 1–2)
ALP LIVER SERPL-CCNC: 97 U/L
ALT SERPL-CCNC: 127 U/L
ANION GAP SERPL CALC-SCNC: 8 MMOL/L (ref 0–18)
AST SERPL-CCNC: 105 U/L (ref 15–37)
BASOPHILS # BLD AUTO: 0.03 X10(3) UL (ref 0–0.2)
BASOPHILS NFR BLD AUTO: 0.4 %
BILIRUB SERPL-MCNC: 0.4 MG/DL (ref 0.1–2)
BILIRUB UR QL STRIP.AUTO: NEGATIVE
BUN BLD-MCNC: 13 MG/DL (ref 7–18)
CALCIUM BLD-MCNC: 9.4 MG/DL (ref 8.5–10.1)
CHLORIDE SERPL-SCNC: 105 MMOL/L (ref 98–112)
CLARITY UR REFRACT.AUTO: CLEAR
CO2 SERPL-SCNC: 25 MMOL/L (ref 21–32)
COLOR UR AUTO: YELLOW
CREAT BLD-MCNC: 1.27 MG/DL
D DIMER PPP FEU-MCNC: 0.48 UG/ML FEU (ref ?–0.54)
EOSINOPHIL # BLD AUTO: 0.21 X10(3) UL (ref 0–0.7)
EOSINOPHIL NFR BLD AUTO: 2.5 %
ERYTHROCYTE [DISTWIDTH] IN BLOOD BY AUTOMATED COUNT: 17.3 %
GLOBULIN PLAS-MCNC: 4.3 G/DL (ref 2.8–4.4)
GLUCOSE BLD-MCNC: 102 MG/DL (ref 70–99)
GLUCOSE BLD-MCNC: 229 MG/DL (ref 70–99)
GLUCOSE BLD-MCNC: 238 MG/DL (ref 70–99)
GLUCOSE BLD-MCNC: 309 MG/DL (ref 70–99)
GLUCOSE UR STRIP.AUTO-MCNC: 150 MG/DL
HCT VFR BLD AUTO: 46.2 %
HGB BLD-MCNC: 13.9 G/DL
HYALINE CASTS #/AREA URNS AUTO: PRESENT /LPF
IMM GRANULOCYTES # BLD AUTO: 0.02 X10(3) UL (ref 0–1)
IMM GRANULOCYTES NFR BLD: 0.2 %
KETONES UR STRIP.AUTO-MCNC: NEGATIVE MG/DL
LEUKOCYTE ESTERASE UR QL STRIP.AUTO: NEGATIVE
LYMPHOCYTES # BLD AUTO: 4.62 X10(3) UL (ref 1–4)
LYMPHOCYTES NFR BLD AUTO: 54 %
MCH RBC QN AUTO: 23.4 PG (ref 26–34)
MCHC RBC AUTO-ENTMCNC: 30.1 G/DL (ref 31–37)
MCV RBC AUTO: 77.8 FL
MONOCYTES # BLD AUTO: 0.62 X10(3) UL (ref 0.1–1)
MONOCYTES NFR BLD AUTO: 7.2 %
NEUTROPHILS # BLD AUTO: 3.06 X10 (3) UL (ref 1.5–7.7)
NEUTROPHILS # BLD AUTO: 3.06 X10(3) UL (ref 1.5–7.7)
NEUTROPHILS NFR BLD AUTO: 35.7 %
NITRITE UR QL STRIP.AUTO: NEGATIVE
OSMOLALITY SERPL CALC.SUM OF ELEC: 294 MOSM/KG (ref 275–295)
PH UR STRIP.AUTO: 5 [PH] (ref 5–8)
PLATELET # BLD AUTO: 173 10(3)UL (ref 150–450)
POTASSIUM SERPL-SCNC: 3.4 MMOL/L (ref 3.5–5.1)
PROT SERPL-MCNC: 8.1 G/DL (ref 6.4–8.2)
PROT UR STRIP.AUTO-MCNC: 30 MG/DL
RBC # BLD AUTO: 5.94 X10(6)UL
RBC UR QL AUTO: NEGATIVE
SARS-COV-2 RNA RESP QL NAA+PROBE: NOT DETECTED
SODIUM SERPL-SCNC: 138 MMOL/L (ref 136–145)
SP GR UR STRIP.AUTO: 1.03 (ref 1–1.03)
TROPONIN I HIGH SENSITIVITY: 12 NG/L
UROBILINOGEN UR STRIP.AUTO-MCNC: <2 MG/DL
WBC # BLD AUTO: 8.6 X10(3) UL (ref 4–11)

## 2022-07-05 PROCEDURE — 82962 GLUCOSE BLOOD TEST: CPT

## 2022-07-05 PROCEDURE — 84484 ASSAY OF TROPONIN QUANT: CPT | Performed by: EMERGENCY MEDICINE

## 2022-07-05 PROCEDURE — 81001 URINALYSIS AUTO W/SCOPE: CPT | Performed by: EMERGENCY MEDICINE

## 2022-07-05 PROCEDURE — 71045 X-RAY EXAM CHEST 1 VIEW: CPT | Performed by: EMERGENCY MEDICINE

## 2022-07-05 PROCEDURE — 93005 ELECTROCARDIOGRAM TRACING: CPT

## 2022-07-05 PROCEDURE — 96374 THER/PROPH/DIAG INJ IV PUSH: CPT

## 2022-07-05 PROCEDURE — 99285 EMERGENCY DEPT VISIT HI MDM: CPT

## 2022-07-05 PROCEDURE — 96361 HYDRATE IV INFUSION ADD-ON: CPT

## 2022-07-05 PROCEDURE — 85379 FIBRIN DEGRADATION QUANT: CPT | Performed by: EMERGENCY MEDICINE

## 2022-07-05 PROCEDURE — 85025 COMPLETE CBC W/AUTO DIFF WBC: CPT | Performed by: EMERGENCY MEDICINE

## 2022-07-05 PROCEDURE — 80053 COMPREHEN METABOLIC PANEL: CPT | Performed by: EMERGENCY MEDICINE

## 2022-07-05 RX ORDER — POTASSIUM CHLORIDE 20 MEQ/1
20 TABLET, EXTENDED RELEASE ORAL ONCE
Status: COMPLETED | OUTPATIENT
Start: 2022-07-05 | End: 2022-07-05

## 2022-07-05 RX ORDER — KETOROLAC TROMETHAMINE 15 MG/ML
15 INJECTION, SOLUTION INTRAMUSCULAR; INTRAVENOUS ONCE
Status: COMPLETED | OUTPATIENT
Start: 2022-07-05 | End: 2022-07-05

## 2022-07-05 NOTE — ED INITIAL ASSESSMENT (HPI)
Pt to the emergency room for increased fatigue, nausea for the past week. Pt states that shes been monitoring her sugars throughout the week and notes that it is currently under control. No vomiting, pt states that eating makes her discomfort worse so she has had decreased appetite. Pt followed up with Belkys Castillo who asked him to come to ER. Reading on CGM is 310; 309 @ triage.

## 2022-07-05 NOTE — ED QUICK NOTES
On arrival to room pt c/o intermittent sharp chest pain. Pt reports it started about a week ago. Radiates to neck and left arm. MD notified.

## 2022-07-06 LAB
ATRIAL RATE: 76 BPM
P AXIS: 65 DEGREES
P-R INTERVAL: 126 MS
Q-T INTERVAL: 414 MS
QRS DURATION: 78 MS
QTC CALCULATION (BEZET): 465 MS
R AXIS: -1 DEGREES
T AXIS: 60 DEGREES
VENTRICULAR RATE: 76 BPM

## 2022-07-08 ENCOUNTER — TELEPHONE (OUTPATIENT)
Dept: NEUROLOGY | Facility: CLINIC | Age: 52
End: 2022-07-08

## 2022-07-08 NOTE — TELEPHONE ENCOUNTER
RN received Fax from [facility] for recertification for [facility] Nick Wood. RN faxed to [phone]. Fax confirmation received. Copy sent to scan.

## 2022-07-12 ENCOUNTER — ORDER TRANSCRIPTION (OUTPATIENT)
Dept: ADMINISTRATIVE | Facility: HOSPITAL | Age: 52
End: 2022-07-12

## 2022-07-12 DIAGNOSIS — I42.0 DILATED IDIOPATHIC CARDIOMYOPATHY (HCC): ICD-10-CM

## 2022-07-12 DIAGNOSIS — E11.9 DM TYPE 2 (DIABETES MELLITUS, TYPE 2) (HCC): ICD-10-CM

## 2022-07-12 DIAGNOSIS — R06.00 DYSPNEA: ICD-10-CM

## 2022-07-12 DIAGNOSIS — R53.81 CHRONIC FATIGUE AND MALAISE: ICD-10-CM

## 2022-07-12 DIAGNOSIS — R53.82 CHRONIC FATIGUE AND MALAISE: ICD-10-CM

## 2022-07-12 DIAGNOSIS — R07.1 CHEST PAIN ON RESPIRATION: Primary | ICD-10-CM

## 2022-07-12 DIAGNOSIS — E78.00 PURE HYPERCHOLESTEROLEMIA: ICD-10-CM

## 2022-07-14 ENCOUNTER — TELEPHONE (OUTPATIENT)
Dept: NEUROLOGY | Facility: CLINIC | Age: 52
End: 2022-07-14

## 2022-07-14 NOTE — TELEPHONE ENCOUNTER
RN received from [facility] for physician review and signature. Patient receives SN    Frequency: Weekly    Duration: 8 weeks    Plan of Care: Instruct Patient/Caregiver about MS, S/S Measures to Control, Possible Complications associated with MS. Paperwork signed and faxed with receipt confirmation. Copy sent to scan.

## 2022-07-26 ENCOUNTER — CASE MANAGEMENT (OUTPATIENT)
Dept: CARE COORDINATION | Age: 52
End: 2022-07-26

## 2022-07-27 ENCOUNTER — CASE MANAGEMENT (OUTPATIENT)
Dept: CARE COORDINATION | Age: 52
End: 2022-07-27

## 2022-07-27 SDOH — HEALTH STABILITY: MENTAL HEALTH: AUDIT TOTAL SCORE: 0

## 2022-07-27 SDOH — HEALTH STABILITY: MENTAL HEALTH: DEPRESSION SCREENING SCORE: 2

## 2022-07-27 SDOH — HEALTH STABILITY: PHYSICAL HEALTH: ON AVERAGE, HOW MANY MINUTES DO YOU ENGAGE IN EXERCISE AT THIS LEVEL?: 20 MIN

## 2022-07-27 SDOH — ECONOMIC STABILITY: HOUSING INSECURITY: ARE YOU WORRIED ABOUT LOSING YOUR HOUSING?: NO

## 2022-07-27 SDOH — HEALTH STABILITY: MENTAL HEALTH: HOW OFTEN DO YOU HAVE A DRINK CONTAINING ALCOHOL?: NEVER

## 2022-07-27 SDOH — HEALTH STABILITY: MENTAL HEALTH: PHQ2 INTERPRETATION: NO FURTHER SCREENING NEEDED

## 2022-07-27 SDOH — ECONOMIC STABILITY: GENERAL

## 2022-07-27 SDOH — HEALTH STABILITY: MENTAL HEALTH: FEELING DOWN, DEPRESSED OR HOPELESS?: SEVERAL DAYS

## 2022-07-27 SDOH — HEALTH STABILITY: MENTAL HEALTH: HOW MANY STANDARD DRINKS CONTAINING ALCOHOL DO YOU HAVE ON A TYPICAL DAY?: 0,1 OR 2

## 2022-07-27 SDOH — HEALTH STABILITY: PHYSICAL HEALTH: ON AVERAGE, HOW MANY DAYS PER WEEK DO YOU ENGAGE IN MODERATE TO STRENUOUS EXERCISE (LIKE A BRISK WALK)?: 3 DAYS

## 2022-07-27 SDOH — HEALTH STABILITY: MENTAL HEALTH: HOW OFTEN DO YOU HAVE 6 OR MORE DRINKS ON ONE OCCASION?: NEVER

## 2022-07-27 SDOH — ECONOMIC STABILITY: FOOD INSECURITY: HOW OFTEN IN THE PAST 12 MONTHS WERE YOU WORRIED OR STRESSED ABOUT HAVING ENOUGH MONEY TO BUY NUTRITIOUS MEALS?: NEVER

## 2022-07-27 SDOH — HEALTH STABILITY: MENTAL HEALTH: LITTLE INTEREST OR PLEASURE IN ACTIVITY?: SEVERAL DAYS

## 2022-07-27 ASSESSMENT — ACTIVITIES OF DAILY LIVING (ADL)
DRIVING: PARTIAL ASSIST
GROOMING: INDEPENDENT
DME_IN_USE: YES
FUNCTIONAL_EVALUATION: PERFORMS ADLS INDEPENDENTLY
MOBILITY: GAIT IMPAIRMENT
BATHING: INDEPENDENT
EATING: INDEPENDENT
DRESSING: INDEPENDENT
TOILETING: INDEPENDENT
DME_IN_USE: WALKER;CANE
AMBULATION: INDEPENDENT

## 2022-07-27 ASSESSMENT — SLEEP AND FATIGUE QUESTIONNAIRES
HOURS OF UNINTERRUPTED SLEEP: 6
SLEEP DESCRIPTORS: MIDDLE OF NIGHT AWAKENING
HOURS OF UNINTERRUPTED SLEEP: 6
SLEEP DESCRIPTORS: MIDDLE OF NIGHT AWAKENING

## 2022-07-27 ASSESSMENT — PATIENT HEALTH QUESTIONNAIRE - PHQ9: SUM OF ALL RESPONSES TO PHQ9 QUESTIONS 1 AND 2: 2

## 2022-08-05 LAB
INDEX VALUE: 1.38
JCV ANTIBODY: POSITIVE

## 2022-08-10 ENCOUNTER — CASE MANAGEMENT (OUTPATIENT)
Dept: CARE COORDINATION | Age: 52
End: 2022-08-10

## 2022-08-11 ENCOUNTER — TELEPHONE (OUTPATIENT)
Dept: NEUROLOGY | Facility: CLINIC | Age: 52
End: 2022-08-11

## 2022-08-11 NOTE — TELEPHONE ENCOUNTER
Tysabri infusion record received from [facility] for physician review. No signs/syptoms of infusion reaction noted. Patient infused on 8/11/2022. No post observation period required. Lot Number [id number] x1 , Exp 12/2025  No new orders requested. Orders are active for 2 additional infusions. Next infusion scheduled for 9/22/2022. Patient infuses every 6 weeks. Placed in physician's box for review.     Last JCV index: 1.38

## 2022-08-19 NOTE — TELEPHONE ENCOUNTER
Tysabri infusion record received from [facility] for physician review. No signs/symptoms of infusion reaction noted. Patient infused on 9/9/2021. Stable during the post infusion observation period.   Lot Number [id number], Exp 08/2024  New orders r
no

## 2022-08-24 ENCOUNTER — CASE MANAGEMENT (OUTPATIENT)
Dept: CARE COORDINATION | Age: 52
End: 2022-08-24

## 2022-09-01 ENCOUNTER — TELEPHONE (OUTPATIENT)
Dept: NEUROLOGY | Facility: CLINIC | Age: 52
End: 2022-09-01

## 2022-09-01 NOTE — TELEPHONE ENCOUNTER
RN  received from [facility] for physician review and signature. Patient receives SN    Frequency: Weekly     Duration: From 9/5/2022-11/3/22    Plan of Care: Recertification for  [facility]    Paperwork signed and faxed with receipt confirmation. Copy Sent to scan.

## 2022-09-07 ENCOUNTER — HOSPITAL ENCOUNTER (OUTPATIENT)
Dept: MAMMOGRAPHY | Age: 52
Discharge: HOME | End: 2022-09-07
Attending: STUDENT IN AN ORGANIZED HEALTH CARE EDUCATION/TRAINING PROGRAM
Payer: MEDICARE

## 2022-09-07 ENCOUNTER — HOSPITAL ENCOUNTER (OUTPATIENT)
Dept: BONE DENSITY | Age: 52
Discharge: HOME | End: 2022-09-07
Attending: STUDENT IN AN ORGANIZED HEALTH CARE EDUCATION/TRAINING PROGRAM
Payer: MEDICARE

## 2022-09-07 DIAGNOSIS — M81.0 OSTEOPOROSIS: ICD-10-CM

## 2022-09-07 DIAGNOSIS — Z12.31 SCREENING MAMMOGRAM, ENCOUNTER FOR: ICD-10-CM

## 2022-09-07 LAB
HM DEXA SCAN: NORMAL
HM MAMMOGRAPHY BILATERAL: NORMAL

## 2022-09-07 PROCEDURE — 77080 DXA BONE DENSITY AXIAL: CPT | Performed by: STUDENT IN AN ORGANIZED HEALTH CARE EDUCATION/TRAINING PROGRAM

## 2022-09-07 PROCEDURE — 77067 SCR MAMMO BI INCL CAD: CPT | Performed by: STUDENT IN AN ORGANIZED HEALTH CARE EDUCATION/TRAINING PROGRAM

## 2022-09-07 PROCEDURE — 77063 BREAST TOMOSYNTHESIS BI: CPT | Performed by: STUDENT IN AN ORGANIZED HEALTH CARE EDUCATION/TRAINING PROGRAM

## 2022-09-09 ENCOUNTER — LAB ENCOUNTER (OUTPATIENT)
Dept: LAB | Facility: HOSPITAL | Age: 52
End: 2022-09-09
Attending: SPECIALIST
Payer: MEDICARE

## 2022-09-09 DIAGNOSIS — M84.30XA STRESS FRACTURE: ICD-10-CM

## 2022-09-09 DIAGNOSIS — E55.9 VITAMIN D DEFICIENCY: ICD-10-CM

## 2022-09-09 DIAGNOSIS — E07.9 DISEASE OF THYROID GLAND: ICD-10-CM

## 2022-09-09 DIAGNOSIS — D51.9 B12 DEFICIENCY ANEMIA: ICD-10-CM

## 2022-09-09 DIAGNOSIS — N18.9 CHRONIC KIDNEY DISEASE, UNSPECIFIED: ICD-10-CM

## 2022-09-09 DIAGNOSIS — D64.9 ANEMIA, UNSPECIFIED: ICD-10-CM

## 2022-09-09 DIAGNOSIS — E11.8 DIABETIC COMPLICATION (HCC): ICD-10-CM

## 2022-09-09 DIAGNOSIS — E78.5 HYPERLIPEMIA: ICD-10-CM

## 2022-09-09 DIAGNOSIS — Z12.31 ENCOUNTER FOR MAMMOGRAM TO ESTABLISH BASELINE MAMMOGRAM: Primary | ICD-10-CM

## 2022-09-09 LAB
ALBUMIN SERPL-MCNC: 3.3 G/DL (ref 3.4–5)
ALBUMIN/GLOB SERPL: 0.9 {RATIO} (ref 1–2)
ALP LIVER SERPL-CCNC: 91 U/L
ALT SERPL-CCNC: 94 U/L
ANION GAP SERPL CALC-SCNC: 7 MMOL/L (ref 0–18)
AST SERPL-CCNC: 49 U/L (ref 15–37)
BASOPHILS # BLD AUTO: 0.03 X10(3) UL (ref 0–0.2)
BASOPHILS NFR BLD AUTO: 0.5 %
BILIRUB SERPL-MCNC: 0.5 MG/DL (ref 0.1–2)
BUN BLD-MCNC: 9 MG/DL (ref 7–18)
CALCIUM BLD-MCNC: 8.8 MG/DL (ref 8.5–10.1)
CHLORIDE SERPL-SCNC: 107 MMOL/L (ref 98–112)
CHOLEST SERPL-MCNC: 232 MG/DL (ref ?–200)
CO2 SERPL-SCNC: 28 MMOL/L (ref 21–32)
CREAT BLD-MCNC: 1.04 MG/DL
EOSINOPHIL # BLD AUTO: 0 X10(3) UL (ref 0–0.7)
EOSINOPHIL NFR BLD AUTO: 0 %
ERYTHROCYTE [DISTWIDTH] IN BLOOD BY AUTOMATED COUNT: 16 %
FASTING PATIENT LIPID ANSWER: YES
FASTING STATUS PATIENT QL REPORTED: YES
GFR SERPLBLD BASED ON 1.73 SQ M-ARVRAT: 64 ML/MIN/1.73M2 (ref 60–?)
GLOBULIN PLAS-MCNC: 3.7 G/DL (ref 2.8–4.4)
GLUCOSE BLD-MCNC: 191 MG/DL (ref 70–99)
HCT VFR BLD AUTO: 41 %
HDLC SERPL-MCNC: 35 MG/DL (ref 40–59)
HGB BLD-MCNC: 12 G/DL
IMM GRANULOCYTES # BLD AUTO: 0.03 X10(3) UL (ref 0–1)
IMM GRANULOCYTES NFR BLD: 0.5 %
LDLC SERPL CALC-MCNC: 173 MG/DL (ref ?–100)
LYMPHOCYTES # BLD AUTO: 2.77 X10(3) UL (ref 1–4)
LYMPHOCYTES NFR BLD AUTO: 45.2 %
MCH RBC QN AUTO: 23.1 PG (ref 26–34)
MCHC RBC AUTO-ENTMCNC: 29.3 G/DL (ref 31–37)
MCV RBC AUTO: 78.8 FL
MONOCYTES # BLD AUTO: 0.5 X10(3) UL (ref 0.1–1)
MONOCYTES NFR BLD AUTO: 8.2 %
NEUTROPHILS # BLD AUTO: 2.8 X10 (3) UL (ref 1.5–7.7)
NEUTROPHILS # BLD AUTO: 2.8 X10(3) UL (ref 1.5–7.7)
NEUTROPHILS NFR BLD AUTO: 45.6 %
NONHDLC SERPL-MCNC: 197 MG/DL (ref ?–130)
OSMOLALITY SERPL CALC.SUM OF ELEC: 298 MOSM/KG (ref 275–295)
PLATELET # BLD AUTO: 173 10(3)UL (ref 150–450)
PLATELET MORPHOLOGY: NORMAL
POTASSIUM SERPL-SCNC: 3.6 MMOL/L (ref 3.5–5.1)
PROT SERPL-MCNC: 7 G/DL (ref 6.4–8.2)
RBC # BLD AUTO: 5.2 X10(6)UL
SODIUM SERPL-SCNC: 142 MMOL/L (ref 136–145)
T3FREE SERPL-MCNC: 2.2 PG/ML (ref 2.4–4.2)
T4 FREE SERPL-MCNC: 1.2 NG/DL (ref 0.8–1.7)
TRIGL SERPL-MCNC: 130 MG/DL (ref 30–149)
TSI SER-ACNC: 0.07 MIU/ML (ref 0.36–3.74)
VLDLC SERPL CALC-MCNC: 26 MG/DL (ref 0–30)
WBC # BLD AUTO: 6.1 X10(3) UL (ref 4–11)

## 2022-09-09 PROCEDURE — 84443 ASSAY THYROID STIM HORMONE: CPT

## 2022-09-09 PROCEDURE — 80053 COMPREHEN METABOLIC PANEL: CPT

## 2022-09-09 PROCEDURE — 84481 FREE ASSAY (FT-3): CPT

## 2022-09-09 PROCEDURE — 80061 LIPID PANEL: CPT

## 2022-09-09 PROCEDURE — 36415 COLL VENOUS BLD VENIPUNCTURE: CPT

## 2022-09-09 PROCEDURE — 84439 ASSAY OF FREE THYROXINE: CPT

## 2022-09-09 PROCEDURE — 85025 COMPLETE CBC W/AUTO DIFF WBC: CPT

## 2022-09-12 ENCOUNTER — TELEPHONE (OUTPATIENT)
Dept: NEUROLOGY | Facility: CLINIC | Age: 52
End: 2022-09-12

## 2022-09-12 NOTE — TELEPHONE ENCOUNTER
RN received from [facility] for physician review and signature. Patient receives SN    Frequency: Weekly    Duration: 9 weeks    Plan of Care: Pt will be free of falls and hospitalizations, understand diet, medications, and infection control. Paperwork signed and faxed with receipt confirmation. Paperwork sent to scan.

## 2022-09-14 ENCOUNTER — CASE MANAGEMENT (OUTPATIENT)
Dept: CARE COORDINATION | Age: 52
End: 2022-09-14

## 2022-09-14 ENCOUNTER — LAB REQUISITION (OUTPATIENT)
Dept: LAB | Facility: HOSPITAL | Age: 52
End: 2022-09-14
Payer: MEDICARE

## 2022-09-14 DIAGNOSIS — N39.0 URINARY TRACT INFECTION, SITE NOT SPECIFIED: ICD-10-CM

## 2022-09-14 LAB
BILIRUB UR QL STRIP.AUTO: NEGATIVE
CLARITY UR REFRACT.AUTO: CLEAR
COLOR UR AUTO: YELLOW
GLUCOSE UR STRIP.AUTO-MCNC: 500 MG/DL
KETONES UR STRIP.AUTO-MCNC: NEGATIVE MG/DL
LEUKOCYTE ESTERASE UR QL STRIP.AUTO: NEGATIVE
NITRITE UR QL STRIP.AUTO: NEGATIVE
PH UR STRIP.AUTO: 7 [PH] (ref 5–8)
PROT UR STRIP.AUTO-MCNC: NEGATIVE MG/DL
RBC UR QL AUTO: NEGATIVE
SP GR UR STRIP.AUTO: 1.02 (ref 1–1.03)
UROBILINOGEN UR STRIP.AUTO-MCNC: 0.2 MG/DL

## 2022-09-14 PROCEDURE — 81003 URINALYSIS AUTO W/O SCOPE: CPT | Performed by: SPECIALIST

## 2022-10-05 ENCOUNTER — CASE MANAGEMENT (OUTPATIENT)
Dept: CARE COORDINATION | Age: 52
End: 2022-10-05

## 2022-10-10 ENCOUNTER — TELEPHONE (OUTPATIENT)
Dept: NEUROLOGY | Facility: CLINIC | Age: 52
End: 2022-10-10

## 2022-10-11 NOTE — TELEPHONE ENCOUNTER
Renewal tysabri orders faxed to [facility] @ [phone] with receipt confirmation. Orders for Tysabri infusions every 6 weeks.

## 2022-10-11 NOTE — TELEPHONE ENCOUNTER
Orders for continued Tysabri infusions every 6 weeks initiated and placed on provider desk for approval.    Will fax to [facility] when signed.

## 2022-10-19 ENCOUNTER — OFFICE VISIT (OUTPATIENT)
Dept: NEUROLOGY | Facility: CLINIC | Age: 52
End: 2022-10-19
Payer: MEDICARE

## 2022-10-19 VITALS
SYSTOLIC BLOOD PRESSURE: 109 MMHG | HEART RATE: 66 BPM | DIASTOLIC BLOOD PRESSURE: 72 MMHG | HEIGHT: 66 IN | WEIGHT: 169 LBS | BODY MASS INDEX: 27.16 KG/M2

## 2022-10-19 DIAGNOSIS — G35 MULTIPLE SCLEROSIS (HCC): Primary | ICD-10-CM

## 2022-10-19 PROCEDURE — 99214 OFFICE O/P EST MOD 30 MIN: CPT | Performed by: OTHER

## 2022-10-19 RX ORDER — ATORVASTATIN CALCIUM 20 MG/1
20 TABLET, FILM COATED ORAL DAILY
COMMUNITY
Start: 2022-09-20

## 2022-10-19 RX ORDER — DULOXETIN HYDROCHLORIDE 60 MG/1
60 CAPSULE, DELAYED RELEASE ORAL 2 TIMES DAILY
COMMUNITY
Start: 2022-08-02 | End: 2022-10-19 | Stop reason: ALTCHOICE

## 2022-10-19 RX ORDER — BACLOFEN 10 MG/1
10 TABLET ORAL 3 TIMES DAILY PRN
COMMUNITY
Start: 2022-09-10

## 2022-10-19 RX ORDER — LEVOTHYROXINE SODIUM 0.07 MG/1
75 TABLET ORAL
COMMUNITY
Start: 2022-09-20

## 2022-10-20 ENCOUNTER — CASE MANAGEMENT (OUTPATIENT)
Dept: CARE COORDINATION | Age: 52
End: 2022-10-20

## 2022-10-25 ENCOUNTER — CASE MANAGEMENT (OUTPATIENT)
Dept: CARE COORDINATION | Age: 52
End: 2022-10-25

## 2022-10-28 ENCOUNTER — CASE MANAGEMENT (OUTPATIENT)
Dept: CARE COORDINATION | Age: 52
End: 2022-10-28

## 2022-11-01 RX ORDER — ASPIRIN 81 MG/1
TABLET, CHEWABLE ORAL DAILY
COMMUNITY

## 2022-11-02 ENCOUNTER — TELEPHONE (OUTPATIENT)
Dept: NEUROLOGY | Facility: CLINIC | Age: 52
End: 2022-11-02

## 2022-11-02 ENCOUNTER — LAB ENCOUNTER (OUTPATIENT)
Dept: LAB | Age: 52
End: 2022-11-02
Attending: UROLOGY
Payer: MEDICARE

## 2022-11-02 ENCOUNTER — ANESTHESIA EVENT (OUTPATIENT)
Dept: SURGERY | Facility: HOSPITAL | Age: 52
End: 2022-11-02
Payer: MEDICARE

## 2022-11-02 DIAGNOSIS — Z01.818 PRE-OP TESTING: ICD-10-CM

## 2022-11-02 NOTE — TELEPHONE ENCOUNTER
RN received from [facility] for physician review and signature. Patient receives SN    Frequency: weekly    Duration: 3 months    Plan of Care: To assist with medication. Paperwork signed and faxed with receipt confirmation. Copy sent to scan.

## 2022-11-03 ENCOUNTER — TELEPHONE (OUTPATIENT)
Dept: NEUROLOGY | Facility: CLINIC | Age: 52
End: 2022-11-03

## 2022-11-03 LAB — SARS-COV-2 RNA RESP QL NAA+PROBE: NOT DETECTED

## 2022-11-03 NOTE — TELEPHONE ENCOUNTER
Tysabri infusion record received from [facility] [address] [place] [address] for physician review. No signs/symptoms of infusion reaction noted. Patient infused on 11/3/2022. No longer required to stay for the post infusion observation period. Lot Number [id number] [id number], Exp 12/2025  No new orders requested. Orders active for one year of infusions. Patient infusing every 6 weeks. Next infusion scheduled for 12/15/2022. Placed in physician's box for review.

## 2022-11-04 ENCOUNTER — ANESTHESIA (OUTPATIENT)
Dept: SURGERY | Facility: HOSPITAL | Age: 52
End: 2022-11-04
Payer: MEDICARE

## 2022-11-04 ENCOUNTER — HOSPITAL ENCOUNTER (OUTPATIENT)
Facility: HOSPITAL | Age: 52
Setting detail: HOSPITAL OUTPATIENT SURGERY
Discharge: HOME | End: 2022-11-04
Attending: UROLOGY | Admitting: UROLOGY
Payer: MEDICARE

## 2022-11-04 ENCOUNTER — APPOINTMENT (OUTPATIENT)
Dept: GENERAL RADIOLOGY | Facility: HOSPITAL | Age: 52
End: 2022-11-04
Attending: UROLOGY
Payer: MEDICARE

## 2022-11-04 VITALS
OXYGEN SATURATION: 96 % | SYSTOLIC BLOOD PRESSURE: 126 MMHG | WEIGHT: 170 LBS | DIASTOLIC BLOOD PRESSURE: 83 MMHG | HEART RATE: 69 BPM | RESPIRATION RATE: 16 BRPM | BODY MASS INDEX: 27.32 KG/M2 | TEMPERATURE: 98 F | HEIGHT: 66 IN

## 2022-11-04 DIAGNOSIS — Z01.818 PRE-OP TESTING: Primary | ICD-10-CM

## 2022-11-04 LAB
GLUCOSE BLD-MCNC: 163 MG/DL (ref 70–99)
GLUCOSE BLD-MCNC: 204 MG/DL (ref 70–99)

## 2022-11-04 PROCEDURE — 82962 GLUCOSE BLOOD TEST: CPT

## 2022-11-04 PROCEDURE — 0T768DZ DILATION OF RIGHT URETER WITH INTRALUMINAL DEVICE, VIA NATURAL OR ARTIFICIAL OPENING ENDOSCOPIC: ICD-10-PCS | Performed by: UROLOGY

## 2022-11-04 DEVICE — URETERAL STENT
Type: IMPLANTABLE DEVICE | Site: URETER | Status: FUNCTIONAL
Brand: ASCERTA™

## 2022-11-04 RX ORDER — HYDROCODONE BITARTRATE AND ACETAMINOPHEN 5; 325 MG/1; MG/1
1-2 TABLET ORAL EVERY 4 HOURS PRN
Qty: 20 TABLET | Refills: 0 | Status: SHIPPED | OUTPATIENT
Start: 2022-11-04

## 2022-11-04 RX ORDER — PROCHLORPERAZINE EDISYLATE 5 MG/ML
5 INJECTION INTRAMUSCULAR; INTRAVENOUS EVERY 8 HOURS PRN
Status: DISCONTINUED | OUTPATIENT
Start: 2022-11-04 | End: 2022-11-04

## 2022-11-04 RX ORDER — NICOTINE POLACRILEX 4 MG
15 LOZENGE BUCCAL
Status: DISCONTINUED | OUTPATIENT
Start: 2022-11-04 | End: 2022-11-04 | Stop reason: HOSPADM

## 2022-11-04 RX ORDER — CLINDAMYCIN PHOSPHATE 900 MG/50ML
900 INJECTION INTRAVENOUS ONCE
Status: COMPLETED | OUTPATIENT
Start: 2022-11-04 | End: 2022-11-04

## 2022-11-04 RX ORDER — ACETAMINOPHEN 500 MG
1000 TABLET ORAL ONCE AS NEEDED
Status: COMPLETED | OUTPATIENT
Start: 2022-11-04 | End: 2022-11-04

## 2022-11-04 RX ORDER — NICOTINE POLACRILEX 4 MG
30 LOZENGE BUCCAL
Status: DISCONTINUED | OUTPATIENT
Start: 2022-11-04 | End: 2022-11-04 | Stop reason: HOSPADM

## 2022-11-04 RX ORDER — HYDROMORPHONE HYDROCHLORIDE 1 MG/ML
0.6 INJECTION, SOLUTION INTRAMUSCULAR; INTRAVENOUS; SUBCUTANEOUS EVERY 5 MIN PRN
Status: DISCONTINUED | OUTPATIENT
Start: 2022-11-04 | End: 2022-11-04

## 2022-11-04 RX ORDER — HYDROMORPHONE HYDROCHLORIDE 1 MG/ML
0.2 INJECTION, SOLUTION INTRAMUSCULAR; INTRAVENOUS; SUBCUTANEOUS EVERY 5 MIN PRN
Status: DISCONTINUED | OUTPATIENT
Start: 2022-11-04 | End: 2022-11-04

## 2022-11-04 RX ORDER — SODIUM CHLORIDE, SODIUM LACTATE, POTASSIUM CHLORIDE, CALCIUM CHLORIDE 600; 310; 30; 20 MG/100ML; MG/100ML; MG/100ML; MG/100ML
INJECTION, SOLUTION INTRAVENOUS CONTINUOUS
Status: DISCONTINUED | OUTPATIENT
Start: 2022-11-04 | End: 2022-11-04

## 2022-11-04 RX ORDER — DEXTROSE MONOHYDRATE 25 G/50ML
50 INJECTION, SOLUTION INTRAVENOUS
Status: DISCONTINUED | OUTPATIENT
Start: 2022-11-04 | End: 2022-11-04 | Stop reason: HOSPADM

## 2022-11-04 RX ORDER — INSULIN ASPART 100 [IU]/ML
INJECTION, SOLUTION INTRAVENOUS; SUBCUTANEOUS ONCE
Status: COMPLETED | OUTPATIENT
Start: 2022-11-04 | End: 2022-11-04

## 2022-11-04 RX ORDER — METOPROLOL TARTRATE 5 MG/5ML
2.5 INJECTION INTRAVENOUS ONCE
Status: DISCONTINUED | OUTPATIENT
Start: 2022-11-04 | End: 2022-11-04

## 2022-11-04 RX ORDER — HYDROCODONE BITARTRATE AND ACETAMINOPHEN 5; 325 MG/1; MG/1
1 TABLET ORAL ONCE AS NEEDED
Status: COMPLETED | OUTPATIENT
Start: 2022-11-04 | End: 2022-11-04

## 2022-11-04 RX ORDER — HYDROCODONE BITARTRATE AND ACETAMINOPHEN 5; 325 MG/1; MG/1
2 TABLET ORAL ONCE AS NEEDED
Status: COMPLETED | OUTPATIENT
Start: 2022-11-04 | End: 2022-11-04

## 2022-11-04 RX ORDER — MIDAZOLAM HYDROCHLORIDE 1 MG/ML
INJECTION INTRAMUSCULAR; INTRAVENOUS AS NEEDED
Status: DISCONTINUED | OUTPATIENT
Start: 2022-11-04 | End: 2022-11-04 | Stop reason: SURG

## 2022-11-04 RX ORDER — HYDROMORPHONE HYDROCHLORIDE 1 MG/ML
0.4 INJECTION, SOLUTION INTRAMUSCULAR; INTRAVENOUS; SUBCUTANEOUS EVERY 5 MIN PRN
Status: DISCONTINUED | OUTPATIENT
Start: 2022-11-04 | End: 2022-11-04

## 2022-11-04 RX ORDER — LIDOCAINE HYDROCHLORIDE 10 MG/ML
INJECTION, SOLUTION EPIDURAL; INFILTRATION; INTRACAUDAL; PERINEURAL AS NEEDED
Status: DISCONTINUED | OUTPATIENT
Start: 2022-11-04 | End: 2022-11-04 | Stop reason: SURG

## 2022-11-04 RX ORDER — NALOXONE HYDROCHLORIDE 0.4 MG/ML
80 INJECTION, SOLUTION INTRAMUSCULAR; INTRAVENOUS; SUBCUTANEOUS AS NEEDED
Status: DISCONTINUED | OUTPATIENT
Start: 2022-11-04 | End: 2022-11-04

## 2022-11-04 RX ORDER — MEPERIDINE HYDROCHLORIDE 25 MG/ML
12.5 INJECTION INTRAMUSCULAR; INTRAVENOUS; SUBCUTANEOUS AS NEEDED
Status: DISCONTINUED | OUTPATIENT
Start: 2022-11-04 | End: 2022-11-04

## 2022-11-04 RX ORDER — SCOLOPAMINE TRANSDERMAL SYSTEM 1 MG/1
1 PATCH, EXTENDED RELEASE TRANSDERMAL ONCE
Status: DISCONTINUED | OUTPATIENT
Start: 2022-11-04 | End: 2022-11-04 | Stop reason: HOSPADM

## 2022-11-04 RX ORDER — INSULIN ASPART 100 [IU]/ML
INJECTION, SOLUTION INTRAVENOUS; SUBCUTANEOUS
Status: COMPLETED
Start: 2022-11-04 | End: 2022-11-04

## 2022-11-04 RX ORDER — MIDAZOLAM HYDROCHLORIDE 1 MG/ML
1 INJECTION INTRAMUSCULAR; INTRAVENOUS EVERY 5 MIN PRN
Status: DISCONTINUED | OUTPATIENT
Start: 2022-11-04 | End: 2022-11-04

## 2022-11-04 RX ORDER — ONDANSETRON 2 MG/ML
4 INJECTION INTRAMUSCULAR; INTRAVENOUS EVERY 6 HOURS PRN
Status: DISCONTINUED | OUTPATIENT
Start: 2022-11-04 | End: 2022-11-04

## 2022-11-04 RX ORDER — ACETAMINOPHEN 500 MG
1000 TABLET ORAL ONCE
Status: DISCONTINUED | OUTPATIENT
Start: 2022-11-04 | End: 2022-11-04 | Stop reason: HOSPADM

## 2022-11-04 RX ORDER — ONDANSETRON 2 MG/ML
INJECTION INTRAMUSCULAR; INTRAVENOUS AS NEEDED
Status: DISCONTINUED | OUTPATIENT
Start: 2022-11-04 | End: 2022-11-04 | Stop reason: SURG

## 2022-11-04 RX ORDER — LABETALOL HYDROCHLORIDE 5 MG/ML
5 INJECTION, SOLUTION INTRAVENOUS EVERY 5 MIN PRN
Status: DISCONTINUED | OUTPATIENT
Start: 2022-11-04 | End: 2022-11-04

## 2022-11-04 RX ADMIN — SODIUM CHLORIDE, SODIUM LACTATE, POTASSIUM CHLORIDE, CALCIUM CHLORIDE: 600; 310; 30; 20 INJECTION, SOLUTION INTRAVENOUS at 07:51:00

## 2022-11-04 RX ADMIN — MIDAZOLAM HYDROCHLORIDE 2 MG: 1 INJECTION INTRAMUSCULAR; INTRAVENOUS at 08:04:00

## 2022-11-04 RX ADMIN — ONDANSETRON 4 MG: 2 INJECTION INTRAMUSCULAR; INTRAVENOUS at 08:48:00

## 2022-11-04 RX ADMIN — SODIUM CHLORIDE, SODIUM LACTATE, POTASSIUM CHLORIDE, CALCIUM CHLORIDE: 600; 310; 30; 20 INJECTION, SOLUTION INTRAVENOUS at 09:05:00

## 2022-11-04 RX ADMIN — LIDOCAINE HYDROCHLORIDE 25 MG: 10 INJECTION, SOLUTION EPIDURAL; INFILTRATION; INTRACAUDAL; PERINEURAL at 08:15:00

## 2022-11-04 RX ADMIN — CLINDAMYCIN PHOSPHATE 900 MG: 900 INJECTION INTRAVENOUS at 08:18:00

## 2022-11-04 NOTE — ANESTHESIA POSTPROCEDURE EVALUATION
[facility] Tura Satchel Patient Status:  Hospital Outpatient Surgery   Age/Gender 45year old female MRN [id number]   Location [address] [facility] Attending Cori Maddox MD   Hosp Day # 0 PCP Lamont Vazquez MD       Anesthesia Post-op Note    CYSTOSCOPY, RIGHT RETROGRADE PYELOGRAM, RIGHT URETOSCOPY, RIGHT URETERAL STENT PLACEMENT    Procedure Summary     Date: 11/04/22 Room / Location: [facility] MAIN OR 07 / [facility] MAIN OR    Anesthesia Start: 0804 Anesthesia Stop: 9769    Procedure: CYSTOSCOPY, RIGHT RETROGRADE PYELOGRAM, RIGHT URETOSCOPY, RIGHT URETERAL STENT PLACEMENT (Right: Ureter) Diagnosis:       Right ureteral calculus      (RIGHT URETERAL CALCULUS)    Surgeons: Cori Maddox MD Anesthesiologist: Ashwin Sofia MD    Anesthesia Type: general ASA Status: 3          Anesthesia Type: general    Vitals Value Taken Time   /87 11/04/22 0917   Temp 98.6 11/04/22 0918   Pulse 80 11/04/22 0917   Resp 16 11/04/22 0917   SpO2 97 % 11/04/22 0917   Vitals shown include unvalidated device data. Patient Location: PACU    Anesthesia Type: general    Airway Patency: patent    Postop Pain Control: adequate    Mental Status: mildly sedated but able to meaningfully participate in the post-anesthesia evaluation    Nausea/Vomiting: none    Cardiopulmonary/Hydration status: stable euvolemic    Complications: no apparent anesthesia related complications    Postop vital signs: stable    Dental Exam: Unchanged from Preop    Patient to be discharged from PACU when criteria met.

## 2022-11-04 NOTE — BRIEF OP NOTE
Pre-Operative Diagnosis: RIGHT URETERAL CALCULUS     Post-Operative Diagnosis: RIGHT URETERAL CALCULUS      Procedure Performed:   CYSTOSCOPY, RIGHT RETROGRADE PYELOGRAM, RIGHT URETOSCOPY, RIGHT URETERAL STENT PLACEMENT    Surgeon(s) and Role:     Mita Painter MD - Primary    Assistant(s):        Surgical Findings: narrowing in ureter at pelvic brim of bony pelvis     Specimen: none     Estimated Blood Loss: Blood Output: 0 mL (11/4/2022  8:50 AM)        Kimberly Whittaker MD  11/4/2022  9:01 AM

## 2022-11-04 NOTE — OPERATIVE REPORT
[facility]    PATIENT'S NAME: PERICO LUDWIG   ATTENDING PHYSICIAN: Clint Abrams M.D. OPERATING PHYSICIAN: Clint Abrams M.D. PATIENT ACCOUNT#:   [de-identified]    LOCATION:  [facility]  MEDICAL RECORD #:   [id number]       YOB: 1970  ADMISSION DATE:       11/04/2022      OPERATION DATE:      OPERATIVE REPORT      PREOPERATIVE DIAGNOSIS:  Right renal stone, right abdominal pain and flank pain. POSTOPERATIVE DIAGNOSIS:  Right renal stone, right abdominal pain and flank pain. PROCEDURE:  Cystoscopy, right retrograde pyelogram, right ureteroscopy, and stent placement. ANESTHESIA:  General.    COMPLICATIONS:  None. ESTIMATED BLOOD LOSS:  Zero. SPECIMENS:  None. INDICATIONS:  This is a 49-year-old female who presents with a history of stones seen 6 months ago on a CT scan, but presents now with worsening pain. Pain is consistent with renal colic. X-ray shows no clear ureteral stone but calcifications within the pelvis. The patient is referred to undergo a right ureteroscopy for treatment of her renal calculi. She is aware that her pain may be related to other etiologies and may not improve with clearance of a renal stone. I discussed the risks of bleeding, infection, ureteral injury, renal injury, as well as risks of anesthesia, including death. The patient understands this and wished to proceed. OPERATIVE TECHNIQUE:  The patient was placed in dorsal lithotomy position, prepped and draped in sterile fashion. A 22-Thai sheath with obturator was passed through the urethra. The obturator was removed. The bladder was evaluated demonstrating no intravesical abnormalities. Open-ended catheter was used to perform a right retrograde pyelogram demonstrating no clear filling defect in the distal ureter. However, there was a filling defect in the mid ureter and renal pelvis. Next, a Glidewire was passed up through the ureter under fluoroscopic guidance.   Next, a dual-lumen catheter was passed through the orifice roughly 2 cm superior to the orifice and a second wire was placed under fluoroscopic guidance. The dual-lumen catheter was removed and a flexible ureteroscope was passed up over the wire. This was passed up through the ureter. No stone was identified until the pelvic brim of the bony pelvis where there was a narrowing, the scope would not pass. I removed the ureteroscope and attempted to dilate the narrowing with the dual-lumen catheter. The ureter would not dilate. I then removed the dual-lumen catheter and attempted to place the ureteroscope again. It would not pass. I then removed the ureteroscope and placed a double-J stent over the wire. The wire was removed and a coil was seen in both the renal pelvis as well as the bladder. The patient's bladder was emptied. She was taken to the recovery room having tolerated the procedure well. PLAN:  Will be for the patient to undergo repeat ureteroscopy in 1 to 2 weeks after the ureter is dilated with the stent in place.     Dictated By Wendy Sampson M.D.  d: 11/04/2022 09:29:53  t: 11/04/2022 18:21:03  [facility] [id number]  /

## 2022-11-04 NOTE — ANESTHESIA PROCEDURE NOTES
Airway  Date/Time: 11/4/2022 8:16 AM  Urgency: elective    Airway not difficult    General Information and Staff    Patient location during procedure: OR  Anesthesiologist: Sumi Pimentel MD  Performed: anesthesiologist     Indications and Patient Condition  Indications for airway management: anesthesia  Sedation level: deep  Preoxygenated: yes  Patient position: sniffing  MILS maintained throughout  Mask difficulty assessment: 0 - not attempted    Final Airway Details  Final airway type: supraglottic airway      Successful airway: classic  Size 3       Number of attempts at approach: 1

## 2022-11-08 NOTE — TELEPHONE ENCOUNTER
Residential home health plan of care and home health certification received for provider signature and review. Certification period: 11/4/2022 - 1/2/2023    Patient receives SN services weekly for 8 weeks. Paperwork signed and faxed with receipt confirmation. Please see attached paperwork for details. Copy to scan.

## 2022-11-15 NOTE — PLAN OF CARE
Problem: Impaired Activities of Daily Living  Goal: Achieve highest/safest level of independence in self care  Interventions:  - Assess ability and encourage patient to participate in ADLs to maximize function  - Promote sitting position while performing A no

## 2022-11-16 ENCOUNTER — CASE MANAGEMENT (OUTPATIENT)
Dept: CARE COORDINATION | Age: 52
End: 2022-11-16

## 2022-11-27 ENCOUNTER — LAB ENCOUNTER (OUTPATIENT)
Dept: LAB | Facility: HOSPITAL | Age: 52
End: 2022-11-27
Attending: UROLOGY
Payer: MEDICARE

## 2022-11-27 DIAGNOSIS — Z01.812 ENCOUNTER FOR PREPROCEDURE SCREENING LABORATORY TESTING FOR COVID-19: ICD-10-CM

## 2022-11-27 DIAGNOSIS — Z20.822 ENCOUNTER FOR PREPROCEDURE SCREENING LABORATORY TESTING FOR COVID-19: ICD-10-CM

## 2022-11-27 LAB — SARS-COV-2 RNA RESP QL NAA+PROBE: NOT DETECTED

## 2022-11-29 ENCOUNTER — ANESTHESIA EVENT (OUTPATIENT)
Dept: SURGERY | Facility: HOSPITAL | Age: 52
End: 2022-11-29
Payer: MEDICARE

## 2022-11-30 ENCOUNTER — APPOINTMENT (OUTPATIENT)
Dept: GENERAL RADIOLOGY | Facility: HOSPITAL | Age: 52
End: 2022-11-30
Attending: UROLOGY
Payer: MEDICARE

## 2022-11-30 ENCOUNTER — HOSPITAL ENCOUNTER (OUTPATIENT)
Facility: HOSPITAL | Age: 52
Setting detail: HOSPITAL OUTPATIENT SURGERY
Discharge: HOME | End: 2022-11-30
Attending: UROLOGY | Admitting: UROLOGY
Payer: MEDICARE

## 2022-11-30 ENCOUNTER — ANESTHESIA (OUTPATIENT)
Dept: SURGERY | Facility: HOSPITAL | Age: 52
End: 2022-11-30
Payer: MEDICARE

## 2022-11-30 VITALS
HEART RATE: 74 BPM | OXYGEN SATURATION: 100 % | RESPIRATION RATE: 16 BRPM | TEMPERATURE: 97 F | HEIGHT: 66 IN | SYSTOLIC BLOOD PRESSURE: 116 MMHG | DIASTOLIC BLOOD PRESSURE: 80 MMHG | BODY MASS INDEX: 27.32 KG/M2 | WEIGHT: 170 LBS

## 2022-11-30 DIAGNOSIS — Z01.812 ENCOUNTER FOR PREPROCEDURE SCREENING LABORATORY TESTING FOR COVID-19: Primary | ICD-10-CM

## 2022-11-30 DIAGNOSIS — Z20.822 ENCOUNTER FOR PREPROCEDURE SCREENING LABORATORY TESTING FOR COVID-19: Primary | ICD-10-CM

## 2022-11-30 LAB
GLUCOSE BLD-MCNC: 191 MG/DL (ref 70–99)
GLUCOSE BLD-MCNC: 95 MG/DL (ref 70–99)

## 2022-11-30 PROCEDURE — BT1D0ZZ FLUOROSCOPY OF RIGHT KIDNEY, URETER AND BLADDER USING HIGH OSMOLAR CONTRAST: ICD-10-PCS | Performed by: UROLOGY

## 2022-11-30 PROCEDURE — 82962 GLUCOSE BLOOD TEST: CPT

## 2022-11-30 PROCEDURE — 0TP98DZ REMOVAL OF INTRALUMINAL DEVICE FROM URETER, VIA NATURAL OR ARTIFICIAL OPENING ENDOSCOPIC: ICD-10-PCS | Performed by: UROLOGY

## 2022-11-30 RX ORDER — PHENYLEPHRINE HCL 10 MG/ML
VIAL (ML) INJECTION AS NEEDED
Status: DISCONTINUED | OUTPATIENT
Start: 2022-11-30 | End: 2022-11-30 | Stop reason: SURG

## 2022-11-30 RX ORDER — HYDROCODONE BITARTRATE AND ACETAMINOPHEN 10; 325 MG/1; MG/1
1 TABLET ORAL ONCE AS NEEDED
Status: COMPLETED | OUTPATIENT
Start: 2022-11-30 | End: 2022-11-30

## 2022-11-30 RX ORDER — ACETAMINOPHEN 500 MG
1000 TABLET ORAL ONCE AS NEEDED
Status: COMPLETED | OUTPATIENT
Start: 2022-11-30 | End: 2022-11-30

## 2022-11-30 RX ORDER — KETOROLAC TROMETHAMINE 30 MG/ML
INJECTION, SOLUTION INTRAMUSCULAR; INTRAVENOUS
Status: COMPLETED
Start: 2022-11-30 | End: 2022-11-30

## 2022-11-30 RX ORDER — NICOTINE POLACRILEX 4 MG
15 LOZENGE BUCCAL
Status: DISCONTINUED | OUTPATIENT
Start: 2022-11-30 | End: 2022-11-30 | Stop reason: HOSPADM

## 2022-11-30 RX ORDER — HYDROMORPHONE HYDROCHLORIDE 1 MG/ML
0.2 INJECTION, SOLUTION INTRAMUSCULAR; INTRAVENOUS; SUBCUTANEOUS EVERY 5 MIN PRN
Status: DISCONTINUED | OUTPATIENT
Start: 2022-11-30 | End: 2022-11-30

## 2022-11-30 RX ORDER — KETOROLAC TROMETHAMINE 30 MG/ML
30 INJECTION, SOLUTION INTRAMUSCULAR; INTRAVENOUS ONCE
Status: COMPLETED | OUTPATIENT
Start: 2022-11-30 | End: 2022-11-30

## 2022-11-30 RX ORDER — MIDAZOLAM HYDROCHLORIDE 1 MG/ML
INJECTION INTRAMUSCULAR; INTRAVENOUS
Status: COMPLETED
Start: 2022-11-30 | End: 2022-11-30

## 2022-11-30 RX ORDER — HYDROMORPHONE HYDROCHLORIDE 1 MG/ML
0.6 INJECTION, SOLUTION INTRAMUSCULAR; INTRAVENOUS; SUBCUTANEOUS EVERY 5 MIN PRN
Status: DISCONTINUED | OUTPATIENT
Start: 2022-11-30 | End: 2022-11-30

## 2022-11-30 RX ORDER — LIDOCAINE HYDROCHLORIDE 10 MG/ML
INJECTION, SOLUTION EPIDURAL; INFILTRATION; INTRACAUDAL; PERINEURAL AS NEEDED
Status: DISCONTINUED | OUTPATIENT
Start: 2022-11-30 | End: 2022-11-30 | Stop reason: SURG

## 2022-11-30 RX ORDER — NICOTINE POLACRILEX 4 MG
30 LOZENGE BUCCAL
Status: DISCONTINUED | OUTPATIENT
Start: 2022-11-30 | End: 2022-11-30 | Stop reason: HOSPADM

## 2022-11-30 RX ORDER — SODIUM CHLORIDE, SODIUM LACTATE, POTASSIUM CHLORIDE, CALCIUM CHLORIDE 600; 310; 30; 20 MG/100ML; MG/100ML; MG/100ML; MG/100ML
INJECTION, SOLUTION INTRAVENOUS CONTINUOUS
Status: DISCONTINUED | OUTPATIENT
Start: 2022-11-30 | End: 2022-11-30

## 2022-11-30 RX ORDER — HYDROCODONE BITARTRATE AND ACETAMINOPHEN 10; 325 MG/1; MG/1
1 TABLET ORAL EVERY 4 HOURS PRN
Status: DISCONTINUED | OUTPATIENT
Start: 2022-11-30 | End: 2022-11-30 | Stop reason: HOSPADM

## 2022-11-30 RX ORDER — SCOLOPAMINE TRANSDERMAL SYSTEM 1 MG/1
1 PATCH, EXTENDED RELEASE TRANSDERMAL ONCE
Status: DISCONTINUED | OUTPATIENT
Start: 2022-11-30 | End: 2022-11-30 | Stop reason: HOSPADM

## 2022-11-30 RX ORDER — MEPERIDINE HYDROCHLORIDE 25 MG/ML
12.5 INJECTION INTRAMUSCULAR; INTRAVENOUS; SUBCUTANEOUS AS NEEDED
Status: DISCONTINUED | OUTPATIENT
Start: 2022-11-30 | End: 2022-11-30

## 2022-11-30 RX ORDER — METOCLOPRAMIDE HYDROCHLORIDE 5 MG/ML
INJECTION INTRAMUSCULAR; INTRAVENOUS AS NEEDED
Status: DISCONTINUED | OUTPATIENT
Start: 2022-11-30 | End: 2022-11-30 | Stop reason: SURG

## 2022-11-30 RX ORDER — NALOXONE HYDROCHLORIDE 0.4 MG/ML
80 INJECTION, SOLUTION INTRAMUSCULAR; INTRAVENOUS; SUBCUTANEOUS AS NEEDED
Status: DISCONTINUED | OUTPATIENT
Start: 2022-11-30 | End: 2022-11-30

## 2022-11-30 RX ORDER — HYDROMORPHONE HYDROCHLORIDE 1 MG/ML
INJECTION, SOLUTION INTRAMUSCULAR; INTRAVENOUS; SUBCUTANEOUS
Status: DISCONTINUED
Start: 2022-11-30 | End: 2022-11-30 | Stop reason: WASHOUT

## 2022-11-30 RX ORDER — ONDANSETRON 2 MG/ML
INJECTION INTRAMUSCULAR; INTRAVENOUS AS NEEDED
Status: DISCONTINUED | OUTPATIENT
Start: 2022-11-30 | End: 2022-11-30 | Stop reason: SURG

## 2022-11-30 RX ORDER — HYDROCODONE BITARTRATE AND ACETAMINOPHEN 10; 325 MG/1; MG/1
2 TABLET ORAL ONCE AS NEEDED
Status: COMPLETED | OUTPATIENT
Start: 2022-11-30 | End: 2022-11-30

## 2022-11-30 RX ORDER — CEFAZOLIN SODIUM/WATER 2 G/20 ML
SYRINGE (ML) INTRAVENOUS AS NEEDED
Status: DISCONTINUED | OUTPATIENT
Start: 2022-11-30 | End: 2022-11-30 | Stop reason: SURG

## 2022-11-30 RX ORDER — DEXTROSE MONOHYDRATE 25 G/50ML
50 INJECTION, SOLUTION INTRAVENOUS
Status: DISCONTINUED | OUTPATIENT
Start: 2022-11-30 | End: 2022-11-30 | Stop reason: HOSPADM

## 2022-11-30 RX ORDER — HYDROMORPHONE HYDROCHLORIDE 1 MG/ML
0.4 INJECTION, SOLUTION INTRAMUSCULAR; INTRAVENOUS; SUBCUTANEOUS EVERY 5 MIN PRN
Status: DISCONTINUED | OUTPATIENT
Start: 2022-11-30 | End: 2022-11-30

## 2022-11-30 RX ORDER — DEXAMETHASONE SODIUM PHOSPHATE 4 MG/ML
VIAL (ML) INJECTION AS NEEDED
Status: DISCONTINUED | OUTPATIENT
Start: 2022-11-30 | End: 2022-11-30 | Stop reason: SURG

## 2022-11-30 RX ORDER — LABETALOL HYDROCHLORIDE 5 MG/ML
5 INJECTION, SOLUTION INTRAVENOUS EVERY 5 MIN PRN
Status: DISCONTINUED | OUTPATIENT
Start: 2022-11-30 | End: 2022-11-30

## 2022-11-30 RX ORDER — HYDROCODONE BITARTRATE AND ACETAMINOPHEN 10; 325 MG/1; MG/1
TABLET ORAL
Status: COMPLETED
Start: 2022-11-30 | End: 2022-11-30

## 2022-11-30 RX ORDER — ONDANSETRON 2 MG/ML
4 INJECTION INTRAMUSCULAR; INTRAVENOUS EVERY 6 HOURS PRN
Status: DISCONTINUED | OUTPATIENT
Start: 2022-11-30 | End: 2022-11-30

## 2022-11-30 RX ORDER — PROCHLORPERAZINE EDISYLATE 5 MG/ML
5 INJECTION INTRAMUSCULAR; INTRAVENOUS EVERY 8 HOURS PRN
Status: DISCONTINUED | OUTPATIENT
Start: 2022-11-30 | End: 2022-11-30

## 2022-11-30 RX ORDER — MIDAZOLAM HYDROCHLORIDE 1 MG/ML
1 INJECTION INTRAMUSCULAR; INTRAVENOUS EVERY 5 MIN PRN
Status: DISCONTINUED | OUTPATIENT
Start: 2022-11-30 | End: 2022-11-30

## 2022-11-30 RX ADMIN — METOCLOPRAMIDE HYDROCHLORIDE 10 MG: 5 INJECTION INTRAMUSCULAR; INTRAVENOUS at 14:11:00

## 2022-11-30 RX ADMIN — ONDANSETRON 4 MG: 2 INJECTION INTRAMUSCULAR; INTRAVENOUS at 14:11:00

## 2022-11-30 RX ADMIN — PHENYLEPHRINE HCL 100 MCG: 10 MG/ML VIAL (ML) INJECTION at 13:48:00

## 2022-11-30 RX ADMIN — CEFAZOLIN SODIUM/WATER 2 G: 2 G/20 ML SYRINGE (ML) INTRAVENOUS at 13:36:00

## 2022-11-30 RX ADMIN — DEXAMETHASONE SODIUM PHOSPHATE 4 MG: 4 MG/ML VIAL (ML) INJECTION at 13:33:00

## 2022-11-30 RX ADMIN — LIDOCAINE HYDROCHLORIDE 50 MG: 10 INJECTION, SOLUTION EPIDURAL; INFILTRATION; INTRACAUDAL; PERINEURAL at 13:33:00

## 2022-11-30 RX ADMIN — SODIUM CHLORIDE, SODIUM LACTATE, POTASSIUM CHLORIDE, CALCIUM CHLORIDE: 600; 310; 30; 20 INJECTION, SOLUTION INTRAVENOUS at 14:23:00

## 2022-11-30 NOTE — ANESTHESIA PROCEDURE NOTES
Airway  Date/Time: 11/30/2022 1:34 PM  Urgency: elective      General Information and Staff    Patient location during procedure: OR  Anesthesiologist: Elora Saint, MD  Performed: anesthesiologist     Indications and Patient Condition  Indications for airway management: anesthesia  Sedation level: deep  Preoxygenated: yes  Patient position: sniffing  Mask difficulty assessment: 1 - vent by mask    Final Airway Details  Final airway type: supraglottic airway      Successful airway: classic  Size 3       Number of attempts at approach: 1

## 2022-11-30 NOTE — DISCHARGE INSTRUCTIONS
Lisa Vanegas  Operative site:  Slight burning on urination may be experienced with the first few urinations. Scant blood may appear in the urine and will clear in a few days. Drinking water and clear liquids is encouraged. General anesthesia / Local with sedation:  The sedation stays in your body about 24 hours. You may have headache, soreness and aching, nausea and vomiting, dizziness, tiredness. Sore throat and hoarseness can be present after general anesthesia only. Drink liquids and eat light foods. Advance to your regular diet as tolerated Remain on liquids only if nausea or vomiting is present. NO ALCHOHOLIC BEVERAGES FOR 24 HOURS. For the next 24 hours rest, move cautiously, do not drive, operate equipment, or make any personal or legal decisions. If prescription medication is ordered: Take as directed. Do not take on an empty stomach. Do not drive or operate equipment. Do not drink alcohol. Call your doctor for:  Difficulty in urination, inability to urinate, excessive bleeding/discoloration  Severe pain not controlled by pain medication. Persistent nausea and vomiting. Temperature over 101 F. Skin rash and general body itching. Other concerns or questions not addressed in these instructions. Follow any additional instructions given by the surgeon. IN CASE OF EMERGENCY GO TO THE NEAREST EMERGENCY ROOM. Call the office for an appointment in 2-4 weeks.   [phone]

## 2022-11-30 NOTE — OPERATIVE REPORT
[facility]  Urology Procedure Note  Emily Chow Patient Status:  Hospital Outpatient Surgery    3/11/1970 MRN [id number]   [facility] SURGERY Attending Akosua Fink MD   Hosp Day # 0 PCP Augusto Ha MD     Procedure: Cystoscopy, right retrograde pyelogram, ureteroscopy, stent removal    Pre-Op Diagnosis:  Right renal calculus    Post-Op Diagnosis: Right flank pain    Procedure: The patient was brought to the operating room where satisfactory general anesthesia was obtained. The airway was controlled with the laryngeal mask. The patient was placed in lithotomy position and was prepped and draped in the usual fashion. The 25 Pakistani cystoscope sheath was placed in the bladder and the bladder was inspected. The ureteral orifices were normal in position and configuration bilaterally. There were no tumors, stones, or mucosal lesions seen. A stent was visible in the right ureter. The stent was grasped and withdrawn through the urethral meatus. A 0.35 guidewire was placed in the right renal collecting system via the stent and the stent was removed. The wire was left in place and the semi-rigid ureteroscope was introduced into the right ureter. No stone was encountered to the limits of the ureteroscope. The ureteroscope was removed and an  ureteral access sheath was placed over the wire. The wire was then removed. The digital flexible ureteroscope was placed into the renal pelvis. A retrograde pyelogram was performed through the ureteroscope and the kidney was systematically examined from the upper pole to the lower pole. No stone was encountered. She was noted to have a small calyceal diverticulum of the upper pole but did not appear to have a stone within this either. There were no tumors or mucosal lesions seen. The ureteroscope was carefully withdrawn with the access sheath. Again, no stones were see in the ureter. The bladder was emptied with the cystoscope sheath.  The patient tolerated the procedure well. Surgeon: Abril Fonseca    Anesthesia:  General    Findings:  No stones in the ureter or kidney. Small upper pole calyceal diverticulum.     Specimens: None    Blood Loss:  0 mL    Complications:  None    Drains:  None    Secondary Diagnosis:  None    Taurus Munroe MD  11/30/2022

## 2022-11-30 NOTE — INTERVAL H&P NOTE
Pre-op Diagnosis: RIGHT URETERAL STONE    The above referenced H&P was reviewed by Emerson Us MD on 11/30/2022, the patient was examined and no significant changes have occurred in the patient's condition since the H&P was performed. She had an attempt at right ureteroscopy but there was narrowing of the ureter and a stent was placed on 11/4/2022. She returns now for another attempt at ureteroscopy. I discussed with the patient and/or legal representative the potential benefits, risks and side effects of this procedure; the likelihood of the patient achieving goals; and potential problems that might occur during recuperation. I discussed reasonable alternatives to the procedure, including risks, benefits and side effects related to the alternatives and risks related to not receiving this procedure. We will proceed with procedure as planned.

## 2022-12-07 ENCOUNTER — CASE MANAGEMENT (OUTPATIENT)
Dept: CARE COORDINATION | Age: 52
End: 2022-12-07

## 2022-12-07 RX ORDER — ATORVASTATIN CALCIUM 20 MG/1
TABLET, FILM COATED ORAL
COMMUNITY
Start: 2022-09-20

## 2022-12-07 RX ORDER — CARVEDILOL 3.12 MG/1
TABLET ORAL
COMMUNITY

## 2022-12-15 ENCOUNTER — TELEPHONE (OUTPATIENT)
Dept: NEUROLOGY | Facility: CLINIC | Age: 52
End: 2022-12-15

## 2022-12-15 ENCOUNTER — LAB ENCOUNTER (OUTPATIENT)
Dept: LAB | Facility: HOSPITAL | Age: 52
End: 2022-12-15
Attending: SPECIALIST
Payer: MEDICARE

## 2022-12-15 DIAGNOSIS — G35 MS (MULTIPLE SCLEROSIS) (HCC): Primary | ICD-10-CM

## 2022-12-15 DIAGNOSIS — D64.9 ANEMIA: ICD-10-CM

## 2022-12-15 DIAGNOSIS — E05.90 HYPERTHYROIDISM WITHOUT CRISIS: ICD-10-CM

## 2022-12-15 DIAGNOSIS — E11.9 DMII (DIABETES MELLITUS, TYPE 2) (HCC): ICD-10-CM

## 2022-12-15 DIAGNOSIS — E78.5 DYSLIPIDEMIA: Primary | ICD-10-CM

## 2022-12-15 DIAGNOSIS — N39.0 UTI (URINARY TRACT INFECTION): ICD-10-CM

## 2022-12-15 LAB
ALBUMIN SERPL-MCNC: 3.4 G/DL (ref 3.4–5)
ALBUMIN/GLOB SERPL: 1 {RATIO} (ref 1–2)
ALP LIVER SERPL-CCNC: 76 U/L
ALT SERPL-CCNC: 44 U/L
ANION GAP SERPL CALC-SCNC: 5 MMOL/L (ref 0–18)
AST SERPL-CCNC: 31 U/L (ref 15–37)
BASOPHILS # BLD AUTO: 0.01 X10(3) UL (ref 0–0.2)
BASOPHILS NFR BLD AUTO: 0.1 %
BILIRUB SERPL-MCNC: 0.4 MG/DL (ref 0.1–2)
BILIRUB UR QL STRIP.AUTO: NEGATIVE
BUN BLD-MCNC: 12 MG/DL (ref 7–18)
CALCIUM BLD-MCNC: 8.8 MG/DL (ref 8.5–10.1)
CHLORIDE SERPL-SCNC: 110 MMOL/L (ref 98–112)
CHOLEST SERPL-MCNC: 156 MG/DL (ref ?–200)
CLARITY UR REFRACT.AUTO: CLEAR
CO2 SERPL-SCNC: 28 MMOL/L (ref 21–32)
COLOR UR AUTO: YELLOW
CREAT BLD-MCNC: 1.01 MG/DL
CREAT UR-SCNC: 118 MG/DL
EOSINOPHIL # BLD AUTO: 0 X10(3) UL (ref 0–0.7)
EOSINOPHIL NFR BLD AUTO: 0 %
ERYTHROCYTE [DISTWIDTH] IN BLOOD BY AUTOMATED COUNT: 16 %
FASTING PATIENT LIPID ANSWER: YES
FASTING STATUS PATIENT QL REPORTED: YES
GFR SERPLBLD BASED ON 1.73 SQ M-ARVRAT: 66 ML/MIN/1.73M2 (ref 60–?)
GLOBULIN PLAS-MCNC: 3.3 G/DL (ref 2.8–4.4)
GLUCOSE BLD-MCNC: 126 MG/DL (ref 70–99)
GLUCOSE UR STRIP.AUTO-MCNC: 500 MG/DL
HCT VFR BLD AUTO: 38.8 %
HDLC SERPL-MCNC: 42 MG/DL (ref 40–59)
HGB BLD-MCNC: 11.5 G/DL
IMM GRANULOCYTES # BLD AUTO: 0.04 X10(3) UL (ref 0–1)
IMM GRANULOCYTES NFR BLD: 0.6 %
KETONES UR STRIP.AUTO-MCNC: NEGATIVE MG/DL
LDLC SERPL CALC-MCNC: 100 MG/DL (ref ?–100)
LEUKOCYTE ESTERASE UR QL STRIP.AUTO: NEGATIVE
LYMPHOCYTES # BLD AUTO: 3.36 X10(3) UL (ref 1–4)
LYMPHOCYTES NFR BLD AUTO: 47.3 %
MCH RBC QN AUTO: 23.1 PG (ref 26–34)
MCHC RBC AUTO-ENTMCNC: 29.6 G/DL (ref 31–37)
MCV RBC AUTO: 77.9 FL
MICROALBUMIN UR-MCNC: 0.56 MG/DL
MICROALBUMIN/CREAT 24H UR-RTO: 4.7 UG/MG (ref ?–30)
MONOCYTES # BLD AUTO: 0.53 X10(3) UL (ref 0.1–1)
MONOCYTES NFR BLD AUTO: 7.5 %
NEUTROPHILS # BLD AUTO: 3.17 X10 (3) UL (ref 1.5–7.7)
NEUTROPHILS # BLD AUTO: 3.17 X10(3) UL (ref 1.5–7.7)
NEUTROPHILS NFR BLD AUTO: 44.5 %
NITRITE UR QL STRIP.AUTO: NEGATIVE
NONHDLC SERPL-MCNC: 114 MG/DL (ref ?–130)
OSMOLALITY SERPL CALC.SUM OF ELEC: 297 MOSM/KG (ref 275–295)
PH UR STRIP.AUTO: 5.5 [PH] (ref 5–8)
PLATELET # BLD AUTO: 175 10(3)UL (ref 150–450)
POTASSIUM SERPL-SCNC: 4 MMOL/L (ref 3.5–5.1)
PROT SERPL-MCNC: 6.7 G/DL (ref 6.4–8.2)
PROT UR STRIP.AUTO-MCNC: NEGATIVE MG/DL
RBC # BLD AUTO: 4.98 X10(6)UL
RBC UR QL AUTO: NEGATIVE
SODIUM SERPL-SCNC: 143 MMOL/L (ref 136–145)
SP GR UR STRIP.AUTO: 1.02 (ref 1–1.03)
T4 FREE SERPL-MCNC: 1.3 NG/DL (ref 0.8–1.7)
TRIGL SERPL-MCNC: 72 MG/DL (ref 30–149)
TSI SER-ACNC: 0.15 MIU/ML (ref 0.36–3.74)
UROBILINOGEN UR STRIP.AUTO-MCNC: 0.2 MG/DL
VLDLC SERPL CALC-MCNC: 12 MG/DL (ref 0–30)
WBC # BLD AUTO: 7.1 X10(3) UL (ref 4–11)

## 2022-12-15 PROCEDURE — 80061 LIPID PANEL: CPT

## 2022-12-15 PROCEDURE — 84443 ASSAY THYROID STIM HORMONE: CPT

## 2022-12-15 PROCEDURE — 36415 COLL VENOUS BLD VENIPUNCTURE: CPT

## 2022-12-15 PROCEDURE — 81003 URINALYSIS AUTO W/O SCOPE: CPT

## 2022-12-15 PROCEDURE — 82043 UR ALBUMIN QUANTITATIVE: CPT

## 2022-12-15 PROCEDURE — 80053 COMPREHEN METABOLIC PANEL: CPT

## 2022-12-15 PROCEDURE — 84439 ASSAY OF FREE THYROXINE: CPT

## 2022-12-15 PROCEDURE — 82570 ASSAY OF URINE CREATININE: CPT

## 2022-12-15 PROCEDURE — 85025 COMPLETE CBC W/AUTO DIFF WBC: CPT

## 2022-12-15 NOTE — TELEPHONE ENCOUNTER
Tysabri infusion record received from [facility] for physician review. No signs/symptoms of infusion reaction noted. Patient infused on 12/15/2022. No longer required to stay for the post infusion observation period. Lot Number [id number], Exp 01/2026  No new orders requested. Orders active for 10 additional infusions. Next infusion scheduled for 12/15/2023. Patient infusing every 6 weeks. Placed in physician's box for review. Patient will be due for her next JCV index in January. Order entered in [address] [place] [address] Rd and mailed to patient home address.

## 2022-12-16 ENCOUNTER — CLINICAL ABSTRACT (OUTPATIENT)
Dept: INTERNAL MEDICINE | Age: 52
End: 2022-12-16

## 2022-12-21 ENCOUNTER — OFFICE VISIT (OUTPATIENT)
Facility: CLINIC | Age: 52
End: 2022-12-21
Payer: MEDICARE

## 2022-12-21 VITALS
OXYGEN SATURATION: 100 % | RESPIRATION RATE: 16 BRPM | BODY MASS INDEX: 26.81 KG/M2 | WEIGHT: 166.81 LBS | SYSTOLIC BLOOD PRESSURE: 100 MMHG | HEART RATE: 70 BPM | DIASTOLIC BLOOD PRESSURE: 68 MMHG | HEIGHT: 66 IN

## 2022-12-21 DIAGNOSIS — J45.40 MODERATE PERSISTENT ASTHMA, UNSPECIFIED WHETHER COMPLICATED: Primary | ICD-10-CM

## 2022-12-21 PROCEDURE — 99204 OFFICE O/P NEW MOD 45 MIN: CPT | Performed by: INTERNAL MEDICINE

## 2022-12-21 RX ORDER — DULOXETIN HYDROCHLORIDE 60 MG/1
60 CAPSULE, DELAYED RELEASE ORAL 2 TIMES DAILY
COMMUNITY
Start: 2022-10-28

## 2022-12-21 NOTE — PATIENT INSTRUCTIONS
Plan-- continue symbicort 2 puffs twice a day          -- rescue inhaler as needed -           - remain on singular           - see me in 4 months     Mikel Day MD  Pulmonary Medicine  12/21/2022

## 2022-12-22 ENCOUNTER — TELEPHONE (OUTPATIENT)
Dept: NEUROLOGY | Facility: CLINIC | Age: 52
End: 2022-12-22

## 2022-12-22 NOTE — TELEPHONE ENCOUNTER
FDA mandated Tysabri reauthorization questionnaire completed online. Patient is JCV Positive, Index = 1.38. Last test date July 2022, next test date due Jan 2023. Has completed 22 Tysabri infusions with 0 courses of steroids in last 6 months. Reauthorized to continue receiving infusion; this is not a prior authorization for medication. Patient receives infusions at [address] Re-authorized from 1/4/2023 - 7/5/2023 through the [employer] [place] program.    Tysabri authorized through insurance. Authorization is maintained by the infusion center. Tysabri is buy and bill for this patient. Patient infuses every 6 weeks. May need to adjust pending the results of her next JCV index. Order placed at her follow up in October. Patient sent message to remind her to have JCV index drawn. Information discussed with Dr. Tommie Bethea prior to submission.

## 2022-12-28 ENCOUNTER — CASE MANAGEMENT (OUTPATIENT)
Dept: CARE COORDINATION | Age: 52
End: 2022-12-28

## 2022-12-28 ASSESSMENT — SLEEP AND FATIGUE QUESTIONNAIRES
HOURS OF UNINTERRUPTED SLEEP: 7
SLEEP DESCRIPTORS: WDL (ABSENCE OF SYMPTOMS)

## 2023-01-06 ENCOUNTER — TELEPHONE (OUTPATIENT)
Dept: NEUROLOGY | Facility: CLINIC | Age: 53
End: 2023-01-06

## 2023-01-06 NOTE — TELEPHONE ENCOUNTER
RN received from [facility] for physician review and signature. Patient receives SN    Frequency: Once every 3 weeks    Duration: 6 months    Plan of Care: Instruct patient/caregiver about MS. S/S, measures to control, and possible complications associated with MS. Paperwork signed and faxed with receipt confirmation. Copy sent to scan.

## 2023-01-20 ENCOUNTER — CASE MANAGEMENT (OUTPATIENT)
Dept: CARE COORDINATION | Age: 53
End: 2023-01-20

## 2023-01-20 ASSESSMENT — SLEEP AND FATIGUE QUESTIONNAIRES
HOURS OF UNINTERRUPTED SLEEP: 8
SLEEP DESCRIPTORS: WDL (ABSENCE OF SYMPTOMS)

## 2023-01-26 ENCOUNTER — TELEPHONE (OUTPATIENT)
Dept: NEUROLOGY | Facility: CLINIC | Age: 53
End: 2023-01-26

## 2023-01-26 NOTE — TELEPHONE ENCOUNTER
Tysabri infusion record received from [facility] for physician review. No signs/symptoms of infusion reaction noted. Patient infused on 1/26/2023. Patient no longer required to stay for the post infusion observation period. Lot Number [id number] x1, Exp 01/2026  No new orders requested. Orders active for 9 additional infusions. Patient infusing every 6 weeks. Next infusion scheduled for 3/9/2023. Placed in physician's box for review.

## 2023-01-31 LAB
INDEX VALUE: 1.08
JCV ANTIBODY: POSITIVE

## 2023-02-03 ENCOUNTER — CASE MANAGEMENT (OUTPATIENT)
Dept: CARE COORDINATION | Age: 53
End: 2023-02-03

## 2023-02-03 ASSESSMENT — SLEEP AND FATIGUE QUESTIONNAIRES
SLEEP DESCRIPTORS: WDL (ABSENCE OF SYMPTOMS)
HOURS OF UNINTERRUPTED SLEEP: 8

## 2023-02-09 ENCOUNTER — HOSPITAL ENCOUNTER (OUTPATIENT)
Dept: CT IMAGING | Facility: HOSPITAL | Age: 53
Discharge: HOME | End: 2023-02-09
Attending: SPECIALIST
Payer: MEDICARE

## 2023-02-09 DIAGNOSIS — R06.02 SOB (SHORTNESS OF BREATH): ICD-10-CM

## 2023-02-09 DIAGNOSIS — R07.9 CHEST PAIN: ICD-10-CM

## 2023-02-09 LAB
CREAT BLD-MCNC: 0.9 MG/DL
GFR SERPLBLD BASED ON 1.73 SQ M-ARVRAT: 76 ML/MIN/1.73M2 (ref 60–?)

## 2023-02-09 PROCEDURE — 71260 CT THORAX DX C+: CPT | Performed by: SPECIALIST

## 2023-02-09 PROCEDURE — 82565 ASSAY OF CREATININE: CPT

## 2023-02-13 ENCOUNTER — CASE MANAGEMENT (OUTPATIENT)
Dept: CARE COORDINATION | Age: 53
End: 2023-02-13

## 2023-02-13 ASSESSMENT — SLEEP AND FATIGUE QUESTIONNAIRES
HOURS OF UNINTERRUPTED SLEEP: 8
SLEEP DESCRIPTORS: WDL (ABSENCE OF SYMPTOMS)

## 2023-02-20 ENCOUNTER — OFFICE VISIT (OUTPATIENT)
Facility: LOCATION | Age: 53
End: 2023-02-20
Payer: MEDICARE

## 2023-02-20 DIAGNOSIS — K21.9 LARYNGOPHARYNGEAL REFLUX: ICD-10-CM

## 2023-02-20 DIAGNOSIS — R49.0 HOARSENESS: Primary | ICD-10-CM

## 2023-02-20 PROCEDURE — 31575 DIAGNOSTIC LARYNGOSCOPY: CPT | Performed by: OTOLARYNGOLOGY

## 2023-02-20 PROCEDURE — 99204 OFFICE O/P NEW MOD 45 MIN: CPT | Performed by: OTOLARYNGOLOGY

## 2023-02-20 RX ORDER — SUCRALFATE 1 G/1
1 TABLET ORAL 3 TIMES DAILY
Qty: 90 TABLET | Refills: 3 | Status: SHIPPED | OUTPATIENT
Start: 2023-02-20 | End: 2023-03-22

## 2023-02-27 ENCOUNTER — CASE MANAGEMENT (OUTPATIENT)
Dept: CARE COORDINATION | Age: 53
End: 2023-02-27

## 2023-02-27 ASSESSMENT — SLEEP AND FATIGUE QUESTIONNAIRES
HOURS OF UNINTERRUPTED SLEEP: 8
SLEEP DESCRIPTORS: WDL (ABSENCE OF SYMPTOMS)

## 2023-03-09 ENCOUNTER — TELEPHONE (OUTPATIENT)
Dept: NEUROLOGY | Facility: CLINIC | Age: 53
End: 2023-03-09

## 2023-03-09 NOTE — TELEPHONE ENCOUNTER
Fax received from [facility] for home health certification and plan of care for provider signature and review. Patient continues to receive skilled nursing services effective: 3/5/2023 once weekly for 4 weeks, then every other week for 4 weeks. Paperwork signed and faxed back to [facility] with receipt confirmation.

## 2023-03-14 ENCOUNTER — TELEPHONE (OUTPATIENT)
Dept: NEUROLOGY | Facility: CLINIC | Age: 53
End: 2023-03-14

## 2023-03-14 ENCOUNTER — CASE MANAGEMENT (OUTPATIENT)
Dept: CARE COORDINATION | Age: 53
End: 2023-03-14

## 2023-03-14 ASSESSMENT — SLEEP AND FATIGUE QUESTIONNAIRES
HOURS OF UNINTERRUPTED SLEEP: 8
SLEEP DESCRIPTORS: WDL (ABSENCE OF SYMPTOMS)

## 2023-03-14 NOTE — TELEPHONE ENCOUNTER
Tysabri infusion record received from [facility] for physician review. No signs/symptoms of infusion reaction noted. Patient infused on 3/13/2023. No post infusion observation period required. Lot Number [id number] x1, Exp 01/2026  No new orders requested. Orders active for 8 additional infusions. Next infusion scheduled for 4/27/2023. Patient infuses every 6 weeks. Placed in physician's box for review.

## 2023-03-16 ENCOUNTER — APPOINTMENT (OUTPATIENT)
Dept: CT IMAGING | Facility: HOSPITAL | Age: 53
End: 2023-03-16
Attending: EMERGENCY MEDICINE
Payer: MEDICARE

## 2023-03-16 ENCOUNTER — HOSPITAL ENCOUNTER (EMERGENCY)
Facility: HOSPITAL | Age: 53
Discharge: HOME | End: 2023-03-17
Attending: EMERGENCY MEDICINE
Payer: MEDICARE

## 2023-03-16 DIAGNOSIS — R10.30 LOWER ABDOMINAL PAIN: Primary | ICD-10-CM

## 2023-03-16 LAB
ALBUMIN SERPL-MCNC: 3.4 G/DL (ref 3.4–5)
ALBUMIN/GLOB SERPL: 1 {RATIO} (ref 1–2)
ALP LIVER SERPL-CCNC: 100 U/L
ALT SERPL-CCNC: 40 U/L
ANION GAP SERPL CALC-SCNC: 3 MMOL/L (ref 0–18)
AST SERPL-CCNC: 25 U/L (ref 15–37)
BASOPHILS # BLD AUTO: 0.02 X10(3) UL (ref 0–0.2)
BASOPHILS NFR BLD AUTO: 0.2 %
BILIRUB SERPL-MCNC: 0.3 MG/DL (ref 0.1–2)
BILIRUB UR QL STRIP.AUTO: NEGATIVE
BUN BLD-MCNC: 10 MG/DL (ref 7–18)
CALCIUM BLD-MCNC: 8.3 MG/DL (ref 8.5–10.1)
CHLORIDE SERPL-SCNC: 108 MMOL/L (ref 98–112)
CLARITY UR REFRACT.AUTO: CLEAR
CO2 SERPL-SCNC: 31 MMOL/L (ref 21–32)
CREAT BLD-MCNC: 1.05 MG/DL
EOSINOPHIL # BLD AUTO: 0.1 X10(3) UL (ref 0–0.7)
EOSINOPHIL NFR BLD AUTO: 1.1 %
ERYTHROCYTE [DISTWIDTH] IN BLOOD BY AUTOMATED COUNT: 16 %
GFR SERPLBLD BASED ON 1.73 SQ M-ARVRAT: 63 ML/MIN/1.73M2 (ref 60–?)
GLOBULIN PLAS-MCNC: 3.5 G/DL (ref 2.8–4.4)
GLUCOSE BLD-MCNC: 147 MG/DL (ref 70–99)
GLUCOSE UR STRIP.AUTO-MCNC: >=500 MG/DL
HCT VFR BLD AUTO: 38.1 %
HGB BLD-MCNC: 11.5 G/DL
IMM GRANULOCYTES # BLD AUTO: 0.02 X10(3) UL (ref 0–1)
IMM GRANULOCYTES NFR BLD: 0.2 %
KETONES UR STRIP.AUTO-MCNC: NEGATIVE MG/DL
LEUKOCYTE ESTERASE UR QL STRIP.AUTO: NEGATIVE
LYMPHOCYTES # BLD AUTO: 5.39 X10(3) UL (ref 1–4)
LYMPHOCYTES NFR BLD AUTO: 58.8 %
MCH RBC QN AUTO: 23 PG (ref 26–34)
MCHC RBC AUTO-ENTMCNC: 30.2 G/DL (ref 31–37)
MCV RBC AUTO: 76.2 FL
MONOCYTES # BLD AUTO: 0.68 X10(3) UL (ref 0.1–1)
MONOCYTES NFR BLD AUTO: 7.4 %
NEUTROPHILS # BLD AUTO: 2.95 X10 (3) UL (ref 1.5–7.7)
NEUTROPHILS # BLD AUTO: 2.95 X10(3) UL (ref 1.5–7.7)
NEUTROPHILS NFR BLD AUTO: 32.3 %
NITRITE UR QL STRIP.AUTO: NEGATIVE
OSMOLALITY SERPL CALC.SUM OF ELEC: 296 MOSM/KG (ref 275–295)
PH UR STRIP.AUTO: 6 [PH] (ref 5–8)
PLATELET # BLD AUTO: 190 10(3)UL (ref 150–450)
POTASSIUM SERPL-SCNC: 3.5 MMOL/L (ref 3.5–5.1)
PROT SERPL-MCNC: 6.9 G/DL (ref 6.4–8.2)
PROT UR STRIP.AUTO-MCNC: NEGATIVE MG/DL
RBC # BLD AUTO: 5 X10(6)UL
RBC UR QL AUTO: NEGATIVE
SODIUM SERPL-SCNC: 142 MMOL/L (ref 136–145)
SP GR UR STRIP.AUTO: 1.02 (ref 1–1.03)
UROBILINOGEN UR STRIP.AUTO-MCNC: <2 MG/DL
WBC # BLD AUTO: 9.2 X10(3) UL (ref 4–11)

## 2023-03-16 PROCEDURE — 81003 URINALYSIS AUTO W/O SCOPE: CPT

## 2023-03-16 PROCEDURE — 96374 THER/PROPH/DIAG INJ IV PUSH: CPT

## 2023-03-16 PROCEDURE — 74176 CT ABD & PELVIS W/O CONTRAST: CPT | Performed by: EMERGENCY MEDICINE

## 2023-03-16 PROCEDURE — 85025 COMPLETE CBC W/AUTO DIFF WBC: CPT | Performed by: EMERGENCY MEDICINE

## 2023-03-16 PROCEDURE — 96375 TX/PRO/DX INJ NEW DRUG ADDON: CPT

## 2023-03-16 PROCEDURE — 99285 EMERGENCY DEPT VISIT HI MDM: CPT

## 2023-03-16 PROCEDURE — 80053 COMPREHEN METABOLIC PANEL: CPT | Performed by: EMERGENCY MEDICINE

## 2023-03-16 PROCEDURE — 81003 URINALYSIS AUTO W/O SCOPE: CPT | Performed by: EMERGENCY MEDICINE

## 2023-03-16 RX ORDER — KETOROLAC TROMETHAMINE 15 MG/ML
15 INJECTION, SOLUTION INTRAMUSCULAR; INTRAVENOUS ONCE
Status: COMPLETED | OUTPATIENT
Start: 2023-03-16 | End: 2023-03-16

## 2023-03-16 RX ORDER — PHENAZOPYRIDINE HYDROCHLORIDE 200 MG/1
200 TABLET, FILM COATED ORAL ONCE
Status: COMPLETED | OUTPATIENT
Start: 2023-03-16 | End: 2023-03-16

## 2023-03-17 VITALS
RESPIRATION RATE: 18 BRPM | BODY MASS INDEX: 26.68 KG/M2 | SYSTOLIC BLOOD PRESSURE: 122 MMHG | WEIGHT: 166 LBS | TEMPERATURE: 99 F | OXYGEN SATURATION: 100 % | HEART RATE: 69 BPM | HEIGHT: 66 IN | DIASTOLIC BLOOD PRESSURE: 77 MMHG

## 2023-03-17 RX ORDER — ONDANSETRON 4 MG/1
4 TABLET, ORALLY DISINTEGRATING ORAL EVERY 4 HOURS PRN
Qty: 10 TABLET | Refills: 0 | Status: SHIPPED | OUTPATIENT
Start: 2023-03-17 | End: 2023-03-17

## 2023-03-17 RX ORDER — MORPHINE SULFATE 4 MG/ML
4 INJECTION, SOLUTION INTRAMUSCULAR; INTRAVENOUS ONCE
Status: DISCONTINUED | OUTPATIENT
Start: 2023-03-17 | End: 2023-03-17

## 2023-03-17 RX ORDER — ONDANSETRON 2 MG/ML
4 INJECTION INTRAMUSCULAR; INTRAVENOUS ONCE
Status: COMPLETED | OUTPATIENT
Start: 2023-03-17 | End: 2023-03-17

## 2023-03-17 RX ORDER — HYDROMORPHONE HYDROCHLORIDE 1 MG/ML
0.5 INJECTION, SOLUTION INTRAMUSCULAR; INTRAVENOUS; SUBCUTANEOUS ONCE
Status: COMPLETED | OUTPATIENT
Start: 2023-03-17 | End: 2023-03-17

## 2023-03-22 ENCOUNTER — OFFICE VISIT (OUTPATIENT)
Facility: LOCATION | Age: 53
End: 2023-03-22
Payer: MEDICARE

## 2023-03-22 DIAGNOSIS — K21.9 LARYNGOPHARYNGEAL REFLUX: ICD-10-CM

## 2023-03-22 DIAGNOSIS — R49.0 HOARSENESS: Primary | ICD-10-CM

## 2023-03-22 PROCEDURE — 31575 DIAGNOSTIC LARYNGOSCOPY: CPT | Performed by: OTOLARYNGOLOGY

## 2023-03-22 PROCEDURE — 99213 OFFICE O/P EST LOW 20 MIN: CPT | Performed by: OTOLARYNGOLOGY

## 2023-04-04 ENCOUNTER — CASE MANAGEMENT (OUTPATIENT)
Dept: CARE COORDINATION | Age: 53
End: 2023-04-04

## 2023-04-04 ASSESSMENT — SLEEP AND FATIGUE QUESTIONNAIRES
SLEEP DESCRIPTORS: WDL (ABSENCE OF SYMPTOMS)
HOURS OF UNINTERRUPTED SLEEP: 8

## 2023-04-10 ENCOUNTER — OFFICE VISIT (OUTPATIENT)
Dept: PAIN CLINIC | Facility: CLINIC | Age: 53
End: 2023-04-10
Payer: MEDICARE

## 2023-04-10 VITALS
HEART RATE: 72 BPM | SYSTOLIC BLOOD PRESSURE: 100 MMHG | DIASTOLIC BLOOD PRESSURE: 68 MMHG | BODY MASS INDEX: 27 KG/M2 | WEIGHT: 166 LBS | OXYGEN SATURATION: 98 %

## 2023-04-10 DIAGNOSIS — G35 MULTIPLE SCLEROSIS (HCC): ICD-10-CM

## 2023-04-10 DIAGNOSIS — M79.7 FIBROMYALGIA: Primary | ICD-10-CM

## 2023-04-10 PROCEDURE — 99214 OFFICE O/P EST MOD 30 MIN: CPT | Performed by: ANESTHESIOLOGY

## 2023-04-10 RX ORDER — DAPAGLIFLOZIN 10 MG/1
10 TABLET, FILM COATED ORAL DAILY
COMMUNITY
Start: 2023-04-03

## 2023-04-10 RX ORDER — ONDANSETRON 4 MG/1
TABLET, ORALLY DISINTEGRATING ORAL
COMMUNITY
Start: 2023-03-17 | End: 2023-04-10

## 2023-04-10 RX ORDER — TAMSULOSIN HYDROCHLORIDE 0.4 MG/1
1 CAPSULE ORAL
COMMUNITY
Start: 2023-03-18

## 2023-04-10 NOTE — PROGRESS NOTES
Patient presents in office today with reported pain in all over pain    Current pain level reported = 8/10    Last reported dose of Norco yesterday      Narcotic Contract renewal none    Urine Drug screen none

## 2023-04-11 ENCOUNTER — OFFICE VISIT (OUTPATIENT)
Facility: LOCATION | Age: 53
End: 2023-04-11
Payer: MEDICARE

## 2023-04-11 ENCOUNTER — HOSPITAL ENCOUNTER (OUTPATIENT)
Dept: CT IMAGING | Facility: HOSPITAL | Age: 53
Discharge: HOME | End: 2023-04-11
Attending: COLON & RECTAL SURGERY
Payer: MEDICARE

## 2023-04-11 VITALS — TEMPERATURE: 97 F | HEART RATE: 73 BPM

## 2023-04-11 DIAGNOSIS — R10.84 DIFFUSE ABDOMINAL PAIN: ICD-10-CM

## 2023-04-11 DIAGNOSIS — D57.3 SICKLE CELL TRAIT (HCC): ICD-10-CM

## 2023-04-11 DIAGNOSIS — I10 ESSENTIAL HYPERTENSION: ICD-10-CM

## 2023-04-11 DIAGNOSIS — N30.10 INTERSTITIAL CYSTITIS: ICD-10-CM

## 2023-04-11 DIAGNOSIS — G35 MULTIPLE SCLEROSIS (HCC): ICD-10-CM

## 2023-04-11 DIAGNOSIS — R53.1 WEAKNESS GENERALIZED: ICD-10-CM

## 2023-04-11 DIAGNOSIS — E21.3 HYPERPARATHYROIDISM (HCC): ICD-10-CM

## 2023-04-11 DIAGNOSIS — E03.9 HYPOTHYROIDISM, UNSPECIFIED TYPE: ICD-10-CM

## 2023-04-11 DIAGNOSIS — R10.84 GENERALIZED ABDOMINAL PAIN: ICD-10-CM

## 2023-04-11 DIAGNOSIS — R10.84 DIFFUSE ABDOMINAL PAIN: Primary | ICD-10-CM

## 2023-04-11 DIAGNOSIS — E10.65 CONTROLLED TYPE 1 DIABETES MELLITUS WITH HYPERGLYCEMIA (HCC): ICD-10-CM

## 2023-04-11 DIAGNOSIS — R10.827 GENERALIZED ABDOMINAL TENDERNESS WITH REBOUND TENDERNESS: ICD-10-CM

## 2023-04-11 DIAGNOSIS — N20.0 CALCULUS OF KIDNEY: ICD-10-CM

## 2023-04-11 DIAGNOSIS — K75.81 NASH (NONALCOHOLIC STEATOHEPATITIS): ICD-10-CM

## 2023-04-11 DIAGNOSIS — I42.0 DILATED IDIOPATHIC CARDIOMYOPATHY (HCC): ICD-10-CM

## 2023-04-11 DIAGNOSIS — K66.0 INTRA-ABDOMINAL ADHESIONS: ICD-10-CM

## 2023-04-11 DIAGNOSIS — D56.0 ALPHA-THALASSEMIA (HCC): ICD-10-CM

## 2023-04-11 DIAGNOSIS — E11.42 POLYNEUROPATHY DUE TO TYPE 2 DIABETES MELLITUS (HCC): ICD-10-CM

## 2023-04-11 DIAGNOSIS — Z98.890 S/P LAPAROSCOPIC SURGERY: ICD-10-CM

## 2023-04-11 PROCEDURE — 74177 CT ABD & PELVIS W/CONTRAST: CPT | Performed by: COLON & RECTAL SURGERY

## 2023-04-11 NOTE — PATIENT INSTRUCTIONS
I am seeing this patient in acute consultation for severe abdominal pain. It is associated with nausea. There is no vomiting. She is having daily bowel movements. She has been in the ER multiple times, including on March 16, 2023. This pain has been going on for some time, several months. It is exacerbating and is at a very bad state even on today's interview and examination. Her pain is diffuse. She has no fever or chills. The nausea is severe, no vomiting, but definitely debilitating. It happens on a near hourly basis, lasting for hours. It is worse on motion. The patient actually had pain as I change the position of the bed. Her worst pain is across the lower abdomen, but the pain is definitely generalized without specific localization. She has had right lower quadrant pain in the past.    She does suffer from chronic interstitial cystitis. She has a narrow right ureter according to prior cystoscopy and ureterogram.  She has a right renal calculus that is slightly larger in size from previous examinations to her most recent CT scan. I have performed laparoscopic lysis of adhesions on 2 occasions for this patient. My last 1 was on Eunice 10, 2022. She actually got significant relief of her abdominal pain at that time. Her other laparoscopic lysis of adhesions was in 2017. She has had a prior laparoscopic total abdominal hysterectomy with bilateral salpingo oophorectomy for menorrhagia in 1993. She has had 2 C-sections. She has had a laparoscopic cholecystectomy. The patient appears in moderate distress today but is ambulatory. I personally reviewed the CT scan myself from [facility] dated March 16, 2023. There are no acute findings. The patient has stool from the cecum all the way to the rectosigmoid, but the colon is nondilated. There is diverticulosis at the rectosigmoid junction, small pocket diverticuli, numbering less than 20.   There is no luminal narrowing or evidence of acute diverticulitis. She definitely had the same pain that day that she has today, and it has been continuous since that visit to the emergency department as well. There is no free fluid, free air, abscess, or any other indications of the abdominal pain on that CT scan. Please see the radiologist findings for all other incidentals. This patient is on for an EGD with Dr. Sarah Barrientos on May 17, 2023. The patient's mother had gastric cancer. The patient's last colonoscopy was done with Dr. Sarah Barrientos in 2021. He did remove a polyp, however he did not mention any diverticulosis, but it is visible on current CAT scan. This patient has diabetes with neuropathy. She has sickle cell trait, and thalassemia. She has multiple sclerosis. She has a cardiomyopathy. She has [place]. Abdominal exam is currently soft, very tender diffusely. Possibly some guarding. Possibly some slight rebound. Bowel sounds are present with normal activity and normal pitch. There is no evidence of ascites. There is no tympany. Her tenderness does not localize. Liver and spleen are not palpable. There are no inguinal, umbilical, or incisional hernias present. The only incisions are some laparoscopic incisions including one above the umbilicus. She has a very concerning examination. Even though her symptoms match her symptoms from the emergency department on March 16, 2023, I would like her to get a stat CT scan of the abdomen pelvis with oral and IV contrast.  The last CAT scan did not have oral contrast.  I have informed the patient to go ahead and schedule that as a stat for today, but I told her to make sure she does not go right from drinking the contrast right into the scanner. We definitely want the contrast to have an opportunity to at least reach the colon. With her sickle cell trait and thalassemia, we definitely want her to get a CBC. I want a make sure she is not suddenly in sickle cell crisis.     She is to wait at [facility] until she hears back from you the radiologist, myself, or the surgeon on-call. We need to see the stat results before she goes home. If she is discharged, I will see her back on April 13, 2023.

## 2023-04-13 ENCOUNTER — OFFICE VISIT (OUTPATIENT)
Facility: LOCATION | Age: 53
End: 2023-04-13
Payer: MEDICARE

## 2023-04-13 DIAGNOSIS — G35 MS (MULTIPLE SCLEROSIS) (HCC): ICD-10-CM

## 2023-04-13 DIAGNOSIS — Z98.890 S/P LAPAROSCOPIC SURGERY: ICD-10-CM

## 2023-04-13 DIAGNOSIS — G98.8 DISORDER OF NERVOUS SYSTEM DUE TO TYPE 2 DIABETES MELLITUS (HCC): ICD-10-CM

## 2023-04-13 DIAGNOSIS — D57.3 SICKLE CELL TRAIT (HCC): ICD-10-CM

## 2023-04-13 DIAGNOSIS — K66.0 INTRA-ABDOMINAL ADHESIONS: ICD-10-CM

## 2023-04-13 DIAGNOSIS — I42.0 DILATED IDIOPATHIC CARDIOMYOPATHY (HCC): ICD-10-CM

## 2023-04-13 DIAGNOSIS — E11.42 POLYNEUROPATHY DUE TO TYPE 2 DIABETES MELLITUS (HCC): ICD-10-CM

## 2023-04-13 DIAGNOSIS — G35 MULTIPLE SCLEROSIS EXACERBATION (HCC): ICD-10-CM

## 2023-04-13 DIAGNOSIS — R10.827 GENERALIZED ABDOMINAL TENDERNESS WITH REBOUND TENDERNESS: Primary | ICD-10-CM

## 2023-04-13 DIAGNOSIS — E10.65 CONTROLLED TYPE 1 DIABETES MELLITUS WITH HYPERGLYCEMIA (HCC): ICD-10-CM

## 2023-04-13 DIAGNOSIS — G37.9 DEMYELINATING DISEASE OF CENTRAL NERVOUS SYSTEM (HCC): ICD-10-CM

## 2023-04-13 DIAGNOSIS — N30.10 INTERSTITIAL CYSTITIS: ICD-10-CM

## 2023-04-13 DIAGNOSIS — K75.81 NASH (NONALCOHOLIC STEATOHEPATITIS): ICD-10-CM

## 2023-04-13 DIAGNOSIS — R53.1 WEAKNESS GENERALIZED: ICD-10-CM

## 2023-04-13 DIAGNOSIS — M79.7 FIBROMYALGIA: ICD-10-CM

## 2023-04-13 DIAGNOSIS — E11.69 DISORDER OF NERVOUS SYSTEM DUE TO TYPE 2 DIABETES MELLITUS (HCC): ICD-10-CM

## 2023-04-13 DIAGNOSIS — I10 ESSENTIAL HYPERTENSION: ICD-10-CM

## 2023-04-19 ENCOUNTER — OFFICE VISIT (OUTPATIENT)
Dept: NEUROLOGY | Facility: CLINIC | Age: 53
End: 2023-04-19
Payer: MEDICARE

## 2023-04-19 ENCOUNTER — TELEPHONE (OUTPATIENT)
Dept: NEUROLOGY | Facility: CLINIC | Age: 53
End: 2023-04-19

## 2023-04-19 ENCOUNTER — HOSPITAL ENCOUNTER (INPATIENT)
Facility: HOSPITAL | Age: 53
LOS: 10 days | Discharge: NURSING, REHAB OR LONG-TERM CARE | End: 2023-04-29
Attending: INTERNAL MEDICINE | Admitting: INTERNAL MEDICINE
Payer: MEDICARE

## 2023-04-19 VITALS
RESPIRATION RATE: 16 BRPM | HEIGHT: 66 IN | BODY MASS INDEX: 26.68 KG/M2 | DIASTOLIC BLOOD PRESSURE: 68 MMHG | HEART RATE: 65 BPM | WEIGHT: 166 LBS | SYSTOLIC BLOOD PRESSURE: 115 MMHG

## 2023-04-19 DIAGNOSIS — R26.89 BALANCE PROBLEM: ICD-10-CM

## 2023-04-19 DIAGNOSIS — G57.93 NEUROPATHIC PAIN OF BOTH LEGS: ICD-10-CM

## 2023-04-19 DIAGNOSIS — G35 MS (MULTIPLE SCLEROSIS) (HCC): Primary | ICD-10-CM

## 2023-04-19 LAB
ANION GAP SERPL CALC-SCNC: 2 MMOL/L (ref 0–18)
APTT PPP: 28.8 SECONDS (ref 23.3–35.6)
BUN BLD-MCNC: 11 MG/DL (ref 7–18)
CALCIUM BLD-MCNC: 9.2 MG/DL (ref 8.5–10.1)
CHLORIDE SERPL-SCNC: 107 MMOL/L (ref 98–112)
CO2 SERPL-SCNC: 32 MMOL/L (ref 21–32)
CREAT BLD-MCNC: 1.26 MG/DL
ERYTHROCYTE [DISTWIDTH] IN BLOOD BY AUTOMATED COUNT: 15.9 %
EST. AVERAGE GLUCOSE BLD GHB EST-MCNC: 197 MG/DL (ref 68–126)
GFR SERPLBLD BASED ON 1.73 SQ M-ARVRAT: 50 ML/MIN/1.73M2 (ref 60–?)
GLUCOSE BLD-MCNC: 113 MG/DL (ref 70–99)
GLUCOSE BLD-MCNC: 136 MG/DL (ref 70–99)
HBA1C MFR BLD: 8.5 % (ref ?–5.7)
HCT VFR BLD AUTO: 38.4 %
HGB BLD-MCNC: 11.5 G/DL
INR BLD: 1.01 (ref 0.85–1.16)
MCH RBC QN AUTO: 22.9 PG (ref 26–34)
MCHC RBC AUTO-ENTMCNC: 29.9 G/DL (ref 31–37)
MCV RBC AUTO: 76.3 FL
OSMOLALITY SERPL CALC.SUM OF ELEC: 293 MOSM/KG (ref 275–295)
PLATELET # BLD AUTO: 169 10(3)UL (ref 150–450)
POTASSIUM SERPL-SCNC: 3.3 MMOL/L (ref 3.5–5.1)
PROTHROMBIN TIME: 13.3 SECONDS (ref 11.6–14.8)
RBC # BLD AUTO: 5.03 X10(6)UL
SODIUM SERPL-SCNC: 141 MMOL/L (ref 136–145)
WBC # BLD AUTO: 7.1 X10(3) UL (ref 4–11)

## 2023-04-19 PROCEDURE — 99223 1ST HOSP IP/OBS HIGH 75: CPT | Performed by: HOSPITALIST

## 2023-04-19 PROCEDURE — 99214 OFFICE O/P EST MOD 30 MIN: CPT | Performed by: OTHER

## 2023-04-19 RX ORDER — NICOTINE POLACRILEX 4 MG
15 LOZENGE BUCCAL
Status: DISCONTINUED | OUTPATIENT
Start: 2023-04-19 | End: 2023-04-29

## 2023-04-19 RX ORDER — ALBUTEROL SULFATE 90 UG/1
2 AEROSOL, METERED RESPIRATORY (INHALATION) EVERY 6 HOURS PRN
Status: DISCONTINUED | OUTPATIENT
Start: 2023-04-19 | End: 2023-04-29

## 2023-04-19 RX ORDER — ENOXAPARIN SODIUM 100 MG/ML
40 INJECTION SUBCUTANEOUS NIGHTLY
Status: DISCONTINUED | OUTPATIENT
Start: 2023-04-19 | End: 2023-04-29

## 2023-04-19 RX ORDER — HYDROCODONE BITARTRATE AND ACETAMINOPHEN 10; 325 MG/1; MG/1
1 TABLET ORAL EVERY 4 HOURS PRN
Status: DISCONTINUED | OUTPATIENT
Start: 2023-04-19 | End: 2023-04-29

## 2023-04-19 RX ORDER — LEVOTHYROXINE SODIUM 0.07 MG/1
75 TABLET ORAL
Status: DISCONTINUED | OUTPATIENT
Start: 2023-04-20 | End: 2023-04-29

## 2023-04-19 RX ORDER — DULOXETIN HYDROCHLORIDE 30 MG/1
60 CAPSULE, DELAYED RELEASE ORAL 2 TIMES DAILY
Status: DISCONTINUED | OUTPATIENT
Start: 2023-04-19 | End: 2023-04-29

## 2023-04-19 RX ORDER — FLUTICASONE FUROATE AND VILANTEROL 100; 25 UG/1; UG/1
1 POWDER RESPIRATORY (INHALATION) DAILY
Status: DISCONTINUED | OUTPATIENT
Start: 2023-04-19 | End: 2023-04-29

## 2023-04-19 RX ORDER — ACETAMINOPHEN 500 MG
500 TABLET ORAL EVERY 4 HOURS PRN
Status: DISCONTINUED | OUTPATIENT
Start: 2023-04-19 | End: 2023-04-29

## 2023-04-19 RX ORDER — ATORVASTATIN CALCIUM 20 MG/1
20 TABLET, FILM COATED ORAL NIGHTLY
Status: DISCONTINUED | OUTPATIENT
Start: 2023-04-19 | End: 2023-04-29

## 2023-04-19 RX ORDER — DOCUSATE SODIUM 100 MG/1
100 CAPSULE, LIQUID FILLED ORAL 2 TIMES DAILY
Status: DISCONTINUED | OUTPATIENT
Start: 2023-04-19 | End: 2023-04-26

## 2023-04-19 RX ORDER — MONTELUKAST SODIUM 10 MG/1
10 TABLET ORAL NIGHTLY
Status: DISCONTINUED | OUTPATIENT
Start: 2023-04-19 | End: 2023-04-29

## 2023-04-19 RX ORDER — POLYETHYLENE GLYCOL 3350 17 G/17G
17 POWDER, FOR SOLUTION ORAL DAILY PRN
Status: DISCONTINUED | OUTPATIENT
Start: 2023-04-19 | End: 2023-04-26

## 2023-04-19 RX ORDER — MELATONIN
100 DAILY
Status: DISCONTINUED | OUTPATIENT
Start: 2023-04-20 | End: 2023-04-29

## 2023-04-19 RX ORDER — PROCHLORPERAZINE EDISYLATE 5 MG/ML
5 INJECTION INTRAMUSCULAR; INTRAVENOUS EVERY 8 HOURS PRN
Status: DISCONTINUED | OUTPATIENT
Start: 2023-04-19 | End: 2023-04-29

## 2023-04-19 RX ORDER — CARVEDILOL 3.12 MG/1
3.12 TABLET ORAL 2 TIMES DAILY WITH MEALS
Status: DISCONTINUED | OUTPATIENT
Start: 2023-04-19 | End: 2023-04-29

## 2023-04-19 RX ORDER — POTASSIUM CHLORIDE 20 MEQ/1
40 TABLET, EXTENDED RELEASE ORAL ONCE
Status: COMPLETED | OUTPATIENT
Start: 2023-04-19 | End: 2023-04-19

## 2023-04-19 RX ORDER — PREGABALIN 100 MG/1
200 CAPSULE ORAL 2 TIMES DAILY
Status: DISCONTINUED | OUTPATIENT
Start: 2023-04-19 | End: 2023-04-29

## 2023-04-19 RX ORDER — DEXTROSE MONOHYDRATE 25 G/50ML
50 INJECTION, SOLUTION INTRAVENOUS
Status: DISCONTINUED | OUTPATIENT
Start: 2023-04-19 | End: 2023-04-29

## 2023-04-19 RX ORDER — HYDROCODONE BITARTRATE AND ACETAMINOPHEN 10; 325 MG/1; MG/1
1 TABLET ORAL 2 TIMES DAILY PRN
Status: DISCONTINUED | OUTPATIENT
Start: 2023-04-19 | End: 2023-04-19

## 2023-04-19 RX ORDER — ONDANSETRON 2 MG/ML
4 INJECTION INTRAMUSCULAR; INTRAVENOUS EVERY 6 HOURS PRN
Status: DISCONTINUED | OUTPATIENT
Start: 2023-04-19 | End: 2023-04-29

## 2023-04-19 RX ORDER — SODIUM PHOSPHATE, DIBASIC AND SODIUM PHOSPHATE, MONOBASIC 7; 19 G/133ML; G/133ML
1 ENEMA RECTAL ONCE AS NEEDED
Status: DISCONTINUED | OUTPATIENT
Start: 2023-04-19 | End: 2023-04-29

## 2023-04-19 RX ORDER — MELATONIN
3 NIGHTLY PRN
Status: DISCONTINUED | OUTPATIENT
Start: 2023-04-19 | End: 2023-04-29

## 2023-04-19 RX ORDER — BACLOFEN 10 MG/1
10 TABLET ORAL 3 TIMES DAILY PRN
Status: DISCONTINUED | OUTPATIENT
Start: 2023-04-19 | End: 2023-04-29

## 2023-04-19 RX ORDER — PANTOPRAZOLE SODIUM 40 MG/1
40 TABLET, DELAYED RELEASE ORAL
Status: DISCONTINUED | OUTPATIENT
Start: 2023-04-19 | End: 2023-04-29

## 2023-04-19 RX ORDER — BISACODYL 10 MG
10 SUPPOSITORY, RECTAL RECTAL
Status: DISCONTINUED | OUTPATIENT
Start: 2023-04-19 | End: 2023-04-29

## 2023-04-19 RX ORDER — NICOTINE POLACRILEX 4 MG
30 LOZENGE BUCCAL
Status: DISCONTINUED | OUTPATIENT
Start: 2023-04-19 | End: 2023-04-29

## 2023-04-19 RX ORDER — SENNOSIDES 8.6 MG
17.2 TABLET ORAL NIGHTLY PRN
Status: DISCONTINUED | OUTPATIENT
Start: 2023-04-19 | End: 2023-04-29

## 2023-04-19 RX ORDER — HYDROMORPHONE HYDROCHLORIDE 1 MG/ML
0.2 INJECTION, SOLUTION INTRAMUSCULAR; INTRAVENOUS; SUBCUTANEOUS EVERY 4 HOURS PRN
Status: DISCONTINUED | OUTPATIENT
Start: 2023-04-19 | End: 2023-04-23

## 2023-04-19 NOTE — TELEPHONE ENCOUNTER
Per Dr. Janay Brody, pt to be admitted directly to hospital on 4/19/2023 for MS Flare  Patient needs a medical bed. Orders:  PLEX    1. Contacted [facility] at [phone] to confirm IP bed is available. 2. Confirmed patient's PCP is a(n) [facility] provider, so the correct hospitalist is contacted. 3. Paged [facility] Hospitalist at [phone]. Dr. Lily Padgett is current on-call hospitalist.  Derrek Robbins return call for acceptance of patient. 4. Hospitalist returned page and was informed of direct admission and above orders given. 5. Called [facility] at [phone] to notify of admission and accepting accepting physician. 6. Patient to present to the ER Registration desk in [facility] Patient to inform staff that Direct Admission as been arranged. Patient should present to [facility] on 4/19/2023 around 1700.  7. Dr. Gwenyth Prader , on-call neurologist contacted with admission information. Dr. Carloz Santana was contacted on the direct admission. RN spoke to Courtney Jane at Dr. Jean-Claude Gonzalez office and informed them of the admission. PLEX for 5 days. Nephrology determines frequency of treatment. Patient notified of pending admission. Verbalized understanding of instructions.

## 2023-04-19 NOTE — PLAN OF CARE
PT arrived to floor at 1800, pt wheeled up and ambulatory to bed. Pt VSS. Port not accessed, attempting to access. Med rec done, navigators completed. MD aware, consults paged. Per nephrology, going to get plex placed tomorrow in IR. Pt on room air. Meds given per [facility], Fall precautions in place, call light in reach. NURSING ADMISSION NOTE      Patient admitted via Wheelchair  Oriented to room. Safety precautions initiated. Bed in low position. Call light in reach.

## 2023-04-20 ENCOUNTER — APPOINTMENT (OUTPATIENT)
Dept: INTERVENTIONAL RADIOLOGY/VASCULAR | Facility: HOSPITAL | Age: 53
End: 2023-04-20
Attending: INTERNAL MEDICINE
Payer: MEDICARE

## 2023-04-20 LAB
ANION GAP SERPL CALC-SCNC: 3 MMOL/L (ref 0–18)
ANTIBODY SCREEN: NEGATIVE
BASOPHILS # BLD AUTO: 0.02 X10(3) UL (ref 0–0.2)
BASOPHILS NFR BLD AUTO: 0.3 %
BUN BLD-MCNC: 11 MG/DL (ref 7–18)
CALCIUM BLD-MCNC: 8.6 MG/DL (ref 8.5–10.1)
CHLORIDE SERPL-SCNC: 109 MMOL/L (ref 98–112)
CO2 SERPL-SCNC: 31 MMOL/L (ref 21–32)
CREAT BLD-MCNC: 1.06 MG/DL
EOSINOPHIL # BLD AUTO: 0.11 X10(3) UL (ref 0–0.7)
EOSINOPHIL NFR BLD AUTO: 1.6 %
ERYTHROCYTE [DISTWIDTH] IN BLOOD BY AUTOMATED COUNT: 15.8 %
GFR SERPLBLD BASED ON 1.73 SQ M-ARVRAT: 62 ML/MIN/1.73M2 (ref 60–?)
GLUCOSE BLD-MCNC: 111 MG/DL (ref 70–99)
GLUCOSE BLD-MCNC: 162 MG/DL (ref 70–99)
GLUCOSE BLD-MCNC: 168 MG/DL (ref 70–99)
GLUCOSE BLD-MCNC: 261 MG/DL (ref 70–99)
GLUCOSE BLD-MCNC: 98 MG/DL (ref 70–99)
HCT VFR BLD AUTO: 36.5 %
HGB BLD-MCNC: 11 G/DL
IMM GRANULOCYTES # BLD AUTO: 0.02 X10(3) UL (ref 0–1)
IMM GRANULOCYTES NFR BLD: 0.3 %
LYMPHOCYTES # BLD AUTO: 3.86 X10(3) UL (ref 1–4)
LYMPHOCYTES NFR BLD AUTO: 55.8 %
MCH RBC QN AUTO: 23 PG (ref 26–34)
MCHC RBC AUTO-ENTMCNC: 30.1 G/DL (ref 31–37)
MCV RBC AUTO: 76.2 FL
MONOCYTES # BLD AUTO: 0.53 X10(3) UL (ref 0.1–1)
MONOCYTES NFR BLD AUTO: 7.7 %
NEUTROPHILS # BLD AUTO: 2.38 X10 (3) UL (ref 1.5–7.7)
NEUTROPHILS # BLD AUTO: 2.38 X10(3) UL (ref 1.5–7.7)
NEUTROPHILS NFR BLD AUTO: 34.3 %
OSMOLALITY SERPL CALC.SUM OF ELEC: 299 MOSM/KG (ref 275–295)
PLATELET # BLD AUTO: 164 10(3)UL (ref 150–450)
POTASSIUM SERPL-SCNC: 3.8 MMOL/L (ref 3.5–5.1)
POTASSIUM SERPL-SCNC: 3.8 MMOL/L (ref 3.5–5.1)
RBC # BLD AUTO: 4.79 X10(6)UL
RH BLOOD TYPE: POSITIVE
SODIUM SERPL-SCNC: 143 MMOL/L (ref 136–145)
WBC # BLD AUTO: 6.9 X10(3) UL (ref 4–11)

## 2023-04-20 PROCEDURE — 99232 SBSQ HOSP IP/OBS MODERATE 35: CPT | Performed by: STUDENT IN AN ORGANIZED HEALTH CARE EDUCATION/TRAINING PROGRAM

## 2023-04-20 PROCEDURE — B518ZZA FLUOROSCOPY OF SUPERIOR VENA CAVA, GUIDANCE: ICD-10-PCS | Performed by: RADIOLOGY

## 2023-04-20 PROCEDURE — 99223 1ST HOSP IP/OBS HIGH 75: CPT | Performed by: INTERNAL MEDICINE

## 2023-04-20 PROCEDURE — 6A551Z3 PHERESIS OF PLASMA, MULTIPLE: ICD-10-PCS | Performed by: INTERNAL MEDICINE

## 2023-04-20 PROCEDURE — 02HV33Z INSERTION OF INFUSION DEVICE INTO SUPERIOR VENA CAVA, PERCUTANEOUS APPROACH: ICD-10-PCS | Performed by: RADIOLOGY

## 2023-04-20 RX ORDER — ONDANSETRON 2 MG/ML
4 INJECTION INTRAMUSCULAR; INTRAVENOUS
Status: DISCONTINUED | OUTPATIENT
Start: 2023-04-20 | End: 2023-04-29

## 2023-04-20 RX ORDER — DIPHENHYDRAMINE HYDROCHLORIDE 50 MG/ML
50 INJECTION INTRAMUSCULAR; INTRAVENOUS
Status: DISCONTINUED | OUTPATIENT
Start: 2023-04-20 | End: 2023-04-29

## 2023-04-20 RX ORDER — MIDAZOLAM HYDROCHLORIDE 1 MG/ML
INJECTION INTRAMUSCULAR; INTRAVENOUS
Status: COMPLETED
Start: 2023-04-20 | End: 2023-04-20

## 2023-04-20 RX ORDER — HEPARIN SODIUM 5000 [USP'U]/ML
INJECTION, SOLUTION INTRAVENOUS; SUBCUTANEOUS
Status: COMPLETED
Start: 2023-04-20 | End: 2023-04-20

## 2023-04-20 RX ORDER — LIDOCAINE HYDROCHLORIDE 10 MG/ML
INJECTION, SOLUTION INFILTRATION; PERINEURAL
Status: COMPLETED
Start: 2023-04-20 | End: 2023-04-20

## 2023-04-20 RX ORDER — ALBUMIN, HUMAN INJ 5% 5 %
2500 SOLUTION INTRAVENOUS EVERY OTHER DAY
Status: DISCONTINUED | OUTPATIENT
Start: 2023-04-20 | End: 2023-04-27

## 2023-04-20 NOTE — PROCEDURES
[address]ok Patient Status:  Inpatient    3/11/1970 MRN [id number]   Location [address] [facility] Attending Pat Alex, [address] [facility] Day # 1 PCP Maxime Barry MD         Brief Procedure Report    Pre-Operative Diagnosis: MS    Post-Operative Diagnosis: Same as above. Procedure Performed: Temp cath    Anesthesia: 1% lidocaine    EBL: 3cc    Complications: None    Summary of Case:   Left I J temp cath placed for plasmapharesis. Tip in high right atrium. Cath may be used. Patient tolerated procedure well without immediate complication. Full report to follow in PACS.     Shaun Vo

## 2023-04-20 NOTE — PLAN OF CARE
Pt received A&Ox4. VSS. RA. Tele NSR. Denies pain. Medications given per MAR. NPO @ MN - plan for plex placement tm. Fall precautions in place. Call light within reach.

## 2023-04-20 NOTE — PROCEDURES
I have discussed with the patient and/or legal representative the potential benefits, risks, and side effects of this temp cath procedure, the likelihood of the patient achieving goals; and the potential problems that might occur during recuperation. I discussed reasonable alternatives to the procedure, including risks, benefits and side effects related to the alternatives, and risks related to not receiving this procedure.

## 2023-04-20 NOTE — PROGRESS NOTES
04/19/23 7255   BiPAP   $ RT Standby Charge (per 15 min) 1   BiPAP/CPAP Monitored Parameters   Toleration Refused  (patient states she does not wear a CPAP)

## 2023-04-20 NOTE — PLAN OF CARE
Pt alert/oriented x 4. Complaints of bilateral lower extremity weakness and muscle spasms. Pt complaints of intermittent sternal chest pain. Norco given as ordered. Denies shortness of breath, O2 sat 97% room air. Lungs clear. At 1394, writer received call from telemetry tech stating pt had 2 sec run of SVT. Assessed pt, pt resting in bed. Denies palpitation, no shortness of breath. Vitals stable. Dr. Momo Gupta notified. Left temporary cath placed in IR this afternoon. Site clean/dry/intact. [facility] here for treatment. Vitals stable. Scheduled medications given per MAR. Updated pt re: plan of care, verbalizes understanding. Needs addressed. Call light within reach, uses appropriately.     Problem: Impaired Functional Mobility  Goal: Achieve highest/safest level of mobility/gait  Description: Interventions:  - Assess patient's functional ability and stability  - Promote increasing activity/tolerance for mobility and gait  - Educate and engage patient/family in tolerated activity level and precautions  Outcome: Progressing

## 2023-04-20 NOTE — CM/SW NOTE
SW noted that patient is current with [facility] for PT and RN services. SW placed resume of care orders. SW will continue to follow for plan of care changes and remain available for any additional DC needs or concerns.      Katerina Sahu MSW, LSW  Discharge Planner   [phone]

## 2023-04-21 LAB
GLUCOSE BLD-MCNC: 133 MG/DL (ref 70–99)
GLUCOSE BLD-MCNC: 135 MG/DL (ref 70–99)
GLUCOSE BLD-MCNC: 150 MG/DL (ref 70–99)
GLUCOSE BLD-MCNC: 257 MG/DL (ref 70–99)

## 2023-04-21 PROCEDURE — 99232 SBSQ HOSP IP/OBS MODERATE 35: CPT | Performed by: NURSE PRACTITIONER

## 2023-04-21 PROCEDURE — 99232 SBSQ HOSP IP/OBS MODERATE 35: CPT | Performed by: STUDENT IN AN ORGANIZED HEALTH CARE EDUCATION/TRAINING PROGRAM

## 2023-04-21 PROCEDURE — 99233 SBSQ HOSP IP/OBS HIGH 50: CPT | Performed by: INTERNAL MEDICINE

## 2023-04-21 NOTE — PROGRESS NOTES
04/20/23 1910   Clinical Encounter Type   Visited With Patient   Routine Visit Introduction   Taxonomy   Intended Effects Preserve dignity and respect   Methods Offer support; Offer spiritual/Baptist support   Interventions Acknowledge current situation; Active listening;Explain  role;[place]; Share words of hope and inspiration;Provide sacred reading(s)      responded to consult request for spiritual care visit.  met with Megan Ambriz who request prayer.  prayed and also read scripture.  remains available for support. Spiritual Care support can be requested via an Epic consult. AFUA Maya. Div  Extension:[phone]

## 2023-04-21 NOTE — PLAN OF CARE
Pt alert and oriented X4. VSS. Pt resting in bed, still co of some pain with activity. PT/OT consulted. All meds given per [facility]. Pt port saline locked. Pt temp cath in place, clean dry intact. Fall precautions in place, call light in reach. 1730: Dialysis nurse called, and said she would be by around 0900 tomorrow.  Also to have premedication drugs ready to be given prior to    Problem: Impaired Functional Mobility  Goal: Achieve highest/safest level of mobility/gait  Description: Interventions:  - Assess patient's functional ability and stability  - Promote increasing activity/tolerance for mobility and gait  - Educate and engage patient/family in tolerated activity level and precautions  Outcome: Progressing

## 2023-04-21 NOTE — TELEPHONE ENCOUNTER
RN received from [facility] for physician review and signature. Patient receives SN    Frequency: 1 week    Duration: weekly    Plan of Care: Weekly Maintenance of Left Subclavian catheter, assessment of MS symptoms.     Paperwork signed and faxed with Additional Progress Note...

## 2023-04-21 NOTE — PLAN OF CARE
Received pt alert and oriented x4. Hypotensive. MD notified. 1L NS bolus administered per orders. Meds given per MAR orders. Pt c/o pain but refused pain meds. Pt c/o muscle spasm and prn baclofen given per orders. Morning BP 89/55, MD notfied, no new orders. Safety precautions in place and call light within reach.

## 2023-04-22 LAB
ANION GAP SERPL CALC-SCNC: 2 MMOL/L (ref 0–18)
BASOPHILS # BLD AUTO: 0.01 X10(3) UL (ref 0–0.2)
BASOPHILS NFR BLD AUTO: 0.1 %
BUN BLD-MCNC: 11 MG/DL (ref 7–18)
CALCIUM BLD-MCNC: 8 MG/DL (ref 8.5–10.1)
CHLORIDE SERPL-SCNC: 108 MMOL/L (ref 98–112)
CO2 SERPL-SCNC: 31 MMOL/L (ref 21–32)
CREAT BLD-MCNC: 1.2 MG/DL
EOSINOPHIL # BLD AUTO: 0 X10(3) UL (ref 0–0.7)
EOSINOPHIL NFR BLD AUTO: 0 %
ERYTHROCYTE [DISTWIDTH] IN BLOOD BY AUTOMATED COUNT: 16.1 %
GFR SERPLBLD BASED ON 1.73 SQ M-ARVRAT: 53 ML/MIN/1.73M2 (ref 60–?)
GLUCOSE BLD-MCNC: 117 MG/DL (ref 70–99)
GLUCOSE BLD-MCNC: 126 MG/DL (ref 70–99)
GLUCOSE BLD-MCNC: 178 MG/DL (ref 70–99)
GLUCOSE BLD-MCNC: 207 MG/DL (ref 70–99)
GLUCOSE BLD-MCNC: 267 MG/DL (ref 70–99)
HCT VFR BLD AUTO: 34.2 %
HGB BLD-MCNC: 10.1 G/DL
IMM GRANULOCYTES # BLD AUTO: 0.01 X10(3) UL (ref 0–1)
IMM GRANULOCYTES NFR BLD: 0.1 %
LYMPHOCYTES # BLD AUTO: 3.68 X10(3) UL (ref 1–4)
LYMPHOCYTES NFR BLD AUTO: 48.6 %
MCH RBC QN AUTO: 22.8 PG (ref 26–34)
MCHC RBC AUTO-ENTMCNC: 29.5 G/DL (ref 31–37)
MCV RBC AUTO: 77.2 FL
MONOCYTES # BLD AUTO: 0.57 X10(3) UL (ref 0.1–1)
MONOCYTES NFR BLD AUTO: 7.5 %
NEUTROPHILS # BLD AUTO: 3.3 X10 (3) UL (ref 1.5–7.7)
NEUTROPHILS # BLD AUTO: 3.3 X10(3) UL (ref 1.5–7.7)
NEUTROPHILS NFR BLD AUTO: 43.7 %
OSMOLALITY SERPL CALC.SUM OF ELEC: 296 MOSM/KG (ref 275–295)
PLATELET # BLD AUTO: 135 10(3)UL (ref 150–450)
POTASSIUM SERPL-SCNC: 3.6 MMOL/L (ref 3.5–5.1)
RBC # BLD AUTO: 4.43 X10(6)UL
SODIUM SERPL-SCNC: 141 MMOL/L (ref 136–145)
WBC # BLD AUTO: 7.6 X10(3) UL (ref 4–11)

## 2023-04-22 PROCEDURE — 99233 SBSQ HOSP IP/OBS HIGH 50: CPT | Performed by: OTHER

## 2023-04-22 PROCEDURE — 99232 SBSQ HOSP IP/OBS MODERATE 35: CPT | Performed by: STUDENT IN AN ORGANIZED HEALTH CARE EDUCATION/TRAINING PROGRAM

## 2023-04-22 NOTE — PLAN OF CARE
Patient alert and oriented x4. Afebrile. Room air. No SOB. Tele SNR. No complaints of pain at this time. No nausea and vomiting. Patient has MS Flare. POC : For x5 PLEX every other day. Patient scheduled for 2/5 PLEX in the morning, [facility] aware. Up to the bathroom with assist. Fall precautions in place. Call light in reach. Needs attended. Will continue to monitor. Problem: CARDIOVASCULAR - ADULT  Goal: Maintains optimal cardiac output and hemodynamic stability  Description: INTERVENTIONS:  - Monitor vital signs, rhythm, and trends  - Monitor for bleeding, hypotension and signs of decreased cardiac output  - Evaluate effectiveness of vasoactive medications to optimize hemodynamic stability  - Monitor arterial and/or venous puncture sites for bleeding and/or hematoma  - Assess quality of pulses, skin color and temperature  - Assess for signs of decreased coronary artery perfusion - ex. Angina  - Evaluate fluid balance, assess for edema, trend weights  Outcome: Progressing     Problem: NEUROLOGICAL - ADULT  Goal: Achieves stable or improved neurological status  Description: INTERVENTIONS  - Assess for and report changes in neurological status  - Initiate measures to prevent increased intracranial pressure  - Maintain blood pressure and fluid volume within ordered parameters to optimize cerebral perfusion and minimize risk of hemorrhage  - Monitor temperature, glucose, and sodium.  Initiate appropriate interventions as ordered  Outcome: Progressing

## 2023-04-22 NOTE — PLAN OF CARE
Pt AOx4. VSS. RA. No complain of N/V/D. Good appetite. Up with stand by assist.  Plasmapheresis done today. Premedicated with Benadryl 50mg and Zofran 4mg. Tolerated well. Will continue plan of care. Problem: Impaired Functional Mobility  Goal: Achieve highest/safest level of mobility/gait  Description: Interventions:  - Assess patient's functional ability and stability  - Promote increasing activity/tolerance for mobility and gait  - Educate and engage patient/family in tolerated activity level and precautions  Outcome: Progressing     Problem: CARDIOVASCULAR - ADULT  Goal: Maintains optimal cardiac output and hemodynamic stability  Description: INTERVENTIONS:  - Monitor vital signs, rhythm, and trends  - Monitor for bleeding, hypotension and signs of decreased cardiac output  - Evaluate effectiveness of vasoactive medications to optimize hemodynamic stability  - Monitor arterial and/or venous puncture sites for bleeding and/or hematoma  - Assess quality of pulses, skin color and temperature  - Assess for signs of decreased coronary artery perfusion - ex. Angina  - Evaluate fluid balance, assess for edema, trend weights  Outcome: Progressing     Problem: NEUROLOGICAL - ADULT  Goal: Achieves stable or improved neurological status  Description: INTERVENTIONS  - Assess for and report changes in neurological status  - Initiate measures to prevent increased intracranial pressure  - Maintain blood pressure and fluid volume within ordered parameters to optimize cerebral perfusion and minimize risk of hemorrhage  - Monitor temperature, glucose, and sodium.  Initiate appropriate interventions as ordered  Outcome: Progressing

## 2023-04-23 ENCOUNTER — APPOINTMENT (OUTPATIENT)
Dept: CT IMAGING | Facility: HOSPITAL | Age: 53
End: 2023-04-23
Attending: HOSPITALIST
Payer: MEDICARE

## 2023-04-23 LAB
GLUCOSE BLD-MCNC: 160 MG/DL (ref 70–99)
GLUCOSE BLD-MCNC: 192 MG/DL (ref 70–99)
GLUCOSE BLD-MCNC: 204 MG/DL (ref 70–99)
GLUCOSE BLD-MCNC: 218 MG/DL (ref 70–99)

## 2023-04-23 PROCEDURE — 99233 SBSQ HOSP IP/OBS HIGH 50: CPT | Performed by: OTHER

## 2023-04-23 PROCEDURE — 74176 CT ABD & PELVIS W/O CONTRAST: CPT | Performed by: HOSPITALIST

## 2023-04-23 PROCEDURE — 99232 SBSQ HOSP IP/OBS MODERATE 35: CPT | Performed by: STUDENT IN AN ORGANIZED HEALTH CARE EDUCATION/TRAINING PROGRAM

## 2023-04-23 RX ORDER — HYDROMORPHONE HYDROCHLORIDE 1 MG/ML
0.2 INJECTION, SOLUTION INTRAMUSCULAR; INTRAVENOUS; SUBCUTANEOUS EVERY 2 HOUR PRN
Status: DISCONTINUED | OUTPATIENT
Start: 2023-04-23 | End: 2023-04-29

## 2023-04-23 RX ORDER — HYDROMORPHONE HYDROCHLORIDE 1 MG/ML
0.8 INJECTION, SOLUTION INTRAMUSCULAR; INTRAVENOUS; SUBCUTANEOUS EVERY 2 HOUR PRN
Status: DISCONTINUED | OUTPATIENT
Start: 2023-04-23 | End: 2023-04-29

## 2023-04-23 RX ORDER — HYDROMORPHONE HYDROCHLORIDE 1 MG/ML
0.4 INJECTION, SOLUTION INTRAMUSCULAR; INTRAVENOUS; SUBCUTANEOUS EVERY 2 HOUR PRN
Status: DISCONTINUED | OUTPATIENT
Start: 2023-04-23 | End: 2023-04-29

## 2023-04-23 NOTE — PLAN OF CARE
Patient alert and oriented x4. Afebrile. Room air. No SOB. Patient here for MS Exacerbation. POC:  PLEX every other day x5, done 2/5. No complaints of pain. No nausea and vomiting. Blood glucose monitored, Insulin given as ordered. Ambulatory. Fall precautions in place. Call light in reach. Needs attended. Problem: CARDIOVASCULAR - ADULT  Goal: Maintains optimal cardiac output and hemodynamic stability  Description: INTERVENTIONS:  - Monitor vital signs, rhythm, and trends  - Monitor for bleeding, hypotension and signs of decreased cardiac output  - Evaluate effectiveness of vasoactive medications to optimize hemodynamic stability  - Monitor arterial and/or venous puncture sites for bleeding and/or hematoma  - Assess quality of pulses, skin color and temperature  - Assess for signs of decreased coronary artery perfusion - ex. Angina  - Evaluate fluid balance, assess for edema, trend weights  Outcome: Progressing     Problem: NEUROLOGICAL - ADULT  Goal: Achieves stable or improved neurological status  Description: INTERVENTIONS  - Assess for and report changes in neurological status  - Initiate measures to prevent increased intracranial pressure  - Maintain blood pressure and fluid volume within ordered parameters to optimize cerebral perfusion and minimize risk of hemorrhage  - Monitor temperature, glucose, and sodium.  Initiate appropriate interventions as ordered  Outcome: Progressing

## 2023-04-24 LAB
GLUCOSE BLD-MCNC: 115 MG/DL (ref 70–99)
GLUCOSE BLD-MCNC: 142 MG/DL (ref 70–99)
GLUCOSE BLD-MCNC: 182 MG/DL (ref 70–99)
GLUCOSE BLD-MCNC: 244 MG/DL (ref 70–99)

## 2023-04-24 PROCEDURE — 99232 SBSQ HOSP IP/OBS MODERATE 35: CPT | Performed by: OTHER

## 2023-04-24 PROCEDURE — 99232 SBSQ HOSP IP/OBS MODERATE 35: CPT | Performed by: STUDENT IN AN ORGANIZED HEALTH CARE EDUCATION/TRAINING PROGRAM

## 2023-04-24 PROCEDURE — 99232 SBSQ HOSP IP/OBS MODERATE 35: CPT | Performed by: INTERNAL MEDICINE

## 2023-04-24 NOTE — PLAN OF CARE
Pt A/O x4. VSS. Afebrile. Pt denies pain throughout day, Medications admin per MAR. Pt receiving plasmapheresis treatment 3/5 today. Pre-medications admin per MAR. Fall and safety precautions in place. Call light within reach.

## 2023-04-24 NOTE — PLAN OF CARE
Patient is alert and oriented x4. VSS on RA. Pt c/o pelvic pain at start of shift, stated this may be related to kidney stones, given dilaudid 0.2 mg without relief. Paged MD for orders, ordered stat CT. Pt went for scan, arrived back and c/o 6/10 pain in back and legs however declined medication. Will continue to assess pain and offer pain interventions. Planning for plasmapharesis tomorrow at 1400, will endorse to oncoming RN. Problem: Impaired Functional Mobility  Goal: Achieve highest/safest level of mobility/gait  Description: Interventions:  - Assess patient's functional ability and stability  - Promote increasing activity/tolerance for mobility and gait  - Educate and engage patient/family in tolerated activity level and precautions  Outcome: Progressing     Problem: CARDIOVASCULAR - ADULT  Goal: Maintains optimal cardiac output and hemodynamic stability  Description: INTERVENTIONS:  - Monitor vital signs, rhythm, and trends  - Monitor for bleeding, hypotension and signs of decreased cardiac output  - Evaluate effectiveness of vasoactive medications to optimize hemodynamic stability  - Monitor arterial and/or venous puncture sites for bleeding and/or hematoma  - Assess quality of pulses, skin color and temperature  - Assess for signs of decreased coronary artery perfusion - ex. Angina  - Evaluate fluid balance, assess for edema, trend weights  Outcome: Progressing     Problem: NEUROLOGICAL - ADULT  Goal: Achieves stable or improved neurological status  Description: INTERVENTIONS  - Assess for and report changes in neurological status  - Initiate measures to prevent increased intracranial pressure  - Maintain blood pressure and fluid volume within ordered parameters to optimize cerebral perfusion and minimize risk of hemorrhage  - Monitor temperature, glucose, and sodium.  Initiate appropriate interventions as ordered  Outcome: Progressing

## 2023-04-25 ENCOUNTER — CASE MANAGEMENT (OUTPATIENT)
Dept: CARE COORDINATION | Age: 53
End: 2023-04-25

## 2023-04-25 LAB
GLUCOSE BLD-MCNC: 137 MG/DL (ref 70–99)
GLUCOSE BLD-MCNC: 165 MG/DL (ref 70–99)
GLUCOSE BLD-MCNC: 209 MG/DL (ref 70–99)
GLUCOSE BLD-MCNC: 216 MG/DL (ref 70–99)

## 2023-04-25 PROCEDURE — 99232 SBSQ HOSP IP/OBS MODERATE 35: CPT | Performed by: OTHER

## 2023-04-25 PROCEDURE — 99232 SBSQ HOSP IP/OBS MODERATE 35: CPT | Performed by: INTERNAL MEDICINE

## 2023-04-25 PROCEDURE — 99232 SBSQ HOSP IP/OBS MODERATE 35: CPT | Performed by: STUDENT IN AN ORGANIZED HEALTH CARE EDUCATION/TRAINING PROGRAM

## 2023-04-25 RX ORDER — CYCLOBENZAPRINE HCL 10 MG
10 TABLET ORAL 3 TIMES DAILY PRN
Status: DISCONTINUED | OUTPATIENT
Start: 2023-04-25 | End: 2023-04-29

## 2023-04-25 RX ORDER — KETOROLAC TROMETHAMINE 30 MG/ML
30 INJECTION, SOLUTION INTRAMUSCULAR; INTRAVENOUS EVERY 6 HOURS PRN
Status: ACTIVE | OUTPATIENT
Start: 2023-04-25 | End: 2023-04-27

## 2023-04-25 RX ORDER — KETOROLAC TROMETHAMINE 30 MG/ML
15 INJECTION, SOLUTION INTRAMUSCULAR; INTRAVENOUS EVERY 6 HOURS PRN
Status: ACTIVE | OUTPATIENT
Start: 2023-04-25 | End: 2023-04-27

## 2023-04-25 NOTE — PLAN OF CARE
Received pt alert and oriented x4. VSS. Afebrile. Pt c/o BLE pain but refused any pain medication at this time. Meds given per MAR orders. , insulin coverage given per orders. Safety precautions in place and call light within reach. Problem: Impaired Functional Mobility  Goal: Achieve highest/safest level of mobility/gait  Description: Interventions:  - Assess patient's functional ability and stability  - Promote increasing activity/tolerance for mobility and gait  - Educate and engage patient/family in tolerated activity level and precautions  Outcome: Progressing     Problem: NEUROLOGICAL - ADULT  Goal: Achieves stable or improved neurological status  Description: INTERVENTIONS  - Assess for and report changes in neurological status  - Initiate measures to prevent increased intracranial pressure  - Maintain blood pressure and fluid volume within ordered parameters to optimize cerebral perfusion and minimize risk of hemorrhage  - Monitor temperature, glucose, and sodium.  Initiate appropriate interventions as ordered  Outcome: Progressing

## 2023-04-25 NOTE — CM/SW NOTE
04/25/23 1500   Discharge disposition   Expected discharge disposition Rehab [address] Provider Diana Santos PT recommending [facility] at DC. PMR, Dr. Faith Varela evaluated pt and agrees w/ pt's need for Acute Rehab. Pt given choice list for accepting Acute Rehab facilities. Pt's first choice is [facility]. GERALDO Diaz, [facility] Liaison notified and [facility] is able to accept. [facility] reserved in [place]. Pt to continue every other day FLEX, final current scheduled treatment is Friday, 4/28. CM/SW will remain available for DC planning and support.      Zackary Santoyo, ILDEFONSON, VIA [facility]    [id number]

## 2023-04-26 LAB
ANION GAP SERPL CALC-SCNC: 3 MMOL/L (ref 0–18)
BUN BLD-MCNC: 10 MG/DL (ref 7–18)
CALCIUM BLD-MCNC: 8.1 MG/DL (ref 8.5–10.1)
CHLORIDE SERPL-SCNC: 108 MMOL/L (ref 98–112)
CO2 SERPL-SCNC: 30 MMOL/L (ref 21–32)
CREAT BLD-MCNC: 0.9 MG/DL
GFR SERPLBLD BASED ON 1.73 SQ M-ARVRAT: 75 ML/MIN/1.73M2 (ref 60–?)
GLUCOSE BLD-MCNC: 168 MG/DL (ref 70–99)
GLUCOSE BLD-MCNC: 178 MG/DL (ref 70–99)
GLUCOSE BLD-MCNC: 211 MG/DL (ref 70–99)
GLUCOSE BLD-MCNC: 216 MG/DL (ref 70–99)
GLUCOSE BLD-MCNC: 261 MG/DL (ref 70–99)
MAGNESIUM SERPL-MCNC: 1.6 MG/DL (ref 1.6–2.6)
OSMOLALITY SERPL CALC.SUM OF ELEC: 295 MOSM/KG (ref 275–295)
POTASSIUM SERPL-SCNC: 3.7 MMOL/L (ref 3.5–5.1)
SODIUM SERPL-SCNC: 141 MMOL/L (ref 136–145)

## 2023-04-26 PROCEDURE — 99232 SBSQ HOSP IP/OBS MODERATE 35: CPT | Performed by: OTHER

## 2023-04-26 PROCEDURE — 99232 SBSQ HOSP IP/OBS MODERATE 35: CPT | Performed by: INTERNAL MEDICINE

## 2023-04-26 PROCEDURE — 99232 SBSQ HOSP IP/OBS MODERATE 35: CPT | Performed by: STUDENT IN AN ORGANIZED HEALTH CARE EDUCATION/TRAINING PROGRAM

## 2023-04-26 RX ORDER — POLYETHYLENE GLYCOL 3350 17 G/17G
17 POWDER, FOR SOLUTION ORAL DAILY
Status: DISCONTINUED | OUTPATIENT
Start: 2023-04-27 | End: 2023-04-29

## 2023-04-26 NOTE — PLAN OF CARE
Patient alert and oriented x4. Afebrile. Room air. No SOB. Numbness and tingling on BLE still noted. However, no complaints of pain at this time. No nausea and vomiting. For scheduled PLEX #4/5 today, [facility] aware. Blood glucose monitored, Insulin coverage given per MAR. Up to the bathroom. Fall precautions in place. Call light in reach. Will continue to monitor. Problem: NEUROLOGICAL - ADULT  Goal: Achieves stable or improved neurological status  Description: INTERVENTIONS  - Assess for and report changes in neurological status  - Initiate measures to prevent increased intracranial pressure  - Maintain blood pressure and fluid volume within ordered parameters to optimize cerebral perfusion and minimize risk of hemorrhage  - Monitor temperature, glucose, and sodium.  Initiate appropriate interventions as ordered  Outcome: Progressing     Problem: PAIN - ADULT  Goal: Verbalizes/displays adequate comfort level or patient's stated pain goal  Description: INTERVENTIONS:  - Encourage pt to monitor pain and request assistance  - Assess pain using appropriate pain scale  - Administer analgesics based on type and severity of pain and evaluate response  - Implement non-pharmacological measures as appropriate and evaluate response  - Consider cultural and social influences on pain and pain management  - Manage/alleviate anxiety  - Utilize distraction and/or relaxation techniques  - Monitor for opioid side effects  - Notify MD/LIP if interventions unsuccessful or patient reports new pain  - Anticipate increased pain with activity and pre-medicate as appropriate  Outcome: Progressing

## 2023-04-26 NOTE — PLAN OF CARE
Patient alert and oriented x4. On room air. Vital signs stable with noted hypotension. Afebrile. Patient complains of BLE pain, declines medication at this time but agreeable to baclofen for back spasm. Medications administered per MAR. Safety precautions in place. Call light within reach. Will continue to monitor. Problem: Impaired Functional Mobility  Goal: Achieve highest/safest level of mobility/gait  Description: Interventions:  - Assess patient's functional ability and stability  - Promote increasing activity/tolerance for mobility and gait  - Educate and engage patient/family in tolerated activity level and precautions  4/25/2023 2021 by Jenae Ackerman RN  Outcome: Progressing     Problem: CARDIOVASCULAR - ADULT  Goal: Maintains optimal cardiac output and hemodynamic stability  Description: INTERVENTIONS:  - Monitor vital signs, rhythm, and trends  - Monitor for bleeding, hypotension and signs of decreased cardiac output  - Evaluate effectiveness of vasoactive medications to optimize hemodynamic stability  - Monitor arterial and/or venous puncture sites for bleeding and/or hematoma  - Assess quality of pulses, skin color and temperature  - Assess for signs of decreased coronary artery perfusion - ex. Angina  - Evaluate fluid balance, assess for edema, trend weights  4/25/2023 2021 by Jenae Ackerman RN  Outcome: Progressing     Problem: NEUROLOGICAL - ADULT  Goal: Achieves stable or improved neurological status  Description: INTERVENTIONS  - Assess for and report changes in neurological status  - Initiate measures to prevent increased intracranial pressure  - Maintain blood pressure and fluid volume within ordered parameters to optimize cerebral perfusion and minimize risk of hemorrhage  - Monitor temperature, glucose, and sodium.  Initiate appropriate interventions as ordered  4/25/2023 2021 by Jenae Ackerman RN  Outcome: Progressing     Problem: PAIN - ADULT  Goal: Verbalizes/displays adequate comfort level or patient's stated pain goal  Description: INTERVENTIONS:  - Encourage pt to monitor pain and request assistance  - Assess pain using appropriate pain scale  - Administer analgesics based on type and severity of pain and evaluate response  - Implement non-pharmacological measures as appropriate and evaluate response  - Consider cultural and social influences on pain and pain management  - Manage/alleviate anxiety  - Utilize distraction and/or relaxation techniques  - Monitor for opioid side effects  - Notify MD/LIP if interventions unsuccessful or patient reports new pain  - Anticipate increased pain with activity and pre-medicate as appropriate  Outcome: Not Progressing

## 2023-04-27 ENCOUNTER — APPOINTMENT (OUTPATIENT)
Dept: INTERVENTIONAL RADIOLOGY/VASCULAR | Facility: HOSPITAL | Age: 53
End: 2023-04-27
Attending: INTERNAL MEDICINE
Payer: MEDICARE

## 2023-04-27 LAB
ANION GAP SERPL CALC-SCNC: 2 MMOL/L (ref 0–18)
BUN BLD-MCNC: 12 MG/DL (ref 7–18)
CALCIUM BLD-MCNC: 8.4 MG/DL (ref 8.5–10.1)
CHLORIDE SERPL-SCNC: 107 MMOL/L (ref 98–112)
CO2 SERPL-SCNC: 33 MMOL/L (ref 21–32)
CREAT BLD-MCNC: 1.04 MG/DL
GFR SERPLBLD BASED ON 1.73 SQ M-ARVRAT: 63 ML/MIN/1.73M2 (ref 60–?)
GLUCOSE BLD-MCNC: 131 MG/DL (ref 70–99)
GLUCOSE BLD-MCNC: 140 MG/DL (ref 70–99)
GLUCOSE BLD-MCNC: 202 MG/DL (ref 70–99)
GLUCOSE BLD-MCNC: 206 MG/DL (ref 70–99)
GLUCOSE BLD-MCNC: 241 MG/DL (ref 70–99)
MAGNESIUM SERPL-MCNC: 1.8 MG/DL (ref 1.6–2.6)
OSMOLALITY SERPL CALC.SUM OF ELEC: 300 MOSM/KG (ref 275–295)
POTASSIUM SERPL-SCNC: 3.7 MMOL/L (ref 3.5–5.1)
SODIUM SERPL-SCNC: 142 MMOL/L (ref 136–145)

## 2023-04-27 PROCEDURE — B518ZZA FLUOROSCOPY OF SUPERIOR VENA CAVA, GUIDANCE: ICD-10-PCS | Performed by: RADIOLOGY

## 2023-04-27 PROCEDURE — 99232 SBSQ HOSP IP/OBS MODERATE 35: CPT | Performed by: OTHER

## 2023-04-27 PROCEDURE — 02WY33Z REVISION OF INFUSION DEVICE IN GREAT VESSEL, PERCUTANEOUS APPROACH: ICD-10-PCS | Performed by: RADIOLOGY

## 2023-04-27 PROCEDURE — 99232 SBSQ HOSP IP/OBS MODERATE 35: CPT | Performed by: INTERNAL MEDICINE

## 2023-04-27 PROCEDURE — 99232 SBSQ HOSP IP/OBS MODERATE 35: CPT | Performed by: STUDENT IN AN ORGANIZED HEALTH CARE EDUCATION/TRAINING PROGRAM

## 2023-04-27 RX ORDER — ALBUMIN, HUMAN INJ 5% 5 %
2500 SOLUTION INTRAVENOUS EVERY OTHER DAY
Status: COMPLETED | OUTPATIENT
Start: 2023-04-27 | End: 2023-04-29

## 2023-04-27 RX ORDER — HEPARIN SODIUM 5000 [USP'U]/ML
INJECTION, SOLUTION INTRAVENOUS; SUBCUTANEOUS
Status: COMPLETED
Start: 2023-04-27 | End: 2023-04-27

## 2023-04-27 RX ORDER — MIDAZOLAM HYDROCHLORIDE 1 MG/ML
INJECTION INTRAMUSCULAR; INTRAVENOUS
Status: COMPLETED
Start: 2023-04-27 | End: 2023-04-27

## 2023-04-27 RX ORDER — LIDOCAINE HYDROCHLORIDE 10 MG/ML
INJECTION, SOLUTION INFILTRATION; PERINEURAL
Status: COMPLETED
Start: 2023-04-27 | End: 2023-04-27

## 2023-04-27 NOTE — PLAN OF CARE
Patient is alert and orientated x4, VSS, and afebrile. Patient had procedure at IR where dialysis catheter was repositioned. Patient also noted to have small blisters appear from adhesive on catheter dressing, please use only gauze and paper tape to cover. Plasmapheresis was done in the afternoon. Patient tolerated well. Call light and safety precautions complete. Problem: Impaired Functional Mobility  Goal: Achieve highest/safest level of mobility/gait  Description: Interventions:  - Assess patient's functional ability and stability  - Promote increasing activity/tolerance for mobility and gait  - Educate and engage patient/family in tolerated activity level and precautions    Outcome: Progressing     Problem: CARDIOVASCULAR - ADULT  Goal: Maintains optimal cardiac output and hemodynamic stability  Description: INTERVENTIONS:  - Monitor vital signs, rhythm, and trends  - Monitor for bleeding, hypotension and signs of decreased cardiac output  - Evaluate effectiveness of vasoactive medications to optimize hemodynamic stability  - Monitor arterial and/or venous puncture sites for bleeding and/or hematoma  - Assess quality of pulses, skin color and temperature  - Assess for signs of decreased coronary artery perfusion - ex. Angina  - Evaluate fluid balance, assess for edema, trend weights  Outcome: Progressing     Problem: NEUROLOGICAL - ADULT  Goal: Achieves stable or improved neurological status  Description: INTERVENTIONS  - Assess for and report changes in neurological status  - Initiate measures to prevent increased intracranial pressure  - Maintain blood pressure and fluid volume within ordered parameters to optimize cerebral perfusion and minimize risk of hemorrhage  - Monitor temperature, glucose, and sodium.  Initiate appropriate interventions as ordered  Outcome: Progressing     Problem: PAIN - ADULT  Goal: Verbalizes/displays adequate comfort level or patient's stated pain goal  Description: INTERVENTIONS:  - Encourage pt to monitor pain and request assistance  - Assess pain using appropriate pain scale  - Administer analgesics based on type and severity of pain and evaluate response  - Implement non-pharmacological measures as appropriate and evaluate response  - Consider cultural and social influences on pain and pain management  - Manage/alleviate anxiety  - Utilize distraction and/or relaxation techniques  - Monitor for opioid side effects  - Notify MD/LIP if interventions unsuccessful or patient reports new pain  - Anticipate increased pain with activity and pre-medicate as appropriate  Outcome: Progressing

## 2023-04-27 NOTE — PROGRESS NOTES
Patient wasn't able to have plasmapheresis today due to sluggish temporary jugular catheter. Informed nephrology and Dr Veryl Primrose, md  ordered to have interventional radiology to change out catheter. Patient didn't want to have procedure done today, and upset that there will be delay in being discharged. will be done tomorrow. Patient npo after midnight. Patient complaining of fibromylagia pain, and medicated with one dose of prn dilaudid iv, and also received  Prn norco.. Offered to call md to see if we can get something else for painpatient denied. Patient didn't recive albumin and or calcium iv for only to be given with plasmapheresis.

## 2023-04-27 NOTE — PLAN OF CARE
Patient is alert and oriented, on RA, vitals are stable, no fever overnight. Numbness and tingling in hands and feet improving. Patient NPO since midnight, plan for IR to replace temporary HD catheter. No new complaints overnight, no acute events. Will continue to monitor. Safety precautions in place. Problem: CARDIOVASCULAR - ADULT  Goal: Maintains optimal cardiac output and hemodynamic stability  Description: INTERVENTIONS:  - Monitor vital signs, rhythm, and trends  - Monitor for bleeding, hypotension and signs of decreased cardiac output  - Evaluate effectiveness of vasoactive medications to optimize hemodynamic stability  - Monitor arterial and/or venous puncture sites for bleeding and/or hematoma  - Assess quality of pulses, skin color and temperature  - Assess for signs of decreased coronary artery perfusion - ex. Angina  - Evaluate fluid balance, assess for edema, trend weights  Outcome: Progressing     Problem: NEUROLOGICAL - ADULT  Goal: Achieves stable or improved neurological status  Description: INTERVENTIONS  - Assess for and report changes in neurological status  - Initiate measures to prevent increased intracranial pressure  - Maintain blood pressure and fluid volume within ordered parameters to optimize cerebral perfusion and minimize risk of hemorrhage  - Monitor temperature, glucose, and sodium.  Initiate appropriate interventions as ordered  Outcome: Progressing     Problem: PAIN - ADULT  Goal: Verbalizes/displays adequate comfort level or patient's stated pain goal  Description: INTERVENTIONS:  - Encourage pt to monitor pain and request assistance  - Assess pain using appropriate pain scale  - Administer analgesics based on type and severity of pain and evaluate response  - Implement non-pharmacological measures as appropriate and evaluate response  - Consider cultural and social influences on pain and pain management  - Manage/alleviate anxiety  - Utilize distraction and/or relaxation techniques  - Monitor for opioid side effects  - Notify MD/LIP if interventions unsuccessful or patient reports new pain  - Anticipate increased pain with activity and pre-medicate as appropriate  Outcome: Progressing

## 2023-04-28 LAB
GLUCOSE BLD-MCNC: 136 MG/DL (ref 70–99)
GLUCOSE BLD-MCNC: 172 MG/DL (ref 70–99)
GLUCOSE BLD-MCNC: 216 MG/DL (ref 70–99)
GLUCOSE BLD-MCNC: 236 MG/DL (ref 70–99)

## 2023-04-28 PROCEDURE — 99232 SBSQ HOSP IP/OBS MODERATE 35: CPT | Performed by: INTERNAL MEDICINE

## 2023-04-28 PROCEDURE — 99232 SBSQ HOSP IP/OBS MODERATE 35: CPT | Performed by: OTHER

## 2023-04-28 PROCEDURE — 99232 SBSQ HOSP IP/OBS MODERATE 35: CPT | Performed by: STUDENT IN AN ORGANIZED HEALTH CARE EDUCATION/TRAINING PROGRAM

## 2023-04-28 NOTE — PLAN OF CARE
Patient is alert and orientated x4, VSS, and on RA. Patient complain of pain to BLE and is relieved with Norco. Low BP overnight and remained through midday, paged MD who prescribed 500 mL bolus to run over 2 hours. 5th treatment of plasmaphoresis will happen tomorrow with plans to discharge afterwards, SW aware. Patient still feels MS is causing severe pain in BLE, 0.4 dilaudid was administered. All meds given per [facility]. Call light and safety precautions in place. Problem: Impaired Functional Mobility  Goal: Achieve highest/safest level of mobility/gait  Description: Interventions:  - Assess patient's functional ability and stability  - Promote increasing activity/tolerance for mobility and gait  - Educate and engage patient/family in tolerated activity level and precautions    Outcome: Progressing     Problem: CARDIOVASCULAR - ADULT  Goal: Maintains optimal cardiac output and hemodynamic stability  Description: INTERVENTIONS:  - Monitor vital signs, rhythm, and trends  - Monitor for bleeding, hypotension and signs of decreased cardiac output  - Evaluate effectiveness of vasoactive medications to optimize hemodynamic stability  - Monitor arterial and/or venous puncture sites for bleeding and/or hematoma  - Assess quality of pulses, skin color and temperature  - Assess for signs of decreased coronary artery perfusion - ex. Angina  - Evaluate fluid balance, assess for edema, trend weights  Outcome: Progressing     Problem: NEUROLOGICAL - ADULT  Goal: Achieves stable or improved neurological status  Description: INTERVENTIONS  - Assess for and report changes in neurological status  - Initiate measures to prevent increased intracranial pressure  - Maintain blood pressure and fluid volume within ordered parameters to optimize cerebral perfusion and minimize risk of hemorrhage  - Monitor temperature, glucose, and sodium.  Initiate appropriate interventions as ordered  Outcome: Progressing     Problem: PAIN - ADULT  Goal: Verbalizes/displays adequate comfort level or patient's stated pain goal  Description: INTERVENTIONS:  - Encourage pt to monitor pain and request assistance  - Assess pain using appropriate pain scale  - Administer analgesics based on type and severity of pain and evaluate response  - Implement non-pharmacological measures as appropriate and evaluate response  - Consider cultural and social influences on pain and pain management  - Manage/alleviate anxiety  - Utilize distraction and/or relaxation techniques  - Monitor for opioid side effects  - Notify MD/LIP if interventions unsuccessful or patient reports new pain  - Anticipate increased pain with activity and pre-medicate as appropriate  Outcome: Progressing

## 2023-04-28 NOTE — PLAN OF CARE
Patient is alert and oriented, on RA. No complains of pain or SOB. All meds given per [facility]. Patient hypotensive in 80s all night, asymptomatic, states she feels fine, normal. Hospitalist notified, ordered to continue monitor patient. Port-a-cath needle and dressing changed, patient tolerated well. Plan for plasmapheresis tomorrow. No new complaints overnight. Safety precautions in place, call light within reach, will continue to monitor. Problem: CARDIOVASCULAR - ADULT  Goal: Maintains optimal cardiac output and hemodynamic stability  Description: INTERVENTIONS:  - Monitor vital signs, rhythm, and trends  - Monitor for bleeding, hypotension and signs of decreased cardiac output  - Evaluate effectiveness of vasoactive medications to optimize hemodynamic stability  - Monitor arterial and/or venous puncture sites for bleeding and/or hematoma  - Assess quality of pulses, skin color and temperature  - Assess for signs of decreased coronary artery perfusion - ex. Angina  - Evaluate fluid balance, assess for edema, trend weights  Outcome: Progressing     Problem: NEUROLOGICAL - ADULT  Goal: Achieves stable or improved neurological status  Description: INTERVENTIONS  - Assess for and report changes in neurological status  - Initiate measures to prevent increased intracranial pressure  - Maintain blood pressure and fluid volume within ordered parameters to optimize cerebral perfusion and minimize risk of hemorrhage  - Monitor temperature, glucose, and sodium.  Initiate appropriate interventions as ordered  Outcome: Progressing     Problem: PAIN - ADULT  Goal: Verbalizes/displays adequate comfort level or patient's stated pain goal  Description: INTERVENTIONS:  - Encourage pt to monitor pain and request assistance  - Assess pain using appropriate pain scale  - Administer analgesics based on type and severity of pain and evaluate response  - Implement non-pharmacological measures as appropriate and evaluate response  - Consider cultural and social influences on pain and pain management  - Manage/alleviate anxiety  - Utilize distraction and/or relaxation techniques  - Monitor for opioid side effects  - Notify MD/LIP if interventions unsuccessful or patient reports new pain  - Anticipate increased pain with activity and pre-medicate as appropriate  Outcome: Progressing

## 2023-04-28 NOTE — CM/SW NOTE
Pt will complete PLEX #5/5 tomorrow. Confirmed w/ [facility] liaison that they can accept pt tomorrow after her PLEX treatment. [facility] requests that OT see pt tomorrow, message left with department. Pt will also need CBC and BMP tomorrow. Dr. Rowena Serrano paged and labs ordered. RN to call [phone] to coordinate/ give report. CM/SW will remain available for DC planning and support.     WOODY Zee, VIA [facility]    [id number]

## 2023-04-29 ENCOUNTER — APPOINTMENT (OUTPATIENT)
Dept: GENERAL RADIOLOGY | Facility: HOSPITAL | Age: 53
End: 2023-04-29
Attending: STUDENT IN AN ORGANIZED HEALTH CARE EDUCATION/TRAINING PROGRAM
Payer: MEDICARE

## 2023-04-29 VITALS
SYSTOLIC BLOOD PRESSURE: 97 MMHG | DIASTOLIC BLOOD PRESSURE: 76 MMHG | HEART RATE: 79 BPM | WEIGHT: 166.69 LBS | OXYGEN SATURATION: 100 % | BODY MASS INDEX: 27 KG/M2 | RESPIRATION RATE: 18 BRPM | TEMPERATURE: 99 F

## 2023-04-29 LAB
ANION GAP SERPL CALC-SCNC: 4 MMOL/L (ref 0–18)
BASOPHILS # BLD AUTO: 0.01 X10(3) UL (ref 0–0.2)
BASOPHILS NFR BLD AUTO: 0.1 %
BUN BLD-MCNC: 10 MG/DL (ref 7–18)
CALCIUM BLD-MCNC: 8.3 MG/DL (ref 8.5–10.1)
CHLORIDE SERPL-SCNC: 106 MMOL/L (ref 98–112)
CO2 SERPL-SCNC: 32 MMOL/L (ref 21–32)
CREAT BLD-MCNC: 0.86 MG/DL
EOSINOPHIL # BLD AUTO: 0 X10(3) UL (ref 0–0.7)
EOSINOPHIL NFR BLD AUTO: 0 %
ERYTHROCYTE [DISTWIDTH] IN BLOOD BY AUTOMATED COUNT: 15.9 %
GFR SERPLBLD BASED ON 1.73 SQ M-ARVRAT: 80 ML/MIN/1.73M2 (ref 60–?)
GLUCOSE BLD-MCNC: 179 MG/DL (ref 70–99)
GLUCOSE BLD-MCNC: 210 MG/DL (ref 70–99)
GLUCOSE BLD-MCNC: 212 MG/DL (ref 70–99)
GLUCOSE BLD-MCNC: 318 MG/DL (ref 70–99)
HCT VFR BLD AUTO: 34.1 %
HGB BLD-MCNC: 10.2 G/DL
IMM GRANULOCYTES # BLD AUTO: 0.02 X10(3) UL (ref 0–1)
IMM GRANULOCYTES NFR BLD: 0.3 %
LYMPHOCYTES # BLD AUTO: 3.57 X10(3) UL (ref 1–4)
LYMPHOCYTES NFR BLD AUTO: 49.6 %
MCH RBC QN AUTO: 22.9 PG (ref 26–34)
MCHC RBC AUTO-ENTMCNC: 29.9 G/DL (ref 31–37)
MCV RBC AUTO: 76.5 FL
MONOCYTES # BLD AUTO: 0.42 X10(3) UL (ref 0.1–1)
MONOCYTES NFR BLD AUTO: 5.8 %
NEUTROPHILS # BLD AUTO: 3.18 X10 (3) UL (ref 1.5–7.7)
NEUTROPHILS # BLD AUTO: 3.18 X10(3) UL (ref 1.5–7.7)
NEUTROPHILS NFR BLD AUTO: 44.2 %
OSMOLALITY SERPL CALC.SUM OF ELEC: 299 MOSM/KG (ref 275–295)
PLATELET # BLD AUTO: 138 10(3)UL (ref 150–450)
POTASSIUM SERPL-SCNC: 3.9 MMOL/L (ref 3.5–5.1)
RBC # BLD AUTO: 4.46 X10(6)UL
SODIUM SERPL-SCNC: 142 MMOL/L (ref 136–145)
WBC # BLD AUTO: 7.2 X10(3) UL (ref 4–11)

## 2023-04-29 PROCEDURE — 71045 X-RAY EXAM CHEST 1 VIEW: CPT | Performed by: STUDENT IN AN ORGANIZED HEALTH CARE EDUCATION/TRAINING PROGRAM

## 2023-04-29 PROCEDURE — 99239 HOSP IP/OBS DSCHRG MGMT >30: CPT | Performed by: STUDENT IN AN ORGANIZED HEALTH CARE EDUCATION/TRAINING PROGRAM

## 2023-04-29 PROCEDURE — 99232 SBSQ HOSP IP/OBS MODERATE 35: CPT | Performed by: INTERNAL MEDICINE

## 2023-04-29 RX ORDER — BACLOFEN 10 MG/1
10 TABLET ORAL 3 TIMES DAILY PRN
Qty: 20 TABLET | Refills: 0 | Status: SHIPPED | OUTPATIENT
Start: 2023-04-29 | End: 2023-05-09

## 2023-04-29 RX ORDER — HYDROCODONE BITARTRATE AND ACETAMINOPHEN 10; 325 MG/1; MG/1
1 TABLET ORAL 2 TIMES DAILY PRN
Qty: 10 TABLET | Refills: 0 | Status: SHIPPED | OUTPATIENT
Start: 2023-04-29 | End: 2023-05-04

## 2023-04-29 RX ORDER — PREGABALIN 200 MG/1
200 CAPSULE ORAL 2 TIMES DAILY
Qty: 60 CAPSULE | Refills: 0 | Status: SHIPPED | OUTPATIENT
Start: 2023-04-29 | End: 2023-05-29

## 2023-04-29 NOTE — PROGRESS NOTES
1355- temporary dialysis catheter removed at bedside  Patient states burning in chest and slightly dizzy  bp remains stable  Bleeding controlled with 10min pressure held  Dr. Victorino Gaona aware of the complaints, states to continue to monitor  Dr. Kimberly Decker aware, cxray ordered

## 2023-04-29 NOTE — CM/SW NOTE
04/29/23 1323   Discharge disposition   Expected discharge disposition Rehab [address] Provider [facility]   Discharge transportation [facility] for patient discharge to [facility] today via [facility] at 5:30pm, PCS form completed for transport. Patient will admit to room 9691 [id number], RN to call facility at [phone] for transfer report.      Lauralyn Dandy, TY Case Manager [id number]

## 2023-04-29 NOTE — PLAN OF CARE
5/5 plasmaphoresis done today  Dialysis cath removed  Plan for jason richmond at 1730 pending dizziness

## 2023-04-29 NOTE — PLAN OF CARE
Patient with pins and needles sensation to both feet but able to ambulate with walker and supervision. Plasmapheresis at am, spoke to [employer] nurse and will starts at 0830, patient aware and agreed. Low BP on low SBP 90s but asymptomatic. Needs addressed. Will continue to monitor. Problem: NEUROLOGICAL - ADULT  Goal: Achieves stable or improved neurological status  Description: INTERVENTIONS  - Assess for and report changes in neurological status  - Initiate measures to prevent increased intracranial pressure  - Maintain blood pressure and fluid volume within ordered parameters to optimize cerebral perfusion and minimize risk of hemorrhage  - Monitor temperature, glucose, and sodium.  Initiate appropriate interventions as ordered  Outcome: Progressing     Problem: PAIN - ADULT  Goal: Verbalizes/displays adequate comfort level or patient's stated pain goal  Description: INTERVENTIONS:  - Encourage pt to monitor pain and request assistance  - Assess pain using appropriate pain scale  - Administer analgesics based on type and severity of pain and evaluate response  - Implement non-pharmacological measures as appropriate and evaluate response  - Consider cultural and social influences on pain and pain management  - Manage/alleviate anxiety  - Utilize distraction and/or relaxation techniques  - Monitor for opioid side effects  - Notify MD/LIP if interventions unsuccessful or patient reports new pain  - Anticipate increased pain with activity and pre-medicate as appropriate  Outcome: Progressing

## 2023-05-08 ENCOUNTER — TELEPHONE (OUTPATIENT)
Dept: NEUROLOGY | Facility: CLINIC | Age: 53
End: 2023-05-08

## 2023-05-08 NOTE — TELEPHONE ENCOUNTER
RN received a call from KENDALL Calhoun  at [place] [address] patient missed her Tysabri appt on April 27, 2023. [facility] has been able to get ahold of the patient to schedule her May infusion. Upon Epic Review patient was in the hospital and now in inpatient Rehab. RN called KENDALL Calhoun at [employer] [facility] [facility] and advised her of the above. Patient verbalized understanding and did not have any further questions. Requesting update on patient.

## 2023-05-09 ENCOUNTER — ANESTHESIA EVENT (OUTPATIENT)
Dept: ENDOSCOPY | Facility: HOSPITAL | Age: 53
End: 2023-05-09
Payer: MEDICARE

## 2023-05-11 ENCOUNTER — CASE MANAGEMENT (OUTPATIENT)
Dept: CARE COORDINATION | Age: 53
End: 2023-05-11

## 2023-05-16 ENCOUNTER — CASE MANAGEMENT (OUTPATIENT)
Dept: CARE COORDINATION | Age: 53
End: 2023-05-16

## 2023-05-17 ENCOUNTER — HOSPITAL ENCOUNTER (OUTPATIENT)
Facility: HOSPITAL | Age: 53
Setting detail: HOSPITAL OUTPATIENT SURGERY
Discharge: HOME | End: 2023-05-17
Attending: INTERNAL MEDICINE | Admitting: INTERNAL MEDICINE
Payer: MEDICARE

## 2023-05-17 ENCOUNTER — ANESTHESIA (OUTPATIENT)
Dept: ENDOSCOPY | Facility: HOSPITAL | Age: 53
End: 2023-05-17
Payer: MEDICARE

## 2023-05-17 VITALS
TEMPERATURE: 97 F | WEIGHT: 166 LBS | OXYGEN SATURATION: 100 % | HEIGHT: 66 IN | SYSTOLIC BLOOD PRESSURE: 111 MMHG | BODY MASS INDEX: 26.68 KG/M2 | HEART RATE: 60 BPM | DIASTOLIC BLOOD PRESSURE: 74 MMHG | RESPIRATION RATE: 18 BRPM

## 2023-05-17 DIAGNOSIS — R13.19 ESOPHAGEAL DYSPHAGIA: ICD-10-CM

## 2023-05-17 LAB — GLUCOSE BLD-MCNC: 91 MG/DL (ref 70–99)

## 2023-05-17 PROCEDURE — 88305 TISSUE EXAM BY PATHOLOGIST: CPT | Performed by: INTERNAL MEDICINE

## 2023-05-17 PROCEDURE — 0DB78ZX EXCISION OF STOMACH, PYLORUS, VIA NATURAL OR ARTIFICIAL OPENING ENDOSCOPIC, DIAGNOSTIC: ICD-10-PCS | Performed by: INTERNAL MEDICINE

## 2023-05-17 PROCEDURE — 0DB18ZX EXCISION OF UPPER ESOPHAGUS, VIA NATURAL OR ARTIFICIAL OPENING ENDOSCOPIC, DIAGNOSTIC: ICD-10-PCS | Performed by: INTERNAL MEDICINE

## 2023-05-17 PROCEDURE — 0DB68ZX EXCISION OF STOMACH, VIA NATURAL OR ARTIFICIAL OPENING ENDOSCOPIC, DIAGNOSTIC: ICD-10-PCS | Performed by: INTERNAL MEDICINE

## 2023-05-17 PROCEDURE — 0DB38ZX EXCISION OF LOWER ESOPHAGUS, VIA NATURAL OR ARTIFICIAL OPENING ENDOSCOPIC, DIAGNOSTIC: ICD-10-PCS | Performed by: INTERNAL MEDICINE

## 2023-05-17 PROCEDURE — 82962 GLUCOSE BLOOD TEST: CPT

## 2023-05-17 RX ORDER — SODIUM CHLORIDE, SODIUM LACTATE, POTASSIUM CHLORIDE, CALCIUM CHLORIDE 600; 310; 30; 20 MG/100ML; MG/100ML; MG/100ML; MG/100ML
INJECTION, SOLUTION INTRAVENOUS CONTINUOUS
Status: DISCONTINUED | OUTPATIENT
Start: 2023-05-17 | End: 2023-05-17

## 2023-05-17 RX ORDER — DEXTROSE MONOHYDRATE 25 G/50ML
50 INJECTION, SOLUTION INTRAVENOUS
Status: DISCONTINUED | OUTPATIENT
Start: 2023-05-17 | End: 2023-05-17

## 2023-05-17 RX ORDER — LIDOCAINE HYDROCHLORIDE 10 MG/ML
INJECTION, SOLUTION EPIDURAL; INFILTRATION; INTRACAUDAL; PERINEURAL AS NEEDED
Status: DISCONTINUED | OUTPATIENT
Start: 2023-05-17 | End: 2023-05-17 | Stop reason: SURG

## 2023-05-17 RX ORDER — SODIUM CHLORIDE, SODIUM LACTATE, POTASSIUM CHLORIDE, CALCIUM CHLORIDE 600; 310; 30; 20 MG/100ML; MG/100ML; MG/100ML; MG/100ML
INJECTION, SOLUTION INTRAVENOUS CONTINUOUS
OUTPATIENT
Start: 2023-05-17

## 2023-05-17 RX ORDER — NICOTINE POLACRILEX 4 MG
30 LOZENGE BUCCAL
Status: DISCONTINUED | OUTPATIENT
Start: 2023-05-17 | End: 2023-05-17

## 2023-05-17 RX ORDER — ONDANSETRON 2 MG/ML
4 INJECTION INTRAMUSCULAR; INTRAVENOUS AS NEEDED
OUTPATIENT
Start: 2023-05-17 | End: 2023-05-17

## 2023-05-17 RX ORDER — NICOTINE POLACRILEX 4 MG
15 LOZENGE BUCCAL
Status: DISCONTINUED | OUTPATIENT
Start: 2023-05-17 | End: 2023-05-17

## 2023-05-17 RX ORDER — NALOXONE HYDROCHLORIDE 0.4 MG/ML
80 INJECTION, SOLUTION INTRAMUSCULAR; INTRAVENOUS; SUBCUTANEOUS AS NEEDED
OUTPATIENT
Start: 2023-05-17 | End: 2023-05-17

## 2023-05-17 RX ADMIN — LIDOCAINE HYDROCHLORIDE 50 MG: 10 INJECTION, SOLUTION EPIDURAL; INFILTRATION; INTRACAUDAL; PERINEURAL at 15:08:00

## 2023-05-17 RX ADMIN — SODIUM CHLORIDE, SODIUM LACTATE, POTASSIUM CHLORIDE, CALCIUM CHLORIDE: 600; 310; 30; 20 INJECTION, SOLUTION INTRAVENOUS at 15:18:00

## 2023-05-17 NOTE — OPERATIVE REPORT
EGD operative report  Patient Name: Palak Mills  Procedure: Esophagogastroduodenoscopy with cold forceps biopsy   Date of procedure: 5/17/2023    Indication: Pharyngoesophageal dysphagia  Attending: Gracy Landau, M.D. Consent:  The risks, benefits, and alternatives were discussed with the patient / POA. Risks included, but were not limited to, bleeding, perforation, medication effects, cardiac arrhythmias, and aspiration. After all questions were answered to their satisfaction, a signed, informed, and witnessed consent was obtained. Timeout:  Prior to initiation of sedation, a formal timeout was performed, confirming the patient's name, date of birth, allergies, correct procedure, and need for antibiotics. The operating physician and sedating physician was also confirmed prior to initiation of sedation. Sedation: Monitored Anesthesia Care. Monitoring:  Pulsoximetry, pulse, respirations, and blood pressure were monitored throughout the entire procedure  Procedure: After achieving adequate sedation and placing the patient in the left lateral decubitus position, the lubricated upper endoscope was introduced into the mouth and advanced to the descending duodenum. The endoscope was then withdrawn into the gastric antrum and placed in a retroflexed position. The endoscope was then righted, and air was suctioned from the stomach. The endoscope was then withdrawn from the patient, with careful visual inspection of the mucosa revealing no additional pathologic findings. The patient tolerated the procedure without apparent procedural complications. The patient left the procedure room in stable condition for recovery. Findings: Esophagus: The mucosa was normal, without masses, polyps, ulcers, erosions, diverticula, or varices. The squamocolumnar junction / esophagogastric junction / diaphragmatic impression were appreciated at 35 cm from the incisors.   Cold forceps biopsies were obtained from the distal and proximal esophagus to evaluate for Eosinophilic Esophagitis. Stomach: The gastric body, antrum, fundus, cardia, and angularis were normal, without masses, polyps, ulcers, erosions, diverticula, or varices. Cold forceps biopsies were obtained from the antrum, body, and fundus, to evaluate for H.pylori. Duodenum: The duodenal bulb, post-bulbar duodenum, and descending duodenum were normal, without masses, polyps, ulcers, erosions, diverticula, or varices. Impression: Findings as above do not explain this patient's dysphagia. Recommendations:   1) Follow-up pathology  2) Manage heartburn with as-needed antacid therapy (she has had a prior PPI trial without resolution of the dysphagia)  3) If pathology is unrevealing, would pursue esophageal manometry. 4) No routine follow-up EGD is indicated.

## 2023-05-17 NOTE — ANESTHESIA POSTPROCEDURE EVALUATION
[facility]    Devan Lange Patient Status:  Hospital Outpatient Surgery   Age/Gender 52year old female MRN [id number]   Location [address] [address] Attending Jose Cruz MD   Hosp Day # 0 PCP Lillie Spaulding MD       Anesthesia Post-op Note    ESOPHAGOGASTRODUODENOSCOPY (EGD) with biopsy     Procedure Summary     Date: 05/17/23 Room / Location: [facility] ENDOSCOPY 02 / [facility] ENDOSCOPY    Anesthesia Start: 1504 Anesthesia Stop: 1044    Procedure: ESOPHAGOGASTRODUODENOSCOPY (EGD) with biopsy Diagnosis:       Esophageal dysphagia      (Normal )    Surgeons: Jose Cruz MD Anesthesiologist: Ki Philip MD    Anesthesia Type: MAC ASA Status: 3          Anesthesia Type: MAC    Vitals Value Taken Time   /74 05/17/23 1522   Temp  05/17/23 1523   Pulse 60 05/17/23 1522   Resp 18 05/17/23 1522   SpO2 100 % 05/17/23 1522       Patient Location: Endoscopy    Anesthesia Type: MAC    Airway Patency: patent    Postop Pain Control: adequate    Mental Status: mildly sedated but able to meaningfully participate in the post-anesthesia evaluation    Nausea/Vomiting: none    Cardiopulmonary/Hydration status: stable euvolemic    Complications: no apparent anesthesia related complications    Postop vital signs: stable    Dental Exam: Unchanged from Preop    Patient to be discharged home when criteria met.

## 2023-05-31 ENCOUNTER — OFFICE VISIT (OUTPATIENT)
Facility: CLINIC | Age: 53
End: 2023-05-31
Payer: MEDICARE

## 2023-05-31 VITALS
HEIGHT: 66 IN | WEIGHT: 169 LBS | DIASTOLIC BLOOD PRESSURE: 70 MMHG | SYSTOLIC BLOOD PRESSURE: 98 MMHG | HEART RATE: 70 BPM | BODY MASS INDEX: 27.16 KG/M2 | RESPIRATION RATE: 16 BRPM | OXYGEN SATURATION: 99 %

## 2023-05-31 DIAGNOSIS — J45.40 MODERATE PERSISTENT ASTHMA, UNSPECIFIED WHETHER COMPLICATED: ICD-10-CM

## 2023-05-31 DIAGNOSIS — R13.14 DYSPHAGIA, PHARYNGOESOPHAGEAL PHASE: Primary | ICD-10-CM

## 2023-05-31 PROCEDURE — 99214 OFFICE O/P EST MOD 30 MIN: CPT | Performed by: INTERNAL MEDICINE

## 2023-05-31 RX ORDER — BACLOFEN 10 MG/1
10 TABLET ORAL 3 TIMES DAILY
COMMUNITY

## 2023-05-31 RX ORDER — MELATONIN
1 NIGHTLY PRN
COMMUNITY
Start: 2023-05-08

## 2023-05-31 RX ORDER — HYDROCODONE BITARTRATE AND ACETAMINOPHEN 10; 325 MG/1; MG/1
1 TABLET ORAL 4 TIMES DAILY PRN
COMMUNITY
Start: 2023-05-12

## 2023-05-31 RX ORDER — POLYETHYLENE GLYCOL 3350 17 G/17G
17 POWDER, FOR SOLUTION ORAL
COMMUNITY
Start: 2023-05-08

## 2023-05-31 RX ORDER — AZELASTINE 1 MG/ML
1 SPRAY, METERED NASAL 2 TIMES DAILY
Qty: 1 EACH | Refills: 5 | Status: SHIPPED | OUTPATIENT
Start: 2023-05-31

## 2023-05-31 NOTE — PATIENT INSTRUCTIONS
Plan-- to get speech eval -talk to nurse and to call me please           -- continue Breo daily          -- rescue inhaler as needed - - consider using prior to activity           - remain on singular           - to get overnight oximetry           - see me in 3 months     Alcides Jeter MD  Pulmonary Medicine  05/31/23

## 2023-06-01 ENCOUNTER — TELEPHONE (OUTPATIENT)
Facility: CLINIC | Age: 53
End: 2023-06-01

## 2023-06-01 DIAGNOSIS — J45.40 MODERATE PERSISTENT ASTHMA, UNSPECIFIED WHETHER COMPLICATED: Primary | ICD-10-CM

## 2023-06-01 NOTE — TELEPHONE ENCOUNTER
Received message from Dr. Vinh Eli for [facility] you please order overnight oximetry\"  Order placed and order and last office note routed to [address]. Suzie Marie rep messaged to inform of new order as well.

## 2023-06-01 NOTE — TELEPHONE ENCOUNTER
PT add on order received from [facility] for provider signature. Faxed back with confirmation. PT evaluation and plan of care received for provider signature and review.     PT evaluation and plan of care received from [facility] Take 1000 mg acetaminophen (2 Tylenol tablets) and/or 600 mg ibuprofen (3 Advil tablets) every 6 hours as needed for pain. Soak thumb in warm water for 15 minutes 3 times a day if possible. Keep it covered, clean and dry when at work.

## 2023-06-01 NOTE — PROGRESS NOTES
Received message from Dr Leopoldo Huynh you please order overnight oximetry \"  Order placed. Routed Last office visit and Order to [address]. Message [facility] rep to inform of new order. Pt sent my chart message to inform that order was placed.

## 2023-06-01 NOTE — TELEPHONE ENCOUNTER
Received call from [facility] with residential home health. She stated that Jevon Mccauley saw Dr Liang Ayoub in the office yesterday 05/31/23 and was told to have patrica call dr Liang Ayoub today. Call transferred to dr Angela Correia office and she spoke with [facility]. Closing encounter at this time.

## 2023-06-07 ENCOUNTER — MED REC SCAN ONLY (OUTPATIENT)
Facility: CLINIC | Age: 53
End: 2023-06-07

## 2023-06-12 ENCOUNTER — OFFICE VISIT (OUTPATIENT)
Dept: NEUROLOGY | Facility: CLINIC | Age: 53
End: 2023-06-12
Payer: MEDICARE

## 2023-06-12 VITALS
HEART RATE: 69 BPM | HEIGHT: 69 IN | SYSTOLIC BLOOD PRESSURE: 105 MMHG | RESPIRATION RATE: 16 BRPM | BODY MASS INDEX: 24.73 KG/M2 | DIASTOLIC BLOOD PRESSURE: 62 MMHG | WEIGHT: 167 LBS

## 2023-06-12 DIAGNOSIS — M21.372 LEFT FOOT DROP: ICD-10-CM

## 2023-06-12 DIAGNOSIS — G35 MS (MULTIPLE SCLEROSIS) (HCC): Primary | ICD-10-CM

## 2023-06-12 DIAGNOSIS — G57.93 NEUROPATHIC PAIN OF BOTH LEGS: ICD-10-CM

## 2023-06-12 DIAGNOSIS — R20.2 PARESTHESIAS: ICD-10-CM

## 2023-06-12 PROCEDURE — 99214 OFFICE O/P EST MOD 30 MIN: CPT | Performed by: PHYSICIAN ASSISTANT

## 2023-06-15 ENCOUNTER — TELEPHONE (OUTPATIENT)
Dept: NEUROLOGY | Facility: CLINIC | Age: 53
End: 2023-06-15

## 2023-06-15 ENCOUNTER — PROCEDURE VISIT (OUTPATIENT)
Dept: NEUROLOGY | Facility: CLINIC | Age: 53
End: 2023-06-15
Payer: MEDICARE

## 2023-06-15 DIAGNOSIS — G35 MS (MULTIPLE SCLEROSIS) (HCC): Primary | ICD-10-CM

## 2023-06-15 DIAGNOSIS — R26.89 BALANCE PROBLEM: ICD-10-CM

## 2023-06-15 DIAGNOSIS — R20.2 PARESTHESIAS: ICD-10-CM

## 2023-06-15 PROCEDURE — 95909 NRV CNDJ TST 5-6 STUDIES: CPT | Performed by: OTHER

## 2023-06-15 PROCEDURE — 95886 MUSC TEST DONE W/N TEST COMP: CPT | Performed by: OTHER

## 2023-06-15 NOTE — TELEPHONE ENCOUNTER
FDA mandated Tysabri reauthorization questionnaire completed online. Patient is JCV Pos, Index = 1.08. Last test date 1/25/2023, next test date due July 2023. Patient received lab request form at office visit 6/12/2023. Has completed 24 Tysabri infusions with 0 courses of steroids in last 6 months. Patient was recently treated with PLEX treatments every other day for 5 treatments for relapse in May 2023. Reauthorized to continue receiving infusion; this is not a prior authorization for medication. Patient receives infusions at [facility] in [facility]. Re-authorized from 7/6/2023 - 1/4/2024 through the [employer] program.    Tysabri authorized through [facility]. Patient infuses every 6 weeks d/t elevated JCV level. Tysabri is buy and bill. Information discussed with Alber Jacob at lost office visit, prior to submission. Patient to have repeat MRI testing. May need to change her DMT depending on results.

## 2023-06-19 NOTE — TELEPHONE ENCOUNTER
RN received a call from [address] at [facility] inquiring about Infusion orders for the patient. RN advised per the [facility] patient is to continue Tysabri until she completes MRI and the results are reviewed. RN advised we will send over orders for the patient. Shirin Martínez will wait for the orders before she contacts the patient.

## 2023-06-20 ENCOUNTER — MED REC SCAN ONLY (OUTPATIENT)
Dept: NEUROLOGY | Facility: CLINIC | Age: 53
End: 2023-06-20

## 2023-06-26 ENCOUNTER — HOSPITAL ENCOUNTER (OUTPATIENT)
Facility: HOSPITAL | Age: 53
Setting detail: HOSPITAL OUTPATIENT SURGERY
Discharge: HOME | End: 2023-06-26
Attending: INTERNAL MEDICINE | Admitting: INTERNAL MEDICINE
Payer: MEDICARE

## 2023-06-26 VITALS
RESPIRATION RATE: 16 BRPM | DIASTOLIC BLOOD PRESSURE: 76 MMHG | HEIGHT: 66 IN | OXYGEN SATURATION: 94 % | BODY MASS INDEX: 27 KG/M2 | HEART RATE: 71 BPM | SYSTOLIC BLOOD PRESSURE: 120 MMHG | TEMPERATURE: 97 F

## 2023-06-26 LAB — GLUCOSE BLD-MCNC: 165 MG/DL (ref 70–99)

## 2023-06-26 PROCEDURE — 82962 GLUCOSE BLOOD TEST: CPT

## 2023-06-26 PROCEDURE — 4A0B7BZ MEASUREMENT OF GASTROINTESTINAL PRESSURE, VIA NATURAL OR ARTIFICIAL OPENING: ICD-10-PCS | Performed by: INTERNAL MEDICINE

## 2023-06-26 RX ORDER — LIDOCAINE HYDROCHLORIDE 20 MG/ML
JELLY TOPICAL
Status: DISCONTINUED | OUTPATIENT
Start: 2023-06-26 | End: 2023-06-26

## 2023-06-29 ENCOUNTER — TELEPHONE (OUTPATIENT)
Facility: CLINIC | Age: 53
End: 2023-06-29

## 2023-06-29 RX ORDER — FLUTICASONE FUROATE AND VILANTEROL 200; 25 UG/1; UG/1
1 POWDER RESPIRATORY (INHALATION) DAILY
Qty: 1 EACH | Refills: 0 | Status: SHIPPED | OUTPATIENT
Start: 2023-06-29 | End: 2023-07-29

## 2023-07-03 ENCOUNTER — HOSPITAL ENCOUNTER (OUTPATIENT)
Dept: MRI IMAGING | Facility: HOSPITAL | Age: 53
Discharge: HOME | End: 2023-07-03
Attending: PHYSICIAN ASSISTANT
Payer: MEDICARE

## 2023-07-03 DIAGNOSIS — G35 MS (MULTIPLE SCLEROSIS) (HCC): ICD-10-CM

## 2023-07-03 PROCEDURE — 70553 MRI BRAIN STEM W/O & W/DYE: CPT | Performed by: PHYSICIAN ASSISTANT

## 2023-07-03 PROCEDURE — 72156 MRI NECK SPINE W/O & W/DYE: CPT | Performed by: PHYSICIAN ASSISTANT

## 2023-07-03 PROCEDURE — A9575 INJ GADOTERATE MEGLUMI 0.1ML: HCPCS | Performed by: PHYSICIAN ASSISTANT

## 2023-07-03 RX ORDER — GADOTERATE MEGLUMINE 376.9 MG/ML
15 INJECTION INTRAVENOUS
Status: COMPLETED | OUTPATIENT
Start: 2023-07-03 | End: 2023-07-03

## 2023-07-03 RX ADMIN — GADOTERATE MEGLUMINE 15 ML: 376.9 INJECTION INTRAVENOUS at 14:02:00

## 2023-07-12 DIAGNOSIS — Z01.818 PREOP EXAMINATION: Primary | ICD-10-CM

## 2023-07-13 ENCOUNTER — TELEPHONE (OUTPATIENT)
Dept: NEUROLOGY | Facility: CLINIC | Age: 53
End: 2023-07-13

## 2023-07-13 NOTE — TELEPHONE ENCOUNTER
Tysabri infusion record received from [facility] for physician review. No signs/symptoms of infusion reaction noted. Patient infused on 7/13/2023. Patient no longer required to stay for the post infusion observation period. Lot Number [id number] [id number], Exp 02/2026  No new orders requested. Orders active for 11 additional.  Next infusion scheduled for 8/24/2023. Patient infuses every 6 weeks. Placed in physician's box for review.

## 2023-07-17 ENCOUNTER — EKG ENCOUNTER (OUTPATIENT)
Dept: LAB | Facility: HOSPITAL | Age: 53
End: 2023-07-17
Attending: STUDENT IN AN ORGANIZED HEALTH CARE EDUCATION/TRAINING PROGRAM
Payer: MEDICARE

## 2023-07-17 DIAGNOSIS — Z01.818 PREOP EXAMINATION: ICD-10-CM

## 2023-07-17 LAB
ALBUMIN SERPL-MCNC: 3.5 G/DL (ref 3.4–5)
ALBUMIN/GLOB SERPL: 1 {RATIO} (ref 1–2)
ALP LIVER SERPL-CCNC: 99 U/L
ALT SERPL-CCNC: 30 U/L
ANION GAP SERPL CALC-SCNC: 3 MMOL/L (ref 0–18)
AST SERPL-CCNC: 23 U/L (ref 15–37)
ATRIAL RATE: 58 BPM
BASOPHILS # BLD AUTO: 0.02 X10(3) UL (ref 0–0.2)
BASOPHILS NFR BLD AUTO: 0.3 %
BILIRUB SERPL-MCNC: 0.4 MG/DL (ref 0.1–2)
BUN BLD-MCNC: 12 MG/DL (ref 7–18)
CALCIUM BLD-MCNC: 9.3 MG/DL (ref 8.5–10.1)
CHLORIDE SERPL-SCNC: 106 MMOL/L (ref 98–112)
CO2 SERPL-SCNC: 30 MMOL/L (ref 21–32)
CREAT BLD-MCNC: 1.25 MG/DL
EOSINOPHIL # BLD AUTO: 0 X10(3) UL (ref 0–0.7)
EOSINOPHIL NFR BLD AUTO: 0 %
ERYTHROCYTE [DISTWIDTH] IN BLOOD BY AUTOMATED COUNT: 13.5 %
FASTING STATUS PATIENT QL REPORTED: YES
GFR SERPLBLD BASED ON 1.73 SQ M-ARVRAT: 51 ML/MIN/1.73M2 (ref 60–?)
GLOBULIN PLAS-MCNC: 3.5 G/DL (ref 2.8–4.4)
GLUCOSE BLD-MCNC: 110 MG/DL (ref 70–99)
HCT VFR BLD AUTO: 41.8 %
HGB BLD-MCNC: 12.4 G/DL
IMM GRANULOCYTES # BLD AUTO: 0.02 X10(3) UL (ref 0–1)
IMM GRANULOCYTES NFR BLD: 0.3 %
LYMPHOCYTES # BLD AUTO: 3.95 X10(3) UL (ref 1–4)
LYMPHOCYTES NFR BLD AUTO: 53.5 %
MCH RBC QN AUTO: 22.3 PG (ref 26–34)
MCHC RBC AUTO-ENTMCNC: 29.7 G/DL (ref 31–37)
MCV RBC AUTO: 75.2 FL
MONOCYTES # BLD AUTO: 0.46 X10(3) UL (ref 0.1–1)
MONOCYTES NFR BLD AUTO: 6.2 %
NEUTROPHILS # BLD AUTO: 2.94 X10 (3) UL (ref 1.5–7.7)
NEUTROPHILS # BLD AUTO: 2.94 X10(3) UL (ref 1.5–7.7)
NEUTROPHILS NFR BLD AUTO: 39.7 %
OSMOLALITY SERPL CALC.SUM OF ELEC: 288 MOSM/KG (ref 275–295)
P AXIS: 62 DEGREES
P-R INTERVAL: 134 MS
PLATELET # BLD AUTO: 189 10(3)UL (ref 150–450)
POTASSIUM SERPL-SCNC: 3.7 MMOL/L (ref 3.5–5.1)
PROT SERPL-MCNC: 7 G/DL (ref 6.4–8.2)
Q-T INTERVAL: 440 MS
QRS DURATION: 80 MS
QTC CALCULATION (BEZET): 431 MS
R AXIS: 9 DEGREES
RBC # BLD AUTO: 5.56 X10(6)UL
SODIUM SERPL-SCNC: 139 MMOL/L (ref 136–145)
T AXIS: 68 DEGREES
VENTRICULAR RATE: 58 BPM
WBC # BLD AUTO: 7.4 X10(3) UL (ref 4–11)

## 2023-07-17 PROCEDURE — 93010 ELECTROCARDIOGRAM REPORT: CPT | Performed by: INTERNAL MEDICINE

## 2023-07-17 PROCEDURE — 93005 ELECTROCARDIOGRAM TRACING: CPT

## 2023-07-17 PROCEDURE — 36415 COLL VENOUS BLD VENIPUNCTURE: CPT

## 2023-07-17 PROCEDURE — 85025 COMPLETE CBC W/AUTO DIFF WBC: CPT

## 2023-07-17 PROCEDURE — 80053 COMPREHEN METABOLIC PANEL: CPT

## 2023-07-19 LAB
INDEX VALUE: 2.26
JCV ANTIBODY: POSITIVE

## 2023-07-24 ENCOUNTER — TELEPHONE (OUTPATIENT)
Facility: CLINIC | Age: 53
End: 2023-07-24

## 2023-07-24 NOTE — TELEPHONE ENCOUNTER
Received fax from [address] that Stallstigen 19 has been unable to be scheduled and the order was going to be cancelled. Attempted to call pt and no answer. [facility] sent to pt.

## 2023-07-27 ENCOUNTER — OFFICE VISIT (OUTPATIENT)
Dept: NEUROLOGY | Facility: CLINIC | Age: 53
End: 2023-07-27
Payer: MEDICARE

## 2023-07-27 ENCOUNTER — TELEPHONE (OUTPATIENT)
Dept: NEUROLOGY | Facility: CLINIC | Age: 53
End: 2023-07-27

## 2023-07-27 ENCOUNTER — MED REC SCAN ONLY (OUTPATIENT)
Dept: NEUROLOGY | Facility: CLINIC | Age: 53
End: 2023-07-27

## 2023-07-27 VITALS
BODY MASS INDEX: 25.33 KG/M2 | WEIGHT: 171 LBS | HEART RATE: 70 BPM | SYSTOLIC BLOOD PRESSURE: 106 MMHG | DIASTOLIC BLOOD PRESSURE: 62 MMHG | HEIGHT: 69 IN | RESPIRATION RATE: 16 BRPM

## 2023-07-27 DIAGNOSIS — G35 MS (MULTIPLE SCLEROSIS) (HCC): Primary | ICD-10-CM

## 2023-07-27 PROCEDURE — 99214 OFFICE O/P EST MOD 30 MIN: CPT | Performed by: PHYSICIAN ASSISTANT

## 2023-07-27 RX ORDER — PREGABALIN 200 MG/1
200 CAPSULE ORAL 2 TIMES DAILY
COMMUNITY
Start: 2023-07-12

## 2023-07-27 RX ORDER — DIROXIMEL FUMARATE 231 MG/1
462 CAPSULE ORAL 2 TIMES DAILY
Qty: 120 CAPSULE | Refills: 0 | COMMUNITY
Start: 2023-07-27

## 2023-08-01 ENCOUNTER — TELEPHONE (OUTPATIENT)
Facility: CLINIC | Age: 53
End: 2023-08-01

## 2023-08-08 ENCOUNTER — TELEPHONE (OUTPATIENT)
Dept: NEUROLOGY | Facility: CLINIC | Age: 53
End: 2023-08-08

## 2023-08-08 NOTE — TELEPHONE ENCOUNTER
Received Tysabri discontinuation questionnaire from [facility]. Patient is switching from tysabri to Vumerity d/t elevated JCV index. Form completed online. Patient is still under the care of Dr. Shantelle Rouse. Has not been diagnosed with PML and has an elevated JCV level of 2.26. Tazorac Pregnancy And Lactation Text: This medication is not safe during pregnancy. It is unknown if this medication is excreted in breast milk.

## 2023-08-11 ENCOUNTER — MED REC SCAN ONLY (OUTPATIENT)
Facility: CLINIC | Age: 53
End: 2023-08-11

## 2023-08-11 ENCOUNTER — TELEPHONE (OUTPATIENT)
Facility: CLINIC | Age: 53
End: 2023-08-11

## 2023-08-11 DIAGNOSIS — J45.40 MODERATE PERSISTENT ASTHMA, UNSPECIFIED WHETHER COMPLICATED: Primary | ICD-10-CM

## 2023-08-23 ENCOUNTER — HOSPITAL ENCOUNTER (OUTPATIENT)
Dept: GENERAL RADIOLOGY | Facility: HOSPITAL | Age: 53
Discharge: HOME | End: 2023-08-23
Attending: INTERNAL MEDICINE
Payer: MEDICARE

## 2023-08-23 ENCOUNTER — APPOINTMENT (OUTPATIENT)
Dept: LAB | Facility: HOSPITAL | Age: 53
End: 2023-08-23
Attending: INTERNAL MEDICINE
Payer: MEDICARE

## 2023-08-23 DIAGNOSIS — R10.13 EPIGASTRIC PAIN: ICD-10-CM

## 2023-08-23 DIAGNOSIS — R13.19 ESOPHAGEAL DYSPHAGIA: ICD-10-CM

## 2023-08-23 DIAGNOSIS — R13.13 PHARYNGEAL DYSPHAGIA: ICD-10-CM

## 2023-08-23 PROCEDURE — 74220 X-RAY XM ESOPHAGUS 1CNTRST: CPT | Performed by: INTERNAL MEDICINE

## 2023-08-31 ENCOUNTER — LAB ENCOUNTER (OUTPATIENT)
Dept: LAB | Age: 53
End: 2023-08-31
Attending: INTERNAL MEDICINE
Payer: MEDICARE

## 2023-08-31 ENCOUNTER — OFFICE VISIT (OUTPATIENT)
Facility: CLINIC | Age: 53
End: 2023-08-31
Payer: MEDICARE

## 2023-08-31 VITALS
DIASTOLIC BLOOD PRESSURE: 72 MMHG | RESPIRATION RATE: 16 BRPM | OXYGEN SATURATION: 99 % | SYSTOLIC BLOOD PRESSURE: 118 MMHG | HEIGHT: 69 IN | WEIGHT: 169 LBS | BODY MASS INDEX: 25.03 KG/M2 | HEART RATE: 71 BPM

## 2023-08-31 DIAGNOSIS — N20.0 MULTIPLE KIDNEY STONES: Primary | ICD-10-CM

## 2023-08-31 DIAGNOSIS — R07.9 CHEST PAIN AT REST: ICD-10-CM

## 2023-08-31 DIAGNOSIS — N20.0 MULTIPLE KIDNEY STONES: ICD-10-CM

## 2023-08-31 LAB
ALBUMIN SERPL-MCNC: 3.9 G/DL (ref 3.4–5)
ALBUMIN/GLOB SERPL: 1.1 {RATIO} (ref 1–2)
ALP LIVER SERPL-CCNC: 110 U/L
ALT SERPL-CCNC: 35 U/L
ANION GAP SERPL CALC-SCNC: 8 MMOL/L (ref 0–18)
AST SERPL-CCNC: 23 U/L (ref 15–37)
BILIRUB SERPL-MCNC: 0.4 MG/DL (ref 0.1–2)
BUN BLD-MCNC: 9 MG/DL (ref 7–18)
CALCIUM BLD-MCNC: 9.3 MG/DL (ref 8.5–10.1)
CHLORIDE SERPL-SCNC: 106 MMOL/L (ref 98–112)
CO2 SERPL-SCNC: 29 MMOL/L (ref 21–32)
CREAT BLD-MCNC: 0.94 MG/DL
D DIMER PPP FEU-MCNC: 0.34 UG/ML FEU (ref ?–0.55)
EGFRCR SERPLBLD CKD-EPI 2021: 72 ML/MIN/1.73M2 (ref 60–?)
FASTING STATUS PATIENT QL REPORTED: YES
GLOBULIN PLAS-MCNC: 3.7 G/DL (ref 2.8–4.4)
GLUCOSE BLD-MCNC: 97 MG/DL (ref 70–99)
OSMOLALITY SERPL CALC.SUM OF ELEC: 295 MOSM/KG (ref 275–295)
POTASSIUM SERPL-SCNC: 3.7 MMOL/L (ref 3.5–5.1)
PROT SERPL-MCNC: 7.6 G/DL (ref 6.4–8.2)
SODIUM SERPL-SCNC: 143 MMOL/L (ref 136–145)

## 2023-08-31 PROCEDURE — 80053 COMPREHEN METABOLIC PANEL: CPT

## 2023-08-31 PROCEDURE — 99214 OFFICE O/P EST MOD 30 MIN: CPT | Performed by: INTERNAL MEDICINE

## 2023-08-31 PROCEDURE — 36415 COLL VENOUS BLD VENIPUNCTURE: CPT

## 2023-08-31 PROCEDURE — 85379 FIBRIN DEGRADATION QUANT: CPT

## 2023-09-06 ENCOUNTER — TELEPHONE (OUTPATIENT)
Dept: NEUROLOGY | Facility: CLINIC | Age: 53
End: 2023-09-06

## 2023-09-06 NOTE — TELEPHONE ENCOUNTER
I spoke with patient to inquire about a correspondence I got from [employer] regarding an adverse event with the use of urea. The patient told me that she was given urea as a test to use for H. pylori which I am not really familiar with because I do not precisely use this and it falls outside of the scope of my specialty.     I simply responded to the email inquiry that I did not prescribe this medication and I am not familiar with what transpired    Carmel Goodell MD  Vascular & General Neurology  Director, [facility]  9/6/2023, Time completed 3:55 PM

## 2023-10-05 ENCOUNTER — HOSPITAL ENCOUNTER (OUTPATIENT)
Dept: MAMMOGRAPHY | Facility: HOSPITAL | Age: 53
Discharge: HOME | End: 2023-10-05
Attending: SPECIALIST
Payer: MEDICARE

## 2023-10-05 DIAGNOSIS — Z12.31 ENCOUNTER FOR SCREENING MAMMOGRAM FOR MALIGNANT NEOPLASM OF BREAST: ICD-10-CM

## 2023-10-05 PROCEDURE — 77063 BREAST TOMOSYNTHESIS BI: CPT | Performed by: SPECIALIST

## 2023-10-05 PROCEDURE — 77067 SCR MAMMO BI INCL CAD: CPT | Performed by: SPECIALIST

## 2023-11-02 ENCOUNTER — OFFICE VISIT (OUTPATIENT)
Dept: NEUROLOGY | Facility: CLINIC | Age: 53
End: 2023-11-02
Payer: MEDICARE

## 2023-11-02 VITALS
RESPIRATION RATE: 16 BRPM | HEART RATE: 76 BPM | WEIGHT: 167 LBS | SYSTOLIC BLOOD PRESSURE: 124 MMHG | BODY MASS INDEX: 26.84 KG/M2 | DIASTOLIC BLOOD PRESSURE: 71 MMHG | HEIGHT: 66 IN

## 2023-11-02 DIAGNOSIS — G35 MS (MULTIPLE SCLEROSIS) (HCC): Primary | ICD-10-CM

## 2023-11-09 ENCOUNTER — LAB ENCOUNTER (OUTPATIENT)
Dept: LAB | Facility: HOSPITAL | Age: 53
End: 2023-11-09
Attending: PHYSICIAN ASSISTANT
Payer: MEDICARE

## 2023-11-09 DIAGNOSIS — G35 MS (MULTIPLE SCLEROSIS) (HCC): ICD-10-CM

## 2023-11-09 LAB
ALBUMIN SERPL-MCNC: 3.8 G/DL (ref 3.4–5)
ALBUMIN/GLOB SERPL: 1 {RATIO} (ref 1–2)
ALP LIVER SERPL-CCNC: 109 U/L
ALT SERPL-CCNC: 41 U/L
ANION GAP SERPL CALC-SCNC: 6 MMOL/L (ref 0–18)
AST SERPL-CCNC: 28 U/L (ref 15–37)
BASOPHILS # BLD AUTO: 0.01 X10(3) UL (ref 0–0.2)
BASOPHILS NFR BLD AUTO: 0.2 %
BILIRUB SERPL-MCNC: 0.4 MG/DL (ref 0.1–2)
BUN BLD-MCNC: 9 MG/DL (ref 9–23)
CALCIUM BLD-MCNC: 8.6 MG/DL (ref 8.5–10.1)
CHLORIDE SERPL-SCNC: 109 MMOL/L (ref 98–112)
CO2 SERPL-SCNC: 28 MMOL/L (ref 21–32)
CREAT BLD-MCNC: 0.91 MG/DL
EGFRCR SERPLBLD CKD-EPI 2021: 75 ML/MIN/1.73M2 (ref 60–?)
EOSINOPHIL # BLD AUTO: 0 X10(3) UL (ref 0–0.7)
EOSINOPHIL NFR BLD AUTO: 0 %
ERYTHROCYTE [DISTWIDTH] IN BLOOD BY AUTOMATED COUNT: 16.7 %
FASTING STATUS PATIENT QL REPORTED: YES
GLOBULIN PLAS-MCNC: 3.8 G/DL (ref 2.8–4.4)
GLUCOSE BLD-MCNC: 153 MG/DL (ref 70–99)
HCT VFR BLD AUTO: 44.4 %
HGB BLD-MCNC: 13.3 G/DL
IMM GRANULOCYTES # BLD AUTO: 0.02 X10(3) UL (ref 0–1)
IMM GRANULOCYTES NFR BLD: 0.4 %
LYMPHOCYTES # BLD AUTO: 2.08 X10(3) UL (ref 1–4)
LYMPHOCYTES NFR BLD AUTO: 39.9 %
MCH RBC QN AUTO: 22.5 PG (ref 26–34)
MCHC RBC AUTO-ENTMCNC: 30 G/DL (ref 31–37)
MCV RBC AUTO: 75.3 FL
MONOCYTES # BLD AUTO: 0.28 X10(3) UL (ref 0.1–1)
MONOCYTES NFR BLD AUTO: 5.4 %
NEUTROPHILS # BLD AUTO: 2.82 X10 (3) UL (ref 1.5–7.7)
NEUTROPHILS # BLD AUTO: 2.82 X10(3) UL (ref 1.5–7.7)
NEUTROPHILS NFR BLD AUTO: 54.1 %
OSMOLALITY SERPL CALC.SUM OF ELEC: 298 MOSM/KG (ref 275–295)
PLATELET # BLD AUTO: 196 10(3)UL (ref 150–450)
POTASSIUM SERPL-SCNC: 3.6 MMOL/L (ref 3.5–5.1)
PROT SERPL-MCNC: 7.6 G/DL (ref 6.4–8.2)
RBC # BLD AUTO: 5.9 X10(6)UL
SODIUM SERPL-SCNC: 143 MMOL/L (ref 136–145)
WBC # BLD AUTO: 5.2 X10(3) UL (ref 4–11)

## 2023-11-09 PROCEDURE — 36415 COLL VENOUS BLD VENIPUNCTURE: CPT

## 2023-11-09 PROCEDURE — 80053 COMPREHEN METABOLIC PANEL: CPT

## 2023-11-09 PROCEDURE — 85025 COMPLETE CBC W/AUTO DIFF WBC: CPT

## 2023-11-22 ENCOUNTER — TELEPHONE (OUTPATIENT)
Facility: CLINIC | Age: 53
End: 2023-11-22

## 2023-11-22 NOTE — TELEPHONE ENCOUNTER
Pt has an appointment on 11- 10:00am   . Called [facility] schedule to do a ct and they said there no ct in her chart.

## 2023-11-22 NOTE — TELEPHONE ENCOUNTER
Dr. Cassandra Rooney notified. Order for CT chest ordered by MD. Pt called to notify, asking to transfer call to central scheduling. Call tx. [facility] sent to pt with central scheduling number.

## 2023-11-25 ENCOUNTER — LAB ENCOUNTER (OUTPATIENT)
Dept: LAB | Facility: HOSPITAL | Age: 53
End: 2023-11-25
Attending: INTERNAL MEDICINE
Payer: MEDICARE

## 2023-11-25 ENCOUNTER — HOSPITAL ENCOUNTER (OUTPATIENT)
Dept: CT IMAGING | Facility: HOSPITAL | Age: 53
Discharge: HOME | End: 2023-11-25
Attending: INTERNAL MEDICINE
Payer: MEDICARE

## 2023-11-25 DIAGNOSIS — R07.9 CHEST PAIN, UNSPECIFIED TYPE: ICD-10-CM

## 2023-11-25 DIAGNOSIS — N95.9 MENOPAUSAL PROBLEM: ICD-10-CM

## 2023-11-25 DIAGNOSIS — E03.9 MYXEDEMA HEART DISEASE: ICD-10-CM

## 2023-11-25 DIAGNOSIS — E10.69 TYPE I DIABETES MELLITUS WITH HYPEROSMOLAR COMA: Primary | ICD-10-CM

## 2023-11-25 DIAGNOSIS — I51.9 MYXEDEMA HEART DISEASE: ICD-10-CM

## 2023-11-25 DIAGNOSIS — R10.10 UPPER ABDOMINAL PAIN: ICD-10-CM

## 2023-11-25 DIAGNOSIS — E10.65 TYPE I DIABETES MELLITUS WITH HYPEROSMOLAR COMA: Primary | ICD-10-CM

## 2023-11-25 DIAGNOSIS — E87.0 TYPE I DIABETES MELLITUS WITH HYPEROSMOLAR COMA: Primary | ICD-10-CM

## 2023-11-25 DIAGNOSIS — M81.0 SENILE OSTEOPOROSIS: ICD-10-CM

## 2023-11-25 LAB
CHOLEST SERPL-MCNC: 157 MG/DL (ref ?–200)
CREAT UR-SCNC: 164 MG/DL
CRP SERPL-MCNC: <0.29 MG/DL (ref ?–0.3)
DHEA-S SERPL-MCNC: 42.7 UG/DL
ESTRADIOL SERPL-MCNC: 27.5 PG/ML
FASTING PATIENT LIPID ANSWER: YES
FSH SERPL-ACNC: 53.1 MIU/ML
HDLC SERPL-MCNC: 33 MG/DL (ref 40–59)
LDLC SERPL CALC-MCNC: 110 MG/DL (ref ?–100)
MICROALBUMIN UR-MCNC: 1.75 MG/DL
MICROALBUMIN/CREAT 24H UR-RTO: 10.7 UG/MG (ref ?–30)
NONHDLC SERPL-MCNC: 124 MG/DL (ref ?–130)
PROGEST SERPL-MCNC: 0.29 NG/ML
T3FREE SERPL-MCNC: 1.74 PG/ML (ref 2.4–4.2)
THYROPEROXIDASE AB SERPL-ACNC: 40 U/ML (ref ?–60)
TRIGL SERPL-MCNC: 69 MG/DL (ref 30–149)
TSI SER-ACNC: 0.57 MIU/ML (ref 0.36–3.74)
VIT D+METAB SERPL-MCNC: 73.6 NG/ML (ref 30–100)
VLDLC SERPL CALC-MCNC: 12 MG/DL (ref 0–30)

## 2023-11-25 PROCEDURE — 36415 COLL VENOUS BLD VENIPUNCTURE: CPT

## 2023-11-25 PROCEDURE — 82306 VITAMIN D 25 HYDROXY: CPT

## 2023-11-25 PROCEDURE — 80061 LIPID PANEL: CPT

## 2023-11-25 PROCEDURE — 83001 ASSAY OF GONADOTROPIN (FSH): CPT

## 2023-11-25 PROCEDURE — 86140 C-REACTIVE PROTEIN: CPT

## 2023-11-25 PROCEDURE — 82570 ASSAY OF URINE CREATININE: CPT

## 2023-11-25 PROCEDURE — 84443 ASSAY THYROID STIM HORMONE: CPT

## 2023-11-25 PROCEDURE — 84481 FREE ASSAY (FT-3): CPT

## 2023-11-25 PROCEDURE — 84410 TESTOSTERONE BIOAVAILABLE: CPT

## 2023-11-25 PROCEDURE — 82670 ASSAY OF TOTAL ESTRADIOL: CPT

## 2023-11-25 PROCEDURE — 82627 DEHYDROEPIANDROSTERONE: CPT

## 2023-11-25 PROCEDURE — 82043 UR ALBUMIN QUANTITATIVE: CPT

## 2023-11-25 PROCEDURE — 84144 ASSAY OF PROGESTERONE: CPT

## 2023-11-25 PROCEDURE — 71250 CT THORAX DX C-: CPT | Performed by: INTERNAL MEDICINE

## 2023-11-25 PROCEDURE — 86376 MICROSOMAL ANTIBODY EACH: CPT

## 2023-11-30 ENCOUNTER — OFFICE VISIT (OUTPATIENT)
Facility: CLINIC | Age: 53
End: 2023-11-30
Payer: MEDICARE

## 2023-11-30 VITALS
WEIGHT: 165 LBS | HEIGHT: 66 IN | RESPIRATION RATE: 16 BRPM | OXYGEN SATURATION: 96 % | BODY MASS INDEX: 26.52 KG/M2 | SYSTOLIC BLOOD PRESSURE: 120 MMHG | DIASTOLIC BLOOD PRESSURE: 64 MMHG | HEART RATE: 78 BPM

## 2023-11-30 DIAGNOSIS — J45.40 MODERATE PERSISTENT ASTHMA, UNSPECIFIED WHETHER COMPLICATED: Primary | ICD-10-CM

## 2023-11-30 PROCEDURE — 99214 OFFICE O/P EST MOD 30 MIN: CPT | Performed by: INTERNAL MEDICINE

## 2023-11-30 RX ORDER — LOTEPREDNOL ETABONATE 5 MG/ML
SUSPENSION/ DROPS OPHTHALMIC
COMMUNITY
Start: 2023-11-29

## 2023-11-30 NOTE — PATIENT INSTRUCTIONS
Plan- -- continue Breo daily         -- rescue inhaler as needed -           - remain on singular           - to begin flonase nasal spray- 1 puff each nostril every night and saline nasal spray 2-3 times during the day             - to get sleep study -           - see me in 3 months     Reinier Huntley MD  Pulmonary Medicine  [phone]

## 2023-12-01 LAB
SEX HORM BIND GLOB: 47.1 NMOL/L
TESTOST % FREE+WEAK BND: 16.1 %
TESTOST FREE+WEAK BND: 2.5 NG/DL
TESTOSTERONE TOT /MS: 15.6 NG/DL

## 2023-12-06 ENCOUNTER — TELEPHONE (OUTPATIENT)
Dept: OTHER | Age: 53
End: 2023-12-06

## 2023-12-08 ENCOUNTER — TELEPHONE (OUTPATIENT)
Dept: OTHER | Age: 53
End: 2023-12-08

## 2023-12-11 ENCOUNTER — OFFICE VISIT (OUTPATIENT)
Dept: SLEEP CENTER | Age: 53
End: 2023-12-11
Attending: INTERNAL MEDICINE
Payer: MEDICARE

## 2023-12-11 DIAGNOSIS — G47.33 OSA (OBSTRUCTIVE SLEEP APNEA): ICD-10-CM

## 2023-12-11 DIAGNOSIS — R06.83 SNORING: Primary | ICD-10-CM

## 2023-12-11 PROCEDURE — 95810 POLYSOM 6/> YRS 4/> PARAM: CPT

## 2023-12-19 ENCOUNTER — SLEEP STUDY (OUTPATIENT)
Facility: CLINIC | Age: 53
End: 2023-12-19
Payer: MEDICARE

## 2023-12-19 DIAGNOSIS — G47.33 OBSTRUCTIVE SLEEP APNEA SYNDROME: Primary | ICD-10-CM

## 2023-12-19 PROCEDURE — 95810 POLYSOM 6/> YRS 4/> PARAM: CPT | Performed by: OTHER

## 2023-12-27 ENCOUNTER — HOSPITAL ENCOUNTER (OUTPATIENT)
Dept: MRI IMAGING | Age: 53
Discharge: HOME | End: 2023-12-27
Attending: SPECIALIST
Payer: MEDICARE

## 2023-12-27 DIAGNOSIS — M54.14 THORACIC RADICULOPATHY: ICD-10-CM

## 2023-12-27 PROCEDURE — 72146 MRI CHEST SPINE W/O DYE: CPT | Performed by: SPECIALIST

## 2024-01-04 ENCOUNTER — TELEPHONE (OUTPATIENT)
Dept: NEUROLOGY | Facility: CLINIC | Age: 54
End: 2024-01-04

## 2024-01-04 DIAGNOSIS — G35 MS (MULTIPLE SCLEROSIS) (HCC): ICD-10-CM

## 2024-01-04 RX ORDER — DIROXIMEL FUMARATE 231 MG/1
462 CAPSULE ORAL 2 TIMES DAILY
Qty: 120 CAPSULE | Refills: 2 | Status: SHIPPED | OUTPATIENT
Start: 2024-01-04 | End: 2024-01-16

## 2024-01-04 NOTE — TELEPHONE ENCOUNTER
Fax received from [facility] requesting a new prior authorization for Vumerity.    Attempted to completed on [facility], unable to find patient when submitted.    Will attempt on surescripts.    New prescription sent to [facility].

## 2024-01-16 RX ORDER — DIROXIMEL FUMARATE 231 MG/1
462 CAPSULE ORAL 2 TIMES DAILY
Qty: 120 CAPSULE | Refills: 5 | Status: SHIPPED | OUTPATIENT
Start: 2024-01-16

## 2024-01-16 NOTE — TELEPHONE ENCOUNTER
Pt call her new pharmacy has changed   Please send RX     [id number] - [place], [place] - [address] [phone], [phone]

## 2024-01-16 NOTE — TELEPHONE ENCOUNTER
Patient with new specialty pharmacy, requesting to have prescription sent to [facility].    Prescription forwarded as requested.

## 2024-01-18 NOTE — TELEPHONE ENCOUNTER
Fax received from [facility] verifying they received the new prescription. They have begun processing the order. If a prior authorization is required, they will coordinate the necessary documentation.

## 2024-01-19 LAB
INDEX VALUE: 1.69
JCV ANTIBODY: POSITIVE

## 2024-01-23 NOTE — TELEPHONE ENCOUNTER
Authorization received for patient continued use of Vumerity capsules.    Authorization granted: 01/20/2024 - until further notice.    Approval letter placed in [employer].

## 2024-01-31 ENCOUNTER — TELEPHONE (OUTPATIENT)
Dept: HEMATOLOGY/ONCOLOGY | Facility: HOSPITAL | Age: 54
End: 2024-01-31

## 2024-02-08 ENCOUNTER — CASE MANAGEMENT (OUTPATIENT)
Dept: CARE COORDINATION | Age: 54
End: 2024-02-08

## 2024-02-13 ENCOUNTER — CASE MANAGEMENT (OUTPATIENT)
Dept: CARE COORDINATION | Age: 54
End: 2024-02-13

## 2024-02-19 ENCOUNTER — TELEPHONE (OUTPATIENT)
Dept: HEMATOLOGY/ONCOLOGY | Facility: HOSPITAL | Age: 54
End: 2024-02-19

## 2024-02-19 ENCOUNTER — CASE MANAGEMENT (OUTPATIENT)
Dept: CARE COORDINATION | Age: 54
End: 2024-02-19

## 2024-02-19 RX ORDER — TELMISARTAN 20 MG/1
20 TABLET ORAL DAILY
COMMUNITY

## 2024-02-19 RX ORDER — PROGESTERONE 100 MG/1
100 CAPSULE ORAL DAILY
COMMUNITY

## 2024-02-19 RX ORDER — PANTOPRAZOLE SODIUM 20 MG/1
20 TABLET, DELAYED RELEASE ORAL DAILY
COMMUNITY

## 2024-02-19 SDOH — SOCIAL STABILITY: SOCIAL INSECURITY: HOW OFTEN DOES ANYONE, INCLUDING FAMILY AND FRIENDS, INSULT OR TALK DOWN TO YOU?: NEVER

## 2024-02-19 SDOH — ECONOMIC STABILITY: FOOD INSECURITY: WITHIN THE PAST 12 MONTHS, THE FOOD YOU BOUGHT JUST DIDN'T LAST AND YOU DIDN'T HAVE MONEY TO GET MORE.: NEVER TRUE

## 2024-02-19 SDOH — ECONOMIC STABILITY: HOUSING INSECURITY: DO YOU HAVE STABLE HOUSING?: STABLE

## 2024-02-19 SDOH — HEALTH STABILITY: MENTAL HEALTH: FEELING DOWN, DEPRESSED OR HOPELESS?: NOT AT ALL

## 2024-02-19 SDOH — ECONOMIC STABILITY: HOUSING INSECURITY: WHAT IS YOUR LIVING SITUATION TODAY?: PRIVATE RESIDENCE

## 2024-02-19 SDOH — HEALTH STABILITY: MENTAL HEALTH: HOW OFTEN DO YOU HAVE 6 OR MORE DRINKS ON ONE OCCASION?: NEVER

## 2024-02-19 SDOH — HEALTH STABILITY: MENTAL HEALTH: HOW MANY STANDARD DRINKS CONTAINING ALCOHOL DO YOU HAVE ON A TYPICAL DAY?: 0,1 OR 2

## 2024-02-19 SDOH — HEALTH STABILITY: MENTAL HEALTH: DEPRESSION SCREENING SCORE: 0

## 2024-02-19 SDOH — ECONOMIC STABILITY: HOUSING INSECURITY: WHAT IS YOUR LIVING SITUATION TODAY?: I HAVE A STEADY PLACE TO LIVE

## 2024-02-19 SDOH — SOCIAL STABILITY: SOCIAL INSECURITY: HOW OFTEN DOES ANYONE, INCLUDING FAMILY AND FRIENDS, PHYSICALLY HURT YOU?: NEVER

## 2024-02-19 SDOH — HEALTH STABILITY: MENTAL HEALTH: PHQ2 INTERPRETATION: NO FURTHER SCREENING NEEDED

## 2024-02-19 SDOH — SOCIAL STABILITY: SOCIAL INSECURITY: HOW OFTEN DOES ANYONE, INCLUDING FAMILY AND FRIENDS, SCREAM OR CURSE AT YOU?: NEVER

## 2024-02-19 SDOH — ECONOMIC STABILITY: HOUSING INSECURITY: DO YOU HAVE PROBLEMS WITH ANY OF THE FOLLOWING?: NONE OF THE ABOVE

## 2024-02-19 SDOH — HEALTH STABILITY: MENTAL HEALTH: AUDIT TOTAL SCORE: 0

## 2024-02-19 SDOH — SOCIAL STABILITY: SOCIAL INSECURITY: HOW OFTEN DOES ANYONE, INCLUDING FAMILY AND FRIENDS, THREATEN YOU WITH HARM?: NEVER

## 2024-02-19 SDOH — HEALTH STABILITY: MENTAL HEALTH: LITTLE INTEREST OR PLEASURE IN ACTIVITY?: NOT AT ALL

## 2024-02-19 SDOH — ECONOMIC STABILITY: TRANSPORTATION INSECURITY
IN THE PAST 12 MONTHS, HAS LACK OF RELIABLE TRANSPORTATION KEPT YOU FROM MEDICAL APPOINTMENTS, MEETINGS, WORK OR FROM GETTING THINGS NEEDED FOR DAILY LIVING?: NO

## 2024-02-19 SDOH — HEALTH STABILITY: MENTAL HEALTH: HOW OFTEN DO YOU HAVE A DRINK CONTAINING ALCOHOL?: NEVER

## 2024-02-19 SDOH — ECONOMIC STABILITY: GENERAL

## 2024-02-19 SDOH — ECONOMIC STABILITY: FOOD INSECURITY: FOOD INSECURITY: NO

## 2024-02-19 ASSESSMENT — ACTIVITIES OF DAILY LIVING (ADL)
DME_IN_USE: WALKER;CANE
GROOMING: INDEPENDENT
EATING: INDEPENDENT
AMBULATION: WITH DEVICE
DRESSING: INDEPENDENT
FUNCTIONAL_EVALUATION: PERFORMS ADLS WITH ASSISTANCE
MOBILITY: GAIT IMPAIRMENT
TOILETING: INDEPENDENT
BATHING: INDEPENDENT

## 2024-02-19 ASSESSMENT — SLEEP AND FATIGUE QUESTIONNAIRES: SLEEP DESCRIPTORS: DIFFICULTY SLEEPING AT NIGHT

## 2024-02-19 ASSESSMENT — PATIENT HEALTH QUESTIONNAIRE - PHQ9: SUM OF ALL RESPONSES TO PHQ9 QUESTIONS 1 AND 2: 0

## 2024-02-20 ENCOUNTER — TELEPHONE (OUTPATIENT)
Dept: HEMATOLOGY/ONCOLOGY | Facility: HOSPITAL | Age: 54
End: 2024-02-20

## 2024-02-20 ENCOUNTER — OFFICE VISIT (OUTPATIENT)
Dept: SURGERY | Facility: CLINIC | Age: 54
End: 2024-02-20
Payer: MEDICARE

## 2024-02-20 VITALS — DIASTOLIC BLOOD PRESSURE: 80 MMHG | SYSTOLIC BLOOD PRESSURE: 126 MMHG | HEART RATE: 70 BPM | OXYGEN SATURATION: 98 %

## 2024-02-20 DIAGNOSIS — D32.9 MENINGIOMA (HCC): Primary | ICD-10-CM

## 2024-02-20 PROCEDURE — 99203 OFFICE O/P NEW LOW 30 MIN: CPT | Performed by: NEUROLOGICAL SURGERY

## 2024-02-20 RX ORDER — FLUOROMETHOLONE 0.1 %
SUSPENSION, DROPS(FINAL DOSAGE FORM)(ML) OPHTHALMIC (EYE)
COMMUNITY
Start: 2024-02-19

## 2024-02-20 RX ORDER — ESTRADIOL 0.03 MG/D
1 PATCH, EXTENDED RELEASE TRANSDERMAL
COMMUNITY
Start: 2024-02-13

## 2024-02-20 RX ORDER — PROGESTERONE 100 MG/1
100 CAPSULE ORAL DAILY
COMMUNITY

## 2024-02-20 RX ORDER — FLUTICASONE FUROATE AND VILANTEROL 200; 25 UG/1; UG/1
1 POWDER RESPIRATORY (INHALATION) DAILY
COMMUNITY
Start: 2024-01-04

## 2024-02-20 RX ORDER — FLUTICASONE PROPIONATE 110 UG/1
AEROSOL, METERED RESPIRATORY (INHALATION)
COMMUNITY
Start: 2024-01-26

## 2024-02-20 RX ORDER — ACETAMINOPHEN AND CODEINE PHOSPHATE 300; 60 MG/1; MG/1
1 TABLET ORAL EVERY 6 HOURS PRN
COMMUNITY
Start: 2024-02-04

## 2024-02-20 RX ORDER — PANTOPRAZOLE SODIUM 20 MG/1
20 TABLET, DELAYED RELEASE ORAL 2 TIMES DAILY
COMMUNITY
Start: 2024-01-16

## 2024-02-20 RX ORDER — INSULIN ASPART 100 [IU]/ML
INJECTION, SOLUTION INTRAVENOUS; SUBCUTANEOUS
COMMUNITY

## 2024-02-20 RX ORDER — TAMSULOSIN HYDROCHLORIDE 0.4 MG/1
0.8 CAPSULE ORAL DAILY
COMMUNITY
Start: 2024-01-05

## 2024-02-20 RX ORDER — TELMISARTAN 20 MG/1
20 TABLET ORAL DAILY
COMMUNITY
Start: 2024-01-26

## 2024-02-20 NOTE — H&P
Neurosurgery Clinic Visit  2024    Kat Crook PCP:  Justin Huerta MD    3/11/1970 MRN [id number]       CHIEF COMPLAINT:  Chief Complaint   Patient presents with    New Patient     Possible meningioma /neuroma    Test Results     Thoracic MRI 23       HISTORY OF PRESENT ILLNESS:  Kat Crook is a(n) 53 year old female here for evaluation of some right flank pain and went around her front.  This started about 4 months ago.It is happening on both sides but worse on the right.  She thought she might be having an MS hug.  She had an MRI of her thoracic spine.  Small lesion was seen.  Patient referred for neurosurgery evaluation.    REVIEW OF SYSTEMS:  The 12 point checklist was reviewed and was negative except: See above    ALLERGIES:  Allergies   Allergen Reactions    Epinephrine OTHER (SEE COMMENTS)     Cardiac issues    Immune Globulin ANAPHYLAXIS    Latex SHORTNESS OF BREATH and OTHER (SEE COMMENTS)     WELTS    Methylprednisolone SWELLING     Swelling of neck, throat and tongue  Injection, throat and tongue swelling      Ocrelizumab ANAPHYLAXIS and OTHER (SEE COMMENTS)     Difficulty breathing    Other SHORTNESS OF BREATH     ENVIRONMENTAL, ASTHMA EXACERBATION    Pcn [Penicillamine]     Penicillins OTHER (SEE COMMENTS)    Urea Tightness in Throat     Urea C-13 in Pranactin for H. Pylori test kit.       MEDICATIONS:  Current Outpatient Medications   Medication Sig Dispense Refill    Telmisartan 20 MG Oral Tab Take 1 tablet (20 mg total) by mouth daily.      progesterone 100 MG Oral Cap Take 1 capsule (100 mg total) by mouth daily.      pantoprazole 20 MG Oral Tab EC Take 1 tablet (20 mg total) by mouth 2 (two) times daily.      Insulin Aspart FlexPen 100 UNIT/ML Subcutaneous Solution Pen-injector INJECT AS DIRECTED THREE TIMES DAILY WITH MEALS PER SLIDING SCALE. MAX DAILY DOSE 40 UNITS      fluticasone propionate 110 MCG/ACT Inhalation Aerosol fluticasone propionate 110 mcg/actuation HFA  aerosol inhaler, [RxNorm: 603789]      fluticasone furoate-vilanterol 200-25 MCG/ACT Inhalation Aerosol Powder, Breath Activated Inhale 1 puff into the lungs daily.      fluorometholone 0.1 % Ophthalmic Suspension       LYLLANA 0.025 MG/24HR Transdermal Patch Biweekly Place 1 patch onto the skin twice a week.      CELEBREX 100 MG Oral Cap CeleBREX 100 mg capsule, [RxNorm: 444803]      acetaminophen-codeine 300-60 MG Oral Tab Take 1 tablet by mouth every 6 (six) hours as needed.      Diroximel Fumarate (VUMERITY) 231 MG Oral Capsule Delayed Release Take 462 mg by mouth in the morning and 462 mg before bedtime. 120 capsule 5    loteprednol (LOTEMAX) 0.5 % Ophthalmic Suspension 1 drop into both eyes Ophthalmic two times a day for 14 days      pregabalin 200 MG Oral Cap Take 1 capsule (200 mg total) by mouth 2 (two) times daily.      Ciclopirox 8 % External Solution APPLY EVERY DAY      HYDROcodone-acetaminophen  MG Oral Tab Take 1 tablet by mouth 4 (four) times daily as needed.      baclofen 10 MG Oral Tab Take 1 tablet (10 mg total) by mouth 3 (three) times daily.      NON FORMULARY Tandem insulin pump settings (Bolus IQ)  Mdnt 3.1 units/hr  0400 3.00 units/hr  0900 3.2 units/hr    Correction:   MN 1:35/110  1 PM 1:40  7 pm 1:35    CHO ratio:  MN 1:5g      FARXIGA 10 MG Oral Tab Take 1 tablet (10 mg total) by mouth daily.      DULoxetine 60 MG Oral Cap DR Particles Take 1 capsule (60 mg total) by mouth 2 (two) times daily.      atorvastatin 20 MG Oral Tab Take 1 tablet (20 mg total) by mouth nightly.      levothyroxine 75 MCG Oral Tab Take 1 tablet (75 mcg total) by mouth before breakfast.      DEXCOM G6 SENSOR Does not apply Misc USE AS DIRECTED EVERY 10 DAYS 9 each 3    Insulin Lispro (HUMALOG) 100 UNIT/ML Subcutaneous Solution INJECT UP TO 70 UNITS VIA INSULIN PUMP DAILY 70 mL 2    carvedilol 3.125 MG Oral Tab Take 1 tablet (3.125 mg total) by mouth 2 (two) times daily.      EPINEPHrine 0.15 MG/0.3ML  Injection Solution Auto-injector Jani pen/lower dose because pt is allergic to epinephrine      Ondansetron HCl (ZOFRAN) 4 mg tablet Take 1 tablet (4 mg total) by mouth 3 (three) times daily as needed.      Benralizumab (FASENRA PEN) 30 MG/ML Subcutaneous Solution Auto-injector Inject 30 mg into the skin. Every other month/every 8 weeks      DEXCOM G6 TRANSMITTER Does not apply Misc USE AS DIRECTED EVERY 90 DAYS 1 each 3    Thiamine HCl 100 MG Oral Tab Take 1 tablet (100 mg total) by mouth daily. 30 tablet 3    docusate sodium 100 MG Oral Cap Take 1 capsule (100 mg total) by mouth 2 (two) times daily.      Montelukast Sodium (SINGULAIR) 10 MG Oral Tab Take 1 tablet (10 mg total) by mouth nightly.      denosumab 60 MG/ML Subcutaneous Solution Inject 1 mL (60 mg total) into the skin every 6 (six) months.      Cholecalciferol (VITAMIN D-3) 5000 UNITS Oral Tab Take 1 tablet (5,000 Units total) by mouth daily.      Albuterol Sulfate  (90 Base) MCG/ACT Inhalation Aero Soln Inhale 2 puffs into the lungs every 6 (six) hours as needed. PRN wheezing/SOB      tamsulosin 0.4 MG Oral Cap Take 2 capsules (0.8 mg total) by mouth daily. (Patient not taking: Reported on 2/20/2024)         HISTORY:  Past Medical History:   Diagnosis Date    Abdominal pain 05/2019    Allergic rhinitis     Anemia     Anxiety state     Arthritis     Asthma (HCC)     Atypical mole 2019    Autoimmune disease (HCC)     Back pain     Bad breath     Black stools 2019    Bloating 05/2019    Blood disorder     Thalassemia alpha, Sickle cell trait    Blood in urine     Blurred vision     Calculus of kidney     Cardiomyopathy (HCC)     last echo 12/2021: EF 55%    Chest pain     Constipation     Decorative tattoo     Deep vein thrombosis (HCC) 12/2021    Depression     Diarrhea, unspecified     Dizziness     Easy bruising     Elevated liver enzymes     Endocrine disorder     Esophageal reflux     Fatigue     Fibromyalgia     Food intolerance      Frequent urination     Frequent UTI     Gastroparesis     Glaucoma     H. pylori infection     H/O  section     Headache disorder     Heart attack (HCC) 2015    Heart palpitations 2017    Heartburn     Hemorrhoids     Herniation of cervical intervertebral disc     High blood pressure     High cholesterol     History of blood clots Dec 2021    Unprovoked    History of blood transfusion     History of cardiac murmur     History of depression     Hoarseness, chronic     Hyperlipidemia     IBS (irritable bowel syndrome)     Irregular bowel habits     Leaking of urine     Liver disease 2013    GIORDANO and Stage 2 fibrosis    Loss of appetite     Migraines     Multiple sclerosis (HCC)     dx 8 years ago    Muscle weakness     cane or walker prn    Myocardial infarction (HCC)     Neuropathy     legs, hands, feet; bilateral    Night sweats     Osteoarthritis     Osteoporosis     KAMALJIT in her 30s    Osteoporosis     Pain with bowel movements     Painful urination     Problems with swallowing     Due to MS    Renal disorder     kidney lesions    Scoliosis of lumbar spine     Shortness of breath     Sickle cell trait (HCC)     Sleep apnea     no treatment    Sleep disturbance     Stress     Type II or unspecified type diabetes mellitus without mention of complication, not stated as uncontrolled     Uncomfortable fullness after meals 2019    Unspecified disorder of thyroid     total thyroidectomy    Visual impairment     glasses    Wears glasses     Weight loss 2019    Wheezing      Past Surgical History:   Procedure Laterality Date      1986    CATH ANGIO  2014    CHOLECYSTECTOMY      COLONOSCOPY N/A 2015    Procedure: COLONOSCOPY;  Surgeon: Liz Tong DO;  Location:  ENDOSCOPY    COLONOSCOPY N/A 2021    Procedure: COLONOSCOPY;  Surgeon: Cedrick Da Silva MD;  Location:  ENDOSCOPY    COLONOSCOPY N/A 10/08/2021    Procedure: COLONOSCOPY,  ESOPHAGOGASTRODUODENOSCOPY (EGD);  Surgeon: Darvin Richardson MD;  Location:  ENDOSCOPY    D & C  1990    Blighted ovum    EGD      HYSTERECTOMY      total hystero.    IR PORT A CATH INSERTION EXCHNGE CHECK  2018    LITHOTRIPSY      x 2    LYSIS OF ADHESIONS      NEEDLE BIOPSY LIVER  2016    OOPHORECTOMY Bilateral 1993    total hystero.    OTHER      dialysis catheter- left chest for plasmaphoresis    OTHER SURGICAL HISTORY       x 2    PORT, INDWELLING, IMP  2019    power port    THYROIDECTOMY  2013    benign MNG    TOTAL ABDOM HYSTERECTOMY      UPPER GI ENDOSCOPY PERFORMED  2014     Family History   Problem Relation Age of Onset    Heart Attack Father     Other (Other) Father         COPD, emphysema    Asthma Father     Pulmonary Disease Father     Hypertension Mother         Needs to be deleted    Cancer Mother         stomach cancer    Ovarian Cancer Mother         30'S    Colon Polyps Mother     Anemia Mother     Ovarian Cancer Maternal Grandmother         30'S    Colon Polyps Maternal Grandmother     Diabetes Maternal Grandmother     Colon Cancer Maternal Grandmother     Anemia Maternal Grandmother     Cancer Maternal Grandmother     Breast Cancer Maternal Aunt         60'S    Other (Other) Sister         rectal CA\    Anemia Daughter      Kat  reports that she has never smoked. She has never been exposed to tobacco smoke. She has never used smokeless tobacco. She reports that she does not drink alcohol and does not use drugs.    PHYSICAL EXAMINATION:  Vital Signs:  /80 (BP Location: Right arm, Patient Position: Sitting)   Pulse 70   LMP  (LMP Unknown)   SpO2 98%   General: The patient is in no acute distress.  HEENT: The head is atraumatic and normocephalic.  The eyes, ears, nose and throat are clear. The pupils are equal, round, and reactive to light.  Hearing is intact and symmetric.  Muscular:  Exam reveals normal bulk and tone.  Neurologic Exam: The patient is  oriented to time, person and place.  Memory, attention span, language findings, and knowledge and insight into the problem appear intact and appropriate.  Cranial nerve Exam:  Visual fields are full.  The pupils are equal, round, and reactive to light.  The extraocular movements are intact.  Facial sensation in intact.  Facial symmetry and movement of the face is intact.  Hearing appears to be intact and symmetric.  Elevation of the palate appears symmetric as well.  Shoulder shrug is intact and symmetric.  The tongue is in the midline.    Strength:     Biceps Triceps Deltoids Wrist Extension Hand Intrinsics   Left 5/5 5/5 5/5 5/5 5/5   Right 5/5 5/5 5/5 5/5 5/5      Hip Flexion Hip Adduction Hip Abduction Knee Flexion Knee Extension Plantar- flexion Dorsi- flexion EHL   Left 5/5 5/5 5/5 5/5 5/5 5/5 5/5 5/5   Right 5/5 5/5 5/5 5/5 5/5 5/5 5/5 5/5     DTRs:   Biceps Triceps Brachio Patella Ankle   Left 2+ 2+ 2+ 2+ 2+   Right 2+ 2+ 2+ 2+ 2+     Clonus:  Absent.  Babinski: Deferred  Mix's:  Absent.  Romberg:  Negative.  Pronator drift: Absent.  Sensation:  Intact to light touch in all tested dermatomes in the upper and lower extremities.    Coordination:  Appears to be intact on finger-to-nose testing   Gait and station: Her left foot catches from time to time  Spine: Michael's negative    REVIEW OF STUDIES:  MRI thoracic spine shows small lesion at T11 on the posterior right,  MRI from 2019 of her full neuro axis does not show this lesion      ASSESSMENT AND PLAN:  53-year-old female with small right posterior T11 lesion  Reviewed the films in detail  This questionable meningioma versus schwannoma versus other lesion  Will get MRI with contrast to further evaluate  At this point does not need surgical intervention  It does not cause her symptoms not pressing any neural elements  Will call her once her MRI is complete to make further plans  Discussed with likely need to observe this  All questions were  answered  Patient appreciative        Time spent on counseling/coordination of care:  30 Minutes    Total time spent with patient:  30 Minutes      Eliseo Woods MD   [facility]  2/20/2024  4:38 PM  Dictated but not proofread

## 2024-02-20 NOTE — PATIENT INSTRUCTIONS
Refill policies:    Allow 2-3 business days for refills; controlled substances may take longer.  Contact your pharmacy at least 5 days prior to running out of medication and have them send an electronic request or submit request through the “request refill” option in your [employer] account.  Refills are not addressed on weekends; covering physicians do not authorize routine medications on weekends.  No narcotics or controlled substances are refilled after noon on Fridays or by on call physicians.  By law, narcotics must be electronically prescribed.  A 30 day supply with no refills is the maximum allowed.  If your prescription is due for a refill, you may be due for a follow up appointment.  To best provide you care, patients receiving routine medications need to be seen at least once a year.  Patients receiving narcotic/controlled substance medications need to be seen at least once every 3 months.  In the event that your preferred pharmacy does not have the requested medication in stock (e.g. Backordered), it is your responsibility to find another pharmacy that has the requested medication available.  We will gladly send a new prescription to that pharmacy at your request.    Scheduling Tests:    If your physician has ordered radiology tests such as MRI or CT scans, please contact Central Scheduling at [phone] right away to schedule the test.  Once scheduled, the [facility] Centralized Referral Team will work with your insurance carrier to obtain pre-certification or prior authorization.  Depending on your insurance carrier, approval may take 3-10 days.  It is highly recommended patients assure they have received an authorization before having a test performed.  If test is done without insurance authorization, patient may be responsible for the entire amount billed.      Precertification and Prior Authorizations:  If your physician has recommended that you have a procedure or additional testing performed the [facility]  Centralized Referral Team will contact your insurance carrier to obtain pre-certification or prior authorization.    You are strongly encouraged to contact your insurance carrier to verify that your procedure/test has been approved and is a COVERED benefit.  Although the [facility] Centralized Referral Team does its due diligence, the insurance carrier gives the disclaimer that \"Although the procedure is authorized, this does not guarantee payment.\"    Ultimately the patient is responsible for payment.   Thank you for your understanding in this matter.  Paperwork Completion:  If you require FMLA or disability paperwork for your recovery, please make sure to either drop it off or have it faxed to our office at [phone]. Be sure the form has your name and date of birth on it.  The form will be faxed to our Forms Department and they will complete it for you.  There is a 25$ fee for all forms that need to be filled out.  Please be aware there is a 10-14 day turnaround time.  You will need to sign a release of information (DAYAN) form if your paperwork does not come with one.  You may call the Forms Department with any questions at [phone].  Their fax number is [phone].

## 2024-02-20 NOTE — PROGRESS NOTES
New Patient-  Reason for visit: PCP recommended to see  due to Thoracic spine MRI    Possible meningioma/neuroma    Pain Location: Right lateral rib radiating across the chest     Estimated time of onset: 4 months ago  Pain Scale: 5                                              Past Treatments for Current Pain Condition: No treatment for thoracic spine      Prior Imaging: Thoracic MRI 12/27/23

## 2024-02-23 ENCOUNTER — NURSE ONLY (OUTPATIENT)
Dept: HEMATOLOGY/ONCOLOGY | Facility: HOSPITAL | Age: 54
End: 2024-02-23
Attending: SPECIALIST
Payer: MEDICARE

## 2024-02-23 DIAGNOSIS — G35 MS (MULTIPLE SCLEROSIS) (HCC): ICD-10-CM

## 2024-02-23 LAB
ALBUMIN SERPL-MCNC: 3.5 G/DL (ref 3.4–5)
ALBUMIN/GLOB SERPL: 1 {RATIO} (ref 1–2)
ALP LIVER SERPL-CCNC: 68 U/L
ALT SERPL-CCNC: 41 U/L
ANION GAP SERPL CALC-SCNC: 3 MMOL/L (ref 0–18)
AST SERPL-CCNC: 29 U/L (ref 15–37)
BASOPHILS # BLD AUTO: 0.01 X10(3) UL (ref 0–0.2)
BASOPHILS NFR BLD AUTO: 0.2 %
BILIRUB SERPL-MCNC: 0.5 MG/DL (ref 0.1–2)
BUN BLD-MCNC: 12 MG/DL (ref 9–23)
CALCIUM BLD-MCNC: 8.9 MG/DL (ref 8.5–10.1)
CHLORIDE SERPL-SCNC: 109 MMOL/L (ref 98–112)
CO2 SERPL-SCNC: 28 MMOL/L (ref 21–32)
CREAT BLD-MCNC: 0.92 MG/DL
EGFRCR SERPLBLD CKD-EPI 2021: 74 ML/MIN/1.73M2 (ref 60–?)
EOSINOPHIL # BLD AUTO: 0 X10(3) UL (ref 0–0.7)
EOSINOPHIL NFR BLD AUTO: 0 %
ERYTHROCYTE [DISTWIDTH] IN BLOOD BY AUTOMATED COUNT: 16.9 %
GLOBULIN PLAS-MCNC: 3.6 G/DL (ref 2.8–4.4)
GLUCOSE BLD-MCNC: 106 MG/DL (ref 70–99)
HCT VFR BLD AUTO: 40.1 %
HGB BLD-MCNC: 12.6 G/DL
IMM GRANULOCYTES # BLD AUTO: 0.02 X10(3) UL (ref 0–1)
IMM GRANULOCYTES NFR BLD: 0.3 %
LYMPHOCYTES # BLD AUTO: 2.63 X10(3) UL (ref 1–4)
LYMPHOCYTES NFR BLD AUTO: 39.5 %
MCH RBC QN AUTO: 22.3 PG (ref 26–34)
MCHC RBC AUTO-ENTMCNC: 31.4 G/DL (ref 31–37)
MCV RBC AUTO: 71 FL
MONOCYTES # BLD AUTO: 0.55 X10(3) UL (ref 0.1–1)
MONOCYTES NFR BLD AUTO: 8.3 %
NEUTROPHILS # BLD AUTO: 3.45 X10 (3) UL (ref 1.5–7.7)
NEUTROPHILS # BLD AUTO: 3.45 X10(3) UL (ref 1.5–7.7)
NEUTROPHILS NFR BLD AUTO: 51.7 %
OSMOLALITY SERPL CALC.SUM OF ELEC: 290 MOSM/KG (ref 275–295)
PLATELET # BLD AUTO: 183 10(3)UL (ref 150–450)
POTASSIUM SERPL-SCNC: 3.6 MMOL/L (ref 3.5–5.1)
PROT SERPL-MCNC: 7.1 G/DL (ref 6.4–8.2)
RBC # BLD AUTO: 5.65 X10(6)UL
SODIUM SERPL-SCNC: 140 MMOL/L (ref 136–145)
WBC # BLD AUTO: 6.7 X10(3) UL (ref 4–11)

## 2024-02-23 PROCEDURE — 36591 DRAW BLOOD OFF VENOUS DEVICE: CPT

## 2024-02-23 PROCEDURE — 85025 COMPLETE CBC W/AUTO DIFF WBC: CPT

## 2024-02-23 PROCEDURE — 80053 COMPREHEN METABOLIC PANEL: CPT

## 2024-02-23 NOTE — PROGRESS NOTES
Education Record    Learner:  Patient    Disease / Diagnosis: CLL    Barriers / Limitations:  None    Method:  Brief focused, printed material and  reinforcement    General Topics:  Plan of care reviewed    Outcome:  Shows understanding    Labs drawn and port flushed. Appt made for port flush in three months.

## 2024-02-29 ENCOUNTER — OFFICE VISIT (OUTPATIENT)
Facility: CLINIC | Age: 54
End: 2024-02-29
Payer: MEDICARE

## 2024-02-29 VITALS
BODY MASS INDEX: 27.32 KG/M2 | RESPIRATION RATE: 16 BRPM | WEIGHT: 170 LBS | HEART RATE: 78 BPM | HEIGHT: 66 IN | OXYGEN SATURATION: 99 % | DIASTOLIC BLOOD PRESSURE: 70 MMHG | SYSTOLIC BLOOD PRESSURE: 118 MMHG

## 2024-02-29 DIAGNOSIS — J45.40 MODERATE PERSISTENT ASTHMA, UNSPECIFIED WHETHER COMPLICATED (HCC): Primary | ICD-10-CM

## 2024-02-29 PROCEDURE — 99214 OFFICE O/P EST MOD 30 MIN: CPT | Performed by: INTERNAL MEDICINE

## 2024-02-29 RX ORDER — LEVOTHYROXINE SODIUM 88 UG/1
88 TABLET ORAL
COMMUNITY
Start: 2024-01-06

## 2024-02-29 NOTE — PROGRESS NOTES
Pulmonary Consult Note    History of Present Illness:  Kat Crook is a 53 year old female presenting to pulmonary clinic today for beginning of summer bad attack at [facility] -   6 weeks ago- then to [facility]- admitted one night - left   related to weather in summer -  Trigger - burning garbage and leaves-   Allergic to steroids-- swells up - mouth and throat - reaction - last 3 yrs ago -   Remains on fasnerra every other month - at home   Treated with bipap   Now feels OK_ always with dyspnea - walks - slows down - breaths hard with stairs - uses albuterol prior to activity -- able to exercise   Rare cough-- chest tightness and wheezing   Had allergy testing then gets asthma- -- reacts to \"everything\"   Has dog and cats at home     Flare of MS-- pain and difficulty - walking -  In hosp with PLEX x 5 April/May- then to [facility]  Pt at home now   Some shortness of breath - prior to hosp and during the hosp -   Ongoing throat   Walking to parking lot - using albuterol and helps some - not preactivity   Remains on fascerna every 8 weeks -   Coughing is bad at night -   Recent endo with [facility]- dysphagia is still bad- chokes on liquids and solids - no swallow study - puts food in cheek   breo - daily and rescue once every other day   sats all good at [facility]- was given oxygen at night - -  A lot problems at night- coughing worse   Nightmares with melatonin   Follows with Dr. Becker with recent visit    8.23 Had kidney stone that got bigger and then a second one and needs repeat lithotripsy -with stent - and repeat lithrotrispy  and dilation - -- increased pain- -now on the 6th    Remains with dyspnea- thinks sl better with Breo 200 -   Back on breo 100 - rescue - 2-3 times per day 3-4 times per week - - beneiftting   Asthma is so good- fascenra-- every 8 weeks   Not much  coughing -- mucus from sinus   Swallowing told fine on UGI with [facility]- - normal -- but coughed a lot after test and with increased mucus -- to see in  follow up- -   Norco 4 times a day for years-does not think helping much  Atypical pain pattern thought perhaps related to MS unclear  Today with significant increase in right anterior axillary line  Worse with expiration     11.23- - sick for awhile - 3 weeks - neg covid- - antibiotics helped - no fever - not well- no energy - not eating better now   Remains on breo 100 -   Now with mucus - heavy - from sinuses more than chest - - was green - now yellow - occasionally  blood -   Some rescue use and nebulizer - increased to twice a day   Remains with chest pain -- left side and right side left ant chest - not palpable pain - - some with deep breathing -   Remains with fatigue - ? Thyroid - to see primary   Remains on fasenra without issues - every 8 weeks - at home     Got flu shot - covid not yet   Neuro recommended PLEX - for weakness and so tired   Needed O2 during the night while in   Awakenings ongoing with some ocughing --     2.24- on vumerity  -- sifonr went up  not working as well - -increased can't think well and legs are weaker - to see echerverri-  Foot drop left and right ? Starting -   No recent plex -   Gets more short of breath-- walking in here - noted dypsnea -   Spacing out now -   No O2 use - has none   Not sleeping well- - not supine - not flat-- can't breath - had an epsiode during sleep study - legs hurt   No coughing - some in middle of night -   Increased nasal drainage-- all clear - flonase no help   Remains on fasenra- doing well - occasionally  nasal spray - singular - no dreams             Past Medical History:   Past Medical History:   Diagnosis Date    Abdominal pain 05/2019    Allergic rhinitis     Anemia     Anxiety     Anxiety state     Arthritis     Asthma (HCC)     Atypical mole 2019    Autoimmune disease (HCC)     Back pain     Bad breath     Black stools 2019    Bloating 05/2019    Blood disorder     Thalassemia alpha, Sickle cell trait    Blood in urine     Blurred vision      Calculus of kidney     Cardiomyopathy (HCC)     last echo 2021: EF 55%    Chest pain     Constipation     Decorative tattoo     Deep vein thrombosis (HCC) 2021    Depression     Diarrhea, unspecified     Dizziness     Easy bruising     Elevated liver enzymes     Endocrine disorder     Esophageal reflux     Fatigue     Fibromyalgia     Food intolerance     Frequent urination     Frequent UTI     Gastroparesis     Glaucoma     H. pylori infection     H/O  section     Headache disorder     Heart attack (HCC)     Heart palpitations 2017    Heartburn     Hemorrhoids     Herniation of cervical intervertebral disc     High blood pressure     High cholesterol     History of blood clots Dec 2021    Unprovoked    History of blood transfusion     History of cardiac murmur     History of depression     Hoarseness, chronic     Hyperlipidemia     IBS (irritable bowel syndrome)     Irregular bowel habits     Leaking of urine     Liver disease 2013    GIORDANO and Stage 2 fibrosis    Loss of appetite     Migraines     Multiple sclerosis (HCC)     dx 8 years ago    Muscle weakness     cane or walker prn    Myocardial infarction (HCC)     Neuropathy     legs, hands, feet; bilateral    Night sweats     Osteoarthritis     Osteoporosis     KAMALJIT in her 30s    Osteoporosis     Pain with bowel movements     Painful urination     Problems with swallowing     Due to MS    Renal disorder     kidney lesions    Scoliosis of lumbar spine     Shortness of breath     Sickle cell trait (HCC)     Sleep apnea     no treatment    Sleep disturbance     Stress     Type II or unspecified type diabetes mellitus without mention of complication, not stated as uncontrolled     Uncomfortable fullness after meals 2019    Unspecified disorder of thyroid     total thyroidectomy    Visual impairment     glasses    Wears glasses     Weight loss 2019    Wheezing     cardiac -- one heart attack - - during allergic reaction to steriods  - epin-   Dr munoz- diastolic dys and pul htn   HX PE-- told unprovoked - no dvt- completed AC - remains on asa   No cancers   4 surgeries this year - abd - cysto with urethal dilation - stent for kidney stone- with removal - dr ball        Past Surgical History:   Past Surgical History:   Procedure Laterality Date      1986    CATARACT      CATH ANGIO  2014    CHOLECYSTECTOMY      COLONOSCOPY N/A 2015    Procedure: COLONOSCOPY;  Surgeon: Liz Tong DO;  Location:  ENDOSCOPY    COLONOSCOPY N/A 2021    Procedure: COLONOSCOPY;  Surgeon: Cedrick Da Silva MD;  Location:  ENDOSCOPY    COLONOSCOPY N/A 10/08/2021    Procedure: COLONOSCOPY, ESOPHAGOGASTRODUODENOSCOPY (EGD);  Surgeon: Darvin Richardson MD;  Location:  ENDOSCOPY    D & C  1990    Blighted ovum    EGD      HYSTERECTOMY      total hystero.    IR PORT A CATH INSERTION EXCHNGE CHECK  2018    LITHOTRIPSY      x 2    LYSIS OF ADHESIONS      NEEDLE BIOPSY LIVER  2016    OOPHORECTOMY Bilateral     total hystero.    OTHER      dialysis catheter- left chest for plasmaphoresis    OTHER SURGICAL HISTORY       x 2    PORT, INDWELLING, IMP  2019    power port    THYROIDECTOMY  2013    benign MNG    TOTAL ABDOM HYSTERECTOMY      UPPER GI ENDOSCOPY PERFORMED  2014       Family Medical History:   Family History   Problem Relation Age of Onset    Heart Attack Father     Other (Other) Father         COPD, emphysema    Asthma Father     Pulmonary Disease Father     Hypertension Mother         Needs to be deleted    Cancer Mother         stomach cancer    Ovarian Cancer Mother         30'S    Colon Polyps Mother     Anemia Mother     Ovarian Cancer Maternal Grandmother         30'S    Colon Polyps Maternal Grandmother     Diabetes Maternal Grandmother     Colon Cancer Maternal Grandmother     Anemia Maternal Grandmother     Cancer Maternal Grandmother     Breast Cancer Maternal  Aunt         60'S    Other (Other) Sister         rectal CA\    Anemia Daughter        Social History: Social History    Socioeconomic History      Marital status: Single    Tobacco Use      Smoking status: Never      Smokeless tobacco: Never    Vaping Use      Vaping Use: Never used    Substance and Sexual Activity      Alcohol use: Never      Drug use: Never    Other Topics      Concerns:        Caffeine Concern: No        Exercise: Yes          walks alot  No working - no chemicals  One dog and 2 cats - allergic to them -- not too bad     Allergies: Epinephrine, Immune globulin, Latex, Methylprednisolone, Ocrelizumab, Other, Pcn [penicillamine], Penicillins, and Urea     Medications:   Current Outpatient Medications   Medication Sig Dispense Refill    levothyroxine 88 MCG Oral Tab Take 1 tablet (88 mcg total) by mouth before breakfast.      Telmisartan 20 MG Oral Tab Take 1 tablet (20 mg total) by mouth daily.      progesterone 100 MG Oral Cap Take 1 capsule (100 mg total) by mouth daily.      pantoprazole 20 MG Oral Tab EC Take 1 tablet (20 mg total) by mouth 2 (two) times daily.      Insulin Aspart FlexPen 100 UNIT/ML Subcutaneous Solution Pen-injector INJECT AS DIRECTED THREE TIMES DAILY WITH MEALS PER SLIDING SCALE. MAX DAILY DOSE 40 UNITS      fluticasone propionate 110 MCG/ACT Inhalation Aerosol fluticasone propionate 110 mcg/actuation HFA aerosol inhaler, [RxNorm: 530260]      fluticasone furoate-vilanterol 200-25 MCG/ACT Inhalation Aerosol Powder, Breath Activated Inhale 1 puff into the lungs daily.      fluorometholone 0.1 % Ophthalmic Suspension       LYLLANA 0.025 MG/24HR Transdermal Patch Biweekly Place 1 patch onto the skin twice a week.      CELEBREX 100 MG Oral Cap CeleBREX 100 mg capsule, [RxNorm: 700314]      acetaminophen-codeine 300-60 MG Oral Tab Take 1 tablet by mouth every 6 (six) hours as needed.      Diroximel Fumarate (VUMERITY) 231 MG Oral Capsule Delayed Release Take 462 mg by mouth in  the morning and 462 mg before bedtime. 120 capsule 5    loteprednol (LOTEMAX) 0.5 % Ophthalmic Suspension 1 drop into both eyes Ophthalmic two times a day for 14 days      pregabalin 200 MG Oral Cap Take 1 capsule (200 mg total) by mouth 2 (two) times daily.      Ciclopirox 8 % External Solution APPLY EVERY DAY      HYDROcodone-acetaminophen  MG Oral Tab Take 1 tablet by mouth 4 (four) times daily as needed.      baclofen 10 MG Oral Tab Take 1 tablet (10 mg total) by mouth 3 (three) times daily.      NON FORMULARY Tandem insulin pump settings (Bolus IQ)  Mdnt 3.1 units/hr  0400 3.00 units/hr  0900 3.2 units/hr    Correction:   MN 1:35/110  1 PM 1:40  7 pm 1:35    CHO ratio:  MN 1:5g      FARXIGA 10 MG Oral Tab Take 1 tablet (10 mg total) by mouth daily.      DULoxetine 60 MG Oral Cap DR Particles Take 1 capsule (60 mg total) by mouth 2 (two) times daily.      atorvastatin 20 MG Oral Tab Take 1 tablet (20 mg total) by mouth nightly.      DEXCOM G6 SENSOR Does not apply Misc USE AS DIRECTED EVERY 10 DAYS 9 each 3    Insulin Lispro (HUMALOG) 100 UNIT/ML Subcutaneous Solution INJECT UP TO 70 UNITS VIA INSULIN PUMP DAILY 70 mL 2    carvedilol 3.125 MG Oral Tab Take 1 tablet (3.125 mg total) by mouth 2 (two) times daily.      EPINEPHrine 0.15 MG/0.3ML Injection Solution Auto-injector Jani pen/lower dose because pt is allergic to epinephrine      Ondansetron HCl (ZOFRAN) 4 mg tablet Take 1 tablet (4 mg total) by mouth 3 (three) times daily as needed.      Benralizumab (FASENRA PEN) 30 MG/ML Subcutaneous Solution Auto-injector Inject 30 mg into the skin. Every other month/every 8 weeks      DEXCOM G6 TRANSMITTER Does not apply Misc USE AS DIRECTED EVERY 90 DAYS 1 each 3    Thiamine HCl 100 MG Oral Tab Take 1 tablet (100 mg total) by mouth daily. 30 tablet 3    docusate sodium 100 MG Oral Cap Take 1 capsule (100 mg total) by mouth 2 (two) times daily.      Montelukast Sodium (SINGULAIR) 10 MG Oral Tab Take 1 tablet  (10 mg total) by mouth nightly.      denosumab 60 MG/ML Subcutaneous Solution Inject 1 mL (60 mg total) into the skin every 6 (six) months.      Cholecalciferol (VITAMIN D-3) 5000 UNITS Oral Tab Take 1 tablet (5,000 Units total) by mouth daily.      Albuterol Sulfate  (90 Base) MCG/ACT Inhalation Aero Soln Inhale 2 puffs into the lungs every 6 (six) hours as needed. PRN wheezing/SOB      levothyroxine 75 MCG Oral Tab Take 1 tablet (75 mcg total) by mouth before breakfast.         Review of Systems: weight down- loss of appettie -questionably related to increased pain-  now weight is up   Not hungery- remains without appettie - not taking supplements - likes ensure but increases her blood sugar  No gerd at all on ppi bid   Notes some dysphagia -- stable but is careful with neg study   Sleeping - insomnia - recently not sleeping well-remains with increasing awakenings and this may be related to pain continues to report having to sleep sitting more upright  Told MOI in the past - awakens at night at times - choking at night - in the past  -    And had a machine- recent neg study sleeps poorly  Neurology -- MS is questionably worse may need treatment- plasma paresis -- speech and cant think well and generlized pain all over as flare and hard to walk       Physical Exam:  /70 (BP Location: Right arm, Patient Position: Sitting, Cuff Size: adult)   Pulse 78   Resp 16   Ht 5' 6\" (1.676 m)   Wt 170 lb (77.1 kg)   LMP  (LMP Unknown)   SpO2 99%   BMI 27.44 kg/m²    Constitutional: comfortable .  Notes increased pain throughout left upper chest-no skin lesions or blisters notes--now seems all over no specific spot reports more tenderness on the right  HEENT: Head NC/AT. PEERL. Throat is clear no thrush   Cardio: Regular rate and rhythm. Normal S1 and S2. No murmurs.regualr   Respiratory: Thorax symmetrical with no labored breathing.- no wheeze with good air entry sl crackles rt base - now all clear   GI:   Abd soft, non-tender.  Extremities: No clubbing or cyanosis. No LE edema. No calf tenderness.  Neurologic: A&Ox3. No gross motor deficits.  Skin: Warm, dry.no rashes or hives noted   Lymphatic: No cervical or supraclavicular lymphadenopathy.no jvd   Psych: Calm, cooperative. Pleasant affect.    Results:  Reviewed     Assessment/Plan:  #Pulmonary hypertension  -History of diastolic dysfunction  Last echo 12/21 with diastolic dysfunction and PAS 30 to 35 mmHg-stable fluid status without diuretic use   history of pulmonary emboli-12/21 seemingly unprovoked completed 3 months with negative follow-up CT decision made for aspirin no recurrence  8/23 no fluid issues  11.23- canceled recent visit - stable fluids   2/24 no fluid issues        #Asthma  Eosinophilic and severe-reports long history of allergies/testing  Reports ongoing recurrent flares requiring ambulances to the hospital-early summer and 6 weeks ago  PFTs 4/21 with normal flows and volumes minimally reduced DLCO 64% corrects to 86%.-Compared to 2018 slight decrease in TLC and residual volume.  Reports swelling questionable angioedema with any form of steroids  Reports requiring BiPAP during episodes  Overall remained stable at this time on MDIs  Notes improvement with Fasenra  Discussed questionable role of animals at home  5/23 clinically stable-recent visit with Dr. Willis  8/23 asthma is doing well on Fasenra-continue Breo for now  11.23- stable despite recent illness- cpm   2.24- remains stable - to see dr willis -remains on Fasenra and ICS/LABA        #GERD/chronic dysphagia  Remains on daily PPI denies any breakthrough symptoms  Recent upper scope without reported active issue-Dr. Richardson had suggested motility study  Had seen speech years ago with plans to revisit-abnormal swallow pattern-keeps food in her cheek  8/23 had esophagram reported as normal-to follow-up with GI  11.23 - seems all stable   2.24- all good on ppi BID -denies an active  issue    #Multiple sclerosis  Follows with Dr. Jeimy Cline  As unable to take steroids plans reportedly in place for PLEX-Will need line access reportedly  5/23 acute flare with ambulatory difficulties and pain prompted admission in April underwent Plex x5 with good response then to [facility]-remains with pain but ambulation is better -to see Jorge Luis  8.23- stopped tysabri- increased jcv - so had to stop-  Vermerity -- now started -   11.23- remains on [facility] -- may need PLEX   2.24- encouraged to call - increased foot drop and increased pain with increased brain fog thinks needs Plex        #Dyspnea  Currently reports stability able to walk slowly  Able to keep up with some exercise  Reports preactivity albuterol helpful  5/23 notes more dyspnea when walking though tolerating physical therapy pretty well-denies any desaturation with activity noted  8/23 overall about the same  11.23 recent illness- denies any dyspnea now   2.24- at times -slight increase with walking though more difficult to walk walking now - occasionally  at rest needs to take deep breaths    # chest pain- anterior-   Had holter -   4/21 with questionably abnormal stress test denies any further assessment  Follows with Dr. Forrest  5/23 continues-with significant increase with associated flare  Remains tender to touch parasternally-up to lower part of her neck  8.23- remains on issues -- hurts under rt breast -- and upper chest - left side upper and rt side lower - - lower belly pain is constant and all the time without change --plan for plain CT chest if not better after procedure  11.23 -- neg CT chest questionable inflammatory  2.24- had MRI spine and seeing april for lesion - told not related to pain process  Now chest pain overall seems more diffuse some bony tenderness      #History of Woody  Follows with hepatology at [place]  Thought related to metabolic syndrome      #History of alpha thalassemia minor      # 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  Resolved in follow up - dr rueda     # sleep disorder  Negative sleep study 2021 -now with desat to 86% percent at times  Had negative MLST at that time  11.23- spikes on overnight and ongoing exhaustion - for sleep study   2.24 neg study -1.7ahi  86% madhav <0.1% low -- to follow -continues to report sleeping poorly-possibly related to pain request repeat sleep study at some point in the future-suspect sleeping issues perhaps related to MS/pain with plans to follow    # recent kidney stones   Thinks pain may be really related to kidney stones  Plans for repeat procedure  11.23 still with stones - watching   2.24- all stable       Plan- -- continue inhaler once a day          -- rescue inhaler as needed -           -- call Dr Payne with update           - remain on singular           - see me in 4 months     Raisa Hoyt MD  Pulmonary Medicine  02.29.24

## 2024-02-29 NOTE — PATIENT INSTRUCTIONS
Plan- -- continue inhaler once a day          -- rescue inhaler as needed -           - remain on singular           - see me in 4 months     Raisa Hoyt MD  Pulmonary Medicine  02.29.24

## 2024-03-06 NOTE — ED INITIAL ASSESSMENT (HPI)
PA already submitted to insurance. Waiting for decision.   Patient states she has been sick for the last 2 months. She states \"I have been seeing my doctors, but the pain is just getting worse. \" Patient states the pain is lower abdominal pain and lower back pain and between shoulder blades.

## 2024-03-11 ENCOUNTER — CASE MANAGEMENT (OUTPATIENT)
Dept: CARE COORDINATION | Age: 54
End: 2024-03-11

## 2024-03-13 PROBLEM — R00.2 PALPITATIONS: Status: ACTIVE | Noted: 2022-10-10

## 2024-03-13 PROBLEM — H53.143 PHOTOPHOBIA OF BOTH EYES: Status: ACTIVE | Noted: 2024-03-13

## 2024-03-13 PROBLEM — F41.9 ANXIETY AND DEPRESSION: Status: ACTIVE | Noted: 2023-05-01

## 2024-03-13 PROBLEM — H25.9 AGE-RELATED CATARACT OF BOTH EYES: Status: ACTIVE | Noted: 2024-03-13

## 2024-03-13 PROBLEM — F32.A ANXIETY AND DEPRESSION: Status: ACTIVE | Noted: 2023-05-01

## 2024-03-13 PROBLEM — R26.81 UNSTEADY GAIT WHEN WALKING: Status: ACTIVE | Noted: 2022-08-12

## 2024-03-13 PROBLEM — H40.003 PREGLAUCOMA, UNSPECIFIED, BILATERAL: Status: ACTIVE | Noted: 2024-03-13

## 2024-03-19 ENCOUNTER — LAB REQUISITION (OUTPATIENT)
Dept: LAB | Facility: HOSPITAL | Age: 54
End: 2024-03-19
Payer: MEDICARE

## 2024-03-19 DIAGNOSIS — N39.0 URINARY TRACT INFECTION, SITE NOT SPECIFIED: ICD-10-CM

## 2024-03-19 PROCEDURE — 87086 URINE CULTURE/COLONY COUNT: CPT | Performed by: SPECIALIST

## 2024-03-19 PROCEDURE — 81015 MICROSCOPIC EXAM OF URINE: CPT | Performed by: SPECIALIST

## 2024-03-22 ENCOUNTER — HOSPITAL ENCOUNTER (OUTPATIENT)
Dept: CT IMAGING | Facility: HOSPITAL | Age: 54
Discharge: HOME | End: 2024-03-22
Payer: MEDICARE

## 2024-03-22 ENCOUNTER — LAB ENCOUNTER (OUTPATIENT)
Dept: LAB | Facility: HOSPITAL | Age: 54
End: 2024-03-22
Payer: MEDICARE

## 2024-03-22 DIAGNOSIS — R14.3 EXCESSIVE GAS: ICD-10-CM

## 2024-03-22 DIAGNOSIS — R10.13 EPIGASTRIC PAIN: ICD-10-CM

## 2024-03-22 DIAGNOSIS — K90.9 STEATORRHEA (HCC): ICD-10-CM

## 2024-03-22 DIAGNOSIS — Z80.0 FAMILY HISTORY OF STOMACH CANCER: ICD-10-CM

## 2024-03-22 DIAGNOSIS — K75.81 NASH (NONALCOHOLIC STEATOHEPATITIS): ICD-10-CM

## 2024-03-22 PROCEDURE — 74170 CT ABD WO CNTRST FLWD CNTRST: CPT

## 2024-03-22 PROCEDURE — 82705 FATS/LIPIDS FECES QUAL: CPT

## 2024-03-22 PROCEDURE — 82656 EL-1 FECAL QUAL/SEMIQ: CPT

## 2024-03-26 LAB — PANCREATIC ELAST FECAL: 383 UG ELAST./G

## 2024-04-01 ENCOUNTER — TELEPHONE (OUTPATIENT)
Dept: NEUROLOGY | Facility: CLINIC | Age: 54
End: 2024-04-01

## 2024-04-01 ENCOUNTER — CASE MANAGEMENT (OUTPATIENT)
Dept: CARE COORDINATION | Age: 54
End: 2024-04-01

## 2024-04-01 LAB
FATS NEUTRAL: NORMAL
FATS TOTAL: NORMAL

## 2024-04-01 NOTE — TELEPHONE ENCOUNTER
Received via fax from [facility];    OV notes DOS 3/28/2024.  Not visible in CareEverywhere.    Of note:  Patient advised to follow up with neurology regarding recent facial muscle twitch and \"M.S. ?\"    No follow up appointment scheduled, recommended at last visit to f/up in six months (5/2/2024). Informed patient via MyChart to schedule.  Not visible in CareEverywhere.   Copy to scan

## 2024-04-03 ENCOUNTER — OFFICE VISIT (OUTPATIENT)
Dept: NEUROLOGY | Facility: CLINIC | Age: 54
End: 2024-04-03
Payer: MEDICARE

## 2024-04-03 VITALS
DIASTOLIC BLOOD PRESSURE: 63 MMHG | HEART RATE: 77 BPM | BODY MASS INDEX: 27 KG/M2 | HEIGHT: 66 IN | RESPIRATION RATE: 16 BRPM | WEIGHT: 168 LBS | SYSTOLIC BLOOD PRESSURE: 101 MMHG

## 2024-04-03 DIAGNOSIS — R07.89 CHEST DISCOMFORT: ICD-10-CM

## 2024-04-03 DIAGNOSIS — G35 MS (MULTIPLE SCLEROSIS) (HCC): Primary | ICD-10-CM

## 2024-04-03 PROCEDURE — 99213 OFFICE O/P EST LOW 20 MIN: CPT | Performed by: PHYSICIAN ASSISTANT

## 2024-04-03 NOTE — PROGRESS NOTES
NEUROLOGY  [facility]       Previous visit and existing record notes reviewed in preparation for the face to face visit.  Relevant labs and studies reviewed and will be noted in relevant areas of this record.    Kat Crook  3/11/1970  Primary Care Provider:  Justin Huerta MD    4/3/2024  54 year old female,      was last seen on: Patient was last seen 11/2/23 and at that time she was dealing with kidney stones and we discussed possibly treating her for a flare-up. She decided not to proceed with the plex at that time.    Seen for/plans last visit:  Multiple Sclerosis    Accompanied visit: No     Present condition:  She was seen at [place] for the kidney stones. From there she was referred to GI for evaluation  She then went to see GI and had testing done and then they referred her back to the urologist  She does not feel that the vumerity is working for her.  She has noticed increased weakness of her lower extremities in the last year  The left foot drop did not improve. She tried the braces for the foot drop but it made her legs hurt so she discontinued them  She is now having foot drop on the right  She has been wearing high tops to help with her stability  She finds the more she does she notices more symptoms  She does not try to overdo things anymore as she finds she will have more symptoms  She walks with a cart at the stores. She will be exhausted after walking the stores    She has noticed a change in the last year in the distance she is able to walk  Both legs are heavy and dragging  Has trouble finding her words. She finds when she needs to explain something she will have difficulty finding her words. She does not notice it as much in general conversation  She does speak Bel[place]ian and finds that she has trouble with recall of some of her Belarusian more recently  No issues with upper extremity strength  Urinary frequency and urgency has improved. She had a UA last week  +fatigue  She is  still doing her exercises. Balance is not as good as it used to be.   Vision takes a second to focus. Has been to the eye doctor     Medications she has done for her MS include Tysabri, Copaxone and IVIG    Dr. Roth was an MS specialist she had seen previously  Last plex was May 2023  Friend is fluent in Russian and she is forgetting some of her Russian      Review of Systems:  Review of Systems:  Denies systemic symptoms     No CP or SOB.  No GI or  symptoms. Relevant Neuro as noted above.      Medications:      Current Outpatient Medications:     levothyroxine 88 MCG Oral Tab, Take 1 tablet (88 mcg total) by mouth before breakfast., Disp: , Rfl:     Telmisartan 20 MG Oral Tab, Take 1 tablet (20 mg total) by mouth daily., Disp: , Rfl:     progesterone 100 MG Oral Cap, Take 1 capsule (100 mg total) by mouth daily., Disp: , Rfl:     pantoprazole 20 MG Oral Tab EC, Take 1 tablet (20 mg total) by mouth 2 (two) times daily., Disp: , Rfl:     Insulin Aspart FlexPen 100 UNIT/ML Subcutaneous Solution Pen-injector, INJECT AS DIRECTED THREE TIMES DAILY WITH MEALS PER SLIDING SCALE. MAX DAILY DOSE 40 UNITS, Disp: , Rfl:     fluticasone propionate 110 MCG/ACT Inhalation Aerosol, fluticasone propionate 110 mcg/actuation HFA aerosol inhaler, [RxNorm: 551364], Disp: , Rfl:     fluticasone furoate-vilanterol 200-25 MCG/ACT Inhalation Aerosol Powder, Breath Activated, Inhale 1 puff into the lungs daily., Disp: , Rfl:     fluorometholone 0.1 % Ophthalmic Suspension, , Disp: , Rfl:     LYLLANA 0.025 MG/24HR Transdermal Patch Biweekly, Place 1 patch onto the skin twice a week., Disp: , Rfl:     CELEBREX 100 MG Oral Cap, CeleBREX 100 mg capsule, [RxNorm: 854499], Disp: , Rfl:     acetaminophen-codeine 300-60 MG Oral Tab, Take 1 tablet by mouth every 6 (six) hours as needed., Disp: , Rfl:     Diroximel Fumarate (VUMERITY) 231 MG Oral Capsule Delayed Release, Take 462 mg by mouth in the morning and 462 mg before bedtime., Disp:  120 capsule, Rfl: 5    loteprednol (LOTEMAX) 0.5 % Ophthalmic Suspension, 1 drop into both eyes Ophthalmic two times a day for 14 days, Disp: , Rfl:     pregabalin 200 MG Oral Cap, Take 1 capsule (200 mg total) by mouth 2 (two) times daily., Disp: , Rfl:     Ciclopirox 8 % External Solution, APPLY EVERY DAY, Disp: , Rfl:     HYDROcodone-acetaminophen  MG Oral Tab, Take 1 tablet by mouth 4 (four) times daily as needed., Disp: , Rfl:     baclofen 10 MG Oral Tab, Take 1 tablet (10 mg total) by mouth 3 (three) times daily., Disp: , Rfl:     NON FORMULARY, Tandem insulin pump settings (Bolus IQ) Mdnt 3.1 units/hr 0400 3.00 units/hr 0900 3.2 units/hr  Correction:  MN 1:35/110 1 PM 1:40 7 pm 1:35  CHO ratio: MN 1:5g, Disp: , Rfl:     FARXIGA 10 MG Oral Tab, Take 1 tablet (10 mg total) by mouth daily., Disp: , Rfl:     DULoxetine 60 MG Oral Cap DR Particles, Take 1 capsule (60 mg total) by mouth 2 (two) times daily., Disp: , Rfl:     atorvastatin 20 MG Oral Tab, Take 1 tablet (20 mg total) by mouth nightly., Disp: , Rfl:     DEXCOM G6 SENSOR Does not apply Misc, USE AS DIRECTED EVERY 10 DAYS, Disp: 9 each, Rfl: 3    Insulin Lispro (HUMALOG) 100 UNIT/ML Subcutaneous Solution, INJECT UP TO 70 UNITS VIA INSULIN PUMP DAILY, Disp: 70 mL, Rfl: 2    carvedilol 3.125 MG Oral Tab, Take 1 tablet (3.125 mg total) by mouth 2 (two) times daily., Disp: , Rfl:     EPINEPHrine 0.15 MG/0.3ML Injection Solution Auto-injector, Jani pen/lower dose because pt is allergic to epinephrine, Disp: , Rfl:     Ondansetron HCl (ZOFRAN) 4 mg tablet, Take 1 tablet (4 mg total) by mouth 3 (three) times daily as needed., Disp: , Rfl:     Benralizumab (FASENRA PEN) 30 MG/ML Subcutaneous Solution Auto-injector, Inject 30 mg into the skin. Every other month/every 8 weeks, Disp: , Rfl:     DEXCOM G6 TRANSMITTER Does not apply Misc, USE AS DIRECTED EVERY 90 DAYS, Disp: 1 each, Rfl: 3    Thiamine HCl 100 MG Oral Tab, Take 1 tablet (100 mg total) by mouth  daily., Disp: 30 tablet, Rfl: 3    docusate sodium 100 MG Oral Cap, Take 1 capsule (100 mg total) by mouth 2 (two) times daily., Disp: , Rfl:     Montelukast Sodium (SINGULAIR) 10 MG Oral Tab, Take 1 tablet (10 mg total) by mouth nightly., Disp: , Rfl:     denosumab 60 MG/ML Subcutaneous Solution, Inject 1 mL (60 mg total) into the skin every 6 (six) months., Disp: , Rfl:     Cholecalciferol (VITAMIN D-3) 5000 UNITS Oral Tab, Take 1 tablet (5,000 Units total) by mouth daily., Disp: , Rfl:     Albuterol Sulfate  (90 Base) MCG/ACT Inhalation Aero Soln, Inhale 2 puffs into the lungs every 6 (six) hours as needed. PRN wheezing/SOB, Disp: , Rfl:   PRN:     Allergies:  Allergies   Allergen Reactions    Epinephrine OTHER (SEE COMMENTS)     Cardiac issues    Immune Globulin ANAPHYLAXIS    Latex SHORTNESS OF BREATH and OTHER (SEE COMMENTS)     WELTS    Methylprednisolone SWELLING     Swelling of neck, throat and tongue  Injection, throat and tongue swelling      Ocrelizumab ANAPHYLAXIS and OTHER (SEE COMMENTS)     Difficulty breathing    Other SHORTNESS OF BREATH     ENVIRONMENTAL, ASTHMA EXACERBATION    Pcn [Penicillamine]     Penicillins OTHER (SEE COMMENTS)    Urea Tightness in Throat     Urea C-13 in Pranactin for H. Pylori test kit.          EXAM:  /63 (BP Location: Left arm, Patient Position: Sitting, Cuff Size: adult)   Pulse 77   Resp 16   Ht 66\"   Wt 168 lb (76.2 kg)   LMP  (LMP Unknown)   BMI 27.12 kg/m²   Looks stated age  General Exam:  HENT:  pink conjunctiva anicteric sclerae  Neck no adenopathy, thyroid normal  Heart and Lungs:  normal  Extremities: no cyanosis, skin changes    NEURO  NAD, A&Ox3  EOMI  CN II-XII intact  Strength 5/5 bilateral upper extremities  Strength 2/5 left quadriceps  Strength 3/5 right quadriceps  Strength 3/5 knee flexion/extension  Strength 2+/5 bilateral dorsiflexion  DTRs symmetric  FTN mildly ataxic bilaterally  No drift on exam  Unable to tandem gait  Using  cane    Problem/s Identified this visit:   1. MS (multiple sclerosis) (HCC)    2. Chest discomfort          Discussion plus Diagnostics & Treatment Orders:    Patient is a 54 year old female that we follow for MS with increased lower extremity weakness. She is unable to do solumedrol due to allergies and can not due IVIG due to hx of blood clots. We will send her to the hospital for direct admission to do plex. Discussed with patient possible switch of her DMT. She is interested in possibly going back on Tysabri. We will get JCV test and depending on results will decide on DMTs    Chest Discomfort in area of previous catheter- Chest X-ray ordered. If no explanation then would recommend to follow-up with pcp      (X) Discussed potential side effects of any treatment relevant to above.  Includes explanation of tests as necessary.    Return in about 3 months (around 7/3/2024).      Patient understands that if needed, based on condition and or test results, follow up will be readjusted    Glenda Orellana PA-C  [facility]  4/3/2024, Time completed 1:12 PM

## 2024-04-03 NOTE — PATIENT INSTRUCTIONS
Refill policies:    Allow 2-3 business days for refills; controlled substances may take longer.  Contact your pharmacy at least 5 days prior to running out of medication and have them send an electronic request or submit request through the “request refill” option in your [employer] account.  Refills are not addressed on weekends; covering physicians do not authorize routine medications on weekends.  No narcotics or controlled substances are refilled after noon on Fridays or by on call physicians.  By law, narcotics must be electronically prescribed.  A 30 day supply with no refills is the maximum allowed.  If your prescription is due for a refill, you may be due for a follow up appointment.  To best provide you care, patients receiving routine medications need to be seen at least once a year.  Patients receiving narcotic/controlled substance medications need to be seen at least once every 3 months.  In the event that your preferred pharmacy does not have the requested medication in stock (e.g. Backordered), it is your responsibility to find another pharmacy that has the requested medication available.  We will gladly send a new prescription to that pharmacy at your request.    Scheduling Tests:    If your physician has ordered radiology tests such as MRI or CT scans, please contact Central Scheduling at [phone] right away to schedule the test.  Once scheduled, the [facility] Centralized Referral Team will work with your insurance carrier to obtain pre-certification or prior authorization.  Depending on your insurance carrier, approval may take 3-10 days.  It is highly recommended patients assure they have received an authorization before having a test performed.  If test is done without insurance authorization, patient may be responsible for the entire amount billed.      Precertification and Prior Authorizations:  If your physician has recommended that you have a procedure or additional testing performed the [facility]  Centralized Referral Team will contact your insurance carrier to obtain pre-certification or prior authorization.    You are strongly encouraged to contact your insurance carrier to verify that your procedure/test has been approved and is a COVERED benefit.  Although the [facility] Centralized Referral Team does its due diligence, the insurance carrier gives the disclaimer that \"Although the procedure is authorized, this does not guarantee payment.\"    Ultimately the patient is responsible for payment.   Thank you for your understanding in this matter.  Paperwork Completion:  If you require FMLA or disability paperwork for your recovery, please make sure to either drop it off or have it faxed to our office at [phone]. Be sure the form has your name and date of birth on it.  The form will be faxed to our Forms Department and they will complete it for you.  There is a 25$ fee for all forms that need to be filled out.  Please be aware there is a 10-14 day turnaround time.  You will need to sign a release of information (DAYAN) form if your paperwork does not come with one.  You may call the Forms Department with any questions at [phone].  Their fax number is [phone].

## 2024-04-04 ENCOUNTER — TELEPHONE (OUTPATIENT)
Dept: NEUROLOGY | Facility: CLINIC | Age: 54
End: 2024-04-04

## 2024-04-04 NOTE — TELEPHONE ENCOUNTER
Per Enedelia at transfer center, no DA beds are available today. Patient may choose to admit through ER or try again tomorrow. Anticipate open beds tomorrow for DA.    Patient updated. She would like to try for DA tomorrow, as she wants to avoid waiting in ER.

## 2024-04-05 ENCOUNTER — HOSPITAL ENCOUNTER (INPATIENT)
Facility: HOSPITAL | Age: 54
LOS: 10 days | Discharge: HOME HEALTH | End: 2024-04-15
Attending: EMERGENCY MEDICINE | Admitting: HOSPITALIST
Payer: MEDICARE

## 2024-04-05 DIAGNOSIS — G35 MULTIPLE SCLEROSIS (HCC): ICD-10-CM

## 2024-04-05 DIAGNOSIS — R53.1 WEAKNESS: Primary | ICD-10-CM

## 2024-04-05 LAB
ALBUMIN SERPL-MCNC: 3.7 G/DL (ref 3.4–5)
ALBUMIN/GLOB SERPL: 1 {RATIO} (ref 1–2)
ALP LIVER SERPL-CCNC: 66 U/L
ALT SERPL-CCNC: 39 U/L
ANION GAP SERPL CALC-SCNC: 4 MMOL/L (ref 0–18)
AST SERPL-CCNC: 24 U/L (ref 15–37)
BILIRUB SERPL-MCNC: 0.4 MG/DL (ref 0.1–2)
BUN BLD-MCNC: 15 MG/DL (ref 9–23)
CALCIUM BLD-MCNC: 9.5 MG/DL (ref 8.5–10.1)
CHLORIDE SERPL-SCNC: 108 MMOL/L (ref 98–112)
CO2 SERPL-SCNC: 30 MMOL/L (ref 21–32)
CREAT BLD-MCNC: 0.93 MG/DL
EGFRCR SERPLBLD CKD-EPI 2021: 72 ML/MIN/1.73M2 (ref 60–?)
ERYTHROCYTE [DISTWIDTH] IN BLOOD BY AUTOMATED COUNT: 15 %
GLOBULIN PLAS-MCNC: 3.8 G/DL (ref 2.8–4.4)
GLUCOSE BLD-MCNC: 120 MG/DL (ref 70–99)
GLUCOSE BLD-MCNC: 158 MG/DL (ref 70–99)
GLUCOSE BLD-MCNC: 225 MG/DL (ref 70–99)
HCT VFR BLD AUTO: 40.7 %
HGB BLD-MCNC: 12.6 G/DL
MCH RBC QN AUTO: 22.7 PG (ref 26–34)
MCHC RBC AUTO-ENTMCNC: 31 G/DL (ref 31–37)
MCV RBC AUTO: 73.2 FL
OSMOLALITY SERPL CALC.SUM OF ELEC: 296 MOSM/KG (ref 275–295)
PLATELET # BLD AUTO: 153 10(3)UL (ref 150–450)
PLATELETS.RETICULATED NFR BLD AUTO: 7.9 % (ref 0–7)
POTASSIUM SERPL-SCNC: 4 MMOL/L (ref 3.5–5.1)
PROT SERPL-MCNC: 7.5 G/DL (ref 6.4–8.2)
RBC # BLD AUTO: 5.56 X10(6)UL
SODIUM SERPL-SCNC: 142 MMOL/L (ref 136–145)
WBC # BLD AUTO: 5.1 X10(3) UL (ref 4–11)

## 2024-04-05 PROCEDURE — 99223 1ST HOSP IP/OBS HIGH 75: CPT | Performed by: HOSPITALIST

## 2024-04-05 RX ORDER — ENEMA 19; 7 G/133ML; G/133ML
1 ENEMA RECTAL ONCE AS NEEDED
Status: DISCONTINUED | OUTPATIENT
Start: 2024-04-05 | End: 2024-04-15

## 2024-04-05 RX ORDER — HEPARIN SODIUM 5000 [USP'U]/ML
5000 INJECTION, SOLUTION INTRAVENOUS; SUBCUTANEOUS EVERY 8 HOURS SCHEDULED
Status: COMPLETED | OUTPATIENT
Start: 2024-04-05 | End: 2024-04-05

## 2024-04-05 RX ORDER — DOCUSATE SODIUM 100 MG/1
100 CAPSULE, LIQUID FILLED ORAL 2 TIMES DAILY
Status: DISCONTINUED | OUTPATIENT
Start: 2024-04-05 | End: 2024-04-12

## 2024-04-05 RX ORDER — PROCHLORPERAZINE EDISYLATE 5 MG/ML
5 INJECTION INTRAMUSCULAR; INTRAVENOUS EVERY 8 HOURS PRN
Status: DISCONTINUED | OUTPATIENT
Start: 2024-04-05 | End: 2024-04-15

## 2024-04-05 RX ORDER — POLYETHYLENE GLYCOL 3350 17 G/17G
17 POWDER, FOR SOLUTION ORAL DAILY PRN
Status: DISCONTINUED | OUTPATIENT
Start: 2024-04-05 | End: 2024-04-15

## 2024-04-05 RX ORDER — PANTOPRAZOLE SODIUM 20 MG/1
20 TABLET, DELAYED RELEASE ORAL 2 TIMES DAILY
Status: DISCONTINUED | OUTPATIENT
Start: 2024-04-05 | End: 2024-04-15

## 2024-04-05 RX ORDER — ACETAMINOPHEN 500 MG
500 TABLET ORAL EVERY 4 HOURS PRN
Status: DISCONTINUED | OUTPATIENT
Start: 2024-04-05 | End: 2024-04-15

## 2024-04-05 RX ORDER — ONDANSETRON 2 MG/ML
4 INJECTION INTRAMUSCULAR; INTRAVENOUS EVERY 6 HOURS PRN
Status: DISCONTINUED | OUTPATIENT
Start: 2024-04-05 | End: 2024-04-08

## 2024-04-05 RX ORDER — ATORVASTATIN CALCIUM 20 MG/1
20 TABLET, FILM COATED ORAL NIGHTLY
Status: DISCONTINUED | OUTPATIENT
Start: 2024-04-05 | End: 2024-04-15

## 2024-04-05 RX ORDER — DULOXETIN HYDROCHLORIDE 30 MG/1
60 CAPSULE, DELAYED RELEASE ORAL 2 TIMES DAILY
Status: DISCONTINUED | OUTPATIENT
Start: 2024-04-05 | End: 2024-04-15

## 2024-04-05 RX ORDER — PROGESTERONE 100 MG/1
100 CAPSULE ORAL DAILY
Status: DISCONTINUED | OUTPATIENT
Start: 2024-04-05 | End: 2024-04-15

## 2024-04-05 RX ORDER — HYDROCODONE BITARTRATE AND ACETAMINOPHEN 10; 325 MG/1; MG/1
1 TABLET ORAL 4 TIMES DAILY PRN
Status: DISCONTINUED | OUTPATIENT
Start: 2024-04-05 | End: 2024-04-15

## 2024-04-05 RX ORDER — CARVEDILOL 3.12 MG/1
3.12 TABLET ORAL 2 TIMES DAILY
Status: DISCONTINUED | OUTPATIENT
Start: 2024-04-05 | End: 2024-04-15

## 2024-04-05 RX ORDER — BISACODYL 10 MG
10 SUPPOSITORY, RECTAL RECTAL
Status: DISCONTINUED | OUTPATIENT
Start: 2024-04-05 | End: 2024-04-15

## 2024-04-05 RX ORDER — MELATONIN
100 DAILY
Status: DISCONTINUED | OUTPATIENT
Start: 2024-04-05 | End: 2024-04-15

## 2024-04-05 RX ORDER — SENNOSIDES 8.6 MG
17.2 TABLET ORAL NIGHTLY PRN
Status: DISCONTINUED | OUTPATIENT
Start: 2024-04-05 | End: 2024-04-15

## 2024-04-05 RX ORDER — LEVOTHYROXINE SODIUM 88 UG/1
88 TABLET ORAL
Status: DISCONTINUED | OUTPATIENT
Start: 2024-04-06 | End: 2024-04-15

## 2024-04-05 RX ORDER — MONTELUKAST SODIUM 10 MG/1
10 TABLET ORAL NIGHTLY
Status: DISCONTINUED | OUTPATIENT
Start: 2024-04-05 | End: 2024-04-15

## 2024-04-05 RX ORDER — NICOTINE POLACRILEX 4 MG
30 LOZENGE BUCCAL
Status: DISCONTINUED | OUTPATIENT
Start: 2024-04-05 | End: 2024-04-15

## 2024-04-05 RX ORDER — LOSARTAN POTASSIUM 25 MG/1
25 TABLET ORAL DAILY
Status: DISCONTINUED | OUTPATIENT
Start: 2024-04-05 | End: 2024-04-07

## 2024-04-05 RX ORDER — DEXTROSE MONOHYDRATE 25 G/50ML
50 INJECTION, SOLUTION INTRAVENOUS
Status: DISCONTINUED | OUTPATIENT
Start: 2024-04-05 | End: 2024-04-15

## 2024-04-05 RX ORDER — BACLOFEN 10 MG/1
10 TABLET ORAL 3 TIMES DAILY
Status: DISCONTINUED | OUTPATIENT
Start: 2024-04-05 | End: 2024-04-15

## 2024-04-05 RX ORDER — NICOTINE POLACRILEX 4 MG
15 LOZENGE BUCCAL
Status: DISCONTINUED | OUTPATIENT
Start: 2024-04-05 | End: 2024-04-15

## 2024-04-05 RX ORDER — ALBUTEROL SULFATE 90 UG/1
2 AEROSOL, METERED RESPIRATORY (INHALATION) EVERY 6 HOURS PRN
Status: DISCONTINUED | OUTPATIENT
Start: 2024-04-05 | End: 2024-04-15

## 2024-04-05 RX ORDER — ACETAMINOPHEN AND CODEINE PHOSPHATE 300; 60 MG/1; MG/1
1 TABLET ORAL EVERY 6 HOURS PRN
Status: DISCONTINUED | OUTPATIENT
Start: 2024-04-05 | End: 2024-04-05

## 2024-04-05 RX ORDER — MONTELUKAST SODIUM 10 MG/1
10 TABLET ORAL NIGHTLY
COMMUNITY

## 2024-04-05 NOTE — TELEPHONE ENCOUNTER
Per Daphney at transfer center, no beds available at [facility] at this time, they advise that patient go through ER. Will make another attempt later today.      Still no open beds, recommended that patient go to ER for admission.   Informed patient, who will head over to [facility].

## 2024-04-05 NOTE — ED PROVIDER NOTES
Patient Seen in: [facility] Emergency Department      History     Chief Complaint   Patient presents with    Fatigue     MS exacerbation - weakness, bilateral leg pain     Stated Complaint: weakness, bilateral leg pain, speech issues, MS excaerbation    Subjective:   HPI    Patient is a pleasant 54-year-old female with history of multiple sclerosis who presents for direct admission for further treatment.  She reports she has diffuse baseline weakness, always worse in the left lower extremity.  Symptoms have been gradually progressing since November.  She was seen in neurology clinic this week and they were attempting direct admission for plasma exchange which has helped her in the past, however were not able to get her a bed so she was sent here to be admitted.  She denies any recent illnesses, fevers, cough or any GI symptoms.  No pain.    Objective:   Past Medical History:   Diagnosis Date    Abdominal pain 2019    Allergic rhinitis     Anemia     Anxiety     Anxiety state     Arthritis     Asthma ([facility])     Atypical mole 2019    Autoimmune disease ([facility])     Back pain     Bad breath     Black stools 2019    Bloating 2019    Blood disorder     Thalassemia alpha, Sickle cell trait    Blood in urine     Blurred vision     Calculus of kidney     Cardiomyopathy ([facility])     last echo 2021: EF 55%    Chest pain     Constipation     Decorative tattoo     Deep vein thrombosis ([facility]) 2021    Depression     Diarrhea, unspecified     Dizziness     Easy bruising     Elevated liver enzymes     Endocrine disorder     Esophageal reflux     Fatigue     Fibromyalgia     Food intolerance     Frequent urination     Frequent UTI     Gastroparesis     Glaucoma     H. pylori infection     H/O  section     Headache disorder     Heart attack (HCC)     Heart palpitations 2017    Heartburn     Hemorrhoids     Herniation of cervical intervertebral disc     High blood pressure     High cholesterol     History of  blood clots Dec 2021    Unprovoked    History of blood transfusion     History of cardiac murmur     History of depression     Hoarseness, chronic     Hyperlipidemia     IBS (irritable bowel syndrome)     Irregular bowel habits     Leaking of urine     Liver disease 2013    GIORDANO and Stage 2 fibrosis    Loss of appetite     Migraines     Multiple sclerosis (HCC)     dx 8 years ago    Muscle weakness     cane or walker prn    Myocardial infarction (HCC)     Neuropathy     legs, hands, feet; bilateral    Night sweats     Osteoarthritis     Osteoporosis     KAMALJIT in her 30s    Osteoporosis     Pain with bowel movements     Painful urination     Problems with swallowing     Due to MS    Renal disorder     kidney lesions    Scoliosis of lumbar spine     Shortness of breath     Sickle cell trait (HCC)     Sleep apnea     no treatment    Sleep disturbance     Stress     Type II or unspecified type diabetes mellitus without mention of complication, not stated as uncontrolled     Uncomfortable fullness after meals 2019    Unspecified disorder of thyroid     total thyroidectomy    Visual impairment     glasses    Wears glasses     Weight loss 2019    Wheezing               Past Surgical History:   Procedure Laterality Date      1986    CATARACT      CATH ANGIO  2014    CHOLECYSTECTOMY      COLONOSCOPY N/A 2015    Procedure: COLONOSCOPY;  Surgeon: Liz Tong DO;  Location:  ENDOSCOPY    COLONOSCOPY N/A 2021    Procedure: COLONOSCOPY;  Surgeon: Cedrick Da Silva MD;  Location:  ENDOSCOPY    COLONOSCOPY N/A 10/08/2021    Procedure: COLONOSCOPY, ESOPHAGOGASTRODUODENOSCOPY (EGD);  Surgeon: Darvin Richardson MD;  Location:  ENDOSCOPY    D & C  1990    Blighted ovum    EGD      HYSTERECTOMY      total hystero.    IR PORT A CATH INSERTION EXCHNGE CHECK  2018    LITHOTRIPSY      x 2    LYSIS OF ADHESIONS      NEEDLE BIOPSY LIVER  2016    OOPHORECTOMY  Bilateral 1993    total hystero.    OTHER      dialysis catheter- left chest for plasmaphoresis    OTHER SURGICAL HISTORY       x 2    PORT, INDWELLING, IMP  2019    power port    THYROIDECTOMY  2013    benign MNG    TOTAL ABDOM HYSTERECTOMY      UPPER GI ENDOSCOPY PERFORMED  2014                Social History     Socioeconomic History    Marital status: Single   Tobacco Use    Smoking status: Never     Passive exposure: Never    Smokeless tobacco: Never    Tobacco comments:     Never used it   Vaping Use    Vaping Use: Never used   Substance and Sexual Activity    Alcohol use: Never    Drug use: Never   Other Topics Concern    Caffeine Concern No    Exercise Yes     Comment: walking              Review of Systems    Positive for stated complaint: weakness, bilateral leg pain, speech issues, MS excaerbation  Other systems are as noted in HPI.  Constitutional and vital signs reviewed.      All other systems reviewed and negative except as noted above.    Physical Exam     ED Triage Vitals [24 1310]   /81   Pulse 72   Resp 18   Temp 98.1 °F (36.7 °C)   Temp src Temporal   SpO2 100 %   O2 Device None (Room air)       Current:/83   Pulse 63   Temp 98.1 °F (36.7 °C) (Temporal)   Resp 18   LMP  (LMP Unknown)   SpO2 99%         Physical Exam  Vitals and nursing note reviewed.   Constitutional:       Appearance: She is well-developed.   HENT:      Head: Normocephalic and atraumatic.   Eyes:      Conjunctiva/sclera: Conjunctivae normal.      Pupils: Pupils are equal, round, and reactive to light.   Cardiovascular:      Rate and Rhythm: Normal rate and regular rhythm.      Heart sounds: Normal heart sounds.   Pulmonary:      Effort: Pulmonary effort is normal.      Breath sounds: Normal breath sounds.   Abdominal:      General: Bowel sounds are normal.      Palpations: Abdomen is soft.   Musculoskeletal:         General: Normal range of motion.   Skin:     General: Skin is warm and  dry.   Neurological:      Mental Status: She is alert and oriented to person, place, and time.      Comments: Speech is little halting and stuttering but no dysarthria or aphasia.  Strength is 5 out of 5 bilateral upper extremities.  Slight tremor in the lower extremities bilaterally but strength is symmetric.               ED Course     Labs Reviewed   CBC, PLATELET; NO DIFFERENTIAL - Abnormal; Notable for the following components:       Result Value    RBC 5.56 (*)     Immature Platelet Fraction 7.9 (*)     MCV 73.2 (*)     MCH 22.7 (*)     All other components within normal limits   COMP METABOLIC PANEL (14) - Abnormal; Notable for the following components:    Glucose 120 (*)     Calculated Osmolality 296 (*)     All other components within normal limits   URINALYSIS WITH CULTURE REFLEX   RAINBOW DRAW LAVENDER   RAINBOW DRAW LIGHT GREEN   RAINBOW DRAW BLUE                      MDM      Patient is a 54-year-old female presenting for admission for inpatient treatment of MS flare it has been building since November as noted above.  She is awake and alert here.  No recent illnesses and no other complaints.  Labs ordered in triage to rule out anemia, dehydration, electrolyte abnormality and will get a UA to rule out UTI as a cause of her symptoms.  Will discuss with the hospitalist and neurology and plan for admission for further treatment.  Admission disposition: 4/5/2024  4:43 PM             Past Medical History-MS, fibromyalgia, high cholesterol, DVT    Differential diagnosis before testing included infection, anemia, dehydration    Co-morbidities that add to the complexity of management include: None    Testing ordered during this visit included labs, UA      External chart review showed reviewed outpatient neurology note      Discussion of management with hospitalist, neurology            Disposition:    Admission  I have discussed with the patient the results of test, differential diagnosis, and treatment plan.  They expressed clear understanding of these instructions and agrees to the plan provided.                                  Medical Decision Making      Disposition and Plan     Clinical Impression:  1. Weakness    2. Multiple sclerosis (HCC)         Disposition:  Admit  4/5/2024  4:43 pm    Follow-up:  No follow-up provider specified.        Medications Prescribed:  Current Discharge Medication List                            Hospital Problems       Present on Admission  Date Reviewed: 4/4/2024            ICD-10-CM Noted POA    * (Principal) Weakness R53.1 4/5/2024 Unknown

## 2024-04-05 NOTE — ED INITIAL ASSESSMENT (HPI)
Pt seen primary on 4/3 MS exacerbation pt was supposed to be an direct admit, but there was no beds available. Pt was told to come to the ER. Pt has C/o flank, chest, and back pain.

## 2024-04-05 NOTE — H&P
[facility]     Kat Crook Patient Status:  Inpatient    3/11/1970 MRN [id number]   Location [facility] 3NE-A Attending Regi Mehta MD   Hosp Day # 0 PCP Justin Huerta MD     Chief Complaint: MS flare    Subjective:    History of Present Illness:     Kat Crook is a 54 year old female with medical history of multiple sclerosis who comes to the ER for evaluation of an MS exacerbation.  She was sent to the hospital for plasma exchange by her neurologist.  She has anaphylaxis to steroids and gets hypotensive to IVIG.  She has tolerated Plex in the past. She reports her symptoms have been on going since November and was told to come to the ER back then for admission but did not.  She reports that she has LE weakness and pain and has noted her speech is even slower than normal.    History/Other:    Past Medical History:  Past Medical History:   Diagnosis Date    Abdominal pain 2019    Allergic rhinitis     Anemia     Anxiety     Anxiety state     Arthritis     Asthma ([facility])     Atypical mole 2019    Autoimmune disease ([facility])     Back pain     Bad breath     Black stools 2019    Bloating 2019    Blood disorder     Thalassemia alpha, Sickle cell trait    Blood in urine     Blurred vision     Calculus of kidney     Cardiomyopathy ([facility])     last echo 2021: EF 55%    Chest pain     Constipation     Decorative tattoo     Deep vein thrombosis ([facility]) 2021    Depression     Diarrhea, unspecified     Dizziness     Easy bruising     Elevated liver enzymes     Endocrine disorder     Esophageal reflux     Fatigue     Fibromyalgia     Food intolerance     Frequent urination     Frequent UTI     Gastroparesis     Glaucoma     H. pylori infection     H/O  section     Headache disorder     Heart attack (HCC) 2015    Heart palpitations 2017    Heartburn     Hemorrhoids     Herniation of cervical intervertebral disc     High blood pressure     High cholesterol     History  of blood clots Dec 2021    Unprovoked    History of blood transfusion     History of cardiac murmur     History of depression     Hoarseness, chronic     Hyperlipidemia     IBS (irritable bowel syndrome)     Irregular bowel habits     Leaking of urine     Liver disease 2013    GIORDANO and Stage 2 fibrosis    Loss of appetite     Migraines     Multiple sclerosis (HCC)     dx 8 years ago    Muscle weakness     cane or walker prn    Myocardial infarction (HCC)     Neuropathy     legs, hands, feet; bilateral    Night sweats     Osteoarthritis     Osteoporosis     KAMALJIT in her 30s    Osteoporosis     Pain with bowel movements     Painful urination     Problems with swallowing     Due to MS    Renal disorder     kidney lesions    Scoliosis of lumbar spine     Shortness of breath     Sickle cell trait (HCC)     Sleep apnea     no treatment    Sleep disturbance     Stress     Type II or unspecified type diabetes mellitus without mention of complication, not stated as uncontrolled     Uncomfortable fullness after meals 2019    Unspecified disorder of thyroid     total thyroidectomy    Visual impairment     glasses    Wears glasses     Weight loss 2019    Wheezing      Past Surgical History:   Past Surgical History:   Procedure Laterality Date      1986    CATARACT      CATH ANGIO  2014    CHOLECYSTECTOMY      COLONOSCOPY N/A 2015    Procedure: COLONOSCOPY;  Surgeon: Liz Tong DO;  Location:  ENDOSCOPY    COLONOSCOPY N/A 2021    Procedure: COLONOSCOPY;  Surgeon: Cedrick Da Silva MD;  Location:  ENDOSCOPY    COLONOSCOPY N/A 10/08/2021    Procedure: COLONOSCOPY, ESOPHAGOGASTRODUODENOSCOPY (EGD);  Surgeon: Darvin Richardson MD;  Location:  ENDOSCOPY    D & C  1990    Blighted ovum    EGD      HYSTERECTOMY      total hystero.    IR PORT A CATH INSERTION EXCHNGE CHECK  2018    LITHOTRIPSY      x 2    LYSIS OF ADHESIONS      NEEDLE BIOPSY LIVER  2016     OOPHORECTOMY Bilateral     total hystero.    OTHER      dialysis catheter- left chest for plasmaphoresis    OTHER SURGICAL HISTORY       x 2    PORT, INDWELLING, IMP  2019    power port    THYROIDECTOMY  2013    benign MNG    TOTAL ABDOM HYSTERECTOMY      UPPER GI ENDOSCOPY PERFORMED  2014      Family History:   Family History   Problem Relation Age of Onset    Heart Attack Father     Other (Other) Father         COPD, emphysema    Asthma Father     Pulmonary Disease Father     Hypertension Mother         Needs to be deleted    Cancer Mother         stomach cancer    Ovarian Cancer Mother         30'S    Colon Polyps Mother     Anemia Mother     Ovarian Cancer Maternal Grandmother         30'S    Colon Polyps Maternal Grandmother     Diabetes Maternal Grandmother     Colon Cancer Maternal Grandmother     Anemia Maternal Grandmother     Cancer Maternal Grandmother     Breast Cancer Maternal Aunt         60'S    Other (Other) Sister         rectal CA\    Anemia Daughter      Social History:    reports that she has never smoked. She has never been exposed to tobacco smoke. She has never used smokeless tobacco. She reports that she does not drink alcohol and does not use drugs.     Allergies:   Allergies   Allergen Reactions    Epinephrine OTHER (SEE COMMENTS)     Cardiac issues    Immune Globulin ANAPHYLAXIS    Latex SHORTNESS OF BREATH and OTHER (SEE COMMENTS)     WELTS    Methylprednisolone SWELLING     Swelling of neck, throat and tongue  Injection, throat and tongue swelling      Ocrelizumab ANAPHYLAXIS and OTHER (SEE COMMENTS)     Difficulty breathing    Other SHORTNESS OF BREATH     ENVIRONMENTAL, ASTHMA EXACERBATION    Pcn [Penicillamine]     Penicillins OTHER (SEE COMMENTS)    Urea Tightness in Throat     Urea C-13 in Pranactin for H. Pylori test kit.       Medications:    Current Facility-Administered Medications on File Prior to Encounter   Medication Dose Route Frequency Provider  Last Rate Last Admin    [COMPLETED] heparin (Porcine) 100 Units/mL lock flush 500 Units  5 mL Intravenous Once Last, Luisa APRN   500 Units at 03/22/24 1035    [COMPLETED] iopamidol 76% (ISOVUE-370) injection for power injector  100 mL Intravenous ONCE PRN Last, Luisa APRN   100 mL at 03/22/24 1054     Current Outpatient Medications on File Prior to Encounter   Medication Sig Dispense Refill    Diroximel Fumarate (VUMERITY) 231 MG Oral Capsule Delayed Release Take 462 mg by mouth in the morning and 462 mg before bedtime. 120 capsule 5    DEXCOM G6 SENSOR Does not apply Misc USE AS DIRECTED EVERY 10 DAYS 9 each 3    Insulin Lispro (HUMALOG) 100 UNIT/ML Subcutaneous Solution INJECT UP TO 70 UNITS VIA INSULIN PUMP DAILY 70 mL 2    DEXCOM G6 TRANSMITTER Does not apply Misc USE AS DIRECTED EVERY 90 DAYS 1 each 3    Thiamine HCl 100 MG Oral Tab Take 1 tablet (100 mg total) by mouth daily. 30 tablet 3    levothyroxine 88 MCG Oral Tab Take 1 tablet (88 mcg total) by mouth before breakfast.      Telmisartan 20 MG Oral Tab Take 1 tablet (20 mg total) by mouth daily.      progesterone 100 MG Oral Cap Take 1 capsule (100 mg total) by mouth daily.      pantoprazole 20 MG Oral Tab EC Take 1 tablet (20 mg total) by mouth 2 (two) times daily.      Insulin Aspart FlexPen 100 UNIT/ML Subcutaneous Solution Pen-injector INJECT AS DIRECTED THREE TIMES DAILY WITH MEALS PER SLIDING SCALE. MAX DAILY DOSE 40 UNITS      fluticasone propionate 110 MCG/ACT Inhalation Aerosol fluticasone propionate 110 mcg/actuation HFA aerosol inhaler, [RxNorm: 776679]      fluticasone furoate-vilanterol 200-25 MCG/ACT Inhalation Aerosol Powder, Breath Activated Inhale 1 puff into the lungs daily.      fluorometholone 0.1 % Ophthalmic Suspension       LYLLANA 0.025 MG/24HR Transdermal Patch Biweekly Place 1 patch onto the skin twice a week.      CELEBREX 100 MG Oral Cap CeleBREX 100 mg capsule, [RxNorm: 118643]      acetaminophen-codeine 300-60 MG Oral  Tab Take 1 tablet by mouth every 6 (six) hours as needed.      loteprednol (LOTEMAX) 0.5 % Ophthalmic Suspension 1 drop into both eyes Ophthalmic two times a day for 14 days      pregabalin 200 MG Oral Cap Take 1 capsule (200 mg total) by mouth 2 (two) times daily.      Ciclopirox 8 % External Solution APPLY EVERY DAY      HYDROcodone-acetaminophen  MG Oral Tab Take 1 tablet by mouth 4 (four) times daily as needed.      baclofen 10 MG Oral Tab Take 1 tablet (10 mg total) by mouth 3 (three) times daily.      NON FORMULARY Tandem insulin pump settings (Bolus IQ)  Mdnt 3.1 units/hr  0400 3.00 units/hr  0900 3.2 units/hr    Correction:   MN 1:35/110  1 PM 1:40  7 pm 1:35    CHO ratio:  MN 1:5g      FARXIGA 10 MG Oral Tab Take 1 tablet (10 mg total) by mouth daily.      DULoxetine 60 MG Oral Cap DR Particles Take 1 capsule (60 mg total) by mouth 2 (two) times daily.      atorvastatin 20 MG Oral Tab Take 1 tablet (20 mg total) by mouth nightly.      carvedilol 3.125 MG Oral Tab Take 1 tablet (3.125 mg total) by mouth 2 (two) times daily.      EPINEPHrine 0.15 MG/0.3ML Injection Solution Auto-injector Ajni pen/lower dose because pt is allergic to epinephrine      Ondansetron HCl (ZOFRAN) 4 mg tablet Take 1 tablet (4 mg total) by mouth 3 (three) times daily as needed.      Benralizumab (FASENRA PEN) 30 MG/ML Subcutaneous Solution Auto-injector Inject 30 mg into the skin. Every other month/every 8 weeks      docusate sodium 100 MG Oral Cap Take 1 capsule (100 mg total) by mouth 2 (two) times daily.      Montelukast Sodium (SINGULAIR) 10 MG Oral Tab Take 1 tablet (10 mg total) by mouth nightly.      denosumab 60 MG/ML Subcutaneous Solution Inject 1 mL (60 mg total) into the skin every 6 (six) months.      Cholecalciferol (VITAMIN D-3) 5000 UNITS Oral Tab Take 1 tablet (5,000 Units total) by mouth daily.      Albuterol Sulfate  (90 Base) MCG/ACT Inhalation Aero Soln Inhale 2 puffs into the lungs every 6 (six)  hours as needed. PRN wheezing/SOB         Review of Systems:   A comprehensive review of systems was completed.    Pertinent positives and negatives noted in the HPI.    Objective:   Physical Exam:    /73   Pulse 64   Temp 98.1 °F (36.7 °C) (Temporal)   Resp 18   LMP  (LMP Unknown)   SpO2 100%   General: No acute distress, Alert  Respiratory: No rhonchi, no wheezes  Cardiovascular: S1, S2. Regular rate and rhythm  Abdomen: Soft, Non-tender, non-distended, positive bowel sounds  Neuro: No new focal deficits  Extremities: No edema      Results:    Labs:      Labs Last 24 Hours:    Recent Labs   Lab 04/05/24  1318   RBC 5.56*   HGB 12.6   HCT 40.7   MCV 73.2*   MCH 22.7*   MCHC 31.0   RDW 15.0   WBC 5.1   .0       Recent Labs   Lab 04/05/24  1318   *   BUN 15   CREATSERUM 0.93   EGFRCR 72   CA 9.5   ALB 3.7      K 4.0      CO2 30.0   ALKPHO 66   AST 24   ALT 39   BILT 0.4   TP 7.5       Lab Results   Component Value Date    PT 13.4 05/01/2014    PT 12.9 11/06/2013    PT 13.0 11/05/2013    INR 1.01 04/19/2023    INR 1.00 12/08/2021    INR 1.0 04/28/2021       No results for input(s): \"TROP\", \"TROPHS\", \"CK\" in the last 168 hours.    No results for input(s): \"TROP\", \"PBNP\" in the last 168 hours.    No results for input(s): \"PCT\" in the last 168 hours.    Imaging: Imaging data reviewed in Epic.    Assessment & Plan:      #MS Exacerbation  # anapylaxis to steroid and unable to tolerate IVIG  -PLEX  -renal and neuro consult  -IR to place line    # DM type II with neuropathy  -hyperglycemia protocol  -on insulin pump PTA- will transition to basal bolus insulin    # Hypothyroidism  -resume PTA synthroid    # GIORDANO    # Alpha thalassemia minor    # Hypertension  -resume PTA meds    # h/o PE in 2021          Plan of care discussed with patient    Regi Mehta MD    Supplementary Documentation:     The 21st Century Cures Act makes medical notes like these available to patients in the interest  of transparency. Please be advised this is a medical document. Medical documents are intended to carry relevant information, facts as evident, and the clinical opinion of the practitioner. The medical note is intended as peer to peer communication and may appear blunt or direct. It is written in medical language and may contain abbreviations or verbiage that are unfamiliar.               **Certification      PHYSICIAN Certification of Need for Inpatient Hospitalization - Initial Certification    Patient will require inpatient services that will reasonably be expected to span two midnight's based on the clinical documentation in H+P.   Based on patients current state of illness, I anticipate that, after discharge, patient will require TBD.

## 2024-04-05 NOTE — ED QUICK NOTES
Patient requesting to have Port-A-Cath accessed. Accessed using sterile technique with 1 inch needle.

## 2024-04-05 NOTE — ED QUICK NOTES
Orders for admission, patient is aware of plan and ready to go upstairs. Any questions, please call ED RN Annie at extension [phone].     Patient Covid vaccination status: Fully vaccinated     COVID Test Ordered in ED: None    COVID Suspicion at Admission: N/A    Running Infusions:  None    Mental Status/LOC at time of transport: AOx4    Other pertinent information:   CIWA score: N/A   NIH score:  0

## 2024-04-06 ENCOUNTER — APPOINTMENT (OUTPATIENT)
Dept: INTERVENTIONAL RADIOLOGY/VASCULAR | Facility: HOSPITAL | Age: 54
End: 2024-04-06
Attending: INTERNAL MEDICINE
Payer: MEDICARE

## 2024-04-06 LAB
ANION GAP SERPL CALC-SCNC: 4 MMOL/L (ref 0–18)
BASOPHILS # BLD AUTO: 0.01 X10(3) UL (ref 0–0.2)
BASOPHILS NFR BLD AUTO: 0.2 %
BILIRUB UR QL STRIP.AUTO: NEGATIVE
BUN BLD-MCNC: 13 MG/DL (ref 9–23)
CALCIUM BLD-MCNC: 8.8 MG/DL (ref 8.5–10.1)
CHLORIDE SERPL-SCNC: 108 MMOL/L (ref 98–112)
CLARITY UR REFRACT.AUTO: CLEAR
CO2 SERPL-SCNC: 31 MMOL/L (ref 21–32)
CREAT BLD-MCNC: 0.91 MG/DL
EGFRCR SERPLBLD CKD-EPI 2021: 74 ML/MIN/1.73M2 (ref 60–?)
EOSINOPHIL # BLD AUTO: 0 X10(3) UL (ref 0–0.7)
EOSINOPHIL NFR BLD AUTO: 0 %
ERYTHROCYTE [DISTWIDTH] IN BLOOD BY AUTOMATED COUNT: 15 %
EST. AVERAGE GLUCOSE BLD GHB EST-MCNC: 163 MG/DL (ref 68–126)
GLUCOSE BLD-MCNC: 101 MG/DL (ref 70–99)
GLUCOSE BLD-MCNC: 127 MG/DL (ref 70–99)
GLUCOSE BLD-MCNC: 131 MG/DL (ref 70–99)
GLUCOSE BLD-MCNC: 134 MG/DL (ref 70–99)
GLUCOSE BLD-MCNC: 258 MG/DL (ref 70–99)
GLUCOSE UR STRIP.AUTO-MCNC: >1000 MG/DL
HBA1C MFR BLD: 7.3 % (ref ?–5.7)
HCT VFR BLD AUTO: 37.8 %
HGB BLD-MCNC: 11.7 G/DL
IMM GRANULOCYTES # BLD AUTO: 0.01 X10(3) UL (ref 0–1)
IMM GRANULOCYTES NFR BLD: 0.2 %
INR BLD: 1 (ref 0.8–1.2)
KETONES UR STRIP.AUTO-MCNC: 10 MG/DL
LEUKOCYTE ESTERASE UR QL STRIP.AUTO: 25
LYMPHOCYTES # BLD AUTO: 3.04 X10(3) UL (ref 1–4)
LYMPHOCYTES NFR BLD AUTO: 58.7 %
MCH RBC QN AUTO: 22.6 PG (ref 26–34)
MCHC RBC AUTO-ENTMCNC: 31 G/DL (ref 31–37)
MCV RBC AUTO: 73 FL
MONOCYTES # BLD AUTO: 0.35 X10(3) UL (ref 0.1–1)
MONOCYTES NFR BLD AUTO: 6.8 %
NEUTROPHILS # BLD AUTO: 1.77 X10 (3) UL (ref 1.5–7.7)
NEUTROPHILS # BLD AUTO: 1.77 X10(3) UL (ref 1.5–7.7)
NEUTROPHILS NFR BLD AUTO: 34.1 %
NITRITE UR QL STRIP.AUTO: NEGATIVE
OSMOLALITY SERPL CALC.SUM OF ELEC: 298 MOSM/KG (ref 275–295)
PH UR STRIP.AUTO: 6.5 [PH] (ref 5–8)
PLATELET # BLD AUTO: 143 10(3)UL (ref 150–450)
PLATELETS.RETICULATED NFR BLD AUTO: 11.4 % (ref 0–7)
POTASSIUM SERPL-SCNC: 3.9 MMOL/L (ref 3.5–5.1)
PROT UR STRIP.AUTO-MCNC: NEGATIVE MG/DL
PROTHROMBIN TIME: 13.2 SECONDS (ref 11.6–14.8)
RBC # BLD AUTO: 5.18 X10(6)UL
RBC UR QL AUTO: NEGATIVE
SODIUM SERPL-SCNC: 143 MMOL/L (ref 136–145)
SP GR UR STRIP.AUTO: 1.02 (ref 1–1.03)
UROBILINOGEN UR STRIP.AUTO-MCNC: NORMAL MG/DL
WBC # BLD AUTO: 5.2 X10(3) UL (ref 4–11)

## 2024-04-06 PROCEDURE — 99223 1ST HOSP IP/OBS HIGH 75: CPT | Performed by: OTHER

## 2024-04-06 PROCEDURE — 02HV33Z INSERTION OF INFUSION DEVICE INTO SUPERIOR VENA CAVA, PERCUTANEOUS APPROACH: ICD-10-PCS | Performed by: STUDENT IN AN ORGANIZED HEALTH CARE EDUCATION/TRAINING PROGRAM

## 2024-04-06 PROCEDURE — 99233 SBSQ HOSP IP/OBS HIGH 50: CPT | Performed by: INTERNAL MEDICINE

## 2024-04-06 PROCEDURE — B518ZZA FLUOROSCOPY OF SUPERIOR VENA CAVA, GUIDANCE: ICD-10-PCS | Performed by: STUDENT IN AN ORGANIZED HEALTH CARE EDUCATION/TRAINING PROGRAM

## 2024-04-06 PROCEDURE — 99223 1ST HOSP IP/OBS HIGH 75: CPT | Performed by: INTERNAL MEDICINE

## 2024-04-06 PROCEDURE — 6A551Z3 PHERESIS OF PLASMA, MULTIPLE: ICD-10-PCS | Performed by: OTHER

## 2024-04-06 RX ORDER — FLUTICASONE FUROATE AND VILANTEROL 200; 25 UG/1; UG/1
1 POWDER RESPIRATORY (INHALATION)
Status: DISCONTINUED | OUTPATIENT
Start: 2024-04-06 | End: 2024-04-15

## 2024-04-06 RX ORDER — ALBUMIN, HUMAN INJ 5% 5 %
3000 SOLUTION INTRAVENOUS EVERY OTHER DAY
Status: DISCONTINUED | OUTPATIENT
Start: 2024-04-06 | End: 2024-04-14 | Stop reason: SDUPTHER

## 2024-04-06 RX ORDER — MORPHINE SULFATE 2 MG/ML
2 INJECTION, SOLUTION INTRAMUSCULAR; INTRAVENOUS EVERY 2 HOUR PRN
Status: DISCONTINUED | OUTPATIENT
Start: 2024-04-06 | End: 2024-04-06

## 2024-04-06 RX ORDER — HYDROMORPHONE HYDROCHLORIDE 1 MG/ML
0.8 INJECTION, SOLUTION INTRAMUSCULAR; INTRAVENOUS; SUBCUTANEOUS EVERY 2 HOUR PRN
Status: DISCONTINUED | OUTPATIENT
Start: 2024-04-06 | End: 2024-04-15

## 2024-04-06 RX ORDER — HEPARIN SODIUM 5000 [USP'U]/ML
INJECTION, SOLUTION INTRAVENOUS; SUBCUTANEOUS
Status: COMPLETED
Start: 2024-04-06 | End: 2024-04-06

## 2024-04-06 RX ORDER — ONDANSETRON 2 MG/ML
4 INJECTION INTRAMUSCULAR; INTRAVENOUS EVERY 6 HOURS PRN
Status: DISCONTINUED | OUTPATIENT
Start: 2024-04-06 | End: 2024-04-15

## 2024-04-06 RX ORDER — DIPHENHYDRAMINE HYDROCHLORIDE 50 MG/ML
25 INJECTION INTRAMUSCULAR; INTRAVENOUS ONCE
Status: COMPLETED | OUTPATIENT
Start: 2024-04-06 | End: 2024-04-06

## 2024-04-06 RX ORDER — LIDOCAINE HYDROCHLORIDE 10 MG/ML
INJECTION, SOLUTION INFILTRATION; PERINEURAL
Status: COMPLETED
Start: 2024-04-06 | End: 2024-04-06

## 2024-04-06 RX ORDER — HEPARIN SODIUM 1000 [USP'U]/ML
1.5 INJECTION, SOLUTION INTRAVENOUS; SUBCUTANEOUS
Status: COMPLETED | OUTPATIENT
Start: 2024-04-06 | End: 2024-04-06

## 2024-04-06 RX ORDER — HYDROMORPHONE HYDROCHLORIDE 1 MG/ML
0.2 INJECTION, SOLUTION INTRAMUSCULAR; INTRAVENOUS; SUBCUTANEOUS EVERY 2 HOUR PRN
Status: DISCONTINUED | OUTPATIENT
Start: 2024-04-06 | End: 2024-04-15

## 2024-04-06 RX ORDER — HYDROMORPHONE HYDROCHLORIDE 1 MG/ML
0.4 INJECTION, SOLUTION INTRAMUSCULAR; INTRAVENOUS; SUBCUTANEOUS EVERY 2 HOUR PRN
Status: DISCONTINUED | OUTPATIENT
Start: 2024-04-06 | End: 2024-04-15

## 2024-04-06 RX ORDER — DIPHENHYDRAMINE HCL 25 MG
25 CAPSULE ORAL ONCE
Status: COMPLETED | OUTPATIENT
Start: 2024-04-06 | End: 2024-04-06

## 2024-04-06 RX ORDER — MORPHINE SULFATE 2 MG/ML
1 INJECTION, SOLUTION INTRAMUSCULAR; INTRAVENOUS EVERY 2 HOUR PRN
Status: DISCONTINUED | OUTPATIENT
Start: 2024-04-06 | End: 2024-04-06

## 2024-04-06 RX ORDER — DIPHENHYDRAMINE HYDROCHLORIDE 50 MG/ML
INJECTION INTRAMUSCULAR; INTRAVENOUS
Status: COMPLETED
Start: 2024-04-06 | End: 2024-04-06

## 2024-04-06 RX ORDER — MORPHINE SULFATE 4 MG/ML
4 INJECTION, SOLUTION INTRAMUSCULAR; INTRAVENOUS EVERY 2 HOUR PRN
Status: DISCONTINUED | OUTPATIENT
Start: 2024-04-06 | End: 2024-04-06

## 2024-04-06 RX ORDER — INSULIN DEGLUDEC 100 U/ML
8 INJECTION, SOLUTION SUBCUTANEOUS NIGHTLY
Status: DISCONTINUED | OUTPATIENT
Start: 2024-04-06 | End: 2024-04-12

## 2024-04-06 RX ORDER — ONDANSETRON 4 MG/1
4 TABLET, ORALLY DISINTEGRATING ORAL EVERY 6 HOURS PRN
Status: DISCONTINUED | OUTPATIENT
Start: 2024-04-06 | End: 2024-04-15

## 2024-04-06 NOTE — PROGRESS NOTES
Arrived around 5pm from ER  Alert and oriented x4  Able to ambulate well with stand by.   Diet ordered  Tele on  Will continue to monitor patient.

## 2024-04-06 NOTE — PLAN OF CARE
Assumed care at 1930.   A&Ox4, ra, [place]. SR on tele.   Scheduled medications given per MAR.   Qid accucheck.   Urine collected and sent to lab.   Npo after midnight for plex placement.   Ongoing pain management to patient's reports of pain to legs.   Safety maintained.   Frequent staff rounding in place for patient's needs.   Updated on plan of care.             Problem: Patient/Family Goals  Goal: Patient/Family Long Term Goal  Description: Patient's Long Term Goal: Discharge home    Interventions:  - Follow up with discharge instructions.   - See additional Care Plan goals for specific interventions  Outcome: Progressing  Goal: Patient/Family Short Term Goal  Description: Patient's Short Term Goal: Safety    Interventions:   - Remain free from falls  - See additional Care Plan goals for specific interventions  Outcome: Progressing

## 2024-04-06 NOTE — INTERVAL H&P NOTE
The above referenced H&P was reviewed by Clarissa Thapa MD on 4/6/2024, the patient was examined and no significant changes have occurred in the patient's condition since the H&P was performed.  Risks, benefits, alternative treatments and consequences of no treatment were discussed.  We will proceed with procedure as planned.      Clarissa Thapa MD  4/6/2024  11:42 AM

## 2024-04-06 NOTE — PROCEDURES
[facility]   [facility] of [facility]  Procedure Note    Kat Crook Patient Status:  Inpatient    3/11/1970 MRN [id number]   Location [facility] INTERVENTIONAL SUITES Attending Tobi Thapa DO   Hosp Day # 1 PCP Justin Huerta MD     Procedure: Temp dialysis catheter placement    Pre-Procedure Diagnosis:  Multiple sclerosis    Post-Procedure Diagnosis: Same    Anesthesia:  Local    Findings:  Successful placement of right neck non-tunneled dialysis catheter via patent internal jugular vein.  Catheter aspirates and flushes freely.    Catheter is ready for use.      Specimens: None    Blood Loss:  <5ml    Tourniquet Time: 0  Complications:  None  Drains:  None    Secondary Diagnosis:  None    Clarissa Thapa MD  2024

## 2024-04-06 NOTE — CONSULTS
Neurology H&P    Kat Crook Patient Status:  Inpatient    3/11/1970 MRN [id number]   Formerly [facility] 3NE-A Attending Tobi Thapa,    Hosp Day # 1 PCP Justin Huerta MD     Subjective:  Kat Crook is a(n) 54 year old female with a past medical history significant for hyperparathyroidism, fibromyalgia, goiter, multiple sclerosis, and multiple other medical problems.  She presented to the ED for multiple sclerosis exacerbation.  She is a patient of my colleague Dr. Payne.  She has a history of allergy to methylprednisolone, and DVT, so was unable to have IV steroids or IVIG.  Presented to the emergency room for plasma exchange.  She has been having more pain and the legs and around the right thoracic area.  This has been present since November.  States he is also had more mental fogginess and some difficulty speaking.  States she is stuttering.  This she reports happens to her when she has a MS exacerbation.  She states that she has been encouraged to come in for plasma exchange since November however she was unable to do so until this weekend.    Current Medications:   atorvastatin  20 mg Oral Nightly    baclofen  10 mg Oral TID    carvedilol  3.125 mg Oral BID    [Held by provider] Diroximel Fumarate  462 mg Oral BID    docusate sodium  100 mg Oral BID    DULoxetine  60 mg Oral BID    levothyroxine  88 mcg Oral Before breakfast    montelukast  10 mg Oral Nightly    thiamine  100 mg Oral Daily    losartan  25 mg Oral Daily    progesterone  100 mg Oral Daily    pregabalin  200 mg Oral BID    pantoprazole  20 mg Oral BID    insulin aspart  1-10 Units Subcutaneous TID AC and HS       No current outpatient medications on file.       Problem List:  Patient Active Problem List   Diagnosis    Neuropathic pain of both legs    Elevated liver enzymes    Hyperparathyroidism (HCC)    Asthma (HCC)    Osteoporosis    Fibromyalgia    Goiter    Hypervitaminosis D    Demyelinating disease of central nervous  system (HCC)    Tachycardia    Encephalopathy    AVN (avascular necrosis of bone) (HCC)    Gastroparesis    Fatty liver    IDDM (insulin dependent diabetes mellitus)    Generalized abdominal pain    Multiple sclerosis (HCC)    At risk for falling    Cardiomyopathy in other disease    Weakness of both lower extremities    CATIA (acute kidney injury) (HCC)    Hypokalemia    MS (multiple sclerosis) (HCC)    Anemia    Weakness generalized    Essential hypertension    Irritable bowel syndrome (IBS)    S/P laparoscopic surgery    Intra-abdominal adhesions    Dyspnea, paroxysmal nocturnal    Multiple sclerosis exacerbation (HCC)    Dysuria    History of plasmapheresis    Hyponatremia    Abdominal pain    Hyperglycemia    Sleep apnea    Dehydration    Abdominal pain, right upper quadrant    Allergic rhinitis    Chronic fatigue and malaise    Dilated idiopathic cardiomyopathy (HCC)    Dizziness    Dyspnea    Chest pain, unspecified    Urinary calculus, unspecified    Undiagnosed cardiac murmurs    Sickle cell trait (HCC)    Pure hypercholesterolemia    GIORDANO (nonalcoholic steatohepatitis)    Myalgia and myositis, unspecified    Liver lesion    Interstitial cystitis    Family history of coronary arteriosclerosis    Type 1 diabetes mellitus with ketoacidosis without coma (HCC)    Urinary tract infection without hematuria, site unspecified    Controlled type 1 diabetes mellitus with hyperglycemia (HCC)    Chest pain of uncertain etiology    Abdominal pain of unknown etiology    Spinal stenosis    Anemia due to chronic blood loss    Aseptic necrosis of head or neck of femur    Bilateral tinnitus    Mild persistent asthma with acute exacerbation (HCC)    Generalized abdominal tenderness    Generalized edema    Hyperlipidemia    Muscle weakness    Disorder of nervous system due to type 2 diabetes mellitus (HCC)    Peripheral vascular disorder due to diabetes mellitus (HCC)    Polyneuropathy due to type 2 diabetes mellitus (HCC)     Vitamin D deficiency    Constipation    Right upper quadrant pain    Shortness of breath    Chronic pain disorder    Headache    Hypothyroidism    Idiopathic osteoporosis    Type 2 diabetes mellitus with other specified complication (HCC)    Fatigue    Esophageal reflux    Myalgia    Pulmonary embolism on left (HCC)    Microcytic anemia    Alpha-thalassemia (HCC)    Pulmonary infiltrate    Encounter for anticoagulation discussion and counseling    Right lower quadrant pain    Hemodialysis catheter dysfunction (HCC)    Primary hypertension    Age-related cataract of both eyes    Anxiety and depression    Palpitations    Photophobia of both eyes    Preglaucoma, unspecified, bilateral    Unsteady gait when walking    Weakness       PMHx:  Past Medical History:   Diagnosis Date    Abdominal pain 2019    Allergic rhinitis     Anemia     Anxiety     Anxiety state     Arthritis     Asthma (Formerly [facility])     Atypical mole 2019    Autoimmune disease (Formerly [facility])     Back pain     Bad breath     Black stools 2019    Bloating 2019    Blood disorder     Thalassemia alpha, Sickle cell trait    Blood in urine     Blurred vision     Calculus of kidney     Cardiomyopathy (Formerly [facility])     last echo 2021: EF 55%    Chest pain     Constipation     Decorative tattoo     Deep vein thrombosis (Formerly [facility]) 2021    Depression     Diarrhea, unspecified     Dizziness     Easy bruising     Elevated liver enzymes     Endocrine disorder     Esophageal reflux     Fatigue     Fibromyalgia     Food intolerance     Frequent urination     Frequent UTI     Gastroparesis     Glaucoma     H. pylori infection     H/O  section     Headache disorder     Heart attack (Formerly [facility]) 2015    Heart palpitations 2017    Heartburn     Hemorrhoids     Herniation of cervical intervertebral disc     High blood pressure     High cholesterol     History of blood clots Dec 2021    Unprovoked    History of blood transfusion     History of cardiac murmur     History of depression      Hoarseness, chronic     Hyperlipidemia     IBS (irritable bowel syndrome)     Irregular bowel habits     Leaking of urine     Liver disease 2013    GIORDANO and Stage 2 fibrosis    Loss of appetite     Migraines     Multiple sclerosis (HCC)     dx 8 years ago    Muscle weakness     cane or walker prn    Myocardial infarction (HCC)     Neuropathy     legs, hands, feet; bilateral    Night sweats     Osteoarthritis     Osteoporosis     KAMALJIT in her 30s    Osteoporosis     Pain with bowel movements     Painful urination     Problems with swallowing     Due to MS    Renal disorder     kidney lesions    Scoliosis of lumbar spine     Shortness of breath     Sickle cell trait (HCC)     Sleep apnea     no treatment    Sleep disturbance     Stress     Type II or unspecified type diabetes mellitus without mention of complication, not stated as uncontrolled     Uncomfortable fullness after meals 2019    Unspecified disorder of thyroid     total thyroidectomy    Visual impairment     glasses    Wears glasses     Weight loss     Wheezing        PSHx:  Past Surgical History:   Procedure Laterality Date      1986    CATARACT      CATH ANGIO  2014    CHOLECYSTECTOMY      COLONOSCOPY N/A 2015    Procedure: COLONOSCOPY;  Surgeon: Liz Tong DO;  Location:  ENDOSCOPY    COLONOSCOPY N/A 2021    Procedure: COLONOSCOPY;  Surgeon: Cedrick Da Silva MD;  Location:  ENDOSCOPY    COLONOSCOPY N/A 10/08/2021    Procedure: COLONOSCOPY, ESOPHAGOGASTRODUODENOSCOPY (EGD);  Surgeon: Darvin Richardson MD;  Location:  ENDOSCOPY    D & C  1990    Blighted ovum    EGD      HYSTERECTOMY      total hystero.    IR PORT A CATH INSERTION EXCHNGE CHECK  2018    LITHOTRIPSY      x 2    LYSIS OF ADHESIONS      NEEDLE BIOPSY LIVER  2016    OOPHORECTOMY Bilateral     total hystero.    OTHER      dialysis catheter- left chest for plasmaphoresis    OTHER SURGICAL HISTORY        x 2    PORT, INDWELLING, IMP  2019    power port    THYROIDECTOMY  2013    benign MNG    TOTAL ABDOM HYSTERECTOMY      UPPER GI ENDOSCOPY PERFORMED  2014       SocHx:  Social History     Socioeconomic History    Marital status: Single   Tobacco Use    Smoking status: Never     Passive exposure: Never    Smokeless tobacco: Never    Tobacco comments:     Never used it   Vaping Use    Vaping Use: Never used   Substance and Sexual Activity    Alcohol use: Never    Drug use: Never   Other Topics Concern    Caffeine Concern No    Exercise Yes     Comment: walking     Social Determinants of Health     Food Insecurity: No Food Insecurity (2024)    Food Insecurity     Food Insecurity: Never true   Transportation Needs: No Transportation Needs (2024)    Transportation Needs     Lack of Transportation: No   Housing Stability: Low Risk  (2024)    Housing Stability     Housing Instability: No       Family History:  Family History   Problem Relation Age of Onset    Heart Attack Father     Other (Other) Father         COPD, emphysema    Asthma Father     Pulmonary Disease Father     Hypertension Mother         Needs to be deleted    Cancer Mother         stomach cancer    Ovarian Cancer Mother         30'S    Colon Polyps Mother     Anemia Mother     Ovarian Cancer Maternal Grandmother         30'S    Colon Polyps Maternal Grandmother     Diabetes Maternal Grandmother     Colon Cancer Maternal Grandmother     Anemia Maternal Grandmother     Cancer Maternal Grandmother     Breast Cancer Maternal Aunt         60'S    Other (Other) Sister         rectal CA\    Anemia Daughter            ROS:  10 point ROS completed and was negative, except for pertinent positive and negatives stated in subjective.    Objective/Physical Exam:    Vital Signs:  Blood pressure 109/74, pulse 72, temperature 97.8 °F (36.6 °C), temperature source Oral, resp. rate 18, weight 168 lb (76.2 kg), SpO2 96%, not currently  breastfeeding.    Gen: Awake and in no apparent distress  HEENT: moist mucus membranes  Neck: Supple  Cardiovascular: Regular rate and rhythm, no murmur  Pulm: CTAB  GI: non-tender, normal bowel sounds  Skin: normal, dry  Extremities: No clubbing or cyanosis      Neurologic:   MENTAL STATUS: alert, ox3, normal attention, language and fund of knowledge.      CRANIAL NERVES II to XII: PERRLA, no ptosis or diplopia, EOM intact, facial sensation intact, strong eye closure, face is symmetric, no dysarthria, tongue midline,  no tongue fasciculations or atrophy, strong shoulder shrug.    MOTOR EXAMINATION:   MSK:  RUE: Delt:  5/5, Bi: 5/5, Tri: 5/5, : 5/5   LUE: Delt:  5/5, Bi: 5/5, Tri: 5/5, : 5/5   RLE: HF: 4/5, KE: 4+/5,    LLE:  HF: 4/5, KE: 4+/5,      3-4/5 in DF and PF but this was limited byt patients report of pain whenever I touched her feet    Normal Tone  No Fasiculations      SENSORY EXAMINATION:  UE: intact to light touch, pinprick intact  LE: intact to light touch, hyperalgesic in feet    COORDINATION:  No dysmetria, or intention tremors     REFLEXES: 2+ at biceps, 2+ brachioradialis, 2+ at patella     GAIT: deferred        Labs:  Lab Results   Component Value Date    WBC 5.2 04/06/2024    HGB 11.7 04/06/2024    HCT 37.8 04/06/2024    .0 04/06/2024    CREATSERUM 0.91 04/06/2024    BUN 13 04/06/2024     04/06/2024    K 3.9 04/06/2024     04/06/2024    CO2 31.0 04/06/2024     04/06/2024    CA 8.8 04/06/2024    ALB 3.7 04/05/2024    ALKPHO 66 04/05/2024    BILT 0.4 04/05/2024    TP 7.5 04/05/2024    AST 24 04/05/2024    ALT 39 04/05/2024    INR 1.00 04/06/2024    PTP 13.2 04/06/2024    PGLU 131 04/06/2024       Imaging:  MRI T spine 12/23  CONCLUSION:  There is a small extramedullary, intrathecal nodule at the T11 level along the right posterior aspect of the thecal sac likely representing a 4 mm neuroma or meningioma.       MRI Brain 7/23  CONCLUSION:  Mild burden of  supratentorial demyelinating plaques consistent multiple sclerosis which is unchanged since previous study.  No evidence of active demyelination.     Assessment:  Is a 54-year-old female with history of multiple sclerosis.  She was admitted to the emergency room for multiple sclerosis exacerbation.  Exam as noted above.  Will start plasma exchange for total of 5 exchanges.  Nephrology will be consulted for this.  Patient has an allergy to methylprednisolone and history of DVT so no IV steroids or IVIG will be used.      Plan:  Multiplke Sclerosis exacerbation  - PLEX x 5 exchanges  - Nephrology consult  - has an allergy to methylprednisilone   - H/o DVT so no IVIG      Prosper Singh, DO  Neurology

## 2024-04-06 NOTE — CONSULTS
[facility]  Report of Consultation    Kat Crook Patient Status:  Inpatient    3/11/1970 MRN [id number]   Location [facility] 3NE-A Attending Tobi Thapa,    Hosp Day # 1 PCP Justin Huerta MD     Reason for Consultation:  Need for PLEX    History of Present Illness:  Kat Crook is a a(n) 54 year old female with hx of MS who is admitted for management of MS exacerbation.   Patient has had PLEX before w/ good response.  Neurology service recommends PLEX x 5 tx    History:  Past Medical History:   Diagnosis Date    Abdominal pain 2019    Allergic rhinitis     Anemia     Anxiety     Anxiety state     Arthritis     Asthma (HCC)     Atypical mole 2019    Autoimmune disease (HCC)     Back pain     Bad breath     Black stools 2019    Bloating 2019    Blood disorder     Thalassemia alpha, Sickle cell trait    Blood in urine     Blurred vision     Calculus of kidney     Cardiomyopathy (HCC)     last echo 2021: EF 55%    Chest pain     Constipation     Decorative tattoo     Deep vein thrombosis (HCC) 2021    Depression     Diarrhea, unspecified     Dizziness     Easy bruising     Elevated liver enzymes     Endocrine disorder     Esophageal reflux     Fatigue     Fibromyalgia     Food intolerance     Frequent urination     Frequent UTI     Gastroparesis     Glaucoma     H. pylori infection     H/O  section     Headache disorder     Heart attack (HCC) 2015    Heart palpitations 2017    Heartburn     Hemorrhoids     Herniation of cervical intervertebral disc     High blood pressure     High cholesterol     History of blood clots Dec 2021    Unprovoked    History of blood transfusion     History of cardiac murmur     History of depression     Hoarseness, chronic     Hyperlipidemia     IBS (irritable bowel syndrome)     Irregular bowel habits     Leaking of urine 2019    Liver disease 2013    GIORDANO and Stage 2 fibrosis    Loss of appetite     Migraines     Multiple sclerosis (HCC)      dx 8 years ago    Muscle weakness     cane or walker prn    Myocardial infarction (HCC)     Neuropathy     legs, hands, feet; bilateral    Night sweats     Osteoarthritis     Osteoporosis     KAMALJIT in her 30s    Osteoporosis     Pain with bowel movements     Painful urination     Problems with swallowing     Due to MS    Renal disorder     kidney lesions    Scoliosis of lumbar spine     Shortness of breath     Sickle cell trait (HCC)     Sleep apnea     no treatment    Sleep disturbance     Stress     Type II or unspecified type diabetes mellitus without mention of complication, not stated as uncontrolled     Uncomfortable fullness after meals 2019    Unspecified disorder of thyroid     total thyroidectomy    Visual impairment     glasses    Wears glasses     Weight loss 2019    Wheezing      Past Surgical History:   Procedure Laterality Date      1986    CATARACT      CATH ANGIO  2014    CHOLECYSTECTOMY      COLONOSCOPY N/A 2015    Procedure: COLONOSCOPY;  Surgeon: Liz Tong DO;  Location:  ENDOSCOPY    COLONOSCOPY N/A 2021    Procedure: COLONOSCOPY;  Surgeon: Cedrick Da Silva MD;  Location:  ENDOSCOPY    COLONOSCOPY N/A 10/08/2021    Procedure: COLONOSCOPY, ESOPHAGOGASTRODUODENOSCOPY (EGD);  Surgeon: Darvin Richardson MD;  Location:  ENDOSCOPY    D & C  1990    Blighted ovum    EGD      HYSTERECTOMY      total hystero.    IR PORT A CATH INSERTION EXCHNGE CHECK  2018    LITHOTRIPSY      x 2    LYSIS OF ADHESIONS      NEEDLE BIOPSY LIVER  2016    OOPHORECTOMY Bilateral 1993    total hystero.    OTHER      dialysis catheter- left chest for plasmaphoresis    OTHER SURGICAL HISTORY       x 2    PORT, INDWELLING, IMP  2019    power port    THYROIDECTOMY  2013    benign MNG    TOTAL ABDOM HYSTERECTOMY      UPPER GI ENDOSCOPY PERFORMED  2014     Family History   Problem Relation Age of Onset    Heart Attack Father     Other  (Other) Father         COPD, emphysema    Asthma Father     Pulmonary Disease Father     Hypertension Mother         Needs to be deleted    Cancer Mother         stomach cancer    Ovarian Cancer Mother         30'S    Colon Polyps Mother     Anemia Mother     Ovarian Cancer Maternal Grandmother         30'S    Colon Polyps Maternal Grandmother     Diabetes Maternal Grandmother     Colon Cancer Maternal Grandmother     Anemia Maternal Grandmother     Cancer Maternal Grandmother     Breast Cancer Maternal Aunt         60'S    Other (Other) Sister         rectal CA\    Anemia Daughter       reports that she has never smoked. She has never been exposed to tobacco smoke. She has never used smokeless tobacco. She reports that she does not drink alcohol and does not use drugs.    Allergies:  Allergies   Allergen Reactions    Epinephrine OTHER (SEE COMMENTS)     Cardiac issues    Immune Globulin ANAPHYLAXIS    Latex SHORTNESS OF BREATH and OTHER (SEE COMMENTS)     WELTS    Methylprednisolone SWELLING     Swelling of neck, throat and tongue  Injection, throat and tongue swelling      Ocrelizumab ANAPHYLAXIS and OTHER (SEE COMMENTS)     Difficulty breathing    Other SHORTNESS OF BREATH     ENVIRONMENTAL, ASTHMA EXACERBATION    Pcn [Penicillamine]     Penicillins OTHER (SEE COMMENTS)    Urea Tightness in Throat     Urea C-13 in Pranactin for H. Pylori test kit.       Current Medications:    Current Facility-Administered Medications:     HYDROmorphone (Dilaudid) 1 MG/ML injection 0.2 mg, 0.2 mg, Intravenous, Q2H PRN **OR** HYDROmorphone (Dilaudid) 1 MG/ML injection 0.4 mg, 0.4 mg, Intravenous, Q2H PRN **OR** HYDROmorphone (Dilaudid) 1 MG/ML injection 0.8 mg, 0.8 mg, Intravenous, Q2H PRN    fluticasone furoate-vilanterol (Breo Ellipta) 200-25 MCG/ACT inhaler 1 puff, 1 puff, Inhalation, Daily    insulin degludec 100 units/mL flextouch 8 Units, 8 Units, Subcutaneous, Nightly    albuterol (Ventolin HFA) 108 (90 Base) MCG/ACT  inhaler 2 puff, 2 puff, Inhalation, Q6H PRN    atorvastatin (Lipitor) tab 20 mg, 20 mg, Oral, Nightly    baclofen (Lioresal) tab 10 mg, 10 mg, Oral, TID    carvedilol (Coreg) tab 3.125 mg, 3.125 mg, Oral, BID    [Held by provider] Diroximel Fumarate CPDR 462 mg **PATIENT SUPPLIED**, 462 mg, Oral, BID    docusate sodium (Colace) cap 100 mg, 100 mg, Oral, BID    DULoxetine (Cymbalta) DR cap 60 mg, 60 mg, Oral, BID    HYDROcodone-acetaminophen (Norco)  MG per tab 1 tablet, 1 tablet, Oral, QID PRN    levothyroxine (Synthroid) tab 88 mcg, 88 mcg, Oral, Before breakfast    montelukast (Singulair) tab 10 mg, 10 mg, Oral, Nightly    thiamine (Vitamin B1) tab 100 mg, 100 mg, Oral, Daily    losartan (Cozaar) tab 25 mg, 25 mg, Oral, Daily    progesterone (Prometrium) cap 100 mg, 100 mg, Oral, Daily    pregabalin (Lyrica) cap 200 mg, 200 mg, Oral, BID    pantoprazole (Protonix) DR tab 20 mg, 20 mg, Oral, BID    glucose (Dex4) 15 GM/59ML oral liquid 15 g, 15 g, Oral, Q15 Min PRN **OR** glucose (Glutose) 40% oral gel 15 g, 15 g, Oral, Q15 Min PRN **OR** glucose-vitamin C (Dex-4) chewable tab 4 tablet, 4 tablet, Oral, Q15 Min PRN **OR** dextrose 50% injection 50 mL, 50 mL, Intravenous, Q15 Min PRN **OR** glucose (Dex4) 15 GM/59ML oral liquid 30 g, 30 g, Oral, Q15 Min PRN **OR** glucose (Glutose) 40% oral gel 30 g, 30 g, Oral, Q15 Min PRN **OR** glucose-vitamin C (Dex-4) chewable tab 8 tablet, 8 tablet, Oral, Q15 Min PRN    acetaminophen (Tylenol Extra Strength) tab 500 mg, 500 mg, Oral, Q4H PRN    ondansetron (Zofran) 4 MG/2ML injection 4 mg, 4 mg, Intravenous, Q6H PRN    prochlorperazine (Compazine) 10 MG/2ML injection 5 mg, 5 mg, Intravenous, Q8H PRN    polyethylene glycol (PEG 3350) (Miralax) 17 g oral packet 17 g, 17 g, Oral, Daily PRN    sennosides (Senokot) tab 17.2 mg, 17.2 mg, Oral, Nightly PRN    bisacodyl (Dulcolax) 10 MG rectal suppository 10 mg, 10 mg, Rectal, Daily PRN    fleet enema (Fleet) 7-19 GM/118ML rectal  enema 133 mL, 1 enema, Rectal, Once PRN    insulin aspart (NovoLOG) 100 Units/mL FlexPen 1-10 Units, 1-10 Units, Subcutaneous, TID AC and HS    acetaminophen (Tylenol) 325 mg, codeine 60 mg for Tylenol #4 (EEH only), , Oral, Q6H PRN    heparin (Porcine) 100 Units/mL lock flush 500 Units, 5 mL, Intravenous, PRN  Home Medications:  No current outpatient medications on file.       Review of Systems:  See HPI; A total of 12 systems reviewed and otherwise unremarkable.    Physical Exam:  Vital signs: Blood pressure 109/74, pulse 72, temperature 97.8 °F (36.6 °C), temperature source Oral, resp. rate 18, weight 168 lb (76.2 kg), SpO2 96%, not currently breastfeeding.  General: No acute distress. Alert and oriented x 3.  HEENT: Moist mucous membranes. EOM-I. PERRL  Neck: No lymphadenopathy.  No JVD. No carotid bruits.  Respiratory: Clear to auscultation bilaterally.  No wheezes. No rhonchi.  Cardiovascular: S1, S2.  Regular rate and rhythm.  No murmurs. Equal pulses   Abdomen: Soft, nontender, nondistended.  Positive bowel sounds. No rebound tenderness   Neurologic: No focal neurological deficits.   Musculoskeletal: Full range of motion of all extremities.  No swelling noted.   Integument: No lesions. No erythema.  Psychiatric: Appropriate mood and affect.    Laboratory:  Lab Results   Component Value Date    WBC 5.2 04/06/2024    HGB 11.7 04/06/2024    HCT 37.8 04/06/2024    .0 04/06/2024    CREATSERUM 0.91 04/06/2024    BUN 13 04/06/2024     04/06/2024    K 3.9 04/06/2024     04/06/2024    CO2 31.0 04/06/2024     04/06/2024    CA 8.8 04/06/2024    ALB 3.7 04/05/2024    ALKPHO 66 04/05/2024    BILT 0.4 04/05/2024    TP 7.5 04/05/2024    AST 24 04/05/2024    ALT 39 04/05/2024    INR 1.00 04/06/2024    PTP 13.2 04/06/2024    PGLU 101 04/06/2024     Lab Results   Component Value Date    COLORUR Light-Yellow 04/06/2024    CLARITY Clear 04/06/2024    SPECGRAVITY 1.021 04/06/2024    GLUUR >1000  04/06/2024    BILUR Negative 04/06/2024    KETUR 10 04/06/2024    BLOODURINE Negative 04/06/2024    PHURINE 6.5 04/06/2024    PROUR Negative 04/06/2024    UROBILINOGEN Normal 04/06/2024    NITRITE Negative 04/06/2024    LEUUR 25 04/06/2024       BUN (mg/dL)   Date Value   04/06/2024 13   04/05/2024 15   02/23/2024 12   05/01/2014 9   04/30/2014 10   12/27/2013 10     Blood Urea Nitrogen (mg/dL)   Date Value   10/28/2021 13.0     CREATININE (mg/dL)   Date Value   05/01/2014 0.80   04/30/2014 0.77   12/27/2013 0.76     Creatinine (mg/dL)   Date Value   04/06/2024 0.91   04/05/2024 0.93   02/23/2024 0.92   10/28/2021 0.93 (H)         Imaging:  Reviewe d    Impression:  MS exacerbation; Motor weakness in LE  - Plan for PLEX per neuro recs  - will start today after temp HD cath placed - appreciate IR assistance  DM  HTN- continue home meds; coreg/losartan  Fibromyalgia  Hx of PE         Thank you for allowing me to participate in this patient's care.  Please feel free to call me with any questions or concerns.    Brandon Hunter MD  4/6/2024  12:46 PM

## 2024-04-06 NOTE — PLAN OF CARE
Aox4, room air, NSR, VSS, Pt states she has some pain but declines pain medication  Temporary HD cath in right jugular placed today for plasmapheresis treatments  Pt scheduled to have a total of 5 treatments, every other day.  First treatment started today 3L albumin.   Zofran and Benadryl to be given prior to treatments  Accucheck QID, carb control diet  Do not use electrolyte protocol, nephro following.  Pt up to chair with PT. Stand by assist with RWW.  PT recommending HHPT at discharge.    Port a cath in right subclavian flushed and capped.  Line draw for labs  Plan of care reviewed with patient.  Continue to monitor.      Problem: Diabetes/Glucose Control  Goal: Glucose maintained within prescribed range  Description: INTERVENTIONS:  - Monitor Blood Glucose as ordered  - Assess for signs and symptoms of hyperglycemia and hypoglycemia  - Administer ordered medications to maintain glucose within target range  - Assess barriers to adequate nutritional intake and initiate nutrition consult as needed  - Instruct patient on self management of diabetes  Outcome: Progressing

## 2024-04-06 NOTE — PHYSICAL THERAPY NOTE
PHYSICAL THERAPY EVALUATION - INPATIENT     Room Number: 3615/3615-A  Evaluation Date: 4/6/2024  Type of Evaluation: Initial  Physician Order: PT Eval and Treat    Presenting Problem: MS exacerbation, weakness  Co-Morbidities : HTN, MS, GIORDANO, DM2, h/o PE  Reason for Therapy: Mobility Dysfunction and Discharge Planning       PHYSICAL THERAPY ASSESSMENT   Patient is currently functioning below baseline with bed mobility, transfers, gait, and stair negotiation.  Prior to admission, patient's baseline is modified indep with amb.  Patient is requiring contact guard assist as a result of the following impairments: decreased functional strength, impaired standing balance, impaired coordination, decreased muscular endurance, and medical status.  Physical Therapy will continue to follow for duration of hospitalization.    Patient will benefit from continued skilled PT Services at discharge to promote functional independence and safety with additional support and return home with home health PT.    PLAN  PT Treatment Plan: Endurance;Energy conservation;Patient education;Family education;Gait training;Neuromuscular re-educate;Strengthening;Stair training;Transfer training;Balance training  Rehab Potential : Good  Frequency (Obs): 3-5x/week  Number of Visits to Meet Established Goals: 5      CURRENT GOALS    Goal #1 Patient is able to demonstrate supine - sit EOB @ level: supervision     Goal #2 Patient is able to demonstrate transfers Sit to/from Stand at assistance level: supervision     Goal #3 Patient is able to ambulate 50 feet with assist device: walker - rolling at assistance level: supervision     Goal #4 Pt able to ascend/descend 1 step with rail with CGA   Goal #5    Goal #6    Goal Comments: Goals established on 4/6/2024      PHYSICAL THERAPY MEDICAL/SOCIAL HISTORY  History related to current admission: Patient is a 54 year old female admitted on 4/5/2024 from home for weakness.  Pt diagnosed with MS  exacerbation.  Pt is pending plasma exchange.      HOME SITUATION  Type of Home: House   Home Layout: One level  Stairs to Enter : 1  Railing: Yes  Stairs to Bedroom: 0       Lives With: Son  Drives: Yes  Patient Owned Equipment: Cane;Other (Comment) (rollator, shower chair)       Prior Level of Rab[place]: per pt reports, pt amb without the home without assist, endorses \"furniture walking\". Pt uses rollator at times in the home. Pt has cane she uses outside the home and drives short distances. Pt lives with son, who is a professional dancer. Pt has daughter that can assist with needs when son is not at home. Pt denies recent fall hx, reports, \"I catch myself.\"    SUBJECTIVE  \"My legs get more tired the more I use them.\"      OBJECTIVE  Precautions: Bed/chair alarm  Fall Risk: High fall risk    WEIGHT BEARING RESTRICTION  Weight Bearing Restriction: None                PAIN ASSESSMENT  Rating: Unable to rate  Location: B 's  Management Techniques: Activity promotion    COGNITION  Overall Cognitive Status:  WFL - within functional limits    RANGE OF MOTION AND STRENGTH ASSESSMENT  Upper extremity ROM and strength are within functional limits     Lower extremity ROM is within functional limits     Lower extremity strength:  B hip flex 3/5  B knee ext 4-/5  R ankle dorsiflex 3+/5  L ankle dorsiflex 2/5      BALANCE  Static Sitting: Good  Dynamic Sitting: Fair +  Static Standing: Fair (with RW)  Dynamic Standing: Fair - (with RW)    ADDITIONAL TESTS                                    ACTIVITY TOLERANCE                         O2 WALK       NEUROLOGICAL FINDINGS  Neurological Findings: None                     AM-PAC '6-Clicks' INPATIENT SHORT FORM - BASIC MOBILITY  How much difficulty does the patient currently have...  Patient Difficulty: Turning over in bed (including adjusting bedclothes, sheets and blankets)?: None   Patient Difficulty: Sitting down on and standing up from a chair with arms (e.g., wheelchair,  bedside commode, etc.): A Little   Patient Difficulty: Moving from lying on back to sitting on the side of the bed?: A Little   How much help from another person does the patient currently need...   Help from Another: Moving to and from a bed to a chair (including a wheelchair)?: A Little   Help from Another: Need to walk in hospital room?: A Little   Help from Another: Climbing 3-5 steps with a railing?: A Lot       AM-PAC Score:  Raw Score: 18   Approx Degree of Impairment: 46.58%   Standardized Score (AM-PAC Scale): 43.63   CMS Modifier (G-Code): CK    FUNCTIONAL ABILITY STATUS  Gait Assessment   Functional Mobility/Gait Assessment  Gait Assistance: Contact guard assist  Distance (ft): 20, 15  Assistive Device: Rolling walker  Pattern: L Foot drop    Skilled Therapy Provided     Bed Mobility:  Rolling: supervision  Supine to sit: CGA   Sit to supine: not tested     Transfer Mobility:  Sit to stand: CGA from EOB to RW and extra time   Stand to sit: CGA  Gait = pt amb 20, 15 feet with RW CGA for balance.  Pt noted with L foot drop and toe drag during amb. Pt had no LOB during amb.    Therapist's Comments: per RN pt ok to be seen.  Pt received in bed and agreeable to therapy. Pt denies dizziness throughout session. Pt sitting at EOB with good balance. Pt reports having to use bathroom. Pt amb to bathroom with RW with CGA and extra time. Pt seated on toilet with CGA. Pt able to sit to stand from low toilet with SBA. Pt amb back to bedside chair with RW and CGA. Pt fatigued after amb.  Pt educated on activity rec, course of PT in hospital setting, call don't fall, and questions answered. Pt left in chair with chair alarm set, RN updated.    Exercise/Education Provided:  Bed mobility  Energy conservation  Functional activity tolerated  Gait training  Neuromuscular re-educate  Transfer training    Patient End of Session: Up in chair;Needs met;Call light within reach;RN aware of session/findings;All patient questions and  concerns addressed;Alarm set;Discussed recommendations with /      Patient Evaluation Complexity Level:  History Moderate - 1 or 2 personal factors and/or co-morbidities   Examination of body systems Moderate - addressing a total of 3 or more elements   Clinical Presentation Moderate - Evolving   Clinical Decision Making Moderate - Evolving       PT Session Time: 30 minutes  Gait Training: 10 minutes  Therapeutic Activity: 5 minutes  \

## 2024-04-06 NOTE — PHYSICAL THERAPY NOTE
PT orders received, chart reviewed.  Attempted to see pt for PT eval, pt currently off the floor for line placement.  Will attempt to see pt at a later time as appropriate.  RN aware of attempt.

## 2024-04-06 NOTE — PROGRESS NOTES
[facility]   part of [facility]     Hospitalist Progress Note     Kat Crook Patient Status:  Inpatient    3/11/1970 MRN [id number]   Location [facility] 3NE-A Attending Tobi Thapa,    Hosp Day # 1 PCP Justin Huerta MD     Chief Complaint: MS flare    Subjective:     Patient is in quite a bit of pain, mostly in her legs. Reports getting fatigued quickly.     Objective:    Review of Systems:   A comprehensive review of systems was completed; pertinent positive and negatives stated in subjective.    Vital signs:  Temp:  [96.8 °F (36 °C)-98.1 °F (36.7 °C)] 97.8 °F (36.6 °C)  Pulse:  [60-72] 72  Resp:  [18] 18  BP: ()/(60-83) 109/74  SpO2:  [94 %-100 %] 96 %    Physical Exam:    General: No acute distress, Alert  Respiratory: No rhonchi, no wheezes  Cardiovascular: S1, S2. Regular rate and rhythm  Abdomen: Soft, Non-tender, non-distended, positive bowel sounds  Neuro: BROWN x 4, speech slow  Extremities: No edema    Diagnostic Data:    Labs:  Recent Labs   Lab 24  1318 24  0420   WBC 5.1 5.2   HGB 12.6 11.7*   MCV 73.2* 73.0*   .0 143.0*   INR  --  1.00     Recent Labs   Lab 24  1318 24  0420   * 134*   BUN 15 13   CREATSERUM 0.93 0.91   CA 9.5 8.8   ALB 3.7  --     143   K 4.0 3.9    108   CO2 30.0 31.0   ALKPHO 66  --    AST 24  --    ALT 39  --    BILT 0.4  --    TP 7.5  --      Estimated Creatinine Clearance: 64.6 mL/min (based on SCr of 0.91 mg/dL).    No results for input(s): \"TROP\", \"TROPHS\", \"CK\" in the last 168 hours.    Recent Labs   Lab 24  0420   PTP 13.2   INR 1.00      Microbiology  No results found for this visit on 24.    Imaging: Reviewed in Epic.    Medications:    atorvastatin  20 mg Oral Nightly    baclofen  10 mg Oral TID    carvedilol  3.125 mg Oral BID    [Held by provider] Diroximel Fumarate  462 mg Oral BID    docusate sodium  100 mg Oral BID    DULoxetine  60 mg Oral BID    levothyroxine  88 mcg Oral  Before breakfast    montelukast  10 mg Oral Nightly    thiamine  100 mg Oral Daily    losartan  25 mg Oral Daily    progesterone  100 mg Oral Daily    pregabalin  200 mg Oral BID    pantoprazole  20 mg Oral BID    insulin aspart  1-10 Units Subcutaneous TID AC and HS       Assessment & Plan:      #MS Exacerbation  -Anaphylaxis to steroids and unable to tolerate IVIG  -PLEX  -Renal and neuro consult  -IR to place line  -On Vumerity and Fasenra outpatient  -Resume home pain meds     #DM type II with neuropathy  -A1c is 7.3  -Hyperglycemia protocol  -On insulin pump PTA - will transition to basal bolus insulin  -Lyrica     #Hypothyroidism  -Levothyroxine    #Hypertension  -Resume home meds    #GERD  -PPI    #GIORDANO - LFTs normal  #Alpha thalassemia minor - monitor hgb  #H/o PE in 2021 - not on anticoagulation  #Fibromyalgia - continue home meds  #Asthma - resume home inhaler and singulair    Tobi Thapa,     Supplementary Documentation:     Quality:  DVT Mechanical Prophylaxis:   SCDs, Early ambuation  DVT Pharmacologic Prophylaxis   Medication    heparin (Porcine) 100 Units/mL lock flush 500 Units     Code Status: Full Code  Panda: No urinary catheter in place  KAYLEY: TBD    Discharge is dependent on: Clinical state, consultant recs  At this point Ms. Crook is expected to be discharge to: Home    The 21st Century Cures Act makes medical notes like these available to patients in the interest of transparency. Please be advised this is a medical document. Medical documents are intended to carry relevant information, facts as evident, and the clinical opinion of the practitioner. The medical note is intended as peer to peer communication and may appear blunt or direct. It is written in medical language and may contain abbreviations or verbiage that are unfamiliar.

## 2024-04-07 PROBLEM — R57.9 SHOCK (HCC): Status: ACTIVE | Noted: 2024-04-07

## 2024-04-07 PROBLEM — Z79.4 TYPE 2 DIABETES MELLITUS WITHOUT COMPLICATION, WITH LONG-TERM CURRENT USE OF INSULIN (HCC): Status: ACTIVE | Noted: 2021-08-09

## 2024-04-07 PROBLEM — E11.9 TYPE 2 DIABETES MELLITUS WITHOUT COMPLICATION, WITH LONG-TERM CURRENT USE OF INSULIN (HCC): Status: ACTIVE | Noted: 2021-08-09

## 2024-04-07 LAB
ALBUMIN SERPL-MCNC: 4.3 G/DL (ref 3.4–5)
ALBUMIN/GLOB SERPL: 4.3 {RATIO} (ref 1–2)
ALP LIVER SERPL-CCNC: 27 U/L
ALT SERPL-CCNC: 68 U/L
ANION GAP SERPL CALC-SCNC: 2 MMOL/L (ref 0–18)
ANION GAP SERPL CALC-SCNC: 4 MMOL/L (ref 0–18)
AST SERPL-CCNC: 55 U/L (ref 15–37)
BILIRUB SERPL-MCNC: 0.6 MG/DL (ref 0.1–2)
BUN BLD-MCNC: 13 MG/DL (ref 9–23)
BUN BLD-MCNC: 13 MG/DL (ref 9–23)
CALCIUM BLD-MCNC: 7.5 MG/DL (ref 8.5–10.1)
CALCIUM BLD-MCNC: 7.6 MG/DL (ref 8.5–10.1)
CHLORIDE SERPL-SCNC: 113 MMOL/L (ref 98–112)
CHLORIDE SERPL-SCNC: 114 MMOL/L (ref 98–112)
CO2 SERPL-SCNC: 29 MMOL/L (ref 21–32)
CO2 SERPL-SCNC: 31 MMOL/L (ref 21–32)
CREAT BLD-MCNC: 0.89 MG/DL
CREAT BLD-MCNC: 0.93 MG/DL
EGFRCR SERPLBLD CKD-EPI 2021: 72 ML/MIN/1.73M2 (ref 60–?)
EGFRCR SERPLBLD CKD-EPI 2021: 76 ML/MIN/1.73M2 (ref 60–?)
ERYTHROCYTE [DISTWIDTH] IN BLOOD BY AUTOMATED COUNT: 15.1 %
ERYTHROCYTE [DISTWIDTH] IN BLOOD BY AUTOMATED COUNT: 15.2 %
GLOBULIN PLAS-MCNC: 1 G/DL (ref 2.8–4.4)
GLUCOSE BLD-MCNC: 106 MG/DL (ref 70–99)
GLUCOSE BLD-MCNC: 125 MG/DL (ref 70–99)
GLUCOSE BLD-MCNC: 135 MG/DL (ref 70–99)
GLUCOSE BLD-MCNC: 140 MG/DL (ref 70–99)
GLUCOSE BLD-MCNC: 170 MG/DL (ref 70–99)
GLUCOSE BLD-MCNC: 189 MG/DL (ref 70–99)
GLUCOSE BLD-MCNC: 197 MG/DL (ref 70–99)
HCT VFR BLD AUTO: 34.3 %
HCT VFR BLD AUTO: 37.7 %
HGB BLD-MCNC: 10.9 G/DL
HGB BLD-MCNC: 11.8 G/DL
LACTATE SERPL-SCNC: 0.9 MMOL/L (ref 0.4–2)
MCH RBC QN AUTO: 22.7 PG (ref 26–34)
MCH RBC QN AUTO: 23.2 PG (ref 26–34)
MCHC RBC AUTO-ENTMCNC: 31.3 G/DL (ref 31–37)
MCHC RBC AUTO-ENTMCNC: 31.8 G/DL (ref 31–37)
MCV RBC AUTO: 72.6 FL
MCV RBC AUTO: 73.1 FL
MRSA DNA SPEC QL NAA+PROBE: NEGATIVE
OSMOLALITY SERPL CALC.SUM OF ELEC: 304 MOSM/KG (ref 275–295)
OSMOLALITY SERPL CALC.SUM OF ELEC: 305 MOSM/KG (ref 275–295)
PLATELET # BLD AUTO: 129 10(3)UL (ref 150–450)
PLATELET # BLD AUTO: 141 10(3)UL (ref 150–450)
PLATELETS.RETICULATED NFR BLD AUTO: 8.3 % (ref 0–7)
POTASSIUM SERPL-SCNC: 4.1 MMOL/L (ref 3.5–5.1)
POTASSIUM SERPL-SCNC: 4.2 MMOL/L (ref 3.5–5.1)
PROCALCITONIN SERPL-MCNC: 0.09 NG/ML (ref ?–0.16)
PROT SERPL-MCNC: 5.3 G/DL (ref 6.4–8.2)
RBC # BLD AUTO: 4.69 X10(6)UL
RBC # BLD AUTO: 5.19 X10(6)UL
SODIUM SERPL-SCNC: 146 MMOL/L (ref 136–145)
SODIUM SERPL-SCNC: 147 MMOL/L (ref 136–145)
T3FREE SERPL-MCNC: 2.3 PG/ML (ref 2.4–4.2)
T4 FREE SERPL-MCNC: 1 NG/DL (ref 0.8–1.7)
TSI SER-ACNC: 0.01 MIU/ML (ref 0.36–3.74)
WBC # BLD AUTO: 4.8 X10(3) UL (ref 4–11)
WBC # BLD AUTO: 5.4 X10(3) UL (ref 4–11)

## 2024-04-07 PROCEDURE — 99291 CRITICAL CARE FIRST HOUR: CPT | Performed by: INTERNAL MEDICINE

## 2024-04-07 PROCEDURE — 99291 CRITICAL CARE FIRST HOUR: CPT | Performed by: OTHER

## 2024-04-07 PROCEDURE — 99291 CRITICAL CARE FIRST HOUR: CPT | Performed by: NURSE PRACTITIONER

## 2024-04-07 PROCEDURE — 3E033XZ INTRODUCTION OF VASOPRESSOR INTO PERIPHERAL VEIN, PERCUTANEOUS APPROACH: ICD-10-PCS | Performed by: NURSE PRACTITIONER

## 2024-04-07 PROCEDURE — 99233 SBSQ HOSP IP/OBS HIGH 50: CPT | Performed by: INTERNAL MEDICINE

## 2024-04-07 RX ORDER — SODIUM CHLORIDE, SODIUM LACTATE, POTASSIUM CHLORIDE, CALCIUM CHLORIDE 600; 310; 30; 20 MG/100ML; MG/100ML; MG/100ML; MG/100ML
INJECTION, SOLUTION INTRAVENOUS CONTINUOUS
Status: ACTIVE | OUTPATIENT
Start: 2024-04-07 | End: 2024-04-09

## 2024-04-07 RX ORDER — MIDODRINE HYDROCHLORIDE 2.5 MG/1
2.5 TABLET ORAL 3 TIMES DAILY PRN
Status: DISCONTINUED | OUTPATIENT
Start: 2024-04-07 | End: 2024-04-15

## 2024-04-07 RX ORDER — ENOXAPARIN SODIUM 100 MG/ML
40 INJECTION SUBCUTANEOUS DAILY
Status: DISCONTINUED | OUTPATIENT
Start: 2024-04-07 | End: 2024-04-15

## 2024-04-07 NOTE — PROGRESS NOTES
[facility]   part of [facility]     Hospitalist Progress Note     Kat Crook Patient Status:  Inpatient    3/11/1970 MRN [id number]   Location [facility] 3NE-A Attending Tobi Thapa,    Hosp Day # 2 PCP Justin Huerta MD     Chief Complaint: MS flare    Subjective:     Patient received PLEX yesterday. She became hypotensive overnight despite 1.5L IVF and was transferred to ICU. On low dose levophed. States she feels ok, about the same as yesterday.    Objective:    Review of Systems:   A comprehensive review of systems was completed; pertinent positive and negatives stated in subjective.    Vital signs:  Temp:  [97.3 °F (36.3 °C)-98 °F (36.7 °C)] 97.5 °F (36.4 °C)  Pulse:  [42-76] 55  Resp:  [7-19] 8  BP: ()/() 125/65  SpO2:  [89 %-99 %] 93 %    Physical Exam:    General: No acute distress, Alert  Respiratory: No rhonchi, no wheezes  Cardiovascular: S1, S2. Regular rate and rhythm  Abdomen: Soft, Non-tender, non-distended, positive bowel sounds  Neuro: BROWN x 4, speech slow  Extremities: No edema    Diagnostic Data:    Labs:  Recent Labs   Lab 24  1318 24  0420 24  0105 24  0443   WBC 5.1 5.2 4.8 5.4   HGB 12.6 11.7* 10.9* 11.8*   MCV 73.2* 73.0* 73.1* 72.6*   .0 143.0* 129.0* 141.0*   INR  --  1.00  --   --      Recent Labs   Lab 24  1318 24  0420 24  0105 24  0443   * 134* 106* 140*   BUN 15 13 13 13   CREATSERUM 0.93 0.91 0.93 0.89   CA 9.5 8.8 7.5* 7.6*   ALB 3.7  --   --  4.3    143 147* 146*   K 4.0 3.9 4.1 4.2    108 114* 113*   CO2 30.0 31.0 29.0 31.0   ALKPHO 66  --   --  27*   AST 24  --   --  55*   ALT 39  --   --  68*   BILT 0.4  --   --  0.6   TP 7.5  --   --  5.3*     Estimated Creatinine Clearance: 66.1 mL/min (based on SCr of 0.89 mg/dL).    No results for input(s): \"TROP\", \"TROPHS\", \"CK\" in the last 168 hours.    Recent Labs   Lab 24  0420   PTP 13.2   INR 1.00     Lab Results    Component Value Date    TSH 0.014 04/07/2024    T4F 1.0 04/07/2024    T3F 2.30 04/07/2024   Microbiology  Hospital Encounter on 04/05/24   1. Urine Culture, Routine     Status: None    Collection Time: 04/06/24 12:28 AM    Specimen: Urine, clean catch   Result Value Ref Range    Urine Culture No Growth at 18-24 hrs. N/A     Imaging: Reviewed in Epic.    Medications:    enoxaparin  40 mg Subcutaneous Daily    fluticasone furoate-vilanterol  1 puff Inhalation Daily    insulin degludec  8 Units Subcutaneous Nightly    albumin human  3,000 mL Intravenous QOD    calcium gluconate 3 g in sodium chloride 0.9% 250 mL IVPB   Intravenous QOD    atorvastatin  20 mg Oral Nightly    [Held by provider] baclofen  10 mg Oral TID    carvedilol  3.125 mg Oral BID    [Held by provider] Diroximel Fumarate  462 mg Oral BID    docusate sodium  100 mg Oral BID    DULoxetine  60 mg Oral BID    levothyroxine  88 mcg Oral Before breakfast    montelukast  10 mg Oral Nightly    thiamine  100 mg Oral Daily    progesterone  100 mg Oral Daily    pregabalin  200 mg Oral BID    pantoprazole  20 mg Oral BID    insulin aspart  1-10 Units Subcutaneous TID AC and HS       Assessment & Plan:      #MS Exacerbation  -Anaphylaxis to steroids and unable to tolerate IVIG due to hx of DVT  -PLEX  -Renal and neuro consult  -On Vumerity and Fasenra outpatient  -Resume home pain meds    #Shock, suspect hypovolemic  -On low dose levophed  -IVF  -Agree with holding baclofen  -PCT negative, follow blood cultures, monitor off abx  -Critical care following     #DM type II with neuropathy  -A1c is 7.3  -Hyperglycemia protocol  -On insulin pump PTA - will transition to basal bolus insulin  -Lyrica     #Hypothyroidism  -Levothyroxine    #Hypertension  -Resume home meds    #GERD  -PPI    #Elevated LFTs, hx of GIORDANO  -LFTs were normal, now slightly elevated. Could be related to hypotension. Continue to monitor    #Alpha thalassemia minor - monitor hgb  #H/o PE in 2021 -  not on anticoagulation  #Fibromyalgia - continue home meds  #Asthma - resume home inhaler and singulair    Tobi Thapa,     Supplementary Documentation:     Quality:  DVT Mechanical Prophylaxis:   SCDs, Early ambuation  DVT Pharmacologic Prophylaxis   Medication    enoxaparin (Lovenox) 40 MG/0.4ML SUBQ injection 40 mg    heparin (Porcine) 100 Units/mL lock flush 500 Units     Code Status: Full Code  Panda: External urinary catheter in place  KAYLEY: TBD    Discharge is dependent on: Clinical state, consultant recs  At this point Ms. Crook is expected to be discharge to: Home    The 21st Century Cures Act makes medical notes like these available to patients in the interest of transparency. Please be advised this is a medical document. Medical documents are intended to carry relevant information, facts as evident, and the clinical opinion of the practitioner. The medical note is intended as peer to peer communication and may appear blunt or direct. It is written in medical language and may contain abbreviations or verbiage that are unfamiliar.

## 2024-04-07 NOTE — PLAN OF CARE
Assumed care at 1930. Pt. A&O x4, on RA, NSR to SB on tele. QID accuchecks. Unit draw with a port. Hemodialysis cath clamped. Having muscle spasms.   Upon assessing BP for night time Coreg and baclofen, pt. BP was 85/51. Dr. Boateng, Hospitalist on call, notified, deferred to nephrology. Dr. Hunter from nephrology notified, 500 mL bolus of 0.9 over 30 minutes ordered and given. Pt. BP after bolus was 75/51. Additional bolus ordered and given, Dr. Hunter asked that the hospitalist be updated. Hospitalist updated. Repeat BP 77/55 after second bolus. Dr. Hunter ordered one more bolus and asked that this RN have the hospitalist come assess the pt. At bedside. Hospitalist informed, he stated that he had talked to Dr. Hunter. Did not come to bedside. After third bolus pt. BP 75/51. Repeat to confirm was 70/47. Hospitalist informed. Transfer orders initiated to ICU for BP support. Report given to [place] CRAWFORD. Pt. Asked that her daughter Nevaeh be updated, update provided. Transferred to ICU.     Problem: Diabetes/Glucose Control  Goal: Glucose maintained within prescribed range  Description: INTERVENTIONS:  - Monitor Blood Glucose as ordered  - Assess for signs and symptoms of hyperglycemia and hypoglycemia  - Administer ordered medications to maintain glucose within target range  - Assess barriers to adequate nutritional intake and initiate nutrition consult as needed  - Instruct patient on self management of diabetes  Outcome: Progressing

## 2024-04-07 NOTE — PROGRESS NOTES
Neurology Progress Note    Kat Crook Patient Status:  Inpatient    3/11/1970 MRN [id number]   [facility] 4SW-A Attending Tobi Thapa,    Hosp Day # 2 PCP Justin Huerta MD       Chief Complaint:  Multiple Sclerosis      Subjective:  Pt was transfered to the ICU last PM due to hypotension. She states that she still has difficulty walking No new symptoms. Feels well today    Current Medications:   enoxaparin  40 mg Subcutaneous Daily    fluticasone furoate-vilanterol  1 puff Inhalation Daily    insulin degludec  8 Units Subcutaneous Nightly    albumin human  3,000 mL Intravenous QOD    calcium gluconate 3 g in sodium chloride 0.9% 250 mL IVPB   Intravenous QOD    atorvastatin  20 mg Oral Nightly    [Held by provider] baclofen  10 mg Oral TID    carvedilol  3.125 mg Oral BID    [Held by provider] Diroximel Fumarate  462 mg Oral BID    docusate sodium  100 mg Oral BID    DULoxetine  60 mg Oral BID    levothyroxine  88 mcg Oral Before breakfast    montelukast  10 mg Oral Nightly    thiamine  100 mg Oral Daily    progesterone  100 mg Oral Daily    pregabalin  200 mg Oral BID    pantoprazole  20 mg Oral BID    insulin aspart  1-10 Units Subcutaneous TID AC and HS       No current outpatient medications on file.       Objective/Physical Exam:    Vital Signs:  Blood pressure 96/52, pulse 64, temperature 97.5 °F (36.4 °C), temperature source Temporal, resp. rate 12, weight 168 lb 6.9 oz (76.4 kg), SpO2 96%, not currently breastfeeding.    Neurologic: This patient is alert and orientated x 3.  Speech fluent. Able to follow simple commands.  Face is symmetrical.  No Drift.  Pupils equally round and reactive to light.  3+ brisk bilaterally.  EOMs intact.  Visual fields are full.  Tongue is midline.   shoulders normally bilaterally.  The rest of the cranial nerves are grossly intact.     MSK:  RUE: Delt:  5/5, Bi: 5/5, Tri: 5/5, : 5/5   LUE: Delt:  5/5, Bi: 5/5, Tri: 5/5, : 5/5   RLE: HF: 4/5,  KE: 4+/5,  DF/PF 4-/5  LLE:  HF: 4/5, KE: 4+/5,  DF/PF 4-/5    Lungs: Clear to auscultation bilaterally  Cardiac: Regular rate and rhythm. No murmur  GI: non tender on distended      Labs:  Lab Results   Component Value Date    WBC 5.4 04/07/2024    HGB 11.8 04/07/2024    HCT 37.7 04/07/2024    .0 04/07/2024    CREATSERUM 0.89 04/07/2024    BUN 13 04/07/2024     04/07/2024    K 4.2 04/07/2024     04/07/2024    CO2 31.0 04/07/2024     04/07/2024    CA 7.6 04/07/2024    ALB 4.3 04/07/2024    ALKPHO 27 04/07/2024    BILT 0.6 04/07/2024    TP 5.3 04/07/2024    AST 55 04/07/2024    ALT 68 04/07/2024    T4F 1.0 04/07/2024    TSH 0.014 04/07/2024    PGLU 170 04/07/2024          Assessment:  This Is a 54-year-old female with history of multiple sclerosis. She was admitted to the emergency room for multiple sclerosis exacerbation. Exam as noted above. Will start plasma exchange for total of 5 exchanges. Nephrology will be consulted for this. Patient has an allergy to methylprednisolone and history of DVT so no IV steroids or IVIG will be used.     Plan:  Multiple sclerosis exacerbation  - PLEX 1/5 completed  - Nephrology consult  - has an allergy to methylprednisilone   - H/o DVT so no IVIG  - I appreciate Nephrology assistance    2. Hypotension  - As per critical care    A total of 35 minutes of critical care time (exclusive of billable procedures) was administered to manage and/or prevent neurologic instability. This involved direct patient intervention, complex decision making, and/or discussion with clinical staff.      Prosper Singh,   Neurology

## 2024-04-07 NOTE — PROGRESS NOTES
[facility]  Progress Note    Kat Crook Patient Status:  Inpatient    3/11/1970 MRN [id number]   [facility] 3NE-A Attending Tobi Thapa,    Hosp Day # 2 PCP Justin Huerta MD       Chart reviewed  Pt w/ persistent hypotension despite fluid boluses yesterday pm  Transferred to ICU ; on low dose pressor this am  Feels OK  Denies any cp/sob         Current Medications:    Current Facility-Administered Medications:     norepinephrine (Levophed) 4 mg/250mL infusion premix, 0.5-30 mcg/min, Intravenous, Continuous    enoxaparin (Lovenox) 40 MG/0.4ML SUBQ injection 40 mg, 40 mg, Subcutaneous, Daily    lactated ringers infusion, , Intravenous, Continuous    HYDROmorphone (Dilaudid) 1 MG/ML injection 0.2 mg, 0.2 mg, Intravenous, Q2H PRN **OR** HYDROmorphone (Dilaudid) 1 MG/ML injection 0.4 mg, 0.4 mg, Intravenous, Q2H PRN **OR** HYDROmorphone (Dilaudid) 1 MG/ML injection 0.8 mg, 0.8 mg, Intravenous, Q2H PRN    fluticasone furoate-vilanterol (Breo Ellipta) 200-25 MCG/ACT inhaler 1 puff, 1 puff, Inhalation, Daily    insulin degludec 100 units/mL flextouch 8 Units, 8 Units, Subcutaneous, Nightly    albumin human (Albumin) 5% injection 3,000 mL, 3,000 mL, Intravenous, QOD    calcium gluconate 3 g in sodium chloride 0.9% 250 mL IVPB, , Intravenous, QOD    ondansetron (Zofran-ODT) disintegrating tab 4 mg, 4 mg, Oral, Q6H PRN **OR** ondansetron (Zofran) 4 MG/2ML injection 4 mg, 4 mg, Intravenous, Q6H PRN    albuterol (Ventolin HFA) 108 (90 Base) MCG/ACT inhaler 2 puff, 2 puff, Inhalation, Q6H PRN    atorvastatin (Lipitor) tab 20 mg, 20 mg, Oral, Nightly    baclofen (Lioresal) tab 10 mg, 10 mg, Oral, TID    carvedilol (Coreg) tab 3.125 mg, 3.125 mg, Oral, BID    [Held by provider] Diroximel Fumarate CPDR 462 mg **PATIENT SUPPLIED**, 462 mg, Oral, BID    docusate sodium (Colace) cap 100 mg, 100 mg, Oral, BID    DULoxetine (Cymbalta) DR cap 60 mg, 60 mg, Oral, BID    HYDROcodone-acetaminophen (Norco)   MG per tab 1 tablet, 1 tablet, Oral, QID PRN    levothyroxine (Synthroid) tab 88 mcg, 88 mcg, Oral, Before breakfast    montelukast (Singulair) tab 10 mg, 10 mg, Oral, Nightly    thiamine (Vitamin B1) tab 100 mg, 100 mg, Oral, Daily    losartan (Cozaar) tab 25 mg, 25 mg, Oral, Daily    progesterone (Prometrium) cap 100 mg, 100 mg, Oral, Daily    pregabalin (Lyrica) cap 200 mg, 200 mg, Oral, BID    pantoprazole (Protonix) DR tab 20 mg, 20 mg, Oral, BID    glucose (Dex4) 15 GM/59ML oral liquid 15 g, 15 g, Oral, Q15 Min PRN **OR** glucose (Glutose) 40% oral gel 15 g, 15 g, Oral, Q15 Min PRN **OR** glucose-vitamin C (Dex-4) chewable tab 4 tablet, 4 tablet, Oral, Q15 Min PRN **OR** dextrose 50% injection 50 mL, 50 mL, Intravenous, Q15 Min PRN **OR** glucose (Dex4) 15 GM/59ML oral liquid 30 g, 30 g, Oral, Q15 Min PRN **OR** glucose (Glutose) 40% oral gel 30 g, 30 g, Oral, Q15 Min PRN **OR** glucose-vitamin C (Dex-4) chewable tab 8 tablet, 8 tablet, Oral, Q15 Min PRN    acetaminophen (Tylenol Extra Strength) tab 500 mg, 500 mg, Oral, Q4H PRN    ondansetron (Zofran) 4 MG/2ML injection 4 mg, 4 mg, Intravenous, Q6H PRN    prochlorperazine (Compazine) 10 MG/2ML injection 5 mg, 5 mg, Intravenous, Q8H PRN    polyethylene glycol (PEG 3350) (Miralax) 17 g oral packet 17 g, 17 g, Oral, Daily PRN    sennosides (Senokot) tab 17.2 mg, 17.2 mg, Oral, Nightly PRN    bisacodyl (Dulcolax) 10 MG rectal suppository 10 mg, 10 mg, Rectal, Daily PRN    fleet enema (Fleet) 7-19 GM/118ML rectal enema 133 mL, 1 enema, Rectal, Once PRN    insulin aspart (NovoLOG) 100 Units/mL FlexPen 1-10 Units, 1-10 Units, Subcutaneous, TID AC and HS    acetaminophen (Tylenol) 325 mg, codeine 60 mg for Tylenol #4 (EEH only), , Oral, Q6H PRN    heparin (Porcine) 100 Units/mL lock flush 500 Units, 5 mL, Intravenous, PRN  Home Medications:  No current outpatient medications on file.       Review of Systems:  See HPI; A total of 12 systems reviewed and  otherwise unremarkable.    Physical Exam:  Vital signs: Blood pressure 125/65, pulse 55, temperature 97.7 °F (36.5 °C), temperature source Temporal, resp. rate (!) 8, weight 168 lb 6.9 oz (76.4 kg), SpO2 93%, not currently breastfeeding.  General: No acute distress. Alert and oriented x 3.  HEENT: Moist mucous membranes. EOM-I. PERRL  Neck: No lymphadenopathy.  No JVD. No carotid bruits.  Respiratory: Clear to auscultation bilaterally.  No wheezes. No rhonchi.  Cardiovascular: S1, S2.  Regular rate and rhythm.  No murmurs. Equal pulses   Abdomen: Soft, nontender, nondistended.  Positive bowel sounds. No rebound tenderness   Neurologic: No focal neurological deficits.   Musculoskeletal: Full range of motion of all extremities.  No swelling noted.   Integument: No lesions. No erythema.  Psychiatric: Appropriate mood and affect.    Recent Labs     04/05/24  1318 04/06/24  0420 04/07/24  0105 04/07/24  0443   WBC 5.1 5.2 4.8 5.4   HGB 12.6 11.7* 10.9* 11.8*   MCV 73.2* 73.0* 73.1* 72.6*   .0 143.0* 129.0* 141.0*   INR  --  1.00  --   --        Recent Labs     04/05/24  1318 04/06/24  0420 04/07/24  0105 04/07/24  0443    143 147* 146*   K 4.0 3.9 4.1 4.2    108 114* 113*   CO2 30.0 31.0 29.0 31.0   BUN 15 13 13 13   CREATSERUM 0.93 0.91 0.93 0.89   CA 9.5 8.8 7.5* 7.6*       Recent Labs     04/05/24 1318 04/07/24  0443   ALT 39 68*   AST 24 55*   ALB 3.7 4.3         Imaging:  Reviewe d    Impression:  MS exacerbation; Motor weakness in LE  - Plan for PLEX per neuro recs  - s/p # 1/5 PLEX on 4/6  - will need to reassess timing of the next treatment pending hemodynamic stability  Hypotension/Shock-? Hypovolemic vs. Sepsis vs. Medication related  - hold baclofen  - fluids  - pressors as needed  - appreciate CCM eval  - start low dose midodrine prn- pt reports she had a \"allergic reaction\" to epi in past where her heart was racing fast  DM  Fibromyalgia  Hx of PE       Thank you for allowing me to  participate in this patient's care.  Please feel free to call me with any questions or concerns.    Brandon Hunter MD  4/7/2024

## 2024-04-07 NOTE — CONSULTS
ICU  Critical Care APRN Consult Note    NAME: Kat Crook - ROOM: 3615/3615-A - MRN: [id number] - Age: 54 year old - :3/11/1970    History Of Present Illness:  Kat Crook is a 54 year old female with PMHx significant for MS, asthma, anxiety, Thalassemia alpha, fibromyalgia, hypothyroidism, hx PE in  no longer on anticoagulation, HTN, HL, DM2 who presented to the ED for treatment for a MS exacerbation.  Her symptoms which have been present since November included leg pain, right thoracic area pain, mental fogginess and some difficulty speaking (stuttering).  She was encouraged to come in for plasma pheresis as she is allergic to IV steroids and has had a previous DVT so unable to receive IVIG.  She was unable to schedule this until this weekend.  She had her first treatment today without issue but this evening developed hypotension not responsive to 1.5 L bolus.  She transferred to ICU for pressors and closer monitoring.     Past Medical History:  Past Medical History:   Diagnosis Date    Abdominal pain 2019    Allergic rhinitis     Anemia     Anxiety     Anxiety state     Arthritis     Asthma (HCC)     Atypical mole 2019    Autoimmune disease (HCC)     Back pain     Bad breath     Black stools 2019    Bloating 2019    Blood disorder     Thalassemia alpha, Sickle cell trait    Blood in urine     Blurred vision     Calculus of kidney     Cardiomyopathy (HCC)     last echo 2021: EF 55%    Chest pain     Constipation     Decorative tattoo     Deep vein thrombosis (HCC) 2021    Depression     Diarrhea, unspecified     Dizziness     Easy bruising     Elevated liver enzymes     Endocrine disorder     Esophageal reflux     Fatigue     Fibromyalgia     Food intolerance     Frequent urination     Frequent UTI     Gastroparesis     Glaucoma     H. pylori infection     H/O  section     Headache disorder     Heart attack (HCC) 2015    Heart palpitations 2017    Heartburn     Hemorrhoids      Herniation of cervical intervertebral disc     High blood pressure     High cholesterol     History of blood clots Dec 2021    Unprovoked    History of blood transfusion     History of cardiac murmur     History of depression     Hoarseness, chronic     Hyperlipidemia     IBS (irritable bowel syndrome)     Irregular bowel habits     Leaking of urine     Liver disease 2013    GIORDANO and Stage 2 fibrosis    Loss of appetite     Migraines     Multiple sclerosis (HCC)     dx 8 years ago    Muscle weakness     cane or walker prn    Myocardial infarction (HCC)     Neuropathy     legs, hands, feet; bilateral    Night sweats     Osteoarthritis     Osteoporosis     KAMALJIT in her 30s    Osteoporosis     Pain with bowel movements     Painful urination     Problems with swallowing     Due to MS    Renal disorder     kidney lesions    Scoliosis of lumbar spine     Shortness of breath     Sickle cell trait (HCC)     Sleep apnea     no treatment    Sleep disturbance     Stress     Type II or unspecified type diabetes mellitus without mention of complication, not stated as uncontrolled     Uncomfortable fullness after meals 2019    Unspecified disorder of thyroid     total thyroidectomy    Visual impairment     glasses    Wears glasses     Weight loss 2019    Wheezing      Past Surgical History:   Past Surgical History:   Procedure Laterality Date      1986    CATARACT      CATH ANGIO  2014    CHOLECYSTECTOMY      COLONOSCOPY N/A 2015    Procedure: COLONOSCOPY;  Surgeon: Liz Tong DO;  Location:  ENDOSCOPY    COLONOSCOPY N/A 2021    Procedure: COLONOSCOPY;  Surgeon: Cedrick Da Silva MD;  Location:  ENDOSCOPY    COLONOSCOPY N/A 10/08/2021    Procedure: COLONOSCOPY, ESOPHAGOGASTRODUODENOSCOPY (EGD);  Surgeon: Darvin Richardson MD;  Location:  ENDOSCOPY    D & C  1990    Blighted ovum    EGD      HYSTERECTOMY      total hystero.    IR PORT A CATH INSERTION  EXCHNGE CHECK  2018    LITHOTRIPSY      x 2    LYSIS OF ADHESIONS      NEEDLE BIOPSY LIVER  2016    OOPHORECTOMY Bilateral     total hystero.    OTHER      dialysis catheter- left chest for plasmaphoresis    OTHER SURGICAL HISTORY       x 2    PORT, INDWELLING, IMP  2019    power port    THYROIDECTOMY  2013    benign MNG    TOTAL ABDOM HYSTERECTOMY      UPPER GI ENDOSCOPY PERFORMED  2014      Family History:   Family History   Problem Relation Age of Onset    Heart Attack Father     Other (Other) Father         COPD, emphysema    Asthma Father     Pulmonary Disease Father     Hypertension Mother         Needs to be deleted    Cancer Mother         stomach cancer    Ovarian Cancer Mother         30'S    Colon Polyps Mother     Anemia Mother     Ovarian Cancer Maternal Grandmother         30'S    Colon Polyps Maternal Grandmother     Diabetes Maternal Grandmother     Colon Cancer Maternal Grandmother     Anemia Maternal Grandmother     Cancer Maternal Grandmother     Breast Cancer Maternal Aunt         60'S    Other (Other) Sister         rectal CA\    Anemia Daughter      Social History:    reports that she has never smoked. She has never been exposed to tobacco smoke. She has never used smokeless tobacco. She reports that she does not drink alcohol and does not use drugs.     Review of Systems:   A comprehensive 10 point review of systems was completed.  Pertinent positives and negatives noted in the HPI.    Current Facility-Administered Medications   Medication Dose Route Frequency    HYDROmorphone (Dilaudid) 1 MG/ML injection 0.2 mg  0.2 mg Intravenous Q2H PRN    Or    HYDROmorphone (Dilaudid) 1 MG/ML injection 0.4 mg  0.4 mg Intravenous Q2H PRN    Or    HYDROmorphone (Dilaudid) 1 MG/ML injection 0.8 mg  0.8 mg Intravenous Q2H PRN    fluticasone furoate-vilanterol (Breo Ellipta) 200-25 MCG/ACT inhaler 1 puff  1 puff Inhalation Daily    insulin degludec 100 units/mL flextouch 8 Units  8  Units Subcutaneous Nightly    albumin human (Albumin) 5% injection 3,000 mL  3,000 mL Intravenous QOD    calcium gluconate 3 g in sodium chloride 0.9% 250 mL IVPB   Intravenous QOD    ondansetron (Zofran-ODT) disintegrating tab 4 mg  4 mg Oral Q6H PRN    Or    ondansetron (Zofran) 4 MG/2ML injection 4 mg  4 mg Intravenous Q6H PRN    albuterol (Ventolin HFA) 108 (90 Base) MCG/ACT inhaler 2 puff  2 puff Inhalation Q6H PRN    atorvastatin (Lipitor) tab 20 mg  20 mg Oral Nightly    baclofen (Lioresal) tab 10 mg  10 mg Oral TID    carvedilol (Coreg) tab 3.125 mg  3.125 mg Oral BID    [Held by provider] Diroximel Fumarate CPDR 462 mg **PATIENT SUPPLIED**  462 mg Oral BID    docusate sodium (Colace) cap 100 mg  100 mg Oral BID    DULoxetine (Cymbalta) DR cap 60 mg  60 mg Oral BID    HYDROcodone-acetaminophen (Norco)  MG per tab 1 tablet  1 tablet Oral QID PRN    levothyroxine (Synthroid) tab 88 mcg  88 mcg Oral Before breakfast    montelukast (Singulair) tab 10 mg  10 mg Oral Nightly    thiamine (Vitamin B1) tab 100 mg  100 mg Oral Daily    losartan (Cozaar) tab 25 mg  25 mg Oral Daily    progesterone (Prometrium) cap 100 mg  100 mg Oral Daily    pregabalin (Lyrica) cap 200 mg  200 mg Oral BID    pantoprazole (Protonix) DR tab 20 mg  20 mg Oral BID    glucose (Dex4) 15 GM/59ML oral liquid 15 g  15 g Oral Q15 Min PRN    Or    glucose (Glutose) 40% oral gel 15 g  15 g Oral Q15 Min PRN    Or    glucose-vitamin C (Dex-4) chewable tab 4 tablet  4 tablet Oral Q15 Min PRN    Or    dextrose 50% injection 50 mL  50 mL Intravenous Q15 Min PRN    Or    glucose (Dex4) 15 GM/59ML oral liquid 30 g  30 g Oral Q15 Min PRN    Or    glucose (Glutose) 40% oral gel 30 g  30 g Oral Q15 Min PRN    Or    glucose-vitamin C (Dex-4) chewable tab 8 tablet  8 tablet Oral Q15 Min PRN    acetaminophen (Tylenol Extra Strength) tab 500 mg  500 mg Oral Q4H PRN    ondansetron (Zofran) 4 MG/2ML injection 4 mg  4 mg Intravenous Q6H PRN     prochlorperazine (Compazine) 10 MG/2ML injection 5 mg  5 mg Intravenous Q8H PRN    polyethylene glycol (PEG 3350) (Miralax) 17 g oral packet 17 g  17 g Oral Daily PRN    sennosides (Senokot) tab 17.2 mg  17.2 mg Oral Nightly PRN    bisacodyl (Dulcolax) 10 MG rectal suppository 10 mg  10 mg Rectal Daily PRN    fleet enema (Fleet) 7-19 GM/118ML rectal enema 133 mL  1 enema Rectal Once PRN    insulin aspart (NovoLOG) 100 Units/mL FlexPen 1-10 Units  1-10 Units Subcutaneous TID AC and HS    acetaminophen (Tylenol) 325 mg, codeine 60 mg for Tylenol #4 (EE[facility] only)   Oral Q6H PRN    heparin (Porcine) 100 Units/mL lock flush 500 Units  5 mL Intravenous PRN     OBJECTIVE  Vitals:  BP (!) 75/51 (BP Location: Right arm)   Pulse 62   Temp 97.3 °F (36.3 °C) (Temporal)   Resp 16   Wt 168 lb (76.2 kg)   LMP  (LMP Unknown)   SpO2 91%   BMI 27.12 kg/m²             Physical Exam:    General Appearance: Alert, cooperative, c/o leg pain, appears stated age  Neck: No JVD, neck supple, no adenopathy, trachea midline, no carotid bruits  Lungs: Clear to auscultation bilaterally, respirations unlabored  Heart: Regular rate and rhythm, S1 and S2 normal, no murmur, rub or gallop  Abdomen: Soft, non-tender, bowel sounds active all four quadrants, no masses, no organomegaly  Extremities: Atraumatic, no cyanosis or edema, capillary refill <3 sec.    Pulses: 2+ and symmetric all extremities  Skin: Skin color, texture, turgor normal for ethnicity, no rashes or lesions, warm and dry  Neurologic: CNII-XII intact, normal strength    Data this admission:  IR CENTRAL VENOUS ACCESS    Result Date: 4/6/2024  CONCLUSION: Successful placement of a temporary dialysis catheter via right internal jugular vein. Catheter is ready for use.  Recommend routine catheter care.   LOCATION:  [facility]    Dictated by (CST): Elier Romo MD on 4/06/2024 at 11:52 AM     Finalized by (CST): Elier Romo MD on 4/06/2024 at 11:53 AM       CT ABDOMEN (W+WO)  (CPT=74170)    Result Date: 3/22/2024  CONCLUSION:   1. No evidence of an acute inflammatory process.  2. Sequelae of cholecystectomy.  3. Nonobstructing 5 mm calculus in the right kidney.  There is suggestion of minimal medullary nephrocalcinosis.  4. No lymphadenopathy.    LOCATION:  [facility]   Dictated by (CST): Justin Wharton MD on 3/22/2024 at 11:12 AM     Finalized by (CST): Justin Wharton MD on 3/22/2024 at 11:17 AM       Labs:  Lab Results   Component Value Date    WBC 5.2 04/06/2024    HGB 11.7 04/06/2024    HCT 37.8 04/06/2024    .0 04/06/2024    CREATSERUM 0.91 04/06/2024    BUN 13 04/06/2024     04/06/2024    K 3.9 04/06/2024     04/06/2024    CO2 31.0 04/06/2024     04/06/2024    CA 8.8 04/06/2024    INR 1.00 04/06/2024     Assessment/Plan:  Shock - hypovolemic vs septic  -Labs drawn  -PCT negative  -Received 1.5 L IVF prior to transfer  -Levophed to maintain SBP above 90 and MAP above 65  -Blood cultures pending  -Hold off on antibiotics    MS exacerbation  -Plasmapheresis EOD, received first 4/6  -Allergic to IV steroids and IVIG  -Renal and Neuro following    DM2  -Insulin pump and home PO meds on hold  -Accu-check AC and HS with ISS  -Treseba nightly    Hypothyroidism  -TSH with reflex T4 pending  -Synthroid    HTN  -Hold meds while hypotensive    F/E/N:    -IV fluids  -Follow lytes and replete prn  -Carb control diet    Proph:  -SQ Lovenox  -SCD    Dispo:     Code Status: Full Code  -ICU for close monitoring    Plan of care discussed with intensivist on-call, Dr. Troy.      Reyna MENENDEZ  Critical Care Nurse Practitioner  Spec [id number]    A total of 35 minutes of critical care time (exclusive of billable procedures) was administered. This involved direct patient intervention, complex decision making, and/or extensive discussions (>50% face to face time) with the patient, family, and clinical staff.    The 21st Century Cures Act makes medical notes like these  available to patients in the interest of transparency. Please be advised this is a medical document. Medical documents are intended to carry relevant information, facts as evident, and the clinical opinion of the practitioner. The medical note is intended as peer to peer communication and may appear blunt or direct. It is written in medical language and may contain abbreviations or verbiage that are unfamiliar.         Intensivist addendum:   I agree with APN note above. I have independently seen and evaluated the patient. I agree with the management plan outlined above. PLEX tentatively planned for tomorrow. Wean off norepinephrine gtt. Midodrine started. Will follow while ICU status. Critical care time: 35 minutes    Dominik Samuel MD  Pulmonary and Critical Care Medicine

## 2024-04-07 NOTE — PLAN OF CARE
1/10/2019       Laura Shah  [facility] [place] [address]         Dear Laura,    We have attempted to reach you by telephone and have been unsuccessful.   We received your requests from [facility] for Levothyroxine, Atorvastatin, Metoprolol, test strips, and Humalog.  We did send in the scripts for the last 3.  However, we wanted to confirm that you would be having labs done prior to appt with Faye 1/30.  If so, would you be agreeable to us sending in scripts for Levothyroxine and Atorvastatin to get you to the appt.  (as you know, the lab results might change the doses).  Then, we will order these meds at your appt.  Please let us know.  Call [phone] and ask for the Endocrine Phone nurse.    Thank you.                [facility]'s Office  [address] [place]  [address]   Received pt from [facility] approx. [id number]. A&Ox4. RA. NSR-SB on tele. Afebrile. Hypotensive. Levo gtt initiated, optimizing to maintain BP Goals. Purewick in place. C/o Muscle cramping, Denies pain medication. HD cath deaccessed. Port a cath patent.

## 2024-04-07 NOTE — PLAN OF CARE
Received report from night RN. Pt in bed resting quietly on room air. Pt reports pain to back, BLE, will medicate per MAR. Pt states the pain worsens greatly with ambulation. Pt steady with assist X 1 to BSC. Great uop today. POC continues. Pt to have 2/5 PLEX treatments tomorrow. Pt reporting fatigue, stating that it's depressing for her to be so sleepy. Call light in reach. Will continue to monitor.

## 2024-04-08 LAB
ALBUMIN SERPL-MCNC: 3.8 G/DL (ref 3.4–5)
ALBUMIN SERPL-MCNC: 4.1 G/DL (ref 3.4–5)
ALBUMIN/GLOB SERPL: 2.1 {RATIO} (ref 1–2)
ALBUMIN/GLOB SERPL: 4.1 {RATIO} (ref 1–2)
ALP LIVER SERPL-CCNC: 27 U/L
ALP LIVER SERPL-CCNC: 43 U/L
ALT SERPL-CCNC: 33 U/L
ALT SERPL-CCNC: 54 U/L
ANION GAP SERPL CALC-SCNC: 1 MMOL/L (ref 0–18)
ANION GAP SERPL CALC-SCNC: 7 MMOL/L (ref 0–18)
AST SERPL-CCNC: 19 U/L (ref 15–37)
AST SERPL-CCNC: 27 U/L (ref 15–37)
BASOPHILS # BLD AUTO: 0.01 X10(3) UL (ref 0–0.2)
BASOPHILS NFR BLD AUTO: 0.2 %
BILIRUB SERPL-MCNC: 0.3 MG/DL (ref 0.1–2)
BILIRUB SERPL-MCNC: 0.4 MG/DL (ref 0.1–2)
BUN BLD-MCNC: 10 MG/DL (ref 9–23)
BUN BLD-MCNC: 11 MG/DL (ref 9–23)
CALCIUM BLD-MCNC: 8.6 MG/DL (ref 8.5–10.1)
CALCIUM BLD-MCNC: 8.9 MG/DL (ref 8.5–10.1)
CHLORIDE SERPL-SCNC: 109 MMOL/L (ref 98–112)
CHLORIDE SERPL-SCNC: 110 MMOL/L (ref 98–112)
CO2 SERPL-SCNC: 29 MMOL/L (ref 21–32)
CO2 SERPL-SCNC: 33 MMOL/L (ref 21–32)
CORTIS SERPL-MCNC: 1.2 UG/DL
CREAT BLD-MCNC: 0.7 MG/DL
CREAT BLD-MCNC: 0.96 MG/DL
EGFRCR SERPLBLD CKD-EPI 2021: 101 ML/MIN/1.73M2 (ref 60–?)
EGFRCR SERPLBLD CKD-EPI 2021: 69 ML/MIN/1.73M2 (ref 60–?)
EOSINOPHIL # BLD AUTO: 0 X10(3) UL (ref 0–0.7)
EOSINOPHIL NFR BLD AUTO: 0 %
ERYTHROCYTE [DISTWIDTH] IN BLOOD BY AUTOMATED COUNT: 14.8 %
GLOBULIN PLAS-MCNC: 1 G/DL (ref 2.8–4.4)
GLOBULIN PLAS-MCNC: 1.8 G/DL (ref 2.8–4.4)
GLUCOSE BLD-MCNC: 121 MG/DL (ref 70–99)
GLUCOSE BLD-MCNC: 125 MG/DL (ref 70–99)
GLUCOSE BLD-MCNC: 135 MG/DL (ref 70–99)
GLUCOSE BLD-MCNC: 149 MG/DL (ref 70–99)
GLUCOSE BLD-MCNC: 177 MG/DL (ref 70–99)
GLUCOSE BLD-MCNC: 91 MG/DL (ref 70–99)
HCT VFR BLD AUTO: 36.5 %
HGB BLD-MCNC: 11.2 G/DL
IMM GRANULOCYTES # BLD AUTO: 0.02 X10(3) UL (ref 0–1)
IMM GRANULOCYTES NFR BLD: 0.3 %
LYMPHOCYTES # BLD AUTO: 3.04 X10(3) UL (ref 1–4)
LYMPHOCYTES NFR BLD AUTO: 49.1 %
MAGNESIUM SERPL-MCNC: 1.4 MG/DL (ref 1.6–2.6)
MAGNESIUM SERPL-MCNC: 1.7 MG/DL (ref 1.6–2.6)
MCH RBC QN AUTO: 22.5 PG (ref 26–34)
MCHC RBC AUTO-ENTMCNC: 30.7 G/DL (ref 31–37)
MCV RBC AUTO: 73.3 FL
MONOCYTES # BLD AUTO: 0.44 X10(3) UL (ref 0.1–1)
MONOCYTES NFR BLD AUTO: 7.1 %
NEUTROPHILS # BLD AUTO: 2.68 X10 (3) UL (ref 1.5–7.7)
NEUTROPHILS # BLD AUTO: 2.68 X10(3) UL (ref 1.5–7.7)
NEUTROPHILS NFR BLD AUTO: 43.3 %
OSMOLALITY SERPL CALC.SUM OF ELEC: 299 MOSM/KG (ref 275–295)
OSMOLALITY SERPL CALC.SUM OF ELEC: 302 MOSM/KG (ref 275–295)
PHOSPHATE SERPL-MCNC: 2.5 MG/DL (ref 2.5–4.9)
PLATELET # BLD AUTO: 135 10(3)UL (ref 150–450)
POTASSIUM SERPL-SCNC: 3 MMOL/L (ref 3.5–5.1)
POTASSIUM SERPL-SCNC: 4.1 MMOL/L (ref 3.5–5.1)
PROT SERPL-MCNC: 5.1 G/DL (ref 6.4–8.2)
PROT SERPL-MCNC: 5.6 G/DL (ref 6.4–8.2)
RBC # BLD AUTO: 4.98 X10(6)UL
SODIUM SERPL-SCNC: 144 MMOL/L (ref 136–145)
SODIUM SERPL-SCNC: 145 MMOL/L (ref 136–145)
WBC # BLD AUTO: 6.2 X10(3) UL (ref 4–11)

## 2024-04-08 PROCEDURE — 5A09357 ASSISTANCE WITH RESPIRATORY VENTILATION, LESS THAN 24 CONSECUTIVE HOURS, CONTINUOUS POSITIVE AIRWAY PRESSURE: ICD-10-PCS | Performed by: INTERNAL MEDICINE

## 2024-04-08 PROCEDURE — 99232 SBSQ HOSP IP/OBS MODERATE 35: CPT | Performed by: INTERNAL MEDICINE

## 2024-04-08 PROCEDURE — 99233 SBSQ HOSP IP/OBS HIGH 50: CPT | Performed by: INTERNAL MEDICINE

## 2024-04-08 PROCEDURE — 99233 SBSQ HOSP IP/OBS HIGH 50: CPT

## 2024-04-08 RX ORDER — MIDODRINE HYDROCHLORIDE 10 MG/1
10 TABLET ORAL 3 TIMES DAILY
Status: DISCONTINUED | OUTPATIENT
Start: 2024-04-08 | End: 2024-04-09

## 2024-04-08 RX ORDER — DIPHENHYDRAMINE HYDROCHLORIDE 50 MG/ML
25 INJECTION INTRAMUSCULAR; INTRAVENOUS ONCE
Qty: 1 ML | Refills: 0 | Status: COMPLETED | OUTPATIENT
Start: 2024-04-08 | End: 2024-04-08

## 2024-04-08 RX ORDER — POTASSIUM CHLORIDE 20 MEQ/1
20 TABLET, EXTENDED RELEASE ORAL ONCE
Status: COMPLETED | OUTPATIENT
Start: 2024-04-08 | End: 2024-04-08

## 2024-04-08 RX ORDER — MIDODRINE HYDROCHLORIDE 5 MG/1
5 TABLET ORAL 3 TIMES DAILY
Status: DISCONTINUED | OUTPATIENT
Start: 2024-04-08 | End: 2024-04-08

## 2024-04-08 NOTE — PROGRESS NOTES
[facility]  Nephrology Progress Note    Kat Crook Attending:  Tobi Thapa DO       Assessment and Plan:    1) MS exacerbation- PLEX #2/5 today    2) Hypotension- mild volume depletion +/- meds (ie baclofen)- continue IVF / midodrine prn    3) DM 2    4) Fibromyalgia    5) h/o PE      Subjective:  Awake alert notes reviewed    Physical Exam:   /68 (BP Location: Right arm)   Pulse 63   Temp 97.3 °F (36.3 °C) (Temporal)   Resp 15   Wt 168 lb 6.9 oz (76.4 kg)   LMP  (LMP Unknown)   SpO2 95%   BMI 27.19 kg/m²   Temp (24hrs), Av.7 °F (36.5 °C), Min:97.3 °F (36.3 °C), Max:98.2 °F (36.8 °C)       Intake/Output Summary (Last 24 hours) at 2024 0920  Last data filed at 2024 0800  Gross per 24 hour   Intake 2834.5 ml   Output 3000 ml   Net -165.5 ml     Wt Readings from Last 3 Encounters:   24 168 lb 6.9 oz (76.4 kg)   24 168 lb (76.2 kg)   24 168 lb 6.4 oz (76.4 kg)     General: awake alert  HEENT: No scleral icterus, MMM  Neck: Supple, no REESE or thyromegaly  Cardiac: Regular rate and rhythm, S1, S2 normal, no murmur or tub  Lungs: Decreased BS at bases bilaterally   Abdomen: Soft, non-tender. + bowel sounds, no palpable organomegaly  Extremities: Without clubbing, cyanosis; no edema  Neurologic: Cranial nerves grossly intact, moving all extremities  Skin: Warm and dry, no rashes       Labs:   Lab Results   Component Value Date    WBC 6.2 2024    HGB 11.2 2024    HCT 36.5 2024    .0 2024    CREATSERUM 0.96 2024    BUN 11 2024     2024    K 4.1 2024     2024    CO2 33.0 2024     2024    CA 8.6 2024    ALB 3.8 2024    ALKPHO 43 2024    BILT 0.4 2024    TP 5.6 2024    AST 27 2024    ALT 54 2024    MG 1.7 2024    PGLU 91 2024       Imaging:  All imaging studies reviewed.    Meds:   Current Facility-Administered Medications    Medication Dose Route Frequency    norepinephrine (Levophed) 4 mg/250mL infusion premix  0.5-30 mcg/min Intravenous Continuous    enoxaparin (Lovenox) 40 MG/0.4ML SUBQ injection 40 mg  40 mg Subcutaneous Daily    lactated ringers infusion   Intravenous Continuous    midodrine (ProAmatine) tab 2.5 mg  2.5 mg Oral TID PRN    HYDROmorphone (Dilaudid) 1 MG/ML injection 0.2 mg  0.2 mg Intravenous Q2H PRN    Or    HYDROmorphone (Dilaudid) 1 MG/ML injection 0.4 mg  0.4 mg Intravenous Q2H PRN    Or    HYDROmorphone (Dilaudid) 1 MG/ML injection 0.8 mg  0.8 mg Intravenous Q2H PRN    fluticasone furoate-vilanterol (Breo Ellipta) 200-25 MCG/ACT inhaler 1 puff  1 puff Inhalation Daily    insulin degludec 100 units/mL flextouch 8 Units  8 Units Subcutaneous Nightly    albumin human (Albumin) 5% injection 3,000 mL  3,000 mL Intravenous QOD    calcium gluconate 3 g in sodium chloride 0.9% 250 mL IVPB   Intravenous QOD    ondansetron (Zofran-ODT) disintegrating tab 4 mg  4 mg Oral Q6H PRN    Or    ondansetron (Zofran) 4 MG/2ML injection 4 mg  4 mg Intravenous Q6H PRN    albuterol (Ventolin HFA) 108 (90 Base) MCG/ACT inhaler 2 puff  2 puff Inhalation Q6H PRN    atorvastatin (Lipitor) tab 20 mg  20 mg Oral Nightly    baclofen (Lioresal) tab 10 mg  10 mg Oral TID    carvedilol (Coreg) tab 3.125 mg  3.125 mg Oral BID    [Held by provider] Diroximel Fumarate CPDR 462 mg **PATIENT SUPPLIED**  462 mg Oral BID    docusate sodium (Colace) cap 100 mg  100 mg Oral BID    DULoxetine (Cymbalta) DR cap 60 mg  60 mg Oral BID    HYDROcodone-acetaminophen (Norco)  MG per tab 1 tablet  1 tablet Oral QID PRN    levothyroxine (Synthroid) tab 88 mcg  88 mcg Oral Before breakfast    montelukast (Singulair) tab 10 mg  10 mg Oral Nightly    thiamine (Vitamin B1) tab 100 mg  100 mg Oral Daily    progesterone (Prometrium) cap 100 mg  100 mg Oral Daily    pregabalin (Lyrica) cap 200 mg  200 mg Oral BID    pantoprazole (Protonix) DR tab 20 mg  20 mg  Oral BID    glucose (Dex4) 15 GM/59ML oral liquid 15 g  15 g Oral Q15 Min PRN    Or    glucose (Glutose) 40% oral gel 15 g  15 g Oral Q15 Min PRN    Or    glucose-vitamin C (Dex-4) chewable tab 4 tablet  4 tablet Oral Q15 Min PRN    Or    dextrose 50% injection 50 mL  50 mL Intravenous Q15 Min PRN    Or    glucose (Dex4) 15 GM/59ML oral liquid 30 g  30 g Oral Q15 Min PRN    Or    glucose (Glutose) 40% oral gel 30 g  30 g Oral Q15 Min PRN    Or    glucose-vitamin C (Dex-4) chewable tab 8 tablet  8 tablet Oral Q15 Min PRN    acetaminophen (Tylenol Extra Strength) tab 500 mg  500 mg Oral Q4H PRN    ondansetron (Zofran) 4 MG/2ML injection 4 mg  4 mg Intravenous Q6H PRN    prochlorperazine (Compazine) 10 MG/2ML injection 5 mg  5 mg Intravenous Q8H PRN    polyethylene glycol (PEG 3350) (Miralax) 17 g oral packet 17 g  17 g Oral Daily PRN    sennosides (Senokot) tab 17.2 mg  17.2 mg Oral Nightly PRN    bisacodyl (Dulcolax) 10 MG rectal suppository 10 mg  10 mg Rectal Daily PRN    fleet enema (Fleet) 7-19 GM/118ML rectal enema 133 mL  1 enema Rectal Once PRN    insulin aspart (NovoLOG) 100 Units/mL FlexPen 1-10 Units  1-10 Units Subcutaneous TID AC and HS    acetaminophen (Tylenol) 325 mg, codeine 60 mg for Tylenol #4 ([facility] only)   Oral Q6H PRN    heparin (Porcine) 100 Units/mL lock flush 500 Units  5 mL Intravenous PRN         Questions/concerns were discussed with patient and/or family by bedside.          Adrienne Ji MD  4/8/2024  9:20 AM

## 2024-04-08 NOTE — CM/SW NOTE
04/08/24 1200   CM/SW Referral Data   Referral Source    Reason for Referral Discharge planning   Informant EMR     HOME SITUATION  Type of Home: House   Home Layout: One level  Stairs to Enter : 1  Railing: Yes  Stairs to Bedroom: 0     Lives With: Son  Drives: Yes  Patient Owned Equipment: Cane;Other (Comment) (rollator, shower chair)     Prior Level of [place]: per pt reports, pt amb without the home without assist, endorses \"furniture walking\". Pt uses rollator at times in the home. Pt has cane she uses outside the home and drives short distances. Pt lives with son, who is a professional dancer. Pt has daughter that can assist with needs when son is not at home. Pt denies recent fall hx, reports, \"I catch myself.\"     PT is current with [facility]. [facility] entered for RN/PT.    Bobbi Ribeiro, MSN RN, CM  [phone]

## 2024-04-08 NOTE — DISCHARGE INSTRUCTIONS
Resume services with [facility]  [phone]    Medicare Referrals for Psychiatry and Counseling   [facility]  [address]  [place], [place] [address] [phone]    [facility]  Multiple: [address], [address], [place], [place] -or- [address], [address], [place], [place] -or - [address], [address], [place], [place] -or- [address], [place], [place]  [phone]    [facility], [facility]  [address], [address], [place], [place]  [phone]    [facility]  [address], [address], [place], [place]  [phone]    [facility], [facility]  [address], [address], [place], [place]  [phone]    [facility] Locations: [facility] Locations: [facility] [place] [place] and [place]   [phone]    [facility]   [address], [address], [place], [place]  [phone]

## 2024-04-08 NOTE — OCCUPATIONAL THERAPY NOTE
OCCUPATIONAL THERAPY EVALUATION - INPATIENT     Room Number: 467/467-A  Evaluation Date: 4/8/2024  Type of Evaluation: Initial  Presenting Problem: weakness, MS exacerbation    Physician Order: IP Consult to Occupational Therapy  Reason for Therapy: ADL/IADL Dysfunction and Discharge Planning    OCCUPATIONAL THERAPY ASSESSMENT   Patient is currently functioning near baseline with toileting, bathing, upper body dressing, lower body dressing, grooming, bed mobility, transfers, stating sitting balance, dynamic sitting balance, static standing balance, dynamic standing balance, and functional standing tolerance. Prior to admission, patient's baseline is modified independent with ADLs, short distance driving, some IADLs.  Patient is requiring contact guard assist as a result of the following impairments: decreased endurance, pain, and impaired standing balance. Occupational Therapy will continue to follow for duration of hospitalization.    Patient will benefit from continued skilled OT Services at discharge to promote functional independence in home.  Anticipate patient will return home with home health OT      History Related to Current Admission: Patient is a 54 year old female admitted on 4/5/2024 with Presenting Problem: weakness, MS exacerbation. Co-Morbidities : MS, fibromyalgia, DVT, HLD, type 1 DM  Patient admitted with increased weakness, increased LE pain, and difficulty with walking.  DX with MS exacerbation and being treated with plasma exchange x5     WEIGHT BEARING RESTRICTION  Weight Bearing Restriction: None                Recommendations for nursing staff:   Transfers: 1 person, RW  Toileting location: bathroom     EVALUATION SESSION:  Patient Start of Session: SF position, eager to work with therapy  FUNCTIONAL TRANSFER ASSESSMENT  Sit to Stand: Edge of Bed  Edge of Bed: Contact Guard Assist    BED MOBILITY  Supine to Sit : Supervision  Sit to Supine (OT): Supervision  Scooting: SUP    BALANCE  ASSESSMENT     FUNCTIONAL ADL ASSESSMENT  Eating: Independent  Grooming Seated: Independent  UB Dressing Seated: Supervision  LB Dressing Seated: Supervision      ACTIVITY TOLERANCE: stable on room air, denied    lightheadedness                      O2 SATURATIONS       COGNITION  Overall Cognitive Status:  WFL - within functional limits    Upper Extremity   ROM: within functional limits   Strength: within functional limits 4+/5 BUEs  Coordination  Gross motor: wfl  Fine motor: wfl  Sensation: denied numbness or tingling of UEs    EDUCATION PROVIDED  Patient: Role of Occupational Therapy; Plan of Care; Discharge Recommendations  Patient's Response to Education: Verbalized Understanding    Equipment used: rw, gait belt  Demonstrates functional use, Would benefit from additional trial yes     Therapist comments: Patient was educated on OT role. She transferred supine to sit with supervision and was able to simulate doff/donning socks using figure four position. Patient stood with CGA and completed functional mobility using RW for approximately 80 feet with CGA. OT provided environmental modification ( requested staff to dim hallway lights) to allow patient to increased visual comfort and concentration for mobility. Patient was able to transfer back in to supine with supervision. Denied any difficulty with UEs.    Patient End of Session: All patient questions and concerns addressed;RN aware of session/findings;Call light within reach;Needs met;In bed    OCCUPATIONAL PROFILE    HOME SITUATION  Type of Home: House  Home Layout: One level  Lives With: Family    Toilet and Equipment: Standard height toilet;3-in-1 commode  Shower/Tub and Equipment: Tub-shower combo  Other Equipment: Other (Comment) (lawrence ARMIJO)    Occupation/Status: not currently working     Drives: Yes (short distances only)       Prior Level of Function: Modified independent with ADLs, mobility. Lives with son and has a dtr who is an RN. Drives short  distances. Reported that she has been using high top shoes for increased ankle support and that she is looking in to getting an AFO for her LLE. Wears GARY hose as needed to help manage neuropathy. Follows with neuro-ophthalmologist for issues with double vision related to MS in the past.     SUBJECTIVE   Very pleasant and participatory    PAIN ASSESSMENT  Rating: Other (Comment)  Location: bottoms of feet with weightbearing, or BLEs with some touch  Management Techniques: Nurse notified;Repositioning;Other (Comment) (avoid sudden or rough tactile input)    OBJECTIVE  Precautions: Other (Comment) (h/o  LLE footdrop)  Fall Risk: High fall risk      ASSESSMENTS    AM-PAC ‘6-Clicks’ Inpatient Daily Activity Short Form  -   Putting on and taking off regular lower body clothing?: A Little  -   Bathing (including washing, rinsing, drying)?: A Little  -   Toileting, which includes using toilet, bedpan or urinal? : A Little  -   Putting on and taking off regular upper body clothing?: A Little  -   Taking care of personal grooming such as brushing teeth?: None  -   Eating meals?: None    AM-PAC Score:  Score: 20  Approx Degree of Impairment: 38.32%  Standardized Score (AM-PAC Scale): 42.03    ADDITIONAL TESTS     NEUROLOGICAL FINDINGS  Coordination - Rapid Alternating Movement: Symmetrical     COGNITION ASSESSMENTS       PLAN  OT Treatment Plan: Functional transfer training;Endurance training;ADL training;Energy conservation/work simplification techniques;UE strengthening/ROM;Patient/Family education;Patient/Family training;Equipment eval/education;Compensatory technique education  Rehab Potential : Good  Frequency: 3-5x/week  Number of Visits to Meet Established Goals: 5    ADL Goals   Patient will perform grooming: with supervision and while standing at sink  Patient will perform upper body dressing:  with setup and with supervision  Patient will perform lower body dressing:  with setup, with supervision, and with adaptive  equipment PRN  Patient will perform toileting: with supervision    Functional Transfer Goals  Patient will transfer to toilet:  with supervision    UE Exercise Program Goal  Patient will be supervision with bilateral AROM low resistance HEP (home exercise program).      Patient Evaluation Complexity Level:   Occupational Profile/Medical History LOW - Brief history including review of medical or therapy records    Specific performance deficits impacting engagement in ADL/IADL LOW  1 - 3 performance deficits    Client Assessment/Performance Deficits MODERATE - Comorbidities and min to mod modifications of tasks    Clinical Decision Making MODERATE - Analysis of occupational profile, detailed assessments, several treatment options    Overall Complexity LOW     OT Session Time: 15 minutes    Therapeutic Activity: 10 minutes

## 2024-04-08 NOTE — PLAN OF CARE
Pt A&Ox4. Follows commands. Fine tremors noted in upper extremities. On RA. NSR on tele. Afebrile. Normotensive. Up to BSC  x1 assist. C/o back and abdominal pain. PRNs given. Port A cath patent. Temp HD cath deaccessed. See MAR and flowsheets for additional information.

## 2024-04-08 NOTE — PROGRESS NOTES
[facility]  [facility] Neurology Progress Note    Kat Crook Patient Status:  Inpatient    3/11/1970 MRN [id number]   Location [facility] 4SW-A Attending Tobi Thapa,    Hosp Day # 3 PCP Justin Huerta MD     CC: Multiple sclerosis flare, weakness    Subjective:  Seen for a follow up visit today. No acute events overnight. Still with numbness and tingling, \"pins and needles\" and some pain to the soles of her feet. Denies any difficulty speaking, no double or blurry vision. Tolerating PLEX well so far. Second cycle today. Denies headache, BP has been stable.        MEDICATIONS:  No current outpatient medications on file.     Current Facility-Administered Medications   Medication Dose Route Frequency    potassium chloride 40 mEq in 250mL sodium chloride 0.9% IVPB premix  40 mEq Intravenous Once    enoxaparin (Lovenox) 40 MG/0.4ML SUBQ injection 40 mg  40 mg Subcutaneous Daily    lactated ringers infusion   Intravenous Continuous    midodrine (ProAmatine) tab 2.5 mg  2.5 mg Oral TID PRN    HYDROmorphone (Dilaudid) 1 MG/ML injection 0.2 mg  0.2 mg Intravenous Q2H PRN    Or    HYDROmorphone (Dilaudid) 1 MG/ML injection 0.4 mg  0.4 mg Intravenous Q2H PRN    Or    HYDROmorphone (Dilaudid) 1 MG/ML injection 0.8 mg  0.8 mg Intravenous Q2H PRN    fluticasone furoate-vilanterol (Breo Ellipta) 200-25 MCG/ACT inhaler 1 puff  1 puff Inhalation Daily    insulin degludec 100 units/mL flextouch 8 Units  8 Units Subcutaneous Nightly    albumin human (Albumin) 5% injection 3,000 mL  3,000 mL Intravenous QOD    calcium gluconate 3 g in sodium chloride 0.9% 250 mL IVPB   Intravenous QOD    ondansetron (Zofran-ODT) disintegrating tab 4 mg  4 mg Oral Q6H PRN    Or    ondansetron (Zofran) 4 MG/2ML injection 4 mg  4 mg Intravenous Q6H PRN    albuterol (Ventolin HFA) 108 (90 Base) MCG/ACT inhaler 2 puff  2 puff Inhalation Q6H PRN    atorvastatin (Lipitor) tab 20 mg  20 mg Oral Nightly    baclofen (Lioresal) tab 10 mg  10 mg  Oral TID    carvedilol (Coreg) tab 3.125 mg  3.125 mg Oral BID    [Held by provider] Diroximel Fumarate CPDR 462 mg  462 mg Oral BID    docusate sodium (Colace) cap 100 mg  100 mg Oral BID    DULoxetine (Cymbalta) DR cap 60 mg  60 mg Oral BID    HYDROcodone-acetaminophen (Norco)  MG per tab 1 tablet  1 tablet Oral QID PRN    levothyroxine (Synthroid) tab 88 mcg  88 mcg Oral Before breakfast    montelukast (Singulair) tab 10 mg  10 mg Oral Nightly    thiamine (Vitamin B1) tab 100 mg  100 mg Oral Daily    progesterone (Prometrium) cap 100 mg  100 mg Oral Daily    pregabalin (Lyrica) cap 200 mg  200 mg Oral BID    pantoprazole (Protonix) DR tab 20 mg  20 mg Oral BID    glucose (Dex4) 15 GM/59ML oral liquid 15 g  15 g Oral Q15 Min PRN    Or    glucose (Glutose) 40% oral gel 15 g  15 g Oral Q15 Min PRN    Or    glucose-vitamin C (Dex-4) chewable tab 4 tablet  4 tablet Oral Q15 Min PRN    Or    dextrose 50% injection 50 mL  50 mL Intravenous Q15 Min PRN    Or    glucose (Dex4) 15 GM/59ML oral liquid 30 g  30 g Oral Q15 Min PRN    Or    glucose (Glutose) 40% oral gel 30 g  30 g Oral Q15 Min PRN    Or    glucose-vitamin C (Dex-4) chewable tab 8 tablet  8 tablet Oral Q15 Min PRN    acetaminophen (Tylenol Extra Strength) tab 500 mg  500 mg Oral Q4H PRN    prochlorperazine (Compazine) 10 MG/2ML injection 5 mg  5 mg Intravenous Q8H PRN    polyethylene glycol (PEG 3350) (Miralax) 17 g oral packet 17 g  17 g Oral Daily PRN    sennosides (Senokot) tab 17.2 mg  17.2 mg Oral Nightly PRN    bisacodyl (Dulcolax) 10 MG rectal suppository 10 mg  10 mg Rectal Daily PRN    fleet enema (Fleet) 7-19 GM/118ML rectal enema 133 mL  1 enema Rectal Once PRN    insulin aspart (NovoLOG) 100 Units/mL FlexPen 1-10 Units  1-10 Units Subcutaneous TID AC and HS    acetaminophen (Tylenol) 325 mg, codeine 60 mg for Tylenol #4 ([facility] only)   Oral Q6H PRN    heparin (Porcine) 100 Units/mL lock flush 500 Units  5 mL Intravenous PRN       REVIEW OF  SYSTEMS:  A 10-point system was reviewed.  Pertinent positives and negatives are noted in HPI.      PHYSICAL EXAMINATION:  VITAL SIGNS: BP (!) 84/56 (BP Location: Left arm)   Pulse 63   Temp 96.9 °F (36.1 °C) (Temporal)   Resp 10   Wt 168 lb 6.9 oz (76.4 kg)   LMP  (LMP Unknown)   SpO2 100%   BMI 27.19 kg/m²   GENERAL:  Patient is a 54 year old female in no acute distress.  HEENT:  Normocephalic, atraumatic  ABD: Soft, non tender  SKIN: Warm, dry, no rashes    NEUROLOGICAL:   Mental status: Oriented to person, place, situation and time   Speech: Fluent, no obvious aphasia or dysarthria  Memory and comprehension: Intact   Cranial Nerves: VFF, PERRL 3mm brisk, EOMI, no nystagmus, facial sensation intact, face symmetric, tongue midline, shoulder shrug equal, remainder CN intact  Motor: No drift, no focal arm weakness, 5 out of 5 motor strength bilaterally. BLEs - 4 out of 5 throughout bilaterally.   Sensory: Intact to light touch  Coordination: FTN intact  Gait: Deferred      Imaging/Diagnostics:  IR CENTRAL VENOUS ACCESS    Result Date: 4/6/2024  CONCLUSION: Successful placement of a temporary dialysis catheter via right internal jugular vein. Catheter is ready for use.  Recommend routine catheter care.   LOCATION:  [facility]    Dictated by (CST): Elier Romo MD on 4/06/2024 at 11:52 AM     Finalized by (CST): Elier Romo MD on 4/06/2024 at 11:53 AM          Labs:  Recent Labs   Lab 04/06/24  0420 04/07/24  0105 04/07/24  0443 04/08/24  0454   RBC 5.18 4.69 5.19 4.98   HGB 11.7* 10.9* 11.8* 11.2*   HCT 37.8 34.3* 37.7 36.5   MCV 73.0* 73.1* 72.6* 73.3*   MCH 22.6* 23.2* 22.7* 22.5*   MCHC 31.0 31.8 31.3 30.7*   RDW 15.0 15.2 15.1 14.8   NEPRELIM 1.77  --   --  2.68   WBC 5.2 4.8 5.4 6.2   .0* 129.0* 141.0* 135.0*         Recent Labs   Lab 04/07/24  0443 04/08/24  0455 04/08/24  1422   * 135* 149*   BUN 13 11 10   CREATSERUM 0.89 0.96 0.70   EGFRCR 76 69 101   CA 7.6* 8.6 8.9   * 144 145    K 4.2 4.1 3.0*   * 110 109   CO2 31.0 33.0* 29.0     Assessment/Plan:    A 54 year old female with:    MS exacerbation with LEs weakness - in a setting of allergies to steroids and unable to have IVIG 2/2 hx of DVTs.  Plan for PLEX x 5 cycles every other day as per her primary neurologist Dr. Hernandez's recommendations. LEs weakness about the same today.  Nephrology on consult to help facilitate the PLEX treatments  Plasmapheresis treatements , session 2/5 today 4/8.   PT/OT  Safety and falls precautions  Hypotension, likely secondary to hypovolemic shock, resolved - stable, monitored closely by critical care. Blood cultures pending  Low potassium today at 3.0, and hypomagnesemia at 1.4, advised replacement as able. Nephrology managing.  Pain to feet bilaterally - history of DM2 with neuropathy, A1c is 7.3. Continue Lyrica.   Discussed with patient. Discussed with dr. [place].   Is this a shared or split note between Advanced Practice Provider and Physician? No       Aimee MENENDEZ  [facility] [place]  4/8/2024, 5:17 PM   [place] # [id number]

## 2024-04-08 NOTE — PROGRESS NOTES
ICU  Critical Care APRN Progress Note    NAME: Kat Crook - ROOM: 3615/[facility]-A - MRN: [id number] - Age: 54 year old - :3/11/1970    Subjective:  No acute events overnight        OBJECTIVE  Vitals:  /81   Pulse 55   Temp 98 °F (36.7 °C) (Temporal)   Resp 15   Wt 168 lb 6.9 oz (76.4 kg)   LMP  (LMP Unknown)   SpO2 97%   BMI 27.19 kg/m²             Physical Exam:    General Appearance: Alert, cooperative, c/o leg pain, appears stated age  Neck: No JVD, neck supple, no adenopathy, trachea midline, no carotid bruits  Lungs: Clear to auscultation bilaterally, respirations unlabored  Heart: Regular rate and rhythm, S1 and S2 normal, no murmur, rub or gallop  Abdomen: Soft, non-tender, bowel sounds active all four quadrants, no masses, no organomegaly  Extremities: Atraumatic, no cyanosis or edema, capillary refill <3 sec.    Pulses: 2+ and symmetric all extremities  Skin: Skin color, texture, turgor normal for ethnicity, no rashes or lesions, warm and dry  Neurologic: CNII-XII intact, normal strength    Data this admission:  IR CENTRAL VENOUS ACCESS    Result Date: 2024  CONCLUSION: Successful placement of a temporary dialysis catheter via right internal jugular vein. Catheter is ready for use.  Recommend routine catheter care.   LOCATION:  [place]    Dictated by (CST): Elier Romo MD on 2024 at 11:52 AM     Finalized by (CST): Elier Romo MD on 2024 at 11:53 AM       CT ABDOMEN (W+WO) (CPT=74170)    Result Date: 3/22/2024  CONCLUSION:   1. No evidence of an acute inflammatory process.  2. Sequelae of cholecystectomy.  3. Nonobstructing 5 mm calculus in the right kidney.  There is suggestion of minimal medullary nephrocalcinosis.  4. No lymphadenopathy.    LOCATION:  [place]   Dictated by (CST): Justin Wharton MD on 3/22/2024 at 11:12 AM     Finalized by (CST): Justin Wharton MD on 3/22/2024 at 11:17 AM       Labs:  Lab Results   Component Value Date    WBC 6.2 2024     HGB 11.2 04/08/2024    HCT 36.5 04/08/2024    .0 04/08/2024    CREATSERUM 0.96 04/08/2024    BUN 11 04/08/2024     04/08/2024    K 4.1 04/08/2024     04/08/2024    CO2 33.0 04/08/2024     04/08/2024    CA 8.6 04/08/2024    ALB 3.8 04/08/2024    ALKPHO 43 04/08/2024    BILT 0.4 04/08/2024    TP 5.6 04/08/2024    AST 27 04/08/2024    ALT 54 04/08/2024    MG 1.7 04/08/2024     Assessment/Plan:  Shock - hypovolemic vs septic  -Labs drawn  -PCT negative  -Received 1.5 L IVF prior to transfer  -Levophed to maintain SBP above 90 and MAP above 65- currently off  -Blood cultures pending  -Hold off on antibiotics    MS exacerbation  -Plasmapheresis EOD, received first 4/6, due today  -Allergic to IV steroids and IVIG  -Renal and Neuro following  -Low O2 sat with sleep, will trial cpap    DM2  -Insulin pump and home PO meds on hold  -Accu-check AC and HS with ISS  -Treseba nightly    Hypothyroidism  -TSH with reflex T4 low  -continue home Synthroid    HTN  -Hold meds while hypotensive    F/E/N:    -IVF per renal  -Follow lytes and replete prn  -Carb control diet    Proph:  -SQ Lovenox  -SCD    Dispo:     Code Status: Full Code  -ICU for monitoring, may transfer to floor after PLEX if tolerated.    Plan of care discussed with intensivist on-call, Dr. Quique Hooper, [facility]  Critical Care NP  Phone [phone]    Addendum    I agree with critical care APN note above. I have independently seen & evaluated the patient.  I agree with the management plan outlined above with the above changes/additions.    Shock has resolved  Pt is getting plex treatments for her MS.  This afternoon she had an episode where her leg felt numb and looked somewhat contracted.  We are checking electrolytes and notifying neurology      Marc Lei  Pulmonary And Critical Care  On call Intensivist 04/08/24

## 2024-04-08 NOTE — PROGRESS NOTES
[facility]   part of [facility]     Hospitalist Progress Note     Kat Crook Patient Status:  Inpatient    3/11/1970 MRN [id number]   Location [facility] 3NE-A Attending Tobi Thapa,    Hosp Day # 3 PCP Justin Huerta MD     Chief Complaint: MS flare    Subjective:     Patient off Levophed.  Reports pain in her lower extremities.    Objective:    Review of Systems:   A comprehensive review of systems was completed; pertinent positive and negatives stated in subjective.    Vital signs:  Temp:  [96.3 °F (35.7 °C)-98.2 °F (36.8 °C)] 96.3 °F (35.7 °C)  Pulse:  [55-78] 60  Resp:  [3-19] 14  BP: ()/(51-91) 106/68  SpO2:  [90 %-100 %] 95 %    Physical Exam:    General: No acute distress, Alert  Respiratory: No rhonchi, no wheezes  Cardiovascular: S1, S2. Regular rate and rhythm  Abdomen: Soft, Non-tender, non-distended, positive bowel sounds  Neuro: BROWN x 4, speech slow  Extremities: No edema    Diagnostic Data:    Labs:  Recent Labs   Lab 24  1318 24  0420 24  0105 24  0443 24  0454   WBC 5.1 5.2 4.8 5.4 6.2   HGB 12.6 11.7* 10.9* 11.8* 11.2*   MCV 73.2* 73.0* 73.1* 72.6* 73.3*   .0 143.0* 129.0* 141.0* 135.0*   INR  --  1.00  --   --   --      Recent Labs   Lab 24  1318 24  0420 24  0105 24  0443 24  0455   *   < > 106* 140* 135*   BUN 15   < > 13 13 11   CREATSERUM 0.93   < > 0.93 0.89 0.96   CA 9.5   < > 7.5* 7.6* 8.6   ALB 3.7  --   --  4.3 3.8      < > 147* 146* 144   K 4.0   < > 4.1 4.2 4.1      < > 114* 113* 110   CO2 30.0   < > 29.0 31.0 33.0*   ALKPHO 66  --   --  27* 43*   AST 24  --   --  55* 27   ALT 39  --   --  68* 54   BILT 0.4  --   --  0.6 0.4   TP 7.5  --   --  5.3* 5.6*    < > = values in this interval not displayed.     Estimated Creatinine Clearance: 61.3 mL/min (based on SCr of 0.96 mg/dL).    No results for input(s): \"TROP\", \"TROPHS\", \"CK\" in the last 168 hours.    Recent Labs   Lab  04/06/24  0420   PTP 13.2   INR 1.00      Microbiology  Hospital Encounter on 04/05/24   1. Blood Culture     Status: None (Preliminary result)    Collection Time: 04/07/24  1:05 AM    Specimen: Bld,Port a cath Line; Blood   Result Value Ref Range    Blood Culture Result No Growth 1 Day N/A   2. Urine Culture, Routine     Status: None    Collection Time: 04/06/24 12:28 AM    Specimen: Urine, clean catch   Result Value Ref Range    Urine Culture No Growth at 18-24 hrs. N/A     Imaging: Reviewed in Epic.    Medications:    diphenhydrAMINE  25 mg Intravenous Once    enoxaparin  40 mg Subcutaneous Daily    fluticasone furoate-vilanterol  1 puff Inhalation Daily    insulin degludec  8 Units Subcutaneous Nightly    albumin human  3,000 mL Intravenous QOD    calcium gluconate 3 g in sodium chloride 0.9% 250 mL IVPB   Intravenous QOD    atorvastatin  20 mg Oral Nightly    baclofen  10 mg Oral TID    carvedilol  3.125 mg Oral BID    [Held by provider] Diroximel Fumarate  462 mg Oral BID    docusate sodium  100 mg Oral BID    DULoxetine  60 mg Oral BID    levothyroxine  88 mcg Oral Before breakfast    montelukast  10 mg Oral Nightly    thiamine  100 mg Oral Daily    progesterone  100 mg Oral Daily    pregabalin  200 mg Oral BID    pantoprazole  20 mg Oral BID    insulin aspart  1-10 Units Subcutaneous TID AC and HS       Assessment & Plan:      #MS Exacerbation  -Anaphylaxis to steroids and unable to tolerate IVIG due to hx of DVT  -PLEX second session today  -Renal and neuro consult  -[facility]  -Pain control    #Shock, suspect hypovolemic.  Resolved  -Off levophed  -IVF, stop tomorrow  -PCT negative, follow blood cultures, monitor off abx  -Critical care following     #DM type II with neuropathy  -A1c is 7.3  -Hyperglycemia protocol  -On insulin pump PTA - will transition to basal bolus insulin  -Lyrica     #Hypothyroidism  -Levothyroxine    #Hypertension  -Resume home  meds    #Dyslipidemia  -Statin    #GERD  -PPI    #Hx of GIORDANO  -LFTs normalized    #Hypokalemia  -Replace    #Alpha thalassemia minor - monitor hgb  #H/o PE in 2021 - not on anticoagulation  #Fibromyalgia - continue home meds  #Asthma - resume home inhaler and singulair    Tobi Thapa, DO    Supplementary Documentation:     Quality:  DVT Mechanical Prophylaxis:   SCDs, Early ambuation  DVT Pharmacologic Prophylaxis   Medication    enoxaparin (Lovenox) 40 MG/0.4ML SUBQ injection 40 mg    heparin (Porcine) 100 Units/mL lock flush 500 Units     Code Status: Full Code  Panda: No urinary catheter in place  KAYLEY: TBD    Discharge is dependent on: Clinical state, consultant recs  At this point Ms. Crook is expected to be discharge to: Home    The 21st Century Cures Act makes medical notes like these available to patients in the interest of transparency. Please be advised this is a medical document. Medical documents are intended to carry relevant information, facts as evident, and the clinical opinion of the practitioner. The medical note is intended as peer to peer communication and may appear blunt or direct. It is written in medical language and may contain abbreviations or verbiage that are unfamiliar.

## 2024-04-08 NOTE — PROGRESS NOTES
Received pt this morning. AOx4. Drowsy this afternoon after plasmapheresis/pain medication/Benadryl but easily awakens to voice. On room air, using CPAP with sleep. NSR on telemetry. Up with standby assist to bedside commode. Pt noted right leg \"not going down\" while sitting on commode, appeared contracted or with muscle spasms. ICU APN and neuro APN notified, labs ordered. K+ low, notified nephrologist, replacement ordered. RLE noted to be weaker than left (upper extremities strength equal). Plasmapheresis #2/5 today, no complications noted during treatment. Hypotensive after, PRN midodrine given. Pt still hypotensive despite PRN dose, scheduled midodrine ordered by ICU APN. See flowsheets and MAR for further data.

## 2024-04-08 NOTE — PHYSICAL THERAPY NOTE
PHYSICAL THERAPY TREATMENT NOTE - INPATIENT    Room Number: 467/467-A     Session: 1   Number of Visits to Meet Established Goals: 5    Presenting Problem: MS exacerbation, weakness  Co-Morbidities : HTN, MS, GIORDANO, DM2, h/o PE    HOME SITUATION  Type of Home: House   Home Layout: One level  Stairs to Enter : 1  Railing: Yes  Stairs to Bedroom: 0     Lives With: Son  Drives: Yes  Patient Owned Equipment: Cane;Other (Comment) (rollator, shower chair)     Prior Level of [place]: per pt reports, pt amb without the home without assist, endorses \"furniture walking\". Pt uses rollator at times in the home. Pt has cane she uses outside the home and drives short distances. Pt lives with son, who is a professional dancer. Pt has daughter that can assist with needs when son is not at home. Pt denies recent fall hx, reports, \"I catch myself.\"  Pt has AFO's for chronic drop foot, but reports that she does not wear them as they cause hip pain when she does.        ASSESSMENT   Patient demonstrates good  progress this session as pt is able to significantly increase ambulation distance.  Pain and fatigue remain limiting factors to increased activity tolerance.  Goals  remain in progress.    Patient continues to function below baseline with bed mobility, transfers, gait, and standing prolonged periods.  Contributing factors to remaining limitations include decreased functional strength, pain, impaired standing balance, and decreased muscular endurance.  Next session anticipate patient to progress bed mobility, transfers, gait, and standing prolonged periods.  Physical therapy will continue to follow patient for duration of hospitalization.    Patient continues to benefit from continued skilled PT services: at discharge to promote functional independence and safety with additional support and return home with home health PT.    PLAN  PT Treatment Plan: Endurance;Energy conservation;Patient education;Family education;Gait  training;Neuromuscular re-educate;Strengthening;Stair training;Transfer training;Balance training  Rehab Potential : Good  Frequency (Obs): 3-5x/week    CURRENT GOALS     Goal #1 Patient is able to demonstrate supine - sit EOB @ level: supervision      Goal #2 Patient is able to demonstrate transfers Sit to/from Stand at assistance level: supervision      Goal #3 Patient is able to ambulate 50 feet with assist device: walker - rolling at assistance level: supervision      Goal #4 Pt able to ascend/descend 1 step with rail with CGA   Goal #5     Goal #6     Goal Comments: Goals established on 2024 all goals ongoing    SUBJECTIVE  Pt excited for physical therapy! \"I love to walk.\"    OBJECTIVE  Precautions: Bed/chair alarm    WEIGHT BEARING RESTRICTION  Weight Bearing Restriction: None                PAIN ASSESSMENT   Ratin  Location: bilateral feet and LE's, pins and needles  Management Techniques: Repositioning    BALANCE                                                                                                                       Static Sitting: Good  Dynamic Sitting: Fair +           Static Standing: Fair -  Dynamic Standing: Poor +    AM-PAC '6-Clicks' INPATIENT SHORT FORM - BASIC MOBILITY  How much difficulty does the patient currently have...  Patient Difficulty: Turning over in bed (including adjusting bedclothes, sheets and blankets)?: None   Patient Difficulty: Sitting down on and standing up from a chair with arms (e.g., wheelchair, bedside commode, etc.): A Little   Patient Difficulty: Moving from lying on back to sitting on the side of the bed?: A Little   How much help from another person does the patient currently need...   Help from Another: Moving to and from a bed to a chair (including a wheelchair)?: A Little   Help from Another: Need to walk in hospital room?: A Little   Help from Another: Climbing 3-5 steps with a railing?: A Little       AM-PAC Score:  Raw Score: 19    Approx Degree of Impairment: 41.77%   Standardized Score (AM-PAC Scale): 45.44   CMS Modifier (G-Code): CK    FUNCTIONAL ABILITY STATUS  Gait Assessment   Functional Mobility/Gait Assessment  Gait Assistance: Contact guard assist  Distance (ft): 150  Assistive Device: Rolling walker  Pattern: R Steppage;L Steppage    Skilled Therapy Provided    Bed Mobility:  Rolling: SBA   Supine>Sit: SBA   Sit<>Supine: SBA     Transfer Mobility:  Sit<>Stand: CGA   Stand<>Sit: CGA   Gait: CGA with RW    Therapist's Comments: Pt required standing rest breaks during ambulation secondary to fatigue of leg musculature.      Patient End of Session: In bed;Needs met;Call light within reach;RN aware of session/findings;All patient questions and concerns addressed    PT Session Time: 15 minutes  Gait Training: 15 minutes

## 2024-04-09 ENCOUNTER — APPOINTMENT (OUTPATIENT)
Dept: CV DIAGNOSTICS | Facility: HOSPITAL | Age: 54
End: 2024-04-09
Attending: NURSE PRACTITIONER
Payer: MEDICARE

## 2024-04-09 LAB
ALBUMIN SERPL-MCNC: 4 G/DL (ref 3.4–5)
ALBUMIN/GLOB SERPL: 4.4 {RATIO} (ref 1–2)
ALP LIVER SERPL-CCNC: 23 U/L
ALT SERPL-CCNC: 23 U/L
ANION GAP SERPL CALC-SCNC: 3 MMOL/L (ref 0–18)
AST SERPL-CCNC: 7 U/L (ref 15–37)
ATRIAL RATE: 50 BPM
BASOPHILS # BLD AUTO: 0 X10(3) UL (ref 0–0.2)
BASOPHILS NFR BLD AUTO: 0 %
BILIRUB SERPL-MCNC: 0.4 MG/DL (ref 0.1–2)
BUN BLD-MCNC: 12 MG/DL (ref 9–23)
CALCIUM BLD-MCNC: 8.5 MG/DL (ref 8.5–10.1)
CHLORIDE SERPL-SCNC: 109 MMOL/L (ref 98–112)
CO2 SERPL-SCNC: 31 MMOL/L (ref 21–32)
CORTIS SERPL-MCNC: 1.4 UG/DL
CREAT BLD-MCNC: 0.89 MG/DL
EGFRCR SERPLBLD CKD-EPI 2021: 76 ML/MIN/1.73M2 (ref 60–?)
EOSINOPHIL # BLD AUTO: 0 X10(3) UL (ref 0–0.7)
EOSINOPHIL NFR BLD AUTO: 0 %
ERYTHROCYTE [DISTWIDTH] IN BLOOD BY AUTOMATED COUNT: 14.6 %
GLOBULIN PLAS-MCNC: 0.9 G/DL (ref 2.8–4.4)
GLUCOSE BLD-MCNC: 125 MG/DL (ref 70–99)
GLUCOSE BLD-MCNC: 141 MG/DL (ref 70–99)
GLUCOSE BLD-MCNC: 153 MG/DL (ref 70–99)
GLUCOSE BLD-MCNC: 168 MG/DL (ref 70–99)
GLUCOSE BLD-MCNC: 178 MG/DL (ref 70–99)
HCT VFR BLD AUTO: 33.3 %
HGB BLD-MCNC: 10.6 G/DL
IMM GRANULOCYTES # BLD AUTO: 0.01 X10(3) UL (ref 0–1)
IMM GRANULOCYTES NFR BLD: 0.2 %
LYMPHOCYTES # BLD AUTO: 2.5 X10(3) UL (ref 1–4)
LYMPHOCYTES NFR BLD AUTO: 38.2 %
MCH RBC QN AUTO: 22.8 PG (ref 26–34)
MCHC RBC AUTO-ENTMCNC: 31.8 G/DL (ref 31–37)
MCV RBC AUTO: 71.8 FL
MONOCYTES # BLD AUTO: 0.48 X10(3) UL (ref 0.1–1)
MONOCYTES NFR BLD AUTO: 7.3 %
NEUTROPHILS # BLD AUTO: 3.55 X10 (3) UL (ref 1.5–7.7)
NEUTROPHILS # BLD AUTO: 3.55 X10(3) UL (ref 1.5–7.7)
NEUTROPHILS NFR BLD AUTO: 54.3 %
OSMOLALITY SERPL CALC.SUM OF ELEC: 298 MOSM/KG (ref 275–295)
P AXIS: 54 DEGREES
P-R INTERVAL: 138 MS
PLATELET # BLD AUTO: 107 10(3)UL (ref 150–450)
PLATELETS.RETICULATED NFR BLD AUTO: 9 % (ref 0–7)
POTASSIUM SERPL-SCNC: 4.3 MMOL/L (ref 3.5–5.1)
PROT SERPL-MCNC: 4.9 G/DL (ref 6.4–8.2)
Q-T INTERVAL: 460 MS
QRS DURATION: 84 MS
QTC CALCULATION (BEZET): 419 MS
R AXIS: 13 DEGREES
RBC # BLD AUTO: 4.64 X10(6)UL
SODIUM SERPL-SCNC: 143 MMOL/L (ref 136–145)
T AXIS: 38 DEGREES
TROPONIN I SERPL HS-MCNC: 9 NG/L
VENTRICULAR RATE: 50 BPM
WBC # BLD AUTO: 6.5 X10(3) UL (ref 4–11)

## 2024-04-09 PROCEDURE — 93306 TTE W/DOPPLER COMPLETE: CPT | Performed by: NURSE PRACTITIONER

## 2024-04-09 PROCEDURE — 99232 SBSQ HOSP IP/OBS MODERATE 35: CPT | Performed by: INTERNAL MEDICINE

## 2024-04-09 PROCEDURE — 99233 SBSQ HOSP IP/OBS HIGH 50: CPT | Performed by: INTERNAL MEDICINE

## 2024-04-09 RX ORDER — COSYNTROPIN 0.25 MG/ML
0.25 INJECTION, POWDER, FOR SOLUTION INTRAMUSCULAR; INTRAVENOUS ONCE
Qty: 1 ML | Refills: 0 | Status: DISCONTINUED | OUTPATIENT
Start: 2024-04-09 | End: 2024-04-11

## 2024-04-09 NOTE — PLAN OF CARE
Patient tolerated PLEX # 2 yesterday well. Day off today.   Plan for PLEX cycle # 3 on Wednesday.   Doing well, able to walk to the bathroom today.  Will follow up with her tomorrow.     Aimee Leach [facility] # [id number]

## 2024-04-09 NOTE — PROGRESS NOTES
ICU  Critical Care APRN Progress Note    NAME: Kat Crook - ROOM: 3615/3615-A - MRN: [id number] - Age: 54 year old - :3/11/1970    Subjective:  No issues overnight. Complains of right sided jaw pain, abdominal pain and chest pain-aching, constant \"8/10\". Overall weakness improved, denies numbness/tingling.        OBJECTIVE  Vitals:  /70   Pulse 52   Temp 97.5 °F (36.4 °C) (Temporal)   Resp 11   Wt 168 lb 6.9 oz (76.4 kg)   LMP  (LMP Unknown)   SpO2 95%   BMI 27.19 kg/m²             O2: 2 L NC    Physical Exam:    General Appearance: Alert, cooperative, c/o right neck pain, CP and abdominal pain, appears stated age  Neck: No JVD, neck supple, no adenopathy, trachea midline, no carotid bruits  Lungs: Clear to auscultation bilaterally, respirations unlabored  Heart: Regular rate and rhythm, S1 and S2 normal, no murmur, rub or gallop  Abdomen: Soft, non-tender, bowel sounds active all four quadrants, no masses, no organomegaly  Extremities: Atraumatic, no cyanosis or edema, capillary refill <3 sec.    Pulses: 2+ and symmetric all extremities  Skin: Skin color, texture, turgor normal for ethnicity, no rashes or lesions, warm and dry  Neurologic: CNII-XII intact, strength and weakness improved per patient    Data this admission:  None this admission    Labs:  Lab Results   Component Value Date    WBC 6.5 2024    HGB 10.6 2024    HCT 33.3 2024    .0 2024    CREATSERUM 0.89 2024    BUN 12 2024     2024    K 4.3 2024     2024    CO2 31.0 2024     2024    CA 8.5 2024    ALB 4.0 2024    ALKPHO 23 2024    BILT 0.4 2024    TP 4.9 2024    AST 7 2024    ALT 23 2024    MG 1.4 2024    PHOS 2.5 2024     Assessment/Plan:    Shock: Likely hypovolemic given improvement with hydration. Less likely septic--> resolving  -Hypotensive with PLEX initiation  -Midodrine was started,  now discontinued   -PCT negative  -LA normal  -cortisol 1.2. Will repeat this afternoon  -s/p IVF bolus on admission (1.5L)  -Vasopressors to maintain SBP above 90 and MAP above 65- currently off  -Blood cultures NGTD, UA negative  -Monitoring off antbx    MS exacerbation with LE weakness  -Weakness improved this morning, numbness/tingling minimal when ambulating  -Allergy to steroids, unable to have IVIG 2/2 DVTs  -Plan for PLEX x 5 days QOD. Received 2/5 on 4/8.   -Neuro following  -Renal following for PLEX  -PT/OT    Chest, right neck and epigastric pain  -Patient complained of new onset ache-like pain this morning, \"8/10\", improved with Norco  -EKG with SB, TWIs in V1-V2 unchanged from prior  -Troponin negative  -TTE ordered  -Protonix   -monitor    Bradycardia  -HR high 40s-50s bpm  -Sinus homar on EKG  -Holding coreg  -TTE ordered  -monitor on tele    Thrombocytopenia  -Plts slowly down-trending  -No signs of bleeding  -Monitor daily CBC    DM2  -A1c 7.3  -Insulin pump and home PO meds on hold  -Accu-check AC and HS   - ISS  -Treseba nightly    Hypothyroidism         -TSH with reflex T4 low         - home Synthroid    HTN        -Hold meds while hypotensive    F/E/N:    -IVF per renal  -Follow lytes and replete prn  -Carb control diet    Proph:  -SQ Lovenox  -PT/OT  -SCD    Dispo:     Code Status: Full Code  -ICU for monitoring until PLEX completed    Plan of care discussed with intensivist on-call, Dr. Quique Hernandez, [facility]  ICU  Phone  [phone]   Pager [phone]      Addendum    I agree with critical care APN note above. I have independently seen & evaluated the patient.  I agree with the management plan outlined above with the above changes/additions.    Pt feeling depressed today and would like to talk w/ someone about it.  Consult for psych placed.  Monitor counts, Await echo and repeat cortisol.  Cont PLEX      Marc Lei  Pulmonary And Critical Care  On call Intensivist 04/09/24

## 2024-04-09 NOTE — PROGRESS NOTES
[facility]   part of [facility]     Hospitalist Progress Note     Kat Crook Patient Status:  Inpatient    3/11/1970 MRN [id number]   Location [facility] 3NE-A Attending Tobi Thapa, DO   Hosp Day # 4 PCP Justin Huerta MD     Chief Complaint: MS flare    Subjective:     Patient felt some throat and jaw discomfort after taking a couple medications this morning.  She was given Norco and symptoms completely resolved.  She feels much better later in the morning and is in good spirits.  She was able to ambulate to the bathroom today.    Objective:    Review of Systems:   A comprehensive review of systems was completed; pertinent positive and negatives stated in subjective.    Vital signs:  Temp:  [96.3 °F (35.7 °C)-97.5 °F (36.4 °C)] 97.5 °F (36.4 °C)  Pulse:  [47-68] 60  Resp:  [8-16] 15  BP: ()/(46-78) 103/56  SpO2:  [88 %-100 %] 94 %    Physical Exam:    General: No acute distress, Alert  Respiratory: No rhonchi, no wheezes  Cardiovascular: S1, S2. Regular rate and rhythm  Abdomen: Soft, Non-tender, non-distended, positive bowel sounds  Neuro: BROWN x 4, speech slow  Extremities: No edema    Diagnostic Data:    Labs:  Recent Labs   Lab 24  0420 24  0105 24  0443 24  0454 24  0552   WBC 5.2 4.8 5.4 6.2 6.5   HGB 11.7* 10.9* 11.8* 11.2* 10.6*   MCV 73.0* 73.1* 72.6* 73.3* 71.8*   .0* 129.0* 141.0* 135.0* 107.0*   INR 1.00  --   --   --   --      Recent Labs   Lab 24  0455 24  1422 24  0552   * 149* 141*   BUN 11 10 12   CREATSERUM 0.96 0.70 0.89   CA 8.6 8.9 8.5   ALB 3.8 4.1 4.0    145 143   K 4.1 3.0* 4.3    109 109   CO2 33.0* 29.0 31.0   ALKPHO 43* 27* 23*   AST 27 19 7*   ALT 54 33 23   BILT 0.4 0.3 0.4   TP 5.6* 5.1* 4.9*     Estimated Creatinine Clearance: 66.1 mL/min (based on SCr of 0.89 mg/dL).    Recent Labs   Lab 24  0912   TROPHS 9     Recent Labs   Lab 24  0420   PTP 13.2   INR 1.00       Microbiology  Hospital Encounter on 04/05/24   1. Blood Culture     Status: None (Preliminary result)    Collection Time: 04/07/24  1:05 AM    Specimen: Bld,Port a cath Line; Blood   Result Value Ref Range    Blood Culture Result No Growth 2 Days N/A   2. Urine Culture, Routine     Status: None    Collection Time: 04/06/24 12:28 AM    Specimen: Urine, clean catch   Result Value Ref Range    Urine Culture No Growth at 18-24 hrs. N/A     Imaging: Reviewed in Epic.    Medications:    enoxaparin  40 mg Subcutaneous Daily    fluticasone furoate-vilanterol  1 puff Inhalation Daily    insulin degludec  8 Units Subcutaneous Nightly    albumin human  3,000 mL Intravenous QOD    calcium gluconate 3 g in sodium chloride 0.9% 250 mL IVPB   Intravenous QOD    atorvastatin  20 mg Oral Nightly    baclofen  10 mg Oral TID    [Held by provider] carvedilol  3.125 mg Oral BID    [Held by provider] Diroximel Fumarate  462 mg Oral BID    docusate sodium  100 mg Oral BID    DULoxetine  60 mg Oral BID    levothyroxine  88 mcg Oral Before breakfast    montelukast  10 mg Oral Nightly    thiamine  100 mg Oral Daily    progesterone  100 mg Oral Daily    pregabalin  200 mg Oral BID    pantoprazole  20 mg Oral BID    insulin aspart  1-10 Units Subcutaneous TID AC and HS       Assessment & Plan:      #MS Exacerbation  -Anaphylaxis to steroids and unable to tolerate IVIG due to hx of DVT  -PLEX third session out of five tomorrow  -Renal and neuro consult  [facility]  -Pain control    #Shock, suspect hypovolemic.  Resolved  -Off levophed  -Stop IV fluids  -PCT negative, follow blood cultures (NGTD), monitor off abx  -Critical care following     #DM type II with neuropathy  -A1c is 7.3  -Hyperglycemia protocol  -On insulin pump PTA - transitioned to basal bolus insulin  -Lyrica     #Hypothyroidism  -Levothyroxine    #Hypertension  -Resume home meds but hold coreg due to  bradycardia    #Dyslipidemia  -Statin    #GERD  -PPI    #Hx of GIORDANO  -LFTs normalized     #Thrombocytopenia, trending down. Does have GIORDANO and low platelets in the past  -Monitor    #Bradycardia  -Hold coreg  -Echo    #Hypokalemia  -Replace    #Alpha thalassemia minor - monitor hgb  #H/o PE in 2021 - not on anticoagulation  #Fibromyalgia - continue home meds  #Asthma - resume home inhaler and singulair    Tobi Thapa, DO    Supplementary Documentation:     Quality:  DVT Mechanical Prophylaxis:   SCDs, Early ambuation  DVT Pharmacologic Prophylaxis   Medication    enoxaparin (Lovenox) 40 MG/0.4ML SUBQ injection 40 mg    heparin (Porcine) 100 Units/mL lock flush 500 Units     Code Status: Full Code  Panda: No urinary catheter in place  KAYLEY: TBD    Discharge is dependent on: Clinical state, consultant recs  At this point Ms. Crook is expected to be discharge to: Home    The 21st Century Cures Act makes medical notes like these available to patients in the interest of transparency. Please be advised this is a medical document. Medical documents are intended to carry relevant information, facts as evident, and the clinical opinion of the practitioner. The medical note is intended as peer to peer communication and may appear blunt or direct. It is written in medical language and may contain abbreviations or verbiage that are unfamiliar.

## 2024-04-09 NOTE — PLAN OF CARE
Pt received at shift change A&Ox4, able to communicate needs and move all extremities. Pt complains of pain,given PRN pain meds per MAR. VSS,afebrile. Pt tolerating diet. No BM, pt given colace and declined Miralax. Pt up to restroom with contact guard assist. Patient updated daughter on her plan of care. Safety and comfort maintained throughout shift, see flowsheets for more information.  Problem: Diabetes/Glucose Control  Goal: Glucose maintained within prescribed range  Description: INTERVENTIONS:  - Monitor Blood Glucose as ordered  - Assess for signs and symptoms of hyperglycemia and hypoglycemia  - Administer ordered medications to maintain glucose within target range  - Assess barriers to adequate nutritional intake and initiate nutrition consult as needed  - Instruct patient on self management of diabetes  4/9/2024 1857 by Clarisa Valentin, RN  Outcome: Progressing  4/9/2024 1856 by Clarisa Valentin, RN  Outcome: Progressing     Problem: GASTROINTESTINAL - ADULT  Goal: Maintains or returns to baseline bowel function  Description: INTERVENTIONS:  - Assess bowel function  - Maintain adequate hydration with IV or PO as ordered and tolerated  - Evaluate effectiveness of GI medications  - Encourage mobilization and activity  - Obtain nutritional consult as needed  - Establish a toileting routine/schedule  - Consider collaborating with pharmacy to review patient's medication profile  Outcome: Progressing     Problem: METABOLIC/FLUID AND ELECTROLYTES - ADULT  Goal: Electrolytes maintained within normal limits  Description: INTERVENTIONS:  - Monitor labs and rhythm and assess patient for signs and symptoms of electrolyte imbalances  - Administer electrolyte replacement as ordered  - Monitor response to electrolyte replacements, including rhythm and repeat lab results as appropriate  - Fluid restriction as ordered  - Instruct patient on fluid and nutrition restrictions as appropriate  Outcome: Progressing  Goal:  Hemodynamic stability and optimal renal function maintained  Description: INTERVENTIONS:  - Monitor labs and assess for signs and symptoms of volume excess or deficit  - Monitor intake, output and patient weight  - Monitor urine specific gravity, serum osmolarity and serum sodium as indicated or ordered  - Monitor response to interventions for patient's volume status, including labs, urine output, blood pressure (other measures as available)  - Encourage oral intake as appropriate  - Instruct patient on fluid and nutrition restrictions as appropriate  Outcome: Progressing     Problem: Impaired Functional Mobility  Goal: Achieve highest/safest level of mobility/gait  Description: Interventions:  - Assess patient's functional ability and stability  - Promote increasing activity/tolerance for mobility and gait  - Educate and engage patient/family in tolerated activity level and precautions  - Recommend use of  cane for transfers and ambulation  Outcome: Progressing     Problem: Impaired Activities of Daily Living  Goal: Achieve highest/safest level of independence in self care  Description: Interventions:  - Assess ability and encourage patient to participate in ADLs to maximize function  - Promote sitting position while performing ADLs such as feeding, grooming, and bathing  - Educate and encourage patient/family in tolerated functional activity level and precautions during self-care  - Provide support under elbow of weak side to prevent shoulder subluxation  Outcome: Progressing

## 2024-04-09 NOTE — PLAN OF CARE
Received patient following report. Patient alert and oriented. FC and BROWN. Stuttering at baseline. Numbness and tingling to lower extremities. Mild generalized pain, refusing pain meds. SB on tele. BP adequate. Room air. IVF infusing until 0700 per order. Up to commode, adequate uo. Port and HD intact. Blistering to neck d/t previous HD dressing. See flowsheets and MAR.

## 2024-04-09 NOTE — PROGRESS NOTES
[facility]  Nephrology Progress Note    Kat Crook Attending:  Tobi Thapa DO       Assessment and Plan:    1) MS exacerbation- PLEX #3/5 Wed    2) Hypotension- mild volume depletion +/- meds (ie baclofen)- resolved. DC midodrine    3) HTN- normally on telmisartan / carvediolol     4) DM 2    5) Fibromyalgia    6) h/o PE      Subjective:  Awake alert in good spirits tolerated pheresis well yest    Physical Exam:   /70   Pulse 52   Temp 97.5 °F (36.4 °C) (Temporal)   Resp 11   Wt 168 lb 6.9 oz (76.4 kg)   LMP  (LMP Unknown)   SpO2 95%   BMI 27.19 kg/m²   Temp (24hrs), Av °F (36.1 °C), Min:96.3 °F (35.7 °C), Max:97.5 °F (36.4 °C)       Intake/Output Summary (Last 24 hours) at 2024 0916  Last data filed at 2024 0601  Gross per 24 hour   Intake 2567.8 ml   Output 2950 ml   Net -382.2 ml     Wt Readings from Last 3 Encounters:   24 168 lb 6.9 oz (76.4 kg)   24 168 lb (76.2 kg)   24 168 lb 6.4 oz (76.4 kg)     General: awake alert  HEENT: No scleral icterus, MMM  Neck: Supple, no REESE or thyromegaly  Cardiac: Regular rate and rhythm, S1, S2 normal, no murmur or tub  Lungs: Decreased BS at bases bilaterally   Abdomen: Soft, non-tender. + bowel sounds, no palpable organomegaly  Extremities: Without clubbing, cyanosis; no edema  Neurologic: Cranial nerves grossly intact, moving all extremities  Skin: Warm and dry, no rashes       Labs:   Lab Results   Component Value Date    WBC 6.5 2024    HGB 10.6 2024    HCT 33.3 2024    .0 2024    CREATSERUM 0.89 2024    BUN 12 2024     2024    K 4.3 2024     2024    CO2 31.0 2024     2024    CA 8.5 2024    ALB 4.0 2024    ALKPHO 23 2024    BILT 0.4 2024    TP 4.9 2024    AST 7 2024    ALT 23 2024    MG 1.4 2024    PHOS 2.5 2024    PGLU 125 2024       Imaging:  All imaging studies  reviewed.    Meds:           Questions/concerns were discussed with patient and/or family by bedside.          Adrienne Ji MD  4/9/2024  916 AM

## 2024-04-10 LAB
ALBUMIN SERPL-MCNC: 3.9 G/DL (ref 3.4–5)
ALBUMIN/GLOB SERPL: 2.6 {RATIO} (ref 1–2)
ALP LIVER SERPL-CCNC: 35 U/L
ALT SERPL-CCNC: 32 U/L
ANION GAP SERPL CALC-SCNC: 1 MMOL/L (ref 0–18)
AST SERPL-CCNC: 8 U/L (ref 15–37)
BASOPHILS # BLD AUTO: 0 X10(3) UL (ref 0–0.2)
BASOPHILS NFR BLD AUTO: 0 %
BILIRUB SERPL-MCNC: 0.3 MG/DL (ref 0.1–2)
BUN BLD-MCNC: 14 MG/DL (ref 9–23)
CALCIUM BLD-MCNC: 8.8 MG/DL (ref 8.5–10.1)
CHLORIDE SERPL-SCNC: 108 MMOL/L (ref 98–112)
CO2 SERPL-SCNC: 33 MMOL/L (ref 21–32)
CORTIS SERPL-MCNC: 1.3 UG/DL
CORTIS SERPL-MCNC: 1.4 UG/DL
CORTIS SERPL-MCNC: 14 UG/DL
CORTIS SERPL-MCNC: 20.1 UG/DL
CREAT BLD-MCNC: 0.79 MG/DL
EGFRCR SERPLBLD CKD-EPI 2021: 88 ML/MIN/1.73M2 (ref 60–?)
EOSINOPHIL # BLD AUTO: 0 X10(3) UL (ref 0–0.7)
EOSINOPHIL NFR BLD AUTO: 0 %
ERYTHROCYTE [DISTWIDTH] IN BLOOD BY AUTOMATED COUNT: 14.6 %
FIBRINOGEN PPP-MCNC: 165 MG/DL (ref 180–480)
GLOBULIN PLAS-MCNC: 1.5 G/DL (ref 2.8–4.4)
GLUCOSE BLD-MCNC: 121 MG/DL (ref 70–99)
GLUCOSE BLD-MCNC: 153 MG/DL (ref 70–99)
GLUCOSE BLD-MCNC: 161 MG/DL (ref 70–99)
GLUCOSE BLD-MCNC: 185 MG/DL (ref 70–99)
GLUCOSE BLD-MCNC: 324 MG/DL (ref 70–99)
HCT VFR BLD AUTO: 33.3 %
HGB BLD-MCNC: 10.4 G/DL
IMM GRANULOCYTES # BLD AUTO: 0.01 X10(3) UL (ref 0–1)
IMM GRANULOCYTES NFR BLD: 0.2 %
INR BLD: 1.08 (ref 0.8–1.2)
LYMPHOCYTES # BLD AUTO: 2.77 X10(3) UL (ref 1–4)
LYMPHOCYTES NFR BLD AUTO: 42.7 %
MCH RBC QN AUTO: 22.9 PG (ref 26–34)
MCHC RBC AUTO-ENTMCNC: 31.2 G/DL (ref 31–37)
MCV RBC AUTO: 73.2 FL
MONOCYTES # BLD AUTO: 0.4 X10(3) UL (ref 0.1–1)
MONOCYTES NFR BLD AUTO: 6.2 %
NEUTROPHILS # BLD AUTO: 3.3 X10 (3) UL (ref 1.5–7.7)
NEUTROPHILS # BLD AUTO: 3.3 X10(3) UL (ref 1.5–7.7)
NEUTROPHILS NFR BLD AUTO: 50.9 %
OSMOLALITY SERPL CALC.SUM OF ELEC: 298 MOSM/KG (ref 275–295)
PLATELET # BLD AUTO: 103 10(3)UL (ref 150–450)
PLATELETS.RETICULATED NFR BLD AUTO: 14.5 % (ref 0–7)
POTASSIUM SERPL-SCNC: 3.9 MMOL/L (ref 3.5–5.1)
PROT SERPL-MCNC: 5.4 G/DL (ref 6.4–8.2)
PROTHROMBIN TIME: 14 SECONDS (ref 11.6–14.8)
RBC # BLD AUTO: 4.55 X10(6)UL
SODIUM SERPL-SCNC: 142 MMOL/L (ref 136–145)
WBC # BLD AUTO: 6.5 X10(3) UL (ref 4–11)

## 2024-04-10 PROCEDURE — 99232 SBSQ HOSP IP/OBS MODERATE 35: CPT | Performed by: STUDENT IN AN ORGANIZED HEALTH CARE EDUCATION/TRAINING PROGRAM

## 2024-04-10 PROCEDURE — 99222 1ST HOSP IP/OBS MODERATE 55: CPT | Performed by: STUDENT IN AN ORGANIZED HEALTH CARE EDUCATION/TRAINING PROGRAM

## 2024-04-10 PROCEDURE — 99232 SBSQ HOSP IP/OBS MODERATE 35: CPT | Performed by: INTERNAL MEDICINE

## 2024-04-10 PROCEDURE — 99233 SBSQ HOSP IP/OBS HIGH 50: CPT | Performed by: INTERNAL MEDICINE

## 2024-04-10 RX ORDER — COSYNTROPIN 0.25 MG/ML
0.25 INJECTION, POWDER, FOR SOLUTION INTRAMUSCULAR; INTRAVENOUS ONCE
Status: COMPLETED | OUTPATIENT
Start: 2024-04-10 | End: 2024-04-10

## 2024-04-10 RX ORDER — DIPHENHYDRAMINE HCL 25 MG
25 CAPSULE ORAL EVERY 6 HOURS PRN
Status: COMPLETED | OUTPATIENT
Start: 2024-04-10 | End: 2024-04-10

## 2024-04-10 NOTE — PROGRESS NOTES
No criteria for crisis psych eval, PHQ-4 score of 0. No safety concerns. Discontinuing psych consult at this time. Resources placed in discharge summary for patient to follow up on an outpatient basis.

## 2024-04-10 NOTE — PLAN OF CARE
Received pt at shift change A&Ox4,BROWN and follows commands. Pt on room air, VSS, afebrile. Pt had several complaints of pain, see MAR for PRN meds given. Pt up with assist to bathroom and commode. Pt tolerating diet. Received PLEX. See flowsheets for more information. Safety and comfort maintained throughout shift. Transfer orders in place.  Problem: Diabetes/Glucose Control  Goal: Glucose maintained within prescribed range  Description: INTERVENTIONS:  - Monitor Blood Glucose as ordered  - Assess for signs and symptoms of hyperglycemia and hypoglycemia  - Administer ordered medications to maintain glucose within target range  - Assess barriers to adequate nutritional intake and initiate nutrition consult as needed  - Instruct patient on self management of diabetes  Outcome: Progressing     Problem: GASTROINTESTINAL - ADULT  Goal: Maintains or returns to baseline bowel function  Description: INTERVENTIONS:  - Assess bowel function  - Maintain adequate hydration with IV or PO as ordered and tolerated  - Evaluate effectiveness of GI medications  - Encourage mobilization and activity  - Obtain nutritional consult as needed  - Establish a toileting routine/schedule  - Consider collaborating with pharmacy to review patient's medication profile  Outcome: Progressing     Problem: METABOLIC/FLUID AND ELECTROLYTES - ADULT  Goal: Electrolytes maintained within normal limits  Description: INTERVENTIONS:  - Monitor labs and rhythm and assess patient for signs and symptoms of electrolyte imbalances  - Administer electrolyte replacement as ordered  - Monitor response to electrolyte replacements, including rhythm and repeat lab results as appropriate  - Fluid restriction as ordered  - Instruct patient on fluid and nutrition restrictions as appropriate  Outcome: Progressing  Goal: Hemodynamic stability and optimal renal function maintained  Description: INTERVENTIONS:  - Monitor labs and assess for signs and symptoms of volume  excess or deficit  - Monitor intake, output and patient weight  - Monitor urine specific gravity, serum osmolarity and serum sodium as indicated or ordered  - Monitor response to interventions for patient's volume status, including labs, urine output, blood pressure (other measures as available)  - Encourage oral intake as appropriate  - Instruct patient on fluid and nutrition restrictions as appropriate  Outcome: Progressing     Problem: Impaired Functional Mobility  Goal: Achieve highest/safest level of mobility/gait  Description: Interventions:  - Assess patient's functional ability and stability  - Promote increasing activity/tolerance for mobility and gait  - Educate and engage patient/family in tolerated activity level and precautions  - Recommend use of  cane for transfers and ambulation  Outcome: Progressing     Problem: Impaired Activities of Daily Living  Goal: Achieve highest/safest level of independence in self care  Description: Interventions:  - Assess ability and encourage patient to participate in ADLs to maximize function  - Promote sitting position while performing ADLs such as feeding, grooming, and bathing  - Educate and encourage patient/family in tolerated functional activity level and precautions during self-care  - Provide support under elbow of weak side to prevent shoulder subluxation  Outcome: Progressing

## 2024-04-10 NOTE — PLAN OF CARE
Received patient following report. Alert and oriented. FC and BROWN. Complaining of lower abd pain associated with urinating and pain to soles of feet. Hemodynamically stable. SB on tele. Room air. BP stable. Afebrile. Voiding in commode/up to bathroom. HD line and port intact. See flowsheets and MAR.

## 2024-04-10 NOTE — PROGRESS NOTES
[facility]  [facility] Neurology Progress Note    Kat Crook Patient Status:  Inpatient    3/11/1970 MRN [id number]   Location [facility] 4SW-A Attending Alvaro Thapa, *   Hosp Day # 5 PCP Justin Huerta MD     CC: Multiple sclerosis flare, weakness     Subjective: Seen for a follow up visit today. No acute events overnight. Still with numbness and tingling, \"pins and needles\" and some pain to the soles of her feet. Improved. Denies any difficulty speaking, no double or blurry vision. Tolerating PLEX well so far. Cycle # 3/5 today. Denies any headache, BP has been stable. Off Midodrine now. Will possibly get transferred out of ICU after 3d cycle today if BP remains stable.       MEDICATIONS:  No current outpatient medications on file.     Current Facility-Administered Medications   Medication Dose Route Frequency    cosyntropin (Cortrosyn) injection 0.25 mg  0.25 mg Intravenous Once    enoxaparin (Lovenox) 40 MG/0.4ML SUBQ injection 40 mg  40 mg Subcutaneous Daily    midodrine (ProAmatine) tab 2.5 mg  2.5 mg Oral TID PRN    HYDROmorphone (Dilaudid) 1 MG/ML injection 0.2 mg  0.2 mg Intravenous Q2H PRN    Or    HYDROmorphone (Dilaudid) 1 MG/ML injection 0.4 mg  0.4 mg Intravenous Q2H PRN    Or    HYDROmorphone (Dilaudid) 1 MG/ML injection 0.8 mg  0.8 mg Intravenous Q2H PRN    fluticasone furoate-vilanterol (Breo Ellipta) 200-25 MCG/ACT inhaler 1 puff  1 puff Inhalation Daily    insulin degludec 100 units/mL flextouch 8 Units  8 Units Subcutaneous Nightly    albumin human (Albumin) 5% injection 3,000 mL  3,000 mL Intravenous QOD    calcium gluconate 3 g in sodium chloride 0.9% 250 mL IVPB   Intravenous QOD    ondansetron (Zofran-ODT) disintegrating tab 4 mg  4 mg Oral Q6H PRN    Or    ondansetron (Zofran) 4 MG/2ML injection 4 mg  4 mg Intravenous Q6H PRN    albuterol (Ventolin HFA) 108 (90 Base) MCG/ACT inhaler 2 puff  2 puff Inhalation Q6H PRN    atorvastatin (Lipitor) tab 20 mg  20 mg Oral  Nightly    baclofen (Lioresal) tab 10 mg  10 mg Oral TID    [Held by provider] carvedilol (Coreg) tab 3.125 mg  3.125 mg Oral BID    [Held by provider] Diroximel Fumarate CPDR 462 mg  462 mg Oral BID    docusate sodium (Colace) cap 100 mg  100 mg Oral BID    DULoxetine (Cymbalta) DR cap 60 mg  60 mg Oral BID    HYDROcodone-acetaminophen (Norco)  MG per tab 1 tablet  1 tablet Oral QID PRN    levothyroxine (Synthroid) tab 88 mcg  88 mcg Oral Before breakfast    montelukast (Singulair) tab 10 mg  10 mg Oral Nightly    thiamine (Vitamin B1) tab 100 mg  100 mg Oral Daily    progesterone (Prometrium) cap 100 mg  100 mg Oral Daily    pregabalin (Lyrica) cap 200 mg  200 mg Oral BID    pantoprazole (Protonix) DR tab 20 mg  20 mg Oral BID    glucose (Dex4) 15 GM/59ML oral liquid 15 g  15 g Oral Q15 Min PRN    Or    glucose (Glutose) 40% oral gel 15 g  15 g Oral Q15 Min PRN    Or    glucose-vitamin C (Dex-4) chewable tab 4 tablet  4 tablet Oral Q15 Min PRN    Or    dextrose 50% injection 50 mL  50 mL Intravenous Q15 Min PRN    Or    glucose (Dex4) 15 GM/59ML oral liquid 30 g  30 g Oral Q15 Min PRN    Or    glucose (Glutose) 40% oral gel 30 g  30 g Oral Q15 Min PRN    Or    glucose-vitamin C (Dex-4) chewable tab 8 tablet  8 tablet Oral Q15 Min PRN    acetaminophen (Tylenol Extra Strength) tab 500 mg  500 mg Oral Q4H PRN    prochlorperazine (Compazine) 10 MG/2ML injection 5 mg  5 mg Intravenous Q8H PRN    polyethylene glycol (PEG 3350) (Miralax) 17 g oral packet 17 g  17 g Oral Daily PRN    sennosides (Senokot) tab 17.2 mg  17.2 mg Oral Nightly PRN    bisacodyl (Dulcolax) 10 MG rectal suppository 10 mg  10 mg Rectal Daily PRN    fleet enema (Fleet) 7-19 GM/118ML rectal enema 133 mL  1 enema Rectal Once PRN    insulin aspart (NovoLOG) 100 Units/mL FlexPen 1-10 Units  1-10 Units Subcutaneous TID AC and HS    acetaminophen (Tylenol) 325 mg, codeine 60 mg for Tylenol #4 ([facility] only)   Oral Q6H PRN    heparin (Porcine) 100  Units/mL lock flush 500 Units  5 mL Intravenous PRN       REVIEW OF SYSTEMS:  A 10-point system was reviewed.  Pertinent positives and negatives are noted in HPI.      PHYSICAL EXAMINATION:  VITAL SIGNS: /69 (BP Location: Left arm)   Pulse 54   Temp 97.8 °F (36.6 °C) (Temporal)   Resp (!) 9   Wt 169 lb 8.5 oz (76.9 kg)   LMP  (LMP Unknown)   SpO2 100%   BMI 27.36 kg/m²   GENERAL:  Patient is a 54 year old female in no acute distress.  HEENT:  Normocephalic, atraumatic  ABD: Soft, non tender  SKIN: Warm, dry, no rashes    NEUROLOGICAL:   Mental status: Oriented to person, place, and time   Speech: Fluent, no dysarthria  Memory and comprehension: Intact   Cranial Nerves: VFF, PERRL 3mm brisk, EOMI, no nystagmus, facial sensation intact, face symmetric, tongue midline, shoulder shrug equal, remainder CN intact  Motor:  No drift, no focal arm weakness, 5 out of 5 motor strength in UEs bilaterally. BLEs - 4 out of 5 throughout bilaterally.   Sensory: Intact to light touch  Coordination: FTN intact  Gait: Deferred      Imaging/Diagnostics:  IR CENTRAL VENOUS ACCESS    Result Date: 4/6/2024  CONCLUSION: Successful placement of a temporary dialysis catheter via right internal jugular vein. Catheter is ready for use.  Recommend routine catheter care.   LOCATION:  [facility]    Dictated by (CST): Elier Romo MD on 4/06/2024 at 11:52 AM     Finalized by (CST): Elier Romo MD on 4/06/2024 at 11:53 AM          Labs:  Recent Labs   Lab 04/08/24  0454 04/09/24  0552 04/10/24  0609   RBC 4.98 4.64 4.55   HGB 11.2* 10.6* 10.4*   HCT 36.5 33.3* 33.3*   MCV 73.3* 71.8* 73.2*   MCH 22.5* 22.8* 22.9*   MCHC 30.7* 31.8 31.2   RDW 14.8 14.6 14.6   NEPRELIM 2.68 3.55 3.30   WBC 6.2 6.5 6.5   .0* 107.0* 103.0*         Recent Labs   Lab 04/08/24  1422 04/09/24  0552 04/10/24  0609   * 141* 161*   BUN 10 12 14   CREATSERUM 0.70 0.89 0.79   EGFRCR 101 76 88   CA 8.9 8.5 8.8    143 142   K 3.0* 4.3 3.9   CL  109 109 108   CO2 29.0 31.0 33.0*       Assessment/ Plan:    A 54 year old female with:     MS exacerbation with LEs weakness - in a setting of allergies to steroids and unable to have IVIG 2/2 hx of DVTs.  Plan for PLEX x 5 cycles every other day as per her primary neurologist Dr. Hernandez's recommendations. LEs weakness about the same today.  Nephrology on consult to help facilitate the PLEX treatments  Plasmapheresis treatements , session 3/5 today 4/10.   PT/OT  Safety and falls precautions  Hypotension, likely secondary to hypovolemic shock, resolved - stable, monitored closely by critical care. Blood cultures pending, no growth so far  Hypokalemia and hypomagnesemia - resolved.  Nephrology managing.  Pain to feet bilaterally - history of DM2 with neuropathy, A1c is 7.3. Continue Lyrica. Feeling some improvement today.   If BP stable after 3d PLEX cycle, may transfer to floor.   Discussed with patient. Discussed with dr. Washington. Will continue to follow.   Is this a shared or split note between Advanced Practice Provider and Physician? No       Aimee MENENDEZ  [facility]  4/10/2024, 9:07 AM   [id number] # [id number]

## 2024-04-10 NOTE — PROGRESS NOTES
[facility]  Nephrology Progress Note    Kat Crook Attending:  Tobi Thapa DO       Assessment and Plan:    1) MS exacerbation- PLEX #3/5 today    2) Hypotension- mild volume depletion +/- meds (ie baclofen)- resolved. Midodrine dc'ed    3) HTN- normally on telmisartan / carvediolol     4) DM 2    5) Fibromyalgia    6) h/o PE      Subjective:  Awake alert in good spirits no issues overnight    Physical Exam:   /69 (BP Location: Left arm)   Pulse 54   Temp 97.8 °F (36.6 °C) (Temporal)   Resp (!) 9   Wt 169 lb 8.5 oz (76.9 kg)   LMP  (LMP Unknown)   SpO2 100%   BMI 27.36 kg/m²   Temp (24hrs), Av.6 °F (36.4 °C), Min:97.2 °F (36.2 °C), Max:97.8 °F (36.6 °C)       Intake/Output Summary (Last 24 hours) at 4/10/2024 0945  Last data filed at 4/10/2024 0800  Gross per 24 hour   Intake 330 ml   Output 1600 ml   Net -1270 ml     Wt Readings from Last 3 Encounters:   04/10/24 169 lb 8.5 oz (76.9 kg)   24 168 lb (76.2 kg)   24 168 lb 6.4 oz (76.4 kg)     General: awake alert  HEENT: No scleral icterus, MMM  Neck: Supple, no REESE or thyromegaly  Cardiac: Regular rate and rhythm, S1, S2 normal, no murmur or tub  Lungs: Decreased BS at bases bilaterally   Abdomen: Soft, non-tender. + bowel sounds, no palpable organomegaly  Extremities: Without clubbing, cyanosis; no edema  Neurologic: Cranial nerves grossly intact, moving all extremities  Skin: Warm and dry, no rashes       Labs:   Lab Results   Component Value Date    WBC 6.5 04/10/2024    HGB 10.4 04/10/2024    HCT 33.3 04/10/2024    .0 04/10/2024    CREATSERUM 0.79 04/10/2024    BUN 14 04/10/2024     04/10/2024    K 3.9 04/10/2024     04/10/2024    CO2 33.0 04/10/2024     04/10/2024    CA 8.8 04/10/2024    ALB 3.9 04/10/2024    ALKPHO 35 04/10/2024    BILT 0.3 04/10/2024    TP 5.4 04/10/2024    AST 8 04/10/2024    ALT 32 04/10/2024    INR 1.08 04/10/2024    PTP 14.0 04/10/2024    PGLU 121 04/10/2024        Imaging:  All imaging studies reviewed.    Meds:   Current Facility-Administered Medications   Medication Dose Route Frequency    cosyntropin (Cortrosyn) injection 0.25 mg  0.25 mg Intravenous Once    enoxaparin (Lovenox) 40 MG/0.4ML SUBQ injection 40 mg  40 mg Subcutaneous Daily    midodrine (ProAmatine) tab 2.5 mg  2.5 mg Oral TID PRN    HYDROmorphone (Dilaudid) 1 MG/ML injection 0.2 mg  0.2 mg Intravenous Q2H PRN    Or    HYDROmorphone (Dilaudid) 1 MG/ML injection 0.4 mg  0.4 mg Intravenous Q2H PRN    Or    HYDROmorphone (Dilaudid) 1 MG/ML injection 0.8 mg  0.8 mg Intravenous Q2H PRN    fluticasone furoate-vilanterol (Breo Ellipta) 200-25 MCG/ACT inhaler 1 puff  1 puff Inhalation Daily    insulin degludec 100 units/mL flextouch 8 Units  8 Units Subcutaneous Nightly    albumin human (Albumin) 5% injection 3,000 mL  3,000 mL Intravenous QOD    calcium gluconate 3 g in sodium chloride 0.9% 250 mL IVPB   Intravenous QOD    ondansetron (Zofran-ODT) disintegrating tab 4 mg  4 mg Oral Q6H PRN    Or    ondansetron (Zofran) 4 MG/2ML injection 4 mg  4 mg Intravenous Q6H PRN    albuterol (Ventolin HFA) 108 (90 Base) MCG/ACT inhaler 2 puff  2 puff Inhalation Q6H PRN    atorvastatin (Lipitor) tab 20 mg  20 mg Oral Nightly    baclofen (Lioresal) tab 10 mg  10 mg Oral TID    [Held by provider] carvedilol (Coreg) tab 3.125 mg  3.125 mg Oral BID    [Held by provider] Diroximel Fumarate CPDR 462 mg  462 mg Oral BID    docusate sodium (Colace) cap 100 mg  100 mg Oral BID    DULoxetine (Cymbalta) DR cap 60 mg  60 mg Oral BID    HYDROcodone-acetaminophen (Norco)  MG per tab 1 tablet  1 tablet Oral QID PRN    levothyroxine (Synthroid) tab 88 mcg  88 mcg Oral Before breakfast    montelukast (Singulair) tab 10 mg  10 mg Oral Nightly    thiamine (Vitamin B1) tab 100 mg  100 mg Oral Daily    progesterone (Prometrium) cap 100 mg  100 mg Oral Daily    pregabalin (Lyrica) cap 200 mg  200 mg Oral BID    pantoprazole (Protonix)   tab 20 mg  20 mg Oral BID    glucose (Dex4) 15 GM/59ML oral liquid 15 g  15 g Oral Q15 Min PRN    Or    glucose (Glutose) 40% oral gel 15 g  15 g Oral Q15 Min PRN    Or    glucose-vitamin C (Dex-4) chewable tab 4 tablet  4 tablet Oral Q15 Min PRN    Or    dextrose 50% injection 50 mL  50 mL Intravenous Q15 Min PRN    Or    glucose (Dex4) 15 GM/59ML oral liquid 30 g  30 g Oral Q15 Min PRN    Or    glucose (Glutose) 40% oral gel 30 g  30 g Oral Q15 Min PRN    Or    glucose-vitamin C (Dex-4) chewable tab 8 tablet  8 tablet Oral Q15 Min PRN    acetaminophen (Tylenol Extra Strength) tab 500 mg  500 mg Oral Q4H PRN    prochlorperazine (Compazine) 10 MG/2ML injection 5 mg  5 mg Intravenous Q8H PRN    polyethylene glycol (PEG 3350) (Miralax) 17 g oral packet 17 g  17 g Oral Daily PRN    sennosides (Senokot) tab 17.2 mg  17.2 mg Oral Nightly PRN    bisacodyl (Dulcolax) 10 MG rectal suppository 10 mg  10 mg Rectal Daily PRN    fleet enema (Fleet) 7-19 GM/118ML rectal enema 133 mL  1 enema Rectal Once PRN    insulin aspart (NovoLOG) 100 Units/mL FlexPen 1-10 Units  1-10 Units Subcutaneous TID AC and HS    acetaminophen (Tylenol) 325 mg, codeine 60 mg for Tylenol #4 ([facility] only)   Oral Q6H PRN    heparin (Porcine) 100 Units/mL lock flush 500 Units  5 mL Intravenous PRN           Questions/concerns were discussed with patient and/or family by bedside.          Adrienne Ji MD  4/10/2024  945 AM

## 2024-04-10 NOTE — CONSULTS
[facility]   part of [facility]    Report of Consultation    Kat Crook Patient Status:  Inpatient    3/11/1970 MRN [id number]   Location [facility] 4SW-A Attending Alvaro Thapa, *   Hosp Day # 5 PCP Justin Huerta MD     Date of Admission:  2024  Date of Consult:  4/10/2024  Reason for Consultation:   Concern for adrenal insufficiency    History of Present Illness:   Patient is a 54 year old female with PMHx significant for MS, HTN, T2DM, fibromyalgia, hx of PE, hypothyroidism who was admitted to the hospital for Weakness concerning for MS exacerbation. Endocrinology consulted regarding concern for adrenal insufficiency. Pt endorses an allergy to steroids, specifically IV steroids. Denies previous PO intake of steroids. States her allergy is tongue swelling. She was started on PLEX this admission and received 2/5 on . She was also noted to have hypotension which has been resolving. Midodrine was started, now discontinued. Due to her hypotension, a Cortisol was drawn  and it was 1.2 with repeat of 1.4. Primary team was planning on an ACTH stim test and patient refused. Upon evaluation this AM, pt endorses improvement in weakness and BP stable. She denies nausea, emesis, or weight loss. She does have a hx of T2DM, currently on MDI. She also has a hx of hypothyroidism on LT4.      Past Medical History  Past Medical History:    Abdominal pain    Allergic rhinitis    Anemia    Anxiety    Anxiety state    Arthritis    Asthma (HCC)    Atypical mole    Autoimmune disease (HCC)    Back pain    Bad breath    Black stools    Bloating    Blood disorder    Thalassemia alpha, Sickle cell trait    Blood in urine    Blurred vision    Calculus of kidney    Cardiomyopathy (HCC)    last echo 2021: EF 55%    Chest pain    Constipation    Decorative tattoo    Deep vein thrombosis (HCC)    Depression    Diarrhea, unspecified    Dizziness    Easy bruising    Elevated liver enzymes    Endocrine  disorder    Esophageal reflux    Fatigue    Fibromyalgia    Food intolerance    Frequent urination    Frequent UTI    Gastroparesis    Glaucoma    H. pylori infection    H/O  section    Headache disorder    Heart attack (HCC)    Heart palpitations    Heartburn    Hemorrhoids    Herniation of cervical intervertebral disc    High blood pressure    High cholesterol    History of blood clots    Unprovoked    History of blood transfusion    History of cardiac murmur    History of depression    Hoarseness, chronic    Hyperlipidemia    IBS (irritable bowel syndrome)    Irregular bowel habits    Leaking of urine    Liver disease    GIORDANO and Stage 2 fibrosis    Loss of appetite    Migraines    Multiple sclerosis (HCC)    dx 8 years ago    Muscle weakness    cane or walker prn    Myocardial infarction (HCC)    Neuropathy    legs, hands, feet; bilateral    Night sweats    Osteoarthritis    Osteoporosis    KAMALJIT in her 30s    Osteoporosis    Pain with bowel movements    Painful urination    Problems with swallowing    Due to MS    Renal disorder    kidney lesions    Scoliosis of lumbar spine    Shortness of breath    Sickle cell trait (HCC)    Sleep apnea    no treatment    Sleep disturbance    Stress    Type II or unspecified type diabetes mellitus without mention of complication, not stated as uncontrolled    Uncomfortable fullness after meals    Unspecified disorder of thyroid    total thyroidectomy    Visual impairment    glasses    Wears glasses    Weight loss    Wheezing       Past Surgical History  Past Surgical History:   Procedure Laterality Date      1986    Cataract      Cath angio  2014    Cholecystectomy      Colonoscopy N/A 2015    Procedure: COLONOSCOPY;  Surgeon: Liz Tong DO;  Location:  ENDOSCOPY    Colonoscopy N/A 2021    Procedure: COLONOSCOPY;  Surgeon: Cedrick Da Silva MD;  Location:  ENDOSCOPY    Colonoscopy N/A 10/08/2021    Procedure:  COLONOSCOPY, ESOPHAGOGASTRODUODENOSCOPY (EGD);  Surgeon: Darvin Richardson MD;  Location:  ENDOSCOPY    D & c  1990    Blighted ovum    Egd      Hysterectomy      total hystero.    Ir port a cath insertion exchnge check  2018    Lithotripsy      x 2    Lysis of adhesions      Needle biopsy liver  2016    Oophorectomy Bilateral 1993    total hystero.    Other      dialysis catheter- left chest for plasmaphoresis    Other surgical history       x 2    Port, indwelling, imp  2019    power port    Thyroidectomy  2013    benign MNG    Total abdom hysterectomy      Upper gi endoscopy performed  2014       Family History  Family History   Problem Relation Age of Onset    Heart Attack Father     Other (Other) Father         COPD, emphysema    Asthma Father     Pulmonary Disease Father     Hypertension Mother         Needs to be deleted    Cancer Mother         stomach cancer    Ovarian Cancer Mother         30'S    Colon Polyps Mother     Anemia Mother     Ovarian Cancer Maternal Grandmother         30'S    Colon Polyps Maternal Grandmother     Diabetes Maternal Grandmother     Colon Cancer Maternal Grandmother     Anemia Maternal Grandmother     Cancer Maternal Grandmother     Breast Cancer Maternal Aunt         60'S    Other (Other) Sister         rectal CA\    Anemia Daughter        Social History  Social History     Socioeconomic History    Marital status: Single   Tobacco Use    Smoking status: Never     Passive exposure: Never    Smokeless tobacco: Never    Tobacco comments:     Never used it   Vaping Use    Vaping status: Never Used   Substance and Sexual Activity    Alcohol use: Never    Drug use: Never   Other Topics Concern    Caffeine Concern No    Exercise Yes     Comment: walking     Social Determinants of Health     Financial Resource Strain: Low Risk  (2024)    Received from [facility]    Financial Resource Strain     In the past year, have you or any family  members you live with been unable to get any of the following when it was really needed? Check all that apply.: None   Food Insecurity: No Food Insecurity (4/5/2024)    Food Insecurity     Food Insecurity: Never true   Transportation Needs: No Transportation Needs (4/5/2024)    Transportation Needs     Lack of Transportation: No   Physical Activity: Insufficiently Active (7/27/2022)    Received from [facility]    Exercise Vital Sign     Days of Exercise per Week: 3 days     Minutes of Exercise per Session: 20 min   Stress: Low Risk  (2/19/2024)    Received from [facility]    Stress     Stress is when someone feels tense, nervous, anxious, or can't sleep at night because their mind is troubled. How stressed are you? : Not at all   Social Connections: Low Risk  (2/19/2024)    Received from [facility]    Social Connections     How often do you see or talk to people that you care about and feel close to? (For example: talking to friends on the phone, visiting friends or family, going to [employer] or club meetings): 5 or more times a week   Housing Stability: Low Risk  (4/5/2024)    Housing Stability     Housing Instability: No           Current Medications:  Current Facility-Administered Medications   Medication Dose Route Frequency    cosyntropin (Cortrosyn) injection 0.25 mg  0.25 mg Intravenous Once    enoxaparin (Lovenox) 40 MG/0.4ML SUBQ injection 40 mg  40 mg Subcutaneous Daily    midodrine (ProAmatine) tab 2.5 mg  2.5 mg Oral TID PRN    HYDROmorphone (Dilaudid) 1 MG/ML injection 0.2 mg  0.2 mg Intravenous Q2H PRN    Or    HYDROmorphone (Dilaudid) 1 MG/ML injection 0.4 mg  0.4 mg Intravenous Q2H PRN    Or    HYDROmorphone (Dilaudid) 1 MG/ML injection 0.8 mg  0.8 mg Intravenous Q2H PRN    fluticasone furoate-vilanterol (Breo Ellipta) 200-25 MCG/ACT inhaler 1 puff  1 puff Inhalation Daily    insulin degludec 100 units/mL flextouch 8 Units  8 Units Subcutaneous Nightly    albumin  human (Albumin) 5% injection 3,000 mL  3,000 mL Intravenous QOD    calcium gluconate 3 g in sodium chloride 0.9% 250 mL IVPB   Intravenous QOD    ondansetron (Zofran-ODT) disintegrating tab 4 mg  4 mg Oral Q6H PRN    Or    ondansetron (Zofran) 4 MG/2ML injection 4 mg  4 mg Intravenous Q6H PRN    albuterol (Ventolin HFA) 108 (90 Base) MCG/ACT inhaler 2 puff  2 puff Inhalation Q6H PRN    atorvastatin (Lipitor) tab 20 mg  20 mg Oral Nightly    baclofen (Lioresal) tab 10 mg  10 mg Oral TID    [Held by provider] carvedilol (Coreg) tab 3.125 mg  3.125 mg Oral BID    [Held by provider] Diroximel Fumarate CPDR 462 mg  462 mg Oral BID    docusate sodium (Colace) cap 100 mg  100 mg Oral BID    DULoxetine (Cymbalta) DR cap 60 mg  60 mg Oral BID    HYDROcodone-acetaminophen (Norco)  MG per tab 1 tablet  1 tablet Oral QID PRN    levothyroxine (Synthroid) tab 88 mcg  88 mcg Oral Before breakfast    montelukast (Singulair) tab 10 mg  10 mg Oral Nightly    thiamine (Vitamin B1) tab 100 mg  100 mg Oral Daily    progesterone (Prometrium) cap 100 mg  100 mg Oral Daily    pregabalin (Lyrica) cap 200 mg  200 mg Oral BID    pantoprazole (Protonix) DR tab 20 mg  20 mg Oral BID    glucose (Dex4) 15 GM/59ML oral liquid 15 g  15 g Oral Q15 Min PRN    Or    glucose (Glutose) 40% oral gel 15 g  15 g Oral Q15 Min PRN    Or    glucose-vitamin C (Dex-4) chewable tab 4 tablet  4 tablet Oral Q15 Min PRN    Or    dextrose 50% injection 50 mL  50 mL Intravenous Q15 Min PRN    Or    glucose (Dex4) 15 GM/59ML oral liquid 30 g  30 g Oral Q15 Min PRN    Or    glucose (Glutose) 40% oral gel 30 g  30 g Oral Q15 Min PRN    Or    glucose-vitamin C (Dex-4) chewable tab 8 tablet  8 tablet Oral Q15 Min PRN    acetaminophen (Tylenol Extra Strength) tab 500 mg  500 mg Oral Q4H PRN    prochlorperazine (Compazine) 10 MG/2ML injection 5 mg  5 mg Intravenous Q8H PRN    polyethylene glycol (PEG 3350) (Miralax) 17 g oral packet 17 g  17 g Oral Daily PRN     sennosides (Senokot) tab 17.2 mg  17.2 mg Oral Nightly PRN    bisacodyl (Dulcolax) 10 MG rectal suppository 10 mg  10 mg Rectal Daily PRN    fleet enema (Fleet) 7-19 GM/118ML rectal enema 133 mL  1 enema Rectal Once PRN    insulin aspart (NovoLOG) 100 Units/mL FlexPen 1-10 Units  1-10 Units Subcutaneous TID AC and HS    acetaminophen (Tylenol) 325 mg, codeine 60 mg for Tylenol #4 (EE[facility] only)   Oral Q6H PRN    heparin (Porcine) 100 Units/mL lock flush 500 Units  5 mL Intravenous PRN     Medications Prior to Admission   Medication Sig    montelukast 10 MG Oral Tab Take 1 tablet (10 mg total) by mouth nightly.    levothyroxine 88 MCG Oral Tab Take 1 tablet (88 mcg total) by mouth before breakfast.    Telmisartan 20 MG Oral Tab Take 1 tablet (20 mg total) by mouth daily.    progesterone 100 MG Oral Cap Take 1 capsule (100 mg total) by mouth daily.    pantoprazole 20 MG Oral Tab EC Take 1 tablet (20 mg total) by mouth 2 (two) times daily.    Insulin Aspart FlexPen 100 UNIT/ML Subcutaneous Solution Pen-injector INJECT AS DIRECTED THREE TIMES DAILY WITH MEALS PER SLIDING SCALE. MAX DAILY DOSE 40 UNITS    fluticasone propionate 110 MCG/ACT Inhalation Aerosol fluticasone propionate 110 mcg/actuation HFA aerosol inhaler, [RxNorm: 219984]    fluticasone furoate-vilanterol 200-25 MCG/ACT Inhalation Aerosol Powder, Breath Activated Inhale 1 puff into the lungs daily.    fluorometholone 0.1 % Ophthalmic Suspension     LYLLANA 0.025 MG/24HR Transdermal Patch Biweekly Place 1 patch onto the skin twice a week.    CELEBREX 100 MG Oral Cap CeleBREX 100 mg capsule, [RxNorm: 634124]    acetaminophen-codeine 300-60 MG Oral Tab Take 1 tablet by mouth every 6 (six) hours as needed.    Diroximel Fumarate (VUMERITY) 231 MG Oral Capsule Delayed Release Take 462 mg by mouth in the morning and 462 mg before bedtime.    loteprednol (LOTEMAX) 0.5 % Ophthalmic Suspension 1 drop into both eyes Ophthalmic two times a day for 14 days    pregabalin 200  MG Oral Cap Take 1 capsule (200 mg total) by mouth 2 (two) times daily.    Ciclopirox 8 % External Solution APPLY EVERY DAY    HYDROcodone-acetaminophen  MG Oral Tab Take 1 tablet by mouth 4 (four) times daily as needed.    baclofen 10 MG Oral Tab Take 1 tablet (10 mg total) by mouth 3 (three) times daily.    FARXIGA 10 MG Oral Tab Take 1 tablet (10 mg total) by mouth daily.    DULoxetine 60 MG Oral Cap DR Particles Take 1 capsule (60 mg total) by mouth 2 (two) times daily.    atorvastatin 20 MG Oral Tab Take 1 tablet (20 mg total) by mouth nightly.    DEXCOM G6 SENSOR Does not apply Misc USE AS DIRECTED EVERY 10 DAYS    Insulin Lispro (HUMALOG) 100 UNIT/ML Subcutaneous Solution INJECT UP TO 70 UNITS VIA INSULIN PUMP DAILY    carvedilol 3.125 MG Oral Tab Take 1 tablet (3.125 mg total) by mouth 2 (two) times daily.    EPINEPHrine 0.15 MG/0.3ML Injection Solution Auto-injector Jani pen/lower dose because pt is allergic to epinephrine    Ondansetron HCl (ZOFRAN) 4 mg tablet Take 1 tablet (4 mg total) by mouth 3 (three) times daily as needed.    Benralizumab (FASENRA PEN) 30 MG/ML Subcutaneous Solution Auto-injector Inject 30 mg into the skin. Every other month/every 8 weeks    DEXCOM G6 TRANSMITTER Does not apply Misc USE AS DIRECTED EVERY 90 DAYS    Thiamine HCl 100 MG Oral Tab Take 1 tablet (100 mg total) by mouth daily.    docusate sodium 100 MG Oral Cap Take 1 capsule (100 mg total) by mouth 2 (two) times daily.    denosumab 60 MG/ML Subcutaneous Solution Inject 1 mL (60 mg total) into the skin every 6 (six) months.    Cholecalciferol (VITAMIN D-3) 5000 UNITS Oral Tab Take 1 tablet (5,000 Units total) by mouth daily.    Albuterol Sulfate  (90 Base) MCG/ACT Inhalation Aero Soln Inhale 2 puffs into the lungs every 6 (six) hours as needed. PRN wheezing/SOB    NON FORMULARY Tandem insulin pump settings (Bolus IQ)  Mdnt 3.1 units/hr  0400 3.00 units/hr  0900 3.2 units/hr    Correction:   MN 1:35/110  1 PM  1:40  7 pm 1:35    CHO ratio:  MN 1:5g       Allergies  Allergies   Allergen Reactions    Epinephrine OTHER (SEE COMMENTS)     Cardiac issues    Immune Globulin ANAPHYLAXIS    Latex SHORTNESS OF BREATH and OTHER (SEE COMMENTS)     WELTS    Methylprednisolone SWELLING     Swelling of neck, throat and tongue  Injection, throat and tongue swelling      Ocrelizumab ANAPHYLAXIS and OTHER (SEE COMMENTS)     Difficulty breathing    Other SHORTNESS OF BREATH     ENVIRONMENTAL, ASTHMA EXACERBATION    Pcn [Penicillamine]     Penicillins OTHER (SEE COMMENTS)    Urea Tightness in Throat     Urea C-13 in Pranactin for H. Pylori test kit.       Review of Systems:   A 10 point review of systems is completed with pertinent positives and negatives noted in HPI.    Physical Exam:   Vital Signs:  Blood pressure 99/60, pulse 65, temperature 97.8 °F (36.6 °C), temperature source Temporal, resp. rate (!) 9, weight 169 lb 8.5 oz (76.9 kg), SpO2 96%, not currently breastfeeding.     General: No acute distress. Awake and alert  HEENT: Moist mucous membranes. EOM-I.   Neck: No lymphadenopathy.  No JVD. No carotid bruits.  Respiratory: Clear to auscultation bilaterally.  No wheezes.    Cardiovascular: S1, S2.  Regular rate and rhythm.  Equal pulses   Abdomen: Soft, nontender, nondistended.     Neurologic: No focal neurological deficits.  Musculoskeletal: Full range of motion of all extremities.  No swelling noted.  Integument: No lesions. No erythema.  Psychiatric: Appropriate mood and affect.    Results:     Laboratory Data:  Lab Results   Component Value Date    WBC 6.5 04/10/2024    HGB 10.4 (L) 04/10/2024    HCT 33.3 (L) 04/10/2024    .0 (L) 04/10/2024    CREATSERUM 0.79 04/10/2024    BUN 14 04/10/2024     04/10/2024    K 3.9 04/10/2024     04/10/2024    CO2 33.0 (H) 04/10/2024     (H) 04/10/2024    CA 8.8 04/10/2024    ALB 3.9 04/10/2024    ALKPHO 35 (L) 04/10/2024    TP 5.4 (L) 04/10/2024    AST 8 (L)  04/10/2024    ALT 32 04/10/2024    PTT 28.8 04/19/2023    INR 1.08 04/10/2024    PTP 14.0 04/10/2024    T4F 1.0 04/07/2024    TSH 0.014 (L) 04/07/2024    LIP 69 (L) 12/07/2021    DDIMER 0.34 08/31/2023    ESRML 25 03/24/2021    CRP <0.29 11/25/2023    MG 1.4 (L) 04/08/2024    PHOS 2.5 04/08/2024    TROP <0.045 07/16/2021     11/09/2020    TRUONG NEGATIVE 10/04/2013    RPR NONREACTIVE 11/05/2013    B12 558 12/10/2018    POCGLU 100 (H) 05/04/2014         .last  Recent Labs     09/09/22  0806 12/15/22  1028 11/25/23  0830 04/07/24  0105   T4F 1.2 1.3  --  1.0   TSH 0.065* 0.150* 0.574 0.014*        Imaging:  Pertinent imaging reviewed      Impression:     Patient Active Problem List   Diagnosis    Neuropathic pain of both legs    Elevated liver enzymes    Hyperparathyroidism (HCC)    Asthma (HCC)    Osteoporosis    Fibromyalgia    Goiter    Hypervitaminosis D    Demyelinating disease of central nervous system (HCC)    Tachycardia    Encephalopathy    AVN (avascular necrosis of bone) (HCC)    Gastroparesis    Fatty liver    IDDM (insulin dependent diabetes mellitus)    Generalized abdominal pain    Multiple sclerosis (HCC)    At risk for falling    Cardiomyopathy in other disease    Weakness of both lower extremities    CATIA (acute kidney injury) (HCC)    Hypokalemia    MS (multiple sclerosis) (HCC)    Hypotension    Anemia    Weakness generalized    Essential hypertension    Irritable bowel syndrome (IBS)    S/P laparoscopic surgery    Intra-abdominal adhesions    Dyspnea, paroxysmal nocturnal    Multiple sclerosis exacerbation (HCC)    Dysuria    History of plasmapheresis    Hyponatremia    Abdominal pain    Hyperglycemia    Sleep apnea    Dehydration    Abdominal pain, right upper quadrant    Allergic rhinitis    Chronic fatigue and malaise    Dilated idiopathic cardiomyopathy (HCC)    Dizziness    Dyspnea    Chest pain, unspecified    Urinary calculus, unspecified    Undiagnosed cardiac murmurs    Sickle cell  trait (HCC)    Pure hypercholesterolemia    GIORDANO (nonalcoholic steatohepatitis)    Myalgia and myositis, unspecified    Liver lesion    Interstitial cystitis    Family history of coronary arteriosclerosis    Type 1 diabetes mellitus with ketoacidosis without coma (HCC)    Urinary tract infection without hematuria, site unspecified    Controlled type 1 diabetes mellitus with hyperglycemia (HCC)    Chest pain of uncertain etiology    Abdominal pain of unknown etiology    Spinal stenosis    Anemia due to chronic blood loss    Aseptic necrosis of head or neck of femur    Bilateral tinnitus    Mild persistent asthma with acute exacerbation (HCC)    Generalized abdominal tenderness    Generalized edema    Hyperlipidemia    Muscle weakness    Disorder of nervous system due to type 2 diabetes mellitus (HCC)    Peripheral vascular disorder due to diabetes mellitus (HCC)    Polyneuropathy due to type 2 diabetes mellitus (HCC)    Vitamin D deficiency    Constipation    Right upper quadrant pain    Shortness of breath    Chronic pain disorder    Headache    Hypothyroidism    Idiopathic osteoporosis    Type 2 diabetes mellitus without complication, with long-term current use of insulin (HCC)    Fatigue    Esophageal reflux    Myalgia    Pulmonary embolism on left (HCC)    Microcytic anemia    Alpha-thalassemia (HCC)    Pulmonary infiltrate    Encounter for anticoagulation discussion and counseling    Right lower quadrant pain    Hemodialysis catheter dysfunction (HCC)    Primary hypertension    Age-related cataract of both eyes    Anxiety and depression    Palpitations    Photophobia of both eyes    Preglaucoma, unspecified, bilateral    Unsteady gait when walking    Weakness    Shock (HCC)       #Hypotension  #Low cortisol   -Clinically, pt with improving weakness and hypotension on PLEX  -VSS over the last 12-24 hours. Was previously on midodrine which has since been stopped  -Per review, no hypoglycemia, hyponatremia, or  hyperkalemia noted on labs  -Cortisol of 1.2 and 1.4 4/9. Repeat this AM of 1.4  Recommendations:  -Discussed ACTH stim trest since cosyntropin is not an IV steroid  -Patient agreeable to stim test. Discussed with RN and primary service  -Reviewed results of test with post-60 minute cortisol >18 at 20  -Discussed that at this time, low suspicion for adrenal insufficiency given appropriate response to cosyntropin. Since patient is clinically improving, no indication to start steroids at this time  -Will follow peripherally and wait for ACTH results    - Staff updated on plan and confirmed orders  - Pt updated on plan and all questions answered    Thank you for allowing me to participate in the care of your patient.    Lindy Milner, DO  4/10/2024

## 2024-04-10 NOTE — PROGRESS NOTES
[facility]   part of [facility]     Hospitalist Progress Note     Kat Crook Patient Status:  Inpatient    3/11/1970 MRN [id number]   Location [facility] 4SW-A Attending Elier, Alvaro Woods, *   Hosp Day # 5 PCP Justin Huerta MD     Chief Complaint: Weakness    Subjective:      - Feeling well, denies acute complaints   - BP stable off midodrine   - Pending PLEX today     Objective:    Review of Systems:   A comprehensive review of systems was completed; pertinent positive and negatives stated in subjective.    Vital signs:  Temp:  [97.2 °F (36.2 °C)-97.8 °F (36.6 °C)] 97.5 °F (36.4 °C)  Pulse:  [47-65] 58  Resp:  [9-16] 10  BP: ()/(42-77) 102/57  SpO2:  [93 %-99 %] 94 %    Physical Exam:    General: No acute distress  Respiratory: no wheezes, no rhonchi  Cardiovascular: S1, S2, regular rate and rhythm  Abdomen: Soft, Non-tender, non-distended, positive bowel sounds  Neuro: No new focal deficits.   Extremities: no edema    Diagnostic Data:    Labs:  Recent Labs   Lab 24  0420 24  0105 24  0443 24  0454 24  0552 04/10/24  0609   WBC 5.2 4.8 5.4 6.2 6.5 6.5   HGB 11.7* 10.9* 11.8* 11.2* 10.6* 10.4*   MCV 73.0* 73.1* 72.6* 73.3* 71.8* 73.2*   .0* 129.0* 141.0* 135.0* 107.0* 103.0*   INR 1.00  --   --   --   --   --        Recent Labs   Lab 24  1422 24  0552 04/10/24  0609   * 141* 161*   BUN 10 12 14   CREATSERUM 0.70 0.89 0.79   CA 8.9 8.5 8.8   ALB 4.1 4.0 3.9    143 142   K 3.0* 4.3 3.9    109 108   CO2 29.0 31.0 33.0*   ALKPHO 27* 23* 35*   AST 19 7* 8*   ALT 33 23 32   BILT 0.3 0.4 0.3   TP 5.1* 4.9* 5.4*       Estimated Creatinine Clearance: 74.4 mL/min (based on SCr of 0.79 mg/dL).    Recent Labs   Lab 24  0912   TROPHS 9       Recent Labs   Lab 24  0420   PTP 13.2   INR 1.00                  Microbiology    Hospital Encounter on 24   1. Blood Culture     Status: None (Preliminary result)     Collection Time: 04/07/24  1:05 AM    Specimen: Bld,Port a cath Line; Blood   Result Value Ref Range    Blood Culture Result No Growth 3 Days N/A   2. Urine Culture, Routine     Status: None    Collection Time: 04/06/24 12:28 AM    Specimen: Urine, clean catch   Result Value Ref Range    Urine Culture No Growth at 18-24 hrs. N/A         Imaging: Reviewed in Epic.    Medications:    cosyntropin  0.25 mg Intravenous Once    enoxaparin  40 mg Subcutaneous Daily    fluticasone furoate-vilanterol  1 puff Inhalation Daily    insulin degludec  8 Units Subcutaneous Nightly    albumin human  3,000 mL Intravenous QOD    calcium gluconate 3 g in sodium chloride 0.9% 250 mL IVPB   Intravenous QOD    atorvastatin  20 mg Oral Nightly    baclofen  10 mg Oral TID    [Held by provider] carvedilol  3.125 mg Oral BID    [Held by provider] Diroximel Fumarate  462 mg Oral BID    docusate sodium  100 mg Oral BID    DULoxetine  60 mg Oral BID    levothyroxine  88 mcg Oral Before breakfast    montelukast  10 mg Oral Nightly    thiamine  100 mg Oral Daily    progesterone  100 mg Oral Daily    pregabalin  200 mg Oral BID    pantoprazole  20 mg Oral BID    insulin aspart  1-10 Units Subcutaneous TID AC and HS       Assessment & Plan:      #MS Exacerbation  -Anaphylaxis to steroids and unable to tolerate IVIG due to hx of DVT  -PLEX third session out of five today, plan for QOD  -Renal and neuro consult  [facility]  -Pain control     #Shock, suspect hypovolemic.  Resolved  -Off levophed, midodrine  -Stop IV fluids  -PCT negative, follow blood cultures (NGTD), monitor off abx  -Critical care following  -AM cortisol low x2; endocrinology consulted       #DM type II with neuropathy  -A1c is 7.3  -Hyperglycemia protocol  -On insulin pump PTA - transitioned to basal bolus insulin  -Lyrica     #Hypothyroidism  -Levothyroxine     #Hypertension  -Resume home meds but holding coreg due to bradycardia      #Dyslipidemia  -Statin     #GERD  -PPI     #Hx of GIORDANO  -LFTs normalized      #Thrombocytopenia, trending down. Does have GIORDANO and low platelets in the past  -Monitor     #Bradycardia  -Hold coreg  -Echo similar to prior, EF 60-65%     #Hypokalemia  -Replace     #Alpha thalassemia minor - monitor hgb  #H/o PE in 2021 - not on anticoagulation  #Fibromyalgia - continue home meds  #Asthma - resume home inhaler and singulair       Alvaro Thapa MD    Supplementary Documentation:     Quality:  DVT Mechanical Prophylaxis:   SCDs, Early ambuation  DVT Pharmacologic Prophylaxis   Medication    enoxaparin (Lovenox) 40 MG/0.4ML SUBQ injection 40 mg    heparin (Porcine) 100 Units/mL lock flush 500 Units                Code Status: Full Code  Padna: No urinary catheter in place  Panda Duration (in days):   Central line:    KAYLEY:     The 21st Century Cures Act makes medical notes like these available to patients in the interest of transparency. Please be advised this is a medical document. Medical documents are intended to carry relevant information, facts as evident, and the clinical opinion of the practitioner. The medical note is intended as peer to peer communication and may appear blunt or direct. It is written in medical language and may contain abbreviations or verbiage that are unfamiliar.

## 2024-04-10 NOTE — PROGRESS NOTES
ICU  Critical Care APRN Progress Note    NAME: Kat Crook - ROOM: [address] [place], [place] [address]A - MRN: [id number] - Age: 54 year old - :3/11/1970    Subjective:  No issues overnight. Mild upper chest wall pain with palpation and lower abdominal cramping-both of these have been ongoing per patient. Weakness improving and only mild numbness/tingling and weakness with prolonged walking or talking.     Current Facility-Administered Medications   Medication Dose Route Frequency    cosyntropin (Cortrosyn) injection 0.25 mg  0.25 mg Intravenous Once    enoxaparin (Lovenox) 40 MG/0.4ML SUBQ injection 40 mg  40 mg Subcutaneous Daily    midodrine (ProAmatine) tab 2.5 mg  2.5 mg Oral TID PRN    HYDROmorphone (Dilaudid) 1 MG/ML injection 0.2 mg  0.2 mg Intravenous Q2H PRN    Or    HYDROmorphone (Dilaudid) 1 MG/ML injection 0.4 mg  0.4 mg Intravenous Q2H PRN    Or    HYDROmorphone (Dilaudid) 1 MG/ML injection 0.8 mg  0.8 mg Intravenous Q2H PRN    fluticasone furoate-vilanterol (Breo Ellipta) 200-25 MCG/ACT inhaler 1 puff  1 puff Inhalation Daily    insulin degludec 100 units/mL flextouch 8 Units  8 Units Subcutaneous Nightly    albumin human (Albumin) 5% injection 3,000 mL  3,000 mL Intravenous QOD    calcium gluconate 3 g in sodium chloride 0.9% 250 mL IVPB   Intravenous QOD    ondansetron (Zofran-ODT) disintegrating tab 4 mg  4 mg Oral Q6H PRN    Or    ondansetron (Zofran) 4 MG/2ML injection 4 mg  4 mg Intravenous Q6H PRN    albuterol (Ventolin HFA) 108 (90 Base) MCG/ACT inhaler 2 puff  2 puff Inhalation Q6H PRN    atorvastatin (Lipitor) tab 20 mg  20 mg Oral Nightly    baclofen (Lioresal) tab 10 mg  10 mg Oral TID    [Held by provider] carvedilol (Coreg) tab 3.125 mg  3.125 mg Oral BID    [Held by provider] Diroximel Fumarate CPDR 462 mg  462 mg Oral BID    docusate sodium (Colace) cap 100 mg  100 mg Oral BID    DULoxetine (Cymbalta) DR cap 60 mg  60 mg Oral BID    HYDROcodone-acetaminophen (Norco)  MG per tab 1 tablet  1 tablet  Oral QID PRN    levothyroxine (Synthroid) tab 88 mcg  88 mcg Oral Before breakfast    montelukast (Singulair) tab 10 mg  10 mg Oral Nightly    thiamine (Vitamin B1) tab 100 mg  100 mg Oral Daily    progesterone (Prometrium) cap 100 mg  100 mg Oral Daily    pregabalin (Lyrica) cap 200 mg  200 mg Oral BID    pantoprazole (Protonix) DR tab 20 mg  20 mg Oral BID    glucose (Dex4) 15 GM/59ML oral liquid 15 g  15 g Oral Q15 Min PRN    Or    glucose (Glutose) 40% oral gel 15 g  15 g Oral Q15 Min PRN    Or    glucose-vitamin C (Dex-4) chewable tab 4 tablet  4 tablet Oral Q15 Min PRN    Or    dextrose 50% injection 50 mL  50 mL Intravenous Q15 Min PRN    Or    glucose (Dex4) 15 GM/59ML oral liquid 30 g  30 g Oral Q15 Min PRN    Or    glucose (Glutose) 40% oral gel 30 g  30 g Oral Q15 Min PRN    Or    glucose-vitamin C (Dex-4) chewable tab 8 tablet  8 tablet Oral Q15 Min PRN    acetaminophen (Tylenol Extra Strength) tab 500 mg  500 mg Oral Q4H PRN    prochlorperazine (Compazine) 10 MG/2ML injection 5 mg  5 mg Intravenous Q8H PRN    polyethylene glycol (PEG 3350) (Miralax) 17 g oral packet 17 g  17 g Oral Daily PRN    sennosides (Senokot) tab 17.2 mg  17.2 mg Oral Nightly PRN    bisacodyl (Dulcolax) 10 MG rectal suppository 10 mg  10 mg Rectal Daily PRN    fleet enema (Fleet) 7-19 GM/118ML rectal enema 133 mL  1 enema Rectal Once PRN    insulin aspart (NovoLOG) 100 Units/mL FlexPen 1-10 Units  1-10 Units Subcutaneous TID AC and HS    acetaminophen (Tylenol) 325 mg, codeine 60 mg for Tylenol #4 ([facility] only)   Oral Q6H PRN    heparin (Porcine) 100 Units/mL lock flush 500 Units  5 mL Intravenous PRN       OBJECTIVE  Vitals:  /69 (BP Location: Left arm)   Pulse 54   Temp 97.8 °F (36.6 °C) (Temporal)   Resp (!) 9   Wt 169 lb 8.5 oz (76.9 kg)   LMP  (LMP Unknown)   SpO2 100%   BMI 27.36 kg/m²             O2: 2 L NC    Physical Exam:    General Appearance: Alert, cooperative,left upper CW pain with palpation, lower  abdominal cramping  Neck: No JVD, neck supple, no adenopathy, trachea midline, no carotid bruits  Lungs: Clear to auscultation bilaterally, respirations unlabored  Heart: Regular rate and rhythm, S1 and S2 normal, no murmur, rub or gallop  Abdomen: Soft, non-tender, bowel sounds active all four quadrants, no masses, no organomegaly  Extremities: Atraumatic, no cyanosis or edema, capillary refill <3 sec.    Pulses: 2+ and symmetric all extremities  Skin: Skin color, texture, turgor normal for ethnicity, no rashes or lesions, warm and dry  Neurologic: CNII-XII intact, strength and weakness improved per patient    Data this admission:  None this admission    Labs:  Lab Results   Component Value Date    WBC 6.5 04/10/2024    HGB 10.4 04/10/2024    HCT 33.3 04/10/2024    .0 04/10/2024    CREATSERUM 0.79 04/10/2024    BUN 14 04/10/2024     04/10/2024    K 3.9 04/10/2024     04/10/2024    CO2 33.0 04/10/2024     04/10/2024    CA 8.8 04/10/2024    ALB 3.9 04/10/2024    ALKPHO 35 04/10/2024    BILT 0.3 04/10/2024    TP 5.4 04/10/2024    AST 8 04/10/2024    ALT 32 04/10/2024    INR 1.08 04/10/2024     Assessment/Plan:    Shock: Likely hypovolemic given improvement with hydration. Less likely septic--> resolving  -Hypotensive with PLEX initiation  -Midodrine was started, now discontinued   -PCT negative  -LA normal  -cortisol 1.2, repeat 1.4 (allergy to steroids). Pt refused stim test.-endocrinology consulted  -s/p IVF bolus on admission (1.5L)  -Vasopressors to maintain SBP above 90 and MAP above 65- currently off  -Blood cultures NGTD, UA negative  -Monitoring off antbx    MS exacerbation with LE weakness  -Weakness improved this morning, numbness/tingling minimal when ambulating  -Allergy to steroids, unable to have IVIG 2/2 DVTs  -Plan for PLEX x 5 days QOD. Received 2/5 on 4/8.   -Neuro following  -Renal following for PLEX  -PT/OT    Chest, right neck and epigastric pain  -Patient complained of  new onset ache-like pain this morning, \"8/10\", improved with Norco  -EKG with SB, TWIs in V1-V2 unchanged from prior  -Troponin negative  -TTE with EF 60-65%, NRWMAs  -PPI  -monitor    Bradycardia  -HR improved to 60s from 40-50s day prior  -Sinus homar on EKG  -Holding coreg  -TTE unremarkable  -monitor on tele    Thrombocytopenia  -Plts slowly down-trending  -No signs of bleeding  -Monitor daily CBC    DM2  -A1c 7.3  -Insulin pump and home PO meds on hold  -Accu-check AC and HS   - ISS  -Treseba nightly    Hypothyroidism         -TSH with reflex T4 low         - home Synthroid    HTN        -Hold meds while hypotensive    F/E/N:    -Follow lytes and replete prn  -Carb control diet    Proph:  -SQ Lovenox  -PT/OT  -SCD    Dispo:     Code Status: Full Code  -ICU for monitoring until PLEX completed    Plan of care discussed with intensivist on-call, Dr. Ramana Hernandez, [facility]  ICU  Phone  [phone]   Pager [phone]      Pulmonary/Critical Care Physician Addendum     Kat Crook was interviewed and examined personally.   I reviewed and agree with the APN's documentation above of the patient's history, physical examination, test results, diagnoses, and plan of treatment.      Labs and imaging reviewed.     ASSESSMENT/PLAN  Afebrile, on RA  Hemodynamically stable  Seems to be getting stronger?  Continue PLEX.      Thank you for the opportunity to care for Kat Crook.      ANTHONY Boles DO, MPH  Pulmonary Critical Care Medicine  [facility]

## 2024-04-11 ENCOUNTER — APPOINTMENT (OUTPATIENT)
Dept: CT IMAGING | Facility: HOSPITAL | Age: 54
End: 2024-04-11
Attending: NURSE PRACTITIONER
Payer: MEDICARE

## 2024-04-11 LAB
ACTH: 1.6 PG/ML
ALBUMIN SERPL-MCNC: 4.2 G/DL (ref 3.4–5)
ALBUMIN/GLOB SERPL: 4.2 {RATIO} (ref 1–2)
ALP LIVER SERPL-CCNC: 24 U/L
ALT SERPL-CCNC: 17 U/L
ANION GAP SERPL CALC-SCNC: 3 MMOL/L (ref 0–18)
AST SERPL-CCNC: 11 U/L (ref 15–37)
BASOPHILS # BLD AUTO: 0.01 X10(3) UL (ref 0–0.2)
BASOPHILS NFR BLD AUTO: 0.1 %
BILIRUB SERPL-MCNC: 0.3 MG/DL (ref 0.1–2)
BUN BLD-MCNC: 14 MG/DL (ref 9–23)
CALCIUM BLD-MCNC: 8.7 MG/DL (ref 8.5–10.1)
CHLORIDE SERPL-SCNC: 110 MMOL/L (ref 98–112)
CO2 SERPL-SCNC: 32 MMOL/L (ref 21–32)
CREAT BLD-MCNC: 0.92 MG/DL
EGFRCR SERPLBLD CKD-EPI 2021: 73 ML/MIN/1.73M2 (ref 60–?)
EOSINOPHIL # BLD AUTO: 0 X10(3) UL (ref 0–0.7)
EOSINOPHIL NFR BLD AUTO: 0 %
ERYTHROCYTE [DISTWIDTH] IN BLOOD BY AUTOMATED COUNT: 14.4 %
GLOBULIN PLAS-MCNC: 1 G/DL (ref 2.8–4.4)
GLUCOSE BLD-MCNC: 108 MG/DL (ref 70–99)
GLUCOSE BLD-MCNC: 114 MG/DL (ref 70–99)
GLUCOSE BLD-MCNC: 132 MG/DL (ref 70–99)
GLUCOSE BLD-MCNC: 154 MG/DL (ref 70–99)
GLUCOSE BLD-MCNC: 205 MG/DL (ref 70–99)
HCT VFR BLD AUTO: 34.6 %
HGB BLD-MCNC: 10.7 G/DL
IMM GRANULOCYTES # BLD AUTO: 0.02 X10(3) UL (ref 0–1)
IMM GRANULOCYTES NFR BLD: 0.2 %
LYMPHOCYTES # BLD AUTO: 2.29 X10(3) UL (ref 1–4)
LYMPHOCYTES NFR BLD AUTO: 28.1 %
MCH RBC QN AUTO: 22.5 PG (ref 26–34)
MCHC RBC AUTO-ENTMCNC: 30.9 G/DL (ref 31–37)
MCV RBC AUTO: 72.8 FL
MONOCYTES # BLD AUTO: 0.53 X10(3) UL (ref 0.1–1)
MONOCYTES NFR BLD AUTO: 6.5 %
NEUTROPHILS # BLD AUTO: 5.3 X10 (3) UL (ref 1.5–7.7)
NEUTROPHILS # BLD AUTO: 5.3 X10(3) UL (ref 1.5–7.7)
NEUTROPHILS NFR BLD AUTO: 65.1 %
OSMOLALITY SERPL CALC.SUM OF ELEC: 304 MOSM/KG (ref 275–295)
PLATELET # BLD AUTO: 106 10(3)UL (ref 150–450)
PLATELETS.RETICULATED NFR BLD AUTO: 15.6 % (ref 0–7)
POTASSIUM SERPL-SCNC: 4 MMOL/L (ref 3.5–5.1)
PROT SERPL-MCNC: 5.2 G/DL (ref 6.4–8.2)
RBC # BLD AUTO: 4.75 X10(6)UL
SODIUM SERPL-SCNC: 145 MMOL/L (ref 136–145)
WBC # BLD AUTO: 8.2 X10(3) UL (ref 4–11)

## 2024-04-11 PROCEDURE — 74176 CT ABD & PELVIS W/O CONTRAST: CPT | Performed by: NURSE PRACTITIONER

## 2024-04-11 PROCEDURE — 99233 SBSQ HOSP IP/OBS HIGH 50: CPT | Performed by: INTERNAL MEDICINE

## 2024-04-11 PROCEDURE — 99233 SBSQ HOSP IP/OBS HIGH 50: CPT

## 2024-04-11 PROCEDURE — 99232 SBSQ HOSP IP/OBS MODERATE 35: CPT | Performed by: STUDENT IN AN ORGANIZED HEALTH CARE EDUCATION/TRAINING PROGRAM

## 2024-04-11 PROCEDURE — 99232 SBSQ HOSP IP/OBS MODERATE 35: CPT | Performed by: INTERNAL MEDICINE

## 2024-04-11 RX ORDER — KETOROLAC TROMETHAMINE 15 MG/ML
15 INJECTION, SOLUTION INTRAMUSCULAR; INTRAVENOUS EVERY 6 HOURS PRN
Status: DISPENSED | OUTPATIENT
Start: 2024-04-11 | End: 2024-04-13

## 2024-04-11 NOTE — PLAN OF CARE
Received patient after report. Patient up in chair on room air. Denies pain. Baseline tremors, stutter, and paresthesias remain. PRNs per MAR for pain + heat packs per pt request. Ambulated in benitez with walker. Transfer orders in place. Awaiting available bed. Updated on plan of care.

## 2024-04-11 NOTE — PROGRESS NOTES
ICU  Critical Care APRN Progress Note    NAME: Kat Crook - ROOM: [address] [place] [address] - MRN: [id number] - Age: 54 year old - :3/11/1970    Subjective:  No issues overnight. Sitting in chair this am.    Current Facility-Administered Medications   Medication Dose Route Frequency    cosyntropin (Cortrosyn) injection 0.25 mg  0.25 mg Intravenous Once    enoxaparin (Lovenox) 40 MG/0.4ML SUBQ injection 40 mg  40 mg Subcutaneous Daily    midodrine (ProAmatine) tab 2.5 mg  2.5 mg Oral TID PRN    HYDROmorphone (Dilaudid) 1 MG/ML injection 0.2 mg  0.2 mg Intravenous Q2H PRN    Or    HYDROmorphone (Dilaudid) 1 MG/ML injection 0.4 mg  0.4 mg Intravenous Q2H PRN    Or    HYDROmorphone (Dilaudid) 1 MG/ML injection 0.8 mg  0.8 mg Intravenous Q2H PRN    fluticasone furoate-vilanterol (Breo Ellipta) 200-25 MCG/ACT inhaler 1 puff  1 puff Inhalation Daily    insulin degludec 100 units/mL flextouch 8 Units  8 Units Subcutaneous Nightly    albumin human (Albumin) 5% injection 3,000 mL  3,000 mL Intravenous QOD    calcium gluconate 3 g in sodium chloride 0.9% 250 mL IVPB   Intravenous QOD    ondansetron (Zofran-ODT) disintegrating tab 4 mg  4 mg Oral Q6H PRN    Or    ondansetron (Zofran) 4 MG/2ML injection 4 mg  4 mg Intravenous Q6H PRN    albuterol (Ventolin HFA) 108 (90 Base) MCG/ACT inhaler 2 puff  2 puff Inhalation Q6H PRN    atorvastatin (Lipitor) tab 20 mg  20 mg Oral Nightly    baclofen (Lioresal) tab 10 mg  10 mg Oral TID    [Held by provider] carvedilol (Coreg) tab 3.125 mg  3.125 mg Oral BID    [Held by provider] Diroximel Fumarate CPDR 462 mg  462 mg Oral BID    docusate sodium (Colace) cap 100 mg  100 mg Oral BID    DULoxetine (Cymbalta) DR cap 60 mg  60 mg Oral BID    HYDROcodone-acetaminophen (Norco)  MG per tab 1 tablet  1 tablet Oral QID PRN    levothyroxine (Synthroid) tab 88 mcg  88 mcg Oral Before breakfast    montelukast (Singulair) tab 10 mg  10 mg Oral Nightly    thiamine (Vitamin B1) tab 100 mg  100 mg Oral  Daily    progesterone (Prometrium) cap 100 mg  100 mg Oral Daily    pregabalin (Lyrica) cap 200 mg  200 mg Oral BID    pantoprazole (Protonix) DR tab 20 mg  20 mg Oral BID    glucose (Dex4) 15 GM/59ML oral liquid 15 g  15 g Oral Q15 Min PRN    Or    glucose (Glutose) 40% oral gel 15 g  15 g Oral Q15 Min PRN    Or    glucose-vitamin C (Dex-4) chewable tab 4 tablet  4 tablet Oral Q15 Min PRN    Or    dextrose 50% injection 50 mL  50 mL Intravenous Q15 Min PRN    Or    glucose (Dex4) 15 GM/59ML oral liquid 30 g  30 g Oral Q15 Min PRN    Or    glucose (Glutose) 40% oral gel 30 g  30 g Oral Q15 Min PRN    Or    glucose-vitamin C (Dex-4) chewable tab 8 tablet  8 tablet Oral Q15 Min PRN    acetaminophen (Tylenol Extra Strength) tab 500 mg  500 mg Oral Q4H PRN    prochlorperazine (Compazine) 10 MG/2ML injection 5 mg  5 mg Intravenous Q8H PRN    polyethylene glycol (PEG 3350) (Miralax) 17 g oral packet 17 g  17 g Oral Daily PRN    sennosides (Senokot) tab 17.2 mg  17.2 mg Oral Nightly PRN    bisacodyl (Dulcolax) 10 MG rectal suppository 10 mg  10 mg Rectal Daily PRN    fleet enema (Fleet) 7-19 GM/118ML rectal enema 133 mL  1 enema Rectal Once PRN    insulin aspart (NovoLOG) 100 Units/mL FlexPen 1-10 Units  1-10 Units Subcutaneous TID AC and HS    acetaminophen (Tylenol) 325 mg, codeine 60 mg for Tylenol #4 ([facility] only)   Oral Q6H PRN    heparin (Porcine) 100 Units/mL lock flush 500 Units  5 mL Intravenous PRN       OBJECTIVE  Vitals:  /67   Pulse 60   Temp 97.4 °F (36.3 °C) (Temporal)   Resp 11   Wt 169 lb 8.5 oz (76.9 kg)   LMP  (LMP Unknown)   SpO2 97%   BMI 27.36 kg/m²             O2: 2 L NC    Physical Exam:    General Appearance: Alert, cooperative,left upper CW pain with palpation, lower abdominal cramping  Neck: No JVD, neck supple, no adenopathy, trachea midline, no carotid bruits  Lungs: Clear to auscultation bilaterally, respirations unlabored  Heart: Regular rate and rhythm, S1 and S2 normal, no murmur,  rub or gallop  Abdomen: Soft, non-tender, bowel sounds active all four quadrants, no masses, no organomegaly  Extremities: Atraumatic, no cyanosis or edema, capillary refill <3 sec.    Pulses: 2+ and symmetric all extremities  Skin: Skin color, texture, turgor normal for ethnicity, no rashes or lesions, warm and dry  Neurologic: CNII-XII intact, strength and weakness improved per patient    Data this admission:  None this admission    Labs:  Lab Results   Component Value Date    WBC 8.2 04/11/2024    HGB 10.7 04/11/2024    HCT 34.6 04/11/2024    .0 04/11/2024    CREATSERUM 0.92 04/11/2024    BUN 14 04/11/2024     04/11/2024    K 4.0 04/11/2024     04/11/2024    CO2 32.0 04/11/2024     04/11/2024    CA 8.7 04/11/2024    ALB 4.2 04/11/2024    ALKPHO 24 04/11/2024    BILT 0.3 04/11/2024    TP 5.2 04/11/2024    AST 11 04/11/2024    ALT 17 04/11/2024     Assessment/Plan:    Shock: Likely hypovolemic given improvement with hydration. Less likely septic--> resolving  -Hypotensive with PLEX initiation  -Midodrine was started, now discontinued   -PCT negative  -LA normal  -cortisol 1.2, repeat 1.4 (allergy to steroids). Pt refused stim test.-endocrinology consulted  -s/p IVF bolus on admission (1.5L)  -Vasopressors to maintain SBP above 90 and MAP above 65- currently off  -Blood cultures NGTD, UA negative  -Monitoring off antbx    MS exacerbation with LE weakness  -Weakness improved this morning, numbness/tingling minimal when ambulating  -Allergy to steroids, unable to have IVIG 2/2 DVTs  -Plan for PLEX x 5 days QOD. Received 3/5 on 4/10.   -Neuro following  -Renal following for PLEX  -PT/OT    Chest, right neck and epigastric pain  -improved with Norco  -EKG with SB, TWIs in V1-V2 unchanged from prior  -Troponin negative  -TTE with EF 60-65%, NRWMAs  -PPI  -monitor    Bradycardia  -HR improved to 60s from 40-50s day prior  -Sinus homar on EKG  -Holding coreg  -TTE unremarkable  -monitor on  tele    Thrombocytopenia  -Plts slowly down-trending  -No signs of bleeding  -Monitor daily CBC    DM2  -A1c 7.3  -Insulin pump and home PO meds on hold  -Accu-check AC and HS   - ISS  -Treseba nightly    Hypothyroidism         -TSH with reflex T4 low         - home Synthroid    HTN        -Hold meds while hypotensive    F/E/N:    -Follow lytes and replete prn  -Carb control diet    Proph:  -SQ Lovenox  -PT/OT  -SCD    Dispo:     Code Status: Full Code  -transfer to floor    Plan of care discussed with intensivist on-call, Dr. Ramana Hooper, [facility]  Critical Care NP  Phone [phone]    Pulmonary/Critical Care Physician Addendum     Kat Crook was interviewed and examined personally.  I reviewed and agree with the APN's documentation above of the patient's history, physical examination, test results, diagnoses, and plan of treatment.      Labs and imaging reviewed.     ASSESSMENT/PLAN  Feels well. Hemodynamically stable.   Midodrine stopped  Stim test appropriate  PLEX as per renal  Stable for transfer out of ICU. Pulmonary/CCM service will sign off upon physical transfer out of the ICU. Contact our service with questions/concerns.    Thank you for the opportunity to care for Kat Crook.      ANTHONY Boles DO, MPH  Pulmonary Critical Care Medicine  [facility]

## 2024-04-11 NOTE — DIETARY NOTE
[facility]   part of [facility]   CLINICAL NUTRITION    Kat Crook admitted on 4/5 presents with MS exacerbation.    PMH: Anemia, Anxiety, Arthritis, Asthma, Autoimmune disease, Calculus of kidney, Cardiomyopathy, Deep vein thrombosis, Depression, Esophageal reflux, Fibromyalgia, Gastroparesis, Glaucoma, H. pylori infection, Herniation of cervical intervertebral disc, High blood pressure, High cholesterol, Hyperlipidemia, IBS, Liver disease, Migraines, Multiple sclerosis, Myocardial infarction, Neuropathy, Osteoarthritis, Osteoporosis, Scoliosis of lumbar spine, Sickle cell trait, Sleep apnea, Type II DM    Admitting diagnosis:  Multiple sclerosis (HCC) [G35]  Weakness [R53.1]    Ht:  5'6\"  Wt: 76.9 kg (169 lb 8.5 oz).   Body mass index is 27.36 kg/m².    Wt Readings from Last 6 Encounters:   04/10/24 76.9 kg (169 lb 8.5 oz)   04/03/24 76.2 kg (168 lb)   03/13/24 76.4 kg (168 lb 6.4 oz)   02/29/24 77.1 kg (170 lb)   11/30/23 74.8 kg (165 lb)   11/02/23 75.8 kg (167 lb)        Labs/Meds reviewed    Diet:       Procedures    Carbohydrate controlled diet 1800 kcal/60 grams; Is Patient on Accuchecks? Yes       Percent Meals Eaten (last 3 days)       Date/Time Percent Meals Eaten (%)    04/08/24 0800 100 %    04/09/24 0800 100 %    04/09/24 1400 100 %    04/10/24 1000 100 %    04/10/24 1200 100 %    04/10/24 1800 100 %            Pt chart reviewed d/t LOS. Visited pt at bedside. Pt reports fair appetite/PO intake, eating 100% per flowsheet (see above). Denies nausea, vomiting or diarrhea but reports constipation with last BM PTA. No chewing or swallowing difficulties and NKFA. No significant weight changes noted; reports her UBW is ~165-168 lbs. Pt requesting HS snack d/t feeling hungry ~7-8pm. Will order fruit with string cheese or yogurt nightly. All questions answered at this time.    Patient is at low nutrition risk at this time.    Please consult if patient status changes or nutrition issues  arise.    Jennifer Thomson RD, LDN, [facility]  Clinical Dietitian  Spectra: [phone]

## 2024-04-11 NOTE — PROGRESS NOTES
[facility]   part of [facility]     Hospitalist Progress Note     Kat Crook Patient Status:  Inpatient    3/11/1970 MRN [id number]   Location [facility] 4SW-A Attending Elier, Alvaro Woods, *   Hosp Day # 6 PCP Justin Huerta MD     Chief Complaint: Weakness    Subjective:      - s/p PLEX yesterday, tolerated well   - Notes strength around the same, slightly improved in LE    - AF, BP stable     - ACTH stim testing completed yesterday, with adequate adrenal stimulation noted     Objective:    Review of Systems:   A comprehensive review of systems was completed; pertinent positive and negatives stated in subjective.    Vital signs:  Temp:  [97 °F (36.1 °C)-97.9 °F (36.6 °C)] 97.4 °F (36.3 °C)  Pulse:  [55-81] 60  Resp:  [6-20] 11  BP: ()/(50-71) 112/67  SpO2:  [89 %-100 %] 97 %    Physical Exam:    General: No acute distress  Respiratory: no wheezes, no rhonchi  Cardiovascular: S1, S2, regular rate and rhythm  Abdomen: Soft, Non-tender, non-distended, positive bowel sounds  Neuro: No new focal deficits.   Extremities: no edema    Diagnostic Data:    Labs:  Recent Labs   Lab 24  0420 24  0105 24  0443 24  0454 24  0552 04/10/24  0609 24  0446   WBC 5.2   < > 5.4 6.2 6.5 6.5 8.2   HGB 11.7*   < > 11.8* 11.2* 10.6* 10.4* 10.7*   MCV 73.0*   < > 72.6* 73.3* 71.8* 73.2* 72.8*   .0*   < > 141.0* 135.0* 107.0* 103.0* 106.0*   INR 1.00  --   --   --   --  1.08  --     < > = values in this interval not displayed.       Recent Labs   Lab 24  0552 04/10/24  0609 24  0446   * 161* 154*   BUN 12 14 14   CREATSERUM 0.89 0.79 0.92   CA 8.5 8.8 8.7   ALB 4.0 3.9 4.2    142 145   K 4.3 3.9 4.0    108 110   CO2 31.0 33.0* 32.0   ALKPHO 23* 35* 24*   AST 7* 8* 11*   ALT 23 32 17   BILT 0.4 0.3 0.3   TP 4.9* 5.4* 5.2*       Estimated Creatinine Clearance: 63.9 mL/min (based on SCr of 0.92 mg/dL).    Recent Labs   Lab 24  0912    TROPHS 9       Recent Labs   Lab 04/06/24  0420 04/10/24  0609   PTP 13.2 14.0   INR 1.00 1.08                  Microbiology    Hospital Encounter on 04/05/24   1. Blood Culture     Status: None (Preliminary result)    Collection Time: 04/07/24  1:05 AM    Specimen: Bld,Port a cath Line; Blood   Result Value Ref Range    Blood Culture Result No Growth 4 Days N/A   2. Urine Culture, Routine     Status: None    Collection Time: 04/06/24 12:28 AM    Specimen: Urine, clean catch   Result Value Ref Range    Urine Culture No Growth at 18-24 hrs. N/A         Imaging: Reviewed in Epic.    Medications:    cosyntropin  0.25 mg Intravenous Once    enoxaparin  40 mg Subcutaneous Daily    fluticasone furoate-vilanterol  1 puff Inhalation Daily    insulin degludec  8 Units Subcutaneous Nightly    albumin human  3,000 mL Intravenous QOD    calcium gluconate 3 g in sodium chloride 0.9% 250 mL IVPB   Intravenous QOD    atorvastatin  20 mg Oral Nightly    baclofen  10 mg Oral TID    [Held by provider] carvedilol  3.125 mg Oral BID    [Held by provider] Diroximel Fumarate  462 mg Oral BID    docusate sodium  100 mg Oral BID    DULoxetine  60 mg Oral BID    levothyroxine  88 mcg Oral Before breakfast    montelukast  10 mg Oral Nightly    thiamine  100 mg Oral Daily    progesterone  100 mg Oral Daily    pregabalin  200 mg Oral BID    pantoprazole  20 mg Oral BID    insulin aspart  1-10 Units Subcutaneous TID AC and HS       Assessment & Plan:      #MS Exacerbation  -Anaphylaxis to steroids and unable to tolerate IVIG due to hx of DVT  -PLEX, plan for every other day, next session (4/5) tmr   -Renal and neuro consult  -[facility]  -Pain control     #Shock, suspect hypovolemic.  Resolved  -Off levophed, midodrine  -PCT negative, blood cultures (NGTD), monitor off abx  -Critical care following  -AM cortisol low x2; endocrinology consulted  - ACTH stim with adequate adrenal stimulation, ACTH levels pending, holding  off steroids per endocrine      #DM type II with neuropathy  -A1c is 7.3  -Hyperglycemia protocol  -On insulin pump PTA - transitioned to basal bolus insulin  -Lyrica     #Hypothyroidism  -Levothyroxine     #Hypertension  -Resume home meds but holding coreg due to bradycardia     #Dyslipidemia  -Statin     #GERD  -PPI     #Hx of GIORDANO  -LFTs normalized      #Thrombocytopenia, trending down. Does have GIORDANO and low platelets in the past  -Monitor     #Bradycardia  -Hold coreg  -Echo similar to prior, EF 60-65%     #Hypokalemia  -Replace     #Alpha thalassemia minor - monitor hgb  #H/o PE in 2021 - not on anticoagulation  #Fibromyalgia - continue home meds  #Asthma - resume home inhaler and singulair       Alvaro Chuck Thapa MD    Supplementary Documentation:     Quality:  DVT Mechanical Prophylaxis:   SCDs, Early ambuation  DVT Pharmacologic Prophylaxis   Medication    enoxaparin (Lovenox) 40 MG/0.4ML SUBQ injection 40 mg    heparin (Porcine) 100 Units/mL lock flush 500 Units                Code Status: Full Code  Panda: No urinary catheter in place  Panda Duration (in days):   Central line:    KAYLEY:     The 21st Century Cures Act makes medical notes like these available to patients in the interest of transparency. Please be advised this is a medical document. Medical documents are intended to carry relevant information, facts as evident, and the clinical opinion of the practitioner. The medical note is intended as peer to peer communication and may appear blunt or direct. It is written in medical language and may contain abbreviations or verbiage that are unfamiliar.

## 2024-04-11 NOTE — PROGRESS NOTES
[facility]  Nephrology Progress Note    Kat Crook Attending:  Tobi Thapa DO       Assessment and Plan:    1) MS exacerbation- PLEX #4/5 Fri    2) Hypotension- mild volume depletion +/- meds (ie baclofen)- resolved. Midodrine dc'ed. Appropriate response to cosyntropin.     3) HTN- normally on telmisartan / carvediolol     4) DM 2    5) Fibromyalgia    6) h/o PE      Subjective:  Awake alert in good spirits no issues overnight. Up in chair.     Physical Exam:   /71 (BP Location: Right arm)   Pulse 68   Temp 98.6 °F (37 °C) (Temporal)   Resp 15   Wt 169 lb 8.5 oz (76.9 kg)   LMP  (LMP Unknown)   SpO2 98%   BMI 27.36 kg/m²   Temp (24hrs), Av.6 °F (36.4 °C), Min:97 °F (36.1 °C), Max:98.6 °F (37 °C)       Intake/Output Summary (Last 24 hours) at 2024 0917  Last data filed at 2024 0600  Gross per 24 hour   Intake 680 ml   Output 1200 ml   Net -520 ml     Wt Readings from Last 3 Encounters:   04/10/24 169 lb 8.5 oz (76.9 kg)   24 168 lb (76.2 kg)   24 168 lb 6.4 oz (76.4 kg)     General: awake alert  HEENT: No scleral icterus, MMM  Neck: Supple, no REESE or thyromegaly  Cardiac: Regular rate and rhythm, S1, S2 normal, no murmur or tub  Lungs: Decreased BS at bases bilaterally   Abdomen: Soft, non-tender. + bowel sounds, no palpable organomegaly  Extremities: Without clubbing, cyanosis; no edema  Neurologic: Cranial nerves grossly intact, moving all extremities  Skin: Warm and dry, no rashes       Labs:   Lab Results   Component Value Date    WBC 8.2 2024    HGB 10.7 2024    HCT 34.6 2024    .0 2024    CREATSERUM 0.92 2024    BUN 14 2024     2024    K 4.0 2024     2024    CO2 32.0 2024     2024    CA 8.7 2024    ALB 4.2 2024    ALKPHO 24 2024    BILT 0.3 2024    TP 5.2 2024    AST 11 2024    ALT 17 2024    PGLU 108 2024        Imaging:  All imaging studies reviewed.    Meds:   Current Facility-Administered Medications   Medication Dose Route Frequency    cosyntropin (Cortrosyn) injection 0.25 mg  0.25 mg Intravenous Once    enoxaparin (Lovenox) 40 MG/0.4ML SUBQ injection 40 mg  40 mg Subcutaneous Daily    midodrine (ProAmatine) tab 2.5 mg  2.5 mg Oral TID PRN    HYDROmorphone (Dilaudid) 1 MG/ML injection 0.2 mg  0.2 mg Intravenous Q2H PRN    Or    HYDROmorphone (Dilaudid) 1 MG/ML injection 0.4 mg  0.4 mg Intravenous Q2H PRN    Or    HYDROmorphone (Dilaudid) 1 MG/ML injection 0.8 mg  0.8 mg Intravenous Q2H PRN    fluticasone furoate-vilanterol (Breo Ellipta) 200-25 MCG/ACT inhaler 1 puff  1 puff Inhalation Daily    insulin degludec 100 units/mL flextouch 8 Units  8 Units Subcutaneous Nightly    albumin human (Albumin) 5% injection 3,000 mL  3,000 mL Intravenous QOD    calcium gluconate 3 g in sodium chloride 0.9% 250 mL IVPB   Intravenous QOD    ondansetron (Zofran-ODT) disintegrating tab 4 mg  4 mg Oral Q6H PRN    Or    ondansetron (Zofran) 4 MG/2ML injection 4 mg  4 mg Intravenous Q6H PRN    albuterol (Ventolin HFA) 108 (90 Base) MCG/ACT inhaler 2 puff  2 puff Inhalation Q6H PRN    atorvastatin (Lipitor) tab 20 mg  20 mg Oral Nightly    baclofen (Lioresal) tab 10 mg  10 mg Oral TID    [Held by provider] carvedilol (Coreg) tab 3.125 mg  3.125 mg Oral BID    [Held by provider] Diroximel Fumarate CPDR 462 mg  462 mg Oral BID    docusate sodium (Colace) cap 100 mg  100 mg Oral BID    DULoxetine (Cymbalta) DR cap 60 mg  60 mg Oral BID    HYDROcodone-acetaminophen (Norco)  MG per tab 1 tablet  1 tablet Oral QID PRN    levothyroxine (Synthroid) tab 88 mcg  88 mcg Oral Before breakfast    montelukast (Singulair) tab 10 mg  10 mg Oral Nightly    thiamine (Vitamin B1) tab 100 mg  100 mg Oral Daily    progesterone (Prometrium) cap 100 mg  100 mg Oral Daily    pregabalin (Lyrica) cap 200 mg  200 mg Oral BID    pantoprazole (Protonix)   tab 20 mg  20 mg Oral BID    glucose (Dex4) 15 GM/59ML oral liquid 15 g  15 g Oral Q15 Min PRN    Or    glucose (Glutose) 40% oral gel 15 g  15 g Oral Q15 Min PRN    Or    glucose-vitamin C (Dex-4) chewable tab 4 tablet  4 tablet Oral Q15 Min PRN    Or    dextrose 50% injection 50 mL  50 mL Intravenous Q15 Min PRN    Or    glucose (Dex4) 15 GM/59ML oral liquid 30 g  30 g Oral Q15 Min PRN    Or    glucose (Glutose) 40% oral gel 30 g  30 g Oral Q15 Min PRN    Or    glucose-vitamin C (Dex-4) chewable tab 8 tablet  8 tablet Oral Q15 Min PRN    acetaminophen (Tylenol Extra Strength) tab 500 mg  500 mg Oral Q4H PRN    prochlorperazine (Compazine) 10 MG/2ML injection 5 mg  5 mg Intravenous Q8H PRN    polyethylene glycol (PEG 3350) (Miralax) 17 g oral packet 17 g  17 g Oral Daily PRN    sennosides (Senokot) tab 17.2 mg  17.2 mg Oral Nightly PRN    bisacodyl (Dulcolax) 10 MG rectal suppository 10 mg  10 mg Rectal Daily PRN    fleet enema (Fleet) 7-19 GM/118ML rectal enema 133 mL  1 enema Rectal Once PRN    insulin aspart (NovoLOG) 100 Units/mL FlexPen 1-10 Units  1-10 Units Subcutaneous TID AC and HS    acetaminophen (Tylenol) 325 mg, codeine 60 mg for Tylenol #4 ([facility] only)   Oral Q6H PRN    heparin (Porcine) 100 Units/mL lock flush 500 Units  5 mL Intravenous PRN           Questions/concerns were discussed with patient and/or family by bedside.          Adrienne Ji MD  4/11/2024  917 AM

## 2024-04-11 NOTE — PHYSICAL THERAPY NOTE
PHYSICAL THERAPY TREATMENT NOTE - INPATIENT    Room Number: 467/467-A     Session: 2     Number of Visits to Meet Established Goals: 5    Presenting Problem: MS exacerbation, weakness  Co-Morbidities : MS, fibromyalgia, DVT, HLD, type 1 DM    ASSESSMENT   Patient demonstrates good  progress this session, goals  remain in progress.    Patient continues to function below baseline with transfers, gait, and stair negotiation.  Contributing factors to remaining limitations include decreased functional strength, pain, impaired   balance, impaired coordination, and impaired motor planning.  Next session anticipate patient to progress gait.  Physical Therapy will continue to follow patient for duration of hospitalization.    Patient continues to benefit from continued skilled PT services: at discharge to promote functional independence in home.  Anticipate patient will return home with home health PT.    PLAN  PT Treatment Plan: Endurance;Energy conservation;Patient education;Family education;Gait training;Neuromuscular re-educate;Strengthening;Stair training;Transfer training;Balance training  Rehab Potential : Good  Frequency (Obs): 3-5x/week    CURRENT GOALS     Goal #1 Patient is able to demonstrate supine - sit EOB @ level: supervision      Goal #2 Patient is able to demonstrate transfers Sit to/from Stand at assistance level: supervision      Goal #3 Patient is able to ambulate 50 feet with assist device: walker - rolling at assistance level: supervision      Goal #4 Pt able to ascend/descend 1 step with rail with CGA   Goal #5     Goal #6     Goal Comments: Goals established on 4/6/2024 4/11/2024 all goals ongoing.    SUBJECTIVE  \" I am weaker than before.\"    OBJECTIVE  Precautions: Other (Comment) (h/o  LLE footdrop)    WEIGHT BEARING RESTRICTION  Weight Bearing Restriction: None                PAIN ASSESSMENT   Rating: Unable to rate  Location: lower abdominal  Management Techniques: Repositioning    BALANCE                                                                                                                        Static Sitting: Good  Dynamic Sitting: Fair +           Static Standing: Fair -  Dynamic Standing: Poor +    ACTIVITY TOLERANCE                         O2 WALK         AM-PAC '6-Clicks' INPATIENT SHORT FORM - BASIC MOBILITY  How much difficulty does the patient currently have...  Patient Difficulty: Turning over in bed (including adjusting bedclothes, sheets and blankets)?: None   Patient Difficulty: Sitting down on and standing up from a chair with arms (e.g., wheelchair, bedside commode, etc.): A Little   Patient Difficulty: Moving from lying on back to sitting on the side of the bed?: A Little   How much help from another person does the patient currently need...   Help from Another: Moving to and from a bed to a chair (including a wheelchair)?: A Little   Help from Another: Need to walk in hospital room?: A Little   Help from Another: Climbing 3-5 steps with a railing?: A Little       AM-PAC Score:  Raw Score: 19   Approx Degree of Impairment: 41.77%   Standardized Score (AM-PAC Scale): 45.44   CMS Modifier (G-Code): CK    FUNCTIONAL ABILITY STATUS  Gait Assessment   Functional Mobility/Gait Assessment  Gait Assistance: Contact guard assist  Distance (ft): 150  Assistive Device: Rolling walker  Pattern:  (pt uses toe drag as proprioceptive feed back.)    Skilled Therapy Provided    Bed Mobility:  Rolling: sup   Supine<>Sit: NT   Sit<>Supine: sup     Transfer Mobility:  Sit<>Stand: cga   Stand<>Sit: cga   Gait: cga and RW. Patient shows toe drag, dec step length, and dec speed. At times ataxic. Pt uses toe drag as a proprioception feed back during stepping pattern. Upon cues for proper foot clearance, pt noted to have difficulty maintaining step through consistently and freezes after every few steps.           THERAPEUTIC EXERCISES  Lower Extremity Alternating marching  Ankle pumps  LAQ     Upper  Extremity      Position Sitting     Repetitions   -10   Sets   1     Patient End of Session: Needs met;Call light within reach;In bed;Alarm set    PT Session Time: 25 minutes  Gait Training: 15 minutes  Therapeutic Activity: 10 minutes  Therapeutic Exercise: 0 minutes   Neuromuscular Re-education: 0 minutes

## 2024-04-11 NOTE — PLAN OF CARE
Received patient patient following report. Patient lethargic but oriented X4, more alert throughout night. Soft BP. 500cc bolus given per APN. NSR on tele. Afebrile. Room air/CPAP. Neuropathy to feet, refusing pain meds. Abd pain associated with urinating, not new per pt. Port and HD line intact. Voiding in commode. Transfer orders remain in place. See flowsheets and MAR.

## 2024-04-11 NOTE — PROGRESS NOTES
[facility]  [facility] Neurology Progress Note    Kat Crook Patient Status:  Inpatient    3/11/1970 MRN [id number]   Location [facility] 4SW-A Attending Alvaro Thapa, *   Hosp Day # 6 PCP Justin Huerta MD     CC: Multiple sclerosis flare, weakness    Subjective:  Seen for a follow up visit today. No acute events overnight. Feeling well, LE weakness is improving slowly, tolerated PLEX # 3 yesterday well. No significant hypotension. Off midodrine. Endocrinology consulted, ACTH stim testing completed yesterday. Had adequate adrenal stimulation.             MEDICATIONS:  No current outpatient medications on file.     Current Facility-Administered Medications   Medication Dose Route Frequency    cosyntropin (Cortrosyn) injection 0.25 mg  0.25 mg Intravenous Once    enoxaparin (Lovenox) 40 MG/0.4ML SUBQ injection 40 mg  40 mg Subcutaneous Daily    midodrine (ProAmatine) tab 2.5 mg  2.5 mg Oral TID PRN    HYDROmorphone (Dilaudid) 1 MG/ML injection 0.2 mg  0.2 mg Intravenous Q2H PRN    Or    HYDROmorphone (Dilaudid) 1 MG/ML injection 0.4 mg  0.4 mg Intravenous Q2H PRN    Or    HYDROmorphone (Dilaudid) 1 MG/ML injection 0.8 mg  0.8 mg Intravenous Q2H PRN    fluticasone furoate-vilanterol (Breo Ellipta) 200-25 MCG/ACT inhaler 1 puff  1 puff Inhalation Daily    insulin degludec 100 units/mL flextouch 8 Units  8 Units Subcutaneous Nightly    albumin human (Albumin) 5% injection 3,000 mL  3,000 mL Intravenous QOD    calcium gluconate 3 g in sodium chloride 0.9% 250 mL IVPB   Intravenous QOD    ondansetron (Zofran-ODT) disintegrating tab 4 mg  4 mg Oral Q6H PRN    Or    ondansetron (Zofran) 4 MG/2ML injection 4 mg  4 mg Intravenous Q6H PRN    albuterol (Ventolin HFA) 108 (90 Base) MCG/ACT inhaler 2 puff  2 puff Inhalation Q6H PRN    atorvastatin (Lipitor) tab 20 mg  20 mg Oral Nightly    baclofen (Lioresal) tab 10 mg  10 mg Oral TID    [Held by provider] carvedilol (Coreg) tab 3.125 mg  3.125 mg Oral BID     [Held by provider] Diroximel Fumarate CPDR 462 mg  462 mg Oral BID    docusate sodium (Colace) cap 100 mg  100 mg Oral BID    DULoxetine (Cymbalta) DR cap 60 mg  60 mg Oral BID    HYDROcodone-acetaminophen (Norco)  MG per tab 1 tablet  1 tablet Oral QID PRN    levothyroxine (Synthroid) tab 88 mcg  88 mcg Oral Before breakfast    montelukast (Singulair) tab 10 mg  10 mg Oral Nightly    thiamine (Vitamin B1) tab 100 mg  100 mg Oral Daily    progesterone (Prometrium) cap 100 mg  100 mg Oral Daily    pregabalin (Lyrica) cap 200 mg  200 mg Oral BID    pantoprazole (Protonix) DR tab 20 mg  20 mg Oral BID    glucose (Dex4) 15 GM/59ML oral liquid 15 g  15 g Oral Q15 Min PRN    Or    glucose (Glutose) 40% oral gel 15 g  15 g Oral Q15 Min PRN    Or    glucose-vitamin C (Dex-4) chewable tab 4 tablet  4 tablet Oral Q15 Min PRN    Or    dextrose 50% injection 50 mL  50 mL Intravenous Q15 Min PRN    Or    glucose (Dex4) 15 GM/59ML oral liquid 30 g  30 g Oral Q15 Min PRN    Or    glucose (Glutose) 40% oral gel 30 g  30 g Oral Q15 Min PRN    Or    glucose-vitamin C (Dex-4) chewable tab 8 tablet  8 tablet Oral Q15 Min PRN    acetaminophen (Tylenol Extra Strength) tab 500 mg  500 mg Oral Q4H PRN    prochlorperazine (Compazine) 10 MG/2ML injection 5 mg  5 mg Intravenous Q8H PRN    polyethylene glycol (PEG 3350) (Miralax) 17 g oral packet 17 g  17 g Oral Daily PRN    sennosides (Senokot) tab 17.2 mg  17.2 mg Oral Nightly PRN    bisacodyl (Dulcolax) 10 MG rectal suppository 10 mg  10 mg Rectal Daily PRN    fleet enema (Fleet) 7-19 GM/118ML rectal enema 133 mL  1 enema Rectal Once PRN    insulin aspart (NovoLOG) 100 Units/mL FlexPen 1-10 Units  1-10 Units Subcutaneous TID AC and HS    acetaminophen (Tylenol) 325 mg, codeine 60 mg for Tylenol #4 ([facility] only)   Oral Q6H PRN    heparin (Porcine) 100 Units/mL lock flush 500 Units  5 mL Intravenous PRN       REVIEW OF SYSTEMS:  A 10-point system was reviewed.  Pertinent positives and  negatives are noted in HPI.      PHYSICAL EXAMINATION:  VITAL SIGNS: /71 (BP Location: Right arm)   Pulse 68   Temp 98.6 °F (37 °C) (Temporal)   Resp 15   Wt 169 lb 8.5 oz (76.9 kg)   LMP  (LMP Unknown)   SpO2 98%   BMI 27.36 kg/m²   GENERAL:  Patient is a 54 year old female in no acute distress.  HEENT:  Normocephalic, atraumatic  ABD: Soft, non tender  SKIN: Warm, dry, no rashes    NEUROLOGICAL:   Mental status: Oriented to person, place, and time   Speech: Fluent, no dysarthria  Memory and comprehension: Intact   Cranial Nerves: VFF, PERRL 3mm brisk, EOMI, no nystagmus, facial sensation intact, face symmetric, tongue midline, shoulder shrug equal, remainder CN intact  Motor: No drift, no focal arm weakness, 5 out of 5 motor strength in UEs bilaterally. BLEs - 4 out of 5 throughout bilaterally.   Sensory: Intact to light touch  Coordination: FTN intact  Gait: Deferred      Imaging/Diagnostics:  IR CENTRAL VENOUS ACCESS    Result Date: 4/6/2024  CONCLUSION: Successful placement of a temporary dialysis catheter via right internal jugular vein. Catheter is ready for use.  Recommend routine catheter care.   LOCATION:  [facility]    Dictated by (CST): Elier Romo MD on 4/06/2024 at 11:52 AM     Finalized by (CST): Elier Romo MD on 4/06/2024 at 11:53 AM          Labs:  Recent Labs   Lab 04/09/24  0552 04/10/24  0609 04/11/24  0446   RBC 4.64 4.55 4.75   HGB 10.6* 10.4* 10.7*   HCT 33.3* 33.3* 34.6*   MCV 71.8* 73.2* 72.8*   MCH 22.8* 22.9* 22.5*   MCHC 31.8 31.2 30.9*   RDW 14.6 14.6 14.4   NEPRELIM 3.55 3.30 5.30   WBC 6.5 6.5 8.2   .0* 103.0* 106.0*         Recent Labs   Lab 04/09/24  0552 04/10/24  0609 04/11/24  0446   * 161* 154*   BUN 12 14 14   CREATSERUM 0.89 0.79 0.92   EGFRCR 76 88 73   CA 8.5 8.8 8.7    142 145   K 4.3 3.9 4.0    108 110   CO2 31.0 33.0* 32.0         Assessment/Plan:       A 54 year old female with:     MS exacerbation with LEs weakness - in a setting  of allergies to steroids and unable to have IVIG 2/2 hx of DVTs. Plan for PLEX x 5 cycles every other day as per her primary neurologist Dr. Hernandez's recommendations. LEs weakness about the same today.  Nephrology on consult to help facilitate the PLEX treatments  Plasmapheresis treatements , session 3/5 was on Wednesday 4/10. Next cycle on Friday, 4/12.   PT/OT  Safety and falls precautions  Hypotension, likely secondary to hypovolemic shock, resolved - stable, monitored closely by critical care. Blood cultures pending, no growth so far  Hypokalemia and hypomagnesemia - resolved.  Nephrology managing.  Pain to feet bilaterally - history of DM2 with neuropathy, A1c is 7.3. Continue Lyrica. Feeling some improvement.    Discussed with patient. Discussed with dr. [place]. Will continue to follow.     Is this a shared or split note between Advanced Practice Provider and Physician? No       Aimee MENENDEZ  [facility]  4/11/2024, 9:23 AM   [employer] # [id number]

## 2024-04-12 ENCOUNTER — APPOINTMENT (OUTPATIENT)
Dept: GENERAL RADIOLOGY | Facility: HOSPITAL | Age: 54
End: 2024-04-12
Attending: STUDENT IN AN ORGANIZED HEALTH CARE EDUCATION/TRAINING PROGRAM
Payer: MEDICARE

## 2024-04-12 PROBLEM — N30.00 ACUTE CYSTITIS WITHOUT HEMATURIA: Status: ACTIVE | Noted: 2024-04-12

## 2024-04-12 PROBLEM — R79.89 LOW SERUM CORTISOL LEVEL: Status: ACTIVE | Noted: 2024-04-12

## 2024-04-12 LAB
ANION GAP SERPL CALC-SCNC: 3 MMOL/L (ref 0–18)
BASOPHILS # BLD AUTO: 0.01 X10(3) UL (ref 0–0.2)
BASOPHILS NFR BLD AUTO: 0.1 %
BILIRUB UR QL STRIP.AUTO: NEGATIVE
BUN BLD-MCNC: 17 MG/DL (ref 9–23)
CALCIUM BLD-MCNC: 8.6 MG/DL (ref 8.5–10.1)
CHLORIDE SERPL-SCNC: 109 MMOL/L (ref 98–112)
CLARITY UR REFRACT.AUTO: CLEAR
CO2 SERPL-SCNC: 31 MMOL/L (ref 21–32)
CREAT BLD-MCNC: 1 MG/DL
EGFRCR SERPLBLD CKD-EPI 2021: 66 ML/MIN/1.73M2 (ref 60–?)
EOSINOPHIL # BLD AUTO: 0 X10(3) UL (ref 0–0.7)
EOSINOPHIL NFR BLD AUTO: 0 %
ERYTHROCYTE [DISTWIDTH] IN BLOOD BY AUTOMATED COUNT: 14.6 %
GLUCOSE BLD-MCNC: 127 MG/DL (ref 70–99)
GLUCOSE BLD-MCNC: 132 MG/DL (ref 70–99)
GLUCOSE BLD-MCNC: 135 MG/DL (ref 70–99)
GLUCOSE BLD-MCNC: 207 MG/DL (ref 70–99)
GLUCOSE BLD-MCNC: 92 MG/DL (ref 70–99)
GLUCOSE UR STRIP.AUTO-MCNC: 150 MG/DL
HCT VFR BLD AUTO: 32.3 %
HGB BLD-MCNC: 10.6 G/DL
IMM GRANULOCYTES # BLD AUTO: 0.02 X10(3) UL (ref 0–1)
IMM GRANULOCYTES NFR BLD: 0.3 %
KETONES UR STRIP.AUTO-MCNC: NEGATIVE MG/DL
LEUKOCYTE ESTERASE UR QL STRIP.AUTO: 250
LYMPHOCYTES # BLD AUTO: 2.9 X10(3) UL (ref 1–4)
LYMPHOCYTES NFR BLD AUTO: 40.1 %
MCH RBC QN AUTO: 22.9 PG (ref 26–34)
MCHC RBC AUTO-ENTMCNC: 32.8 G/DL (ref 31–37)
MCV RBC AUTO: 69.8 FL
MONOCYTES # BLD AUTO: 0.54 X10(3) UL (ref 0.1–1)
MONOCYTES NFR BLD AUTO: 7.5 %
NEUTROPHILS # BLD AUTO: 3.76 X10 (3) UL (ref 1.5–7.7)
NEUTROPHILS # BLD AUTO: 3.76 X10(3) UL (ref 1.5–7.7)
NEUTROPHILS NFR BLD AUTO: 52 %
NITRITE UR QL STRIP.AUTO: NEGATIVE
OSMOLALITY SERPL CALC.SUM OF ELEC: 299 MOSM/KG (ref 275–295)
PH UR STRIP.AUTO: 7 [PH] (ref 5–8)
PLATELET # BLD AUTO: 105 10(3)UL (ref 150–450)
PLATELETS.RETICULATED NFR BLD AUTO: 10.8 % (ref 0–7)
POTASSIUM SERPL-SCNC: 3.9 MMOL/L (ref 3.5–5.1)
PROLACTIN SERPL-MCNC: 21.6 NG/ML
PROT UR STRIP.AUTO-MCNC: NEGATIVE MG/DL
RBC # BLD AUTO: 4.63 X10(6)UL
RBC UR QL AUTO: NEGATIVE
SODIUM SERPL-SCNC: 143 MMOL/L (ref 136–145)
SP GR UR STRIP.AUTO: 1.02 (ref 1–1.03)
UROBILINOGEN UR STRIP.AUTO-MCNC: NORMAL MG/DL
WBC # BLD AUTO: 7.2 X10(3) UL (ref 4–11)

## 2024-04-12 PROCEDURE — 71045 X-RAY EXAM CHEST 1 VIEW: CPT | Performed by: STUDENT IN AN ORGANIZED HEALTH CARE EDUCATION/TRAINING PROGRAM

## 2024-04-12 PROCEDURE — 99232 SBSQ HOSP IP/OBS MODERATE 35: CPT | Performed by: STUDENT IN AN ORGANIZED HEALTH CARE EDUCATION/TRAINING PROGRAM

## 2024-04-12 PROCEDURE — 99232 SBSQ HOSP IP/OBS MODERATE 35: CPT | Performed by: INTERNAL MEDICINE

## 2024-04-12 PROCEDURE — 99233 SBSQ HOSP IP/OBS HIGH 50: CPT | Performed by: INTERNAL MEDICINE

## 2024-04-12 RX ORDER — DIPHENHYDRAMINE HCL 25 MG
25 CAPSULE ORAL EVERY 6 HOURS PRN
Status: DISCONTINUED | OUTPATIENT
Start: 2024-04-12 | End: 2024-04-15

## 2024-04-12 RX ORDER — PHENAZOPYRIDINE HYDROCHLORIDE 100 MG/1
100 TABLET, FILM COATED ORAL
Status: COMPLETED | OUTPATIENT
Start: 2024-04-12 | End: 2024-04-13

## 2024-04-12 RX ORDER — INSULIN DEGLUDEC 100 U/ML
6 INJECTION, SOLUTION SUBCUTANEOUS NIGHTLY
Status: DISCONTINUED | OUTPATIENT
Start: 2024-04-12 | End: 2024-04-15

## 2024-04-12 RX ORDER — DOCUSATE SODIUM 100 MG/1
100 CAPSULE, LIQUID FILLED ORAL 2 TIMES DAILY
Status: DISCONTINUED | OUTPATIENT
Start: 2024-04-12 | End: 2024-04-15

## 2024-04-12 RX ORDER — ALBUMIN, HUMAN INJ 5% 5 %
SOLUTION INTRAVENOUS
Status: DISPENSED
Start: 2024-04-12 | End: 2024-04-13

## 2024-04-12 NOTE — PROGRESS NOTES
ICU  Critical Care APRN Progress Note    NAME: Kat Crook - [facility] - MRN: [id number] - Age: 54 year old - :3/11/1970    Subjective:  Overnight patient with pain to abdomen and right flank.  CT scan with possible cystitis and non obstructing stone.  Pain controlled with IVP medication    Current Facility-Administered Medications   Medication Dose Route Frequency    cefTRIAXone (Rocephin) 1 g in D5W 100 mL IVPB-ADD  1 g Intravenous Q24H    insulin degludec 100 units/mL flextouch 6 Units  6 Units Subcutaneous Nightly    ketorolac (Toradol) 15 MG/ML injection 15 mg  15 mg Intravenous Q6H PRN    enoxaparin (Lovenox) 40 MG/0.4ML SUBQ injection 40 mg  40 mg Subcutaneous Daily    midodrine (ProAmatine) tab 2.5 mg  2.5 mg Oral TID PRN    HYDROmorphone (Dilaudid) 1 MG/ML injection 0.2 mg  0.2 mg Intravenous Q2H PRN    Or    HYDROmorphone (Dilaudid) 1 MG/ML injection 0.4 mg  0.4 mg Intravenous Q2H PRN    Or    HYDROmorphone (Dilaudid) 1 MG/ML injection 0.8 mg  0.8 mg Intravenous Q2H PRN    fluticasone furoate-vilanterol (Breo Ellipta) 200-25 MCG/ACT inhaler 1 puff  1 puff Inhalation Daily    albumin human (Albumin) 5% injection 3,000 mL  3,000 mL Intravenous QOD    calcium gluconate 3 g in sodium chloride 0.9% 250 mL IVPB   Intravenous QOD    ondansetron (Zofran-ODT) disintegrating tab 4 mg  4 mg Oral Q6H PRN    Or    ondansetron (Zofran) 4 MG/2ML injection 4 mg  4 mg Intravenous Q6H PRN    albuterol (Ventolin HFA) 108 (90 Base) MCG/ACT inhaler 2 puff  2 puff Inhalation Q6H PRN    atorvastatin (Lipitor) tab 20 mg  20 mg Oral Nightly    baclofen (Lioresal) tab 10 mg  10 mg Oral TID    [Held by provider] carvedilol (Coreg) tab 3.125 mg  3.125 mg Oral BID    [Held by provider] Diroximel Fumarate CPDR 462 mg  462 mg Oral BID    DULoxetine (Cymbalta)  cap 60 mg  60 mg Oral BID    HYDROcodone-acetaminophen (Norco)  MG per tab 1 tablet  1 tablet Oral QID PRN    levothyroxine (Synthroid) tab 88 mcg  88 mcg  Oral Before breakfast    montelukast (Singulair) tab 10 mg  10 mg Oral Nightly    thiamine (Vitamin B1) tab 100 mg  100 mg Oral Daily    progesterone (Prometrium) cap 100 mg  100 mg Oral Daily    pregabalin (Lyrica) cap 200 mg  200 mg Oral BID    pantoprazole (Protonix) DR tab 20 mg  20 mg Oral BID    glucose (Dex4) 15 GM/59ML oral liquid 15 g  15 g Oral Q15 Min PRN    Or    glucose (Glutose) 40% oral gel 15 g  15 g Oral Q15 Min PRN    Or    glucose-vitamin C (Dex-4) chewable tab 4 tablet  4 tablet Oral Q15 Min PRN    Or    dextrose 50% injection 50 mL  50 mL Intravenous Q15 Min PRN    Or    glucose (Dex4) 15 GM/59ML oral liquid 30 g  30 g Oral Q15 Min PRN    Or    glucose (Glutose) 40% oral gel 30 g  30 g Oral Q15 Min PRN    Or    glucose-vitamin C (Dex-4) chewable tab 8 tablet  8 tablet Oral Q15 Min PRN    acetaminophen (Tylenol Extra Strength) tab 500 mg  500 mg Oral Q4H PRN    prochlorperazine (Compazine) 10 MG/2ML injection 5 mg  5 mg Intravenous Q8H PRN    polyethylene glycol (PEG 3350) (Miralax) 17 g oral packet 17 g  17 g Oral Daily PRN    sennosides (Senokot) tab 17.2 mg  17.2 mg Oral Nightly PRN    bisacodyl (Dulcolax) 10 MG rectal suppository 10 mg  10 mg Rectal Daily PRN    fleet enema (Fleet) 7-19 GM/118ML rectal enema 133 mL  1 enema Rectal Once PRN    insulin aspart (NovoLOG) 100 Units/mL FlexPen 1-10 Units  1-10 Units Subcutaneous TID AC and HS    acetaminophen (Tylenol) 325 mg, codeine 60 mg for Tylenol #4 ([facility] only)   Oral Q6H PRN    heparin (Porcine) 100 Units/mL lock flush 500 Units  5 mL Intravenous PRN       OBJECTIVE  Vitals:  /62 (BP Location: Right arm)   Pulse 55   Temp 97.4 °F (36.3 °C) (Temporal)   Resp 14   Wt 169 lb 8.5 oz (76.9 kg)   LMP  (LMP Unknown)   SpO2 99%   BMI 27.36 kg/m²             O2: 2 L NC    Physical Exam:    General Appearance: Alert, cooperative,left upper CW pain with palpation, lower abdominal cramping  Neck: No JVD, neck supple, no adenopathy, trachea  midline, no carotid bruits  Lungs: Clear to auscultation bilaterally, respirations unlabored  Heart: Regular rate and rhythm, S1 and S2 normal, no murmur, rub or gallop  Abdomen: Soft, non-tender, bowel sounds active all four quadrants, no masses, no organomegaly  Extremities: Atraumatic, no cyanosis or edema, capillary refill <3 sec.    Pulses: 2+ and symmetric all extremities  Skin: Skin color, texture, turgor normal for ethnicity, no rashes or lesions, warm and dry  Neurologic: CNII-XII intact, strength and weakness improved per patient    Data this admission:  None this admission    Labs:  Lab Results   Component Value Date    WBC 7.2 04/12/2024    HGB 10.6 04/12/2024    HCT 32.3 04/12/2024    .0 04/12/2024    CREATSERUM 1.00 04/12/2024    BUN 17 04/12/2024     04/12/2024    K 3.9 04/12/2024     04/12/2024    CO2 31.0 04/12/2024     04/12/2024    CA 8.6 04/12/2024     Assessment/Plan:    Shock: Likely hypovolemic given improvement with hydration. Less likely septic--> resolving  -Hypotensive with PLEX initiation  -PCT negative  -LA normal  -cortisol 1.2, repeat 1.4 (allergy to steroids).   -s/p IVF bolus on admission (1.5L)  -Vasopressors to maintain SBP above 90 and MAP above 65- currently off  -Blood cultures NGTD, UA negative  -Monitoring off antbx    MS exacerbation with LE weakness  -Weakness improved this morning, numbness/tingling minimal when ambulating  -Allergy to steroids, unable to have IVIG 2/2 DVTs  -Plan for PLEX x 5 days QOD. Received 3/5 on 4/10, to receive today.   -Neuro following  -Renal following for PLEX  -PT/OT    Right flank, abd pain  -CT reveals right renal stone and possible cystitis  -started on pyridium  -Urology consulted  -PRN pain medications    Chest, right neck and epigastric pain  -improved with Norco  -EKG with SB, TWIs in V1-V2 unchanged from prior  -Troponin negative  -TTE with EF 60-65%, NRWMAs  -PPI  -monitor    Bradycardia  -HR improved to 60s  from 40-50s day prior  -Sinus homar on EKG  -Holding coreg  -TTE unremarkable  -monitor on tele    Thrombocytopenia  -Plts slowly down-trending  -No signs of bleeding  -Monitor daily CBC    DM2  -A1c 7.3  -Insulin pump and home PO meds on hold  -Accu-check AC and HS   - ISS  -Treseba nightly    Hypothyroidism         -TSH with reflex T4 low         - home Synthroid    HTN        -Hold meds while hypotensive    F/E/N:    -Follow lytes and replete prn  -Carb control diet    Proph:  -SQ Lovenox  -PT/OT  -SCD    Dispo:     Code Status: Full Code  -transfer to floor, pulmonary will sign off once out of ICU    Plan of care discussed with intensivist on-call, Dr. Katalina Hooper, [facility]  Critical Care NP  Phone [phone]    Pulmonary Critical Care Physician Attestation    I have personally seen and examined the patient.  I have reviewed and agree with Tenisha MORA's documentation with the following highlights/changes.    Shock has resolved. Continue PLEX for MS exacerbation. CT abdomen done for abdominal pain showed mild bladder wall thickening and chronic nonobstructing renal stone.    Stable for floor.    Parvez Darden MD  Pulmonary & Critical Care Medicine  [facility]

## 2024-04-12 NOTE — CONSULTS
[facility]  Department of Urology   Consultation Note    Kat Crook Patient Status:  Inpatient    3/11/1970 MRN [id number]   Location [facility] 4SW-A Attending Alvaro Thapa, *   Hosp Day # 7 PCP Justin Huerta MD     Reason for Consultation:  Renal stone    History of Present Illness:  Kat Crook is a a(n) 54 year old female  with medical history of multiple sclerosis HTN, T2DM, fibromyalgia, hx of PE, hypothyroidism who was admitted to the hospital for Weakness concerning for MS exacerbation.     Chronic abdominal pain  Pain increased  Reports lower abdominal pain, right>left with some radiation towards right back area.    No nausea, vomiting, fever, or chills.  No dysuria or gross hematuria  Voiding ok    Labs:  WBC 5.12 > 5.2 > 4.8 > 5.4 > 6.2 > 6.5 > 6.5 > 8.2 > 7.2  Serum creat 24: 1  Serum calcium level 24: 8.6    UA 24: 6-10 WBC/hpf, 0-2 RBC/hpf, no bacteria    UA 24: 21-50 WBC/hpf, 3-5 RBC/hpf, no bacteria, few squamous epithelial cells  Urine culture 24: pending    Abdominal/pelvic CT scan without contrast 24:  There is a stable nonobstructing 5 mm calculus within the right kidney.  No hydronephrosis bilaterally.     Seen by Dr. Beard in the past  Last office note 23:  cystoscopy, right retrograde pyelogram, and right ESWL on 2023. She had a 5 mm calculus in the upper pole of the right kidney, possibly in a calyceal diverticulum. She has not passed fragments. She continues to have pain, though it is more in the suprapubic and mid abdomen now.      Seen by Dr. Marc 23  Per office note:  Lower abdominal and pelvic pain that   She states that she has a history of interstitial cystitis and has been treated with pelvic floor physical therapy in the past sometimes radiates to the right side. CT ordered   TE 23: Dr. Marc reviewed the ct scan results and states it is negative for a stone that would explain the  pain she is having. He suggests her to have another course of pelvic floor physical therapy.   History:  Past Medical History:    Abdominal pain    Allergic rhinitis    Anemia    Anxiety    Anxiety state    Arthritis    Asthma (HCC)    Atypical mole    Autoimmune disease (HCC)    Back pain    Bad breath    Black stools    Bloating    Blood disorder    Thalassemia alpha, Sickle cell trait    Blood in urine    Blurred vision    Calculus of kidney    Cardiomyopathy (HCC)    last echo 2021: EF 55%    Chest pain    Constipation    Decorative tattoo    Deep vein thrombosis (HCC)    Depression    Diarrhea, unspecified    Dizziness    Easy bruising    Elevated liver enzymes    Endocrine disorder    Esophageal reflux    Fatigue    Fibromyalgia    Food intolerance    Frequent urination    Frequent UTI    Gastroparesis    Glaucoma    H. pylori infection    H/O  section    Headache disorder    Heart attack (HCC)    Heart palpitations    Heartburn    Hemorrhoids    Herniation of cervical intervertebral disc    High blood pressure    High cholesterol    History of blood clots    Unprovoked    History of blood transfusion    History of cardiac murmur    History of depression    Hoarseness, chronic    Hyperlipidemia    IBS (irritable bowel syndrome)    Irregular bowel habits    Leaking of urine    Liver disease    GIORDANO and Stage 2 fibrosis    Loss of appetite    Migraines    Multiple sclerosis (HCC)    dx 8 years ago    Muscle weakness    cane or walker prn    Myocardial infarction (HCC)    Neuropathy    legs, hands, feet; bilateral    Night sweats    Osteoarthritis    Osteoporosis    KAMALJIT in her 30s    Osteoporosis    Pain with bowel movements    Painful urination    Problems with swallowing    Due to MS    Renal disorder    kidney lesions    Scoliosis of lumbar spine    Shortness of breath    Sickle cell trait (HCC)    Sleep apnea    no treatment    Sleep disturbance    Stress    Type II or unspecified type diabetes  mellitus without mention of complication, not stated as uncontrolled    Uncomfortable fullness after meals    Unspecified disorder of thyroid    total thyroidectomy    Visual impairment    glasses    Wears glasses    Weight loss    Wheezing     Past Surgical History:   Procedure Laterality Date      1986    Cataract      Cath angio  2014    Cholecystectomy      Colonoscopy N/A 2015    Procedure: COLONOSCOPY;  Surgeon: Liz Tong DO;  Location:  ENDOSCOPY    Colonoscopy N/A 2021    Procedure: COLONOSCOPY;  Surgeon: Cedrick Da Silva MD;  Location:  ENDOSCOPY    Colonoscopy N/A 10/08/2021    Procedure: COLONOSCOPY, ESOPHAGOGASTRODUODENOSCOPY (EGD);  Surgeon: Darvin Richardson MD;  Location:  ENDOSCOPY    D & c  1990    Blighted ovum    Egd      Hysterectomy      total hystero.    Ir port a cath insertion exchnge check  2018    Lithotripsy      x 2    Lysis of adhesions      Needle biopsy liver  2016    Oophorectomy Bilateral     total hystero.    Other      dialysis catheter- left chest for plasmaphoresis    Other surgical history       x 2    Port, indwelling, imp  2019    power port    Thyroidectomy  2013    benign MNG    Total abdom hysterectomy      Upper gi endoscopy performed  2014     Family History   Problem Relation Age of Onset    Heart Attack Father     Other (Other) Father         COPD, emphysema    Asthma Father     Pulmonary Disease Father     Hypertension Mother         Needs to be deleted    Cancer Mother         stomach cancer    Ovarian Cancer Mother         30'S    Colon Polyps Mother     Anemia Mother     Ovarian Cancer Maternal Grandmother         30'S    Colon Polyps Maternal Grandmother     Diabetes Maternal Grandmother     Colon Cancer Maternal Grandmother     Anemia Maternal Grandmother     Cancer Maternal Grandmother     Breast Cancer Maternal Aunt         60'S    Other (Other) Sister         rectal  CA\    Anemia Daughter       reports that she has never smoked. She has never been exposed to tobacco smoke. She has never used smokeless tobacco. She reports that she does not drink alcohol and does not use drugs.    Allergies:  Allergies   Allergen Reactions    Epinephrine OTHER (SEE COMMENTS)     Cardiac issues    Immune Globulin ANAPHYLAXIS    Latex SHORTNESS OF BREATH and OTHER (SEE COMMENTS)     WELTS    Methylprednisolone SWELLING     Swelling of neck, throat and tongue  Injection, throat and tongue swelling      Ocrelizumab ANAPHYLAXIS and OTHER (SEE COMMENTS)     Difficulty breathing    Other SHORTNESS OF BREATH     ENVIRONMENTAL, ASTHMA EXACERBATION    Pcn [Penicillamine]     Penicillins OTHER (SEE COMMENTS)    Urea Tightness in Throat     Urea C-13 in Pranactin for H. Pylori test kit.       Medications:    Current Facility-Administered Medications:     cefTRIAXone (Rocephin) 1 g in D5W 100 mL IVPB-ADD, 1 g, Intravenous, Q24H    insulin degludec 100 units/mL flextouch 6 Units, 6 Units, Subcutaneous, Nightly    phenazopyridine (Pyridium) tab 100 mg, 100 mg, Oral, TID CC    docusate sodium (Colace) cap 100 mg, 100 mg, Oral, BID    ketorolac (Toradol) 15 MG/ML injection 15 mg, 15 mg, Intravenous, Q6H PRN    enoxaparin (Lovenox) 40 MG/0.4ML SUBQ injection 40 mg, 40 mg, Subcutaneous, Daily    midodrine (ProAmatine) tab 2.5 mg, 2.5 mg, Oral, TID PRN    HYDROmorphone (Dilaudid) 1 MG/ML injection 0.2 mg, 0.2 mg, Intravenous, Q2H PRN **OR** HYDROmorphone (Dilaudid) 1 MG/ML injection 0.4 mg, 0.4 mg, Intravenous, Q2H PRN **OR** HYDROmorphone (Dilaudid) 1 MG/ML injection 0.8 mg, 0.8 mg, Intravenous, Q2H PRN    fluticasone furoate-vilanterol (Breo Ellipta) 200-25 MCG/ACT inhaler 1 puff, 1 puff, Inhalation, Daily    albumin human (Albumin) 5% injection 3,000 mL, 3,000 mL, Intravenous, QOD    calcium gluconate 3 g in sodium chloride 0.9% 250 mL IVPB, , Intravenous, QOD    ondansetron (Zofran-ODT) disintegrating tab 4 mg,  4 mg, Oral, Q6H PRN **OR** ondansetron (Zofran) 4 MG/2ML injection 4 mg, 4 mg, Intravenous, Q6H PRN    albuterol (Ventolin HFA) 108 (90 Base) MCG/ACT inhaler 2 puff, 2 puff, Inhalation, Q6H PRN    atorvastatin (Lipitor) tab 20 mg, 20 mg, Oral, Nightly    baclofen (Lioresal) tab 10 mg, 10 mg, Oral, TID    [Held by provider] carvedilol (Coreg) tab 3.125 mg, 3.125 mg, Oral, BID    [Held by provider] Diroximel Fumarate CPDR 462 mg, 462 mg, Oral, BID    DULoxetine (Cymbalta) DR cap 60 mg, 60 mg, Oral, BID    HYDROcodone-acetaminophen (Norco)  MG per tab 1 tablet, 1 tablet, Oral, QID PRN    levothyroxine (Synthroid) tab 88 mcg, 88 mcg, Oral, Before breakfast    montelukast (Singulair) tab 10 mg, 10 mg, Oral, Nightly    thiamine (Vitamin B1) tab 100 mg, 100 mg, Oral, Daily    progesterone (Prometrium) cap 100 mg, 100 mg, Oral, Daily    pregabalin (Lyrica) cap 200 mg, 200 mg, Oral, BID    pantoprazole (Protonix) DR tab 20 mg, 20 mg, Oral, BID    glucose (Dex4) 15 GM/59ML oral liquid 15 g, 15 g, Oral, Q15 Min PRN **OR** glucose (Glutose) 40% oral gel 15 g, 15 g, Oral, Q15 Min PRN **OR** glucose-vitamin C (Dex-4) chewable tab 4 tablet, 4 tablet, Oral, Q15 Min PRN **OR** dextrose 50% injection 50 mL, 50 mL, Intravenous, Q15 Min PRN **OR** glucose (Dex4) 15 GM/59ML oral liquid 30 g, 30 g, Oral, Q15 Min PRN **OR** glucose (Glutose) 40% oral gel 30 g, 30 g, Oral, Q15 Min PRN **OR** glucose-vitamin C (Dex-4) chewable tab 8 tablet, 8 tablet, Oral, Q15 Min PRN    acetaminophen (Tylenol Extra Strength) tab 500 mg, 500 mg, Oral, Q4H PRN    prochlorperazine (Compazine) 10 MG/2ML injection 5 mg, 5 mg, Intravenous, Q8H PRN    polyethylene glycol (PEG 3350) (Miralax) 17 g oral packet 17 g, 17 g, Oral, Daily PRN    sennosides (Senokot) tab 17.2 mg, 17.2 mg, Oral, Nightly PRN    bisacodyl (Dulcolax) 10 MG rectal suppository 10 mg, 10 mg, Rectal, Daily PRN    fleet enema (Fleet) 7-19 GM/118ML rectal enema 133 mL, 1 enema, Rectal, Once  PRN    insulin aspart (NovoLOG) 100 Units/mL FlexPen 1-10 Units, 1-10 Units, Subcutaneous, TID AC and HS    acetaminophen (Tylenol) 325 mg, codeine 60 mg for Tylenol #4 ([facility] only), , Oral, Q6H PRN    heparin (Porcine) 100 Units/mL lock flush 500 Units, 5 mL, Intravenous, PRN    Review of Systems:  Pertinent items are noted in HPI.    Physical Exam:  /75 (BP Location: Right arm)   Pulse 64   Temp 97.1 °F (36.2 °C) (Temporal)   Resp 14   Wt 169 lb 8.5 oz (76.9 kg)   LMP  (LMP Unknown)   SpO2 97%   BMI 27.36 kg/m²   GENERAL: the patient is resting in bed in no acute distress.    HEENT: Unremarkable.  NECK: Supple.   LUNGS:non-labored respirations.   ABDOMEN: The abdomen is soft with right lower abdominal/flank pain.      Laboratory Data:  Lab Results   Component Value Date    WBC 7.2 04/12/2024    HGB 10.6 04/12/2024    HCT 32.3 04/12/2024    .0 04/12/2024    CREATSERUM 1.00 04/12/2024    BUN 17 04/12/2024     04/12/2024    K 3.9 04/12/2024     04/12/2024    CO2 31.0 04/12/2024     04/12/2024    CA 8.6 04/12/2024    PGLU 92 04/12/2024       Hospital Encounter on 04/05/24   1. Blood Culture     Status: None    Collection Time: 04/07/24  1:05 AM    Specimen: Bld,Port a cath Line; Blood   Result Value Ref Range    Blood Culture Result No Growth 5 Days N/A   2. Urine Culture, Routine     Status: None    Collection Time: 04/06/24 12:28 AM    Specimen: Urine, clean catch   Result Value Ref Range    Urine Culture No Growth at 18-24 hrs. N/A      UA 4/6/24: 6-10 WBC/hpf, 0-2 RBC/hpf, no bacteria    UA 4/12/24: 21-50 WBC/hpf, 3-5 RBC/hpf, no bacteria, few squamous epithelial cells  Urine culture 4/12/24: pending    Imaging:    CT ABDOMEN+PELVIS KIDNEYSTONE 2D RNDR(NO IV,NO ORAL)(CPT=74176)    Result Date: 4/11/2024  CONCLUSION:  1. Mild urinary bladder wall thickening, correlate for cystitis. 2. Nonobstructing 5 mm right renal calculus.  No hydronephrosis. Please see above for further  details.    LOCATION:  [place]   Dictated by (CST): Elier Romo MD on 4/11/2024 at 11:05 PM     Finalized by (CST): Elier Romo MD on 4/11/2024 at 11:12 PM       Impression:  Patient Active Problem List   Diagnosis    Neuropathic pain of both legs    Elevated liver enzymes    Hyperparathyroidism (HCC)    Asthma (HCC)    Osteoporosis    Fibromyalgia    Goiter    Hypervitaminosis D    Demyelinating disease of central nervous system (HCC)    Tachycardia    Encephalopathy    AVN (avascular necrosis of bone) (HCC)    Gastroparesis    Fatty liver    IDDM (insulin dependent diabetes mellitus)    Generalized abdominal pain    Multiple sclerosis (HCC)    At risk for falling    Cardiomyopathy in other disease    Weakness of both lower extremities    CATIA (acute kidney injury) (HCC)    Hypokalemia    MS (multiple sclerosis) (HCC)    Hypotension    Anemia    Weakness generalized    Essential hypertension    Irritable bowel syndrome (IBS)    S/P laparoscopic surgery    Intra-abdominal adhesions    Dyspnea, paroxysmal nocturnal    Multiple sclerosis exacerbation (HCC)    Dysuria    History of plasmapheresis    Hyponatremia    Abdominal pain    Hyperglycemia    Sleep apnea    Dehydration    Abdominal pain, right upper quadrant    Allergic rhinitis    Chronic fatigue and malaise    Dilated idiopathic cardiomyopathy (HCC)    Dizziness    Dyspnea    Chest pain, unspecified    Nephrolithiasis    Undiagnosed cardiac murmurs    Sickle cell trait (HCC)    Pure hypercholesterolemia    GIORDANO (nonalcoholic steatohepatitis)    Myalgia and myositis, unspecified    Liver lesion    Interstitial cystitis    Family history of coronary arteriosclerosis    Type 1 diabetes mellitus with ketoacidosis without coma (HCC)    Urinary tract infection without hematuria, site unspecified    Controlled type 1 diabetes mellitus with hyperglycemia (HCC)    Chest pain of uncertain etiology    Abdominal pain of unknown etiology    Spinal stenosis    Anemia  due to chronic blood loss    Aseptic necrosis of head or neck of femur    Bilateral tinnitus    Mild persistent asthma with acute exacerbation (HCC)    Generalized abdominal tenderness    Generalized edema    Hyperlipidemia    Muscle weakness    Disorder of nervous system due to type 2 diabetes mellitus (HCC)    Peripheral vascular disorder due to diabetes mellitus (HCC)    Polyneuropathy due to type 2 diabetes mellitus (HCC)    Vitamin D deficiency    Constipation    Right upper quadrant pain    Shortness of breath    Chronic pain disorder    Headache    Hypothyroidism    Idiopathic osteoporosis    Type 2 diabetes mellitus without complication, with long-term current use of insulin (HCC)    Fatigue    Esophageal reflux    Myalgia    Pulmonary embolism on left (HCC)    Microcytic anemia    Alpha-thalassemia (HCC)    Pulmonary infiltrate    Encounter for anticoagulation discussion and counseling    Right lower quadrant pain    Hemodialysis catheter dysfunction (HCC)    Primary hypertension    Age-related cataract of both eyes    Anxiety and depression    Palpitations    Photophobia of both eyes    Preglaucoma, unspecified, bilateral    Unsteady gait when walking    Weakness    Shock (HCC)    Acute cystitis without hematuria     ABDOMINAL PAIN  -CT scan as above    RIGHT RENAL CALCULUS (5 MM)  -no hydronephrosis    ABNORMAL UA  UA 4/6/24: 6-10 WBC/hpf, 0-2 RBC/hpf, no bacteria  UA 4/12/24: 21-50 WBC/hpf, 3-5 RBC/hpf, no bacteria, few squamous epithelial cells  Urine culture 4/12/24: pending    Recommendations:  -Check final culture results  -Abx per attending  -Follow labs, temp  -Pain management per attending  -Stable right renal calculus - not in a position to cause pain    Above discussed with patient  Will update Dr. Jha.      Thank you for allowing me to participate in the care of your patient.    Mellisa Garcia PA-C  [facility]  Department of Urology  4/12/2024  10:38 AM

## 2024-04-12 NOTE — PROGRESS NOTES
[facility]   part of [facility]    Report of Consultation    Kat Crook Patient Status:  Inpatient    3/11/1970 MRN [id number]   Location [facility] 4SW-A Attending Alvaro Thapa, *   Hosp Day # 7 PCP Justin Huerta MD     Date of Admission:  2024  Date of Consult:  4/10/2024    Interval History:   No acute complaints aside from persistent headaches; tolerating PLEX. Denies peripheral vision loss. Reports she took PO hydrocortisone as an outpatient in the past which had to be discontinued despite Benadryl use due to apparent facial swelling. She reports she was able to tolerate dexamethasone in the past but does not remember when.     History of Present Illness:   Patient is a 54 year old female with PMHx significant for MS, HTN, T2DM, fibromyalgia, hx of PE, hypothyroidism who was admitted to the hospital for Weakness concerning for MS exacerbation. Endocrinology consulted regarding concern for adrenal insufficiency. Pt endorses an allergy to steroids, specifically IV steroids. Denies previous PO intake of steroids. States her allergy is tongue swelling. She was started on PLEX this admission and received 2/5 on . She was also noted to have hypotension which has been resolving. Midodrine was started, now discontinued. Due to her hypotension, a Cortisol was drawn  and it was 1.2 with repeat of 1.4. Primary team was planning on an ACTH stim test and patient refused. She does have a hx of T2DM, currently on MDI. She also has a hx of hypothyroidism on LT4.      ACTH stim test was performed 4/10 with good cortisol response >20. ACTH resulted undetectable.    Past Medical History  Past Medical History:    Abdominal pain    Allergic rhinitis    Anemia    Anxiety    Anxiety state    Arthritis    Asthma (HCC)    Atypical mole    Autoimmune disease (HCC)    Back pain    Bad breath    Black stools    Bloating    Blood disorder    Thalassemia alpha, Sickle cell trait    Blood in urine     Blurred vision    Calculus of kidney    Cardiomyopathy (HCC)    last echo 2021: EF 55%    Chest pain    Constipation    Decorative tattoo    Deep vein thrombosis (HCC)    Depression    Diarrhea, unspecified    Dizziness    Easy bruising    Elevated liver enzymes    Endocrine disorder    Esophageal reflux    Fatigue    Fibromyalgia    Food intolerance    Frequent urination    Frequent UTI    Gastroparesis    Glaucoma    H. pylori infection    H/O  section    Headache disorder    Heart attack (HCC)    Heart palpitations    Heartburn    Hemorrhoids    Herniation of cervical intervertebral disc    High blood pressure    High cholesterol    History of blood clots    Unprovoked    History of blood transfusion    History of cardiac murmur    History of depression    Hoarseness, chronic    Hyperlipidemia    IBS (irritable bowel syndrome)    Irregular bowel habits    Leaking of urine    Liver disease    GIORDANO and Stage 2 fibrosis    Loss of appetite    Migraines    Multiple sclerosis (HCC)    dx 8 years ago    Muscle weakness    cane or walker prn    Myocardial infarction (HCC)    Neuropathy    legs, hands, feet; bilateral    Night sweats    Osteoarthritis    Osteoporosis    KAMALJIT in her 30s    Osteoporosis    Pain with bowel movements    Painful urination    Problems with swallowing    Due to MS    Renal disorder    kidney lesions    Scoliosis of lumbar spine    Shortness of breath    Sickle cell trait (HCC)    Sleep apnea    no treatment    Sleep disturbance    Stress    Type II or unspecified type diabetes mellitus without mention of complication, not stated as uncontrolled    Uncomfortable fullness after meals    Unspecified disorder of thyroid    total thyroidectomy    Visual impairment    glasses    Wears glasses    Weight loss    Wheezing       Past Surgical History  Past Surgical History:   Procedure Laterality Date      1986    Cataract      Cath angio  2014     Cholecystectomy      Colonoscopy N/A 2015    Procedure: COLONOSCOPY;  Surgeon: Liz Tong DO;  Location:  ENDOSCOPY    Colonoscopy N/A 2021    Procedure: COLONOSCOPY;  Surgeon: Cedrick Da Silva MD;  Location:  ENDOSCOPY    Colonoscopy N/A 10/08/2021    Procedure: COLONOSCOPY, ESOPHAGOGASTRODUODENOSCOPY (EGD);  Surgeon: Darvin Richardson MD;  Location:  ENDOSCOPY    D & c  1990    Blighted ovum    Egd      Hysterectomy      total hystero.    Ir port a cath insertion exchnge check  2018    Lithotripsy      x 2    Lysis of adhesions      Needle biopsy liver      Oophorectomy Bilateral     total hystero.    Other      dialysis catheter- left chest for plasmaphoresis    Other surgical history       x 2    Port, indwelling, imp  2019    power port    Thyroidectomy  2013    benign MNG    Total abdom hysterectomy      Upper gi endoscopy performed  2014       Family History  Family History   Problem Relation Age of Onset    Heart Attack Father     Other (Other) Father         COPD, emphysema    Asthma Father     Pulmonary Disease Father     Hypertension Mother         Needs to be deleted    Cancer Mother         stomach cancer    Ovarian Cancer Mother         30'S    Colon Polyps Mother     Anemia Mother     Ovarian Cancer Maternal Grandmother         30'S    Colon Polyps Maternal Grandmother     Diabetes Maternal Grandmother     Colon Cancer Maternal Grandmother     Anemia Maternal Grandmother     Cancer Maternal Grandmother     Breast Cancer Maternal Aunt         60'S    Other (Other) Sister         rectal CA\    Anemia Daughter        Social History  Social History     Socioeconomic History    Marital status: Single   Tobacco Use    Smoking status: Never     Passive exposure: Never    Smokeless tobacco: Never    Tobacco comments:     Never used it   Vaping Use    Vaping status: Never Used   Substance and Sexual Activity    Alcohol use: Never    Drug use:  Never   Other Topics Concern    Caffeine Concern No    Exercise Yes     Comment: walking     Social Determinants of Health     Financial Resource Strain: Low Risk  (2/19/2024)    Received from [employer], [facility]    Financial Resource Strain     In the past year, have you or any family members you live with been unable to get any of the following when it was really needed? Check all that apply.: None   Food Insecurity: No Food Insecurity (4/5/2024)    Food Insecurity     Food Insecurity: Never true   Transportation Needs: No Transportation Needs (4/5/2024)    Transportation Needs     Lack of Transportation: No   Physical Activity: Insufficiently Active (7/27/2022)    Received from [employer] [facility]    Exercise Vital Sign     Days of Exercise per Week: 3 days     Minutes of Exercise per Session: 20 min   Stress: Low Risk  (2/19/2024)    Received from [employer], [facility]    Stress     Stress is when someone feels tense, nervous, anxious, or can't sleep at night because their mind is troubled. How stressed are you? : Not at all   Social Connections: Low Risk  (2/19/2024)    Received from [employer], [facility]    Social Connections     How often do you see or talk to people that you care about and feel close to? (For example: talking to friends on the phone, visiting friends or family, going to Religion or club meetings): 5 or more times a week   Housing Stability: Low Risk  (4/5/2024)    Housing Stability     Housing Instability: No           Current Medications:  Current Facility-Administered Medications   Medication Dose Route Frequency    cefTRIAXone (Rocephin) 1 g in D5W 100 mL IVPB-ADD  1 g Intravenous Q24H    insulin degludec 100 units/mL flextouch 6 Units  6 Units Subcutaneous Nightly    phenazopyridine (Pyridium) tab 100 mg  100 mg Oral TID CC    docusate sodium (Colace) cap 100 mg  100 mg Oral BID     ketorolac (Toradol) 15 MG/ML injection 15 mg  15 mg Intravenous Q6H PRN    enoxaparin (Lovenox) 40 MG/0.4ML SUBQ injection 40 mg  40 mg Subcutaneous Daily    midodrine (ProAmatine) tab 2.5 mg  2.5 mg Oral TID PRN    HYDROmorphone (Dilaudid) 1 MG/ML injection 0.2 mg  0.2 mg Intravenous Q2H PRN    Or    HYDROmorphone (Dilaudid) 1 MG/ML injection 0.4 mg  0.4 mg Intravenous Q2H PRN    Or    HYDROmorphone (Dilaudid) 1 MG/ML injection 0.8 mg  0.8 mg Intravenous Q2H PRN    fluticasone furoate-vilanterol (Breo Ellipta) 200-25 MCG/ACT inhaler 1 puff  1 puff Inhalation Daily    albumin human (Albumin) 5% injection 3,000 mL  3,000 mL Intravenous QOD    calcium gluconate 3 g in sodium chloride 0.9% 250 mL IVPB   Intravenous QOD    ondansetron (Zofran-ODT) disintegrating tab 4 mg  4 mg Oral Q6H PRN    Or    ondansetron (Zofran) 4 MG/2ML injection 4 mg  4 mg Intravenous Q6H PRN    albuterol (Ventolin HFA) 108 (90 Base) MCG/ACT inhaler 2 puff  2 puff Inhalation Q6H PRN    atorvastatin (Lipitor) tab 20 mg  20 mg Oral Nightly    baclofen (Lioresal) tab 10 mg  10 mg Oral TID    [Held by provider] carvedilol (Coreg) tab 3.125 mg  3.125 mg Oral BID    [Held by provider] Diroximel Fumarate CPDR 462 mg  462 mg Oral BID    DULoxetine (Cymbalta) DR cap 60 mg  60 mg Oral BID    HYDROcodone-acetaminophen (Norco)  MG per tab 1 tablet  1 tablet Oral QID PRN    levothyroxine (Synthroid) tab 88 mcg  88 mcg Oral Before breakfast    montelukast (Singulair) tab 10 mg  10 mg Oral Nightly    thiamine (Vitamin B1) tab 100 mg  100 mg Oral Daily    progesterone (Prometrium) cap 100 mg  100 mg Oral Daily    pregabalin (Lyrica) cap 200 mg  200 mg Oral BID    pantoprazole (Protonix) DR tab 20 mg  20 mg Oral BID    glucose (Dex4) 15 GM/59ML oral liquid 15 g  15 g Oral Q15 Min PRN    Or    glucose (Glutose) 40% oral gel 15 g  15 g Oral Q15 Min PRN    Or    glucose-vitamin C (Dex-4) chewable tab 4 tablet  4 tablet Oral Q15 Min PRN    Or    dextrose 50%  injection 50 mL  50 mL Intravenous Q15 Min PRN    Or    glucose (Dex4) 15 GM/59ML oral liquid 30 g  30 g Oral Q15 Min PRN    Or    glucose (Glutose) 40% oral gel 30 g  30 g Oral Q15 Min PRN    Or    glucose-vitamin C (Dex-4) chewable tab 8 tablet  8 tablet Oral Q15 Min PRN    acetaminophen (Tylenol Extra Strength) tab 500 mg  500 mg Oral Q4H PRN    prochlorperazine (Compazine) 10 MG/2ML injection 5 mg  5 mg Intravenous Q8H PRN    polyethylene glycol (PEG 3350) (Miralax) 17 g oral packet 17 g  17 g Oral Daily PRN    sennosides (Senokot) tab 17.2 mg  17.2 mg Oral Nightly PRN    bisacodyl (Dulcolax) 10 MG rectal suppository 10 mg  10 mg Rectal Daily PRN    fleet enema (Fleet) 7-19 GM/118ML rectal enema 133 mL  1 enema Rectal Once PRN    insulin aspart (NovoLOG) 100 Units/mL FlexPen 1-10 Units  1-10 Units Subcutaneous TID AC and HS    acetaminophen (Tylenol) 325 mg, codeine 60 mg for Tylenol #4 (EEH only)   Oral Q6H PRN    heparin (Porcine) 100 Units/mL lock flush 500 Units  5 mL Intravenous PRN     Medications Prior to Admission   Medication Sig    montelukast 10 MG Oral Tab Take 1 tablet (10 mg total) by mouth nightly.    levothyroxine 88 MCG Oral Tab Take 1 tablet (88 mcg total) by mouth before breakfast.    Telmisartan 20 MG Oral Tab Take 1 tablet (20 mg total) by mouth daily.    progesterone 100 MG Oral Cap Take 1 capsule (100 mg total) by mouth daily.    pantoprazole 20 MG Oral Tab EC Take 1 tablet (20 mg total) by mouth 2 (two) times daily.    Insulin Aspart FlexPen 100 UNIT/ML Subcutaneous Solution Pen-injector INJECT AS DIRECTED THREE TIMES DAILY WITH MEALS PER SLIDING SCALE. MAX DAILY DOSE 40 UNITS    fluticasone propionate 110 MCG/ACT Inhalation Aerosol fluticasone propionate 110 mcg/actuation HFA aerosol inhaler, [RxNorm: 356177]    fluticasone furoate-vilanterol 200-25 MCG/ACT Inhalation Aerosol Powder, Breath Activated Inhale 1 puff into the lungs daily.    fluorometholone 0.1 % Ophthalmic Suspension      LYLLANA 0.025 MG/24HR Transdermal Patch Biweekly Place 1 patch onto the skin twice a week.    CELEBREX 100 MG Oral Cap CeleBREX 100 mg capsule, [RxNorm: 947667]    acetaminophen-codeine 300-60 MG Oral Tab Take 1 tablet by mouth every 6 (six) hours as needed.    Diroximel Fumarate (VUMERITY) 231 MG Oral Capsule Delayed Release Take 462 mg by mouth in the morning and 462 mg before bedtime.    loteprednol (LOTEMAX) 0.5 % Ophthalmic Suspension 1 drop into both eyes Ophthalmic two times a day for 14 days    pregabalin 200 MG Oral Cap Take 1 capsule (200 mg total) by mouth 2 (two) times daily.    Ciclopirox 8 % External Solution APPLY EVERY DAY    HYDROcodone-acetaminophen  MG Oral Tab Take 1 tablet by mouth 4 (four) times daily as needed.    baclofen 10 MG Oral Tab Take 1 tablet (10 mg total) by mouth 3 (three) times daily.    FARXIGA 10 MG Oral Tab Take 1 tablet (10 mg total) by mouth daily.    DULoxetine 60 MG Oral Cap DR Particles Take 1 capsule (60 mg total) by mouth 2 (two) times daily.    atorvastatin 20 MG Oral Tab Take 1 tablet (20 mg total) by mouth nightly.    DEXCOM G6 SENSOR Does not apply Misc USE AS DIRECTED EVERY 10 DAYS    Insulin Lispro (HUMALOG) 100 UNIT/ML Subcutaneous Solution INJECT UP TO 70 UNITS VIA INSULIN PUMP DAILY    carvedilol 3.125 MG Oral Tab Take 1 tablet (3.125 mg total) by mouth 2 (two) times daily.    EPINEPHrine 0.15 MG/0.3ML Injection Solution Auto-injector Jani pen/lower dose because pt is allergic to epinephrine    Ondansetron HCl (ZOFRAN) 4 mg tablet Take 1 tablet (4 mg total) by mouth 3 (three) times daily as needed.    Benralizumab (FASENRA PEN) 30 MG/ML Subcutaneous Solution Auto-injector Inject 30 mg into the skin. Every other month/every 8 weeks    DEXCOM G6 TRANSMITTER Does not apply Misc USE AS DIRECTED EVERY 90 DAYS    Thiamine HCl 100 MG Oral Tab Take 1 tablet (100 mg total) by mouth daily.    docusate sodium 100 MG Oral Cap Take 1 capsule (100 mg total) by mouth 2  (two) times daily.    denosumab 60 MG/ML Subcutaneous Solution Inject 1 mL (60 mg total) into the skin every 6 (six) months.    Cholecalciferol (VITAMIN D-3) 5000 UNITS Oral Tab Take 1 tablet (5,000 Units total) by mouth daily.    Albuterol Sulfate  (90 Base) MCG/ACT Inhalation Aero Soln Inhale 2 puffs into the lungs every 6 (six) hours as needed. PRN wheezing/SOB    NON FORMULARY Tandem insulin pump settings (Bolus IQ)  Mdnt 3.1 units/hr  0400 3.00 units/hr  0900 3.2 units/hr    Correction:   MN 1:35/110  1 PM 1:40  7 pm 1:35    CHO ratio:  MN 1:5g       Allergies  Allergies   Allergen Reactions    Epinephrine OTHER (SEE COMMENTS)     Cardiac issues    Immune Globulin ANAPHYLAXIS    Latex SHORTNESS OF BREATH and OTHER (SEE COMMENTS)     WELTS    Methylprednisolone SWELLING     Swelling of neck, throat and tongue  Injection, throat and tongue swelling      Ocrelizumab ANAPHYLAXIS and OTHER (SEE COMMENTS)     Difficulty breathing    Other SHORTNESS OF BREATH     ENVIRONMENTAL, ASTHMA EXACERBATION    Pcn [Penicillamine]     Penicillins OTHER (SEE COMMENTS)    Urea Tightness in Throat     Urea C-13 in Pranactin for H. Pylori test kit.       Review of Systems:   A 10 point review of systems is completed with pertinent positives and negatives noted in HPI.    Physical Exam:   Vital Signs:  Blood pressure 104/61, pulse 61, temperature 97.6 °F (36.4 °C), temperature source Temporal, resp. rate 12, weight 169 lb 8.5 oz (76.9 kg), SpO2 98%, not currently breastfeeding.     General: No acute distress. Awake and alert  HEENT: Moist mucous membranes. EOM-I.   Neck: No lymphadenopathy.  No JVD. No carotid bruits.  Respiratory: Clear to auscultation bilaterally.  No wheezes.    Cardiovascular: S1, S2.  Regular rate and rhythm.  Equal pulses   Abdomen: Soft, nontender, nondistended.     Neurologic: Coherent and linear but halting/stuttering speech. Follows commands. Peripheral vision normal to  confrontation.  Musculoskeletal: Full range of motion of all extremities.  No swelling noted.  Integument: No lesions. No erythema.  Psychiatric: Appropriate mood and affect.    Results:     Laboratory Data:  Lab Results   Component Value Date    WBC 7.2 04/12/2024    HGB 10.6 (L) 04/12/2024    HCT 32.3 (L) 04/12/2024    .0 (L) 04/12/2024    CREATSERUM 1.00 04/12/2024    BUN 17 04/12/2024     04/12/2024    K 3.9 04/12/2024     04/12/2024    CO2 31.0 04/12/2024     (H) 04/12/2024    CA 8.6 04/12/2024    ALB 4.2 04/11/2024    ALKPHO 24 (L) 04/11/2024    TP 5.2 (L) 04/11/2024    AST 11 (L) 04/11/2024    ALT 17 04/11/2024    PTT 28.8 04/19/2023    INR 1.08 04/10/2024    PTP 14.0 04/10/2024    T4F 1.0 04/07/2024    TSH 0.014 (L) 04/07/2024    LIP 69 (L) 12/07/2021    DDIMER 0.34 08/31/2023    ESRML 25 03/24/2021    CRP <0.29 11/25/2023    MG 1.4 (L) 04/08/2024    PHOS 2.5 04/08/2024    TROP <0.045 07/16/2021     11/09/2020    TRUONG NEGATIVE 10/04/2013    RPR NONREACTIVE 11/05/2013    B12 558 12/10/2018    POCGLU 100 (H) 05/04/2014       Recent Labs     09/09/22  0806 12/15/22  1028 11/25/23  0830 04/07/24  0105   T4F 1.2 1.3  --  1.0   TSH 0.065* 0.150* 0.574 0.014*       Latest Reference Range & Units 09/12/16 08:07 04/08/24 04:55 04/09/24 14:45 04/10/24 08:22 04/10/24 14:14 04/10/24 14:37 04/10/24 15:01   Cortisol ug/dL 11.8 1.2 1.4 1.4 1.3 14.0 (post 30min Cosyntropin) 20.1 (post 60min Cosyntropin)      Latest Reference Range & Units 09/12/16 08:07 04/10/24 14:14   ACTH 7.2 - 63.3 pg/mL  1.6 (L) (baseline prior to Cosyntropin)   (L): Data is abnormally low     Latest Reference Range & Units 04/10/24 15:01   Prolactin 2.2 - 31.5 ng/mL 21.6     Imaging:  Pertinent imaging reviewed      Impression:     #Hypotension  #Low cortisol   #Glucocorticoid allergy   -Clinically, pt with improving weakness and hypotension on PLEX  -BP overall stable over the past 24 hours without the use of  steroids  -Per review, no hypoglycemia, hyponatremia, or hyperkalemia noted on labs  -Cortisol of <3 on multiple occasions on 4/9, empiric steroids not initiated due to patient's history of anaphylaxis to glucocorticoid therapy in the past  -ACTH stim test on 4/10 showed good cortisol response with 60min cortisol >20, however baseline ACTH was undetectable    Recommendations:  -Extensively discussed with patient that though there is no acute need for steroids given good response to Cosyntropin, early-phase central AI cannot be excluded given undetectable ACTH level   -Levels can be affected by PLEX, but cannot confirm  -Recommend patient undergo repeat Cosyntropin stim test outpatient in 4-6 weeks to assess for ACTH recovery versus progression of adrenal insufficiency  -Discussed that pituitary evaluation can be considered in light of undetectable ACTH though she does not have visual loss or other mass effect symptoms - particularly as empiric glucocorticoid therapy is risky given her history of anaphylaxis to multiple types of glucocorticoids. Patient prefers to proceed with inpatient pituitary imaging to ensure no obvious pituitary lesions - will obtain MRI pituitary w/wo. If no pituitary lesion visualized, recommend against initiation of empiric glucocorticoid coverage.   -Per review, pt has previously tolerated dexamethasone in June 2022 with Benadryl pretreatment  -If any hypotension develops unresponsive to fluid bolusing, recommend Benadryl + PO dexamethasone 2mg q8H for glucocorticoid coverage with close observation for allergic response    - Staff updated on plan and confirmed orders  - Pt updated on plan and all questions answered    Inpatient endocrinology coverage is available M-F, 8AM-4PM. Weekend coverage per primary service including any discharge. Please be advised that there will be NO inpatient endocrinology coverage between 4/16 and 4/19 due to staffing shortage. [facility] (Drs.  Kathleen Eastman Manchanda) can be paged via [employer] for emergencies and urgent consults for recommendations.    Sandra Bishop,   Endocrinology

## 2024-04-12 NOTE — PLAN OF CARE
Received pt at change of shift alert and oriented x4.  Pt c/o severe lower abdominal and R flank pain especially after urinating.  [place] and toradol given. Pain was starting to improve somewhat and then went for CT A/P.  Results reported to APN.  After returned, pain increased.  Dilaudid 0,4mg IVP given, had good relief and able to sleep.  Continues to stutter with speech and ataxia with ambulation.  [facility] called for plasmapheresis 4/5 in AM.  Transfer orders.

## 2024-04-12 NOTE — PLAN OF CARE
Patient remains hemodynamically stable. Persistent issues with right abdomen/flank pain - pain management per MAR. PLEX #4/5 today. Premedicate with benadryl/zofran. MRI brain/pituitary to be done tomorrow per patient request. Transfer orders; awaiting bed.

## 2024-04-12 NOTE — PROGRESS NOTES
Pt with increased abdominal and right flank pain today.  Using heat packs and Norco for some relief.  She states it is worse than usual.  Pain increases with urination.  UA on admit without hematuria.  No fever or signs of infection.  No burning with urination.  On chart review pt had a right kidney stone on CT last year.      -CT abd kidney stone protocol  -PRN Toradol  -Continue PRN Norco    Discussed with Dr. Woodrow MENENDEZ  Critical Care Nurse Practitioner  Spec [id number]

## 2024-04-12 NOTE — OCCUPATIONAL THERAPY NOTE
OCCUPATIONAL THERAPY TREATMENT NOTE - INPATIENT     Room Number: 467/467-A  Session: 1   Number of Visits to Meet Established Goals: 5    Presenting Problem: weakness, MS exacerbation    History Related to Current Admission: Patient is a 54 year old female admitted on 4/5/2024 with Presenting Problem: weakness, MS exacerbation. Co-Morbidities : MS, fibromyalgia, DVT, HLD, type 1 DM  Patient admitted with increased weakness, increased LE pain, and difficulty with walking.  DX with MS exacerbation and being treated with plasma exchange x5    Prior Level of Function: Modified independent with ADLs, mobility. Lives with son and has a dtr who is an RN. Drives short distances. Reported that she has been using high top shoes for increased ankle support and that she is looking in to getting an AFO for her LLE. Wears GARY hose as needed to help manage neuropathy. Follows with neuro-ophthalmologist for issues with double vision related to MS in the past.     ASSESSMENT   Patient demonstrates fair progress this session, goals remain in progress.  OT educated patient on UE therex and issued theraband and soft foam exerciser. Education given on pacing and energy conservation methods she can try at home especially pertaining to bathing routine .     Patient continues to function below baseline with toileting, bathing, transfers, dynamic standing balance, functional standing tolerance, and performing household tasks.   Contributing factors to remaining limitations include pain, impaired standing balance, impaired coordination, and decreased muscular endurance.  Next session anticipate patient to progress toileting, upper body dressing, lower body dressing, and grooming.  Occupational Therapy will continue to follow patient for duration of hospitalization.    Patient continues to benefit from continued skilled OT services: at discharge to promote functional independence in home.  Anticipate patient will return home with home health  OT.          OT Device Recommendations: Transfer tub bench      WEIGHT BEARING RESTRICTION  Weight Bearing Restriction: None                Recommendations for nursing staff:   Transfers: N/T   Toileting location: bathroom     TREATMENT SESSION:  Patient Start of Session: SF position; pt requesting to remain in bed while receiving antibiotics and due to abdominal pain  FUNCTIONAL TRANSFER ASSESSMENT  Sit to Stand: Edge of Bed  Edge of Bed: Contact Guard Assist    BED MOBILITY  Supine to Sit : Supervision  Sit to Supine (OT): Supervision  Scooting: SUP    BALANCE ASSESSMENT     FUNCTIONAL ADL ASSESSMENT  Eating: Independent  Grooming Seated: Independent  UB Dressing Seated: Supervision  LB Dressing Seated: Supervision      ACTIVITY TOLERANCE: no notable changes for supine activity; O2 mid to upper 90s on room sir                         O2 SATURATIONS       EDUCATION PROVIDED  Patient: Plan of Care; Discharge Recommendations; UE HEP for Strengthening; UE HEP for ROM; Compensatory ADL Techniques  Patient's Response to Education: Verbalized Understanding; Returned Demonstration      Equipment used: theraband, foam cube exerciser  Demonstrates functional use, Would benefit from additional trial yes     Exercises:    Exercises Repetitions Comments   Scapular elevation     Scapular retraction     Shoulder rolls     Shoulder flexion     Shoulder abduction 15    Shoulder internal/external rotation     Forward punch 15    Elbow flexion 15    Elbow extension 15    Forearm pronation/supination     Wrist flexion/extension     Gross grasp/fist pumps 15    Ankle pumps     Knee extension     Marching         Therapist comments:   Patient reporting that she has had no trouble with using bathroom or donning socks or grooming while in hospital  OT instructed patient in UE therex with education provided to perform lower resistance and allow plenty of rest between sets to avoid rebound fatigue related to MS. Assisted patient with  problem solving methods to conserve energy and/or have assistance for bathing routines at home.   Patient End of Session: All patient questions and concerns addressed;RN aware of session/findings;Call light within reach;Needs met;In bed    SUBJECTIVE  \"I will never turn down therapy.\"    PAIN ASSESSMENT  Rating: Other (Comment)  Location: R lower quadrant  Management Techniques: Other (Comment) (RN aware)     OBJECTIVE  Precautions: Other (Comment) (h/o  LLE footdrop)    AM-PAC ‘6-Clicks’ Inpatient Daily Activity Short Form  -   Putting on and taking off regular lower body clothing?: A Little  -   Bathing (including washing, rinsing, drying)?: A Little  -   Toileting, which includes using toilet, bedpan or urinal? : A Little  -   Putting on and taking off regular upper body clothing?: None  -   Taking care of personal grooming such as brushing teeth?: None  -   Eating meals?: None    AM-PAC Score:  Score: 21  Approx Degree of Impairment: 32.79%  Standardized Score (AM-PAC Scale): 44.27    PLAN  OT Treatment Plan: Functional transfer training;Endurance training;ADL training;Energy conservation/work simplification techniques;UE strengthening/ROM;Patient/Family education;Patient/Family training;Equipment eval/education;Compensatory technique education  Rehab Potential : Good  Frequency: 3-5x/week    OT Goals: ongoing 4/12    ADL Goals   Patient will perform grooming: with supervision and while standing at sink  Patient will perform upper body dressing:  with setup and with supervision  Patient will perform lower body dressing:  with setup, with supervision, and with adaptive equipment PRN  Patient will perform toileting: with supervision     Functional Transfer Goals  Patient will transfer to toilet:  with supervision     UE Exercise Program Goal  Patient will be supervision with bilateral AROM low resistance HEP (home exercise program).    OT Session Time: 30 minutes  Self-Care Home Management: 7 minutes  Therapeutic  Exercise: 23 minutes

## 2024-04-12 NOTE — PROGRESS NOTES
[facility]  Nephrology Progress Note    Kat Crook Attending:  Tobi Thapa DO       Assessment and Plan:    1) MS exacerbation- PLEX #4/5 today    2) Hypotension- mild volume depletion +/- meds (ie baclofen)- resolved. Midodrine dc'ed. Appropriate response to cosyntropin.     3) HTN- normally on telmisartan / carvediolol     4) DM 2    5) Fibromyalgia    6) h/o PE    7) Cystitis- cx pending; on CTX; add pyridium       Subjective:  Awake alert in good spirits no issues overnight. Up in chair.     Physical Exam:   /75 (BP Location: Right arm)   Pulse 64   Temp 97.1 °F (36.2 °C) (Temporal)   Resp 14   Wt 169 lb 8.5 oz (76.9 kg)   LMP  (LMP Unknown)   SpO2 97%   BMI 27.36 kg/m²   Temp (24hrs), Av.6 °F (36.4 °C), Min:97.1 °F (36.2 °C), Max:98.4 °F (36.9 °C)       Intake/Output Summary (Last 24 hours) at 2024 0913  Last data filed at 2024 0840  Gross per 24 hour   Intake 120 ml   Output 500 ml   Net -380 ml     Wt Readings from Last 3 Encounters:   04/10/24 169 lb 8.5 oz (76.9 kg)   24 168 lb (76.2 kg)   24 168 lb 6.4 oz (76.4 kg)     General: awake alert  HEENT: No scleral icterus, MMM  Neck: Supple, no REESE or thyromegaly  Cardiac: Regular rate and rhythm, S1, S2 normal, no murmur or tub  Lungs: Decreased BS at bases bilaterally   Abdomen: Soft, non-tender. + bowel sounds, no palpable organomegaly  Extremities: Without clubbing, cyanosis; no edema  Neurologic: Cranial nerves grossly intact, moving all extremities  Skin: Warm and dry, no rashes       Labs:   Lab Results   Component Value Date    WBC 7.2 2024    HGB 10.6 2024    HCT 32.3 2024    .0 2024    CREATSERUM 1.00 2024    BUN 17 2024     2024    K 3.9 2024     2024    CO2 31.0 2024     2024    CA 8.6 2024    PGLU 92 2024       Imaging:  All imaging studies reviewed.    Meds:   Current Facility-Administered  Medications   Medication Dose Route Frequency    cefTRIAXone (Rocephin) 1 g in D5W 100 mL IVPB-ADD  1 g Intravenous Q24H    insulin degludec 100 units/mL flextouch 6 Units  6 Units Subcutaneous Nightly    ketorolac (Toradol) 15 MG/ML injection 15 mg  15 mg Intravenous Q6H PRN    enoxaparin (Lovenox) 40 MG/0.4ML SUBQ injection 40 mg  40 mg Subcutaneous Daily    midodrine (ProAmatine) tab 2.5 mg  2.5 mg Oral TID PRN    HYDROmorphone (Dilaudid) 1 MG/ML injection 0.2 mg  0.2 mg Intravenous Q2H PRN    Or    HYDROmorphone (Dilaudid) 1 MG/ML injection 0.4 mg  0.4 mg Intravenous Q2H PRN    Or    HYDROmorphone (Dilaudid) 1 MG/ML injection 0.8 mg  0.8 mg Intravenous Q2H PRN    fluticasone furoate-vilanterol (Breo Ellipta) 200-25 MCG/ACT inhaler 1 puff  1 puff Inhalation Daily    albumin human (Albumin) 5% injection 3,000 mL  3,000 mL Intravenous QOD    calcium gluconate 3 g in sodium chloride 0.9% 250 mL IVPB   Intravenous QOD    ondansetron (Zofran-ODT) disintegrating tab 4 mg  4 mg Oral Q6H PRN    Or    ondansetron (Zofran) 4 MG/2ML injection 4 mg  4 mg Intravenous Q6H PRN    albuterol (Ventolin HFA) 108 (90 Base) MCG/ACT inhaler 2 puff  2 puff Inhalation Q6H PRN    atorvastatin (Lipitor) tab 20 mg  20 mg Oral Nightly    baclofen (Lioresal) tab 10 mg  10 mg Oral TID    [Held by provider] carvedilol (Coreg) tab 3.125 mg  3.125 mg Oral BID    [Held by provider] Diroximel Fumarate CPDR 462 mg  462 mg Oral BID    DULoxetine (Cymbalta) DR cap 60 mg  60 mg Oral BID    HYDROcodone-acetaminophen (Norco)  MG per tab 1 tablet  1 tablet Oral QID PRN    levothyroxine (Synthroid) tab 88 mcg  88 mcg Oral Before breakfast    montelukast (Singulair) tab 10 mg  10 mg Oral Nightly    thiamine (Vitamin B1) tab 100 mg  100 mg Oral Daily    progesterone (Prometrium) cap 100 mg  100 mg Oral Daily    pregabalin (Lyrica) cap 200 mg  200 mg Oral BID    pantoprazole (Protonix) DR tab 20 mg  20 mg Oral BID    glucose (Dex4) 15 GM/59ML oral  liquid 15 g  15 g Oral Q15 Min PRN    Or    glucose (Glutose) 40% oral gel 15 g  15 g Oral Q15 Min PRN    Or    glucose-vitamin C (Dex-4) chewable tab 4 tablet  4 tablet Oral Q15 Min PRN    Or    dextrose 50% injection 50 mL  50 mL Intravenous Q15 Min PRN    Or    glucose (Dex4) 15 GM/59ML oral liquid 30 g  30 g Oral Q15 Min PRN    Or    glucose (Glutose) 40% oral gel 30 g  30 g Oral Q15 Min PRN    Or    glucose-vitamin C (Dex-4) chewable tab 8 tablet  8 tablet Oral Q15 Min PRN    acetaminophen (Tylenol Extra Strength) tab 500 mg  500 mg Oral Q4H PRN    prochlorperazine (Compazine) 10 MG/2ML injection 5 mg  5 mg Intravenous Q8H PRN    polyethylene glycol (PEG 3350) (Miralax) 17 g oral packet 17 g  17 g Oral Daily PRN    sennosides (Senokot) tab 17.2 mg  17.2 mg Oral Nightly PRN    bisacodyl (Dulcolax) 10 MG rectal suppository 10 mg  10 mg Rectal Daily PRN    fleet enema (Fleet) 7-19 GM/118ML rectal enema 133 mL  1 enema Rectal Once PRN    insulin aspart (NovoLOG) 100 Units/mL FlexPen 1-10 Units  1-10 Units Subcutaneous TID AC and HS    acetaminophen (Tylenol) 325 mg, codeine 60 mg for Tylenol #4 ([facility] only)   Oral Q6H PRN    heparin (Porcine) 100 Units/mL lock flush 500 Units  5 mL Intravenous PRN           Questions/concerns were discussed with patient and/or family by bedside.          Adrienne Ji MD  4/12/2024  913 AM

## 2024-04-12 NOTE — PROGRESS NOTES
[facility]   part of [facility]     Hospitalist Progress Note     Kat Crook Patient Status:  Inpatient    3/11/1970 MRN [id number]   Location [facility] 4SW-A Attending Alvaro Thapa, *   Hosp Day # 7 PCP Justin Huerta MD     Chief Complaint: Weakness    Subjective:      - R flank pain noted after urination, CT A/P completed showing non-obstructing 5mm right renal calculus stable from prior in     - UA with positive leuks, WBC noted    - Pt notes pain in R flank has been ongoing for years since , has had multiple lithotripsy, pelvic floor therapy without improvement in pain.     Objective:    Review of Systems:   A comprehensive review of systems was completed; pertinent positive and negatives stated in subjective.    Vital signs:  Temp:  [96.8 °F (36 °C)-98.4 °F (36.9 °C)] 97.4 °F (36.3 °C)  Pulse:  [55-85] 55  Resp:  [10-18] 14  BP: (102-117)/(60-71) 102/62  SpO2:  [90 %-100 %] 99 %    Physical Exam:    General: No acute distress  Respiratory: no wheezes, no rhonchi  Cardiovascular: S1, S2, regular rate and rhythm  Abdomen: Soft, ttp in right CVA and suprapubic region, non-distended, positive bowel sounds  Neuro: No new focal deficits.   Extremities: no edema    Diagnostic Data:    Labs:  Recent Labs   Lab 24  0420 24  0105 24  0454 24  0552 04/10/24  0609 24  0446 24  0432   WBC 5.2   < > 6.2 6.5 6.5 8.2 7.2   HGB 11.7*   < > 11.2* 10.6* 10.4* 10.7* 10.6*   MCV 73.0*   < > 73.3* 71.8* 73.2* 72.8* 69.8*   .0*   < > 135.0* 107.0* 103.0* 106.0* 105.0*   INR 1.00  --   --   --  1.08  --   --     < > = values in this interval not displayed.       Recent Labs   Lab 24  0552 04/10/24  0609 24  0446 24  0432   * 161* 154* 132*   BUN 12 14 14 17   CREATSERUM 0.89 0.79 0.92 1.00   CA 8.5 8.8 8.7 8.6   ALB 4.0 3.9 4.2  --     142 145 143   K 4.3 3.9 4.0 3.9    108 110 109   CO2 31.0 33.0* 32.0 31.0    ALKPHO 23* 35* 24*  --    AST 7* 8* 11*  --    ALT 23 32 17  --    BILT 0.4 0.3 0.3  --    TP 4.9* 5.4* 5.2*  --        Estimated Creatinine Clearance: 58.8 mL/min (based on SCr of 1 mg/dL).    Recent Labs   Lab 04/09/24  0912   TROPHS 9       Recent Labs   Lab 04/06/24  0420 04/10/24  0609   PTP 13.2 14.0   INR 1.00 1.08                  Microbiology    Hospital Encounter on 04/05/24   1. Blood Culture     Status: None    Collection Time: 04/07/24  1:05 AM    Specimen: Bld,Port a cath Line; Blood   Result Value Ref Range    Blood Culture Result No Growth 5 Days N/A   2. Urine Culture, Routine     Status: None    Collection Time: 04/06/24 12:28 AM    Specimen: Urine, clean catch   Result Value Ref Range    Urine Culture No Growth at 18-24 hrs. N/A         Imaging: Reviewed in Epic.    Medications:    enoxaparin  40 mg Subcutaneous Daily    fluticasone furoate-vilanterol  1 puff Inhalation Daily    insulin degludec  8 Units Subcutaneous Nightly    albumin human  3,000 mL Intravenous QOD    calcium gluconate 3 g in sodium chloride 0.9% 250 mL IVPB   Intravenous QOD    atorvastatin  20 mg Oral Nightly    baclofen  10 mg Oral TID    [Held by provider] carvedilol  3.125 mg Oral BID    [Held by provider] Diroximel Fumarate  462 mg Oral BID    docusate sodium  100 mg Oral BID    DULoxetine  60 mg Oral BID    levothyroxine  88 mcg Oral Before breakfast    montelukast  10 mg Oral Nightly    thiamine  100 mg Oral Daily    progesterone  100 mg Oral Daily    pregabalin  200 mg Oral BID    pantoprazole  20 mg Oral BID    insulin aspart  1-10 Units Subcutaneous TID AC and HS       Assessment & Plan:      #MS Exacerbation  -Anaphylaxis to steroids and unable to tolerate IVIG due to hx of DVT  -PLEX, plan for every other day, next session (4/5) today   -Renal and neuro consult  -[facility]  -Pain control     # Abd pain  # UTI   - UA positive, started IV abx, f/u Ucx   - CT A/P with stable 5mm right  non-obstructive calculus   - Urology consult    #Shock, suspect hypovolemic.  Resolved  -Off levophed, midodrine  -PCT negative, blood cultures (NGTD), monitor off abx  -Critical care following  -AM cortisol low x2; endocrinology consulted  - ACTH stim with adequate adrenal stimulation, ACTH levels pending, holding off steroids per endocrine      #DM type II with neuropathy  -A1c is 7.3  -Hyperglycemia protocol  -On insulin pump PTA - transitioned to basal bolus insulin  -Lyrica     #Hypothyroidism  -Levothyroxine     #Hypertension  -Resume home meds but holding coreg due to bradycardia     #Dyslipidemia  -Statin     #GERD  -PPI     #Hx of GIORDANO  -LFTs normalized      #Thrombocytopenia, trending down. Does have GIORDANO and low platelets in the past  -Monitor     #Bradycardia  -Hold coreg  -Echo similar to prior, EF 60-65%     #Alpha thalassemia minor - monitor hgb  #H/o PE in 2021 - not on anticoagulation  #Fibromyalgia - continue home meds  #Asthma - resume home inhaler and singulair       Alvaro Thapa MD    Supplementary Documentation:     Quality:  DVT Mechanical Prophylaxis:   SCDs, Early ambuation  DVT Pharmacologic Prophylaxis   Medication    enoxaparin (Lovenox) 40 MG/0.4ML SUBQ injection 40 mg    heparin (Porcine) 100 Units/mL lock flush 500 Units                Code Status: Full Code  Panda: No urinary catheter in place  Panda Duration (in days):   Central line:    KAYLEY:     The 21st Century Cures Act makes medical notes like these available to patients in the interest of transparency. Please be advised this is a medical document. Medical documents are intended to carry relevant information, facts as evident, and the clinical opinion of the practitioner. The medical note is intended as peer to peer communication and may appear blunt or direct. It is written in medical language and may contain abbreviations or verbiage that are unfamiliar.

## 2024-04-13 ENCOUNTER — APPOINTMENT (OUTPATIENT)
Dept: MRI IMAGING | Facility: HOSPITAL | Age: 54
End: 2024-04-13
Attending: STUDENT IN AN ORGANIZED HEALTH CARE EDUCATION/TRAINING PROGRAM
Payer: MEDICARE

## 2024-04-13 LAB
ANION GAP SERPL CALC-SCNC: 1 MMOL/L (ref 0–18)
BASOPHILS # BLD AUTO: 0.01 X10(3) UL (ref 0–0.2)
BASOPHILS NFR BLD AUTO: 0.1 %
BUN BLD-MCNC: 15 MG/DL (ref 9–23)
CALCIUM BLD-MCNC: 8.8 MG/DL (ref 8.5–10.1)
CHLORIDE SERPL-SCNC: 108 MMOL/L (ref 98–112)
CO2 SERPL-SCNC: 33 MMOL/L (ref 21–32)
CREAT BLD-MCNC: 1.06 MG/DL
EGFRCR SERPLBLD CKD-EPI 2021: 62 ML/MIN/1.73M2 (ref 60–?)
EOSINOPHIL # BLD AUTO: 0 X10(3) UL (ref 0–0.7)
EOSINOPHIL NFR BLD AUTO: 0 %
ERYTHROCYTE [DISTWIDTH] IN BLOOD BY AUTOMATED COUNT: 14.7 %
GLUCOSE BLD-MCNC: 110 MG/DL (ref 70–99)
GLUCOSE BLD-MCNC: 149 MG/DL (ref 70–99)
GLUCOSE BLD-MCNC: 232 MG/DL (ref 70–99)
GLUCOSE BLD-MCNC: 247 MG/DL (ref 70–99)
HCT VFR BLD AUTO: 34 %
HGB BLD-MCNC: 10.6 G/DL
IMM GRANULOCYTES # BLD AUTO: 0.03 X10(3) UL (ref 0–1)
IMM GRANULOCYTES NFR BLD: 0.4 %
LYMPHOCYTES # BLD AUTO: 1.79 X10(3) UL (ref 1–4)
LYMPHOCYTES NFR BLD AUTO: 22.8 %
MCH RBC QN AUTO: 22.3 PG (ref 26–34)
MCHC RBC AUTO-ENTMCNC: 31.2 G/DL (ref 31–37)
MCV RBC AUTO: 71.6 FL
MONOCYTES # BLD AUTO: 0.32 X10(3) UL (ref 0.1–1)
MONOCYTES NFR BLD AUTO: 4.1 %
NEUTROPHILS # BLD AUTO: 5.69 X10 (3) UL (ref 1.5–7.7)
NEUTROPHILS # BLD AUTO: 5.69 X10(3) UL (ref 1.5–7.7)
NEUTROPHILS NFR BLD AUTO: 72.6 %
OSMOLALITY SERPL CALC.SUM OF ELEC: 303 MOSM/KG (ref 275–295)
PLATELET # BLD AUTO: 102 10(3)UL (ref 150–450)
PLATELETS.RETICULATED NFR BLD AUTO: 15.2 % (ref 0–7)
POTASSIUM SERPL-SCNC: 4.3 MMOL/L (ref 3.5–5.1)
RBC # BLD AUTO: 4.75 X10(6)UL
SODIUM SERPL-SCNC: 142 MMOL/L (ref 136–145)
WBC # BLD AUTO: 7.8 X10(3) UL (ref 4–11)

## 2024-04-13 PROCEDURE — 99232 SBSQ HOSP IP/OBS MODERATE 35: CPT | Performed by: INTERNAL MEDICINE

## 2024-04-13 PROCEDURE — 99232 SBSQ HOSP IP/OBS MODERATE 35: CPT | Performed by: STUDENT IN AN ORGANIZED HEALTH CARE EDUCATION/TRAINING PROGRAM

## 2024-04-13 PROCEDURE — 70553 MRI BRAIN STEM W/O & W/DYE: CPT | Performed by: STUDENT IN AN ORGANIZED HEALTH CARE EDUCATION/TRAINING PROGRAM

## 2024-04-13 PROCEDURE — 99233 SBSQ HOSP IP/OBS HIGH 50: CPT | Performed by: INTERNAL MEDICINE

## 2024-04-13 RX ORDER — GADOTERATE MEGLUMINE 376.9 MG/ML
16 INJECTION INTRAVENOUS
Status: COMPLETED | OUTPATIENT
Start: 2024-04-13 | End: 2024-04-13

## 2024-04-13 RX ORDER — ALBUMIN, HUMAN INJ 5% 5 %
3000 SOLUTION INTRAVENOUS EVERY OTHER DAY
Status: COMPLETED | OUTPATIENT
Start: 2024-04-14 | End: 2024-04-14

## 2024-04-13 NOTE — PROGRESS NOTES
[facility]  Nephrology Progress Note    Kat Crook Attending:  Tobi Thapa DO       Assessment and Plan:    1) MS exacerbation- PLEX #5/5 Sun    2) Hypotension- mild volume depletion +/- meds (ie baclofen)- resolved. Midodrine dc'ed. Appropriate response to cosyntropin.     3) HTN- normally on telmisartan / carvediolol     4) DM 2    5) Fibromyalgia    6) h/o PE    7) Cystitis- cx pending; on CTX; continue pyridium       Subjective:  Awake alert in good spirits no issues overnight. Up in chair.     Physical Exam:   /66 (BP Location: Right arm)   Pulse 66   Temp 97.7 °F (36.5 °C) (Temporal)   Resp 13   Wt 171 lb 4.8 oz (77.7 kg)   LMP  (LMP Unknown)   SpO2 97%   BMI 27.65 kg/m²   Temp (24hrs), Av.3 °F (36.3 °C), Min:96.7 °F (35.9 °C), Max:97.7 °F (36.5 °C)       Intake/Output Summary (Last 24 hours) at 2024 0837  Last data filed at 2024 0800  Gross per 24 hour   Intake 906 ml   Output 2850 ml   Net -1944 ml     Wt Readings from Last 3 Encounters:   24 171 lb 4.8 oz (77.7 kg)   24 168 lb (76.2 kg)   24 168 lb 6.4 oz (76.4 kg)     General: awake alert  HEENT: No scleral icterus, MMM  Neck: Supple, no REESE or thyromegaly  Cardiac: Regular rate and rhythm, S1, S2 normal, no murmur or tub  Lungs: Decreased BS at bases bilaterally   Abdomen: Soft, non-tender. + bowel sounds, no palpable organomegaly  Extremities: Without clubbing, cyanosis; no edema  Neurologic: Cranial nerves grossly intact, moving all extremities  Skin: Warm and dry, no rashes       Labs:   Lab Results   Component Value Date    WBC 7.8 2024    HGB 10.6 2024    HCT 34.0 2024    .0 2024    CREATSERUM 1.06 2024    BUN 15 2024     2024    K 4.3 2024     2024    CO2 33.0 2024     2024    CA 8.8 2024    PGLU 207 2024       Imaging:  All imaging studies reviewed.    Meds:   Current Facility-Administered  Medications   Medication Dose Route Frequency    cefTRIAXone (Rocephin) 1 g in D5W 100 mL IVPB-ADD  1 g Intravenous Q24H    insulin degludec 100 units/mL flextouch 6 Units  6 Units Subcutaneous Nightly    phenazopyridine (Pyridium) tab 100 mg  100 mg Oral TID CC    docusate sodium (Colace) cap 100 mg  100 mg Oral BID    diphenhydrAMINE (Benadryl) cap/tab 25 mg  25 mg Oral Q6H PRN    norepinephrine (Levophed) 4 mg/250mL infusion premix  0.5-30 mcg/min Intravenous Continuous    ketorolac (Toradol) 15 MG/ML injection 15 mg  15 mg Intravenous Q6H PRN    enoxaparin (Lovenox) 40 MG/0.4ML SUBQ injection 40 mg  40 mg Subcutaneous Daily    midodrine (ProAmatine) tab 2.5 mg  2.5 mg Oral TID PRN    HYDROmorphone (Dilaudid) 1 MG/ML injection 0.2 mg  0.2 mg Intravenous Q2H PRN    Or    HYDROmorphone (Dilaudid) 1 MG/ML injection 0.4 mg  0.4 mg Intravenous Q2H PRN    Or    HYDROmorphone (Dilaudid) 1 MG/ML injection 0.8 mg  0.8 mg Intravenous Q2H PRN    fluticasone furoate-vilanterol (Breo Ellipta) 200-25 MCG/ACT inhaler 1 puff  1 puff Inhalation Daily    albumin human (Albumin) 5% injection 3,000 mL  3,000 mL Intravenous QOD    calcium gluconate 3 g in sodium chloride 0.9% 250 mL IVPB   Intravenous QOD    ondansetron (Zofran-ODT) disintegrating tab 4 mg  4 mg Oral Q6H PRN    Or    ondansetron (Zofran) 4 MG/2ML injection 4 mg  4 mg Intravenous Q6H PRN    albuterol (Ventolin HFA) 108 (90 Base) MCG/ACT inhaler 2 puff  2 puff Inhalation Q6H PRN    atorvastatin (Lipitor) tab 20 mg  20 mg Oral Nightly    baclofen (Lioresal) tab 10 mg  10 mg Oral TID    [Held by provider] carvedilol (Coreg) tab 3.125 mg  3.125 mg Oral BID    [Held by provider] Diroximel Fumarate CPDR 462 mg  462 mg Oral BID    DULoxetine (Cymbalta)  cap 60 mg  60 mg Oral BID    HYDROcodone-acetaminophen (Norco)  MG per tab 1 tablet  1 tablet Oral QID PRN    levothyroxine (Synthroid) tab 88 mcg  88 mcg Oral Before breakfast    montelukast (Singulair) tab 10 mg  10  mg Oral Nightly    thiamine (Vitamin B1) tab 100 mg  100 mg Oral Daily    progesterone (Prometrium) cap 100 mg  100 mg Oral Daily    pregabalin (Lyrica) cap 200 mg  200 mg Oral BID    pantoprazole (Protonix) DR tab 20 mg  20 mg Oral BID    glucose (Dex4) 15 GM/59ML oral liquid 15 g  15 g Oral Q15 Min PRN    Or    glucose (Glutose) 40% oral gel 15 g  15 g Oral Q15 Min PRN    Or    glucose-vitamin C (Dex-4) chewable tab 4 tablet  4 tablet Oral Q15 Min PRN    Or    dextrose 50% injection 50 mL  50 mL Intravenous Q15 Min PRN    Or    glucose (Dex4) 15 GM/59ML oral liquid 30 g  30 g Oral Q15 Min PRN    Or    glucose (Glutose) 40% oral gel 30 g  30 g Oral Q15 Min PRN    Or    glucose-vitamin C (Dex-4) chewable tab 8 tablet  8 tablet Oral Q15 Min PRN    acetaminophen (Tylenol Extra Strength) tab 500 mg  500 mg Oral Q4H PRN    prochlorperazine (Compazine) 10 MG/2ML injection 5 mg  5 mg Intravenous Q8H PRN    polyethylene glycol (PEG 3350) (Miralax) 17 g oral packet 17 g  17 g Oral Daily PRN    sennosides (Senokot) tab 17.2 mg  17.2 mg Oral Nightly PRN    bisacodyl (Dulcolax) 10 MG rectal suppository 10 mg  10 mg Rectal Daily PRN    fleet enema (Fleet) 7-19 GM/118ML rectal enema 133 mL  1 enema Rectal Once PRN    insulin aspart (NovoLOG) 100 Units/mL FlexPen 1-10 Units  1-10 Units Subcutaneous TID AC and HS    acetaminophen (Tylenol) 325 mg, codeine 60 mg for Tylenol #4 ([facility] only)   Oral Q6H PRN    heparin (Porcine) 100 Units/mL lock flush 500 Units  5 mL Intravenous PRN           Questions/concerns were discussed with patient and/or family by bedside.          Adrienne Ji MD  4/13/2024  837 AM

## 2024-04-13 NOTE — PROGRESS NOTES
[facility]   part of [facility]     Hospitalist Progress Note     Kat Crook Patient Status:  Inpatient    3/11/1970 MRN [id number]   Location [facility] 4SW-A Attending Elier, Alvaro Woods, *   Hosp Day # 8 PCP Justin Huerta MD     Chief Complaint: Weakness    Subjective:      - Hypotensive overnight after PLEX, received IVF bolus without effect and started on low dose levophed, stopped this AM   - States abd pain has improved markedly    - Denies other f/c, cp, sob, n/v    Objective:    Review of Systems:   A comprehensive review of systems was completed; pertinent positive and negatives stated in subjective.    Vital signs:  Temp:  [96.7 °F (35.9 °C)-97.6 °F (36.4 °C)] 96.7 °F (35.9 °C)  Pulse:  [53-72] 69  Resp:  [8-13] 11  BP: ()/(42-70) 112/70  SpO2:  [90 %-99 %] 95 %    Physical Exam:    General: No acute distress  Respiratory: no wheezes, no rhonchi  Cardiovascular: S1, S2, regular rate and rhythm  Abdomen: Soft, mild ttp in suprapubic region, non-distended, positive bowel sounds  Neuro: No new focal deficits.   Extremities: no edema    Diagnostic Data:    Labs:  Recent Labs   Lab 24  0552 04/10/24  0609 24   WBC 6.5 6.5 8.2 7.2 7.8   HGB 10.6* 10.4* 10.7* 10.6* 10.6*   MCV 71.8* 73.2* 72.8* 69.8* 71.6*   .0* 103.0* 106.0* 105.0* 102.0*   INR  --  1.08  --   --   --        Recent Labs   Lab 24  0552 04/10/24  0609 246 24   * 161* 154* 132* 247*   BUN 12 14 14 17 15   CREATSERUM 0.89 0.79 0.92 1.00 1.06*   CA 8.5 8.8 8.7 8.6 8.8   ALB 4.0 3.9 4.2  --   --     142 145 143 142   K 4.3 3.9 4.0 3.9 4.3    108 110 109 108   CO2 31.0 33.0* 32.0 31.0 33.0*   ALKPHO 23* 35* 24*  --   --    AST 7* 8* 11*  --   --    ALT 23 32 17  --   --    BILT 0.4 0.3 0.3  --   --    TP 4.9* 5.4* 5.2*  --   --        Estimated Creatinine Clearance: 55.5 mL/min (A) (based on SCr of 1.06  mg/dL (H)).    Recent Labs   Lab 04/09/24  0912   TROPHS 9       Recent Labs   Lab 04/10/24  0609   PTP 14.0   INR 1.08                  Microbiology    Hospital Encounter on 04/05/24   1. Blood Culture     Status: None    Collection Time: 04/07/24  1:05 AM    Specimen: Bld,Port a cath Line; Blood   Result Value Ref Range    Blood Culture Result No Growth 5 Days N/A   2. Urine Culture, Routine     Status: None    Collection Time: 04/06/24 12:28 AM    Specimen: Urine, clean catch   Result Value Ref Range    Urine Culture No Growth at 18-24 hrs. N/A         Imaging: Reviewed in Epic.    Medications:    cefTRIAXone  1 g Intravenous Q24H    insulin degludec  6 Units Subcutaneous Nightly    phenazopyridine  100 mg Oral TID CC    docusate sodium  100 mg Oral BID    enoxaparin  40 mg Subcutaneous Daily    fluticasone furoate-vilanterol  1 puff Inhalation Daily    albumin human  3,000 mL Intravenous QOD    calcium gluconate 3 g in sodium chloride 0.9% 250 mL IVPB   Intravenous QOD    atorvastatin  20 mg Oral Nightly    baclofen  10 mg Oral TID    [Held by provider] carvedilol  3.125 mg Oral BID    [Held by provider] Diroximel Fumarate  462 mg Oral BID    DULoxetine  60 mg Oral BID    levothyroxine  88 mcg Oral Before breakfast    montelukast  10 mg Oral Nightly    thiamine  100 mg Oral Daily    progesterone  100 mg Oral Daily    pregabalin  200 mg Oral BID    pantoprazole  20 mg Oral BID    insulin aspart  1-10 Units Subcutaneous TID AC and HS       Assessment & Plan:      #MS Exacerbation  -Anaphylaxis to steroids and unable to tolerate IVIG due to hx of DVT  -PLEX, plan for every other day, next session (5/5) tomorrow   -Renal and neuro consult  -On [facility] and [facility] outpatient  -Pain control     # Abd pain, improved   # UTI   - UA positive, started IV abx, f/u Ucx, pending   - CT A/P with stable 5mm right non-obstructive calculus   - Urology consult - no acute surgical intervention    #Shock, suspect hypovolemic.   Resolved  -Off levophed this AM, midodrine  -PCT negative, blood cultures (NGTD), monitor off abx  -Critical care following  -AM cortisol low x2; endocrinology consulted  - ACTH stim with adequate adrenal stimulation; holding off steroids per endocrine   -Initial ACTH level low, pending pituitary w/u for primary adrenal insufficiency; MRI Brain pending     #DM type II with neuropathy  -A1c is 7.3  -Hyperglycemia protocol  -On insulin pump PTA - transitioned to basal bolus insulin  -Lyrica     #Hypothyroidism  -Levothyroxine     #Hypertension  -Resume home meds but holding coreg due to bradycardia     #Dyslipidemia  -Statin     #GERD  -PPI     #Hx of GIORDANO  -LFTs normalized      #Thrombocytopenia, trending down. Does have GIORDANO and low platelets in the past  -Monitor     #Bradycardia  -Hold coreg  -Echo similar to prior, EF 60-65%     #Alpha thalassemia minor - monitor hgb  #H/o PE in 2021 - not on anticoagulation  #Fibromyalgia - continue home meds  #Asthma - resume home inhaler and singulair       Alvaro Chuck Thapa MD    Supplementary Documentation:     Quality:  DVT Mechanical Prophylaxis:   SCDs, Early ambuation  DVT Pharmacologic Prophylaxis   Medication    enoxaparin (Lovenox) 40 MG/0.4ML SUBQ injection 40 mg    heparin (Porcine) 100 Units/mL lock flush 500 Units                Code Status: Full Code  Panda: No urinary catheter in place  Panda Duration (in days):   Central line:    KAYLEY:     The 21st Century Cures Act makes medical notes like these available to patients in the interest of transparency. Please be advised this is a medical document. Medical documents are intended to carry relevant information, facts as evident, and the clinical opinion of the practitioner. The medical note is intended as peer to peer communication and may appear blunt or direct. It is written in medical language and may contain abbreviations or verbiage that are unfamiliar.

## 2024-04-13 NOTE — PLAN OF CARE
Assumed pt care around 0730. Pt AOx4, following commands. RA, SR/SB. VSS. Levophed gtt off since 0630 this morning. Up w./ SBA and walker. Voids in bathroom. See emar and flowsheets for further data.

## 2024-04-13 NOTE — PROGRESS NOTES
ICU  Critical Care APRN Progress Note    NAME: Kat Crook - [facility] - MRN: [id number] - Age: 54 year old - :3/11/1970    Subjective:  No issues overnight. Abdominal pain resolved, Some tingling when first standing, otherwise improved, ambulating in hallways. BP dropped post PLEX yesterday  with low dose levo overnight-now weaned off.     Current Facility-Administered Medications   Medication Dose Route Frequency    cefTRIAXone (Rocephin) 1 g in D5W 100 mL IVPB-ADD  1 g Intravenous Q24H    insulin degludec 100 units/mL flextouch 6 Units  6 Units Subcutaneous Nightly    phenazopyridine (Pyridium) tab 100 mg  100 mg Oral TID CC    docusate sodium (Colace) cap 100 mg  100 mg Oral BID    diphenhydrAMINE (Benadryl) cap/tab 25 mg  25 mg Oral Q6H PRN    norepinephrine (Levophed) 4 mg/250mL infusion premix  0.5-30 mcg/min Intravenous Continuous    ketorolac (Toradol) 15 MG/ML injection 15 mg  15 mg Intravenous Q6H PRN    enoxaparin (Lovenox) 40 MG/0.4ML SUBQ injection 40 mg  40 mg Subcutaneous Daily    midodrine (ProAmatine) tab 2.5 mg  2.5 mg Oral TID PRN    HYDROmorphone (Dilaudid) 1 MG/ML injection 0.2 mg  0.2 mg Intravenous Q2H PRN    Or    HYDROmorphone (Dilaudid) 1 MG/ML injection 0.4 mg  0.4 mg Intravenous Q2H PRN    Or    HYDROmorphone (Dilaudid) 1 MG/ML injection 0.8 mg  0.8 mg Intravenous Q2H PRN    fluticasone furoate-vilanterol (Breo Ellipta) 200-25 MCG/ACT inhaler 1 puff  1 puff Inhalation Daily    albumin human (Albumin) 5% injection 3,000 mL  3,000 mL Intravenous QOD    calcium gluconate 3 g in sodium chloride 0.9% 250 mL IVPB   Intravenous QOD    ondansetron (Zofran-ODT) disintegrating tab 4 mg  4 mg Oral Q6H PRN    Or    ondansetron (Zofran) 4 MG/2ML injection 4 mg  4 mg Intravenous Q6H PRN    albuterol (Ventolin HFA) 108 (90 Base) MCG/ACT inhaler 2 puff  2 puff Inhalation Q6H PRN    atorvastatin (Lipitor) tab 20 mg  20 mg Oral Nightly    baclofen (Lioresal) tab 10 mg  10 mg Oral TID    [Held  by provider] carvedilol (Coreg) tab 3.125 mg  3.125 mg Oral BID    [Held by provider] Diroximel Fumarate CPDR 462 mg  462 mg Oral BID    DULoxetine (Cymbalta) DR cap 60 mg  60 mg Oral BID    HYDROcodone-acetaminophen (Norco)  MG per tab 1 tablet  1 tablet Oral QID PRN    levothyroxine (Synthroid) tab 88 mcg  88 mcg Oral Before breakfast    montelukast (Singulair) tab 10 mg  10 mg Oral Nightly    thiamine (Vitamin B1) tab 100 mg  100 mg Oral Daily    progesterone (Prometrium) cap 100 mg  100 mg Oral Daily    pregabalin (Lyrica) cap 200 mg  200 mg Oral BID    pantoprazole (Protonix) DR tab 20 mg  20 mg Oral BID    glucose (Dex4) 15 GM/59ML oral liquid 15 g  15 g Oral Q15 Min PRN    Or    glucose (Glutose) 40% oral gel 15 g  15 g Oral Q15 Min PRN    Or    glucose-vitamin C (Dex-4) chewable tab 4 tablet  4 tablet Oral Q15 Min PRN    Or    dextrose 50% injection 50 mL  50 mL Intravenous Q15 Min PRN    Or    glucose (Dex4) 15 GM/59ML oral liquid 30 g  30 g Oral Q15 Min PRN    Or    glucose (Glutose) 40% oral gel 30 g  30 g Oral Q15 Min PRN    Or    glucose-vitamin C (Dex-4) chewable tab 8 tablet  8 tablet Oral Q15 Min PRN    acetaminophen (Tylenol Extra Strength) tab 500 mg  500 mg Oral Q4H PRN    prochlorperazine (Compazine) 10 MG/2ML injection 5 mg  5 mg Intravenous Q8H PRN    polyethylene glycol (PEG 3350) (Miralax) 17 g oral packet 17 g  17 g Oral Daily PRN    sennosides (Senokot) tab 17.2 mg  17.2 mg Oral Nightly PRN    bisacodyl (Dulcolax) 10 MG rectal suppository 10 mg  10 mg Rectal Daily PRN    fleet enema (Fleet) 7-19 GM/118ML rectal enema 133 mL  1 enema Rectal Once PRN    insulin aspart (NovoLOG) 100 Units/mL FlexPen 1-10 Units  1-10 Units Subcutaneous TID AC and HS    acetaminophen (Tylenol) 325 mg, codeine 60 mg for Tylenol #4 (EE[facility] only)   Oral Q6H PRN    heparin (Porcine) 100 Units/mL lock flush 500 Units  5 mL Intravenous PRN       OBJECTIVE  Vitals:  /66 (BP Location: Right arm)   Pulse 66    Temp 97.7 °F (36.5 °C) (Temporal)   Resp 13   Wt 171 lb 4.8 oz (77.7 kg)   LMP  (LMP Unknown)   SpO2 97%   BMI 27.65 kg/m²             O2: 2 L NC    Physical Exam:    General Appearance: Alert, cooperative, follows commands  Neck: No JVD, neck supple, no adenopathy, trachea midline, no carotid bruits  Lungs: Clear to auscultation bilaterally, respirations unlabored  Heart: Regular rate and rhythm, S1 and S2 normal, no murmur, rub or gallop  Abdomen: Soft, non-tender, bowel sounds active all four quadrants, no masses, no organomegaly  Extremities: Atraumatic, no cyanosis or edema, capillary refill <3 sec.    Pulses: 2+ and symmetric all extremities  Skin: Skin color, texture, turgor normal for ethnicity, no rashes or lesions, warm and dry  Neurologic: CNII-XII intact, strength and weakness improved per patient    Data this admission:  None this admission    Labs:  Lab Results   Component Value Date    WBC 7.8 04/13/2024    HGB 10.6 04/13/2024    HCT 34.0 04/13/2024    .0 04/13/2024    CREATSERUM 1.06 04/13/2024    BUN 15 04/13/2024     04/13/2024    K 4.3 04/13/2024     04/13/2024    CO2 33.0 04/13/2024     04/13/2024    CA 8.8 04/13/2024     Assessment/Plan:    Shock: Likely hypovolemic given improvement with hydration. Less likely septic. Now, with episodes post PLEX  -Hypotensive with PLEX initiation and now post PLEX yesterday  -PCT negative  -LA normal  -cortisol 1.2, repeat 1.4 (allergy to steroids). Normal response to cosyntropin  -Midodrine discontinued  -UA positive, cx pending  -Vasopressors to maintain SBP above 90 and MAP above 65- hypotensive s/p PLEX yesterday, low dose levo overnight, now weaned off  -Blood cultures NGTD, UA negative  -Rocephin (4/12-)    MS exacerbation with LE weakness  -Weakness improved this morning, numbness/tingling minimal when ambulating  -Allergy to steroids, unable to have IVIG 2/2 DVTs  -Plan for PLEX x 5 days QOD. Received 4/5 on 4/12, last  dose tomorrow  -Neuro following  -Renal following for PLEX  -MRI brain pending  -PT/OT    Right flank, abd pain--> improved  -CT reveals right renal stone and possible cystitis  -Per uro- stable right renal calculus, not contributing to pain  -started on pyridium  -Urology consulted, antbx started  -Urine cx pending  -PRN pain medications    Chest, right neck and epigastric pain--> resolved  -improved with Norco  -EKG with SB, TWIs in V1-V2 unchanged from prior  -Troponin negative  -TTE with EF 60-65%, NRWMAs  -PPI  -monitor    Bradycardia  -HR  remains in high 50s-low 60s (prior days as low as 40s)  -Sinus homar on EKG  -Holding coreg  -TTE unremarkable  -monitor on tele    Thrombocytopenia: Noted with initiation of PLEX  -Plts remain low, but stable  -4T score low, less likely HIT  -No signs of bleeding  -Monitor daily CBC    DM2  -A1c 7.3  -Insulin pump and home PO meds on hold  -Accu-check AC and HS   - ISS  -Treseba nightly    Hypothyroidism         -TSH with reflex T4 low         - home Synthroid    HTN        -Hold meds while hypotensive        -monitor on tele    F/E/N:    -Follow lytes and replete prn  -Carb control diet    Proph:  -SQ Lovenox  -PT/OT  -SCD    Dispo:     Code Status: Full Code  -On low dose levo overnight, transfer held, Re-eval later today. Can tx if BP remains stable    Plan of care discussed with intensivist on-call, Dr. Katalina Hernandez, [facility]  ICU  Phone  [phone]   Pager [phone]    Pulmonary Critical Care Physician Attestation    I have personally seen and examined the patient.  I have reviewed and agree with Lillie MORA's documentation with the following highlights/changes.    Low dose norepinephrine overnight now off this morning. Abdominal pain resolved and she has no complaints today other than feeling tired. Continuing ceftriaxone for possible UTI. Last session of PLEX tomorrow. Will monitor BP and can transfer to floor if she remains stable off vasopressors.    Parvez SANCHEZ  MD Katalina  Pulmonary & Critical Care Medicine  [facility]

## 2024-04-13 NOTE — PLAN OF CARE
Received pt at change of shift drowsy.  Denied pain.  SBP<90.  500cc  bolus of LR given.  SBP remained <90.  Low dose levo started.  Weaning levo as able to keep SBP>90.  On CPAP > 4.5hrs overnight.  Up to bathroom to void large amount orange colored urine.  See flowsheet.

## 2024-04-13 NOTE — PHYSICAL THERAPY NOTE
Attempted to see Pt this PM - RN aware of attempt.  Pt off floor at MRI.  Will f/u later today if time permits, after all other patients are attempted per tentative schedule.

## 2024-04-13 NOTE — PROGRESS NOTES
Patient s/p PLEX today.  Hypotensive this evening.  500ml IV fluid bolus given but SBP remains <90.  Levophed gtt reordered and RN to start.    [facility] on-call updated    SHON Tristan  Critical Care NP  [facility]  Spect: [phone]  Pgr: [phone]

## 2024-04-14 LAB
ANION GAP SERPL CALC-SCNC: 2 MMOL/L (ref 0–18)
BASOPHILS # BLD AUTO: 0.01 X10(3) UL (ref 0–0.2)
BASOPHILS NFR BLD AUTO: 0.1 %
BUN BLD-MCNC: 15 MG/DL (ref 9–23)
CALCIUM BLD-MCNC: 8.4 MG/DL (ref 8.5–10.1)
CHLORIDE SERPL-SCNC: 109 MMOL/L (ref 98–112)
CO2 SERPL-SCNC: 33 MMOL/L (ref 21–32)
CREAT BLD-MCNC: 1.01 MG/DL
EGFRCR SERPLBLD CKD-EPI 2021: 65 ML/MIN/1.73M2 (ref 60–?)
EOSINOPHIL # BLD AUTO: 0 X10(3) UL (ref 0–0.7)
EOSINOPHIL NFR BLD AUTO: 0 %
ERYTHROCYTE [DISTWIDTH] IN BLOOD BY AUTOMATED COUNT: 15 %
GLUCOSE BLD-MCNC: 110 MG/DL (ref 70–99)
GLUCOSE BLD-MCNC: 118 MG/DL (ref 70–99)
GLUCOSE BLD-MCNC: 162 MG/DL (ref 70–99)
GLUCOSE BLD-MCNC: 176 MG/DL (ref 70–99)
GLUCOSE BLD-MCNC: 261 MG/DL (ref 70–99)
HCT VFR BLD AUTO: 30.9 %
HGB BLD-MCNC: 9.8 G/DL
IMM GRANULOCYTES # BLD AUTO: 0.01 X10(3) UL (ref 0–1)
IMM GRANULOCYTES NFR BLD: 0.1 %
LYMPHOCYTES # BLD AUTO: 2.76 X10(3) UL (ref 1–4)
LYMPHOCYTES NFR BLD AUTO: 39.5 %
MCH RBC QN AUTO: 22.7 PG (ref 26–34)
MCHC RBC AUTO-ENTMCNC: 31.7 G/DL (ref 31–37)
MCV RBC AUTO: 71.5 FL
MONOCYTES # BLD AUTO: 0.48 X10(3) UL (ref 0.1–1)
MONOCYTES NFR BLD AUTO: 6.9 %
NEUTROPHILS # BLD AUTO: 3.72 X10 (3) UL (ref 1.5–7.7)
NEUTROPHILS # BLD AUTO: 3.72 X10(3) UL (ref 1.5–7.7)
NEUTROPHILS NFR BLD AUTO: 53.4 %
OSMOLALITY SERPL CALC.SUM OF ELEC: 300 MOSM/KG (ref 275–295)
PLATELET # BLD AUTO: 100 10(3)UL (ref 150–450)
PLATELETS.RETICULATED NFR BLD AUTO: 14 % (ref 0–7)
POTASSIUM SERPL-SCNC: 3.8 MMOL/L (ref 3.5–5.1)
RBC # BLD AUTO: 4.32 X10(6)UL
SODIUM SERPL-SCNC: 144 MMOL/L (ref 136–145)
WBC # BLD AUTO: 7 X10(3) UL (ref 4–11)

## 2024-04-14 PROCEDURE — 99232 SBSQ HOSP IP/OBS MODERATE 35: CPT | Performed by: STUDENT IN AN ORGANIZED HEALTH CARE EDUCATION/TRAINING PROGRAM

## 2024-04-14 PROCEDURE — 99232 SBSQ HOSP IP/OBS MODERATE 35: CPT | Performed by: INTERNAL MEDICINE

## 2024-04-14 PROCEDURE — 99233 SBSQ HOSP IP/OBS HIGH 50: CPT | Performed by: INTERNAL MEDICINE

## 2024-04-14 NOTE — PLAN OF CARE
Assumed care of patient this am, ambulating in benitez with PT, c/o leg pain - patient declined pain medication. PLEX #5/5 today. Patient premedicated. Tolerated pheresis well. Good appetite. X1 bm. Daughter at bedside updated on plan of care.

## 2024-04-14 NOTE — PHYSICAL THERAPY NOTE
PHYSICAL THERAPY TREATMENT NOTE - INPATIENT    Room Number: 467/467-A     Session: 3     Number of Visits to Meet Established Goals: 5    Presenting Problem: MS exacerbation, weakness  Co-Morbidities : MS, fibromyalgia, DVT, HLD, type 1 DM    ASSESSMENT   Patient demonstrates good  progress this session, goals  updated to reflect patient performance.    Patient continues to function below baseline with transfers and gait.  Contributing factors to remaining limitations include decreased functional strength, decreased endurance/aerobic capacity, and impaired standing balance.  Next session anticipate patient to progress gait.  Physical Therapy will continue to follow patient for duration of hospitalization.    Patient continues to benefit from continued skilled PT services: at discharge to promote functional independence and safety with additional support and return home with home health PT.    PLAN  PT Treatment Plan: Endurance;Energy conservation;Patient education;Family education;Gait training;Neuromuscular re-educate;Strengthening;Stair training;Transfer training;Balance training  Rehab Potential : Good  Frequency (Obs): 3-5x/week    CURRENT GOALS      Goal #1 Patient is able to demonstrate supine - sit EOB @ level: supervision   MET   Goal #2 Patient is able to demonstrate transfers Sit to/from Stand at assistance level: supervision  MET    Goal #3 Patient is able to ambulate 50 feet with assist device: walker - rolling at assistance level: supervision   Progressing   Goal #4 Pt able to ascend/descend 1 step with rail with CGA   Goal #5     Goal #6     Goal Comments: Goals established on 2024 all goals updated    SUBJECTIVE  Eager to return home tomorrow, reports fatigue impacts speech functioning (slower)    OBJECTIVE  Precautions: Other (Comment) (h/o  LLE footdrop)    WEIGHT BEARING RESTRICTION  Weight Bearing Restriction: None                PAIN ASSESSMENT   Ratin  Location: lower  abdominal  Management Techniques: Repositioning    BALANCE                                                                                                                       Static Sitting: Good  Dynamic Sitting: Good           Static Standing: Fair  Dynamic Standing: Fair -    ACTIVITY TOLERANCE                         O2 WALK         AM-PAC '6-Clicks' INPATIENT SHORT FORM - BASIC MOBILITY  How much difficulty does the patient currently have...  Patient Difficulty: Turning over in bed (including adjusting bedclothes, sheets and blankets)?: None   Patient Difficulty: Sitting down on and standing up from a chair with arms (e.g., wheelchair, bedside commode, etc.): None   Patient Difficulty: Moving from lying on back to sitting on the side of the bed?: None   How much help from another person does the patient currently need...   Help from Another: Moving to and from a bed to a chair (including a wheelchair)?: None   Help from Another: Need to walk in hospital room?: A Little   Help from Another: Climbing 3-5 steps with a railing?: A Little       AM-PAC Score:  Raw Score: 22   Approx Degree of Impairment: 20.91%   Standardized Score (AM-PAC Scale): 53.28   CMS Modifier (G-Code): CJ    FUNCTIONAL ABILITY STATUS  Gait Assessment   Functional Mobility/Gait Assessment  Gait Assistance: Contact guard assist  Distance (ft): 200  Assistive Device: Rolling walker  Pattern:  (pt uses toe drag as proprioceptive feed back.)    Skilled Therapy Provided    Bed Mobility:  Rolling: indep   Supine<>Sit: indep  Sitting balance: able to reach to floor  Donning gym shoes/ties: indep     Transfer Mobility:  Sit<>Stand: mod I   Stand<>Sit: mod I  Toilet transfers: mod I  Toileting Mod I   Gait: 200ft with RW supervision, up/down ramp with CGA, step to pattern, increased time with LLE advancement, able to clear floor with compensatory techniques, requires standing breaks, pt able to self-select appropriately    Education: energy  conservation techniques, pt able to return demo, discussed safety, continued use of RW, slowing down, pacing, pt in agreement    Patient End of Session: Needs met;Call light within reach;In bed;Alarm set    PT Session Time: 30 minutes  Gait Training: 15 minutes  Therapeutic Activity: 15 minutes

## 2024-04-14 NOTE — PROGRESS NOTES
[facility]  Nephrology Progress Note    Kat Crook Attending:  Tobi Thapa DO       Assessment and Plan:    1) MS exacerbation- PLEX #5/5 today    2) Hypotension- mild volume depletion +/- meds (ie baclofen)- resolved. Midodrine dc'ed. Appropriate response to cosyntropin.     3) HTN- normally on telmisartan / carvediolol     4) DM 2    5) Fibromyalgia    6) h/o PE    7) ? Cystitis ? - cx neg; off abx. Suprapubic pain improved with pyridium; CT otherwise unimpressive      Subjective:  Awake alert in good spirits no issues overnight    Physical Exam:   /67   Pulse 59   Temp 97.8 °F (36.6 °C) (Temporal)   Resp 14   Wt 173 lb 15.1 oz (78.9 kg)   LMP  (LMP Unknown)   SpO2 95%   BMI 28.08 kg/m²   Temp (24hrs), Av.9 °F (36.6 °C), Min:97.4 °F (36.3 °C), Max:98.6 °F (37 °C)       Intake/Output Summary (Last 24 hours) at 2024 0819  Last data filed at 2024 0638  Gross per 24 hour   Intake 100 ml   Output 2050 ml   Net -1950 ml     Wt Readings from Last 3 Encounters:   24 173 lb 15.1 oz (78.9 kg)   24 168 lb (76.2 kg)   24 168 lb 6.4 oz (76.4 kg)     General: awake alert  HEENT: No scleral icterus, MMM  Neck: Supple, no REESE or thyromegaly  Cardiac: Regular rate and rhythm, S1, S2 normal, no murmur or tub  Lungs: Decreased BS at bases bilaterally   Abdomen: Soft, non-tender. + bowel sounds, no palpable organomegaly  Extremities: Without clubbing, cyanosis; no edema  Neurologic: Cranial nerves grossly intact, moving all extremities  Skin: Warm and dry, no rashes       Labs:   Lab Results   Component Value Date    WBC 7.0 2024    HGB 9.8 2024    HCT 30.9 2024    .0 2024    CREATSERUM 1.01 2024    BUN 15 2024     2024    K 3.8 2024     2024    CO2 33.0 2024     2024    CA 8.4 2024    PGLU 110 2024       Imaging:  All imaging studies reviewed.    Meds:   Current  Facility-Administered Medications   Medication Dose Route Frequency    albumin human (Albumin) 5% injection 3,000 mL  3,000 mL Intravenous QOD    calcium gluconate 3 g in sodium chloride 0.9% 250 mL IVPB   Intravenous Once    insulin degludec 100 units/mL flextouch 6 Units  6 Units Subcutaneous Nightly    docusate sodium (Colace) cap 100 mg  100 mg Oral BID    diphenhydrAMINE (Benadryl) cap/tab 25 mg  25 mg Oral Q6H PRN    norepinephrine (Levophed) 4 mg/250mL infusion premix  0.5-30 mcg/min Intravenous Continuous    enoxaparin (Lovenox) 40 MG/0.4ML SUBQ injection 40 mg  40 mg Subcutaneous Daily    midodrine (ProAmatine) tab 2.5 mg  2.5 mg Oral TID PRN    HYDROmorphone (Dilaudid) 1 MG/ML injection 0.2 mg  0.2 mg Intravenous Q2H PRN    Or    HYDROmorphone (Dilaudid) 1 MG/ML injection 0.4 mg  0.4 mg Intravenous Q2H PRN    Or    HYDROmorphone (Dilaudid) 1 MG/ML injection 0.8 mg  0.8 mg Intravenous Q2H PRN    fluticasone furoate-vilanterol (Breo Ellipta) 200-25 MCG/ACT inhaler 1 puff  1 puff Inhalation Daily    albumin human (Albumin) 5% injection 3,000 mL  3,000 mL Intravenous QOD    ondansetron (Zofran-ODT) disintegrating tab 4 mg  4 mg Oral Q6H PRN    Or    ondansetron (Zofran) 4 MG/2ML injection 4 mg  4 mg Intravenous Q6H PRN    albuterol (Ventolin HFA) 108 (90 Base) MCG/ACT inhaler 2 puff  2 puff Inhalation Q6H PRN    atorvastatin (Lipitor) tab 20 mg  20 mg Oral Nightly    baclofen (Lioresal) tab 10 mg  10 mg Oral TID    [Held by provider] carvedilol (Coreg) tab 3.125 mg  3.125 mg Oral BID    [Held by provider] Diroximel Fumarate CPDR 462 mg  462 mg Oral BID    DULoxetine (Cymbalta) DR cap 60 mg  60 mg Oral BID    HYDROcodone-acetaminophen (Norco)  MG per tab 1 tablet  1 tablet Oral QID PRN    levothyroxine (Synthroid) tab 88 mcg  88 mcg Oral Before breakfast    montelukast (Singulair) tab 10 mg  10 mg Oral Nightly    thiamine (Vitamin B1) tab 100 mg  100 mg Oral Daily    progesterone (Prometrium) cap 100 mg   100 mg Oral Daily    pregabalin (Lyrica) cap 200 mg  200 mg Oral BID    pantoprazole (Protonix) DR tab 20 mg  20 mg Oral BID    glucose (Dex4) 15 GM/59ML oral liquid 15 g  15 g Oral Q15 Min PRN    Or    glucose (Glutose) 40% oral gel 15 g  15 g Oral Q15 Min PRN    Or    glucose-vitamin C (Dex-4) chewable tab 4 tablet  4 tablet Oral Q15 Min PRN    Or    dextrose 50% injection 50 mL  50 mL Intravenous Q15 Min PRN    Or    glucose (Dex4) 15 GM/59ML oral liquid 30 g  30 g Oral Q15 Min PRN    Or    glucose (Glutose) 40% oral gel 30 g  30 g Oral Q15 Min PRN    Or    glucose-vitamin C (Dex-4) chewable tab 8 tablet  8 tablet Oral Q15 Min PRN    acetaminophen (Tylenol Extra Strength) tab 500 mg  500 mg Oral Q4H PRN    prochlorperazine (Compazine) 10 MG/2ML injection 5 mg  5 mg Intravenous Q8H PRN    polyethylene glycol (PEG 3350) (Miralax) 17 g oral packet 17 g  17 g Oral Daily PRN    sennosides (Senokot) tab 17.2 mg  17.2 mg Oral Nightly PRN    bisacodyl (Dulcolax) 10 MG rectal suppository 10 mg  10 mg Rectal Daily PRN    fleet enema (Fleet) 7-19 GM/118ML rectal enema 133 mL  1 enema Rectal Once PRN    insulin aspart (NovoLOG) 100 Units/mL FlexPen 1-10 Units  1-10 Units Subcutaneous TID AC and HS    acetaminophen (Tylenol) 325 mg, codeine 60 mg for Tylenol #4 ([facility] only)   Oral Q6H PRN    heparin (Porcine) 100 Units/mL lock flush 500 Units  5 mL Intravenous PRN           Questions/concerns were discussed with patient and/or family by bedside.          Adrienne Ji MD  4/14/2024  819 AM

## 2024-04-14 NOTE — PROGRESS NOTES
[facility]   part of [facility]     Hospitalist Progress Note     Kat Crook Patient Status:  Inpatient    3/11/1970 MRN [id number]   Location [facility] 4SW-A Attending Alvaro Thapa, *   Hosp Day # 9 PCP Justin Huerta MD     Chief Complaint: Weakness    Subjective:      - AF, BP stable    - Abd pain improved    - Plan for PLEX today     Objective:    Review of Systems:   A comprehensive review of systems was completed; pertinent positive and negatives stated in subjective.    Vital signs:  Temp:  [97.4 °F (36.3 °C)-98.6 °F (37 °C)] 97.8 °F (36.6 °C)  Pulse:  [56-77] 59  Resp:  [7-24] 14  BP: ()/(51-91) 111/67  SpO2:  [92 %-100 %] 95 %    Physical Exam:    General: No acute distress  Respiratory: no wheezes, no rhonchi  Cardiovascular: S1, S2, regular rate and rhythm  Abdomen: Soft, mild ttp in suprapubic region, non-distended, positive bowel sounds  Neuro: No new focal deficits.   Extremities: no edema    Diagnostic Data:    Labs:  Recent Labs   Lab 04/10/24  0609 24  0446 24  0432 24  04424  0404   WBC 6.5 8.2 7.2 7.8 7.0   HGB 10.4* 10.7* 10.6* 10.6* 9.8*   MCV 73.2* 72.8* 69.8* 71.6* 71.5*   .0* 106.0* 105.0* 102.0* 100.0*   INR 1.08  --   --   --   --        Recent Labs   Lab 24  0552 04/10/24  0609 24  0446 24  0432 24  0449 24  0404   * 161* 154* 132* 247* 118*   BUN 12 14 14 17 15 15   CREATSERUM 0.89 0.79 0.92 1.00 1.06* 1.01   CA 8.5 8.8 8.7 8.6 8.8 8.4*   ALB 4.0 3.9 4.2  --   --   --     142 145 143 142 144   K 4.3 3.9 4.0 3.9 4.3 3.8    108 110 109 108 109   CO2 31.0 33.0* 32.0 31.0 33.0* 33.0*   ALKPHO 23* 35* 24*  --   --   --    AST 7* 8* 11*  --   --   --    ALT 23 32 17  --   --   --    BILT 0.4 0.3 0.3  --   --   --    TP 4.9* 5.4* 5.2*  --   --   --        Estimated Creatinine Clearance: 58.2 mL/min (based on SCr of 1.01 mg/dL).    Recent Labs   Lab 24  0912   TROPHS 9        Recent Labs   Lab 04/10/24  0609   PTP 14.0   INR 1.08                  Microbiology    Hospital Encounter on 04/05/24   1. Urine Culture, Routine     Status: None    Collection Time: 04/12/24  5:02 AM    Specimen: Urine, clean catch   Result Value Ref Range    Urine Culture No Growth at 18-24 hrs. N/A   2. Blood Culture     Status: None    Collection Time: 04/07/24  1:05 AM    Specimen: Bld,Port a cath Line; Blood   Result Value Ref Range    Blood Culture Result No Growth 5 Days N/A         Imaging: Reviewed in Epic.    Medications:    albumin human  3,000 mL Intravenous QOD    calcium gluconate 3 g in sodium chloride 0.9% 250 mL IVPB   Intravenous Once    cefTRIAXone  1 g Intravenous Q24H    insulin degludec  6 Units Subcutaneous Nightly    docusate sodium  100 mg Oral BID    enoxaparin  40 mg Subcutaneous Daily    fluticasone furoate-vilanterol  1 puff Inhalation Daily    albumin human  3,000 mL Intravenous QOD    atorvastatin  20 mg Oral Nightly    baclofen  10 mg Oral TID    [Held by provider] carvedilol  3.125 mg Oral BID    [Held by provider] Diroximel Fumarate  462 mg Oral BID    DULoxetine  60 mg Oral BID    levothyroxine  88 mcg Oral Before breakfast    montelukast  10 mg Oral Nightly    thiamine  100 mg Oral Daily    progesterone  100 mg Oral Daily    pregabalin  200 mg Oral BID    pantoprazole  20 mg Oral BID    insulin aspart  1-10 Units Subcutaneous TID AC and HS       Assessment & Plan:      #MS Exacerbation  -Anaphylaxis to steroids and unable to tolerate IVIG due to hx of DVT  -PLEX, plan for every other day, last session (5/5) today   -Renal and neuro consult  -On [facility] outpatient  -Pain control     # Abd pain, improved   # UTI, ruled out   - UA positive, f/u Ucx, NGTD - abx stopped   - CT A/P with stable 5mm right non-obstructive calculus   - Urology consult - no acute surgical intervention    #Shock, suspect hypovolemic.  Resolved  -Off levophed, midodrine  -PCT negative,  blood cultures (NGTD)  -Critical care following  -AM cortisol low x2; endocrinology consulted  - ACTH stim with adequate adrenal stimulation; holding off steroids per endocrine   -Initial ACTH level low, pending pituitary w/u for primary adrenal insufficiency; MRI Brain without acute pituitary abnormalities noted, stable plaques     #DM type II with neuropathy  -A1c is 7.3  -Hyperglycemia protocol  -On insulin pump PTA - transitioned to basal bolus insulin  -Lyrica     #Hypothyroidism  -Levothyroxine     #Hypertension  -Resume home meds but holding coreg due to bradycardia     #Dyslipidemia  -Statin     #GERD  -PPI     #Hx of GIORDANO  -LFTs normalized      #Thrombocytopenia    - Does have GIORDANO and low platelets in the past  -Monitor     #Bradycardia  -Hold coreg  -Echo similar to prior, EF 60-65%     #Alpha thalassemia minor - monitor hgb  #H/o PE in 2021 - not on anticoagulation  #Fibromyalgia - continue home meds  #Asthma - resume home inhaler and singulair       Alvaro Chuck Thapa MD    Supplementary Documentation:     Quality:  DVT Mechanical Prophylaxis:   SCDs, Early ambuation  DVT Pharmacologic Prophylaxis   Medication    enoxaparin (Lovenox) 40 MG/0.4ML SUBQ injection 40 mg    heparin (Porcine) 100 Units/mL lock flush 500 Units                Code Status: Full Code  Panda: No urinary catheter in place  Panda Duration (in days):   Central line:    KAYLEY:     The 21st Century Cures Act makes medical notes like these available to patients in the interest of transparency. Please be advised this is a medical document. Medical documents are intended to carry relevant information, facts as evident, and the clinical opinion of the practitioner. The medical note is intended as peer to peer communication and may appear blunt or direct. It is written in medical language and may contain abbreviations or verbiage that are unfamiliar.

## 2024-04-14 NOTE — PROGRESS NOTES
ICU  Critical Care APRN Progress Note    NAME: Kat Crook [facility] - MRN: [id number] - Age: 54 year old - :3/11/1970    Subjective:  No issues overnight. Feels better.         Current Facility-Administered Medications   Medication Dose Route Frequency    albumin human (Albumin) 5% injection 3,000 mL  3,000 mL Intravenous QOD    calcium gluconate 3 g in sodium chloride 0.9% 250 mL IVPB   Intravenous Once    insulin degludec 100 units/mL flextouch 6 Units  6 Units Subcutaneous Nightly    docusate sodium (Colace) cap 100 mg  100 mg Oral BID    diphenhydrAMINE (Benadryl) cap/tab 25 mg  25 mg Oral Q6H PRN    norepinephrine (Levophed) 4 mg/250mL infusion premix  0.5-30 mcg/min Intravenous Continuous    enoxaparin (Lovenox) 40 MG/0.4ML SUBQ injection 40 mg  40 mg Subcutaneous Daily    midodrine (ProAmatine) tab 2.5 mg  2.5 mg Oral TID PRN    HYDROmorphone (Dilaudid) 1 MG/ML injection 0.2 mg  0.2 mg Intravenous Q2H PRN    Or    HYDROmorphone (Dilaudid) 1 MG/ML injection 0.4 mg  0.4 mg Intravenous Q2H PRN    Or    HYDROmorphone (Dilaudid) 1 MG/ML injection 0.8 mg  0.8 mg Intravenous Q2H PRN    fluticasone furoate-vilanterol (Breo Ellipta) 200-25 MCG/ACT inhaler 1 puff  1 puff Inhalation Daily    albumin human (Albumin) 5% injection 3,000 mL  3,000 mL Intravenous QOD    ondansetron (Zofran-ODT) disintegrating tab 4 mg  4 mg Oral Q6H PRN    Or    ondansetron (Zofran) 4 MG/2ML injection 4 mg  4 mg Intravenous Q6H PRN    albuterol (Ventolin HFA) 108 (90 Base) MCG/ACT inhaler 2 puff  2 puff Inhalation Q6H PRN    atorvastatin (Lipitor) tab 20 mg  20 mg Oral Nightly    baclofen (Lioresal) tab 10 mg  10 mg Oral TID    [Held by provider] carvedilol (Coreg) tab 3.125 mg  3.125 mg Oral BID    [Held by provider] Diroximel Fumarate CPDR 462 mg  462 mg Oral BID    DULoxetine (Cymbalta) DR cap 60 mg  60 mg Oral BID    HYDROcodone-acetaminophen (Norco)  MG per tab 1 tablet  1 tablet Oral QID PRN    levothyroxine  (Synthroid) tab 88 mcg  88 mcg Oral Before breakfast    montelukast (Singulair) tab 10 mg  10 mg Oral Nightly    thiamine (Vitamin B1) tab 100 mg  100 mg Oral Daily    progesterone (Prometrium) cap 100 mg  100 mg Oral Daily    pregabalin (Lyrica) cap 200 mg  200 mg Oral BID    pantoprazole (Protonix) DR tab 20 mg  20 mg Oral BID    glucose (Dex4) 15 GM/59ML oral liquid 15 g  15 g Oral Q15 Min PRN    Or    glucose (Glutose) 40% oral gel 15 g  15 g Oral Q15 Min PRN    Or    glucose-vitamin C (Dex-4) chewable tab 4 tablet  4 tablet Oral Q15 Min PRN    Or    dextrose 50% injection 50 mL  50 mL Intravenous Q15 Min PRN    Or    glucose (Dex4) 15 GM/59ML oral liquid 30 g  30 g Oral Q15 Min PRN    Or    glucose (Glutose) 40% oral gel 30 g  30 g Oral Q15 Min PRN    Or    glucose-vitamin C (Dex-4) chewable tab 8 tablet  8 tablet Oral Q15 Min PRN    acetaminophen (Tylenol Extra Strength) tab 500 mg  500 mg Oral Q4H PRN    prochlorperazine (Compazine) 10 MG/2ML injection 5 mg  5 mg Intravenous Q8H PRN    polyethylene glycol (PEG 3350) (Miralax) 17 g oral packet 17 g  17 g Oral Daily PRN    sennosides (Senokot) tab 17.2 mg  17.2 mg Oral Nightly PRN    bisacodyl (Dulcolax) 10 MG rectal suppository 10 mg  10 mg Rectal Daily PRN    fleet enema (Fleet) 7-19 GM/118ML rectal enema 133 mL  1 enema Rectal Once PRN    insulin aspart (NovoLOG) 100 Units/mL FlexPen 1-10 Units  1-10 Units Subcutaneous TID AC and HS    acetaminophen (Tylenol) 325 mg, codeine 60 mg for Tylenol #4 ([facility] only)   Oral Q6H PRN    heparin (Porcine) 100 Units/mL lock flush 500 Units  5 mL Intravenous PRN       OBJECTIVE  Vitals:  /67   Pulse 59   Temp 97.8 °F (36.6 °C) (Temporal)   Resp 14   Wt 173 lb 15.1 oz (78.9 kg)   LMP  (LMP Unknown)   SpO2 95%   BMI 28.08 kg/m²             O2: RA    Physical Exam:    General Appearance: Alert, cooperative, follows commands  Neck: No JVD, neck supple, no adenopathy, trachea midline, no carotid bruits  Lungs: Clear  to auscultation bilaterally, respirations unlabored  Heart: Regular rate and rhythm, S1 and S2 normal, no murmur, rub or gallop  Abdomen: Soft, non-tender, bowel sounds active all four quadrants, no masses, no organomegaly  Extremities: Atraumatic, no cyanosis or edema, capillary refill <3 sec.    Pulses: 2+ and symmetric all extremities  Skin: Skin color, texture, turgor normal for ethnicity, no rashes or lesions, warm and dry  Neurologic: CNII-XII intact, strength and weakness improved per patient    Data this admission:  MRI BRAIN+PITUITARY (W+WO)(CPT=70553)    Result Date: 4/13/2024  CONCLUSION:   1. Overall mild to moderate burden of demyelinating plaques is stable.  2. No enhancing lesions.  3. No suspicious lesions in the pituitary gland.  Diaphragma sella is unremarkable.  No mass effect.    LOCATION:  [facility]   Dictated by (CST): Justin Wharton MD on 4/13/2024 at 2:35 PM     Finalized by (CST): Justin Wharton MD on 4/13/2024 at 2:37 PM          Labs:  Lab Results   Component Value Date    WBC 7.0 04/14/2024    HGB 9.8 04/14/2024    HCT 30.9 04/14/2024    .0 04/14/2024    CREATSERUM 1.01 04/14/2024    BUN 15 04/14/2024     04/14/2024    K 3.8 04/14/2024     04/14/2024    CO2 33.0 04/14/2024     04/14/2024    CA 8.4 04/14/2024     Assessment/Plan:    Shock: Likely hypovolemic given improvement with hydration. Less likely septic. Now, episodes occur post PLEX  -Hypotensive with PLEX initiation and now post PLEX yesterday  -PCT negative  -LA normal  -cortisol 1.2, repeat 1.4 (allergy to steroids). Normal response to cosyntropin  -Midodrine discontinued  -UA positive, cx negative  -Vasopressors to maintain SBP above 90 and MAP above 65-now off  -Blood cultures NGTD, UA negative  -Rocephin (4/12-)    MS exacerbation with LE weakness  -Weakness improved this morning, numbness/tingling minimal when ambulating  -Allergy to steroids, unable to have IVIG 2/2 DVTs  -Plan for PLEX x 5  days QOD. Last session today   -Neuro following  -Renal following for PLEX  -MRI brain with stable demyelinating plaques  -PT/OT-ambulating    Right flank, abd pain--> improved  -CT reveals right renal stone and possible cystitis  -Per uro- stable right renal calculus, not contributing to pain  -started on pyridium  -Urology consulted, antbx started  -Urine cx negative  -PRN pain medications    Chest, right neck and epigastric pain--> resolved  -improved with Norco  -EKG with SB, TWIs in V1-V2 unchanged from prior  -Troponin negative  -TTE with EF 60-65%, NRWMAs  -PPI  -monitor    Bradycardia  -HR  remains in high 50s-low 60s (prior days as low as 40s)  -Sinus homar on EKG  -Holding coreg  -TTE unremarkable  -monitor on tele    Thrombocytopenia: Noted with initiation of PLEX  -Plts remain low, but stable  -4T score low, less likely HIT  -No signs of bleeding  -Monitor daily CBC    DM2  -A1c 7.3  -Insulin pump and home PO meds on hold  -Accu-check AC and HS   - ISS  -Treseba nightly    Hypothyroidism         -TSH with reflex T4 low         - home Synthroid    HTN        -Hold meds while hypotensive        -monitor on tele    F/E/N:    -Follow lytes and replete prn  -Carb control diet    Proph:  -SQ Lovenox  -PT/OT  -SCD    Dispo:     Code Status: Full Code  -ICU, Re-eval later today. Can tx if BP remains stable after PLEX    Plan of care discussed with intensivist on-call, Dr. Katalina Hernandez, [facility]  ICU  Phone  [phone]   Pager [phone]      Pulmonary Critical Care Physician Attestation    I have personally seen and examined the patient.  I have reviewed and agree with Lillie MORA's documentation with the following highlights/changes.    Lat day of PLEX today, will monitor overnight for hypotension following PLEX. MRI brain yesterday with stable mild to moderate burden of demyelinating plaques and no suspicious pituitary lesions.    Parvez Darden MD  Pulmonary & Critical Care Medicine  [facility]

## 2024-04-14 NOTE — PLAN OF CARE
Received pt at change of shift alert and oriented x4 sitting up in chair.  Denies pain.  States lower abdominal pain is mild since starting pyridium.  VSS.  Off pressors.  Up with walker to bathroom.  Voiding well.  No BM.  Wearing CPAP overnight.  Will get #5/5 plasmapheresis in AM.

## 2024-04-15 VITALS
OXYGEN SATURATION: 96 % | WEIGHT: 170.44 LBS | DIASTOLIC BLOOD PRESSURE: 60 MMHG | HEART RATE: 68 BPM | SYSTOLIC BLOOD PRESSURE: 107 MMHG | RESPIRATION RATE: 12 BRPM | TEMPERATURE: 98 F | BODY MASS INDEX: 28 KG/M2

## 2024-04-15 LAB
ANION GAP SERPL CALC-SCNC: 4 MMOL/L (ref 0–18)
BUN BLD-MCNC: 17 MG/DL (ref 9–23)
CALCIUM BLD-MCNC: 8.8 MG/DL (ref 8.5–10.1)
CHLORIDE SERPL-SCNC: 108 MMOL/L (ref 98–112)
CO2 SERPL-SCNC: 33 MMOL/L (ref 21–32)
CREAT BLD-MCNC: 0.88 MG/DL
EGFRCR SERPLBLD CKD-EPI 2021: 77 ML/MIN/1.73M2 (ref 60–?)
ERYTHROCYTE [DISTWIDTH] IN BLOOD BY AUTOMATED COUNT: 14.8 %
GLUCOSE BLD-MCNC: 117 MG/DL (ref 70–99)
GLUCOSE BLD-MCNC: 136 MG/DL (ref 70–99)
GLUCOSE BLD-MCNC: 180 MG/DL (ref 70–99)
HCT VFR BLD AUTO: 30.2 %
HGB BLD-MCNC: 9.6 G/DL
MCH RBC QN AUTO: 22.7 PG (ref 26–34)
MCHC RBC AUTO-ENTMCNC: 31.8 G/DL (ref 31–37)
MCV RBC AUTO: 71.6 FL
OSMOLALITY SERPL CALC.SUM OF ELEC: 306 MOSM/KG (ref 275–295)
PLATELET # BLD AUTO: 102 10(3)UL (ref 150–450)
PLATELETS.RETICULATED NFR BLD AUTO: 14.9 % (ref 0–7)
POTASSIUM SERPL-SCNC: 3.9 MMOL/L (ref 3.5–5.1)
RBC # BLD AUTO: 4.22 X10(6)UL
SODIUM SERPL-SCNC: 145 MMOL/L (ref 136–145)
WBC # BLD AUTO: 7.7 X10(3) UL (ref 4–11)

## 2024-04-15 PROCEDURE — 99233 SBSQ HOSP IP/OBS HIGH 50: CPT

## 2024-04-15 PROCEDURE — 99232 SBSQ HOSP IP/OBS MODERATE 35: CPT | Performed by: INTERNAL MEDICINE

## 2024-04-15 PROCEDURE — 99239 HOSP IP/OBS DSCHRG MGMT >30: CPT | Performed by: INTERNAL MEDICINE

## 2024-04-15 NOTE — DISCHARGE SUMMARY
[place] [facility]  DISCHARGE SUMMARY     Kat Crook Patient Status:  Inpatient    3/11/1970 MRN [id number]   Location [facility] 4SW-A Attending Jonny Roque MD   Hosp Day # 10 PCP Justin Huerta MD     Date of Admission: 2024  Date of Discharge:  4/15/2024     Discharge Disposition: Home    Discharge Diagnosis:  #MS Exacerbation s/p PLEX  #Abd pain, improved   #UTI, ruled out  #Non-obstructive nephrolithiasis  #Shock, suspect hypovolemic.  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  She was sent to the hospital for plasma exchange by her neurologist.  She has anaphylaxis to steroids and gets hypotensive to IVIG.  She has tolerated Plex in the past. She reports her symptoms have been on going since November and was told to come to the ER back then for admission but did not.  She reports that she has LE weakness and pain and has noted her speech is even slower than normal.     Brief Synopsis: Patient is a 55 yo female w/ MS who presented worsening diffuse weakness.  She was seen by neuro and sent to hospital for plasma exchange.  H/o DVT so no IVIG and reportedly has allergy to steroids.  She was admitted for further w/u neurology, nephrology were consulted.  IR placed temp cath for PLEX. During first treatment she developed hypotension which did not improved w/ fluid bolus.  She was t/f to ICU for pressor support and critical care was consulted.  Blood cx were obtained. Pt continued on hyperglycemia protocol per endo consult and off pump during this admission. Cortisol low.  ACTH w/ adequate stim and holding further steroids.  MRI brain without  acute pituitary abnormalities noted, stable plaques.  BP/bradycardia improved and remains off coreg/telmisartan.  Urology evaluated due to nephrolithiasis, no hydro and no further w/u needed.  Patient completed 5 days PLEX and temp cath removed.  PT/OT evaluated and SW coordinated C.  Dtr updated on dc planning.  Pt instructed to f/u neurology, urology, endocrinology, pulm and PCP in 1 week in as seen below.     Lace+ Score: 75  59-90 High Risk  29-58 Medium Risk  0-28   Low Risk       TCM Follow-Up Recommendation:  LACE > 58: High Risk of readmission after discharge from the hospital.      Procedures during hospitalization:  Clarissa Thapa MD  4/6/2024   Procedure: Temp dialysis catheter placement  Pre-Procedure Diagnosis:  Multiple sclerosis      Consultants:  Endocrinology, critical care, neurology, nephrology, urology       Discharge Medications        CONTINUE taking these medications        Instructions Prescription details   acetaminophen-codeine 300-60 MG Tabs  Commonly known as: TYLENOL #4      Take 1 tablet by mouth every 6 (six) hours as needed.   Refills: 0     albuterol 108 (90 Base) MCG/ACT Aers  Commonly known as: Ventolin HFA      Inhale 2 puffs into the lungs every 6 (six) hours as needed. PRN wheezing/SOB   Refills: 0     atorvastatin 20 MG Tabs  Commonly known as: Lipitor      Take 1 tablet (20 mg total) by mouth nightly.   Refills: 0     baclofen 10 MG Tabs  Commonly known as: Lioresal      Take 1 tablet (10 mg total) by mouth 3 (three) times daily.   Refills: 0     CeleBREX 100 MG Caps  Generic drug: celecoxib      Take 1 capsule (100 mg total) by mouth 2 (two) times daily.   Refills: 0     Cholecalciferol 125 MCG (5000 UT) Tabs  Commonly known as: VITAMIN D-3      Take 1 tablet (5,000 Units total) by mouth daily.   Refills: 0     Ciclopirox 8 % Soln      APPLY EVERY DAY   Refills: 0     denosumab 60 MG/ML Soln  Commonly known as: PROLIA      Inject 1 mL (60 mg total) into the skin every 6  (six) months.   Refills: 0     Dexcom G6 Sensor Misc      USE AS DIRECTED EVERY 10 DAYS   Quantity: 9 each  Refills: 3     Dexcom G6 Transmitter Misc      USE AS DIRECTED EVERY 90 DAYS   Quantity: 1 each  Refills: 3     docusate sodium 100 MG Caps  Commonly known as: Colace      Take 1 capsule (100 mg total) by mouth 2 (two) times daily.   Refills: 0     DULoxetine 60 MG Cpep  Commonly known as: Cymbalta      Take 1 capsule (60 mg total) by mouth 2 (two) times daily.   Refills: 0     EPINEPHrine 0.15 MG/0.3ML Soaj  Commonly known as: EpiPen Jr      Jani pen/lower dose because pt is allergic to epinephrine   Refills: 0     Farxiga 10 MG Tabs  Generic drug: dapagliflozin      Take 1 tablet (10 mg total) by mouth daily.   Refills: 0     Fasenra Pen 30 MG/ML Soaj  Generic drug: Benralizumab      Inject 30 mg into the skin. Every other month/every 8 weeks   Refills: 0     fluorometholone 0.1 % Susp  Commonly known as: FML Liquifilm       Refills: 0     fluticasone furoate-vilanterol 200-25 MCG/ACT Aepb  Commonly known as: Breo Ellipta      Inhale 1 puff into the lungs daily.   Refills: 0     fluticasone propionate 110 MCG/ACT Aero  Commonly known as: Flovent HFA      fluticasone propionate 110 mcg/actuation HFA aerosol inhaler, [RxNorm: 590520]   Refills: 0     HYDROcodone-acetaminophen  MG Tabs  Commonly known as: Norco      Take 1 tablet by mouth 4 (four) times daily as needed.   Refills: 0     insulin lispro 100 UNIT/ML Soln  Commonly known as: HumaLOG      INJECT UP TO 70 UNITS VIA INSULIN PUMP DAILY   Quantity: 70 mL  Refills: 2     levothyroxine 88 MCG Tabs  Commonly known as: Synthroid      Take 1 tablet (88 mcg total) by mouth before breakfast.   Refills: 0     Lyllana 0.025 MG/24HR Pttw  Generic drug: estradiol      Place 1 patch onto the skin twice a week.   Refills: 0     montelukast 10 MG Tabs  Commonly known as: Singulair      Take 1 tablet (10 mg total) by mouth nightly.   Refills: 0     NON  FORMULARY      Tandem insulin pump settings (Bolus IQ)  Gus 3.1 units/hr  0400 3.00 units/hr  0900 3.2 units/hr    Correction:   MN 1:35/110  1 PM 1:40  7 pm 1:35    CHO ratio:  MN 1:5g   Refills: 0     ondansetron 4 mg tablet  Commonly known as: Zofran      Take 1 tablet (4 mg total) by mouth 3 (three) times daily as needed.   Refills: 0     pantoprazole 20 MG Tbec  Commonly known as: Protonix      Take 1 tablet (20 mg total) by mouth 2 (two) times daily.   Refills: 0     pregabalin 200 MG Caps  Commonly known as: LYRICA      Take 1 capsule (200 mg total) by mouth 2 (two) times daily.   Refills: 0     progesterone 100 MG Caps  Commonly known as: Prometrium      Take 1 capsule (100 mg total) by mouth daily.   Refills: 0     thiamine 100 MG Tabs  Commonly known as: Vitamin B1      Take 1 tablet (100 mg total) by mouth daily.   Quantity: 30 tablet  Refills: 3     Vumerity 231 MG Cpdr  Generic drug: Diroximel Fumarate      Take 462 mg by mouth in the morning and 462 mg before bedtime.   Quantity: 120 capsule  Refills: 5            STOP taking these medications      carvedilol 3.125 MG Tabs  Commonly known as: Coreg        Telmisartan 20 MG Tabs                 [place]    Follow-up appointment:   Sarkis Beard MD  [address]  [facility] [address]  [place] [address]  [phone]    Schedule an appointment as soon as possible for a visit in 1 week(s)  For re-evaluation of kidney stone    Mary Ellen Andrew MD  [address]  [facility] [address]  [facility] [id number] [phone]    Follow up in 2 week(s)  Please follow up with your endocrinologist for follow up of cortisol and diabetes.    Raisa Hoyt MD  [address]  [facility] [address]  [place] [address]  [phone]    Follow up in 1 week(s)      Justin Huerta MD  [address] [facility] [id number] [phone]    Schedule an appointment as soon as possible for a visit in 1 week(s)      Rao Payne MD  [address] [facility] [address]  [facility]  [address]  [phone]    Follow up      Vital signs:  Temp:  [97.3 °F (36.3 °C)-98.3 °F (36.8 °C)] 97.8 °F (36.6 °C)  Pulse:  [59-82] 68  Resp:  [9-20] 12  BP: ()/(46-66) 107/60  SpO2:  [91 %-98 %] 96 %    Physical Exam:    General: No acute distress 54 year old female alert and oriented x3  Lungs: clear to auscultation on RA   Cardiovascular: S1, S2 RRR 70  Abdomen: Soft, nondistended  Extremities: ambulates w/ walker in hallway    -----------------------------------------------------------------------------------------------  PATIENT DISCHARGE INSTRUCTIONS: See electronic chart    CHRISTELLE You  2:41 PM     Addendum:    Agree with above note.  Pt. Seen and examined.    Gen: NAD  CVS: s1s2  Resp: CTA  Abd: soft    ##MS Exacerbation s/p PLEX  #Abd pain, improved   #UTI, ruled out  #Non-obstructive nephrolithiasis  #Shock, suspect hypovolemic.  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 Chart reviewed. Labs and imaging over the last 24 hours have been personally reviewed.  I personally made/approved 100% of the management plan for this patient and take full responsibility for the patient management.   Note has been reviewed by me and modified as needed.  Exam and Impression/ Recs as noted above.  D/w staff.    Jonny Roque MD            The 21st Century Cures Act makes medical notes like these available to patients in the interest of transparency. Please be advised this is a medical document. Medical documents are intended to carry relevant information, facts as evident, and the clinical opinion of the practitioner. The medical note is intended as peer to peer communication and may appear blunt or direct. It is written in medical language and may contain abbreviations or verbiage that are unfamiliar.

## 2024-04-15 NOTE — PROGRESS NOTES
[facility]  Nephrology Progress Note    Kat Crook Attending:  Tobi Thapa DO       Assessment and Plan:    1) MS exacerbation- completed PLEX x 5 . Remove central line if no further pheresis per neuro     2) Hypotension- mild volume depletion +/- meds (ie baclofen)- resolved. Midodrine dc'ed. Appropriate response to cosyntropin.     3) HTN- normally on telmisartan / carvediolol     4) DM 2    5) Fibromyalgia    6) h/o PE    7) ? Cystitis ? - cx neg; off abx. Suprapubic pain improved with pyridium; CT otherwise unimpressive      Subjective:  Awake alert in good spirits no issues overnight    Physical Exam:   /60 (BP Location: Left arm)   Pulse 64   Temp 98.1 °F (36.7 °C) (Temporal)   Resp 11   Wt 170 lb 6.7 oz (77.3 kg)   LMP  (LMP Unknown)   SpO2 97%   BMI 27.51 kg/m²   Temp (24hrs), Av.8 °F (36.6 °C), Min:97.3 °F (36.3 °C), Max:98.3 °F (36.8 °C)       Intake/Output Summary (Last 24 hours) at 4/15/2024 0942  Last data filed at 4/15/2024 0800  Gross per 24 hour   Intake 750 ml   Output 2025 ml   Net -1275 ml     Wt Readings from Last 3 Encounters:   04/15/24 170 lb 6.7 oz (77.3 kg)   24 168 lb (76.2 kg)   24 168 lb 6.4 oz (76.4 kg)     General: awake alert  HEENT: No scleral icterus, MMM  Neck: Supple, no REESE or thyromegaly  Cardiac: Regular rate and rhythm, S1, S2 normal, no murmur or tub  Lungs: Decreased BS at bases bilaterally   Abdomen: Soft, non-tender. + bowel sounds, no palpable organomegaly  Extremities: Without clubbing, cyanosis; no edema  Neurologic: Cranial nerves grossly intact, moving all extremities  Skin: Warm and dry, no rashes       Labs:   Lab Results   Component Value Date    WBC 7.7 04/15/2024    HGB 9.6 04/15/2024    HCT 30.2 04/15/2024    .0 04/15/2024    CREATSERUM 0.88 04/15/2024    BUN 17 04/15/2024     04/15/2024    K 3.9 04/15/2024     04/15/2024    CO2 33.0 04/15/2024     04/15/2024    CA 8.8 04/15/2024    PGLU 136  04/15/2024       Imaging:  All imaging studies reviewed.    Meds:   Current Facility-Administered Medications   Medication Dose Route Frequency    insulin degludec 100 units/mL flextouch 6 Units  6 Units Subcutaneous Nightly    docusate sodium (Colace) cap 100 mg  100 mg Oral BID    diphenhydrAMINE (Benadryl) cap/tab 25 mg  25 mg Oral Q6H PRN    enoxaparin (Lovenox) 40 MG/0.4ML SUBQ injection 40 mg  40 mg Subcutaneous Daily    midodrine (ProAmatine) tab 2.5 mg  2.5 mg Oral TID PRN    HYDROmorphone (Dilaudid) 1 MG/ML injection 0.2 mg  0.2 mg Intravenous Q2H PRN    Or    HYDROmorphone (Dilaudid) 1 MG/ML injection 0.4 mg  0.4 mg Intravenous Q2H PRN    Or    HYDROmorphone (Dilaudid) 1 MG/ML injection 0.8 mg  0.8 mg Intravenous Q2H PRN    fluticasone furoate-vilanterol (Breo Ellipta) 200-25 MCG/ACT inhaler 1 puff  1 puff Inhalation Daily    ondansetron (Zofran-ODT) disintegrating tab 4 mg  4 mg Oral Q6H PRN    Or    ondansetron (Zofran) 4 MG/2ML injection 4 mg  4 mg Intravenous Q6H PRN    albuterol (Ventolin HFA) 108 (90 Base) MCG/ACT inhaler 2 puff  2 puff Inhalation Q6H PRN    atorvastatin (Lipitor) tab 20 mg  20 mg Oral Nightly    baclofen (Lioresal) tab 10 mg  10 mg Oral TID    [Held by provider] carvedilol (Coreg) tab 3.125 mg  3.125 mg Oral BID    [Held by provider] Diroximel Fumarate CPDR 462 mg  462 mg Oral BID    DULoxetine (Cymbalta) DR cap 60 mg  60 mg Oral BID    HYDROcodone-acetaminophen (Norco)  MG per tab 1 tablet  1 tablet Oral QID PRN    levothyroxine (Synthroid) tab 88 mcg  88 mcg Oral Before breakfast    montelukast (Singulair) tab 10 mg  10 mg Oral Nightly    thiamine (Vitamin B1) tab 100 mg  100 mg Oral Daily    progesterone (Prometrium) cap 100 mg  100 mg Oral Daily    pregabalin (Lyrica) cap 200 mg  200 mg Oral BID    pantoprazole (Protonix) DR tab 20 mg  20 mg Oral BID    glucose (Dex4) 15 GM/59ML oral liquid 15 g  15 g Oral Q15 Min PRN    Or    glucose (Glutose) 40% oral gel 15 g  15 g Oral  Q15 Min PRN    Or    glucose-vitamin C (Dex-4) chewable tab 4 tablet  4 tablet Oral Q15 Min PRN    Or    dextrose 50% injection 50 mL  50 mL Intravenous Q15 Min PRN    Or    glucose (Dex4) 15 GM/59ML oral liquid 30 g  30 g Oral Q15 Min PRN    Or    glucose (Glutose) 40% oral gel 30 g  30 g Oral Q15 Min PRN    Or    glucose-vitamin C (Dex-4) chewable tab 8 tablet  8 tablet Oral Q15 Min PRN    acetaminophen (Tylenol Extra Strength) tab 500 mg  500 mg Oral Q4H PRN    prochlorperazine (Compazine) 10 MG/2ML injection 5 mg  5 mg Intravenous Q8H PRN    polyethylene glycol (PEG 3350) (Miralax) 17 g oral packet 17 g  17 g Oral Daily PRN    sennosides (Senokot) tab 17.2 mg  17.2 mg Oral Nightly PRN    bisacodyl (Dulcolax) 10 MG rectal suppository 10 mg  10 mg Rectal Daily PRN    fleet enema (Fleet) 7-19 GM/118ML rectal enema 133 mL  1 enema Rectal Once PRN    insulin aspart (NovoLOG) 100 Units/mL FlexPen 1-10 Units  1-10 Units Subcutaneous TID AC and HS    acetaminophen (Tylenol) 325 mg, codeine 60 mg for Tylenol #4 ([facility] only)   Oral Q6H PRN    heparin (Porcine) 100 Units/mL lock flush 500 Units  5 mL Intravenous PRN           Questions/concerns were discussed with patient and/or family by bedside.          Adrienne Ji MD  4/15/2024  943 AM

## 2024-04-15 NOTE — PROGRESS NOTES
ICU  Critical Care APRN Progress Note    NAME: Kat Crook - ROOM: [address] [place] [address] - MRN: [id number] - Age: 54 year old - :3/11/1970    Subjective:  Completed last PLEX yesterday.  No issues overnight. Hoping to go home        Current Facility-Administered Medications   Medication Dose Route Frequency    insulin degludec 100 units/mL flextouch 6 Units  6 Units Subcutaneous Nightly    docusate sodium (Colace) cap 100 mg  100 mg Oral BID    diphenhydrAMINE (Benadryl) cap/tab 25 mg  25 mg Oral Q6H PRN    norepinephrine (Levophed) 4 mg/250mL infusion premix  0.5-30 mcg/min Intravenous Continuous    enoxaparin (Lovenox) 40 MG/0.4ML SUBQ injection 40 mg  40 mg Subcutaneous Daily    midodrine (ProAmatine) tab 2.5 mg  2.5 mg Oral TID PRN    HYDROmorphone (Dilaudid) 1 MG/ML injection 0.2 mg  0.2 mg Intravenous Q2H PRN    Or    HYDROmorphone (Dilaudid) 1 MG/ML injection 0.4 mg  0.4 mg Intravenous Q2H PRN    Or    HYDROmorphone (Dilaudid) 1 MG/ML injection 0.8 mg  0.8 mg Intravenous Q2H PRN    fluticasone furoate-vilanterol (Breo Ellipta) 200-25 MCG/ACT inhaler 1 puff  1 puff Inhalation Daily    ondansetron (Zofran-ODT) disintegrating tab 4 mg  4 mg Oral Q6H PRN    Or    ondansetron (Zofran) 4 MG/2ML injection 4 mg  4 mg Intravenous Q6H PRN    albuterol (Ventolin HFA) 108 (90 Base) MCG/ACT inhaler 2 puff  2 puff Inhalation Q6H PRN    atorvastatin (Lipitor) tab 20 mg  20 mg Oral Nightly    baclofen (Lioresal) tab 10 mg  10 mg Oral TID    [Held by provider] carvedilol (Coreg) tab 3.125 mg  3.125 mg Oral BID    [Held by provider] Diroximel Fumarate CPDR 462 mg  462 mg Oral BID    DULoxetine (Cymbalta) DR cap 60 mg  60 mg Oral BID    HYDROcodone-acetaminophen (Norco)  MG per tab 1 tablet  1 tablet Oral QID PRN    levothyroxine (Synthroid) tab 88 mcg  88 mcg Oral Before breakfast    montelukast (Singulair) tab 10 mg  10 mg Oral Nightly    thiamine (Vitamin B1) tab 100 mg  100 mg Oral Daily    progesterone (Prometrium) cap  100 mg  100 mg Oral Daily    pregabalin (Lyrica) cap 200 mg  200 mg Oral BID    pantoprazole (Protonix) DR tab 20 mg  20 mg Oral BID    glucose (Dex4) 15 GM/59ML oral liquid 15 g  15 g Oral Q15 Min PRN    Or    glucose (Glutose) 40% oral gel 15 g  15 g Oral Q15 Min PRN    Or    glucose-vitamin C (Dex-4) chewable tab 4 tablet  4 tablet Oral Q15 Min PRN    Or    dextrose 50% injection 50 mL  50 mL Intravenous Q15 Min PRN    Or    glucose (Dex4) 15 GM/59ML oral liquid 30 g  30 g Oral Q15 Min PRN    Or    glucose (Glutose) 40% oral gel 30 g  30 g Oral Q15 Min PRN    Or    glucose-vitamin C (Dex-4) chewable tab 8 tablet  8 tablet Oral Q15 Min PRN    acetaminophen (Tylenol Extra Strength) tab 500 mg  500 mg Oral Q4H PRN    prochlorperazine (Compazine) 10 MG/2ML injection 5 mg  5 mg Intravenous Q8H PRN    polyethylene glycol (PEG 3350) (Miralax) 17 g oral packet 17 g  17 g Oral Daily PRN    sennosides (Senokot) tab 17.2 mg  17.2 mg Oral Nightly PRN    bisacodyl (Dulcolax) 10 MG rectal suppository 10 mg  10 mg Rectal Daily PRN    fleet enema (Fleet) 7-19 GM/118ML rectal enema 133 mL  1 enema Rectal Once PRN    insulin aspart (NovoLOG) 100 Units/mL FlexPen 1-10 Units  1-10 Units Subcutaneous TID AC and HS    acetaminophen (Tylenol) 325 mg, codeine 60 mg for Tylenol #4 ([facility] only)   Oral Q6H PRN    heparin (Porcine) 100 Units/mL lock flush 500 Units  5 mL Intravenous PRN       OBJECTIVE  Vitals:  /66   Pulse 60   Temp 97.9 °F (36.6 °C) (Temporal)   Resp 10   Wt 170 lb 6.7 oz (77.3 kg)   LMP  (LMP Unknown)   SpO2 98%   BMI 27.51 kg/m²             O2: RA    Physical Exam:    General Appearance: Alert, cooperative, follows commands  Neck: No JVD, neck supple, no adenopathy, trachea midline, no carotid bruits  Lungs: Clear to auscultation bilaterally, respirations unlabored  Heart: Regular rate and rhythm, S1 and S2 normal, no murmur, rub or gallop  Abdomen: Soft, non-tender, bowel sounds active all four quadrants, no  masses, no organomegaly  Extremities: Atraumatic, no cyanosis or edema, capillary refill <3 sec.    Pulses: 2+ and symmetric all extremities  Skin: Skin color, texture, turgor normal for ethnicity, no rashes or lesions, warm and dry  Neurologic: CNII-XII intact, strength and weakness improved per patient    Data this admission:  No results found.      Labs:  Lab Results   Component Value Date    WBC 7.7 04/15/2024    HGB 9.6 04/15/2024    HCT 30.2 04/15/2024    .0 04/15/2024    CREATSERUM 0.88 04/15/2024    BUN 17 04/15/2024     04/15/2024    K 3.9 04/15/2024     04/15/2024    CO2 33.0 04/15/2024     04/15/2024    CA 8.8 04/15/2024     Assessment/Plan:    Shock: Likely hypovolemic given improvement with hydration. Less likely septic. Now, episodes occur post PLEX  -Hypotensive with PLEX initiation and now post PLEX yesterday  -PCT negative  -LA normal  -cortisol 1.2, repeat 1.4 (allergy to steroids). Normal response to cosyntropin  -UA positive, cx negative  -Vasopressors to maintain SBP above 90 and MAP above 65-now off  -Blood cultures NGTD, UA negative  -Rocephin (4/12-4/14)    MS exacerbation with LE weakness  -Weakness improved this morning, numbness/tingling minimal when ambulating  -Allergy to steroids, unable to have IVIG 2/2 DVTs  -PLEX x 5 days completed 4/14  -Neuro following  -Renal following for PLEX  -MRI brain with stable demyelinating plaques  -PT/OT-ambulating    Right flank, abd pain--> resolved  -CT reveals right renal stone and possible cystitis  -Per uro- stable right renal calculus, not contributing to pain  -started on pyridium  -Urology consulted, antbx started  -Urine cx negative  -PRN pain medications    Chest, right neck and epigastric pain--> resolved  -improved with Norco  -EKG with SB, TWIs in V1-V2 unchanged from prior  -Troponin negative  -TTE with EF 60-65%, NRWMAs  -PPI  -monitor    Bradycardia  -HR  remains in high 50s-low 60s (prior days as low as  40s)  -Sinus homar on EKG  -Holding coreg  -TTE unremarkable  -monitor on tele    Thrombocytopenia: Noted with initiation of PLEX  -Plts remain low, but stable  -4T score low, less likely HIT  -No signs of bleeding  -Monitor daily CBC    DM2  -A1c 7.3  -Insulin pump and home PO meds on hold  -Accu-check AC and HS   - ISS  -Treseba nightly    Hypothyroidism         -TSH with reflex T4 low         - home Synthroid    HTN        -Hold meds while hypotensive        -monitor on tele    F/E/N:    -Follow lytes and replete prn  -Carb control diet    Proph:  -SQ Lovenox  -PT/OT  -SCD    Dispo:     Code Status: Full Code  -stable to transfer to floor vs dc home when ok with neuro    Plan of care discussed with intensivist on-call, Dr. William Hernandez, [facility]  ICU  Phone  [phone]   Pager [phone]    Pulmonary Critical Care Physician ADDENDUM     I have personally seen and examined the patient.  I have reviewed and agree with Lillie MORA's documentation with the following highlights/changes.  No acute events overnight.  She states that she feels well, wants to go home.  Stable from pulmonary perspective for discharge home.  Pt to follow up with Dr. Hoyt.       Lori MAN

## 2024-04-15 NOTE — PHYSICAL THERAPY NOTE
PHYSICAL THERAPY TREATMENT NOTE - INPATIENT    Room Number: 467/467-A     Session: 4  Number of Visits to Meet Established Goals: 5    Presenting Problem: MS exacerbation, weakness  Co-Morbidities : MS, fibromyalgia, DVT, HLD, type 1 DM    ASSESSMENT   Patient demonstrates good  progress this session as she is able to complete bed mobility, transfers, and ambulation with modified independence-supervision.  Pt able to initiate and complete step with CGA-SBA.  Thus all inpatient goals   met .     Patient continues to benefit from continued skilled PT services: at discharge to promote functional independence and safety with additional support and return home with home health PT.    PLAN  Patient has been evaluated and presents with no inpatient physical therapy needs.  Patient discharged from inpatient physical therapy services at this time.   Please reconsult if there is a change in status that would necessitate PT services.    CURRENT GOALS      Goal #1 Patient is able to demonstrate supine - sit EOB @ level: supervision   MET   Goal #2 Patient is able to demonstrate transfers Sit to/from Stand at assistance level: supervision  MET    Goal #3 Patient is able to ambulate 50 feet with assist device: walker - rolling at assistance level: supervision  MET 4/15   Goal #4 Pt able to ascend/descend 1 step with rail with CGA    MET 4/15   Goal #5     Goal #6     Goal Comments: Goals established on 2024 all goals updated    SUBJECTIVE  Pt reports she is hoping to discharge home today.    OBJECTIVE  Precautions: Other (Comment) (h/o  LLE footdrop)    WEIGHT BEARING RESTRICTION  Weight Bearing Restriction: None                PAIN ASSESSMENT   Ratin  Location: lower abdominal  Management Techniques: Repositioning    BALANCE                                                                                                                       Static Sitting: Good  Dynamic Sitting: Good           Static  Standing: Fair  Dynamic Standing: Fair -    ADDITIONAL TEST  Gait Speed=.21 m/s  Minimal Clinical Important Difference in Older Adults: 0.05-0.12m/s (Rajendra et al., 2016)    Age 70-79 Comfortable Gait Speed: (Abhilash Haddad & Young, 2013):  Men=1.13 m/s  Women=1.26 m/s  Predictor of Poor clinical outcomes=.8 m/s  Predictor of further functional decline in already impaired individuals= .6 m/s        ACTIVITY TOLERANCE   HR at rest=66 bpm, HR with activity=80 bpm         VK=497/66  BP Location: Left arm  BP Method: Automatic  Patient Position: Standing    SPO2=95%    AM-PAC '6-Clicks' INPATIENT SHORT FORM - BASIC MOBILITY  How much difficulty does the patient currently have...  Patient Difficulty: Turning over in bed (including adjusting bedclothes, sheets and blankets)?: None   Patient Difficulty: Sitting down on and standing up from a chair with arms (e.g., wheelchair, bedside commode, etc.): None   Patient Difficulty: Moving from lying on back to sitting on the side of the bed?: None   How much help from another person does the patient currently need...   Help from Another: Moving to and from a bed to a chair (including a wheelchair)?: None   Help from Another: Need to walk in hospital room?: A Little   Help from Another: Climbing 3-5 steps with a railing?: A Little       AM-PAC Score:  Raw Score: 22   Approx Degree of Impairment: 20.91%   Standardized Score (AM-PAC Scale): 53.28   CMS Modifier (G-Code): CJ    FUNCTIONAL ABILITY STATUS  Gait Assessment   Functional Mobility/Gait Assessment  Gait Assistance: Supervision  Distance (ft): 160  Assistive Device: Rolling walker  Pattern:  (intermittent L foot drag)    Skilled Therapy Provided    Bed Mobility:  Rolling: indep   Supine>Sit: indep  Sitting balance: able to reach to floor  Donning gym shoes/ties: indep     Transfer Mobility:  Sit>Stand: mod I   Stand>Sit: mod I  Gait: 160ft with RW SBA, step to pattern, increased time with LLE advancement, able to clear floor  with compensatory techniques, requires standing breaks, pt able to self-select appropriately  Stoop:  Pt ascended/descended stoop step with use of rolling walker x2 reps with a step-to pattern with CGA.    Education: energy conservation techniques, continued use of RW, pacing    Patient End of Session: Up in chair;Needs met;Call light within reach;RN aware of session/findings;All patient questions and concerns addressed    PT Session Time: 30 minutes  Gait Trainin minutes  Therapeutic Activity: 5 minutes

## 2024-04-15 NOTE — PROGRESS NOTES
[facility]   part of [facility]     Hospitalist Progress Note     Kat Crook Patient Status:  Inpatient    3/11/1970 MRN [id number]   Location [facility] 4SW-A Attending Elier, Alvaro Woods, *   Hosp Day # 10 PCP Justin Huerta MD     Chief Complaint: Weakness    Subjective:     Pt without acute events ovenright. FInished PLEX and overall feels better. BP have been stable. Hopes to go home.     Objective:    Review of Systems:   A comprehensive review of systems was completed; pertinent positive and negatives stated in subjective.    Vital signs:  Temp:  [97.3 °F (36.3 °C)-98.3 °F (36.8 °C)] 98.1 °F (36.7 °C)  Pulse:  [59-82] 64  Resp:  [9-16] 11  BP: ()/(46-66) 120/60  SpO2:  [92 %-99 %] 97 %    Physical Exam:    General: No acute distress  Respiratory: no wheezes, no rhonchi  Cardiovascular: S1, S2, regular rate and rhythm  Abdomen: Soft, mild ttp in suprapubic region, non-distended, positive bowel sounds  Neuro: No new focal deficits.   Extremities: no edema    Diagnostic Data:    Labs:  Recent Labs   Lab 04/10/24  0609 24  0446 24  04324  0404 04/15/24  0452   WBC 6.5 8.2 7.2 7.8 7.0 7.7   HGB 10.4* 10.7* 10.6* 10.6* 9.8* 9.6*   MCV 73.2* 72.8* 69.8* 71.6* 71.5* 71.6*   .0* 106.0* 105.0* 102.0* 100.0* 102.0*   INR 1.08  --   --   --   --   --        Recent Labs   Lab 24  0552 04/10/24  0609 24  0446 24  0432 24  0449 24  0404 04/15/24  0452   * 161* 154*   < > 247* 118* 180*   BUN 12 14 14   < > 15 15 17   CREATSERUM 0.89 0.79 0.92   < > 1.06* 1.01 0.88   CA 8.5 8.8 8.7   < > 8.8 8.4* 8.8   ALB 4.0 3.9 4.2  --   --   --   --     142 145   < > 142 144 145   K 4.3 3.9 4.0   < > 4.3 3.8 3.9    108 110   < > 108 109 108   CO2 31.0 33.0* 32.0   < > 33.0* 33.0* 33.0*   ALKPHO 23* 35* 24*  --   --   --   --    AST 7* 8* 11*  --   --   --   --    ALT 23 32 17  --   --   --   --    BILT 0.4 0.3 0.3   --   --   --   --    TP 4.9* 5.4* 5.2*  --   --   --   --     < > = values in this interval not displayed.       Estimated Creatinine Clearance: 66.8 mL/min (based on SCr of 0.88 mg/dL).    Recent Labs   Lab 04/09/24  0912   TROPHS 9       Recent Labs   Lab 04/10/24  0609   PTP 14.0   INR 1.08                  Microbiology    Hospital Encounter on 04/05/24   1. Urine Culture, Routine     Status: None    Collection Time: 04/12/24  5:02 AM    Specimen: Urine, clean catch   Result Value Ref Range    Urine Culture No Growth at 18-24 hrs. N/A   2. Blood Culture     Status: None    Collection Time: 04/07/24  1:05 AM    Specimen: Bld,Port a cath Line; Blood   Result Value Ref Range    Blood Culture Result No Growth 5 Days N/A         Imaging: Reviewed in Epic.    Medications:    insulin degludec  6 Units Subcutaneous Nightly    docusate sodium  100 mg Oral BID    enoxaparin  40 mg Subcutaneous Daily    fluticasone furoate-vilanterol  1 puff Inhalation Daily    atorvastatin  20 mg Oral Nightly    baclofen  10 mg Oral TID    [Held by provider] carvedilol  3.125 mg Oral BID    [Held by provider] Diroximel Fumarate  462 mg Oral BID    DULoxetine  60 mg Oral BID    levothyroxine  88 mcg Oral Before breakfast    montelukast  10 mg Oral Nightly    thiamine  100 mg Oral Daily    progesterone  100 mg Oral Daily    pregabalin  200 mg Oral BID    pantoprazole  20 mg Oral BID    insulin aspart  1-10 Units Subcutaneous TID AC and HS       Assessment & Plan:      #MS Exacerbation  -Anaphylaxis to steroids and unable to tolerate IVIG due to hx of DVT  -completed PLEX  -Renal and neuro following  -On Vumerity and Fasenra outpatient  -Pain control     # Abd pain, improved   # UTI, ruled out   - UA positive, f/u Ucx, NGTD - abx stopped   - CT A/P with stable 5mm right non-obstructive calculus   - Urology consult - no acute surgical intervention    #Shock, suspect hypovolemic.  Resolved  -Off levophed, midodrine  -PCT negative, blood  cultures (NGTD)  -Critical care following  -AM cortisol low x2; endocrinology consulted  - ACTH stim with adequate adrenal stimulation; holding off steroids per endocrine   -Initial ACTH level low, pending pituitary w/u for primary adrenal insufficiency; MRI Brain without acute pituitary abnormalities noted, stable plaques     #DM type II with neuropathy  -A1c is 7.3  -Hyperglycemia protocol  -On insulin pump PTA - transitioned to basal bolus insulin  -Lyrica     #Hypothyroidism  -Levothyroxine     #Hypertension  -Resume home meds but holding coreg due to bradycardia     #Dyslipidemia  -Statin     #GERD  -PPI     #Hx of GIORDANO  -LFTs normalized      #Thrombocytopenia    - Does have GIORDANO and low platelets in the past  -Monitor     #Bradycardia  -Hold coreg  -Echo similar to prior, EF 60-65%     #Alpha thalassemia minor - monitor hgb  #H/o PE in 2021 - not on anticoagulation  #Fibromyalgia - continue home meds  #Asthma - resume home inhaler and singulair       Jonny Roque MD      Supplementary Documentation:     Quality:  DVT Mechanical Prophylaxis:   SCDs, Early ambuation  DVT Pharmacologic Prophylaxis   Medication    enoxaparin (Lovenox) 40 MG/0.4ML SUBQ injection 40 mg    heparin (Porcine) 100 Units/mL lock flush 500 Units                Code Status: Full Code  Panda: No urinary catheter in place  Panda Duration (in days):   Central line:    KAYLEY: 4/15/2024    The 21st Century Cures Act makes medical notes like these available to patients in the interest of transparency. Please be advised this is a medical document. Medical documents are intended to carry relevant information, facts as evident, and the clinical opinion of the practitioner. The medical note is intended as peer to peer communication and may appear blunt or direct. It is written in medical language and may contain abbreviations or verbiage that are unfamiliar.

## 2024-04-15 NOTE — PLAN OF CARE
Received pt at change of shift alert and oriented x4.  Pt denied pain.  Still notes numbness and tingling in hands and feet but states it is much less.  Up to bathroom with walker.  Voiding well.  Cpap on overnight.  VSS.  Afeb.  BP remained >90.  Pt hoping to go home today.

## 2024-04-15 NOTE — PROGRESS NOTES
[facility]  [facility] Neurology Progress Note    Kat Crook Patient Status:  Inpatient    3/11/1970 MRN [id number]   Location [facility] 4SW-A Attending Jonny Roque MD   Hosp Day # 10 PCP Justin Huerta MD     CC: Multiple sclerosis flare, weakness     Subjective:  Kat Crook is a 54-year-old female with history of multiple sclerosis. She was admitted to the emergency room for multiple sclerosis exacerbation. Started on plasma exchange for total of 5 exchanges. Nephrology was consulted for assistance with facilitating this. Patient has an allergy to methylprednisolone and history of DVT so no IV steroids or IVIG could be used.    Seen for a follow up visit today. Last PLEX cycle was completed yesterday, . Tolerated well. Reports much improved, did steps with PT today. No acute events overnight. Denies any new weakness, vision changes or paresthesias.       MEDICATIONS:  No current outpatient medications on file.     Current Facility-Administered Medications   Medication Dose Route Frequency    insulin degludec 100 units/mL flextouch 6 Units  6 Units Subcutaneous Nightly    docusate sodium (Colace) cap 100 mg  100 mg Oral BID    diphenhydrAMINE (Benadryl) cap/tab 25 mg  25 mg Oral Q6H PRN    enoxaparin (Lovenox) 40 MG/0.4ML SUBQ injection 40 mg  40 mg Subcutaneous Daily    midodrine (ProAmatine) tab 2.5 mg  2.5 mg Oral TID PRN    HYDROmorphone (Dilaudid) 1 MG/ML injection 0.2 mg  0.2 mg Intravenous Q2H PRN    Or    HYDROmorphone (Dilaudid) 1 MG/ML injection 0.4 mg  0.4 mg Intravenous Q2H PRN    Or    HYDROmorphone (Dilaudid) 1 MG/ML injection 0.8 mg  0.8 mg Intravenous Q2H PRN    fluticasone furoate-vilanterol (Breo Ellipta) 200-25 MCG/ACT inhaler 1 puff  1 puff Inhalation Daily    ondansetron (Zofran-ODT) disintegrating tab 4 mg  4 mg Oral Q6H PRN    Or    ondansetron (Zofran) 4 MG/2ML injection 4 mg  4 mg Intravenous Q6H PRN    albuterol (Ventolin HFA) 108 (90 Base) MCG/ACT inhaler 2  puff  2 puff Inhalation Q6H PRN    atorvastatin (Lipitor) tab 20 mg  20 mg Oral Nightly    baclofen (Lioresal) tab 10 mg  10 mg Oral TID    [Held by provider] carvedilol (Coreg) tab 3.125 mg  3.125 mg Oral BID    [Held by provider] Diroximel Fumarate CPDR 462 mg  462 mg Oral BID    DULoxetine (Cymbalta) DR cap 60 mg  60 mg Oral BID    HYDROcodone-acetaminophen (Norco)  MG per tab 1 tablet  1 tablet Oral QID PRN    levothyroxine (Synthroid) tab 88 mcg  88 mcg Oral Before breakfast    montelukast (Singulair) tab 10 mg  10 mg Oral Nightly    thiamine (Vitamin B1) tab 100 mg  100 mg Oral Daily    progesterone (Prometrium) cap 100 mg  100 mg Oral Daily    pregabalin (Lyrica) cap 200 mg  200 mg Oral BID    pantoprazole (Protonix) DR tab 20 mg  20 mg Oral BID    glucose (Dex4) 15 GM/59ML oral liquid 15 g  15 g Oral Q15 Min PRN    Or    glucose (Glutose) 40% oral gel 15 g  15 g Oral Q15 Min PRN    Or    glucose-vitamin C (Dex-4) chewable tab 4 tablet  4 tablet Oral Q15 Min PRN    Or    dextrose 50% injection 50 mL  50 mL Intravenous Q15 Min PRN    Or    glucose (Dex4) 15 GM/59ML oral liquid 30 g  30 g Oral Q15 Min PRN    Or    glucose (Glutose) 40% oral gel 30 g  30 g Oral Q15 Min PRN    Or    glucose-vitamin C (Dex-4) chewable tab 8 tablet  8 tablet Oral Q15 Min PRN    acetaminophen (Tylenol Extra Strength) tab 500 mg  500 mg Oral Q4H PRN    prochlorperazine (Compazine) 10 MG/2ML injection 5 mg  5 mg Intravenous Q8H PRN    polyethylene glycol (PEG 3350) (Miralax) 17 g oral packet 17 g  17 g Oral Daily PRN    sennosides (Senokot) tab 17.2 mg  17.2 mg Oral Nightly PRN    bisacodyl (Dulcolax) 10 MG rectal suppository 10 mg  10 mg Rectal Daily PRN    fleet enema (Fleet) 7-19 GM/118ML rectal enema 133 mL  1 enema Rectal Once PRN    insulin aspart (NovoLOG) 100 Units/mL FlexPen 1-10 Units  1-10 Units Subcutaneous TID AC and HS    acetaminophen (Tylenol) 325 mg, codeine 60 mg for Tylenol #4 ([facility] only)   Oral Q6H PRN     heparin (Porcine) 100 Units/mL lock flush 500 Units  5 mL Intravenous PRN       REVIEW OF SYSTEMS:  A 10-point system was reviewed.  Pertinent positives and negatives are noted in HPI.      PHYSICAL EXAMINATION:  VITAL SIGNS: /60 (BP Location: Left arm)   Pulse 64   Temp 98.1 °F (36.7 °C) (Temporal)   Resp 11   Wt 170 lb 6.7 oz (77.3 kg)   LMP  (LMP Unknown)   SpO2 97%   BMI 27.51 kg/m²   GENERAL:  Patient is a 54 year old female in no acute distress.  HEENT:  Normocephalic, atraumatic  ABD: Soft, non tender  SKIN: Warm, dry, no rashes    NEUROLOGICAL:   Mental status: Oriented to person, place, and time   Speech: Fluent, no dysarthria  Memory and comprehension: Intact   Cranial Nerves: VFF, PERRL 3mm brisk, EOMI, no nystagmus, facial sensation intact, face symmetric, tongue midline, shoulder shrug equal, remainder CN intact  Motor: No drift, no focal arm or leg weakness.  Motor strength is 5 or close to 5 out of 5 in all extremities bilaterally. Reports mild weakness when on feet for long  Sensory: Intact to light touch.  Some minimal numbness intermittently to LEs.   Coordination: FTN intact  Gait: Deferred      Imaging/Diagnostics:  MRI BRAIN+PITUITARY (W+WO)(CPT=70553)    Result Date: 4/13/2024  CONCLUSION:   1. Overall mild to moderate burden of demyelinating plaques is stable.  2. No enhancing lesions.  3. No suspicious lesions in the pituitary gland.  Diaphragma sella is unremarkable.  No mass effect.    LOCATION:  [facility]   Dictated by (CST): Justin Wharton MD on 4/13/2024 at 2:35 PM     Finalized by (CST): Justin Wharton MD on 4/13/2024 at 2:37 PM       XR CHEST AP PORTABLE  (CPT=71045)    Result Date: 4/12/2024  CONCLUSION:  There is interval placement of a right temporary hemodialysis catheter.  There is no evidence of complication.  There is otherwise no evidence of active cardiopulmonary disease.   LOCATION:  [facility]      Dictated by (CST): Kamar Lopez MD on 4/12/2024 at 11:06 AM      Finalized by (CST): Kamar Lopez MD on 4/12/2024 at 11:07 AM       CT ABDOMEN+PELVIS KIDNEYSTONE 2D RNDR(NO IV,NO ORAL)(CPT=74176)    Result Date: 4/11/2024  CONCLUSION:  1. Mild urinary bladder wall thickening, correlate for cystitis. 2. Nonobstructing 5 mm right renal calculus.  No hydronephrosis. Please see above for further details.    LOCATION:  [facility]   Dictated by (CST): Elier Romo MD on 4/11/2024 at 11:05 PM     Finalized by (CST): Elier Romo MD on 4/11/2024 at 11:12 PM       IR CENTRAL VENOUS ACCESS    Result Date: 4/6/2024  CONCLUSION: Successful placement of a temporary dialysis catheter via right internal jugular vein. Catheter is ready for use.  Recommend routine catheter care.   LOCATION:  [facility]    Dictated by (CST): Elier Romo MD on 4/06/2024 at 11:52 AM     Finalized by (CST): Elier Romo MD on 4/06/2024 at 11:53 AM          Labs:  Recent Labs   Lab 04/12/24  0432 04/13/24  0449 04/14/24  0404 04/15/24  0452   RBC 4.63 4.75 4.32 4.22   HGB 10.6* 10.6* 9.8* 9.6*   HCT 32.3* 34.0* 30.9* 30.2*   MCV 69.8* 71.6* 71.5* 71.6*   MCH 22.9* 22.3* 22.7* 22.7*   MCHC 32.8 31.2 31.7 31.8   RDW 14.6 14.7 15.0 14.8   NEPRELIM 3.76 5.69 3.72  --    WBC 7.2 7.8 7.0 7.7   .0* 102.0* 100.0* 102.0*         Recent Labs   Lab 04/13/24  0449 04/14/24  0404 04/15/24  0452   * 118* 180*   BUN 15 15 17   CREATSERUM 1.06* 1.01 0.88   EGFRCR 62 65 77   CA 8.8 8.4* 8.8    144 145   K 4.3 3.8 3.9    109 108   CO2 33.0* 33.0* 33.0*       Assessment/Plan:    A 54 year old female with:     MS exacerbation with LEs weakness - in a setting of allergies to steroids and unable to have IVIG 2/2 hx of DVTs. Plan for PLEX x 5 cycles every other day as per her primary neurologist Dr. Hernandez's recommendations. LEs weakness about the same today.  Nephrology on consult to help facilitate the PLEX treatments  Plasmapheresis treatements , session 5/5 was on Sunday 4/14.   PT/OT - home health PT  recommended.  Safety and falls precautions  Hypotension, likely secondary to hypovolemic shock, resolved - stable, monitored closely by critical care. Blood cultures- no growth   Hypokalemia and hypomagnesemia - resolved.  Nephrology managing.  Pain to feet bilaterally - history of DM2 with neuropathy, A1c is 7.3. Continue Lyrica. Feeling some improvement.  Discussed with patient, discussed with dr. Tiwari. May discharge home after central line removed. Monitor closely for bleeding post removal as patient has history of prolonged bleeding from lines before. To follow up with Dr. Alvarez in 2-4 weks.   Is this a shared or split note between Advanced Practice Provider and Physician? No       Aimee MENENDEZ  [facility]  4/15/2024, 9:25 AM   Premium # [id number]

## 2024-04-15 NOTE — PLAN OF CARE
Received pt at shift change from night nurse. Pt alert and oriented x4 and able to follow commands and move all extremities. Pt afebrile, on room air, VSS. Pt does have complaints of pain, but she denies all pain medication. Pt tolerating diet, up with assistance to the restroom. Pt cleared to discharge home. Discharged from hospital with daughter at 1605.  Problem: Patient/Family Goals  Goal: Patient/Family Long Term Goal  Description: Patient's Long Term Goal: Discharge home    Interventions:  - Follow up with discharge instructions.   - See additional Care Plan goals for specific interventions  Outcome: Adequate for Discharge  Goal: Patient/Family Short Term Goal  Description: Patient's Short Term Goal: Safety    Interventions:   - Remain free from falls  - See additional Care Plan goals for specific interventions  Outcome: Adequate for Discharge     Problem: Diabetes/Glucose Control  Goal: Glucose maintained within prescribed range  Description: INTERVENTIONS:  - Monitor Blood Glucose as ordered  - Assess for signs and symptoms of hyperglycemia and hypoglycemia  - Administer ordered medications to maintain glucose within target range  - Assess barriers to adequate nutritional intake and initiate nutrition consult as needed  - Instruct patient on self management of diabetes  Outcome: Adequate for Discharge     Problem: METABOLIC/FLUID AND ELECTROLYTES - ADULT  Goal: Electrolytes maintained within normal limits  Description: INTERVENTIONS:  - Monitor labs and rhythm and assess patient for signs and symptoms of electrolyte imbalances  - Administer electrolyte replacement as ordered  - Monitor response to electrolyte replacements, including rhythm and repeat lab results as appropriate  - Fluid restriction as ordered  - Instruct patient on fluid and nutrition restrictions as appropriate  Outcome: Adequate for Discharge  Goal: Hemodynamic stability and optimal renal function maintained  Description: INTERVENTIONS:  -  Monitor labs and assess for signs and symptoms of volume excess or deficit  - Monitor intake, output and patient weight  - Monitor urine specific gravity, serum osmolarity and serum sodium as indicated or ordered  - Monitor response to interventions for patient's volume status, including labs, urine output, blood pressure (other measures as available)  - Encourage oral intake as appropriate  - Instruct patient on fluid and nutrition restrictions as appropriate  Outcome: Adequate for Discharge     Problem: Impaired Functional Mobility  Goal: Achieve highest/safest level of mobility/gait  Description: Interventions:  - Assess patient's functional ability and stability  - Promote increasing activity/tolerance for mobility and gait  - Educate and engage patient/family in tolerated activity level and precautions  - Recommend use of  RW for transfers and ambulation  Outcome: Adequate for Discharge     Problem: Impaired Activities of Daily Living  Goal: Achieve highest/safest level of independence in self care  Description: Interventions:  - Assess ability and encourage patient to participate in ADLs to maximize function  - Promote sitting position while performing ADLs such as feeding, grooming, and bathing  - Educate and encourage patient/family in tolerated functional activity level and precautions during self-care    Outcome: Adequate for Discharge

## 2024-04-17 ENCOUNTER — PATIENT OUTREACH (OUTPATIENT)
Dept: CASE MANAGEMENT | Age: 54
End: 2024-04-17

## 2024-04-17 NOTE — PROGRESS NOTES
Hospital follow up.    Rao Payne MD  Neurology  [address] [place] [address]  [phone]  Wednesday 5/15 @1  W/ CHRISTELLE Anderson    Confirmed with patient.    Closing encounter.

## 2024-04-20 LAB — Lab: 0.07 UG/DL

## 2024-04-22 ENCOUNTER — CASE MANAGEMENT (OUTPATIENT)
Dept: CARE COORDINATION | Age: 54
End: 2024-04-22

## 2024-04-22 ASSESSMENT — ACTIVITIES OF DAILY LIVING (ADL): DME_IN_USE: WALKER;CANE

## 2024-04-22 ASSESSMENT — SLEEP AND FATIGUE QUESTIONNAIRES: SLEEP DESCRIPTORS: WDL (ABSENCE OF SYMPTOMS)

## 2024-04-25 LAB
INDEX VALUE: 1.22
JCV ANTIBODY: POSITIVE

## 2024-05-02 ENCOUNTER — TELEPHONE (OUTPATIENT)
Dept: NEUROLOGY | Facility: CLINIC | Age: 54
End: 2024-05-02

## 2024-05-02 NOTE — TELEPHONE ENCOUNTER
Patient would like to do every 8 weeks. Message sent to [facility] for assistance restarting patient.

## 2024-05-02 NOTE — TELEPHONE ENCOUNTER
Patient calling, advised that the office told her to call if she wanted to start tysabri. Pt states that vumerity has been unsuccessful so far and would like to go back onto Tysabri.     Please advise ordering medication/starting PA process.

## 2024-05-07 ENCOUNTER — CASE MANAGEMENT (OUTPATIENT)
Dept: CARE COORDINATION | Age: 54
End: 2024-05-07

## 2024-05-09 ENCOUNTER — CASE MANAGEMENT (OUTPATIENT)
Dept: CARE COORDINATION | Age: 54
End: 2024-05-09

## 2024-05-13 NOTE — TELEPHONE ENCOUNTER
Received a call from Yee with [facility], states they are trying to contact patient regarding VUMERiTY without success.  Informed that patient will no longer be taking VUMERITY due to ineffectiveness.  Per Yee, discontinued medication and faxed order discontinued to [facility] @ [phone]  Confirmation fax received.

## 2024-05-15 ENCOUNTER — OFFICE VISIT (OUTPATIENT)
Dept: NEUROLOGY | Facility: CLINIC | Age: 54
End: 2024-05-15

## 2024-05-15 ENCOUNTER — TELEPHONE (OUTPATIENT)
Dept: NEUROLOGY | Facility: CLINIC | Age: 54
End: 2024-05-15

## 2024-05-15 VITALS
SYSTOLIC BLOOD PRESSURE: 112 MMHG | WEIGHT: 166 LBS | BODY MASS INDEX: 26.68 KG/M2 | HEIGHT: 66 IN | RESPIRATION RATE: 16 BRPM | HEART RATE: 76 BPM | DIASTOLIC BLOOD PRESSURE: 74 MMHG

## 2024-05-15 DIAGNOSIS — G35 MS (MULTIPLE SCLEROSIS) (HCC): Primary | ICD-10-CM

## 2024-05-15 DIAGNOSIS — G57.93 NEUROPATHIC PAIN OF BOTH LEGS: ICD-10-CM

## 2024-05-15 DIAGNOSIS — R93.7 ABNORMAL MRI, THORACIC SPINE: ICD-10-CM

## 2024-05-15 PROCEDURE — 99214 OFFICE O/P EST MOD 30 MIN: CPT | Performed by: PHYSICIAN ASSISTANT

## 2024-05-15 RX ORDER — CARVEDILOL 3.12 MG/1
3.12 TABLET ORAL 2 TIMES DAILY
COMMUNITY
Start: 2024-05-07

## 2024-05-15 NOTE — TELEPHONE ENCOUNTER
Enrollment form faxed to [facility]. Patient to restart infusions every 8 weeks d/t elevated JCV index.    Referral to be sent to [facility] infusion center.

## 2024-05-15 NOTE — PATIENT INSTRUCTIONS
Refill policies:    Allow 2-3 business days for refills; controlled substances may take longer.  Contact your pharmacy at least 5 days prior to running out of medication and have them send an electronic request or submit request through the “request refill” option in your [employer] account.  Refills are not addressed on weekends; covering physicians do not authorize routine medications on weekends.  No narcotics or controlled substances are refilled after noon on Fridays or by on call physicians.  By law, narcotics must be electronically prescribed.  A 30 day supply with no refills is the maximum allowed.  If your prescription is due for a refill, you may be due for a follow up appointment.  To best provide you care, patients receiving routine medications need to be seen at least once a year.  Patients receiving narcotic/controlled substance medications need to be seen at least once every 3 months.  In the event that your preferred pharmacy does not have the requested medication in stock (e.g. Backordered), it is your responsibility to find another pharmacy that has the requested medication available.  We will gladly send a new prescription to that pharmacy at your request.    Scheduling Tests:    If your physician has ordered radiology tests such as MRI or CT scans, please contact Central Scheduling at [phone] right away to schedule the test.  Once scheduled, the [facility] Centralized Referral Team will work with your insurance carrier to obtain pre-certification or prior authorization.  Depending on your insurance carrier, approval may take 3-10 days.  It is highly recommended patients assure they have received an authorization before having a test performed.  If test is done without insurance authorization, patient may be responsible for the entire amount billed.      Precertification and Prior Authorizations:  If your physician has recommended that you have a procedure or additional testing performed the [facility]  Centralized Referral Team will contact your insurance carrier to obtain pre-certification or prior authorization.    You are strongly encouraged to contact your insurance carrier to verify that your procedure/test has been approved and is a COVERED benefit.  Although the [facility] Centralized Referral Team does its due diligence, the insurance carrier gives the disclaimer that \"Although the procedure is authorized, this does not guarantee payment.\"    Ultimately the patient is responsible for payment.   Thank you for your understanding in this matter.  Paperwork Completion:  If you require FMLA or disability paperwork for your recovery, please make sure to either drop it off or have it faxed to our office at [phone]. Be sure the form has your name and date of birth on it.  The form will be faxed to our Forms Department and they will complete it for you.  There is a 25$ fee for all forms that need to be filled out.  Please be aware there is a 10-14 day turnaround time.  You will need to sign a release of information (DAYAN) form if your paperwork does not come with one.  You may call the Forms Department with any questions at [phone].  Their fax number is [phone].

## 2024-05-15 NOTE — PROGRESS NOTES
NEUROLOGY  [facility]       Previous visit and existing record notes reviewed in preparation for the face to face visit.  Relevant labs and studies reviewed and will be noted in relevant areas of this record.    Kat Crook  3/11/1970  Primary Care Provider:  Justin Huerta MD    5/15/2024  54 year old female      was last seen on: Patient was last seen 4/3/24 and at that time she was admitted to the hospital for plex due to lower extremity weakness and trouble finding her words and speech issues    Seen for/plans last visit:  Multiple Sclerosis    Accompanied visit: No    Present condition:  Since her last visit she completed 5 days of PLEX. She was discharged 4/15/24. She is interested in restarting on the Tysabri  Will start speech therapy tomorrow  Still having issues with her speech, hesitant,stutter  No improvement in the lower extremity weakness following the plex at this point  The sensitivity in the lower extremities is improved following the plex  She is currently in PT. The walker is too heavy for her to move it in and out of the car on her own  She uses the cane when she is not able to bring her walker  No change in memory since the last visit  Depression is not good. She is working with psychiatrist  +fatigue     4/22/2024   INDEX VALUE 1.22 (H)    JCV ANTIBODY POSITIVE !           MRI Brain(4/13/24)    Impression   CONCLUSION:       1. Overall mild to moderate burden of demyelinating plaques is stable.     2. No enhancing lesions.     3. No suspicious lesions in the pituitary gland.  Diaphragma sella is unremarkable.  No mass effect.          MRI Thoracic Spine (12/27/23)    Impression   CONCLUSION:  There is a small extramedullary, intrathecal nodule at the T11 level along the right posterior aspect of the thecal sac likely representing a 4 mm neuroma or meningioma.        MRI Cervical Spine(7/3/23)        Impression   CONCLUSION:       1.  Mild degenerative changes of the  cervical spine from C3-4 to C 5-6 which is unchanged since previous study.       2. No evidence of demyelinating disease within the cervical spine.       Review of Systems:  Review of Systems:  Denies systemic symptoms     No CP or SOB.  No GI or  symptoms. Relevant Neuro as noted above.      Medications:      Current Outpatient Medications:     carvedilol 3.125 MG Oral Tab, Take 1 tablet (3.125 mg total) by mouth 2 (two) times daily., Disp: , Rfl:     montelukast 10 MG Oral Tab, Take 1 tablet (10 mg total) by mouth nightly., Disp: , Rfl:     levothyroxine 88 MCG Oral Tab, Take 1 tablet (88 mcg total) by mouth before breakfast., Disp: , Rfl:     pantoprazole 20 MG Oral Tab EC, Take 1 tablet (20 mg total) by mouth 2 (two) times daily., Disp: , Rfl:     fluticasone furoate-vilanterol 200-25 MCG/ACT Inhalation Aerosol Powder, Breath Activated, Inhale 1 puff into the lungs daily., Disp: , Rfl:     fluorometholone 0.1 % Ophthalmic Suspension, , Disp: , Rfl:     LYLLANA 0.025 MG/24HR Transdermal Patch Biweekly, Place 1 patch onto the skin twice a week., Disp: , Rfl:     CELEBREX 100 MG Oral Cap, Take 1 capsule (100 mg total) by mouth 2 (two) times daily., Disp: , Rfl:     acetaminophen-codeine 300-60 MG Oral Tab, Take 1 tablet by mouth every 6 (six) hours as needed., Disp: , Rfl:     pregabalin 200 MG Oral Cap, Take 1 capsule (200 mg total) by mouth 2 (two) times daily., Disp: , Rfl:     Ciclopirox 8 % External Solution, APPLY EVERY DAY, Disp: , Rfl:     HYDROcodone-acetaminophen  MG Oral Tab, Take 1 tablet by mouth 4 (four) times daily as needed., Disp: , Rfl:     baclofen 10 MG Oral Tab, Take 1 tablet (10 mg total) by mouth 3 (three) times daily., Disp: , Rfl:     NON FORMULARY, Tandem insulin pump settings (Bolus IQ) Mdnt 3.1 units/hr 0400 3.00 units/hr 0900 3.2 units/hr  Correction:  MN 1:35/110 1 PM 1:40 7 pm 1:35  CHO ratio: MN 1:5g, Disp: , Rfl:     FARXIGA 10 MG Oral Tab, Take 1 tablet (10 mg total) by  mouth daily., Disp: , Rfl:     DULoxetine 60 MG Oral Cap DR Particles, Take 1 capsule (60 mg total) by mouth 2 (two) times daily., Disp: , Rfl:     atorvastatin 20 MG Oral Tab, Take 1 tablet (20 mg total) by mouth nightly., Disp: , Rfl:     DEXCOM G6 SENSOR Does not apply Misc, USE AS DIRECTED EVERY 10 DAYS, Disp: 9 each, Rfl: 3    Insulin Lispro (HUMALOG) 100 UNIT/ML Subcutaneous Solution, INJECT UP TO 70 UNITS VIA INSULIN PUMP DAILY, Disp: 70 mL, Rfl: 2    EPINEPHrine 0.15 MG/0.3ML Injection Solution Auto-injector, Jani pen/lower dose because pt is allergic to epinephrine, Disp: , Rfl:     Ondansetron HCl (ZOFRAN) 4 mg tablet, Take 1 tablet (4 mg total) by mouth 3 (three) times daily as needed., Disp: , Rfl:     Benralizumab (FASENRA PEN) 30 MG/ML Subcutaneous Solution Auto-injector, Inject 30 mg into the skin. Every other month/every 8 weeks, Disp: , Rfl:     DEXCOM G6 TRANSMITTER Does not apply Misc, USE AS DIRECTED EVERY 90 DAYS, Disp: 1 each, Rfl: 3    Thiamine HCl 100 MG Oral Tab, Take 1 tablet (100 mg total) by mouth daily., Disp: 30 tablet, Rfl: 3    docusate sodium 100 MG Oral Cap, Take 1 capsule (100 mg total) by mouth 2 (two) times daily., Disp: , Rfl:     denosumab 60 MG/ML Subcutaneous Solution, Inject 1 mL (60 mg total) into the skin every 6 (six) months., Disp: , Rfl:     Cholecalciferol (VITAMIN D-3) 5000 UNITS Oral Tab, Take 1 tablet (5,000 Units total) by mouth daily., Disp: , Rfl:     Albuterol Sulfate  (90 Base) MCG/ACT Inhalation Aero Soln, Inhale 2 puffs into the lungs every 6 (six) hours as needed. PRN wheezing/SOB, Disp: , Rfl:   PRN:     Allergies:  Allergies   Allergen Reactions    Epinephrine OTHER (SEE COMMENTS)     Cardiac issues    Immune Globulin ANAPHYLAXIS    Latex SHORTNESS OF BREATH and OTHER (SEE COMMENTS)     WELTS    Methylprednisolone SWELLING     Swelling of neck, throat and tongue  Injection, throat and tongue swelling      Ocrelizumab ANAPHYLAXIS and OTHER (SEE  COMMENTS)     Difficulty breathing    Other SHORTNESS OF BREATH     ENVIRONMENTAL, ASTHMA EXACERBATION    Pcn [Penicillamine]     Penicillins OTHER (SEE COMMENTS)    Urea Tightness in Throat     Urea C-13 in Pranactin for H. Pylori test kit.    Midodrine ITCHING     Pt reports mouth pain and itching possibly r/t midodrine          EXAM:  /74 (BP Location: Left arm, Patient Position: Sitting, Cuff Size: adult)   Pulse 76   Resp 16   Ht 66\"   Wt 166 lb (75.3 kg)   LMP  (LMP Unknown)   BMI 26.79 kg/m²   Looks stated age  General Exam:  HENT:  pink conjunctiva anicteric sclerae  Neck no adenopathy, thyroid normal  Heart and Lungs:  normal  Extremities: no cyanosis, skin changes    NEURO  NAD, A&Ox3  Stutter noted on exam  EOMI  CN II-XII intact  Strength 5/5 bilateral upper extremities  Strength 3/5 left quadriceps  Strength 4/5 right quadriceps  Strength 4+/5 bilateral knee flexion/extension  Strength 5/5 right dorsiflexion  Strength 4/5 left dorsiflexion  DTRs 3+ biceps, triceps and brachioradialis bilaterally  DTRs 3+ patellar bilaterally  FTN intact on the right  FTN mildly ataxic on the left  No drift on exam  Patient using cane      Problem/s Identified this visit:   1. MS (multiple sclerosis) (HCC)    2. Neuropathic pain of both legs    3. Abnormal MRI, thoracic spine          Discussion plus Diagnostics & Treatment Orders:    MS- Completed a 5 day course of PLEX about a month ago now. Has seen minimal improvement following PLEX. Will plan to restart the Tysabri. Continue with PT and Speech Therapy    Abnormal Thoracic Spine- Seen by Dr. Woods. Follow-up imaging was placed by Dr. Woods.      (X) Discussed potential side effects of any treatment relevant to above.  Includes explanation of tests as necessary.    Return in about 6 months (around 11/15/2024).      Patient understands that if needed, based on condition and or test results, follow up will be readjusted    CASIE Anderson  [facility]  5/15/2024, Time completed 1:17 PM

## 2024-05-16 NOTE — TELEPHONE ENCOUNTER
Referral form faxed to [facility] to initiate treatment with Tysabri every 8 weeks.    Authorization pending.

## 2024-05-24 ENCOUNTER — APPOINTMENT (OUTPATIENT)
Dept: HEMATOLOGY/ONCOLOGY | Facility: HOSPITAL | Age: 54
End: 2024-05-24
Attending: SPECIALIST
Payer: MEDICARE

## 2024-05-30 ENCOUNTER — E-ADVICE (OUTPATIENT)
Dept: CARE COORDINATION | Age: 54
End: 2024-05-30

## 2024-05-30 ENCOUNTER — NURSE ONLY (OUTPATIENT)
Dept: HEMATOLOGY/ONCOLOGY | Facility: HOSPITAL | Age: 54
End: 2024-05-30
Attending: SPECIALIST
Payer: MEDICARE

## 2024-05-30 ENCOUNTER — CASE MANAGEMENT (OUTPATIENT)
Dept: CARE COORDINATION | Age: 54
End: 2024-05-30

## 2024-05-30 VITALS
SYSTOLIC BLOOD PRESSURE: 108 MMHG | DIASTOLIC BLOOD PRESSURE: 69 MMHG | RESPIRATION RATE: 18 BRPM | OXYGEN SATURATION: 97 % | HEART RATE: 80 BPM

## 2024-05-30 DIAGNOSIS — G35 MS (MULTIPLE SCLEROSIS) (HCC): ICD-10-CM

## 2024-05-30 LAB
ALBUMIN SERPL-MCNC: 3.5 G/DL (ref 3.4–5)
ALBUMIN/GLOB SERPL: 1 {RATIO} (ref 1–2)
ALP LIVER SERPL-CCNC: 98 U/L
ALT SERPL-CCNC: 27 U/L
ANION GAP SERPL CALC-SCNC: 8 MMOL/L (ref 0–18)
AST SERPL-CCNC: 19 U/L (ref 15–37)
BASOPHILS # BLD AUTO: 0.01 X10(3) UL (ref 0–0.2)
BASOPHILS NFR BLD AUTO: 0.1 %
BILIRUB SERPL-MCNC: 0.2 MG/DL (ref 0.1–2)
BUN BLD-MCNC: 19 MG/DL (ref 9–23)
CALCIUM BLD-MCNC: 8.6 MG/DL (ref 8.5–10.1)
CHLORIDE SERPL-SCNC: 106 MMOL/L (ref 98–112)
CO2 SERPL-SCNC: 26 MMOL/L (ref 21–32)
CREAT BLD-MCNC: 1.02 MG/DL
EGFRCR SERPLBLD CKD-EPI 2021: 65 ML/MIN/1.73M2 (ref 60–?)
EOSINOPHIL # BLD AUTO: 0 X10(3) UL (ref 0–0.7)
EOSINOPHIL NFR BLD AUTO: 0 %
ERYTHROCYTE [DISTWIDTH] IN BLOOD BY AUTOMATED COUNT: 14.6 %
FASTING STATUS PATIENT QL REPORTED: NO
GLOBULIN PLAS-MCNC: 3.5 G/DL (ref 2.8–4.4)
GLUCOSE BLD-MCNC: 211 MG/DL (ref 70–99)
HCT VFR BLD AUTO: 38.6 %
HGB BLD-MCNC: 11.9 G/DL
IMM GRANULOCYTES # BLD AUTO: 0.01 X10(3) UL (ref 0–1)
IMM GRANULOCYTES NFR BLD: 0.1 %
LYMPHOCYTES # BLD AUTO: 2.78 X10(3) UL (ref 1–4)
LYMPHOCYTES NFR BLD AUTO: 41.4 %
MCH RBC QN AUTO: 22.4 PG (ref 26–34)
MCHC RBC AUTO-ENTMCNC: 30.8 G/DL (ref 31–37)
MCV RBC AUTO: 72.6 FL
MONOCYTES # BLD AUTO: 0.46 X10(3) UL (ref 0.1–1)
MONOCYTES NFR BLD AUTO: 6.9 %
NEUTROPHILS # BLD AUTO: 3.45 X10 (3) UL (ref 1.5–7.7)
NEUTROPHILS # BLD AUTO: 3.45 X10(3) UL (ref 1.5–7.7)
NEUTROPHILS NFR BLD AUTO: 51.5 %
OSMOLALITY SERPL CALC.SUM OF ELEC: 299 MOSM/KG (ref 275–295)
PLATELET # BLD AUTO: 172 10(3)UL (ref 150–450)
PLATELETS.RETICULATED NFR BLD AUTO: 8.3 % (ref 0–7)
POTASSIUM SERPL-SCNC: 4 MMOL/L (ref 3.5–5.1)
PROT SERPL-MCNC: 7 G/DL (ref 6.4–8.2)
RBC # BLD AUTO: 5.32 X10(6)UL
SODIUM SERPL-SCNC: 140 MMOL/L (ref 136–145)
WBC # BLD AUTO: 6.7 X10(3) UL (ref 4–11)

## 2024-05-30 PROCEDURE — 36591 DRAW BLOOD OFF VENOUS DEVICE: CPT

## 2024-05-30 PROCEDURE — 80053 COMPREHEN METABOLIC PANEL: CPT

## 2024-05-30 PROCEDURE — 85025 COMPLETE CBC W/AUTO DIFF WBC: CPT

## 2024-05-30 NOTE — PROGRESS NOTES
Education Record    Learner:  Patient    Disease / Diagnosis: MS    Barriers / Limitations:  None   Comments:    Method:  Brief focused   Comments:    General Topics:  Plan of care reviewed   Comments:    Outcome:  Shows understanding   Comments:

## 2024-06-04 ENCOUNTER — MED REC SCAN ONLY (OUTPATIENT)
Dept: NEUROLOGY | Facility: CLINIC | Age: 54
End: 2024-06-04

## 2024-06-04 NOTE — TELEPHONE ENCOUNTER
Patient scheduled to restart her Tysabri on 6/6/2024 at [facility].    Re-enrollment paperwork to scanning on [facility] scan dated 6/4/2024.

## 2024-06-06 ENCOUNTER — TELEPHONE (OUTPATIENT)
Dept: NEUROLOGY | Facility: CLINIC | Age: 54
End: 2024-06-06

## 2024-06-06 NOTE — TELEPHONE ENCOUNTER
Tysabri infusion record received from [facility] for physician review.  No signs/symptoms of infusion reaction noted.  Patient infused on 6/6/2024. Does not require the post infusion observation period. This is a restart of Tysabri for patient.  Lot Number [id number] [id number], Exp 11/2027  No new orders requested.  Orders active for 5 additional infusions.  Next infusion scheduled for 8/1/2024. Patient infusing every 8 weeks.  Placed in physician's box for review.

## 2024-06-27 ENCOUNTER — OFFICE VISIT (OUTPATIENT)
Facility: CLINIC | Age: 54
End: 2024-06-27

## 2024-06-27 VITALS
DIASTOLIC BLOOD PRESSURE: 64 MMHG | SYSTOLIC BLOOD PRESSURE: 112 MMHG | HEIGHT: 66 IN | BODY MASS INDEX: 26.86 KG/M2 | WEIGHT: 167.13 LBS | RESPIRATION RATE: 16 BRPM | OXYGEN SATURATION: 96 % | HEART RATE: 75 BPM

## 2024-06-27 DIAGNOSIS — R13.14 DYSPHAGIA, PHARYNGOESOPHAGEAL PHASE: ICD-10-CM

## 2024-06-27 DIAGNOSIS — J45.40 MODERATE PERSISTENT ASTHMA, UNSPECIFIED WHETHER COMPLICATED (HCC): Primary | ICD-10-CM

## 2024-06-27 PROCEDURE — 99214 OFFICE O/P EST MOD 30 MIN: CPT | Performed by: INTERNAL MEDICINE

## 2024-06-27 RX ORDER — TELMISARTAN 20 MG/1
20 TABLET ORAL DAILY
COMMUNITY
Start: 2024-06-12

## 2024-06-27 NOTE — PATIENT INSTRUCTIONS
Plan- -- continue inhaler once a day          -- rescue inhaler as needed -            - ill call about study           --          - see me in 4 months     Raisa Hoyt MD  Pulmonary Medicine  06/27/24

## 2024-06-27 NOTE — PROGRESS NOTES
Pulmonary Consult Note    History of Present Illness:  Kat Crook is a 54 year old female presenting to pulmonary clinic today for beginning of summer bad attack at [facility] -   6 weeks ago- then to [facility]- admitted one night - left   related to weather in summer -  Trigger - burning garbage and leaves-   Allergic to steroids-- swells up - mouth and throat - reaction - last 3 yrs ago -   Remains on fasnerra every other month - at home   Treated with bipap   Now feels OK_ always with dyspnea - walks - slows down - breaths hard with stairs - uses albuterol prior to activity -- able to exercise   Rare cough-- chest tightness and wheezing   Had allergy testing then gets asthma- -- reacts to \"everything\"   Has dog and cats at home     Flare of MS-- pain and difficulty - walking -  In hosp with PLEX x 5 April/May- then to [facility]  Pt at home now   Some shortness of breath - prior to hosp and during the hosp -   Ongoing throat   Walking to parking lot - using albuterol and helps some - not preactivity   Remains on fascerna every 8 weeks -   Coughing is bad at night -   Recent endo with [facility]- dysphagia is still bad- chokes on liquids and solids - no swallow study - puts food in cheek   breo - daily and rescue once every other day   sats all good at [facility]- was given oxygen at night - -  A lot problems at night- coughing worse   Nightmares with melatonin   Follows with Dr. Becker with recent visit    8.23 Had kidney stone that got bigger and then a second one and needs repeat lithotripsy -with stent - and repeat lithrotrispy  and dilation - -- increased pain- -now on the 6th    Remains with dyspnea- thinks sl better with Breo 200 -   Back on breo 100 - rescue - 2-3 times per day 3-4 times per week - - beneiftting   Asthma is so good- fascenra-- every 8 weeks   Not much  coughing -- mucus from sinus   Swallowing told fine on UGI with [facility]- - normal -- but coughed a lot after test and with increased mucus -- to see in  follow up- -   Norco 4 times a day for years-does not think helping much  Atypical pain pattern thought perhaps related to MS unclear  Today with significant increase in right anterior axillary line  Worse with expiration     11.23- - sick for awhile - 3 weeks - neg covid- - antibiotics helped - no fever - not well- no energy - not eating better now   Remains on breo 100 -   Now with mucus - heavy - from sinuses more than chest - - was green - now yellow - occasionally  blood -   Some rescue use and nebulizer - increased to twice a day   Remains with chest pain -- left side and right side left ant chest - not palpable pain - - some with deep breathing -   Remains with fatigue - ? Thyroid - to see primary   Remains on fasenra without issues - every 8 weeks - at home     Got flu shot - covid not yet   Neuro recommended PLEX - for weakness and so tired   Needed O2 during the night while in   Awakenings ongoing with some ocughing --     2.24- on [facility]  -- [employer] went up  not working as well - -increased can't think well and legs are weaker - to see echerverri-  Foot drop left and right ? Starting -   No recent plex -   Gets more short of breath-- walking in here - noted dypsnea -   Spacing out now -   No O2 use - has none   Not sleeping well- - not supine - not flat-- can't breath - had an epsiode during sleep study - legs hurt   No coughing - some in middle of night -   Increased nasal drainage-- all clear - flonase no help   Remains on fasenra- doing well - occasionally  nasal spray - singular - no dreams     6/24- in hosp with flare and needed PLEX_ - was shock ?post plex   Had PT after - still in speech - has been affected - in past but now worse - - in speech   Gets tired with talking   Has been feeling good -- some coughing- rain and the hot-   Told in hosp has sleep apnea - with desats and started on cpap - told better -  Awaknes in the night   Chest pain hurting - across front -   No gerd noted - throat clearing  - - maybe hard to talk and using muscle   Thinks slept better with cpap -   Remains on singular nightly and thinks needs   Some rescue  when hot            Past Medical History:   Past Medical History:    Abdominal pain    Allergic rhinitis    Anemia    Anxiety    Anxiety state    Arthritis    Asthma (HCC)    Atypical mole    Autoimmune disease (HCC)    Back pain    Bad breath    Black stools    Bloating    Blood disorder    Thalassemia alpha, Sickle cell trait    Blood in urine    Blurred vision    Calculus of kidney    Cardiomyopathy (HCC)    last echo 2021: EF 55%    Chest pain    Constipation    Decorative tattoo    Deep vein thrombosis (HCC)    Depression    Diarrhea, unspecified    Dizziness    Easy bruising    Elevated liver enzymes    Endocrine disorder    Esophageal reflux    Fatigue    Fibromyalgia    Food intolerance    Frequent urination    Frequent UTI    Gastroparesis    Glaucoma    H. pylori infection    H/O  section    Headache disorder    Heart attack (HCC)    Heart palpitations    Heartburn    Hemorrhoids    Herniation of cervical intervertebral disc    High blood pressure    High cholesterol    History of blood clots    Unprovoked    History of blood transfusion    History of cardiac murmur    History of depression    Hoarseness, chronic    Hyperlipidemia    IBS (irritable bowel syndrome)    Irregular bowel habits    Leaking of urine    Liver disease    GIORDANO and Stage 2 fibrosis    Loss of appetite    Migraines    Multiple sclerosis (HCC)    dx 8 years ago    Muscle weakness    cane or walker prn    Myocardial infarction (HCC)    Neuropathy    legs, hands, feet; bilateral    Night sweats    Osteoarthritis    Osteoporosis    KAMALJIT in her 30s    Osteoporosis    Pain with bowel movements    Painful urination    Problems with swallowing    Due to MS    Renal disorder    kidney lesions    Scoliosis of lumbar spine    Shortness of breath    Sickle cell trait (HCC)    Sleep apnea    no treatment     Sleep disturbance    Stress    Type II or unspecified type diabetes mellitus without mention of complication, not stated as uncontrolled    Uncomfortable fullness after meals    Unspecified disorder of thyroid    total thyroidectomy    Visual impairment    glasses    Wears glasses    Weight loss    Wheezing    cardiac -- one heart attack - - during allergic reaction to steriods - epin-   Dr munoz- diastolic dys and pul htn   HX PE-- told unprovoked - no dvt- completed AC - remains on asa   No cancers   4 surgeries this year - abd - cysto with urethal dilation - stent for kidney stone- with removal - dr ball        Past Surgical History:   Past Surgical History:   Procedure Laterality Date      1986    Cataract      Cath angio  2014    Cholecystectomy      Colonoscopy N/A 2015    Procedure: COLONOSCOPY;  Surgeon: Liz Tong DO;  Location:  ENDOSCOPY    Colonoscopy N/A 2021    Procedure: COLONOSCOPY;  Surgeon: Cedrick Da Silva MD;  Location:  ENDOSCOPY    Colonoscopy N/A 10/08/2021    Procedure: COLONOSCOPY, ESOPHAGOGASTRODUODENOSCOPY (EGD);  Surgeon: Darvin Richardson MD;  Location:  ENDOSCOPY    D & c  1990    Blighted ovum    Egd      Hysterectomy      total hystero.    Ir port a cath insertion exchnge check  2018    Lithotripsy      x 2    Lysis of adhesions      Needle biopsy liver  2016    Oophorectomy Bilateral 1993    total hystero.    Other      dialysis catheter- left chest for plasmaphoresis    Other surgical history       x 2    Port, indwelling, imp  2019    power port    Thyroidectomy  2013    benign MNG    Total abdom hysterectomy      Upper gi endoscopy performed  2014       Family Medical History:   Family History   Problem Relation Age of Onset    Heart Attack Father     Other (Other) Father         COPD, emphysema    Asthma Father     Pulmonary Disease Father     Hypertension Mother         Needs  to be deleted    Cancer Mother         stomach cancer    Ovarian Cancer Mother         30'S    Colon Polyps Mother     Anemia Mother     Ovarian Cancer Maternal Grandmother         30'S    Colon Polyps Maternal Grandmother     Diabetes Maternal Grandmother     Colon Cancer Maternal Grandmother     Anemia Maternal Grandmother     Cancer Maternal Grandmother     Breast Cancer Maternal Aunt         60'S    Other (Other) Sister         rectal CA\    Anemia Daughter        Social History: Social History    Socioeconomic History      Marital status: Single    Tobacco Use      Smoking status: Never      Smokeless tobacco: Never    Vaping Use      Vaping Use: Never used    Substance and Sexual Activity      Alcohol use: Never      Drug use: Never    Other Topics      Concerns:        Caffeine Concern: No        Exercise: Yes          walks alot  No working - no chemicals  One dog and 2 cats - allergic to them -- not too bad     Allergies: Epinephrine, Immune globulin, Latex, Methylprednisolone, Ocrelizumab, Other, Pcn [penicillamine], Penicillins, Urea, and Midodrine     Medications:   Current Outpatient Medications   Medication Sig Dispense Refill    Telmisartan 20 MG Oral Tab Take 1 tablet (20 mg total) by mouth daily.      carvedilol 3.125 MG Oral Tab Take 1 tablet (3.125 mg total) by mouth 2 (two) times daily.      montelukast 10 MG Oral Tab Take 1 tablet (10 mg total) by mouth nightly.      levothyroxine 88 MCG Oral Tab Take 1 tablet (88 mcg total) by mouth before breakfast.      pantoprazole 20 MG Oral Tab EC Take 1 tablet (20 mg total) by mouth 2 (two) times daily.      fluticasone furoate-vilanterol 200-25 MCG/ACT Inhalation Aerosol Powder, Breath Activated Inhale 1 puff into the lungs daily.      fluorometholone 0.1 % Ophthalmic Suspension       LYLLANA 0.025 MG/24HR Transdermal Patch Biweekly Place 1 patch onto the skin twice a week.      CELEBREX 100 MG Oral Cap Take 1 capsule (100 mg total) by mouth 2 (two)  times daily.      acetaminophen-codeine 300-60 MG Oral Tab Take 1 tablet by mouth every 6 (six) hours as needed.      pregabalin 200 MG Oral Cap Take 1 capsule (200 mg total) by mouth 2 (two) times daily.      Ciclopirox 8 % External Solution APPLY EVERY DAY      HYDROcodone-acetaminophen  MG Oral Tab Take 1 tablet by mouth 4 (four) times daily as needed.      baclofen 10 MG Oral Tab Take 1 tablet (10 mg total) by mouth 3 (three) times daily.      NON FORMULARY Tandem insulin pump settings (Bolus IQ)  Mdnt 3.1 units/hr  0400 3.00 units/hr  0900 3.2 units/hr    Correction:   MN 1:35/110  1 PM 1:40  7 pm 1:35    CHO ratio:  MN 1:5g      FARXIGA 10 MG Oral Tab Take 1 tablet (10 mg total) by mouth daily.      DULoxetine 60 MG Oral Cap DR Particles Take 1 capsule (60 mg total) by mouth 2 (two) times daily.      atorvastatin 20 MG Oral Tab Take 1 tablet (20 mg total) by mouth nightly.      DEXCOM G6 SENSOR Does not apply Misc USE AS DIRECTED EVERY 10 DAYS 9 each 3    Insulin Lispro (HUMALOG) 100 UNIT/ML Subcutaneous Solution INJECT UP TO 70 UNITS VIA INSULIN PUMP DAILY 70 mL 2    EPINEPHrine 0.15 MG/0.3ML Injection Solution Auto-injector Jani pen/lower dose because pt is allergic to epinephrine      Ondansetron HCl (ZOFRAN) 4 mg tablet Take 1 tablet (4 mg total) by mouth 3 (three) times daily as needed.      Benralizumab (FASENRA PEN) 30 MG/ML Subcutaneous Solution Auto-injector Inject 30 mg into the skin. Every other month/every 8 weeks      DEXCOM G6 TRANSMITTER Does not apply Misc USE AS DIRECTED EVERY 90 DAYS 1 each 3    Thiamine HCl 100 MG Oral Tab Take 1 tablet (100 mg total) by mouth daily. 30 tablet 3    docusate sodium 100 MG Oral Cap Take 1 capsule (100 mg total) by mouth 2 (two) times daily.      denosumab 60 MG/ML Subcutaneous Solution Inject 1 mL (60 mg total) into the skin every 6 (six) months.      Cholecalciferol (VITAMIN D-3) 5000 UNITS Oral Tab Take 1 tablet (5,000 Units total) by mouth daily.       Albuterol Sulfate  (90 Base) MCG/ACT Inhalation Aero Soln Inhale 2 puffs into the lungs every 6 (six) hours as needed. PRN wheezing/SOB         Review of Systems: weight down- loss of appettie -questionably related to increased pain-  now weight is up   Not hungery- remains without appettie - not taking supplements - likes ensure but increases her blood sugar  No gerd at all on ppi bid   Notes some dysphagia -- stable but is careful with neg study   Sleeping - insomnia - recently not sleeping well-remains with increasing awakenings and this may be related to pain continues to report having to sleep sitting more upright  Told MOI in the past - awakens at night at times - choking at night - in the past  -    And had a machine- recent neg study sleeps poorly  Neurology -- MS is questionably worse may need treatment- plasma paresis -- speech and cant think well and generlized pain all over as flare and hard to walk       Physical Exam:  /64 (BP Location: Right arm, Patient Position: Sitting, Cuff Size: adult)   Pulse 75   Resp 16   Ht 5' 6\" (1.676 m)   Wt 167 lb 1.6 oz (75.8 kg)   LMP  (LMP Unknown)   SpO2 96%   BMI 26.97 kg/m²    Constitutional: comfortable .  Notes increased pain throughout left upper chest-no skin lesions or blisters notes--now seems all over no specific spot reports more tenderness on the right  HEENT: Head NC/AT. PEERL. Throat is clear no thrush   Cardio: Regular rate and rhythm. Normal S1 and S2. No murmurs.regualr   Respiratory: Thorax symmetrical with no labored breathing.- no wheeze with good air entry sl crackles rt base - now all clear   GI:  Abd soft, non-tender.  Extremities: No clubbing or cyanosis. No LE edema. No calf tenderness.  Neurologic: A&Ox3. No gross motor deficits.  Skin: Warm, dry.no rashes or hives noted   Lymphatic: No cervical or supraclavicular lymphadenopathy.no jvd   Psych: Calm, cooperative. Pleasant affect.    Results:  Reviewed      Assessment/Plan:  #Pulmonary hypertension  -History of diastolic dysfunction  Last echo 12/21 with diastolic dysfunction and PAS 30 to 35 mmHg-stable fluid status without diuretic use   history of pulmonary emboli-12/21 seemingly unprovoked completed 3 months with negative follow-up CT decision made for aspirin no recurrence  8/23 no fluid issues  11.23- canceled recent visit - stable fluids   2/24 no fluid issues  6/24- echo in hosp with PAS 30-35mmHg       #Asthma  Eosinophilic and severe-reports long history of allergies/testing  Reports ongoing recurrent flares requiring ambulances to the hospital-early summer and 6 weeks ago  PFTs 4/21 with normal flows and volumes minimally reduced DLCO 64% corrects to 86%.-Compared to 2018 slight decrease in TLC and residual volume.  Reports swelling questionable angioedema with any form of steroids  Reports requiring BiPAP during episodes  Overall remained stable at this time on MDIs  Notes improvement with Fasenra  Discussed questionable role of animals at home  5/23 clinically stable-recent visit with Dr. Willis  8/23 asthma is doing well on Fasenra-continue Breo for now  11.23- stable despite recent illness- cpm   2.24- remains stable - to see dr willis -remains on Fasenra and ICS/LABA  6/24- remains on fascnera and on breo- hesitant to stop Singulair- cpm         #GERD/chronic dysphagia  Remains on daily PPI denies any breakthrough symptoms  Recent upper scope without reported active issue-Dr. Richardson had suggested motility study  Had seen speech years ago with plans to revisit-abnormal swallow pattern-keeps food in her cheek  8/23 had esophagram reported as normal-to follow-up with GI  11.23 - seems all stable   2.24- all good on ppi BID -denies an active issue    #Multiple sclerosis  Follows with Dr. Jeimy Cline  As unable to take steroids plans reportedly in place for PLEX-Will need line access reportedly  5/23 acute flare with ambulatory difficulties and pain  prompted admission in April underwent Plex x5 with good response then to [facility]-remains with pain but ambulation is better -to see Jorge Luis  8.23- stopped tysabri- increased jcv - so had to stop-  Vermerity -- now started -   11.23- remains on [facility] -- may need PLEX   2.24- encouraged to call - increased foot drop and increased pain with increased brain fog thinks needs Plex  6/24- flare in April required PLEX in hosp   Speech issue as new issue-- not much better         #Dyspnea  Currently reports stability able to walk slowly  Able to keep up with some exercise  Reports preactivity albuterol helpful  5/23 notes more dyspnea when walking though tolerating physical therapy pretty well-denies any desaturation with activity noted  8/23 overall about the same  11.23 recent illness- denies any dyspnea now   2.24- at times -slight increase with walking though more difficult to walk walking now - occasionally  at rest needs to take deep breaths  6/24-     # chest pain- anterior-   Had holter -   4/21 with questionably abnormal stress test denies any further assessment  Follows with Dr. Forrest  5/23 continues-with significant increase with associated flare  Remains tender to touch parasternally-up to lower part of her neck  8.23- remains on issues -- hurts under rt breast -- and upper chest - left side upper and rt side lower - - lower belly pain is constant and all the time without change --plan for plain CT chest if not better after procedure  11.23 -- neg CT chest questionable inflammatory  2.24- had MRI spine and seeing april for lesion - told not related to pain process  6/24 now chest pain overall seems more diffuse some bony tenderness      #History of Woody  Follows with hepatology at [place]  Thought related to metabolic syndrome      #History of alpha thalassemia minor      # 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 Related to OSA_-  To try to recheck home study     # recent kidney stones   Thinks pain may be really related to kidney stones  Plans for repeat procedure  11.23 still with stones - watching   2.24- all stable       Plan- -- continue inhaler once a day          -- rescue inhaler as needed -            - ill call about study           --          - see me in 4 months     Raisa Hoyt MD  Pulmonary Medicine  06/27/24

## 2024-08-01 ENCOUNTER — TELEPHONE (OUTPATIENT)
Dept: NEUROLOGY | Facility: CLINIC | Age: 54
End: 2024-08-01

## 2024-08-01 NOTE — TELEPHONE ENCOUNTER
Tysabri infusion record received from [facility] for physician review.  No signs/symptoms of infusion reaction noted.  Patient infused on 8/1/2024. No post observation period required.  Lot Number [id number], Exp 02/2027  No new orders requested.  Orders active for 10 additional infusions.  Next infusion scheduled for 9/26/2024. Patient infuses every 4 weeks.  Placed in physician's box for review.

## 2024-08-05 ENCOUNTER — TELEPHONE (OUTPATIENT)
Dept: NEUROLOGY | Facility: CLINIC | Age: 54
End: 2024-08-05

## 2024-08-05 ENCOUNTER — OFFICE VISIT (OUTPATIENT)
Dept: NEUROLOGY | Facility: CLINIC | Age: 54
End: 2024-08-05
Payer: MEDICARE

## 2024-08-05 VITALS
DIASTOLIC BLOOD PRESSURE: 79 MMHG | RESPIRATION RATE: 16 BRPM | SYSTOLIC BLOOD PRESSURE: 119 MMHG | HEIGHT: 66 IN | HEART RATE: 69 BPM | WEIGHT: 167 LBS | BODY MASS INDEX: 26.84 KG/M2

## 2024-08-05 DIAGNOSIS — M21.371 FOOT DROP, BILATERAL: ICD-10-CM

## 2024-08-05 DIAGNOSIS — G57.93 NEUROPATHIC PAIN OF BOTH LEGS: ICD-10-CM

## 2024-08-05 DIAGNOSIS — G35 MS (MULTIPLE SCLEROSIS) (HCC): Primary | ICD-10-CM

## 2024-08-05 DIAGNOSIS — M21.372 FOOT DROP, BILATERAL: ICD-10-CM

## 2024-08-05 PROCEDURE — 99214 OFFICE O/P EST MOD 30 MIN: CPT | Performed by: OTHER

## 2024-08-05 RX ORDER — NATALIZUMAB 300 MG/15ML
INJECTION INTRAVENOUS
COMMUNITY

## 2024-08-05 RX ORDER — MIRABEGRON 50 MG/1
50 TABLET, FILM COATED, EXTENDED RELEASE ORAL DAILY
COMMUNITY
Start: 2024-07-25

## 2024-08-05 NOTE — TELEPHONE ENCOUNTER
Patient unable to sign document via [employer].  Offered for patient to return to clinic to sign document.  Will await her response.

## 2024-08-05 NOTE — PROGRESS NOTES
NEUROLOGY  [facility]       Kat Crook  3/11/1970  Primary Care Provider:  Justin Huerta MD    8/5/2024  54 year old yo,  was last seen on:: May 2024    Seen for/plans last visit:  MS    Previous visit and existing record notes reviewed in preparation for the face to face visit.  Relevant labs and studies reviewed and will be noted in relevant areas of this record.  Accompanied visit:     (x) No.      Present condition:  She has had 2 relapses and speech therapy is ongoing  Foot drop did not recover fully    Past History update/new problem(s): as noted above    Review of Systems:  Review of Systems:  Denies systemic symptoms     No CP or SOB.  No GI or  symptoms. Relevant Neuro as noted above.      Medications:      Current Outpatient Medications:     natalizumab (TYSABRI) 300 mg/15mL Intravenous Conc, Inject into the vein. Every 8 weeks, Disp: , Rfl:     MYRBETRIQ 50 MG Oral Tablet 24 Hr, Take 1 tablet (50 mg total) by mouth daily., Disp: , Rfl:     Telmisartan 20 MG Oral Tab, Take 1 tablet (20 mg total) by mouth daily., Disp: , Rfl:     carvedilol 3.125 MG Oral Tab, Take 1 tablet (3.125 mg total) by mouth 2 (two) times daily., Disp: , Rfl:     montelukast 10 MG Oral Tab, Take 1 tablet (10 mg total) by mouth nightly., Disp: , Rfl:     levothyroxine 88 MCG Oral Tab, Take 1 tablet (88 mcg total) by mouth before breakfast., Disp: , Rfl:     pantoprazole 20 MG Oral Tab EC, Take 1 tablet (20 mg total) by mouth 2 (two) times daily., Disp: , Rfl:     fluticasone furoate-vilanterol 200-25 MCG/ACT Inhalation Aerosol Powder, Breath Activated, Inhale 1 puff into the lungs daily., Disp: , Rfl:     fluorometholone 0.1 % Ophthalmic Suspension, , Disp: , Rfl:     LYLLANA 0.025 MG/24HR Transdermal Patch Biweekly, Place 1 patch onto the skin twice a week., Disp: , Rfl:     CELEBREX 100 MG Oral Cap, Take 1 capsule (100 mg total) by mouth 2 (two) times daily., Disp: , Rfl:     acetaminophen-codeine 300-60  MG Oral Tab, Take 1 tablet by mouth every 6 (six) hours as needed., Disp: , Rfl:     pregabalin 200 MG Oral Cap, Take 1 capsule (200 mg total) by mouth 2 (two) times daily., Disp: , Rfl:     Ciclopirox 8 % External Solution, APPLY EVERY DAY, Disp: , Rfl:     HYDROcodone-acetaminophen  MG Oral Tab, Take 1 tablet by mouth 4 (four) times daily as needed., Disp: , Rfl:     baclofen 10 MG Oral Tab, Take 1 tablet (10 mg total) by mouth 3 (three) times daily., Disp: , Rfl:     NON FORMULARY, Tandem insulin pump settings (Bolus IQ) Mdnt 3.1 units/hr 0400 3.00 units/hr 0900 3.2 units/hr  Correction:  MN 1:35/110 1 PM 1:40 7 pm 1:35  CHO ratio: MN 1:5g, Disp: , Rfl:     FARXIGA 10 MG Oral Tab, Take 1 tablet (10 mg total) by mouth daily., Disp: , Rfl:     DULoxetine 60 MG Oral Cap DR Particles, Take 1 capsule (60 mg total) by mouth 2 (two) times daily., Disp: , Rfl:     atorvastatin 20 MG Oral Tab, Take 1 tablet (20 mg total) by mouth nightly., Disp: , Rfl:     DEXCOM G6 SENSOR Does not apply Misc, USE AS DIRECTED EVERY 10 DAYS, Disp: 9 each, Rfl: 3    Insulin Lispro (HUMALOG) 100 UNIT/ML Subcutaneous Solution, INJECT UP TO 70 UNITS VIA INSULIN PUMP DAILY, Disp: 70 mL, Rfl: 2    EPINEPHrine 0.15 MG/0.3ML Injection Solution Auto-injector, Jani pen/lower dose because pt is allergic to epinephrine, Disp: , Rfl:     Ondansetron HCl (ZOFRAN) 4 mg tablet, Take 1 tablet (4 mg total) by mouth 3 (three) times daily as needed., Disp: , Rfl:     Benralizumab (FASENRA PEN) 30 MG/ML Subcutaneous Solution Auto-injector, Inject 30 mg into the skin. Every other month/every 8 weeks, Disp: , Rfl:     DEXCOM G6 TRANSMITTER Does not apply Misc, USE AS DIRECTED EVERY 90 DAYS, Disp: 1 each, Rfl: 3    Thiamine HCl 100 MG Oral Tab, Take 1 tablet (100 mg total) by mouth daily., Disp: 30 tablet, Rfl: 3    docusate sodium 100 MG Oral Cap, Take 1 capsule (100 mg total) by mouth 2 (two) times daily., Disp: , Rfl:     denosumab 60 MG/ML  Subcutaneous Solution, Inject 1 mL (60 mg total) into the skin every 6 (six) months., Disp: , Rfl:     Cholecalciferol (VITAMIN D-3) 5000 UNITS Oral Tab, Take 1 tablet (5,000 Units total) by mouth daily., Disp: , Rfl:     Albuterol Sulfate  (90 Base) MCG/ACT Inhalation Aero Soln, Inhale 2 puffs into the lungs every 6 (six) hours as needed. PRN wheezing/SOB, Disp: , Rfl:   PRN:     Allergies:  Allergies   Allergen Reactions    Epinephrine OTHER (SEE COMMENTS)     Cardiac issues    Immune Globulin ANAPHYLAXIS    Latex SHORTNESS OF BREATH and OTHER (SEE COMMENTS)     WELTS    Methylprednisolone SWELLING     Swelling of neck, throat and tongue  Injection, throat and tongue swelling      Ocrelizumab ANAPHYLAXIS and OTHER (SEE COMMENTS)     Difficulty breathing    Other SHORTNESS OF BREATH     ENVIRONMENTAL, ASTHMA EXACERBATION    Pcn [Penicillamine]     Penicillins OTHER (SEE COMMENTS)    Urea Tightness in Throat     Urea C-13 in Pranactin for H. Pylori test kit.    Midodrine ITCHING     Pt reports mouth pain and itching possibly r/t midodrine          EXAM:  /79 (BP Location: Left arm, Patient Position: Sitting, Cuff Size: adult)   Pulse 69   Resp 16   Ht 66\"   Wt 167 lb (75.8 kg)   LMP  (LMP Unknown)   BMI 26.95 kg/m²   Looks stated age  General Exam:  HENT:  pink conjunctiva anicteric sclerae  Neck no adenopathy, thyroid normal  Heart and Lungs:  normal  Extremities: no cyanosis, skin changes    NEURO  Uses cane and bilateral foot drop noted  Tysabri ongoing and is making her feel better slowly        INTERPRETATION of RELEVANT LABS and other DATA:    JCV index in April was 1.22      Problem/s Identified this visit:   1. MS (multiple sclerosis) (HCC)    2. Neuropathic pain of both legs    3. Foot drop, bilateral          Discussion plus Diagnostics & Treatment Orders:  Refer for Walk Aide for foot drop  Tysabri on extended protocol  JCV every 6 months    (x) Discussed potential side effects of any  treatment relevant to above.  Includes explanation of tests as necessary.    Return in about 6 months (around 2/5/2025).      Patient understands that if needed, based on condition and or test results, follow up will be readjusted      Rao Payne MD  Vascular & General Neurology  Director, Multiple Sclerosis Program  [facility]  8/5/2024, Time completed 1:31 PM    Decision making:  ( x ) labs reviewed/ordered - 1  (  ) new diagnosis: - 1  ( x) Images & studies independently reviewed -non F2F  (  ) Case/studies discussed with other caregivers - -non F2F  (  ) Telephone time with patiern or authorized Fam member--non F2F  ( x ) other records reviewed --non F2F including consultations  (  ) [facility] meetings - patient not present --non F2F  (  ) Independent Historian obtained    Non Face to Face CPT code 88370/30661 applies as documented above    PROCEDURE DONE     (   ) see notes        After visit, patient was escorted out and handed-off discharge process and instructions to the check out desk.  No additional issues relevant to visit were raised to staff at this time interval.        This document is to be interpreted as my current opinion regarding the case as of the stated date of service based on the information available to me at this time and may supersedes any prior opinion expressed either orally or in writing.  Services rendered are only within the scope of direct medical care  Sometimes, reports may have been prepared partially using a speech recognition software technology.  If a word or phrase is confusing or out of context, please do not hesitate to call for clarification.

## 2024-08-05 NOTE — TELEPHONE ENCOUNTER
Patient sent [employer] Patient Information and medical Release Form via [employer] with instructions to return signed form to this office.    [employer] physician statement of Medical Necessity/Prescription document placed in RN bin for completion.

## 2024-08-05 NOTE — PATIENT INSTRUCTIONS
Refill policies:    Allow 2-3 business days for refills; controlled substances may take longer.  Contact your pharmacy at least 5 days prior to running out of medication and have them send an electronic request or submit request through the “request refill” option in your [employer] account.  Refills are not addressed on weekends; covering physicians do not authorize routine medications on weekends.  No narcotics or controlled substances are refilled after noon on Fridays or by on call physicians.  By law, narcotics must be electronically prescribed.  A 30 day supply with no refills is the maximum allowed.  If your prescription is due for a refill, you may be due for a follow up appointment.  To best provide you care, patients receiving routine medications need to be seen at least once a year.  Patients receiving narcotic/controlled substance medications need to be seen at least once every 3 months.  In the event that your preferred pharmacy does not have the requested medication in stock (e.g. Backordered), it is your responsibility to find another pharmacy that has the requested medication available.  We will gladly send a new prescription to that pharmacy at your request.    Scheduling Tests:    If your physician has ordered radiology tests such as MRI or CT scans, please contact Central Scheduling at [phone] right away to schedule the test.  Once scheduled, the [facility] Centralized Referral Team will work with your insurance carrier to obtain pre-certification or prior authorization.  Depending on your insurance carrier, approval may take 3-10 days.  It is highly recommended patients assure they have received an authorization before having a test performed.  If test is done without insurance authorization, patient may be responsible for the entire amount billed.      Precertification and Prior Authorizations:  If your physician has recommended that you have a procedure or additional testing performed the [facility]  Centralized Referral Team will contact your insurance carrier to obtain pre-certification or prior authorization.    You are strongly encouraged to contact your insurance carrier to verify that your procedure/test has been approved and is a COVERED benefit.  Although the [facility] Centralized Referral Team does its due diligence, the insurance carrier gives the disclaimer that \"Although the procedure is authorized, this does not guarantee payment.\"    Ultimately the patient is responsible for payment.   Thank you for your understanding in this matter.  Paperwork Completion:  If you require FMLA or disability paperwork for your recovery, please make sure to either drop it off or have it faxed to our office at [phone]. Be sure the form has your name and date of birth on it.  The form will be faxed to our Forms Department and they will complete it for you.  There is a 25$ fee for all forms that need to be filled out.  Please be aware there is a 10-14 day turnaround time.  You will need to sign a release of information (DAYAN) form if your paperwork does not come with one.  You may call the Forms Department with any questions at [phone].  Their fax number is [phone].

## 2024-08-09 NOTE — TELEPHONE ENCOUNTER
Patient signed document.  Document, order, and insurance information faxed to [phone] with confirmation fax.    Order copied to scan

## 2024-08-15 ENCOUNTER — TELEPHONE (OUTPATIENT)
Dept: NEUROLOGY | Facility: CLINIC | Age: 54
End: 2024-08-15

## 2024-08-15 NOTE — TELEPHONE ENCOUNTER
Patient stated she has a swollen, painful right hand.    Please call patient to discuss and advise.

## 2024-08-15 NOTE — TELEPHONE ENCOUNTER
Per patient her right hand is numb, painful and feels like it is on fire. Describes these symptoms for 1 week, although today is the worse. Patient is elevating hand with no improvement in swelling.     Per Dr. Payne patient offered appointment for tomorrow (8/16/24). Unfortunately patient unable to come into office.     Patient able to come in on Monday, Ok per Dr. Payne.   Patient instructed to go to ER or urgent care if symptoms worsen. Patient expressed understanding.     Appointment made with .

## 2024-08-19 ENCOUNTER — OFFICE VISIT (OUTPATIENT)
Dept: NEUROLOGY | Facility: CLINIC | Age: 54
End: 2024-08-19
Payer: MEDICARE

## 2024-08-19 VITALS
DIASTOLIC BLOOD PRESSURE: 72 MMHG | WEIGHT: 167 LBS | RESPIRATION RATE: 16 BRPM | HEART RATE: 72 BPM | BODY MASS INDEX: 26.84 KG/M2 | HEIGHT: 66 IN | SYSTOLIC BLOOD PRESSURE: 102 MMHG

## 2024-08-19 DIAGNOSIS — M21.372 LEFT FOOT DROP: ICD-10-CM

## 2024-08-19 DIAGNOSIS — M54.12 CERVICAL RADICULOPATHY: ICD-10-CM

## 2024-08-19 DIAGNOSIS — G35 MS (MULTIPLE SCLEROSIS) (HCC): Primary | ICD-10-CM

## 2024-08-19 DIAGNOSIS — R20.2 NUMBNESS AND TINGLING IN RIGHT HAND: ICD-10-CM

## 2024-08-19 DIAGNOSIS — G57.93 NEUROPATHIC PAIN OF BOTH LEGS: ICD-10-CM

## 2024-08-19 DIAGNOSIS — R26.89 BALANCE PROBLEM: ICD-10-CM

## 2024-08-19 DIAGNOSIS — R20.0 NUMBNESS AND TINGLING IN RIGHT HAND: ICD-10-CM

## 2024-08-19 NOTE — PATIENT INSTRUCTIONS
Refill policies:    Allow 2-3 business days for refills; controlled substances may take longer.  Contact your pharmacy at least 5 days prior to running out of medication and have them send an electronic request or submit request through the “request refill” option in your [employer] account.  Refills are not addressed on weekends; covering physicians do not authorize routine medications on weekends.  No narcotics or controlled substances are refilled after noon on Fridays or by on call physicians.  By law, narcotics must be electronically prescribed.  A 30 day supply with no refills is the maximum allowed.  If your prescription is due for a refill, you may be due for a follow up appointment.  To best provide you care, patients receiving routine medications need to be seen at least once a year.  Patients receiving narcotic/controlled substance medications need to be seen at least once every 3 months.  In the event that your preferred pharmacy does not have the requested medication in stock (e.g. Backordered), it is your responsibility to find another pharmacy that has the requested medication available.  We will gladly send a new prescription to that pharmacy at your request.    Scheduling Tests:    If your physician has ordered radiology tests such as MRI or CT scans, please contact Central Scheduling at [phone] right away to schedule the test.  Once scheduled, the [facility] Centralized Referral Team will work with your insurance carrier to obtain pre-certification or prior authorization.  Depending on your insurance carrier, approval may take 3-10 days.  It is highly recommended patients assure they have received an authorization before having a test performed.  If test is done without insurance authorization, patient may be responsible for the entire amount billed.      Precertification and Prior Authorizations:  If your physician has recommended that you have a procedure or additional testing performed the [facility]  Centralized Referral Team will contact your insurance carrier to obtain pre-certification or prior authorization.    You are strongly encouraged to contact your insurance carrier to verify that your procedure/test has been approved and is a COVERED benefit.  Although the [facility] Centralized Referral Team does its due diligence, the insurance carrier gives the disclaimer that \"Although the procedure is authorized, this does not guarantee payment.\"    Ultimately the patient is responsible for payment.   Thank you for your understanding in this matter.  Paperwork Completion:  If you require FMLA or disability paperwork for your recovery, please make sure to either drop it off or have it faxed to our office at [phone]. Be sure the form has your name and date of birth on it.  The form will be faxed to our Forms Department and they will complete it for you.  There is a 25$ fee for all forms that need to be filled out.  Please be aware there is a 10-14 day turnaround time.  You will need to sign a release of information (DAYAN) form if your paperwork does not come with one.  You may call the Forms Department with any questions at [phone].  Their fax number is [phone].

## 2024-08-19 NOTE — PROGRESS NOTES
NEUROLOGY  [facility]       Kat Crook  3/11/1970  Primary Care Provider:  Justin Huerta MD    8/19/2024  54 year old yo,  was last seen on:: August 5, 2024    Seen for/plans last visit:  MS    Previous visit and existing record notes reviewed in preparation for the face to face visit.  Relevant labs and studies reviewed and will be noted in relevant areas of this record.  Accompanied visit:     (x) No.      Present condition:  The past few weeks the right hand has been swelling on and off then gets numb in hands      Past History update/new problem(s): as above    Review of Systems:  Review of Systems:  Denies systemic symptoms     No CP or SOB.  No GI or  symptoms. Relevant Neuro as noted above.      Medications:      Current Outpatient Medications:     natalizumab (TYSABRI) 300 mg/15mL Intravenous Conc, Inject into the vein. Every 8 weeks, Disp: , Rfl:     MYRBETRIQ 50 MG Oral Tablet 24 Hr, Take 1 tablet (50 mg total) by mouth daily., Disp: , Rfl:     Telmisartan 20 MG Oral Tab, Take 1 tablet (20 mg total) by mouth daily., Disp: , Rfl:     carvedilol 3.125 MG Oral Tab, Take 1 tablet (3.125 mg total) by mouth 2 (two) times daily., Disp: , Rfl:     montelukast 10 MG Oral Tab, Take 1 tablet (10 mg total) by mouth nightly., Disp: , Rfl:     levothyroxine 88 MCG Oral Tab, Take 1 tablet (88 mcg total) by mouth before breakfast., Disp: , Rfl:     pantoprazole 20 MG Oral Tab EC, Take 1 tablet (20 mg total) by mouth 2 (two) times daily., Disp: , Rfl:     fluticasone furoate-vilanterol 200-25 MCG/ACT Inhalation Aerosol Powder, Breath Activated, Inhale 1 puff into the lungs daily., Disp: , Rfl:     LYLLANA 0.025 MG/24HR Transdermal Patch Biweekly, Place 1 patch onto the skin twice a week., Disp: , Rfl:     CELEBREX 100 MG Oral Cap, Take 1 capsule (100 mg total) by mouth 2 (two) times daily., Disp: , Rfl:     acetaminophen-codeine 300-60 MG Oral Tab, Take 1 tablet by mouth every 6 (six) hours as  needed., Disp: , Rfl:     pregabalin 200 MG Oral Cap, Take 1 capsule (200 mg total) by mouth 2 (two) times daily., Disp: , Rfl:     Ciclopirox 8 % External Solution, APPLY EVERY DAY, Disp: , Rfl:     HYDROcodone-acetaminophen  MG Oral Tab, Take 1 tablet by mouth 4 (four) times daily as needed., Disp: , Rfl:     baclofen 10 MG Oral Tab, Take 1 tablet (10 mg total) by mouth 3 (three) times daily., Disp: , Rfl:     NON FORMULARY, Tandem insulin pump settings (Bolus IQ) Mdnt 3.1 units/hr 0400 3.00 units/hr 0900 3.2 units/hr  Correction:  MN 1:35/110 1 PM 1:40 7 pm 1:35  CHO ratio: MN 1:5g, Disp: , Rfl:     FARXIGA 10 MG Oral Tab, Take 1 tablet (10 mg total) by mouth daily., Disp: , Rfl:     DULoxetine 60 MG Oral Cap DR Particles, Take 1 capsule (60 mg total) by mouth 2 (two) times daily., Disp: , Rfl:     atorvastatin 20 MG Oral Tab, Take 1 tablet (20 mg total) by mouth nightly., Disp: , Rfl:     DEXCOM G6 SENSOR Does not apply Misc, USE AS DIRECTED EVERY 10 DAYS, Disp: 9 each, Rfl: 3    Insulin Lispro (HUMALOG) 100 UNIT/ML Subcutaneous Solution, INJECT UP TO 70 UNITS VIA INSULIN PUMP DAILY, Disp: 70 mL, Rfl: 2    EPINEPHrine 0.15 MG/0.3ML Injection Solution Auto-injector, Jani pen/lower dose because pt is allergic to epinephrine, Disp: , Rfl:     Ondansetron HCl (ZOFRAN) 4 mg tablet, Take 1 tablet (4 mg total) by mouth 3 (three) times daily as needed., Disp: , Rfl:     Benralizumab (FASENRA PEN) 30 MG/ML Subcutaneous Solution Auto-injector, Inject 30 mg into the skin. Every other month/every 8 weeks, Disp: , Rfl:     DEXCOM G6 TRANSMITTER Does not apply Misc, USE AS DIRECTED EVERY 90 DAYS, Disp: 1 each, Rfl: 3    Thiamine HCl 100 MG Oral Tab, Take 1 tablet (100 mg total) by mouth daily., Disp: 30 tablet, Rfl: 3    docusate sodium 100 MG Oral Cap, Take 1 capsule (100 mg total) by mouth 2 (two) times daily., Disp: , Rfl:     denosumab 60 MG/ML Subcutaneous Solution, Inject 1 mL (60 mg total) into the skin every 6  (six) months., Disp: , Rfl:     Cholecalciferol (VITAMIN D-3) 5000 UNITS Oral Tab, Take 1 tablet (5,000 Units total) by mouth daily., Disp: , Rfl:     Albuterol Sulfate  (90 Base) MCG/ACT Inhalation Aero Soln, Inhale 2 puffs into the lungs every 6 (six) hours as needed. PRN wheezing/SOB, Disp: , Rfl:   PRN:     Allergies:  Allergies   Allergen Reactions    Epinephrine OTHER (SEE COMMENTS)     Cardiac issues    Immune Globulin ANAPHYLAXIS    Latex SHORTNESS OF BREATH and OTHER (SEE COMMENTS)     WELTS    Methylprednisolone SWELLING     Swelling of neck, throat and tongue  Injection, throat and tongue swelling      Ocrelizumab ANAPHYLAXIS and OTHER (SEE COMMENTS)     Difficulty breathing    Other SHORTNESS OF BREATH     ENVIRONMENTAL, ASTHMA EXACERBATION    Pcn [Penicillamine]     Penicillins OTHER (SEE COMMENTS)    Urea Tightness in Throat     Urea C-13 in Pranactin for H. Pylori test kit.    Midodrine ITCHING     Pt reports mouth pain and itching possibly r/t midodrine          EXAM:  /72 (BP Location: Left arm, Patient Position: Sitting, Cuff Size: adult)   Pulse 72   Resp 16   Ht 66\"   Wt 167 lb (75.8 kg)   LMP  (LMP Unknown)   BMI 26.95 kg/m²   Looks stated age  General Exam:  HENT:  pink conjunctiva anicteric sclerae  Neck no adenopathy, thyroid normal  Heart and Lungs:  normal  Extremities: no cyanosis, skin changes    NEURO  Tinel's positive right hand  Rest of DTRs are present  Rest shows foot drop      INTERPRETATION of RELEVANT LABS and other DATA:          Problem/s Identified this visit:   1. MS (multiple sclerosis) (HCC)    2. Neuropathic pain of both legs    3. Balance problem    4. Left foot drop    5. Numbness and tingling in right hand    6. Cervical radiculopathy          Discussion plus Diagnostics & Treatment Orders:  Schedule MRI of CErvical spine  EMG right arm        (x) Discussed potential side effects of any treatment relevant to above.  Includes explanation of tests as  necessary.    Return for scheduled EMG-NCV test.      Patient understands that if needed, based on condition and or test results, follow up will be readjusted      Rao Payne MD  Vascular & General Neurology  Director, Multiple Sclerosis Program  [facility]  8/19/2024, Time completed 2:01 PM    Decision making:  ( x ) labs reviewed/ordered - 1  (  ) new diagnosis: - 1  ( x) Images & studies independently reviewed -non F2F  (  ) Case/studies discussed with other caregivers - -non F2F  (  ) Telephone time with patiern or authorized [facility] member--non F2F  ( x ) other records reviewed --non F2F including consultations  (  ) [facility] meetings - patient not present --non F2F  (  ) Independent Historian obtained    Non Face to Face CPT code 61740/11728 applies as documented above    PROCEDURE DONE     (   ) see notes        After visit, patient was escorted out and handed-off discharge process and instructions to the check out desk.  No additional issues relevant to visit were raised to staff at this time interval.        This document is to be interpreted as my current opinion regarding the case as of the stated date of service based on the information available to me at this time and may supersedes any prior opinion expressed either orally or in writing.  Services rendered are only within the scope of direct medical care  Sometimes, reports may have been prepared partially using a speech recognition software technology.  If a word or phrase is confusing or out of context, please do not hesitate to call for clarification.

## 2024-08-23 ENCOUNTER — HOSPITAL ENCOUNTER (OUTPATIENT)
Dept: GENERAL RADIOLOGY | Facility: HOSPITAL | Age: 54
Discharge: HOME | End: 2024-08-23
Attending: SPECIALIST
Payer: MEDICARE

## 2024-08-23 ENCOUNTER — HOSPITAL ENCOUNTER (OUTPATIENT)
Dept: CT IMAGING | Facility: HOSPITAL | Age: 54
Discharge: HOME | End: 2024-08-23
Attending: SPECIALIST
Payer: MEDICARE

## 2024-08-23 DIAGNOSIS — R10.10 UPPER ABDOMINAL PAIN: ICD-10-CM

## 2024-08-23 LAB
ALBUMIN SERPL-MCNC: 4.2 G/DL (ref 3.2–4.8)
ALBUMIN/GLOB SERPL: 1.7 {RATIO} (ref 1–2)
ALP LIVER SERPL-CCNC: 83 U/L
ALT SERPL-CCNC: 22 U/L
ANION GAP SERPL CALC-SCNC: 1 MMOL/L (ref 0–18)
AST SERPL-CCNC: 20 U/L (ref ?–34)
BASOPHILS # BLD AUTO: 0.02 X10(3) UL (ref 0–0.2)
BASOPHILS NFR BLD AUTO: 0.3 %
BILIRUB SERPL-MCNC: 0.5 MG/DL (ref 0.3–1.2)
BILIRUB UR QL STRIP.AUTO: NEGATIVE
BUN BLD-MCNC: 11 MG/DL (ref 9–23)
CALCIUM BLD-MCNC: 8.8 MG/DL (ref 8.7–10.4)
CHLORIDE SERPL-SCNC: 107 MMOL/L (ref 98–112)
CLARITY UR REFRACT.AUTO: CLEAR
CO2 SERPL-SCNC: 32 MMOL/L (ref 21–32)
CREAT BLD-MCNC: 0.9 MG/DL
CREAT BLD-MCNC: 0.93 MG/DL
CRP SERPL-MCNC: 0.5 MG/DL (ref ?–0.5)
EGFRCR SERPLBLD CKD-EPI 2021: 72 ML/MIN/1.73M2 (ref 60–?)
EGFRCR SERPLBLD CKD-EPI 2021: 75 ML/MIN/1.73M2 (ref 60–?)
EOSINOPHIL # BLD AUTO: 0 X10(3) UL (ref 0–0.7)
EOSINOPHIL NFR BLD AUTO: 0 %
ERYTHROCYTE [DISTWIDTH] IN BLOOD BY AUTOMATED COUNT: 18.3 %
FASTING STATUS PATIENT QL REPORTED: YES
GLOBULIN PLAS-MCNC: 2.5 G/DL (ref 2–3.5)
GLUCOSE BLD-MCNC: 174 MG/DL (ref 70–99)
GLUCOSE UR STRIP.AUTO-MCNC: >1000 MG/DL
HCT VFR BLD AUTO: 37.3 %
HGB BLD-MCNC: 11.3 G/DL
IMM GRANULOCYTES # BLD AUTO: 0.02 X10(3) UL (ref 0–1)
IMM GRANULOCYTES NFR BLD: 0.3 %
KETONES UR STRIP.AUTO-MCNC: NEGATIVE MG/DL
LEUKOCYTE ESTERASE UR QL STRIP.AUTO: NEGATIVE
LYMPHOCYTES # BLD AUTO: 3.27 X10(3) UL (ref 1–4)
LYMPHOCYTES NFR BLD AUTO: 51.3 %
MCH RBC QN AUTO: 22.3 PG (ref 26–34)
MCHC RBC AUTO-ENTMCNC: 30.3 G/DL (ref 31–37)
MCV RBC AUTO: 73.6 FL
MONOCYTES # BLD AUTO: 0.49 X10(3) UL (ref 0.1–1)
MONOCYTES NFR BLD AUTO: 7.7 %
NEUTROPHILS # BLD AUTO: 2.57 X10 (3) UL (ref 1.5–7.7)
NEUTROPHILS # BLD AUTO: 2.57 X10(3) UL (ref 1.5–7.7)
NEUTROPHILS NFR BLD AUTO: 40.4 %
NITRITE UR QL STRIP.AUTO: NEGATIVE
OSMOLALITY SERPL CALC.SUM OF ELEC: 294 MOSM/KG (ref 275–295)
PH UR STRIP.AUTO: 6 [PH] (ref 5–8)
PLATELET # BLD AUTO: 141 10(3)UL (ref 150–450)
PLATELET MORPHOLOGY: NORMAL
PLATELETS.RETICULATED NFR BLD AUTO: 7.7 % (ref 0–7)
POTASSIUM SERPL-SCNC: 3.8 MMOL/L (ref 3.5–5.1)
PROT SERPL-MCNC: 6.7 G/DL (ref 5.7–8.2)
PROT UR STRIP.AUTO-MCNC: NEGATIVE MG/DL
RBC # BLD AUTO: 5.07 X10(6)UL
RBC UR QL AUTO: NEGATIVE
SODIUM SERPL-SCNC: 140 MMOL/L (ref 136–145)
SP GR UR STRIP.AUTO: 1.03 (ref 1–1.03)
UROBILINOGEN UR STRIP.AUTO-MCNC: NORMAL MG/DL
WBC # BLD AUTO: 6.4 X10(3) UL (ref 4–11)

## 2024-08-23 PROCEDURE — 74177 CT ABD & PELVIS W/CONTRAST: CPT | Performed by: SPECIALIST

## 2024-08-23 PROCEDURE — 85025 COMPLETE CBC W/AUTO DIFF WBC: CPT | Performed by: SPECIALIST

## 2024-08-23 PROCEDURE — 81003 URINALYSIS AUTO W/O SCOPE: CPT | Performed by: SPECIALIST

## 2024-08-23 PROCEDURE — 86140 C-REACTIVE PROTEIN: CPT | Performed by: SPECIALIST

## 2024-08-23 PROCEDURE — 82565 ASSAY OF CREATININE: CPT

## 2024-08-23 PROCEDURE — 36591 DRAW BLOOD OFF VENOUS DEVICE: CPT

## 2024-08-23 PROCEDURE — 80053 COMPREHEN METABOLIC PANEL: CPT | Performed by: SPECIALIST

## 2024-08-27 ENCOUNTER — HOSPITAL ENCOUNTER (INPATIENT)
Facility: HOSPITAL | Age: 54
LOS: 3 days | Discharge: HOME HEALTH | End: 2024-08-30
Attending: EMERGENCY MEDICINE | Admitting: HOSPITALIST
Payer: MEDICARE

## 2024-08-27 ENCOUNTER — APPOINTMENT (OUTPATIENT)
Dept: CT IMAGING | Facility: HOSPITAL | Age: 54
End: 2024-08-27
Attending: EMERGENCY MEDICINE
Payer: MEDICARE

## 2024-08-27 DIAGNOSIS — R10.9 INTRACTABLE ABDOMINAL PAIN: ICD-10-CM

## 2024-08-27 DIAGNOSIS — K86.89 PANCREATIC DUCT DILATED (HCC): Primary | ICD-10-CM

## 2024-08-27 LAB
ALBUMIN SERPL-MCNC: 4.4 G/DL (ref 3.2–4.8)
ALBUMIN/GLOB SERPL: 1.8 {RATIO} (ref 1–2)
ALP LIVER SERPL-CCNC: 83 U/L
ALT SERPL-CCNC: 26 U/L
ANION GAP SERPL CALC-SCNC: 3 MMOL/L (ref 0–18)
AST SERPL-CCNC: 25 U/L (ref ?–34)
BASOPHILS # BLD AUTO: 0.02 X10(3) UL (ref 0–0.2)
BASOPHILS NFR BLD AUTO: 0.3 %
BILIRUB SERPL-MCNC: 0.3 MG/DL (ref 0.3–1.2)
BILIRUB UR QL STRIP.AUTO: NEGATIVE
BUN BLD-MCNC: 9 MG/DL (ref 9–23)
CALCIUM BLD-MCNC: 9.2 MG/DL (ref 8.7–10.4)
CHLORIDE SERPL-SCNC: 107 MMOL/L (ref 98–112)
CLARITY UR REFRACT.AUTO: CLEAR
CO2 SERPL-SCNC: 30 MMOL/L (ref 21–32)
CREAT BLD-MCNC: 0.93 MG/DL
EGFRCR SERPLBLD CKD-EPI 2021: 72 ML/MIN/1.73M2 (ref 60–?)
EOSINOPHIL # BLD AUTO: 0 X10(3) UL (ref 0–0.7)
EOSINOPHIL NFR BLD AUTO: 0 %
ERYTHROCYTE [DISTWIDTH] IN BLOOD BY AUTOMATED COUNT: 18.6 %
GLOBULIN PLAS-MCNC: 2.4 G/DL (ref 2–3.5)
GLUCOSE BLD-MCNC: 110 MG/DL (ref 70–99)
GLUCOSE BLD-MCNC: 139 MG/DL (ref 70–99)
GLUCOSE BLD-MCNC: 155 MG/DL (ref 70–99)
GLUCOSE BLD-MCNC: 242 MG/DL (ref 70–99)
GLUCOSE BLD-MCNC: 43 MG/DL (ref 70–99)
GLUCOSE BLD-MCNC: 82 MG/DL (ref 70–99)
GLUCOSE UR STRIP.AUTO-MCNC: >1000 MG/DL
HCT VFR BLD AUTO: 36.3 %
HGB BLD-MCNC: 11.5 G/DL
IMM GRANULOCYTES # BLD AUTO: 0.03 X10(3) UL (ref 0–1)
IMM GRANULOCYTES NFR BLD: 0.4 %
KETONES UR STRIP.AUTO-MCNC: NEGATIVE MG/DL
LEUKOCYTE ESTERASE UR QL STRIP.AUTO: 25
LIPASE SERPL-CCNC: 39 U/L (ref 12–53)
LYMPHOCYTES # BLD AUTO: 4.23 X10(3) UL (ref 1–4)
LYMPHOCYTES NFR BLD AUTO: 53.3 %
MCH RBC QN AUTO: 22.4 PG (ref 26–34)
MCHC RBC AUTO-ENTMCNC: 31.7 G/DL (ref 31–37)
MCV RBC AUTO: 70.6 FL
MONOCYTES # BLD AUTO: 0.59 X10(3) UL (ref 0.1–1)
MONOCYTES NFR BLD AUTO: 7.4 %
NEUTROPHILS # BLD AUTO: 3.06 X10 (3) UL (ref 1.5–7.7)
NEUTROPHILS # BLD AUTO: 3.06 X10(3) UL (ref 1.5–7.7)
NEUTROPHILS NFR BLD AUTO: 38.6 %
NITRITE UR QL STRIP.AUTO: NEGATIVE
OSMOLALITY SERPL CALC.SUM OF ELEC: 289 MOSM/KG (ref 275–295)
PH UR STRIP.AUTO: 5.5 [PH] (ref 5–8)
PLATELET # BLD AUTO: 164 10(3)UL (ref 150–450)
PLATELETS.RETICULATED NFR BLD AUTO: 6.8 % (ref 0–7)
POTASSIUM SERPL-SCNC: 3.7 MMOL/L (ref 3.5–5.1)
PROT SERPL-MCNC: 6.8 G/DL (ref 5.7–8.2)
PROT UR STRIP.AUTO-MCNC: NEGATIVE MG/DL
RBC # BLD AUTO: 5.14 X10(6)UL
RBC UR QL AUTO: NEGATIVE
SODIUM SERPL-SCNC: 140 MMOL/L (ref 136–145)
SP GR UR STRIP.AUTO: >1.03 (ref 1–1.03)
UROBILINOGEN UR STRIP.AUTO-MCNC: NORMAL MG/DL
WBC # BLD AUTO: 7.9 X10(3) UL (ref 4–11)

## 2024-08-27 PROCEDURE — 99223 1ST HOSP IP/OBS HIGH 75: CPT | Performed by: HOSPITALIST

## 2024-08-27 PROCEDURE — 74177 CT ABD & PELVIS W/CONTRAST: CPT | Performed by: EMERGENCY MEDICINE

## 2024-08-27 RX ORDER — HYDROMORPHONE HYDROCHLORIDE 1 MG/ML
0.8 INJECTION, SOLUTION INTRAMUSCULAR; INTRAVENOUS; SUBCUTANEOUS EVERY 2 HOUR PRN
Status: DISCONTINUED | OUTPATIENT
Start: 2024-08-27 | End: 2024-08-30

## 2024-08-27 RX ORDER — NICOTINE POLACRILEX 4 MG
30 LOZENGE BUCCAL
Status: DISCONTINUED | OUTPATIENT
Start: 2024-08-27 | End: 2024-08-30

## 2024-08-27 RX ORDER — CARVEDILOL 3.12 MG/1
3.12 TABLET ORAL 2 TIMES DAILY WITH MEALS
Status: DISCONTINUED | OUTPATIENT
Start: 2024-08-28 | End: 2024-08-30

## 2024-08-27 RX ORDER — MELATONIN
3 NIGHTLY PRN
Status: DISCONTINUED | OUTPATIENT
Start: 2024-08-27 | End: 2024-08-30

## 2024-08-27 RX ORDER — ECHINACEA PURPUREA EXTRACT 125 MG
1 TABLET ORAL
Status: DISCONTINUED | OUTPATIENT
Start: 2024-08-27 | End: 2024-08-30

## 2024-08-27 RX ORDER — ACETAMINOPHEN 500 MG
500 TABLET ORAL EVERY 4 HOURS PRN
Status: DISCONTINUED | OUTPATIENT
Start: 2024-08-27 | End: 2024-08-30

## 2024-08-27 RX ORDER — SENNOSIDES 8.6 MG
17.2 TABLET ORAL NIGHTLY PRN
Status: DISCONTINUED | OUTPATIENT
Start: 2024-08-27 | End: 2024-08-30

## 2024-08-27 RX ORDER — DEXTROSE MONOHYDRATE 25 G/50ML
INJECTION, SOLUTION INTRAVENOUS
Status: COMPLETED
Start: 2024-08-27 | End: 2024-08-27

## 2024-08-27 RX ORDER — ONDANSETRON 2 MG/ML
4 INJECTION INTRAMUSCULAR; INTRAVENOUS ONCE
Status: COMPLETED | OUTPATIENT
Start: 2024-08-27 | End: 2024-08-27

## 2024-08-27 RX ORDER — NICOTINE POLACRILEX 4 MG
15 LOZENGE BUCCAL
Status: DISCONTINUED | OUTPATIENT
Start: 2024-08-27 | End: 2024-08-30

## 2024-08-27 RX ORDER — LEVOTHYROXINE SODIUM 88 UG/1
88 TABLET ORAL
Status: DISCONTINUED | OUTPATIENT
Start: 2024-08-28 | End: 2024-08-30

## 2024-08-27 RX ORDER — BISACODYL 10 MG
10 SUPPOSITORY, RECTAL RECTAL
Status: DISCONTINUED | OUTPATIENT
Start: 2024-08-27 | End: 2024-08-30

## 2024-08-27 RX ORDER — ONDANSETRON 2 MG/ML
4 INJECTION INTRAMUSCULAR; INTRAVENOUS EVERY 6 HOURS PRN
Status: DISCONTINUED | OUTPATIENT
Start: 2024-08-27 | End: 2024-08-30

## 2024-08-27 RX ORDER — ATORVASTATIN CALCIUM 20 MG/1
20 TABLET, FILM COATED ORAL NIGHTLY
Status: DISCONTINUED | OUTPATIENT
Start: 2024-08-28 | End: 2024-08-30

## 2024-08-27 RX ORDER — PANTOPRAZOLE SODIUM 20 MG/1
20 TABLET, DELAYED RELEASE ORAL
Status: DISCONTINUED | OUTPATIENT
Start: 2024-08-28 | End: 2024-08-30

## 2024-08-27 RX ORDER — MELATONIN
100 DAILY
Status: DISCONTINUED | OUTPATIENT
Start: 2024-08-28 | End: 2024-08-30

## 2024-08-27 RX ORDER — ONDANSETRON 2 MG/ML
4 INJECTION INTRAMUSCULAR; INTRAVENOUS EVERY 4 HOURS PRN
Status: ACTIVE | OUTPATIENT
Start: 2024-08-27 | End: 2024-08-28

## 2024-08-27 RX ORDER — POLYETHYLENE GLYCOL 3350 17 G/17G
17 POWDER, FOR SOLUTION ORAL DAILY PRN
Status: DISCONTINUED | OUTPATIENT
Start: 2024-08-27 | End: 2024-08-30

## 2024-08-27 RX ORDER — HYDROMORPHONE HYDROCHLORIDE 1 MG/ML
0.4 INJECTION, SOLUTION INTRAMUSCULAR; INTRAVENOUS; SUBCUTANEOUS EVERY 2 HOUR PRN
Status: DISCONTINUED | OUTPATIENT
Start: 2024-08-27 | End: 2024-08-30

## 2024-08-27 RX ORDER — MIRABEGRON 50 MG/1
50 TABLET, EXTENDED RELEASE ORAL DAILY
Status: DISCONTINUED | OUTPATIENT
Start: 2024-08-28 | End: 2024-08-28 | Stop reason: ALTCHOICE

## 2024-08-27 RX ORDER — HYDROMORPHONE HYDROCHLORIDE 1 MG/ML
0.2 INJECTION, SOLUTION INTRAMUSCULAR; INTRAVENOUS; SUBCUTANEOUS EVERY 2 HOUR PRN
Status: DISCONTINUED | OUTPATIENT
Start: 2024-08-27 | End: 2024-08-30

## 2024-08-27 RX ORDER — FAMOTIDINE 10 MG/ML
20 INJECTION, SOLUTION INTRAVENOUS ONCE
Status: COMPLETED | OUTPATIENT
Start: 2024-08-27 | End: 2024-08-27

## 2024-08-27 RX ORDER — HYDROMORPHONE HYDROCHLORIDE 1 MG/ML
0.5 INJECTION, SOLUTION INTRAMUSCULAR; INTRAVENOUS; SUBCUTANEOUS ONCE
Status: COMPLETED | OUTPATIENT
Start: 2024-08-27 | End: 2024-08-27

## 2024-08-27 RX ORDER — ENOXAPARIN SODIUM 100 MG/ML
40 INJECTION SUBCUTANEOUS DAILY
Status: DISCONTINUED | OUTPATIENT
Start: 2024-08-28 | End: 2024-08-30

## 2024-08-27 RX ORDER — HYDROCODONE BITARTRATE AND ACETAMINOPHEN 10; 325 MG/1; MG/1
1 TABLET ORAL 4 TIMES DAILY PRN
Status: DISCONTINUED | OUTPATIENT
Start: 2024-08-27 | End: 2024-08-30

## 2024-08-27 RX ORDER — DEXTROSE MONOHYDRATE AND SODIUM CHLORIDE 5; .45 G/100ML; G/100ML
INJECTION, SOLUTION INTRAVENOUS ONCE
Status: COMPLETED | OUTPATIENT
Start: 2024-08-27 | End: 2024-08-28

## 2024-08-27 RX ORDER — DEXTROSE MONOHYDRATE 25 G/50ML
50 INJECTION, SOLUTION INTRAVENOUS
Status: DISCONTINUED | OUTPATIENT
Start: 2024-08-27 | End: 2024-08-30

## 2024-08-27 RX ORDER — INSULIN DEGLUDEC 100 U/ML
20 INJECTION, SOLUTION SUBCUTANEOUS DAILY
Status: DISCONTINUED | OUTPATIENT
Start: 2024-08-28 | End: 2024-08-30

## 2024-08-27 RX ORDER — ALBUTEROL SULFATE 90 UG/1
2 AEROSOL, METERED RESPIRATORY (INHALATION) EVERY 6 HOURS PRN
Status: DISCONTINUED | OUTPATIENT
Start: 2024-08-27 | End: 2024-08-30

## 2024-08-27 RX ORDER — FLUTICASONE PROPIONATE AND SALMETEROL 500; 50 UG/1; UG/1
1 POWDER RESPIRATORY (INHALATION) 2 TIMES DAILY
Status: DISCONTINUED | OUTPATIENT
Start: 2024-08-27 | End: 2024-08-30

## 2024-08-27 RX ORDER — PROCHLORPERAZINE EDISYLATE 5 MG/ML
5 INJECTION INTRAMUSCULAR; INTRAVENOUS EVERY 8 HOURS PRN
Status: DISCONTINUED | OUTPATIENT
Start: 2024-08-27 | End: 2024-08-30

## 2024-08-27 RX ORDER — MONTELUKAST SODIUM 10 MG/1
10 TABLET ORAL NIGHTLY
Status: DISCONTINUED | OUTPATIENT
Start: 2024-08-27 | End: 2024-08-30

## 2024-08-27 RX ORDER — SODIUM CHLORIDE 9 MG/ML
INJECTION, SOLUTION INTRAVENOUS CONTINUOUS
Status: ACTIVE | OUTPATIENT
Start: 2024-08-27 | End: 2024-08-28

## 2024-08-27 RX ORDER — HYDROMORPHONE HYDROCHLORIDE 1 MG/ML
1 INJECTION, SOLUTION INTRAMUSCULAR; INTRAVENOUS; SUBCUTANEOUS EVERY 30 MIN PRN
Status: DISCONTINUED | OUTPATIENT
Start: 2024-08-27 | End: 2024-08-30

## 2024-08-27 RX ORDER — PREGABALIN 50 MG/1
200 CAPSULE ORAL 2 TIMES DAILY
Status: DISCONTINUED | OUTPATIENT
Start: 2024-08-27 | End: 2024-08-30

## 2024-08-27 RX ORDER — KETOROLAC TROMETHAMINE 15 MG/ML
15 INJECTION, SOLUTION INTRAMUSCULAR; INTRAVENOUS ONCE
Status: COMPLETED | OUTPATIENT
Start: 2024-08-27 | End: 2024-08-27

## 2024-08-27 RX ORDER — BACLOFEN 10 MG/1
10 TABLET ORAL 3 TIMES DAILY
Status: DISCONTINUED | OUTPATIENT
Start: 2024-08-28 | End: 2024-08-30

## 2024-08-27 RX ORDER — DULOXETIN HYDROCHLORIDE 30 MG/1
60 CAPSULE, DELAYED RELEASE ORAL 2 TIMES DAILY
Status: DISCONTINUED | OUTPATIENT
Start: 2024-08-27 | End: 2024-08-30

## 2024-08-27 RX ORDER — HYDROMORPHONE HYDROCHLORIDE 1 MG/ML
0.5 INJECTION, SOLUTION INTRAMUSCULAR; INTRAVENOUS; SUBCUTANEOUS EVERY 30 MIN PRN
Status: ACTIVE | OUTPATIENT
Start: 2024-08-27 | End: 2024-08-28

## 2024-08-27 RX ORDER — SODIUM PHOSPHATE, DIBASIC AND SODIUM PHOSPHATE, MONOBASIC 7; 19 G/230ML; G/230ML
1 ENEMA RECTAL ONCE AS NEEDED
Status: DISCONTINUED | OUTPATIENT
Start: 2024-08-27 | End: 2024-08-30

## 2024-08-27 RX ORDER — LOSARTAN POTASSIUM 25 MG/1
25 TABLET ORAL DAILY
Status: DISCONTINUED | OUTPATIENT
Start: 2024-08-28 | End: 2024-08-30

## 2024-08-27 RX ORDER — VIBEGRON 75 MG/1
75 TABLET, FILM COATED ORAL DAILY
COMMUNITY
Start: 2024-08-20 | End: 2024-09-04

## 2024-08-27 RX ORDER — FAMOTIDINE 10 MG/ML
INJECTION, SOLUTION INTRAVENOUS
Status: COMPLETED
Start: 2024-08-27 | End: 2024-08-27

## 2024-08-27 NOTE — ED INITIAL ASSESSMENT (HPI)
Patient here for abd pain that radiates to the right flank for two weeks. Nauseous without emesis. Denies fevers. States her PCP did blood work and a CT scan which did not give a reason for the pain.

## 2024-08-27 NOTE — ED PROVIDER NOTES
Patient Seen in: [facility] Emergency Department      History     Chief Complaint   Patient presents with    Abdomen/Flank Pain     Stated Complaint: abd pain    Subjective:   HPI    Patient comes to the emergency department with abdominal pain which has been present over the past 2 weeks.  The pain is quite generalized, but does center in on the right flank.  Patient has been nauseous without any vomiting.  She has had no fevers.  She did have CT and lab work performed by her primary care physician a few days ago without any specific abnormalities noted.    Objective:   Past Medical History:    Abdominal pain    Allergic rhinitis    Anemia    Anxiety    Anxiety state    Arthritis    Asthma (HCC)    Atypical mole    Autoimmune disease (HCC)    Back pain    Bad breath    Black stools    Bloating    Blood disorder    Thalassemia alpha, Sickle cell trait    Blood in urine    Blurred vision    Calculus of kidney    Cardiomyopathy (HCC)    last echo 2021: EF 55%    Chest pain    Constipation    Decorative tattoo    Deep vein thrombosis (HCC)    Depression    Diarrhea, unspecified    Dizziness    Easy bruising    Elevated liver enzymes    Endocrine disorder    Esophageal reflux    Fatigue    Fibromyalgia    Food intolerance    Frequent urination    Frequent UTI    Gastroparesis    Glaucoma    H. pylori infection    H/O  section    Headache disorder    Heart attack (HCC)    Heart palpitations    Heartburn    Hemorrhoids    Herniation of cervical intervertebral disc    High blood pressure    High cholesterol    History of blood clots    Unprovoked    History of blood transfusion    History of cardiac murmur    History of depression    Hoarseness, chronic    Hyperlipidemia    IBS (irritable bowel syndrome)    Irregular bowel habits    Leaking of urine    Liver disease    GIORDANO and Stage 2 fibrosis    Loss of appetite    Migraines    Multiple sclerosis (HCC)    dx 8 years ago    Muscle weakness    cane or  walker prn    Myocardial infarction (HCC)    Neuropathy    legs, hands, feet; bilateral    Night sweats    Osteoarthritis    Osteoporosis    KAMALJIT in her 30s    Osteoporosis    Pain with bowel movements    Painful urination    Problems with swallowing    Due to MS    Renal disorder    kidney lesions    Scoliosis of lumbar spine    Shortness of breath    Sickle cell trait (HCC)    Sleep apnea    no treatment    Sleep disturbance    Stress    Type II or unspecified type diabetes mellitus without mention of complication, not stated as uncontrolled    Uncomfortable fullness after meals    Unspecified disorder of thyroid    total thyroidectomy    Visual impairment    glasses    Wears glasses    Weight loss    Wheezing              Past Surgical History:   Procedure Laterality Date      1986    Cataract      Cath angio  2014    Cholecystectomy      Colonoscopy N/A 2015    Procedure: COLONOSCOPY;  Surgeon: Liz Tong DO;  Location:  ENDOSCOPY    Colonoscopy N/A 2021    Procedure: COLONOSCOPY;  Surgeon: Cedrick Da Silva MD;  Location:  ENDOSCOPY    Colonoscopy N/A 10/08/2021    Procedure: COLONOSCOPY, ESOPHAGOGASTRODUODENOSCOPY (EGD);  Surgeon: Darvin Richardson MD;  Location:  ENDOSCOPY    D & c  1990    Blighted ovum    Egd      Hysterectomy      total hystero.    Ir port a cath insertion exchnge check  2018    Lithotripsy      x 2    Lysis of adhesions      Needle biopsy liver  2016    Oophorectomy Bilateral     total hystero.    Other      dialysis catheter- left chest for plasmaphoresis    Other surgical history       x 2    Port, indwelling, imp  2019    power port    Thyroidectomy  2013    benign MNG    Total abdom hysterectomy      Upper gi endoscopy performed  2014                Social History     Socioeconomic History    Marital status: Single   Tobacco Use    Smoking status: Never     Passive exposure: Never    Smokeless  tobacco: Never    Tobacco comments:     Never used it   Vaping Use    Vaping status: Never Used   Substance and Sexual Activity    Alcohol use: Never    Drug use: Never   Other Topics Concern    Caffeine Concern Yes     Comment: 1 can 3 x week    Exercise Yes     Comment: walking     Social Determinants of Health     Financial Resource Strain: Low Risk  (2/19/2024)    Received from [facility]    Financial Resource Strain     In the past year, have you or any family members you live with been unable to get any of the following when it was really needed? Check all that apply.: None   Food Insecurity: No Food Insecurity (4/5/2024)    Food Insecurity     Food Insecurity: Never true   Transportation Needs: No Transportation Needs (4/5/2024)    Transportation Needs     Lack of Transportation: No   Physical Activity: Insufficiently Active (7/27/2022)    Received from [facility], [facility]    Exercise Vital Sign     Days of Exercise per Week: 3 days     Minutes of Exercise per Session: 20 min   Stress: Low Risk  (2/19/2024)    Received from [facility], [facility]    Stress     Stress is when someone feels tense, nervous, anxious, or can't sleep at night because their mind is troubled. How stressed are you? : Not at all   Social Connections: Low Risk  (2/19/2024)    Received from [facility]    Social Connections     How often do you see or talk to people that you care about and feel close to? (For example: talking to friends on the phone, visiting friends or family, going to [employer] or club meetings): 5 or more times a week   Housing Stability: Low Risk  (4/5/2024)    Housing Stability     Housing Instability: No              Review of Systems    Positive for stated Chief Complaint: Abdomen/Flank Pain    Other systems are as noted in HPI.  Constitutional and vital signs reviewed.      All other systems  reviewed and negative except as noted above.    Physical Exam     ED Triage Vitals [08/27/24 1406]   /77   Pulse 80   Resp 20   Temp 98.4 °F (36.9 °C)   Temp src Oral   SpO2 98 %   O2 Device None (Room air)       Current Vitals:   Vital Signs  BP: 135/79  Pulse: 67  Resp: 13  Temp: 98.4 °F (36.9 °C)  Temp src: Oral  MAP (mmHg): 95    Oxygen Therapy  SpO2: 97 %  O2 Device: None (Room air)            Physical Exam  Vitals and nursing note reviewed.   Constitutional:       Appearance: She is well-developed.   HENT:      Head: Normocephalic.   Cardiovascular:      Rate and Rhythm: Normal rate and regular rhythm.      Heart sounds: Normal heart sounds. No murmur heard.  Pulmonary:      Effort: Pulmonary effort is normal. No respiratory distress.      Breath sounds: Normal breath sounds.   Abdominal:      General: Bowel sounds are normal.      Palpations: Abdomen is soft.      Tenderness: There is generalized abdominal tenderness. There is no rebound.      Comments: Generalized discomfort with right flank discomfort.  No peritoneal findings.   Musculoskeletal:         General: No tenderness. Normal range of motion.      Cervical back: Normal range of motion and neck supple.   Lymphadenopathy:      Cervical: No cervical adenopathy.   Skin:     General: Skin is warm and dry.      Findings: No rash.   Neurological:      Mental Status: She is alert and oriented to person, place, and time.      Sensory: No sensory deficit.              ED Course     Labs Reviewed   COMP METABOLIC PANEL (14) - Abnormal; Notable for the following components:       Result Value    Glucose 110 (*)     All other components within normal limits   CBC WITH DIFFERENTIAL WITH PLATELET - Abnormal; Notable for the following components:    HGB 11.5 (*)     MCV 70.6 (*)     MCH 22.4 (*)     Lymphocyte Absolute 4.23 (*)     All other components within normal limits   URINALYSIS WITH CULTURE REFLEX - Abnormal; Notable for the following components:     Spec Gravity >1.030 (*)     Glucose Urine >1000 (*)     Leukocyte Esterase Urine 25 (*)     WBC Urine 11-20 (*)     Squamous Epi. Cells Few (*)     All other components within normal limits   POCT GLUCOSE - Abnormal; Notable for the following components:    POC Glucose 43 (*)     All other components within normal limits   POCT GLUCOSE - Abnormal; Notable for the following components:    POC Glucose 242 (*)     All other components within normal limits   POCT GLUCOSE - Abnormal; Notable for the following components:    POC Glucose 139 (*)     All other components within normal limits   LIPASE - Normal   URINE CULTURE, ROUTINE   BLOOD CULTURE   BLOOD CULTURE          ED Course as of 08/27/24 2032  ------------------------------------------------------------  Time: 08/27 1703  Value: Lipase: 39  Comment: (Reviewed)  ------------------------------------------------------------  Time: 08/27 1908  Comment: Patient was quite uncomfortable and received 2 doses of Dilaudid without any improvement of her symptoms.  Toradol was administered.  ------------------------------------------------------------  Time: 08/27 1908  Value: WBC Urine(!): 11-20  Comment: (Reviewed)  ------------------------------------------------------------  Time: 08/27 1909  Value: Leukocyte Esterase (!): 25  Comment: (Reviewed)  ------------------------------------------------------------  Time: 08/27 1909  Value: Comp Metabolic Panel (14)(!)  Comment: Unremarkable  ------------------------------------------------------------  Time: 08/27 1909  Value: CBC With Differential With Platelet(!)  Comment: Unremarkable, no elevated white count.  ------------------------------------------------------------  Time: 08/27 1955  Value: CT APPENDIX ABD/PEL W CONTRAST (CPT=74177)  Comment: No acute inflammatory process.  However, new pancreatic ductal dilatation is noted.     Patient received several doses of Dilaudid which did partially moderate her pain.  She had  no episodes of emesis.         MDM      Patient comes to the emergency department with 2 weeks of ongoing abdominal pain which is worsened in spite of a negative outpatient workup performed by patient's primary physician.  Differential diagnosis includes acute bowel obstruction, pancreatic/biliary pathology, acute intra-abdominal inflammatory process, kidney stone, UTI.  Patient is diabetic and gastroparesis is also a consideration.  Patient objectively did have pyuria and leukocyte esterase in her urine, but patient did not have significant flank discomfort on exam.  Because of this, abdominal CT was repeated.  Pancreatic ductal dilatation was noted, but no evidence of inflammation was noted in patient's lipase and liver function tests were normal.  Patient did have moderation of pain with Dilaudid but continued to have quite significant discomfort.  Patient be hospitalized for symptom control and evaluation by gastroenterology.  Admission disposition: 8/27/2024  8:16 PM                                        Medical Decision Making      Disposition and Plan     Clinical Impression:  1. Pancreatic duct dilated (HCC)    2. Intractable abdominal pain         Disposition:  Admit  8/27/2024  8:16 pm    Follow-up:  No follow-up provider specified.        Medications Prescribed:  Current Discharge Medication List                            Hospital Problems       Present on Admission  Date Reviewed: 8/19/2024            ICD-10-CM Noted POA    * (Principal) Pancreatic duct dilated (HCC) K86.89 8/27/2024 Unknown

## 2024-08-28 ENCOUNTER — APPOINTMENT (OUTPATIENT)
Dept: MRI IMAGING | Facility: HOSPITAL | Age: 54
End: 2024-08-28
Attending: NURSE PRACTITIONER
Payer: MEDICARE

## 2024-08-28 LAB
ANION GAP SERPL CALC-SCNC: 3 MMOL/L (ref 0–18)
ATRIAL RATE: 69 BPM
BASOPHILS # BLD AUTO: 0.02 X10(3) UL (ref 0–0.2)
BASOPHILS NFR BLD AUTO: 0.3 %
BUN BLD-MCNC: 9 MG/DL (ref 9–23)
CALCIUM BLD-MCNC: 8.1 MG/DL (ref 8.7–10.4)
CHLORIDE SERPL-SCNC: 107 MMOL/L (ref 98–112)
CO2 SERPL-SCNC: 32 MMOL/L (ref 21–32)
CREAT BLD-MCNC: 0.94 MG/DL
EGFRCR SERPLBLD CKD-EPI 2021: 71 ML/MIN/1.73M2 (ref 60–?)
EOSINOPHIL # BLD AUTO: 0 X10(3) UL (ref 0–0.7)
EOSINOPHIL NFR BLD AUTO: 0 %
ERYTHROCYTE [DISTWIDTH] IN BLOOD BY AUTOMATED COUNT: 19.2 %
EST. AVERAGE GLUCOSE BLD GHB EST-MCNC: 194 MG/DL (ref 68–126)
GLUCOSE BLD-MCNC: 114 MG/DL (ref 70–99)
GLUCOSE BLD-MCNC: 123 MG/DL (ref 70–99)
GLUCOSE BLD-MCNC: 126 MG/DL (ref 70–99)
GLUCOSE BLD-MCNC: 168 MG/DL (ref 70–99)
GLUCOSE BLD-MCNC: 203 MG/DL (ref 70–99)
GLUCOSE BLD-MCNC: 217 MG/DL (ref 70–99)
HBA1C MFR BLD: 8.4 % (ref ?–5.7)
HCT VFR BLD AUTO: 35.8 %
HGB BLD-MCNC: 10.6 G/DL
IMM GRANULOCYTES # BLD AUTO: 0.02 X10(3) UL (ref 0–1)
IMM GRANULOCYTES NFR BLD: 0.3 %
LYMPHOCYTES # BLD AUTO: 3 X10(3) UL (ref 1–4)
LYMPHOCYTES NFR BLD AUTO: 46.5 %
MAGNESIUM SERPL-MCNC: 1.9 MG/DL (ref 1.6–2.6)
MCH RBC QN AUTO: 22.1 PG (ref 26–34)
MCHC RBC AUTO-ENTMCNC: 29.6 G/DL (ref 31–37)
MCV RBC AUTO: 74.6 FL
MONOCYTES # BLD AUTO: 0.59 X10(3) UL (ref 0.1–1)
MONOCYTES NFR BLD AUTO: 9.1 %
NEUTROPHILS # BLD AUTO: 2.82 X10 (3) UL (ref 1.5–7.7)
NEUTROPHILS # BLD AUTO: 2.82 X10(3) UL (ref 1.5–7.7)
NEUTROPHILS NFR BLD AUTO: 43.8 %
OSMOLALITY SERPL CALC.SUM OF ELEC: 294 MOSM/KG (ref 275–295)
P AXIS: 48 DEGREES
P-R INTERVAL: 134 MS
PLATELET # BLD AUTO: 176 10(3)UL (ref 150–450)
POTASSIUM SERPL-SCNC: 4 MMOL/L (ref 3.5–5.1)
Q-T INTERVAL: 390 MS
QRS DURATION: 80 MS
QTC CALCULATION (BEZET): 417 MS
R AXIS: 17 DEGREES
RBC # BLD AUTO: 4.8 X10(6)UL
SODIUM SERPL-SCNC: 142 MMOL/L (ref 136–145)
T AXIS: 78 DEGREES
VENTRICULAR RATE: 69 BPM
WBC # BLD AUTO: 6.5 X10(3) UL (ref 4–11)

## 2024-08-28 PROCEDURE — 76376 3D RENDER W/INTRP POSTPROCES: CPT | Performed by: NURSE PRACTITIONER

## 2024-08-28 PROCEDURE — 74183 MRI ABD W/O CNTR FLWD CNTR: CPT | Performed by: NURSE PRACTITIONER

## 2024-08-28 PROCEDURE — 99232 SBSQ HOSP IP/OBS MODERATE 35: CPT | Performed by: HOSPITALIST

## 2024-08-28 RX ORDER — SENNOSIDES 8.6 MG
8.6 TABLET ORAL 2 TIMES DAILY
Status: DISCONTINUED | OUTPATIENT
Start: 2024-08-28 | End: 2024-08-30

## 2024-08-28 RX ORDER — POLYETHYLENE GLYCOL 3350 17 G/17G
17 POWDER, FOR SOLUTION ORAL DAILY
Status: DISCONTINUED | OUTPATIENT
Start: 2024-08-28 | End: 2024-08-30

## 2024-08-28 RX ORDER — GADOTERATE MEGLUMINE 376.9 MG/ML
15 INJECTION INTRAVENOUS
Status: COMPLETED | OUTPATIENT
Start: 2024-08-28 | End: 2024-08-28

## 2024-08-28 RX ORDER — SODIUM CHLORIDE 9 MG/ML
INJECTION, SOLUTION INTRAVENOUS CONTINUOUS
Status: DISCONTINUED | OUTPATIENT
Start: 2024-08-28 | End: 2024-08-30

## 2024-08-28 RX ORDER — SODIUM CHLORIDE 9 MG/ML
INJECTION, SOLUTION INTRAVENOUS CONTINUOUS
Status: ACTIVE | OUTPATIENT
Start: 2024-08-28 | End: 2024-08-29

## 2024-08-28 NOTE — SPIRITUAL CARE NOTE
Spiritual Care Visit Note    Patient Name: Kat Crook Date of Spiritual Care Visit: 24   : 3/11/1970 Primary Dx: Pancreatic duct dilated (HCC)       Referred By: Referral From: Nurse    Spiritual Care Taxonomy:    Intended Effects: Aligning care plan with patient's values    Methods: Collaborate with care team member    Interventions: Assist someone with Advance Directives    Visit Type/Summary:     - PoA: New PoAH Created: Visited patient in response to PoA for Healthcare consult/request. Created a new PoAH for Healthcare document that, per the patient, accurately reflects their wishes. Gave the patient the original PoAH document along with copies. Confirmed that the PoAH primary agent name and contact information have been entered into the Epic, Advance Directives section. A copy of the new PoAH document has been placed on the patient's paper chart.    Spiritual Care support can be requested via an Epic consult. For urgent/immediate needs, please contact the On Call  at: [facility]: ext [phone]    [facility]   Spiritual Care Department

## 2024-08-28 NOTE — H&P
[facility]     Kat Crook Patient Status:  Emergency    3/11/1970 MRN [id number]   [facility] EMERGENCY DEPARTMENT Attending Chico Zuñiga MD   Hosp Day # 0 PCP Justin Huerta MD     Chief Complaint: abdominal pain    Subjective:    History of Present Illness:     Kat Crook is a 54 year old female with a past medical history of DM2 multiple sclerosis, chronic neuropathic pain, asthma, pulmonary hypertension, GIORDANO who presented to the emergency department with abdominal pain.  Patient is complaining of epigastric and right flank pain which started overnight.  Patient suffers from chronic abdominal pain, IBS, but never this severe.  She has nausea but no vomiting, denies diarrhea or constipation.  But no improvement in her pain despite IV pain meds.  No recent fevers, no chills, no chest pain or shortness of breath.  She has no other acute complaints at this time.    History/Other:    Past Medical History:  Past Medical History:    Abdominal pain    Allergic rhinitis    Anemia    Anxiety    Anxiety state    Arthritis    Asthma (HCC)    Atypical mole    Autoimmune disease (HCC)    Back pain    Bad breath    Black stools    Bloating    Blood disorder    Thalassemia alpha, Sickle cell trait    Blood in urine    Blurred vision    Calculus of kidney    Cardiomyopathy (HCC)    last echo 2021: EF 55%    Chest pain    Constipation    Decorative tattoo    Deep vein thrombosis (HCC)    Depression    Diarrhea, unspecified    Dizziness    Easy bruising    Elevated liver enzymes    Endocrine disorder    Esophageal reflux    Fatigue    Fibromyalgia    Food intolerance    Frequent urination    Frequent UTI    Gastroparesis    Glaucoma    H. pylori infection    H/O  section    Headache disorder    Heart attack (HCC)    Heart palpitations    Heartburn    Hemorrhoids    Herniation of cervical intervertebral disc    High blood pressure    High cholesterol    History of  blood clots    Unprovoked    History of blood transfusion    History of cardiac murmur    History of depression    Hoarseness, chronic    Hyperlipidemia    IBS (irritable bowel syndrome)    Irregular bowel habits    Leaking of urine    Liver disease    GIORDANO and Stage 2 fibrosis    Loss of appetite    Migraines    Multiple sclerosis (HCC)    dx 8 years ago    Muscle weakness    cane or walker prn    Myocardial infarction (HCC)    Neuropathy    legs, hands, feet; bilateral    Night sweats    Osteoarthritis    Osteoporosis    KAMALJIT in her 30s    Osteoporosis    Pain with bowel movements    Painful urination    Problems with swallowing    Due to MS    Renal disorder    kidney lesions    Scoliosis of lumbar spine    Shortness of breath    Sickle cell trait (HCC)    Sleep apnea    no treatment    Sleep disturbance    Stress    Type II or unspecified type diabetes mellitus without mention of complication, not stated as uncontrolled    Uncomfortable fullness after meals    Unspecified disorder of thyroid    total thyroidectomy    Visual impairment    glasses    Wears glasses    Weight loss    Wheezing     Past Surgical History:   Past Surgical History:   Procedure Laterality Date      1986    Cataract      Cath angio  2014    Cholecystectomy      Colonoscopy N/A 2015    Procedure: COLONOSCOPY;  Surgeon: Liz Tong DO;  Location:  ENDOSCOPY    Colonoscopy N/A 2021    Procedure: COLONOSCOPY;  Surgeon: Cedrick Da Silva MD;  Location:  ENDOSCOPY    Colonoscopy N/A 10/08/2021    Procedure: COLONOSCOPY, ESOPHAGOGASTRODUODENOSCOPY (EGD);  Surgeon: Darvin Richardson MD;  Location:  ENDOSCOPY    D & c  1990    Blighted ovum    Egd      Hysterectomy      total hystero.    Ir port a cath insertion exchnge check  2018    Lithotripsy      x 2    Lysis of adhesions      Needle biopsy liver  2016    Oophorectomy Bilateral     total hystero.    Other      dialysis  catheter- left chest for plasmaphoresis    Other surgical history       x 2    Port, indwelling, imp  2019    power port    Thyroidectomy  2013    benign MNG    Total abdom hysterectomy      Upper gi endoscopy performed  2014      Family History:   Family History   Problem Relation Age of Onset    Heart Attack Father     Other (Other) Father         COPD, emphysema    Asthma Father     Pulmonary Disease Father     Hypertension Mother         Needs to be deleted    Cancer Mother         stomach cancer    Ovarian Cancer Mother         30'S    Colon Polyps Mother     Anemia Mother     Ovarian Cancer Maternal Grandmother         30'S    Colon Polyps Maternal Grandmother     Diabetes Maternal Grandmother     Colon Cancer Maternal Grandmother     Anemia Maternal Grandmother     Cancer Maternal Grandmother     Breast Cancer Maternal Aunt         60'S    Other (Other) Sister         rectal CA\    Anemia Daughter      Social History:    reports that she has never smoked. She has never been exposed to tobacco smoke. She has never used smokeless tobacco. She reports that she does not drink alcohol and does not use drugs.     Allergies:   Allergies   Allergen Reactions    Epinephrine OTHER (SEE COMMENTS)     Cardiac issues    Immune Globulin ANAPHYLAXIS    Latex SHORTNESS OF BREATH and OTHER (SEE COMMENTS)     WELTS    Methylprednisolone SWELLING     Swelling of neck, throat and tongue  Injection, throat and tongue swelling      Ocrelizumab ANAPHYLAXIS and OTHER (SEE COMMENTS)     Difficulty breathing    Other SHORTNESS OF BREATH     ENVIRONMENTAL, ASTHMA EXACERBATION    Pcn [Penicillamine]     Penicillins OTHER (SEE COMMENTS)    Urea Tightness in Throat     Urea C-13 in Pranactin for H. Pylori test kit.    Midodrine ITCHING     Pt reports mouth pain and itching possibly r/t midodrine       Medications:    Current Facility-Administered Medications on File Prior to Encounter   Medication Dose Route Frequency  Provider Last Rate Last Admin    [COMPLETED] heparin (Porcine) 100 Units/mL lock flush        Given at 08/23/24 0945    [COMPLETED] iopamidol 76% (ISOVUE-370) injection for power injector  80 mL Intravenous ONCE PRN Justin Huerta MD   80 mL at 08/23/24 0947     Current Outpatient Medications on File Prior to Encounter   Medication Sig Dispense Refill    natalizumab (TYSABRI) 300 mg/15mL Intravenous Conc Inject into the vein. Every 8 weeks      MYRBETRIQ 50 MG Oral Tablet 24 Hr Take 1 tablet (50 mg total) by mouth daily.      Telmisartan 20 MG Oral Tab Take 1 tablet (20 mg total) by mouth daily.      carvedilol 3.125 MG Oral Tab Take 1 tablet (3.125 mg total) by mouth 2 (two) times daily.      montelukast 10 MG Oral Tab Take 1 tablet (10 mg total) by mouth nightly.      levothyroxine 88 MCG Oral Tab Take 1 tablet (88 mcg total) by mouth before breakfast.      pantoprazole 20 MG Oral Tab EC Take 1 tablet (20 mg total) by mouth 2 (two) times daily.      fluticasone furoate-vilanterol 200-25 MCG/ACT Inhalation Aerosol Powder, Breath Activated Inhale 1 puff into the lungs daily.      LYLLANA 0.025 MG/24HR Transdermal Patch Biweekly Place 1 patch onto the skin twice a week.      CELEBREX 100 MG Oral Cap Take 1 capsule (100 mg total) by mouth 2 (two) times daily.      acetaminophen-codeine 300-60 MG Oral Tab Take 1 tablet by mouth every 6 (six) hours as needed.      pregabalin 200 MG Oral Cap Take 1 capsule (200 mg total) by mouth 2 (two) times daily.      Ciclopirox 8 % External Solution APPLY EVERY DAY      HYDROcodone-acetaminophen  MG Oral Tab Take 1 tablet by mouth 4 (four) times daily as needed.      baclofen 10 MG Oral Tab Take 1 tablet (10 mg total) by mouth 3 (three) times daily.      NON FORMULARY Tandem insulin pump settings (Bolus IQ)  Mdnt 3.1 units/hr  0400 3.00 units/hr  0900 3.2 units/hr    Correction:   MN 1:35/110  1 PM 1:40  7 pm 1:35    CHO ratio:  MN 1:5g      FARXIGA 10 MG Oral Tab Take 1  tablet (10 mg total) by mouth daily.      DULoxetine 60 MG Oral Cap DR Particles Take 1 capsule (60 mg total) by mouth 2 (two) times daily.      atorvastatin 20 MG Oral Tab Take 1 tablet (20 mg total) by mouth nightly.      DEXCOM G6 SENSOR Does not apply Misc USE AS DIRECTED EVERY 10 DAYS 9 each 3    Insulin Lispro (HUMALOG) 100 UNIT/ML Subcutaneous Solution INJECT UP TO 70 UNITS VIA INSULIN PUMP DAILY 70 mL 2    EPINEPHrine 0.15 MG/0.3ML Injection Solution Auto-injector Jani pen/lower dose because pt is allergic to epinephrine      Ondansetron HCl (ZOFRAN) 4 mg tablet Take 1 tablet (4 mg total) by mouth 3 (three) times daily as needed.      Benralizumab (FASENRA PEN) 30 MG/ML Subcutaneous Solution Auto-injector Inject 30 mg into the skin. Every other month/every 8 weeks      DEXCOM G6 TRANSMITTER Does not apply Misc USE AS DIRECTED EVERY 90 DAYS 1 each 3    Thiamine HCl 100 MG Oral Tab Take 1 tablet (100 mg total) by mouth daily. 30 tablet 3    docusate sodium 100 MG Oral Cap Take 1 capsule (100 mg total) by mouth 2 (two) times daily.      denosumab 60 MG/ML Subcutaneous Solution Inject 1 mL (60 mg total) into the skin every 6 (six) months.      Cholecalciferol (VITAMIN D-3) 5000 UNITS Oral Tab Take 1 tablet (5,000 Units total) by mouth daily.      Albuterol Sulfate  (90 Base) MCG/ACT Inhalation Aero Soln Inhale 2 puffs into the lungs every 6 (six) hours as needed. PRN wheezing/SOB         Review of Systems:   A comprehensive review of systems was completed.    Pertinent positives and negatives noted in the HPI.    Objective:   Physical Exam:    /79   Pulse 67   Temp 98.4 °F (36.9 °C) (Oral)   Resp 13   LMP  (LMP Unknown)   SpO2 97%   General: No acute distress, Alert, pleasant   Respiratory: No rhonchi, no wheezes  Cardiovascular: S1, S2. Regular rate and rhythm  Abdomen: Soft, epigastric TTP,, non-distended, positive bowel sounds  Neuro: No new focal deficits  Extremities: No  edema    Results:    Labs:      Labs Last 24 Hours:    Recent Labs   Lab 08/23/24  0847 08/27/24  1555   RBC 5.07 5.14   HGB 11.3* 11.5*   HCT 37.3 36.3   MCV 73.6* 70.6*   MCH 22.3* 22.4*   MCHC 30.3* 31.7   RDW 18.3 18.6   NEPRELIM 2.57 3.06   WBC 6.4 7.9   .0* 164.0       Recent Labs   Lab 08/23/24  0847 08/23/24  0945 08/27/24  1555   *  --  110*   BUN 11  --  9   CREATSERUM 0.93  --  0.93   EGFRCR 72 75 72   CA 8.8  --  9.2   ALB 4.2  --  4.4     --  140   K 3.8  --  3.7     --  107   CO2 32.0  --  30.0   ALKPHO 83  --  83   AST 20  --  25   ALT 22  --  26   BILT 0.5  --  0.3   TP 6.7  --  6.8       Lab Results   Component Value Date    PT 13.4 05/01/2014    PT 12.9 11/06/2013    PT 13.0 11/05/2013    INR 1.08 04/10/2024    INR 1.00 04/06/2024    INR 1.01 04/19/2023       No results for input(s): \"TROP\", \"TROPHS\", \"CK\" in the last 168 hours.    No results for input(s): \"TROP\", \"PBNP\" in the last 168 hours.    No results for input(s): \"PCT\" in the last 168 hours.    Imaging: Imaging data reviewed in Epic.    Assessment & Plan:      #Abdominal pain  CT with enlarged pancreatic duct  Lipase, liver enzymes normal  S/p cholecystectomy  IV pain meds, anti-emetics  GI consult    #Acute UTI  Cefazolin, f/u cultures     #DM Type 2  Degludec, carb coverage, iss  Hyperglycemia protocol    #Peripheral neuropathy - Lyrica     #MS  Follows with neurology, Dr. Payne   Gets Natalizumab injections, Baclofen     #Essential HTN - Losartan, coreg    #Asthma   Follows with Dr. Hoyt  Receives Benralizumab   Singulair, Breo, prn albuterol     #Hypothyroidism - Synthroid    #Depression - Cymbalta     #HLD - statin     #Osteoporosis - Denosumab, vit d      Plan of care discussed with patient, er physician     Mane Schulz MD    Supplementary Documentation:     The 21st Century Cures Act makes medical notes like these available to patients in the interest of transparency. Please be advised this is a medical  document. Medical documents are intended to carry relevant information, facts as evident, and the clinical opinion of the practitioner. The medical note is intended as peer to peer communication and may appear blunt or direct. It is written in medical language and may contain abbreviations or verbiage that are unfamiliar.

## 2024-08-28 NOTE — ED QUICK NOTES
Orders for admission, patient is aware of plan and ready to go upstairs. Any questions, please call ED TY Negrete at extension [phone].     Patient Covid vaccination status: Fully vaccinated     COVID Test Ordered in ED: None    COVID Suspicion at Admission: N/A    Running Infusions:      Mental Status/LOC at time of transport: A&O x4    Other pertinent information: port access  CIWA score: N/A   NIH score:  N/A

## 2024-08-28 NOTE — PLAN OF CARE
NURSING ADMISSION NOTE      Patient admitted via Cart  A&Ox4, VSS and afebrile  Upon recving pt from ER - pt did not have on her insulin pump. 2 person body checked completed w.(Pct Anahi) - no pump or devices attached or seen on pt.  Has Sleep apnea but does not use cpap: 2L O2 overnight (RA baseline)  On tele (NSR)  1800 kcal/carb control diet  Activity: SBA w/cane    PMH: DM type 2, sickle cell, fibromyalgia, MI, IBS, GERD, sleep apnea, HLD, high BP, anxiety, depression and neuropathy     Consults: GI and hospitalist    Oriented to room.  Safety precautions initiated.  Bed in low position.  Call light in reach.      At home meds w/pharmacy.

## 2024-08-28 NOTE — PROGRESS NOTES
Iv bolus 500 ns was given this am for low bp, Then started on 83 ml/ hr,Medicated for pain, Rt emily cath w/ good blood return  Sched meds given and tolerated well, Kept npo after breakfast   For MRI/MRCP of abdomen, No s/s of diabetic reaction,  Assisted needs..

## 2024-08-28 NOTE — CONSULTS
[facility]                       Gastroenterology Consultation-[facility]    Kat Crook Patient Status:  Inpatient    3/11/1970 MRN [id number]   Location [facility] 4NW-A Attending Ace Davis MD   Hosp Day # 1 PCP Justin Huerta MD     Reason for consultation: Acute on chronic abd pain; abnormal CT a/p   HPI: This is a 54 yr-old female with an extensive PMhx that includes CAD, CM, pulmonary HTN, HTN, MS, DM c/b neuropathic pain, fibromyalgia, fatty liver, IBS-M, and anemia who presented to ER yesterday with 2 weeks of worsening abd pain.   Pt reports chronic, diffuse 5/10 abd pain which worsens without regards to PO intake, movement, or BMs. Over the last 2 weeks her pain has worsened to 9/10--same locations just increased severity. No improvement with chronic Norco. She reports nausea without vomiting and continues to have baseline alternating BM pattern without overt bleeding. No fevers or chills. Despite pain she reports wt gain. No NSAID use. Pt has completed several EGDs throughout the years for her abd pain--most recently 2023--and a colonoscopy 10/2021  CT a/p IV (24) suggested no acute process; CT a/p IV (24) suggested new mild pancreatic ductal dilation with no pancreatic mass noted and no signs of acute pancreatitis + moderate stool burden (however decreased compared to  scan); labs with normal lipase, normal LFTs, normal kidney functions, normal WBC, stable anemia (Hgb 10-11)  PMHx:   Past Medical History:    Abdominal pain    Allergic rhinitis    Anemia    Anxiety    Anxiety state    Arthritis    Asthma (HCC)    Atypical mole    Autoimmune disease (HCC)    Back pain    Bad breath    Black stools    Bloating    Blood disorder    Thalassemia alpha, Sickle cell trait    Blood in urine    Blurred vision    Calculus of kidney    Cardiomyopathy (HCC)    last echo 2021: EF 55%    Chest pain    Constipation    Decorative tattoo    Deep vein thrombosis (HCC)     Depression    Diarrhea, unspecified    Dizziness    Easy bruising    Elevated liver enzymes    Endocrine disorder    Esophageal reflux    Fatigue    Fibromyalgia    Food intolerance    Frequent urination    Frequent UTI    Gastroparesis    Glaucoma    H. pylori infection    H/O  section    Headache disorder    Heart attack (HCC)    Heart palpitations    Heartburn    Hemorrhoids    Herniation of cervical intervertebral disc    High blood pressure    High cholesterol    History of blood clots    Unprovoked    History of blood transfusion    History of cardiac murmur    History of depression    Hoarseness, chronic    Hyperlipidemia    IBS (irritable bowel syndrome)    Irregular bowel habits    Leaking of urine    Liver disease    GIORDANO and Stage 2 fibrosis    Loss of appetite    Migraines    Multiple sclerosis (HCC)    dx 8 years ago    Muscle weakness    cane or walker prn    Myocardial infarction (HCC)    Neuropathy    legs, hands, feet; bilateral    Night sweats    Osteoarthritis    Osteoporosis    KAMALJIT in her 30s    Osteoporosis    Pain with bowel movements    Painful urination    Problems with swallowing    Due to MS    Renal disorder    kidney lesions    Scoliosis of lumbar spine    Shortness of breath    Sickle cell trait (HCC)    Sleep apnea    no treatment    Sleep disturbance    Stress    Type II or unspecified type diabetes mellitus without mention of complication, not stated as uncontrolled    Uncomfortable fullness after meals    Unspecified disorder of thyroid    total thyroidectomy    Visual impairment    glasses    Wears glasses    Weight loss    Wheezing                PSHx:   Past Surgical History:   Procedure Laterality Date      1986    Cataract      Cath angio  2014    Cholecystectomy      Colonoscopy N/A 2015    Procedure: COLONOSCOPY;  Surgeon: Liz Tong DO;  Location:  ENDOSCOPY    Colonoscopy N/A 2021    Procedure: COLONOSCOPY;   Surgeon: Cedrick Da Silva MD;  Location:  ENDOSCOPY    Colonoscopy N/A 10/08/2021    Procedure: COLONOSCOPY, ESOPHAGOGASTRODUODENOSCOPY (EGD);  Surgeon: Darvin Ricahrdson MD;  Location:  ENDOSCOPY    D & c  1990    Blighted ovum    Egd      Hysterectomy      total hystero.    Ir port a cath insertion exchnge check  2018    Lithotripsy      x 2    Lysis of adhesions      Needle biopsy liver  2016    Oophorectomy Bilateral     total hystero.    Other      dialysis catheter- left chest for plasmaphoresis    Other surgical history       x 2    Port, indwelling, imp  2019    power port    Thyroidectomy  2013    benign MNG    Total abdom hysterectomy      Upper gi endoscopy performed  2014     Medications:    sodium chloride 0.9% infusion   Intravenous Continuous    sodium chloride 0.9% infusion   Intravenous Continuous    [COMPLETED] HYDROmorphone (Dilaudid) 1 MG/ML injection 0.5 mg  0.5 mg Intravenous Once    [COMPLETED] sodium chloride 0.9 % IV bolus 1,000 mL  1,000 mL Intravenous Once    [COMPLETED] ondansetron (Zofran) 4 MG/2ML injection 4 mg  4 mg Intravenous Once    [COMPLETED] cefTRIAXone (Rocephin) 2 g in sodium chloride 0.9% 100 mL IVPB-ADDV  2 g Intravenous Once    glucose (Dex4) 15 GM/59ML oral liquid 15 g  15 g Oral Q15 Min PRN    Or    glucose (Glutose) 40% oral gel 15 g  15 g Oral Q15 Min PRN    Or    dextrose 50% injection 50 mL  50 mL Intravenous Q15 Min PRN    Or    glucose (Dex4) 15 GM/59ML oral liquid 30 g  30 g Oral Q15 Min PRN    Or    glucose (Glutose) 40% oral gel 30 g  30 g Oral Q15 Min PRN    [COMPLETED] HYDROmorphone (Dilaudid) 1 MG/ML injection 0.5 mg  0.5 mg Intravenous Once    [COMPLETED] iopamidol 76% (ISOVUE-370) injection for power injector  100 mL Intravenous ONCE PRN    [COMPLETED] ketorolac (Toradol) 15 MG/ML injection 15 mg  15 mg Intravenous Once    [COMPLETED] famotidine (Pepcid) 20 mg/2mL injection 20 mg  20 mg Intravenous Once     HYDROmorphone (Dilaudid) 1 MG/ML injection 1 mg  1 mg Intravenous Q30 Min PRN    [] sodium chloride 0.9% infusion   Intravenous Continuous    [] HYDROmorphone (Dilaudid) 1 MG/ML injection 0.5 mg  0.5 mg Intravenous Q30 Min PRN    [] ondansetron (Zofran) 4 MG/2ML injection 4 mg  4 mg Intravenous Q4H PRN    [COMPLETED] dextrose 5%-sodium chloride 0.45% infusion   Intravenous Once    HYDROcodone-acetaminophen (Norco)  MG per tab 1 tablet  1 tablet Oral QID PRN    levothyroxine (Synthroid) tab 88 mcg  88 mcg Oral Daily @ 0700    montelukast (Singulair) tab 10 mg  10 mg Oral Nightly    pantoprazole (Protonix) DR tab 20 mg  20 mg Oral BID AC    pregabalin (Lyrica) cap 200 mg  200 mg Oral BID    [Held by provider] losartan (Cozaar) tab 25 mg  25 mg Oral Daily    thiamine (Vitamin B1) tab 100 mg  100 mg Oral Daily    fluticasone-salmeterol (Advair Diskus) 500-50 MCG/ACT inhaler 1 puff  1 puff Inhalation BID    DULoxetine (Cymbalta) DR cap 60 mg  60 mg Oral BID    carvedilol (Coreg) tab 3.125 mg  3.125 mg Oral BID with meals    baclofen (Lioresal) tab 10 mg  10 mg Oral TID    atorvastatin (Lipitor) tab 20 mg  20 mg Oral Nightly    albuterol (Ventolin HFA) 108 (90 Base) MCG/ACT inhaler 2 puff  2 puff Inhalation Q6H PRN    acetaminophen (Tylenol Extra Strength) tab 500 mg  500 mg Oral Q4H PRN    melatonin tab 3 mg  3 mg Oral Nightly PRN    glycerin-hypromellose- (Artificial Tears) 0.2-0.2-1 % ophthalmic solution 1 drop  1 drop Both Eyes QID PRN    sodium chloride (Saline Mist) 0.65 % nasal solution 1 spray  1 spray Each Nare Q3H PRN    enoxaparin (Lovenox) 40 MG/0.4ML SUBQ injection 40 mg  40 mg Subcutaneous Daily    HYDROmorphone (Dilaudid) 1 MG/ML injection 0.2 mg  0.2 mg Intravenous Q2H PRN    Or    HYDROmorphone (Dilaudid) 1 MG/ML injection 0.4 mg  0.4 mg Intravenous Q2H PRN    Or    HYDROmorphone (Dilaudid) 1 MG/ML injection 0.8 mg  0.8 mg Intravenous Q2H PRN    polyethylene glycol (PEG  3350) (Miralax) 17 g oral packet 17 g  17 g Oral Daily PRN    sennosides (Senokot) tab 17.2 mg  17.2 mg Oral Nightly PRN    bisacodyl (Dulcolax) 10 MG rectal suppository 10 mg  10 mg Rectal Daily PRN    fleet enema (Fleet) rectal enema 133 mL  1 enema Rectal Once PRN    ondansetron (Zofran) 4 MG/2ML injection 4 mg  4 mg Intravenous Q6H PRN    prochlorperazine (Compazine) 10 MG/2ML injection 5 mg  5 mg Intravenous Q8H PRN    ceFAZolin (Ancef) 1 g in dextrose 5% 100mL IVPB-ADD  1 g Intravenous Q8H    glucose (Dex4) 15 GM/59ML oral liquid 15 g  15 g Oral Q15 Min PRN    Or    glucose (Glutose) 40% oral gel 15 g  15 g Oral Q15 Min PRN    Or    glucose-vitamin C (Dex-4) chewable tab 4 tablet  4 tablet Oral Q15 Min PRN    Or    dextrose 50% injection 50 mL  50 mL Intravenous Q15 Min PRN    Or    glucose (Dex4) 15 GM/59ML oral liquid 30 g  30 g Oral Q15 Min PRN    Or    glucose (Glutose) 40% oral gel 30 g  30 g Oral Q15 Min PRN    Or    glucose-vitamin C (Dex-4) chewable tab 8 tablet  8 tablet Oral Q15 Min PRN    insulin aspart (NovoLOG) 100 Units/mL FlexPen 1-68 Units  1-68 Units Subcutaneous TID CC    insulin aspart (NovoLOG) 100 Units/mL FlexPen 1-10 Units  1-10 Units Subcutaneous TID AC and HS    insulin degludec (Tresiba) 100 units/mL flextouch 20 Units  20 Units Subcutaneous Daily     Allergies:   Allergies   Allergen Reactions    Epinephrine OTHER (SEE COMMENTS)     Cardiac issues    Immune Globulin ANAPHYLAXIS    Latex SHORTNESS OF BREATH and OTHER (SEE COMMENTS)     WELTS    Methylprednisolone SWELLING     Swelling of neck, throat and tongue  Injection, throat and tongue swelling      Ocrelizumab ANAPHYLAXIS and OTHER (SEE COMMENTS)     Difficulty breathing    Other SHORTNESS OF BREATH     ENVIRONMENTAL, ASTHMA EXACERBATION    Pcn [Penicillamine]     Penicillins OTHER (SEE COMMENTS)    Urea Tightness in Throat     Urea C-13 in Pranactin for H. Pylori test kit.    Midodrine ITCHING     Pt reports mouth pain and  itching possibly r/t midodrine     Social HX:   Social History     Socioeconomic History    Marital status: Single   Tobacco Use    Smoking status: Never     Passive exposure: Never    Smokeless tobacco: Never    Tobacco comments:     Never used it   Vaping Use    Vaping status: Never Used   Substance and Sexual Activity    Alcohol use: Never    Drug use: Never   Other Topics Concern    Caffeine Concern Yes     Comment: 1 can 3 x week    Exercise Yes     Comment: walking     Social Determinants of Health     Financial Resource Strain: Low Risk  (2/19/2024)    Received from [facility]    Financial Resource Strain     In the past year, have you or any family members you live with been unable to get any of the following when it was really needed? Check all that apply.: None   Food Insecurity: No Food Insecurity (8/27/2024)    Food Insecurity     Food Insecurity: Never true   Transportation Needs: No Transportation Needs (8/27/2024)    Transportation Needs     Lack of Transportation: No   Physical Activity: Insufficiently Active (7/27/2022)    Received from [facility]    Exercise Vital Sign     Days of Exercise per Week: 3 days     Minutes of Exercise per Session: 20 min   Stress: Low Risk  (2/19/2024)    Received from [facility], [facility]    Stress     Stress is when someone feels tense, nervous, anxious, or can't sleep at night because their mind is troubled. How stressed are you? : Not at all   Social Connections: Low Risk  (2/19/2024)    Received from [facility], [facility]    Social Connections     How often do you see or talk to people that you care about and feel close to? (For example: talking to friends on the phone, visiting friends or family, going to [employer] or club meetings): 5 or more times a week   Housing Stability: Low Risk  (8/27/2024)    Housing Stability     Housing Instability: No       FamHx: The patient has no family history of colon cancer or other gastrointestinal malignancies;  No family history of ulcer disease, or inflammatory bowel disease  ROS:  In addition to the pertinent positives described above:            Infectious Disease:  No chronic infections or recent fevers prior to the acute illness            Cardiovascular: + CAD, CM, HTN, dyslipidemia             Respiratory: + asthma            Hematologic: The patient reports no easy bruising, frequent gum bleeding or nose bleeding;  + chronic anemia            Dermatologic: The patient reports no recent rashes or chronic skin disorders            Rheumatologic: + chronic arthritis, myalgias, arthralgias            Genitourinary:  The patient reports no history of recurrent urinary tract infections, hematuria, dysuria, or nephrolithiasis           Psychiatric: + depression, anxiety           Oncologic: The patient reports no history of prior solid tumor or hematologic malignancy           ENT: The patient reports no hoarseness of voice, hearing loss, sinus congestion, tinnitus           Neurologic: + MS, migraines   PE: BP 91/58 (BP Location: Right arm)   Pulse 63   Temp 97.5 °F (36.4 °C) (Oral)   Resp 16   Wt 167 lb 14.4 oz (76.2 kg)   LMP  (LMP Unknown)   SpO2 98%   BMI 27.10 kg/m²   Gen: AAO x 3, able to speak in complete sentences  HENT: EOMI, PERRL, oropharynx is clear with moist mucosal membranes  Eyes: Sclerae are anicteric  Neck:  Supple without nuchal rigidity  CV: Regular rate and rhythm, with normal S1 and S2  Resp: Clear to auscultation bilaterally without wheezes; rubs, rhonchi, or rales  Abdomen: Soft, diffuse tenderness with light palpation, non-distended with the presence of bowel sounds; No hepatosplenomegaly; no rebound or guarding; No ascites is clinically apparent; no tympany to percussion  Ext: No peripheral edema or cyanosis  Skin: Warm and dry  Psychiatric: Appropriate mood and congruent affect without  obvious depression or anxiety  Labs:   Lab Results   Component Value Date    WBC 6.5 08/28/2024    HGB 10.6 08/28/2024    HCT 35.8 08/28/2024    .0 08/28/2024    CREATSERUM 0.94 08/28/2024    BUN 9 08/28/2024     08/28/2024    K 4.0 08/28/2024     08/28/2024    CO2 32.0 08/28/2024     08/28/2024    CA 8.1 08/28/2024    ALB 4.4 08/27/2024    ALKPHO 83 08/27/2024    BILT 0.3 08/27/2024    AST 25 08/27/2024    ALT 26 08/27/2024    LIP 39 08/27/2024    MG 1.9 08/28/2024     Recent Labs   Lab 08/23/24  0847 08/27/24  1555 08/28/24  0601   * 110* 126*   BUN 11 9 9   CREATSERUM 0.93 0.93 0.94   CA 8.8 9.2 8.1*    140 142   K 3.8 3.7 4.0    107 107   CO2 32.0 30.0 32.0     Recent Labs   Lab 08/28/24  0601   RBC 4.80   HGB 10.6*   HCT 35.8   MCV 74.6*   MCH 22.1*   MCHC 29.6*   RDW 19.2   NEPRELIM 2.82   WBC 6.5   .0       Recent Labs   Lab 08/23/24  0847 08/27/24  1555   ALT 22 26   AST 20 25       Imaging:   PROCEDURE:  CT APPENDIX ABD/PEL W CONTRAST (CPT=74177)     COMPARISON:  [facility] , CT, CT ABDOMEN+PELVIS(CONTRAST ONLY)(CPT=74177), 8/23/2024, 9:44 AM.     INDICATIONS:  abd pain     TECHNIQUE:  Axial helical acquisitions are obtained from through the abdomen and pelvis after bolus intravenous nonionic contrast administration.  Images of the right lower quadrant reconstructed at 2.5 mm. Coronal MPR imaging was obtained.  Dose  reduction techniques were used. Dose information is transmitted to the ACR (American College of Radiology) NRDR (National Radiology Data Registry) which includes the Dose Index Registry.     PATIENT STATED HISTORY:(As transcribed by Technologist)  Patient with severe abdominal and back pain all four quadrants.      CONTRAST USED:  100cc of Isovue 370     FINDINGS:    APPENDIX:  A long appendix is identified and has a normal appearance without wall thickening, distention or appendicolith.  There is no evidence for acute appendicitis.  LIVER:  There  is mild fatty infiltration.  No focal hepatic mass or enlargement.  BILIARY:  Status post cholecystectomy.  Common bile duct is mildly dilated which may reflect post cholecystectomy change.  CBD measures 8 mm in diameter.  PANCREAS:  Pancreatic duct is dilated measuring 5 mm which is a new finding.  No pancreatic head mass is identified.  No peripancreatic inflammation.    SPLEEN:  No enlargement or focal lesion.    KIDNEYS:  Bilateral subcentimeter cortical lucencies are seen which may reflect cysts.  Most of these are too small to definitively characterize.  The largest suspected cyst is in the lower pole of the right kidney measuring 11 mm in diameter.  There is  a 5 mm calculus identified in the upper pole of the right kidney adjacent to a fluid density structure which could reflect a calculus within a calyceal diverticulum.  No change from previous.  This alternatively could reflect a calculus adjacent to a  cortical cyst.  There is no hydronephrosis or hydroureter and there are no ureteral calculi identified.  ADRENALS:  No mass or enlargement.    AORTA/VASCULAR:  Atheromatous mixed plaque noted within the aorta without evidence for aneurysm.  The portal vein is patent.  RETROPERITONEUM:  No mass or adenopathy.    BOWEL/MESENTERY:  No visible mass, obstruction, or bowel wall thickening.  Moderate stool seen within the colon although decreased compared to the previous CT from 8/23/2024.  ABDOMINAL WALL:  No mass or hernia.    URINARY BLADDER:  No visible focal wall thickening, lesion, or calculus.    PELVIC NODES:  No adenopathy.    PELVIC ORGANS:  Status post hysterectomy.  BONES:  No bony lesion or fracture.    LUNG BASES:  Mild dependent atelectasis is seen.  There is bronchiectasis seen in the lower lobes.  Catheter seen projecting in the right atrium.  OTHER:  Negative.        Impression   CONCLUSION:    1. There is no evidence for acute appendicitis.  2. There is new mild pancreatic ductal dilatation.  A  pancreatic mass is not identified.  There is no evidence for acute pancreatitis.  Clinical significance is indeterminate.  The need for nonemergent follow-up MR imaging should be based clinically.    3. Moderate stool seen within the colon although decreased in volume compared to the CT from 8/23/2024.  No evidence for mechanical bowel obstruction.  4. Nonobstructing 5 mil right renal calculus.    5. Other incidental findings are noted as detailed above.      LOCATION:  [facility]    Dictated by (CST): Leopoldo Hoyt MD on 8/27/2024 at 7:25 PM      Finalized by (CST): Leopoldo Hoyt MD on 8/27/2024 at 7:34 PM    _________________________________________________  PROCEDURE:  CT ABDOMEN+PELVIS (CONTRAST ONLY) (CPT=74177)     COMPARISON:  [facility] , CT, CT ABDOMEN+PELVIS KIDNEYSTONE 2D RNDR(NO IV,NO ORAL)(CPT=74176), 4/11/2024, 10:53 PM.     INDICATIONS:  R10.10 Upper abdominal pain     TECHNIQUE:  CT scanning was performed from the dome of the diaphragm to the pubic symphysis with non-ionic intravenous contrast material. Post contrast coronal MPR imaging was performed.  Dose reduction techniques were used. Dose information is  transmitted to the ACR (American College of Radiology) NRDR (National Radiology Data Registry) which includes the Dose Index Registry.     PATIENT STATED HISTORY:(As transcribed by Technologist)  The patient complaints of bilateral lower quadrants abdominal pain.      CONTRAST USED:  80cc of Isovue 370     FINDINGS:    LIVER:  No enlargement, atrophy, abnormal density, or significant focal lesion.    BILIARY:  Postsurgical changes of cholecystectomy noted.  PANCREAS:  No lesion, fluid collection, ductal dilatation, or atrophy.    SPLEEN:  No enlargement or focal lesion.    KIDNEYS:  Probable small cyst of the right kidney measures up to 9 mm.  No specific further follow-up is recommended.  No evidence of hydronephrosis.  ADRENALS:  No mass or enlargement.    AORTA/VASCULAR:  No aneurysm or  dissection.    RETROPERITONEUM:  No mass or adenopathy.    BOWEL/MESENTERY:  There is a normal-appearing appendix.  No free fluid is seen.  There is no evidence of obstruction.  Some portions of the bowel are decompressed, limiting assessment.  No dilated loops of small bowel are seen.  ABDOMINAL WALL:  No mass or hernia.    URINARY BLADDER:  No visible focal wall thickening, lesion, or calculus.    PELVIC NODES:  No adenopathy.    PELVIC ORGANS:  Uterus and ovaries are not clearly identified.  BONES:  No bony lesion or fracture.    LUNG BASES:  No visible pulmonary or pleural disease.    OTHER:  Negative.        Impression   CONCLUSION:  No clear etiology of the patient's symptoms.  No free fluid is seen within the abdomen or pelvis.  If clinical symptoms persist then recommend follow-up imaging.     LOCATION:  [place] [id number]     Dictated by (CST): Bernard Santana MD on 8/23/2024 at 10:18 AM      Finalized by (CST): Bernard Santana MD on 8/23/2024 at 10:20 AM    ________________________________  Impression: 54 yr-old female with extensive PMhx that includes CAD, CM, pulmonary HTN, HTN, MS, DM c/b neuropathic pain, fibromyalgia, fatty liver, IBS-M, and anemia admitted yesterday with 2 weeks of acute on chronic pain. CT a/p IV (8/23/24) suggested no acute process; CT a/p IV (8/27/24) suggested new mild pancreatic ductal dilation with no pancreatic mass noted and no signs of acute pancreatitis + moderate stool burden (however decreased compared to 8/23 scan); labs with normal lipase, normal LFTs, normal kidney functions, normal WBC, stable anemia (Hgb 10-11)  Will obtain MRI abd/MRCP to assess PD, pancreas, biliary tree and start laxatives to improve stool burden which maybe contributing to her diffuse abd pain.     Recommendations:     MRI abd/MRCP with contrast  Start Miralax 17 gm PO daily + Senna 8.6 mg PO BID to improve stool burden  Pain management per Hospitalist recommendations limiting narcotics as able  CMP, CBC in  AM    Thank you for the consultation, we will follow the patient with you.  Attending addendum (Dr Tiwari) to follow later today and provide formal, final recommendations at that time   SHON Ken  9:38 AM  8/28/2024  [facility]  [phone]    Attending physician addendum:   I have personally performed a face to face diagnostic evaluation on this patient.  I have reviewed and agree with the care plan.  54 female with fu ctional chronic issues and now with acute on chronic abdominal pain, non specific PD abnormality on CT not seen on CT a few days earlier, imaging reviewed and appears non specific, plan for MRI/MRCP, conservative management, pain control, diet as tolerated  Morales Tiwari MD MD  [facility]  8/28/2024  5:25 PM

## 2024-08-28 NOTE — PROGRESS NOTES
[facility]   part of [facility]     Hospitalist Progress Note     Kat Crook Patient Status:  Inpatient    3/11/1970 MRN [id number]   Location [facility] 4NW-A Attending Ace Davis MD   Hosp Day # 1 PCP Justin Huerta MD     Chief Complaint: abd pain     Subjective:     Patient   c/o r sided abd pain that radiates up to epigastric/ chest area   c/o nausea  but doesn't feel like vomiting   Pain makes her MS worse     Objective:    Review of Systems:   A comprehensive review of systems was completed; pertinent positive and negatives stated in subjective.    Vital signs:  Temp:  [97.5 °F (36.4 °C)-98.4 °F (36.9 °C)] 97.5 °F (36.4 °C)  Pulse:  [63-80] 63  Resp:  [10-20] 16  BP: ()/(53-87) 91/58  SpO2:  [91 %-100 %] 100 %    Physical Exam:    General: No acute distress AO   Respiratory: No wheezes, no rhonchi clear  RA   Cardiovascular: S1, S2, regular rate and rhythm, NSR   Abdomen: Soft,  + diffuse tenderness  right side, up to epigastric , non-distended, hypo  bowel sounds  Neuro: No new focal deficits.   Extremities: No edema       Diagnostic Data:    Labs:  Recent Labs   Lab 24  0847 24  1555 24  0601   WBC 6.4 7.9 6.5   HGB 11.3* 11.5* 10.6*   MCV 73.6* 70.6* 74.6*   .0* 164.0 176.0       Recent Labs   Lab 24  0847 24  1555 24  0601   * 110* 126*   BUN 11 9 9   CREATSERUM 0.93 0.93 0.94   CA 8.8 9.2 8.1*   ALB 4.2 4.4  --     140 142   K 3.8 3.7 4.0    107 107   CO2 32.0 30.0 32.0   ALKPHO 83 83  --    AST 20 25  --    ALT 22 26  --    BILT 0.5 0.3  --    TP 6.7 6.8  --        Estimated Creatinine Clearance: 62.6 mL/min (based on SCr of 0.94 mg/dL).    No results for input(s): \"TROP\", \"TROPHS\", \"CK\" in the last 168 hours.    No results for input(s): \"PTP\", \"INR\" in the last 168 hours.           Microbiology    No results found for this visit on 24.      Imaging: Reviewed in Epic.    Medications:    levothyroxine  88  mcg Oral Daily @ 0700    montelukast  10 mg Oral Nightly    pantoprazole  20 mg Oral BID AC    pregabalin  200 mg Oral BID    losartan  25 mg Oral Daily    thiamine  100 mg Oral Daily    fluticasone-salmeterol  1 puff Inhalation BID    DULoxetine  60 mg Oral BID    carvedilol  3.125 mg Oral BID with meals    baclofen  10 mg Oral TID    atorvastatin  20 mg Oral Nightly    enoxaparin  40 mg Subcutaneous Daily    ceFAZolin  1 g Intravenous Q8H    insulin aspart  1-68 Units Subcutaneous TID CC    insulin aspart  1-10 Units Subcutaneous TID AC and HS    insulin degludec  20 Units Subcutaneous Daily       Assessment & Plan:       #Abdominal pain  CT with enlarged pancreatic duct  Lipase, liver enzymes normal  S/p cholecystectomy  IV pain meds, anti-emetics  GI consult  Start IVF give bolus      #Acute UTI  Cefazolin, f/u cultures      #DM Type 2  Degludec, carb coverage, iss  Hyperglycemia protocol  Removed insulin pump prior to admission      #Peripheral neuropathy - Lyrica      #MS  Follows with neurology, Dr. Payne   Gets Natalizumab injections, Baclofen      #Essential HTN - Losartan, coreg     #Asthma   Follows with Dr. Hoyt  Receives Benralizumab   Singulair, Breo, prn albuterol        #Hypothyroidism - Synthroid     #Depression - Cymbalta      #HLD - statin      #Osteoporosis - Denosumab, vit d     POC  IVF bolus/ hourly rate  Pain meds  GI eval   Follow U cx / blood cx   Labs in am   Discussed w/ GI   will get MRI scan today     Caroline Morgan NP    Supplementary Documentation:     Quality:  DVT Mechanical Prophylaxis:     Early ambuation  DVT Pharmacologic Prophylaxis   Medication    enoxaparin (Lovenox) 40 MG/0.4ML SUBQ injection 40 mg                Code Status: Full Code  Panda: No urinary catheter in place  Panda Duration (in days):   Central line:    KAYLEY:     Discharge is dependent on: clinical course  At this point Ms. Crook is expected to be discharge to: tbd     The 21st Century Cures Act makes  medical notes like these available to patients in the interest of transparency. Please be advised this is a medical document. Medical documents are intended to carry relevant information, facts as evident, and the clinical opinion of the practitioner. The medical note is intended as peer to peer communication and may appear blunt or direct. It is written in medical language and may contain abbreviations or verbiage that are unfamiliar.        ADDENDUM:    Patient seen and examined independently   Chest: CTA B/L  CVS: S1, S2, RRR  ABD: Soft, tender, ND, BS+  EXT: No c/c    Assessment/Plan  I evaluated the patient and agree with the above note and plan.  The chart was reviewed and case was d/w rounding APN.   I made clinical decisions independently    #Abdominal pain- CT with enlarged pancreatic duct  -GI consulted  -MRCP per GI  -bowel rest, IVF, IV Analgesics    #UTI- abx and f/u Cx   #DM- hyperglycemic protocol  #MS- f/u Dr. Kerry Davis MD

## 2024-08-28 NOTE — HOME CARE LIAISON
Patient is current with [facility] for RN services will need [facility] orders at [place]. Informed CHAPARRITA Osullivan

## 2024-08-29 LAB
ALBUMIN SERPL-MCNC: 3.5 G/DL (ref 3.2–4.8)
ALBUMIN/GLOB SERPL: 1.5 {RATIO} (ref 1–2)
ALP LIVER SERPL-CCNC: 71 U/L
ALT SERPL-CCNC: 37 U/L
ANION GAP SERPL CALC-SCNC: 3 MMOL/L (ref 0–18)
AST SERPL-CCNC: 34 U/L (ref ?–34)
BASOPHILS # BLD AUTO: 0.01 X10(3) UL (ref 0–0.2)
BASOPHILS NFR BLD AUTO: 0.2 %
BILIRUB SERPL-MCNC: 0.3 MG/DL (ref 0.3–1.2)
BUN BLD-MCNC: 9 MG/DL (ref 9–23)
CALCIUM BLD-MCNC: 7.8 MG/DL (ref 8.7–10.4)
CHLORIDE SERPL-SCNC: 110 MMOL/L (ref 98–112)
CO2 SERPL-SCNC: 30 MMOL/L (ref 21–32)
CREAT BLD-MCNC: 0.8 MG/DL
EGFRCR SERPLBLD CKD-EPI 2021: 86 ML/MIN/1.73M2 (ref 60–?)
EOSINOPHIL # BLD AUTO: 0 X10(3) UL (ref 0–0.7)
EOSINOPHIL NFR BLD AUTO: 0 %
ERYTHROCYTE [DISTWIDTH] IN BLOOD BY AUTOMATED COUNT: 18.4 %
GLOBULIN PLAS-MCNC: 2.3 G/DL (ref 2–3.5)
GLUCOSE BLD-MCNC: 117 MG/DL (ref 70–99)
GLUCOSE BLD-MCNC: 119 MG/DL (ref 70–99)
GLUCOSE BLD-MCNC: 125 MG/DL (ref 70–99)
GLUCOSE BLD-MCNC: 163 MG/DL (ref 70–99)
GLUCOSE BLD-MCNC: 166 MG/DL (ref 70–99)
HCT VFR BLD AUTO: 31.9 %
HGB BLD-MCNC: 9.9 G/DL
IMM GRANULOCYTES # BLD AUTO: 0.01 X10(3) UL (ref 0–1)
IMM GRANULOCYTES NFR BLD: 0.2 %
LYMPHOCYTES # BLD AUTO: 3.36 X10(3) UL (ref 1–4)
LYMPHOCYTES NFR BLD AUTO: 56.9 %
MCH RBC QN AUTO: 22.6 PG (ref 26–34)
MCHC RBC AUTO-ENTMCNC: 31 G/DL (ref 31–37)
MCV RBC AUTO: 72.8 FL
MONOCYTES # BLD AUTO: 0.47 X10(3) UL (ref 0.1–1)
MONOCYTES NFR BLD AUTO: 8 %
NEUTROPHILS # BLD AUTO: 2.05 X10 (3) UL (ref 1.5–7.7)
NEUTROPHILS # BLD AUTO: 2.05 X10(3) UL (ref 1.5–7.7)
NEUTROPHILS NFR BLD AUTO: 34.7 %
OSMOLALITY SERPL CALC.SUM OF ELEC: 296 MOSM/KG (ref 275–295)
PLATELET # BLD AUTO: 121 10(3)UL (ref 150–450)
PLATELETS.RETICULATED NFR BLD AUTO: 7.4 % (ref 0–7)
POTASSIUM SERPL-SCNC: 3.8 MMOL/L (ref 3.5–5.1)
PROT SERPL-MCNC: 5.8 G/DL (ref 5.7–8.2)
RBC # BLD AUTO: 4.38 X10(6)UL
SODIUM SERPL-SCNC: 143 MMOL/L (ref 136–145)
WBC # BLD AUTO: 5.9 X10(3) UL (ref 4–11)

## 2024-08-29 PROCEDURE — 99232 SBSQ HOSP IP/OBS MODERATE 35: CPT | Performed by: HOSPITALIST

## 2024-08-29 RX ORDER — BISACODYL 5 MG/1
5 TABLET, DELAYED RELEASE ORAL DAILY
Status: DISCONTINUED | OUTPATIENT
Start: 2024-08-29 | End: 2024-08-30

## 2024-08-29 NOTE — PROGRESS NOTES
Pt A&Ox4 on room air, arrived from mri/mrcp, stated had some pain after administered prn pain medication. Tolerating diet. Tolerating medications well per mar. Call light within reach, frequent checks made, needs met.

## 2024-08-29 NOTE — PLAN OF CARE
Patient is alert and oriented x 4. Behavior is calm and pleasant. Compliant with medications and care. No s/s of distress noted. VSS. Patient is afebrile. Complaining of constipation this shift, miralax, senna and dulcolax given per mar. Limiting use of narcotics advisable to patient due to opioid-induced constipation. Patient is agreeable to this plan but did take 1 norco early in shift. QID accucheck. IV ancef discontinued due to no UTI noted on urine screen. Ambulates in room per self. On IVF NS at 83 ml's/hr. Medications given per mar. Safety precautions maintained per protocol. Call light within reach.    Problem: Diabetes/Glucose Control  Goal: Glucose maintained within prescribed range  Description: INTERVENTIONS:  - Monitor Blood Glucose as ordered  - Assess for signs and symptoms of hyperglycemia and hypoglycemia  - Administer ordered medications to maintain glucose within target range  - Assess barriers to adequate nutritional intake and initiate nutrition consult as needed  - Instruct patient on self management of diabetes  Outcome: Progressing

## 2024-08-29 NOTE — PROGRESS NOTES
[facility]   part of [facility]     Hospitalist Progress Note     Kat Crook Patient Status:  Inpatient    3/11/1970 MRN [id number]   Location [facility] 4NW-A Attending Ace Davis MD   Hosp Day # 2 PCP Justin Huerta MD     Chief Complaint: abd pain     Subjective:   Improving. No fevers     Objective:    Review of Systems:   A comprehensive review of systems was completed; pertinent positive and negatives stated in subjective.    Vital signs:  Temp:  [97 °F (36.1 °C)-98.3 °F (36.8 °C)] 98.2 °F (36.8 °C)  Pulse:  [63-70] 63  Resp:  [16-18] 16  BP: ()/(50-66) 114/66  SpO2:  [94 %-97 %] 94 %    Physical Exam:    General: No acute distress AO   Respiratory: No wheezes, no rhonchi clear  RA   Cardiovascular: S1, S2, regular rate and rhythm, NSR   Abdomen: Soft,  + diffuse tenderness , diminished BS   Extremities: No edema       Diagnostic Data:    Labs:  Recent Labs   Lab 24  0847 24  1555 24  0601 24  0611   WBC 6.4 7.9 6.5 5.9   HGB 11.3* 11.5* 10.6* 9.9*   MCV 73.6* 70.6* 74.6* 72.8*   .0* 164.0 176.0 121.0*       Recent Labs   Lab 24  0847 24  1555 24  0601 24  0611   * 110* 126* 125*   BUN 11 9 9 9   CREATSERUM 0.93 0.93 0.94 0.80   CA 8.8 9.2 8.1* 7.8*   ALB 4.2 4.4  --  3.5    140 142 143   K 3.8 3.7 4.0 3.8    107 107 110   CO2 32.0 30.0 32.0 30.0   ALKPHO 83 83  --  71   AST 20 25  --  34*   ALT 22 26  --  37   BILT 0.5 0.3  --  0.3   TP 6.7 6.8  --  5.8       Estimated Creatinine Clearance: 73.5 mL/min (based on SCr of 0.8 mg/dL).    No results for input(s): \"TROP\", \"TROPHS\", \"CK\" in the last 168 hours.    No results for input(s): \"PTP\", \"INR\" in the last 168 hours.           Microbiology    Hospital Encounter on 24   1. Blood Culture     Status: None (Preliminary result)    Collection Time: 24  5:31 PM    Specimen: Blood,peripheral   Result Value Ref Range    Blood Culture Result No Growth 1  Day N/A   2. Urine Culture, Routine     Status: None    Collection Time: 08/27/24  2:21 PM    Specimen: Urine, clean catch   Result Value Ref Range    Urine Culture No Growth at 18-24 hrs. N/A         Imaging: Reviewed in Epic.    Medications:    polyethylene glycol (PEG 3350)  17 g Oral Daily    sennosides  8.6 mg Oral BID    levothyroxine  88 mcg Oral Daily @ 0700    montelukast  10 mg Oral Nightly    pantoprazole  20 mg Oral BID AC    pregabalin  200 mg Oral BID    [Held by provider] losartan  25 mg Oral Daily    thiamine  100 mg Oral Daily    fluticasone-salmeterol  1 puff Inhalation BID    DULoxetine  60 mg Oral BID    carvedilol  3.125 mg Oral BID with meals    baclofen  10 mg Oral TID    atorvastatin  20 mg Oral Nightly    enoxaparin  40 mg Subcutaneous Daily    ceFAZolin  1 g Intravenous Q8H    insulin aspart  1-68 Units Subcutaneous TID CC    insulin aspart  1-10 Units Subcutaneous TID AC and HS    insulin degludec  20 Units Subcutaneous Daily       Assessment & Plan:       #Abdominal pain- CT with enlarged pancreatic duct  Lipase, liver enzymes normal  S/p cholecystectomy  IV pain meds, anti-emetics  GI consulted  MRCP with mild pancreatic atrophy and mild ductal dilation- f/u GI input      #UTI ruled out- UCX unimpressive. Will stop Abx      #DM Type 2- hyperglycemic protocol   Removed insulin pump prior to admission      #Peripheral neuropathy - Lyrica      #MS- Follows with neurology, Dr. Payne   Gets Natalizumab injections, Baclofen      #Essential HTN - Losartan, coreg     #Asthma - Follows with Dr. Hoyt  Receives Benralizumab   Singulair, Breo, prn albuterol      #Hypothyroidism - Synthroid     #Depression - Cymbalta      #HLD - statin      #Osteoporosis - Denosumab, vit d       Supplementary Documentation:     Quality:  DVT Mechanical Prophylaxis:     Early ambuation  DVT Pharmacologic Prophylaxis   Medication    enoxaparin (Lovenox) 40 MG/0.4ML SUBQ injection 40 mg                Code Status:  Full Code  Panda: No urinary catheter in place  Panda Duration (in days):   Central line:    KAYLEY:     Discharge is dependent on: clinical course  At this point Ms. Crook is expected to be discharge to: tbd     The 21st Century Cures Act makes medical notes like these available to patients in the interest of transparency. Please be advised this is a medical document. Medical documents are intended to carry relevant information, facts as evident, and the clinical opinion of the practitioner. The medical note is intended as peer to peer communication and may appear blunt or direct. It is written in medical language and may contain abbreviations or verbiage that are unfamiliar.

## 2024-08-29 NOTE — PROGRESS NOTES
Gastroenterology Progress Note  Kat Crook Patient Status:  Inpatient    3/11/1970 MRN [id number]   Location [facility] 4NW-A Attending Ace Davis MD   Hosp Day # 2 PCP Justin Huerta MD     Chief Complaint: Acute on chronic abd pain   S: Reports baseline chronic abd pain is 5/10 and she is now at 6/10. No vomiting, tolerating diet. No BMs or overt GI bleeding.   O: BP 99/54 (BP Location: Right arm)   Pulse 65   Temp 98.3 °F (36.8 °C) (Oral)   Resp 18   Wt 167 lb 14.4 oz (76.2 kg)   LMP  (LMP Unknown)   SpO2 97%   BMI 27.10 kg/m²   Gen: AAOx3  CV: RRR with normal S1 / S2  Resp: CTA bilaterally; No increase in respiratory effort   Abd: (+)BS, soft, diffuse tenderness with minimal palpation which does not increase with deep palpation, non-distended; no rebound or guarding  Ext: No edema or cyanosis  Skin: Warm and dry  Laboratory Data:       Recent Labs   Lab 24  1155 24  1737 24  2159 24  2350 24  0510   PGLU 168* 114* 203* 217* 119*     No results for input(s): \"INR\" in the last 168 hours.      Recent Labs   Lab 24  0847 24  1555 24  0601 24  0611   WBC 6.4 7.9 6.5 5.9   HGB 11.3* 11.5* 10.6* 9.9*   .0* 164.0 176.0 121.0*       Recent Labs   Lab 24  0847 24  1555 24  0601 24  0611    140 142 143   K 3.8 3.7 4.0 3.8    107 107 110   CO2 32.0 30.0 32.0 30.0   BUN 11 9 9 9   CREATSERUM 0.93 0.93 0.94 0.80       Recent Labs   Lab 24  0847 24  1555 24  0611   ALT 22 26 37   AST 20 25 34*     Imaging:  PROCEDURE:  MRI ABDOMEN&MRCP W/3D (W+W0)(CPT=74183/97943)     COMPARISON:  [facility] , CT, CT APPENDIX ABD/PEL W CONTRAST (CPT=74177), 2024, 6:43 PM.     INDICATIONS:  Follow-up for abnormal CT scan of the abdomen pelvis suggesting pancreatic duct dilatation.     TECHNIQUE:  Multiplanar single shot fast spin echo, chemical shift imaging,  breath hold T2 weighted images and 2-D fiesta sequences were acquired. Fluid sensitive imaging with MRCP reconstruction was also performed including 3D multi-projection imaging  (non independent workstation).  Both projection and source MRCP images are evaluated.  Subsequent post contrast imaging was performed after intravenous administration of paramagnetic contrast agent.     3-D RENDERING:  Additional 3-D rendering was generated by the technologist.     PATIENT STATED HISTORY:(As transcribed by Technologist)  Right upper quadrant pain      CONTRAST USED:  15mL of Dotarem     FINDINGS:    LIVER:  There is diffuse fatty infiltration of the liver/steatosis.  No focal hepatic lesions or abnormal enhancement.  BILIARY:  Patient is status post cholecystectomy.  No biliary ductal dilatation.  No filling defects within the CBD identified.  PANCREAS:  There is a mild degree of pancreatic atrophy diffusely.  There is a mild nonspecific degree of dilatation involving the downstream pancreatic duct at the level of the head measuring up to 3 mm. No evidence of pancreatitis or focal pancreatic  mass lesions.  No abnormal enhancement.  SPLEEN:  No enlargement or focal lesion.    KIDNEYS:  No significant mass or obstruction.  Incidental simple bilateral nonenhancing renal cysts, the largest of which involves the right inferior pole measuring 1 cm.  These are consistent with benign cysts and no further workup is required or  recommended.  ADRENALS:  No mass or enlargement.    AORTA/VASCULAR:  No aneurysm or dissection.    RETROPERITONEUM:  No mass or adenopathy.    BOWEL/MESENTERY:  No visible mass, obstruction, or bowel wall thickening.  No evidence of ascites or acute inflammatory changes within the peritoneal cavity.  ABDOMINAL WALL:  No mass or hernia.    BONES:  No bony lesion or fracture.    LUNG BASES:  No visible pleural disease.  Lung bases not well assessed with MRI.    OTHER:  Negative.         Impression   CONCLUSION:     1. There is diffuse pancreatic atrophy with mild nonspecific dilatation of the downstream pancreatic duct at the level of the pancreatic head measuring up to 3 mm.  The remaining portions of the pancreatic duct are unremarkable.  No focal pancreatic mass   lesions, evidence of pancreatitis or abnormal enhancement.  2. Diffuse fatty infiltration of the liver/steatosis.  Changes of prior cholecystectomy without biliary ductal dilatation.     LOCATION:  [facility]     Dictated by (CST): Kirby Merritt DO on 8/28/2024 at 7:33 PM      Finalized by (CST): Kirby Merritt DO on 8/28/2024 at 7:39 PM     Assessment:  54 yr-old female with extensive PMhx that includes CAD, CM, pulmonary HTN, HTN, MS, DM c/b neuropathic pain, fibromyalgia, fatty liver, IBS-M, and anemia admitted 8/27 with 2 weeks of acute on chronic pain. CT with non-specific PD dilation--MRI abd/MRCP completed which suggests diffuse pancreatic atrophy with mild non-specific dilation of PD  with NO pancreatic mass, pancreatitis, or abnormal enhancement. LFTs normal.   Pain with tactile touch of abd. Symptoms maybe d/t functional process or visceral hypersensitivity, constipation. Will attempt to improve stool burden and monitor while assessing for pancreatic insuffiencey   Plan:   Pancreatic elastase to assess for pancreatic insuffiencey   No plan for endoscopy at this time   Continue Miralax daily + Senna to improve chronic constipation  Pain management per Hospitalist recommendations limiting narcotics as able    Case discussed and reviewed with Dr Te Alvarado, SHON  9:33 AM  8/29/2024  [facility]  [phone]    Attending physician addendum:   I have personally performed a face to face diagnostic evaluation on this patient.  I have reviewed and agree with the care plan. Non specific acute on chronic pain, constipation and abnormal imaging of pancreas, fatty atrophy, no malignancy, rec pain control, diet as tolerated, Fecal  elastase,  follow with GI as outpatient, no plan for endoscopy at this time    Total time spent in the care of the patient today: 35 minutes.    Morales Tiwari MD MD  [facility]  8/29/2024  4:57 PM

## 2024-08-29 NOTE — PLAN OF CARE
Problem: Patient/Family Goals  Goal: Patient/Family Long Term Goal  Description: Patient's Long Term Goal:     Interventions:  -   - See additional Care Plan goals for specific interventions  Outcome: Progressing  Goal: Patient/Family Short Term Goal  Description: Patient's Short Term Goal:     Interventions:   -   - See additional Care Plan goals for specific interventions  Outcome: Progressing     Problem: Diabetes/Glucose Control  Goal: Glucose maintained within prescribed range  Description: INTERVENTIONS:  - Monitor Blood Glucose as ordered  - Assess for signs and symptoms of hyperglycemia and hypoglycemia  - Administer ordered medications to maintain glucose within target range  - Assess barriers to adequate nutritional intake and initiate nutrition consult as needed  - Instruct patient on self management of diabetes  Outcome: Progressing

## 2024-08-29 NOTE — CM/SW NOTE
SW noted that patient is current with [facility]  for [facility] RN. SW placed resume of care orders for Home Health for MD hendrix and notified [facility] liaison.     SW will continue to follow for plan of care changes and remain available for any additional DC needs or concerns.     Shi Rodriguez MSW, LSW  Discharge Planner   [phone]

## 2024-08-30 VITALS
HEART RATE: 55 BPM | WEIGHT: 167.88 LBS | OXYGEN SATURATION: 96 % | RESPIRATION RATE: 18 BRPM | TEMPERATURE: 98 F | BODY MASS INDEX: 27 KG/M2 | SYSTOLIC BLOOD PRESSURE: 109 MMHG | DIASTOLIC BLOOD PRESSURE: 65 MMHG

## 2024-08-30 LAB
DEPRECATED HBV CORE AB SER IA-ACNC: 42 NG/ML
GLUCOSE BLD-MCNC: 118 MG/DL (ref 70–99)
GLUCOSE BLD-MCNC: 151 MG/DL (ref 70–99)
GLUCOSE BLD-MCNC: 184 MG/DL (ref 70–99)
HGB RETIC QN AUTO: 25.3 PG (ref 28.2–36.6)
IMM RETICS NFR: 0.2 RATIO (ref 0.1–0.3)
IRON SATN MFR SERPL: 34 %
IRON SERPL-MCNC: 90 UG/DL
RETICS # AUTO: 58.8 X10(3) UL (ref 22.5–147.5)
RETICS/RBC NFR AUTO: 1.3 %
TOTAL IRON BINDING CAPACITY: 264 UG/DL (ref 250–425)
TRANSFERRIN SERPL-MCNC: 197 MG/DL (ref 250–380)

## 2024-08-30 PROCEDURE — 99239 HOSP IP/OBS DSCHRG MGMT >30: CPT | Performed by: HOSPITALIST

## 2024-08-30 RX ORDER — BISACODYL 10 MG
10 SUPPOSITORY, RECTAL RECTAL ONCE
Status: DISCONTINUED | OUTPATIENT
Start: 2024-08-30 | End: 2024-08-30

## 2024-08-30 NOTE — PLAN OF CARE
Patient is alert and oriented, still complains of abdominal pain. No BM overnight. All meds given per MAR. IVF infusing. No acute events overnight. Safety precautions in place, call light within reach, plan of care ongoing.     Problem: Diabetes/Glucose Control  Goal: Glucose maintained within prescribed range  Description: INTERVENTIONS:  - Monitor Blood Glucose as ordered  - Assess for signs and symptoms of hyperglycemia and hypoglycemia  - Administer ordered medications to maintain glucose within target range  - Assess barriers to adequate nutritional intake and initiate nutrition consult as needed  - Instruct patient on self management of diabetes  Outcome: Progressing     Problem: PAIN - ADULT  Goal: Verbalizes/displays adequate comfort level or patient's stated pain goal  Description: INTERVENTIONS:  - Encourage pt to monitor pain and request assistance  - Assess pain using appropriate pain scale  - Administer analgesics based on type and severity of pain and evaluate response  - Implement non-pharmacological measures as appropriate and evaluate response  - Consider cultural and social influences on pain and pain management  - Manage/alleviate anxiety  - Utilize distraction and/or relaxation techniques  - Monitor for opioid side effects  - Notify MD/LIP if interventions unsuccessful or patient reports new pain  - Anticipate increased pain with activity and pre-medicate as appropriate  Outcome: Progressing

## 2024-08-30 NOTE — PLAN OF CARE
NURSING DISCHARGE NOTE    Discharged Home via Wheelchair.  Accompanied by Family member  Belongings Taken by patient/family.    Patient given AVS with discharge follow up instructions. Patient verbalized understanding of discharge instructions. Port-a-cath need heparanized and removed per protocol with no issues. All patient belongings taken with her at time of discharge.

## 2024-08-30 NOTE — DISCHARGE SUMMARY
[facility]  DISCHARGE SUMMARY     Kat Crook Patient Status:  Inpatient    3/11/1970 MRN [id number]   [facility] 4NW-A Attending Ace Davis MD   Hosp Day # 3 PCP Justin Huerta MD     Date of Admission: 2024  Date of Discharge:   2024    Discharge Disposition: Home Health Care Services    Discharge Diagnosis:  Abdominal pain CT showed enlarged pancreatic duct which was decreased on MRI scan shows atrophy of the pancreas  UTI was ruled out  Diabetes mellitus type 2  For peripheral neuropathy  MS receives Natalizumab injections  Essential hypertension  Asthma  Hypothyroidism  Depression  Dyslipidemia  Chronic narcotic use due to chronic pain syndrome   Osteoporosis  CAD   Pulm HTN   IBS     History of Present Illness:     Kat Crook is a 54 year old female with a past medical history of DM2 multiple sclerosis, chronic neuropathic pain, asthma, pulmonary hypertension, GIORDANO who presented to the emergency department with abdominal pain.  Patient is complaining of epigastric and right flank pain which started overnight.  Patient suffers from chronic abdominal pain, IBS, but never this severe.  She has nausea but no vomiting, denies diarrhea or constipation.  But no improvement in her pain despite IV pain meds.  No recent fevers, no chills, no chest pain or shortness of breath.  She has no other acute complaints at this time.       Brief Synopsis:   54-year-old presenting with worsening abdominal pain and had CT scan  no acute process.  CT on  showed new mild pancreatic ductal dilatation, no pancreatic mass, no signs of acute pancreatitis.  Moderate stool burden.  Labs were normal with normal LFTs normal leg base normal kidney function normal white count stable anemia.  Ferritin level was slightly lower than normal.  Patient has been given IV narcotics with little change in pain relief.  GI was consulted.  Patient underwent MRI of the abdomen, no acute process  identified.  Patient has been given medication to promote bowel movement needs to avoid constipation due to chronic Norco use for chronic pain syndrome.  Labs have been stable no further workup is recommended by GI.  They had wanted to get a pancreatic elactase e she did not provide stool sample when she had bowel movement.   She has been on a carbohydrate controlled diet eating small amounts.  Patient still complaining of pain no further rectal workup recommended.  To be discharged home    Lace+ Score: 74  59-90 High Risk  29-58 Medium Risk  0-28   Low Risk       TCM Follow-Up Recommendation:  LACE > 58: High Risk of readmission after discharge from the hospital.      Procedures during hospitalization:   CT appendix abdomen   1. There is no evidence for acute appendicitis.   2. There is new mild pancreatic ductal dilatation.  A pancreatic mass is not identified.  There is no evidence for acute pancreatitis.  Clinical significance is indeterminate.  The need for nonemergent follow-up MR imaging should be based clinically.    3. Moderate stool seen within the colon although decreased in volume compared to the CT from 8/23/2024.  No evidence for mechanical bowel obstruction.   4. Nonobstructing 5 mil right renal calculus.    5. Other incidental findings are noted as detailed above.   MRI of the abdomen      1. There is diffuse pancreatic atrophy with mild nonspecific dilatation of the downstream pancreatic duct at the level of the pancreatic head measuring up to 3 mm.  The remaining portions of the pancreatic duct are unremarkable.  No focal pancreatic mass    lesions, evidence of pancreatitis or abnormal enhancement.   2. Diffuse fatty infiltration of the liver/steatosis.  Changes of prior cholecystectomy without biliary ductal dilatation.     Follow up :  -Follow-up with GI, PCP  -Never obtained stool sample from patient for pancreatic elactase  -Colace to avoid constipation caused by chronic narcotic use  Edgard  low  would benefit from IV iron as outpt  Blood cx NGTD   Urine cx NGTD    WBC  WNL   Hgb 9.9  stable      Lab/Test results pending at Discharge:   None     Consultants:  Gi     Discharge Medication List:     Discharge Medications        CONTINUE taking these medications        Instructions Prescription details   albuterol 108 (90 Base) MCG/ACT Aers  Commonly known as: Ventolin HFA      Inhale 2 puffs into the lungs every 6 (six) hours as needed. PRN wheezing/SOB   Refills: 0     atorvastatin 20 MG Tabs  Commonly known as: Lipitor      Take 1 tablet (20 mg total) by mouth nightly.   Refills: 0     baclofen 10 MG Tabs  Commonly known as: Lioresal      Take 1 tablet (10 mg total) by mouth 2 (two) times daily.   Refills: 0     carvedilol 3.125 MG Tabs  Commonly known as: Coreg      Take 1 tablet (3.125 mg total) by mouth 2 (two) times daily.   Refills: 0     CeleBREX 100 MG Caps  Generic drug: celecoxib      Take 1 capsule (100 mg total) by mouth 2 (two) times daily.   Refills: 0     Cholecalciferol 125 MCG (5000 UT) Tabs  Commonly known as: VITAMIN D-3      Take 1 tablet (5,000 Units total) by mouth daily.   Refills: 0     Ciclopirox 8 % Soln      APPLY EVERY DAY   Refills: 0     denosumab 60 MG/ML Soln  Commonly known as: PROLIA      Inject 1 mL (60 mg total) into the skin every 6 (six) months.   Refills: 0     Dexcom G6 Sensor Misc      USE AS DIRECTED EVERY 10 DAYS   Quantity: 9 each  Refills: 3     Dexcom G6 Transmitter Misc      USE AS DIRECTED EVERY 90 DAYS   Quantity: 1 each  Refills: 3     docusate sodium 100 MG Caps  Commonly known as: Colace      Take 1 capsule (100 mg total) by mouth 2 (two) times daily.   Refills: 0     DULoxetine 60 MG Cpep  Commonly known as: Cymbalta      Take 1 capsule (60 mg total) by mouth 2 (two) times daily.   Refills: 0     EPINEPHrine 0.15 MG/0.3ML Soaj  Commonly known as: EpiPen Jr      Jani pen/lower dose because pt is allergic to epinephrine   Refills: 0     Farxiga 10 MG  Tabs  Generic drug: dapagliflozin      Take 1 tablet (10 mg total) by mouth daily.   Refills: 0     Fasenra Pen 30 MG/ML Soaj  Generic drug: Benralizumab      Inject 30 mg into the skin. Every other month/every 8 weeks   Refills: 0     fluticasone furoate-vilanterol 200-25 MCG/ACT Aepb  Commonly known as: Breo Ellipta      Inhale 1 puff into the lungs daily.   Refills: 0     Gemtesa 75 MG Tabs  Generic drug: Vibegron      Take 75 mg by mouth daily.   Refills: 0     HYDROcodone-acetaminophen  MG Tabs  Commonly known as: Norco      Take 1 tablet by mouth 4 (four) times daily as needed.   Refills: 0     insulin lispro 100 UNIT/ML Soln  Commonly known as: HumaLOG      INJECT UP TO 70 UNITS VIA INSULIN PUMP DAILY   Quantity: 70 mL  Refills: 2     levothyroxine 88 MCG Tabs  Commonly known as: Synthroid      Take 1 tablet (88 mcg total) by mouth before breakfast.   Refills: 0     Lyllana 0.025 MG/24HR Pttw  Generic drug: estradiol      Place 1 patch onto the skin twice a week.   Refills: 0     montelukast 10 MG Tabs  Commonly known as: Singulair      Take 1 tablet (10 mg total) by mouth nightly.   Refills: 0     NON FORMULARY      Tandem insulin pump settings (Bolus IQ)  Mdnt 3.1 units/hr  0400 3.00 units/hr  0900 3.2 units/hr    Correction:   MN 1:35/110  1 PM 1:40  7 pm 1:35    CHO ratio:  MN 1:5g   Refills: 0     ondansetron 4 mg tablet  Commonly known as: Zofran      Take 1 tablet (4 mg total) by mouth 3 (three) times daily as needed.   Refills: 0     pantoprazole 20 MG Tbec  Commonly known as: Protonix      Take 1 tablet (20 mg total) by mouth 2 (two) times daily.   Refills: 0     pregabalin 200 MG Caps  Commonly known as: LYRICA      Take 1 capsule (200 mg total) by mouth 2 (two) times daily.   Refills: 0     thiamine 100 MG Tabs  Commonly known as: Vitamin B1      Take 1 tablet (100 mg total) by mouth daily.   Quantity: 30 tablet  Refills: 3     Tysabri 300 MG/15ML Conc  Generic drug: natalizumab      Inject  into the vein. Every 8 weeks   Refills: 0            STOP taking these medications      acetaminophen-codeine 300-60 MG Tabs  Commonly known as: TYLENOL #4        Myrbetriq 50 MG Tb24  Generic drug: Mirabegron ER        Telmisartan 20 MG Tabs                 [place]     Follow-up appointment:   Justin Huerta MD  [address]  [facility] [address] [phone]    Follow up in 1 week(s)  Follow up    Morales Tiwari MD  [address] [facility]   [facility] [address] [phone]    Follow up      Appointments for Next 30 Days 8/30/2024 - 9/29/2024      None            Vital signs:  Temp:  [98 °F (36.7 °C)-98.5 °F (36.9 °C)] 98.2 °F (36.8 °C)  Pulse:  [55-66] 55  Resp:  [16-18] 18  BP: (103-109)/(58-68) 109/65  SpO2:  [96 %] 96 %    Physical Exam:      General: No acute distress AO   Respiratory: No wheezes, no rhonchi clear  RA   Cardiovascular: S1, S2, regular rate and rhythm, NSR   Abdomen: Soft,  + diffuse tenderness , lower abd diminished BS  2 Bm's th is am   Extremities: No edema   -----------------------------------------------------------------------------------------------  PATIENT DISCHARGE INSTRUCTIONS: See electronic chart    Caroline Morgan NP    Addendum:    Agree with above note except as documented below.      Gen: NAD  CVS: s1s2  Resp: CTA  Abd: soft    #Abd pain: CT noted.  No acute process.  Possible pancreatic insuff.  To f/u with G  #DM2: insulin  #abnl U/A: cystitis ruled out.   #HTN: stable  #HLD: statin      Pt seen and examined independently. Chart reviewed. Labs and imaging over the last 24 hours have been personally reviewed.  I personally made/approved 100% of the management plan for this patient and take full responsibility for the patient management.   Note has been reviewed by me and modified as needed.  Exam and Impression/ Recs as noted above.  D/w staff.    All diagnosis and plans reviewed and discussed with patient in detail    Gurvinder Sanchez MD    Total time for  discharge:  38 minutes    The 21st Century Cures Act makes medical notes like these available to patients in the interest of transparency. Please be advised this is a medical document. Medical documents are intended to carry relevant information, facts as evident, and the clinical opinion of the practitioner. The medical note is intended as peer to peer communication and may appear blunt or direct. It is written in medical language and may contain abbreviations or verbiage that are unfamiliar.

## 2024-08-30 NOTE — PROGRESS NOTES
[facility]   part of [facility]     Hospitalist Progress Note     Kat Crook Patient Status:  Inpatient    3/11/1970 MRN [id number]   Location [facility] 4NW-A Attending RIO Sanchez MD   Hosp Day # 3 PCP Justin Huerta MD     Chief Complaint: abd pain     Subjective:   Walking in [place] - stated she had not had a BM still w/ a lot of pain.  Pt says we are not listening to her. She had the pain prior to her taking norco- she is not a addict.   RN states she had 2 BM's post supp this am.      Objective:    Review of Systems:   A comprehensive review of systems was completed; pertinent positive and negatives stated in subjective.    Vital signs:  Temp:  [98 °F (36.7 °C)-98.5 °F (36.9 °C)] 98.2 °F (36.8 °C)  Pulse:  [55-66] 55  Resp:  [16-18] 18  BP: (103-114)/(58-68) 109/65  SpO2:  [94 %-96 %] 96 %    Physical Exam:    General: No acute distress AO   Respiratory: No wheezes, no rhonchi clear  RA   Cardiovascular: S1, S2, regular rate and rhythm, NSR   Abdomen: Soft,  + diffuse tenderness , lower abd diminished BS  2 Bm's th is am   Extremities: No edema       Diagnostic Data:    Labs:  Recent Labs   Lab 24  1555 24  0601 24  0611   WBC 7.9 6.5 5.9   HGB 11.5* 10.6* 9.9*   MCV 70.6* 74.6* 72.8*   .0 176.0 121.0*       Recent Labs   Lab 24  1555 24  0601 24  0611   * 126* 125*   BUN 9 9 9   CREATSERUM 0.93 0.94 0.80   CA 9.2 8.1* 7.8*   ALB 4.4  --  3.5    142 143   K 3.7 4.0 3.8    107 110   CO2 30.0 32.0 30.0   ALKPHO 83  --  71   AST 25  --  34*   ALT 26  --  37   BILT 0.3  --  0.3   TP 6.8  --  5.8       Estimated Creatinine Clearance: 73.5 mL/min (based on SCr of 0.8 mg/dL).    No results for input(s): \"TROP\", \"TROPHS\", \"CK\" in the last 168 hours.    No results for input(s): \"PTP\", \"INR\" in the last 168 hours.           Microbiology    Hospital Encounter on 24   1. Blood Culture     Status: None (Preliminary result)    Collection  Time: 08/27/24  5:31 PM    Specimen: Blood,peripheral   Result Value Ref Range    Blood Culture Result No Growth 2 Days N/A   2. Urine Culture, Routine     Status: None    Collection Time: 08/27/24  2:21 PM    Specimen: Urine, clean catch   Result Value Ref Range    Urine Culture No Growth at 18-24 hrs. N/A         Imaging: Reviewed in Epic.    Medications:    bisacodyl  5 mg Oral Daily    polyethylene glycol (PEG 3350)  17 g Oral Daily    sennosides  8.6 mg Oral BID    levothyroxine  88 mcg Oral Daily @ 0700    montelukast  10 mg Oral Nightly    pantoprazole  20 mg Oral BID AC    pregabalin  200 mg Oral BID    [Held by provider] losartan  25 mg Oral Daily    thiamine  100 mg Oral Daily    fluticasone-salmeterol  1 puff Inhalation BID    DULoxetine  60 mg Oral BID    carvedilol  3.125 mg Oral BID with meals    baclofen  10 mg Oral TID    atorvastatin  20 mg Oral Nightly    enoxaparin  40 mg Subcutaneous Daily    insulin aspart  1-68 Units Subcutaneous TID CC    insulin aspart  1-10 Units Subcutaneous TID AC and HS    insulin degludec  20 Units Subcutaneous Daily       Assessment & Plan:       #Abdominal pain- CT with enlarged pancreatic duct  Lipase, liver enzymes normal  S/p cholecystectomy  IV pain meds dc'ed    anti-emetics  GI s/o needing pancreatic elastase w/ BM   MRCP with mild pancreatic atrophy and mild ductal dilation- f/u GI input      #UTI ruled out- UCX unimpressive. Will stop Abx      #DM Type 2- hyperglycemic protocol   Removed insulin pump prior to admission      #Peripheral neuropathy - Lyrica      #MS- Follows with neurology, Dr. Payne   Gets Natalizumab injections, Baclofen      #Essential HTN - Losartan, coreg     #Asthma - Follows with Dr. Hoyt  Receives Benralizumab   Singulair, Breo, prn albuterol      #Hypothyroidism - Synthroid     #Depression - Cymbalta      #HLD - statin      #Osteoporosis - Denosumab, vit d   # Chronic Norco use  for pain     POC  Dc home  f/u w/ PCP   Needs  f/u w/  GI to r/o pancreatic insuffiencey   Need daily miralax and senna daily to prevent constipation   Needs to limit norco use   Caroline flaherty NP      Addendum:    Agree with above note except as documented below.      Gen: NAD  CVS: s1s2  Resp: CTA  Abd: soft    #Abd pain: CT noted.  No acute process.  Possible pancreatic insuff.    #DM2: insulin  #abnl U/A: cystitis ruled out.   #HTN: stable  #HLD: statin      Pt seen and examined independently. Chart reviewed. Labs and imaging over the last 24 hours have been personally reviewed.  I personally made/approved 100% of the management plan for this patient and take full responsibility for the patient management.   Note has been reviewed by me and modified as needed.  Exam and Impression/ Recs as noted above.  D/w staff.    Gurvinder Sanchez MD        Supplementary Documentation:     Quality:  DVT Mechanical Prophylaxis:     Early ambuation  DVT Pharmacologic Prophylaxis   Medication    enoxaparin (Lovenox) 40 MG/0.4ML SUBQ injection 40 mg                Code Status: Full Code  Panda: No urinary catheter in place  Panda Duration (in days):   Central line:    KAYLEY: 8/30/2024    Discharge is dependent on: clinical course  At this point Ms. Crook is expected to be discharge to: tbd     The 21st Century Cures Act makes medical notes like these available to patients in the interest of transparency. Please be advised this is a medical document. Medical documents are intended to carry relevant information, facts as evident, and the clinical opinion of the practitioner. The medical note is intended as peer to peer communication and may appear blunt or direct. It is written in medical language and may contain abbreviations or verbiage that are unfamiliar.

## 2024-09-02 LAB — PANCREATIC ELAST FECAL: >800 UG ELAST./G

## 2024-09-19 ENCOUNTER — APPOINTMENT (OUTPATIENT)
Dept: CT IMAGING | Facility: HOSPITAL | Age: 54
End: 2024-09-19
Attending: EMERGENCY MEDICINE
Payer: MEDICARE

## 2024-09-19 ENCOUNTER — HOSPITAL ENCOUNTER (EMERGENCY)
Facility: HOSPITAL | Age: 54
Discharge: HOME | End: 2024-09-19
Attending: EMERGENCY MEDICINE
Payer: MEDICARE

## 2024-09-19 VITALS
RESPIRATION RATE: 20 BRPM | BODY MASS INDEX: 26.68 KG/M2 | TEMPERATURE: 99 F | WEIGHT: 166 LBS | OXYGEN SATURATION: 96 % | HEIGHT: 66 IN | SYSTOLIC BLOOD PRESSURE: 132 MMHG | DIASTOLIC BLOOD PRESSURE: 80 MMHG | HEART RATE: 75 BPM

## 2024-09-19 DIAGNOSIS — R73.9 HYPERGLYCEMIA: Primary | ICD-10-CM

## 2024-09-19 DIAGNOSIS — G89.29 CHRONIC ABDOMINAL PAIN: ICD-10-CM

## 2024-09-19 DIAGNOSIS — K29.00 ACUTE GASTRITIS WITHOUT HEMORRHAGE, UNSPECIFIED GASTRITIS TYPE: ICD-10-CM

## 2024-09-19 DIAGNOSIS — R10.9 CHRONIC ABDOMINAL PAIN: ICD-10-CM

## 2024-09-19 LAB
ALBUMIN SERPL-MCNC: 4.4 G/DL (ref 3.2–4.8)
ALBUMIN/GLOB SERPL: 1.6 {RATIO} (ref 1–2)
ALP LIVER SERPL-CCNC: 104 U/L
ALT SERPL-CCNC: 23 U/L
ANION GAP SERPL CALC-SCNC: 6 MMOL/L (ref 0–18)
AST SERPL-CCNC: 24 U/L (ref ?–34)
BASOPHILS # BLD AUTO: 0.02 X10(3) UL (ref 0–0.2)
BASOPHILS NFR BLD AUTO: 0.3 %
BILIRUB SERPL-MCNC: 0.3 MG/DL (ref 0.3–1.2)
BILIRUB UR QL STRIP.AUTO: NEGATIVE
BUN BLD-MCNC: 13 MG/DL (ref 9–23)
CALCIUM BLD-MCNC: 9.7 MG/DL (ref 8.7–10.4)
CHLORIDE SERPL-SCNC: 106 MMOL/L (ref 98–112)
CLARITY UR REFRACT.AUTO: CLEAR
CO2 SERPL-SCNC: 30 MMOL/L (ref 21–32)
CREAT BLD-MCNC: 1.13 MG/DL
EGFRCR SERPLBLD CKD-EPI 2021: 57 ML/MIN/1.73M2 (ref 60–?)
EOSINOPHIL # BLD AUTO: 0 X10(3) UL (ref 0–0.7)
EOSINOPHIL NFR BLD AUTO: 0 %
ERYTHROCYTE [DISTWIDTH] IN BLOOD BY AUTOMATED COUNT: 20.2 %
GLOBULIN PLAS-MCNC: 2.8 G/DL (ref 2–3.5)
GLUCOSE BLD-MCNC: 217 MG/DL (ref 70–99)
GLUCOSE UR STRIP.AUTO-MCNC: >1000 MG/DL
HCT VFR BLD AUTO: 39.5 %
HGB BLD-MCNC: 12.1 G/DL
IMM GRANULOCYTES # BLD AUTO: 0.02 X10(3) UL (ref 0–1)
IMM GRANULOCYTES NFR BLD: 0.3 %
KETONES UR STRIP.AUTO-MCNC: NEGATIVE MG/DL
LEUKOCYTE ESTERASE UR QL STRIP.AUTO: NEGATIVE
LIPASE SERPL-CCNC: 59 U/L (ref 12–53)
LYMPHOCYTES # BLD AUTO: 3.81 X10(3) UL (ref 1–4)
LYMPHOCYTES NFR BLD AUTO: 52.3 %
MCH RBC QN AUTO: 22.4 PG (ref 26–34)
MCHC RBC AUTO-ENTMCNC: 30.6 G/DL (ref 31–37)
MCV RBC AUTO: 73.1 FL
MONOCYTES # BLD AUTO: 0.56 X10(3) UL (ref 0.1–1)
MONOCYTES NFR BLD AUTO: 7.7 %
NEUTROPHILS # BLD AUTO: 2.87 X10 (3) UL (ref 1.5–7.7)
NEUTROPHILS # BLD AUTO: 2.87 X10(3) UL (ref 1.5–7.7)
NEUTROPHILS NFR BLD AUTO: 39.4 %
NITRITE UR QL STRIP.AUTO: NEGATIVE
OSMOLALITY SERPL CALC.SUM OF ELEC: 301 MOSM/KG (ref 275–295)
PH UR STRIP.AUTO: 5.5 [PH] (ref 5–8)
PLATELET # BLD AUTO: 149 10(3)UL (ref 150–450)
PLATELETS.RETICULATED NFR BLD AUTO: 7.7 % (ref 0–7)
POTASSIUM SERPL-SCNC: 4.2 MMOL/L (ref 3.5–5.1)
PROT SERPL-MCNC: 7.2 G/DL (ref 5.7–8.2)
PROT UR STRIP.AUTO-MCNC: NEGATIVE MG/DL
RBC # BLD AUTO: 5.4 X10(6)UL
RBC UR QL AUTO: NEGATIVE
SODIUM SERPL-SCNC: 142 MMOL/L (ref 136–145)
SP GR UR STRIP.AUTO: >1.03 (ref 1–1.03)
UROBILINOGEN UR STRIP.AUTO-MCNC: NORMAL MG/DL
WBC # BLD AUTO: 7.3 X10(3) UL (ref 4–11)

## 2024-09-19 PROCEDURE — 81003 URINALYSIS AUTO W/O SCOPE: CPT

## 2024-09-19 PROCEDURE — 83690 ASSAY OF LIPASE: CPT

## 2024-09-19 PROCEDURE — 96375 TX/PRO/DX INJ NEW DRUG ADDON: CPT

## 2024-09-19 PROCEDURE — 81003 URINALYSIS AUTO W/O SCOPE: CPT | Performed by: EMERGENCY MEDICINE

## 2024-09-19 PROCEDURE — 99285 EMERGENCY DEPT VISIT HI MDM: CPT

## 2024-09-19 PROCEDURE — 99284 EMERGENCY DEPT VISIT MOD MDM: CPT

## 2024-09-19 PROCEDURE — 85025 COMPLETE CBC W/AUTO DIFF WBC: CPT | Performed by: EMERGENCY MEDICINE

## 2024-09-19 PROCEDURE — 85025 COMPLETE CBC W/AUTO DIFF WBC: CPT

## 2024-09-19 PROCEDURE — 96361 HYDRATE IV INFUSION ADD-ON: CPT

## 2024-09-19 PROCEDURE — 80053 COMPREHEN METABOLIC PANEL: CPT

## 2024-09-19 PROCEDURE — 74177 CT ABD & PELVIS W/CONTRAST: CPT | Performed by: EMERGENCY MEDICINE

## 2024-09-19 PROCEDURE — 96374 THER/PROPH/DIAG INJ IV PUSH: CPT

## 2024-09-19 PROCEDURE — 80053 COMPREHEN METABOLIC PANEL: CPT | Performed by: EMERGENCY MEDICINE

## 2024-09-19 PROCEDURE — 83690 ASSAY OF LIPASE: CPT | Performed by: EMERGENCY MEDICINE

## 2024-09-19 RX ORDER — HYDROMORPHONE HYDROCHLORIDE 1 MG/ML
1 INJECTION, SOLUTION INTRAMUSCULAR; INTRAVENOUS; SUBCUTANEOUS ONCE
Status: COMPLETED | OUTPATIENT
Start: 2024-09-19 | End: 2024-09-19

## 2024-09-19 RX ORDER — MAGNESIUM HYDROXIDE/ALUMINUM HYDROXICE/SIMETHICONE 120; 1200; 1200 MG/30ML; MG/30ML; MG/30ML
30 SUSPENSION ORAL ONCE
Status: DISCONTINUED | OUTPATIENT
Start: 2024-09-19 | End: 2024-09-19

## 2024-09-19 RX ORDER — SUCRALFATE 1 G/1
1 TABLET ORAL
Qty: 120 TABLET | Refills: 0 | Status: SHIPPED | OUTPATIENT
Start: 2024-09-19 | End: 2024-09-24

## 2024-09-19 RX ORDER — ONDANSETRON 2 MG/ML
4 INJECTION INTRAMUSCULAR; INTRAVENOUS ONCE
Status: COMPLETED | OUTPATIENT
Start: 2024-09-19 | End: 2024-09-19

## 2024-09-19 RX ORDER — KETOROLAC TROMETHAMINE 15 MG/ML
15 INJECTION, SOLUTION INTRAMUSCULAR; INTRAVENOUS ONCE
Status: COMPLETED | OUTPATIENT
Start: 2024-09-19 | End: 2024-09-19

## 2024-09-19 RX ORDER — LIDOCAINE HYDROCHLORIDE 20 MG/ML
10 SOLUTION OROPHARYNGEAL ONCE
Status: COMPLETED | OUTPATIENT
Start: 2024-09-19 | End: 2024-09-19

## 2024-09-19 NOTE — ED INITIAL ASSESSMENT (HPI)
Pt arrives to ED with c/o mid lower abdominal pain that started earlier this week that had gotten worse today. Pt states she was admitted at the end of August for the same pain but is unsure of the diagnoses. \"I think its my appendix\". Endorses N/V/D. Denies urinary symptoms.

## 2024-09-19 NOTE — ED PROVIDER NOTES
Patient Seen in: [facility] Emergency Department      History     Chief Complaint   Patient presents with    Abdomen/Flank Pain     Stated Complaint: Abdominal pain    Subjective:   Is a 54-year-old female who presents emergency room with generalized abdominal pain.  Patient reports no vomiting no fever patient reports that her home pain medications were not helping with the pain she takes Norco at home for her chronic pain.    The history is provided by the patient.           Objective:   Past Medical History:    Abdominal pain    Allergic rhinitis    Anemia    Anxiety    Anxiety state    Arthritis    Asthma (HCC)    Atypical mole    Autoimmune disease (HCC)    Back pain    Bad breath    Black stools    Bloating    Blood disorder    Thalassemia alpha, Sickle cell trait    Blood in urine    Blurred vision    Calculus of kidney    Cardiomyopathy (HCC)    last echo 2021: EF 55%    Chest pain    Constipation    Decorative tattoo    Deep vein thrombosis (HCC)    Depression    Diarrhea, unspecified    Dizziness    Easy bruising    Elevated liver enzymes    Endocrine disorder    Esophageal reflux    Fatigue    Fibromyalgia    Food intolerance    Frequent urination    Frequent UTI    Gastroparesis    Glaucoma    H. pylori infection    H/O  section    Headache disorder    Heart attack (HCC)    Heart palpitations    Heartburn    Hemorrhoids    Herniation of cervical intervertebral disc    High blood pressure    High cholesterol    History of blood clots    Unprovoked    History of blood transfusion    History of cardiac murmur    History of depression    Hoarseness, chronic    Hyperlipidemia    IBS (irritable bowel syndrome)    Irregular bowel habits    Leaking of urine    Liver disease    GIORDANO and Stage 2 fibrosis    Loss of appetite    Migraines    Multiple sclerosis (HCC)    dx 8 years ago    Muscle weakness    cane or walker prn    Myocardial infarction (HCC)    Nausea    Neuropathy    legs, hands,  feet; bilateral    Night sweats    Osteoarthritis    Osteoporosis    KAMALJIT in her 30s    Osteoporosis    Pain with bowel movements    Painful urination    Problems with swallowing    Due to MS    Renal disorder    kidney lesions    Scoliosis of lumbar spine    Shortness of breath    Sickle cell trait (HCC)    Sleep apnea    no treatment    Sleep disturbance    Stress    Type II or unspecified type diabetes mellitus without mention of complication, not stated as uncontrolled    Uncomfortable fullness after meals    Unspecified disorder of thyroid    total thyroidectomy    Visual impairment    glasses    Wears glasses    Weight loss    Wheezing              Past Surgical History:   Procedure Laterality Date      1986    Cataract      Cath angio  2014    Cholecystectomy      Colonoscopy N/A 2015    Procedure: COLONOSCOPY;  Surgeon: Liz Tong DO;  Location:  ENDOSCOPY    Colonoscopy N/A 2021    Procedure: COLONOSCOPY;  Surgeon: Cedrick Da Silva MD;  Location:  ENDOSCOPY    Colonoscopy N/A 10/08/2021    Procedure: COLONOSCOPY, ESOPHAGOGASTRODUODENOSCOPY (EGD);  Surgeon: Darvin Richardson MD;  Location:  ENDOSCOPY    D & c  1990    Blighted ovum    Egd      Hysterectomy      total hystero.    Ir port a cath insertion exchnge check  2018    Lithotripsy      x 2    Lysis of adhesions      Needle biopsy liver  2016    Oophorectomy Bilateral     total hystero.    Other      dialysis catheter- left chest for plasmaphoresis    Other surgical history       x 2    Port, indwelling, imp  2019    power port    Thyroidectomy  2013    benign MNG    Total abdom hysterectomy      Upper gi endoscopy performed  2014                Social History     Socioeconomic History    Marital status: Single   Tobacco Use    Smoking status: Never     Passive exposure: Never    Smokeless tobacco: Never    Tobacco comments:     Never used it   Vaping Use     Vaping status: Never Used   Substance and Sexual Activity    Alcohol use: Never    Drug use: Never   Other Topics Concern    Caffeine Concern Yes     Comment: 1 can 3 x week    Exercise Yes     Comment: walking     Social Determinants of Health     Financial Resource Strain: Low Risk  (2/19/2024)    Received from [employer], [facility]    Financial Resource Strain     In the past year, have you or any family members you live with been unable to get any of the following when it was really needed? Check all that apply.: None   Food Insecurity: No Food Insecurity (8/27/2024)    Food Insecurity     Food Insecurity: Never true   Transportation Needs: No Transportation Needs (8/27/2024)    Transportation Needs     Lack of Transportation: No   Physical Activity: Insufficiently Active (7/27/2022)    Received from [employer], [facility]    Exercise Vital Sign     Days of Exercise per Week: 3 days     Minutes of Exercise per Session: 20 min   Stress: Low Risk  (2/19/2024)    Received from [employer], [facility]    Stress     Stress is when someone feels tense, nervous, anxious, or can't sleep at night because their mind is troubled. How stressed are you? : Not at all   Social Connections: Low Risk  (2/19/2024)    Received from [employer], [facility]    Social Connections     How often do you see or talk to people that you care about and feel close to? (For example: talking to friends on the phone, visiting friends or family, going to Baptism or club meetings): 5 or more times a week   Housing Stability: Low Risk  (8/27/2024)    Housing Stability     Housing Instability: No              Review of Systems   Constitutional:  Negative for fever.   Gastrointestinal:  Positive for abdominal pain.       Positive for stated Chief Complaint: Abdomen/Flank Pain    Other systems are as noted in HPI.  Constitutional and vital signs reviewed.       All other systems reviewed and negative except as noted above.    Physical Exam     ED Triage Vitals   BP 09/19/24 0122 120/80   Pulse 09/19/24 0120 90   Resp 09/19/24 0120 20   Temp 09/19/24 0120 98.7 °F (37.1 °C)   Temp src 09/19/24 0120 Oral   SpO2 09/19/24 0120 96 %   O2 Device 09/19/24 0120 None (Room air)       Current Vitals:   Vital Signs  BP: 135/73  Pulse: 73  Resp: 20  Temp: 98.7 °F (37.1 °C)  Temp src: Oral  MAP (mmHg): 92    Oxygen Therapy  SpO2: 95 %  O2 Device: None (Room air)            Physical Exam  Vitals and nursing note reviewed.   Constitutional:       General: She is not in acute distress.     Appearance: She is well-developed. She is obese. She is not toxic-appearing.   HENT:      Head: Normocephalic and atraumatic.   Eyes:      Extraocular Movements: Extraocular movements intact.      Pupils: Pupils are equal, round, and reactive to light.   Cardiovascular:      Rate and Rhythm: Normal rate and regular rhythm.      Heart sounds: Normal heart sounds.   Pulmonary:      Effort: Pulmonary effort is normal.      Breath sounds: Normal breath sounds.   Abdominal:      General: Bowel sounds are normal. There is no distension.      Palpations: Abdomen is soft.      Tenderness: There is no abdominal tenderness. There is no guarding or rebound.   Skin:     General: Skin is warm.      Capillary Refill: Capillary refill takes less than 2 seconds.   Neurological:      General: No focal deficit present.      Mental Status: She is alert and oriented to person, place, and time.   Psychiatric:         Mood and Affect: Mood normal.         Behavior: Behavior normal.               ED Course     Labs Reviewed   COMP METABOLIC PANEL (14) - Abnormal; Notable for the following components:       Result Value    Glucose 217 (*)     Creatinine 1.13 (*)     Calculated Osmolality 301 (*)     eGFR-Cr 57 (*)     All other components within normal limits   CBC WITH DIFFERENTIAL WITH PLATELET - Abnormal; Notable for the  following components:    RBC 5.40 (*)     .0 (*)     Immature Platelet Fraction 7.7 (*)     MCV 73.1 (*)     MCH 22.4 (*)     MCHC 30.6 (*)     All other components within normal limits   LIPASE - Abnormal; Notable for the following components:    Lipase 59 (*)     All other components within normal limits   URINALYSIS WITH CULTURE REFLEX - Abnormal; Notable for the following components:    Spec Gravity >1.030 (*)     Glucose Urine >1000 (*)     All other components within normal limits   RAINBOW DRAW LAVENDER   RAINBOW DRAW LIGHT GREEN             CT scan shows no AAA bowel obstruction or free air moderate gastric wall fold thickening suggesting gastritis consider nonemergent GI follow-up.  Prominent right colonic stool.  Normal appendix no diverticulitis hysterectomy calcified pelvic phleboliths symmetric renal enhancement small anterior lower pole left renal cortical cyst no acute abnormalities in lung bases minimal dependent atelectasis gastroesophageal reflux lower esophagus.         MDM      Social -negative tobacco, negative etoh, negative drugs  Family History-noncontributory  Past Medical History-osteoporosis, high blood pressure, heart attack, frequent UTIs, liver disease, hyperlipidemia, anxiety, gastroparesis, glaucoma, GERD, fibromyalgia, IBS, sickle cell trait, multiple sclerosis, anemia, neuropathy, osteoporosis, depression    Differential diagnosis before testing included gastritis, bowel obstruction, gastroenteritis, perforation, appendicitis    Co-morbidities that add to the complexity of management include: Multiple abdominal surgeries,    Testing ordered during this visit included CT scan of the abdomen baseline labs    Radiographic images  I personally reviewed the radiographs and my individual interpretation shows gastritis  I also reviewed the official reports that showed CT scan shows no AAA bowel obstruction or free air moderate gastric wall fold thickening suggesting gastritis  consider nonemergent GI follow-up.  Prominent right colonic stool.  Normal appendix no diverticulitis hysterectomy calcified pelvic phleboliths symmetric renal enhancement small anterior lower pole left renal cortical cyst no acute abnormalities in lung bases minimal dependent atelectasis gastroesophageal reflux lower esophagus.    External chart review showed review of Care Everywhere in [facility] system shows patient's creatinine is slightly elevated compared to previous    History obtained by an independent source included from patient    Discussion of management with patient    Social determinants of health that affect care include patient was advised on previous admission and that she will need to follow-up with GI for endoscopy however she has not yet scheduled that.      Medications Provided: IV fluids Dilaudid Zofran Toradol GI cocktail Carafate    Course of Events during Emergency Room Visit include 54-year-old female presents emergency room generalized abdominal pain her laboratory findings showed no significant elevation white count mild renal insufficiency.  Patient be given IV fluids.  Her platelet count is mildly low.  Urinalysis shows hyperglycemia.  Patient is known diabetes.  Patient CT scan shows gastritis.  She has not seen follow-up for endoscopy with her GI doctor yet I would recommend she does so she is states she is already on omeprazole.  Patient will be put on Carafate and give a GI cocktail here.  Patient follow-up with primary care physician          Disposition:      Discharge  I have discussed with the patient the results of test, differential diagnosis, treatment plan, warning signs and symptoms which should prompt immediate return.  They expressed understanding of these instructions and agrees to the following plan provided.  They were given written discharge instructions and agrees to return for any concerns and voiced understanding and all questions were answered.                                       Medical Decision Making      Disposition and Plan     Clinical Impression:  1. Hyperglycemia    2. Chronic abdominal pain    3. Acute gastritis without hemorrhage, unspecified gastritis type         Disposition:  Discharge  9/19/2024  5:10 am    Follow-up:  Justin Huerta MD  [address] [facility] [address] [phone]    Schedule an appointment as soon as possible for a visit      Ronn Perez MD  [address]   [facility] [address] [phone]    Schedule an appointment as soon as possible for a visit            Medications Prescribed:  Current Discharge Medication List        START taking these medications    Details   sucralfate 1 g Oral Tab Take 1 tablet (1 g total) by mouth 4 (four) times daily before meals and nightly.  Qty: 120 tablet, Refills: 0

## 2024-09-19 NOTE — ED QUICK NOTES
Patient oxygen saturation dropping into 70's when asleep after dilaudid administration. 2L via nasal cannula applied.

## 2024-09-23 ENCOUNTER — TELEPHONE (OUTPATIENT)
Facility: LOCATION | Age: 54
End: 2024-09-23

## 2024-09-23 PROBLEM — E10.65 TYPE 1 DIABETES MELLITUS WITH HYPERGLYCEMIA (HCC): Status: ACTIVE | Noted: 2024-09-18

## 2024-09-23 NOTE — TELEPHONE ENCOUNTER
Spoke with patient, scheduled 9/24/25.    Future Appointments   Date Time Provider Department Center   9/24/2024  9:00 AM Suzy Soto MD [id number]   10/11/2024 10:20 AM Morales Tiwari MD [facility]   10/28/2024  1:30 PM Raisa Hoyt MD [facility] [place]

## 2024-09-23 NOTE — TELEPHONE ENCOUNTER
Carmen from Dr. Richardson's office called to schedule patient for significant abdominal pain. Patient has had imaging that doesn't really show much. Currently scheduled for 10/4. Wondering if it's possible to have the patient seen sooner with anyone.     Please advise  Best callback number is patient [phone]    Future Appointments   Date Time Provider Department Center   10/4/2024  9:00 AM Suzy Soto MD [id number]   10/11/2024 10:20 AM Morales Tiwari MD [facility]   10/28/2024  1:30 PM Raisa Hoyt MD [facility] [place]

## 2024-09-24 ENCOUNTER — OFFICE VISIT (OUTPATIENT)
Facility: LOCATION | Age: 54
End: 2024-09-24
Payer: MEDICARE

## 2024-09-24 VITALS
HEART RATE: 81 BPM | BODY MASS INDEX: 26 KG/M2 | TEMPERATURE: 98 F | HEIGHT: 66 IN | OXYGEN SATURATION: 98 % | RESPIRATION RATE: 14 BRPM | SYSTOLIC BLOOD PRESSURE: 119 MMHG | DIASTOLIC BLOOD PRESSURE: 80 MMHG

## 2024-09-24 DIAGNOSIS — R10.31 RIGHT LOWER QUADRANT ABDOMINAL PAIN: Primary | ICD-10-CM

## 2024-09-24 PROCEDURE — 99202 OFFICE O/P NEW SF 15 MIN: CPT | Performed by: STUDENT IN AN ORGANIZED HEALTH CARE EDUCATION/TRAINING PROGRAM

## 2024-09-24 RX ORDER — LINACLOTIDE 145 UG/1
CAPSULE, GELATIN COATED ORAL
COMMUNITY
Start: 2024-09-04

## 2024-09-26 ENCOUNTER — TELEPHONE (OUTPATIENT)
Dept: OTHER | Age: 54
End: 2024-09-26

## 2024-09-26 ENCOUNTER — TELEPHONE (OUTPATIENT)
Dept: NEUROLOGY | Facility: CLINIC | Age: 54
End: 2024-09-26

## 2024-09-26 NOTE — TELEPHONE ENCOUNTER
Tysabri infusion record received from [facility] center for physician review.  No signs/symptoms of infusion reaction noted.  Patient infused on 9/26/2024. No observation period required.  Lot Number [id number] x1, Exp 11/2027  No new orders requested.  Orders active for 9 additional infusions.  Next infusion scheduled for 11/21/2024. Infusing every 8 weeks.  Placed in physician's box for review.

## 2024-09-26 NOTE — H&P
Patient ID: Kat Crook is a 54 year old female.    Chief Complaint   Patient presents with    New Patient     Establish care and discuss worsening intermittent pain in RLQ  and below the umbilical area. Pain is chronic (at least 6yrs) but has been constant and more intense since August 2024.         HPI: Kat Crook is a 54 year old female presents to clinic for evaluation.  Patient has a long history of chronic abdominal pain.  She reports the pain for started shortly after her hysterectomy in 1990.  During that time, pain improved and she was able to work and be functioning.  In 2013, she began to have leg pain and was subsequently diagnosed with MS in 2014.  Since then, her abdominal pain has returned and has been getting worse.  She underwent to laparoscopic lysis of adhesions with Dr. Arriaga, 1 in 2017 and the other in 2022 for adhesions and chronic abdominal pain.  Patient states that both operations did not improve her pain.  She states that her pain is mostly tolerable but for the past 2 months, it has been getting worse to where she doubles over.  She does endorse nausea at times but denies any vomiting.  She denies any change in her bowel habits.  She has had a complete workup including EGD, colonoscopy, and abdominal imaging which was all negative.  She was referred to general surgery for further evaluation and management.    Workup:   CT ABDOMEN+PELVIS(CONTRAST ONLY)(CPT=74177)    Result Date: 9/19/2024  CONCLUSION:   No acute process identified within the abdomen or pelvis.   LOCATION:  [facility]   Dictated by (CST): Santiago Garcia MD on 9/19/2024 at 7:38 AM     Finalized by (CST): Santiago Garcia MD on 9/19/2024 at 7:42 AM       MRI ABDOMEN AND MRCP W/3D (W+W0)(CPT=74183/00000)    Result Date: 8/28/2024  CONCLUSION:  1. There is diffuse pancreatic atrophy with mild nonspecific dilatation of the downstream pancreatic duct at the level of the pancreatic head measuring up to 3 mm.  The remaining portions of  the pancreatic duct are unremarkable.  No focal pancreatic mass  lesions, evidence of pancreatitis or abnormal enhancement. 2. Diffuse fatty infiltration of the liver/steatosis.  Changes of prior cholecystectomy without biliary ductal dilatation.  LOCATION:  [id number]    Dictated by (CST): Kirby Merritt DO on 8/28/2024 at 7:33 PM     Finalized by (CST): Kirby Merritt DO on 8/28/2024 at 7:39 PM       CT APPENDIX ABD/PEL W CONTRAST (CPT=74177)    Result Date: 8/27/2024  CONCLUSION:  1. There is no evidence for acute appendicitis. 2. There is new mild pancreatic ductal dilatation.  A pancreatic mass is not identified.  There is no evidence for acute pancreatitis.  Clinical significance is indeterminate.  The need for nonemergent follow-up MR imaging should be based clinically.  3. Moderate stool seen within the colon although decreased in volume compared to the CT from 8/23/2024.  No evidence for mechanical bowel obstruction. 4. Nonobstructing 5 mil right renal calculus.  5. Other incidental findings are noted as detailed above.    LOCATION:  [facility]   Dictated by (CST): Leopoldo Hoyt MD on 8/27/2024 at 7:25 PM     Finalized by (CST): Leopoldo Hoyt MD on 8/27/2024 at 7:34 PM       CT ABDOMEN+PELVIS(CONTRAST ONLY)(CPT=74177)    Result Date: 8/23/2024  CONCLUSION:  No clear etiology of the patient's symptoms.  No free fluid is seen within the abdomen or pelvis.  If clinical symptoms persist then recommend follow-up imaging.   LOCATION:  [id number]   Dictated by (CST): Bernard Santana MD on 8/23/2024 at 10:18 AM     Finalized by (CST): Bernard Santana MD on 8/23/2024 at 10:20 AM            Past Medical History  Past Medical History:    Abdominal pain    Allergic rhinitis    Anemia    Anxiety    Anxiety state    Arthritis    Asthma (HCC)    Atypical mole    Autoimmune disease (HCC)    Back pain    Bad breath    Black stools    Bloating    Blood disorder    Thalassemia alpha, Sickle cell trait    Blood in urine    Blurred vision     Calculus of kidney    Cardiomyopathy (HCC)    last echo 2021: EF 55%    Chest pain    Constipation    Decorative tattoo    Deep vein thrombosis (HCC)    Depression    Diarrhea, unspecified    Dizziness    Easy bruising    Elevated liver enzymes    Endocrine disorder    Esophageal reflux    Fatigue    Fibromyalgia    Food intolerance    Frequent urination    Frequent UTI    Gastroparesis    Glaucoma    H. pylori infection    H/O  section    Headache disorder    Heart attack (HCC)    Heart palpitations    Heartburn    Hemorrhoids    Herniation of cervical intervertebral disc    High blood pressure    High cholesterol    History of blood clots    Unprovoked    History of blood transfusion    History of cardiac murmur    History of depression    Hoarseness, chronic    Hyperlipidemia    IBS (irritable bowel syndrome)    Irregular bowel habits    Leaking of urine    Liver disease    GIORDANO and Stage 2 fibrosis    Loss of appetite    Migraines    Multiple sclerosis (HCC)    dx 8 years ago    Muscle weakness    cane or walker prn    Myocardial infarction (HCC)    Nausea    Neuropathy    legs, hands, feet; bilateral    Night sweats    Osteoarthritis    Osteoporosis    KAMALJIT in her 30s    Osteoporosis    Pain with bowel movements    Painful urination    Problems with swallowing    Due to MS    Renal disorder    kidney lesions    Scoliosis of lumbar spine    Shortness of breath    Sickle cell trait (HCC)    Sleep apnea    no treatment    Sleep disturbance    Stress    Type II or unspecified type diabetes mellitus without mention of complication, not stated as uncontrolled    Uncomfortable fullness after meals    Unspecified disorder of thyroid    total thyroidectomy    Visual impairment    glasses    Wears glasses    Weight loss    Wheezing       Past Surgical History  Past Surgical History:   Procedure Laterality Date      1986    Cataract      Cath angio  2014    Cholecystectomy       Colonoscopy N/A 2015    Procedure: COLONOSCOPY;  Surgeon: Liz Tong DO;  Location:  ENDOSCOPY    Colonoscopy N/A 2021    Procedure: COLONOSCOPY;  Surgeon: Cedrick Da Silva MD;  Location:  ENDOSCOPY    Colonoscopy N/A 10/08/2021    Procedure: COLONOSCOPY, ESOPHAGOGASTRODUODENOSCOPY (EGD);  Surgeon: Darvin Richardson MD;  Location:  ENDOSCOPY    D & c  1990    Blighted ovum    Egd      Hysterectomy      total hystero.    Ir port a cath insertion exchnge check  2018    Lithotripsy      x 2    Lysis of adhesions      Needle biopsy liver  2016    Oophorectomy Bilateral     total hystero.    Other      dialysis catheter- left chest for plasmaphoresis    Other surgical history       x 2    Port, indwelling, imp  2019    power port    Thyroidectomy  2013    benign MNG    Total abdom hysterectomy      Upper gi endoscopy performed  2014       Medications  Current Outpatient Medications   Medication Sig Dispense Refill    linaCLOtide (LINZESS) 145 MCG Oral Cap       natalizumab (TYSABRI) 300 mg/15mL Intravenous Conc Inject into the vein. Every 8 weeks      carvedilol 3.125 MG Oral Tab Take 1 tablet (3.125 mg total) by mouth 2 (two) times daily.      montelukast 10 MG Oral Tab Take 1 tablet (10 mg total) by mouth nightly.      levothyroxine 88 MCG Oral Tab Take 1 tablet (88 mcg total) by mouth before breakfast.      pantoprazole 20 MG Oral Tab EC Take 1 tablet (20 mg total) by mouth 2 (two) times daily.      fluticasone furoate-vilanterol 200-25 MCG/ACT Inhalation Aerosol Powder, Breath Activated Inhale 1 puff into the lungs daily.      LYLLANA 0.025 MG/24HR Transdermal Patch Biweekly Place 1 patch onto the skin twice a week.      CELEBREX 100 MG Oral Cap Take 1 capsule (100 mg total) by mouth 2 (two) times daily.      pregabalin 200 MG Oral Cap Take 1 capsule (200 mg total) by mouth 2 (two) times daily.      Ciclopirox 8 % External Solution APPLY EVERY DAY       HYDROcodone-acetaminophen  MG Oral Tab Take 1 tablet by mouth 4 (four) times daily as needed.      baclofen 10 MG Oral Tab Take 1 tablet (10 mg total) by mouth 2 (two) times daily.      NON FORMULARY Tandem insulin pump settings (Bolus IQ)  Mdnt 3.1 units/hr  0400 3.00 units/hr  0900 3.2 units/hr    Correction:   MN 1:35/110  1 PM 1:40  7 pm 1:35    CHO ratio:  MN 1:5g      FARXIGA 10 MG Oral Tab Take 1 tablet (10 mg total) by mouth daily.      DULoxetine 60 MG Oral Cap DR Particles Take 1 capsule (60 mg total) by mouth 2 (two) times daily.      atorvastatin 20 MG Oral Tab Take 1 tablet (20 mg total) by mouth nightly.      DEXCOM G6 SENSOR Does not apply Misc USE AS DIRECTED EVERY 10 DAYS 9 each 3    Insulin Lispro (HUMALOG) 100 UNIT/ML Subcutaneous Solution INJECT UP TO 70 UNITS VIA INSULIN PUMP DAILY 70 mL 2    EPINEPHrine 0.15 MG/0.3ML Injection Solution Auto-injector Jani pen/lower dose because pt is allergic to epinephrine      Ondansetron HCl (ZOFRAN) 4 mg tablet Take 1 tablet (4 mg total) by mouth 3 (three) times daily as needed.      Benralizumab (FASENRA PEN) 30 MG/ML Subcutaneous Solution Auto-injector Inject 30 mg into the skin. Every other month/every 8 weeks      DEXCOM G6 TRANSMITTER Does not apply Misc USE AS DIRECTED EVERY 90 DAYS 1 each 3    Thiamine HCl 100 MG Oral Tab Take 1 tablet (100 mg total) by mouth daily. 30 tablet 3    docusate sodium 100 MG Oral Cap Take 1 capsule (100 mg total) by mouth 2 (two) times daily.      denosumab 60 MG/ML Subcutaneous Solution Inject 1 mL (60 mg total) into the skin every 6 (six) months.      Cholecalciferol (VITAMIN D-3) 5000 UNITS Oral Tab Take 1 tablet (5,000 Units total) by mouth daily.      Albuterol Sulfate  (90 Base) MCG/ACT Inhalation Aero Soln Inhale 2 puffs into the lungs every 6 (six) hours as needed. PRN wheezing/SOB         Allergies  Allergies   Allergen Reactions    Epinephrine OTHER (SEE COMMENTS)     Cardiac issues    Immune  Globulin ANAPHYLAXIS    Latex SHORTNESS OF BREATH and OTHER (SEE COMMENTS)     WELTS    Methylprednisolone SWELLING     Swelling of neck, throat and tongue  Injection, throat and tongue swelling      Ocrelizumab ANAPHYLAXIS and OTHER (SEE COMMENTS)     Difficulty breathing    Other SHORTNESS OF BREATH     ENVIRONMENTAL, ASTHMA EXACERBATION    Pcn [Penicillamine]     Penicillins OTHER (SEE COMMENTS)    Urea Tightness in Throat     Urea C-13 in Pranactin for H. Pylori test kit.    Midodrine ITCHING     Pt reports mouth pain and itching possibly r/t midodrine       Social History  History   Smoking Status    Never   Smokeless Tobacco    Never     History   Alcohol Use Never     History   Drug Use Unknown       Family History  Family History   Problem Relation Age of Onset    Heart Attack Father     Other (Other) Father         COPD, emphysema    Asthma Father     Pulmonary Disease Father     Hypertension Mother         Needs to be deleted    Cancer Mother         stomach cancer    Ovarian Cancer Mother         30'S    Colon Polyps Mother     Anemia Mother     Ovarian Cancer Maternal Grandmother         30'S    Colon Polyps Maternal Grandmother     Diabetes Maternal Grandmother     Colon Cancer Maternal Grandmother     Anemia Maternal Grandmother     Cancer Maternal Grandmother     Breast Cancer Maternal Aunt         60'S    Other (Other) Sister         rectal CA\    Anemia Daughter        Review of Systems  Review of Systems   Constitutional: Negative.    Respiratory: Negative.     Cardiovascular: Negative.    Gastrointestinal:  Positive for abdominal pain and nausea. Negative for abdominal distention, constipation, diarrhea and vomiting.       Exam  Vitals:    09/24/24 0853   BP: 119/80   Pulse: 81   Resp: 14   Temp: 97.5 °F (36.4 °C)     Physical Exam  Constitutional:       Appearance: Normal appearance.      Comments: Uncomfortable appearing and slow-moving secondary to pain   Cardiovascular:      Rate and Rhythm:  Normal rate.   Pulmonary:      Effort: Pulmonary effort is normal.   Abdominal:      General: Abdomen is flat. There is no distension.      Palpations: Abdomen is soft.      Tenderness: There is abdominal tenderness (To palpation in right lower quadrant).       Skin:     General: Skin is warm and dry.   Neurological:      Mental Status: She is alert and oriented to person, place, and time.         Assessment/Plan  Assessment   Problem List Items Addressed This Visit          Gastrointestinal and Abdominal    Abdominal pain - Primary    Relevant Medications    linaCLOtide (LINZESS) 145 MCG Oral Cap       Kat Crook is a 54 year old female with chronic abdominal pain    Patient presents with chronic abdominal pain  Per timetable, abdominal pain seems to be getting worse since her diagnosis of MS  Patient has undergone 2 surgeries for lysis of adhesions without improvement of her pain  CT images were reviewed personally by me and with the patient  Nothing abnormal on imaging was found  On my physical exam, patient seems to be tender just under her umbilicus along the midline and in the right lower quadrant  Patient was concerned that her appendix may be flaring, images were reviewed specifically to examine the appendix and nothing abnormal found  Patient does have a loop of sigmoid colon that is slightly dilated to the higher end of normal  On her last operation with Dr. Arriaga for lyse of adhesions, the sigmoid colon was described as frozen in place  This loop of sigmoid sits right where patient is having pain  The slight distention and adhesions of this loop could be causing the patient's pain especially in the light of her MS  She states that she is currently in remission but significant pain can lead to a flare    No acute surgical intervention  I do not recommend any surgical intervention to remove any intestines or lyse any adhesions  Overall, I do not think this is what is causing her pain but could be in  addition to her MS  If patient has any changes or worsening of her symptoms including nausea, vomiting, or change in her bowel habits, she can contact my office to be reevaluated  Otherwise, she can follow-up as needed    Thank you for letting me participate in the care of this patient      Suzy Soto MD  General Surgery  [facility]     CC:  Justin Huerta MD

## 2024-10-03 ENCOUNTER — ORDER TRANSCRIPTION (OUTPATIENT)
Dept: ADMINISTRATIVE | Facility: HOSPITAL | Age: 54
End: 2024-10-03

## 2024-10-03 DIAGNOSIS — R07.9 CHEST PAIN: Primary | ICD-10-CM

## 2024-10-11 PROBLEM — I27.29 PULMONARY HYPERTENSION DUE TO MYELOPROLIFERATIVE DISORDER (HCC): Status: ACTIVE | Noted: 2024-10-03

## 2024-10-11 PROBLEM — D47.1 PULMONARY HYPERTENSION DUE TO MYELOPROLIFERATIVE DISORDER (HCC): Status: ACTIVE | Noted: 2024-10-03

## 2024-10-13 PROBLEM — R93.3 ABNORMAL FINDING ON GI TRACT IMAGING: Status: ACTIVE | Noted: 2024-10-13

## 2024-10-28 ENCOUNTER — OFFICE VISIT (OUTPATIENT)
Facility: CLINIC | Age: 54
End: 2024-10-28
Payer: MEDICARE

## 2024-10-28 VITALS
OXYGEN SATURATION: 93 % | DIASTOLIC BLOOD PRESSURE: 50 MMHG | BODY MASS INDEX: 26.36 KG/M2 | RESPIRATION RATE: 16 BRPM | SYSTOLIC BLOOD PRESSURE: 100 MMHG | HEART RATE: 83 BPM | WEIGHT: 164 LBS | HEIGHT: 66 IN

## 2024-10-28 DIAGNOSIS — R13.14 DYSPHAGIA, PHARYNGOESOPHAGEAL PHASE: ICD-10-CM

## 2024-10-28 DIAGNOSIS — J45.40 MODERATE PERSISTENT ASTHMA, UNSPECIFIED WHETHER COMPLICATED (HCC): Primary | ICD-10-CM

## 2024-10-28 PROCEDURE — 99214 OFFICE O/P EST MOD 30 MIN: CPT | Performed by: INTERNAL MEDICINE

## 2024-10-28 RX ORDER — ALBUTEROL SULFATE 0.83 MG/ML
2.5 SOLUTION RESPIRATORY (INHALATION) 3 TIMES DAILY
Qty: 30 EACH | Refills: 11 | Status: SHIPPED | OUTPATIENT
Start: 2024-10-28

## 2024-10-28 NOTE — PROGRESS NOTES
Pulmonary Consult Note    History of Present Illness:  Kat Crook is a 54 year old female presenting to pulmonary clinic today for beginning of summer bad attack at [facility] -   6 weeks ago- then to [facility]- admitted one night - left   related to weather in summer -  Trigger - burning garbage and leaves-   Allergic to steroids-- swells up - mouth and throat - reaction - last 3 yrs ago -   Remains on fasnerra every other month - at home   Treated with bipap   Now feels OK_ always with dyspnea - walks - slows down - breaths hard with stairs - uses albuterol prior to activity -- able to exercise   Rare cough-- chest tightness and wheezing   Had allergy testing then gets asthma- -- reacts to \"everything\"   Has dog and cats at home     Flare of MS-- pain and difficulty - walking -  In hosp with PLEX x 5 April/May- then to [facility]  Pt at home now   Some shortness of breath - prior to hosp and during the hosp -   Ongoing throat   Walking to parking lot - using albuterol and helps some - not preactivity   Remains on fascerna every 8 weeks -   Coughing is bad at night -   Recent endo with [facility]- dysphagia is still bad- chokes on liquids and solids - no swallow study - puts food in cheek   breo - daily and rescue once every other day   sats all good at [facility]- was given oxygen at night - -  A lot problems at night- coughing worse   Nightmares with melatonin   Follows with Dr. Becker with recent visit    8.23 Had kidney stone that got bigger and then a second one and needs repeat lithotripsy -with stent - and repeat lithrotrispy  and dilation - -- increased pain- -now on the 6th    Remains with dyspnea- thinks sl better with Breo 200 -   Back on breo 100 - rescue - 2-3 times per day 3-4 times per week - - beneiftting   Asthma is so good- fascenra-- every 8 weeks   Not much  coughing -- mucus from sinus   Swallowing told fine on UGI with [facility]- - normal -- but coughed a lot after test and with increased mucus -- to see in  follow up- -   Norco 4 times a day for years-does not think helping much  Atypical pain pattern thought perhaps related to MS unclear  Today with significant increase in right anterior axillary line  Worse with expiration     11.23- - sick for awhile - 3 weeks - neg covid- - antibiotics helped - no fever - not well- no energy - not eating better now   Remains on breo 100 -   Now with mucus - heavy - from sinuses more than chest - - was green - now yellow - occasionally  blood -   Some rescue use and nebulizer - increased to twice a day   Remains with chest pain -- left side and right side left ant chest - not palpable pain - - some with deep breathing -   Remains with fatigue - ? Thyroid - to see primary   Remains on fasenra without issues - every 8 weeks - at home     Got flu shot - covid not yet   Neuro recommended PLEX - for weakness and so tired   Needed O2 during the night while in   Awakenings ongoing with some ocughing --     2.24- on vumerity  -- [email or web address] went up  not working as well - -increased can't think well and legs are weaker - to see echerverri-  Foot drop left and right ? Starting -   No recent plex -   Gets more short of breath-- walking in here - noted dypsnea -   Spacing out now -   No O2 use - has none   Not sleeping well- - not supine - not flat-- can't breath - had an epsiode during sleep study - legs hurt   No coughing - some in middle of night -   Increased nasal drainage-- all clear - flonase no help   Remains on fasenra- doing well - occasionally  nasal spray - singular - no dreams     6/24- in hosp with flare and needed PLEX_ - was shock ?post plex   Had PT after - still in speech - has been affected - in past but now worse - - in speech   Gets tired with talking   Has been feeling good -- some coughing- rain and the hot-   Told in hosp has sleep apnea - with desats and started on cpap - told better -  Awaknes in the night   Chest pain hurting - across front -   No gerd noted - throat clearing  - - maybe hard to talk and using muscle   Thinks slept better with cpap -   Remains on singular nightly and thinks needs   Some rescue  when hot  10/24-  started with abd pain - in hosp with pancreatic abnormality - for EUS- with pandolfi - -atrophy - to look -   Mild upper to lower below belly button - when active its bad- -   BM help - linzess - helped the pain - better now   - ? Nerve pain as well   Breathing not good- change in weather - tighter and coughing - - needed O2 at night -   Snoring is worse - - and using O2 at night in the hosp - not during the day   Remains on fasnera and breo - using albuterol             Past Medical History:   Past Medical History:    Abdominal pain    Allergic rhinitis    Anemia    Anxiety    Anxiety state    Arthritis    Asthma (HCC)    Atypical mole    Autoimmune disease (HCC)    Back pain    Bad breath    Black stools    Bloating    Blood disorder    Thalassemia alpha, Sickle cell trait    Blood in urine    Blurred vision    Calculus of kidney    Cardiomyopathy (HCC)    last echo 2021: EF 55%    Chest pain    Constipation    Decorative tattoo    Deep vein thrombosis (HCC)    Depression    Diarrhea, unspecified    Dizziness    Easy bruising    Elevated liver enzymes    Endocrine disorder    Esophageal reflux    Fatigue    Fibromyalgia    Food intolerance    Frequent urination    Frequent UTI    Gastroparesis    Glaucoma    H. pylori infection    H/O  section    Headache disorder    Heart attack (HCC)    Heart palpitations    Heartburn    Hemorrhoids    Herniation of cervical intervertebral disc    High blood pressure    High cholesterol    History of blood clots    Unprovoked    History of blood transfusion    History of cardiac murmur    History of depression    Hoarseness, chronic    Hyperlipidemia    IBS (irritable bowel syndrome)    Irregular bowel habits    Leaking of urine    Liver disease    GIORDANO and Stage 2 fibrosis    Loss of appetite    Migraines     Multiple sclerosis (HCC)    dx 8 years ago    Muscle weakness    cane or walker prn    Myocardial infarction (HCC)    Nausea    Neuropathy    legs, hands, feet; bilateral    Night sweats    Osteoarthritis    Osteoporosis    KAMALJIT in her 30s    Osteoporosis    Pain with bowel movements    Painful urination    Problems with swallowing    Due to MS    Renal disorder    kidney lesions    Scoliosis of lumbar spine    Shortness of breath    Sickle cell trait (HCC)    Sleep apnea    no treatment    Sleep disturbance    Stress    Type II or unspecified type diabetes mellitus without mention of complication, not stated as uncontrolled    Uncomfortable fullness after meals    Unspecified disorder of thyroid    total thyroidectomy    Visual impairment    glasses    Wears glasses    Weight loss    Wheezing    cardiac -- one heart attack - - during allergic reaction to steriods - epin-   Dr munoz- diastolic dys and pul htn   HX PE-- told unprovoked - no dvt- completed AC - remains on asa   No cancers   4 surgeries this year - abd - cysto with urethal dilation - stent for kidney stone- with removal - dr ball        Past Surgical History:   Past Surgical History:   Procedure Laterality Date      1986    Cataract      Cath angio  2014    Cholecystectomy      Colonoscopy N/A 2015    Procedure: COLONOSCOPY;  Surgeon: Liz Tong DO;  Location:  ENDOSCOPY    Colonoscopy N/A 2021    Procedure: COLONOSCOPY;  Surgeon: Cedrick Da Silva MD;  Location:  ENDOSCOPY    Colonoscopy N/A 10/08/2021    Procedure: COLONOSCOPY, ESOPHAGOGASTRODUODENOSCOPY (EGD);  Surgeon: Darvin Richardson MD;  Location:  ENDOSCOPY    D & c  1990    Blighted ovum    Egd      Hysterectomy      total hystero.    Ir port a cath insertion exchnge check  2018    Lithotripsy      x 2    Lysis of adhesions      Needle biopsy liver  2016    Oophorectomy Bilateral     total hystero.    Other       dialysis catheter- left chest for plasmaphoresis    Other surgical history       x 2    Port, indwelling, imp  2019    power port    Thyroidectomy  2013    benign MNG    Total abdom hysterectomy      Upper gi endoscopy performed  2014       Family Medical History:   Family History   Problem Relation Age of Onset    Heart Attack Father     Other (Other) Father         COPD, emphysema    Asthma Father     Pulmonary Disease Father     Hypertension Mother         Needs to be deleted    Cancer Mother         stomach cancer    Ovarian Cancer Mother         30'S    Colon Polyps Mother     Anemia Mother     Ovarian Cancer Maternal Grandmother         30'S    Colon Polyps Maternal Grandmother     Diabetes Maternal Grandmother     Colon Cancer Maternal Grandmother     Anemia Maternal Grandmother     Cancer Maternal Grandmother     Breast Cancer Maternal Aunt         60'S    Other (Other) Sister         rectal CA\    Anemia Daughter        Social History: Social History    Socioeconomic History      Marital status: Single    Tobacco Use      Smoking status: Never      Smokeless tobacco: Never    Vaping Use      Vaping Use: Never used    Substance and Sexual Activity      Alcohol use: Never      Drug use: Never    Other Topics      Concerns:        Caffeine Concern: No        Exercise: Yes          walks alot  No working - no chemicals  One dog and 2 cats - allergic to them -- not too bad     Allergies: Epinephrine, Immune globulin, Latex, Methylprednisolone, Ocrelizumab, Other, Pcn [penicillamine], Penicillins, Urea, and Midodrine     Medications:   Current Outpatient Medications   Medication Sig Dispense Refill    FIASP FLEXTOUCH 100 UNIT/ML Subcutaneous Solution Pen-injector       metoprolol tartrate 50 MG Oral Tab TAKE 1 TABLET BY MOUTH ONCE THE NIGHT BEFORE CTA AND 1 TABLET THE MORNING OF CTA      linaCLOtide (LINZESS) 145 MCG Oral Cap       natalizumab (TYSABRI) 300 mg/15mL Intravenous Conc Inject  into the vein. Every 8 weeks      carvedilol 3.125 MG Oral Tab Take 1 tablet (3.125 mg total) by mouth 2 (two) times daily.      montelukast 10 MG Oral Tab Take 1 tablet (10 mg total) by mouth nightly.      levothyroxine 88 MCG Oral Tab Take 1 tablet (88 mcg total) by mouth before breakfast.      pantoprazole 20 MG Oral Tab EC Take 1 tablet (20 mg total) by mouth 2 (two) times daily.      fluticasone furoate-vilanterol 200-25 MCG/ACT Inhalation Aerosol Powder, Breath Activated Inhale 1 puff into the lungs daily.      LYLLANA 0.025 MG/24HR Transdermal Patch Biweekly Place 1 patch onto the skin twice a week.      CELEBREX 100 MG Oral Cap Take 1 capsule (100 mg total) by mouth 2 (two) times daily.      pregabalin 200 MG Oral Cap Take 1 capsule (200 mg total) by mouth 2 (two) times daily.      Ciclopirox 8 % External Solution APPLY EVERY DAY      HYDROcodone-acetaminophen  MG Oral Tab Take 1 tablet by mouth 4 (four) times daily as needed.      baclofen 10 MG Oral Tab Take 1 tablet (10 mg total) by mouth 2 (two) times daily.      NON FORMULARY Tandem insulin pump settings (Bolus IQ)  Mdnt 3.1 units/hr  0400 3.00 units/hr  0900 3.2 units/hr    Correction:   MN 1:35/110  1 PM 1:40  7 pm 1:35    CHO ratio:  MN 1:5g      FARXIGA 10 MG Oral Tab Take 1 tablet (10 mg total) by mouth daily.      DULoxetine 60 MG Oral Cap DR Particles Take 1 capsule (60 mg total) by mouth 2 (two) times daily.      atorvastatin 20 MG Oral Tab Take 1 tablet (20 mg total) by mouth nightly.      DEXCOM G6 SENSOR Does not apply Misc USE AS DIRECTED EVERY 10 DAYS 9 each 3    Insulin Lispro (HUMALOG) 100 UNIT/ML Subcutaneous Solution INJECT UP TO 70 UNITS VIA INSULIN PUMP DAILY 70 mL 2    EPINEPHrine 0.15 MG/0.3ML Injection Solution Auto-injector Jani pen/lower dose because pt is allergic to epinephrine      Ondansetron HCl (ZOFRAN) 4 mg tablet Take 1 tablet (4 mg total) by mouth 3 (three) times daily as needed.      Benralizumab (FASENRA PEN)  30 MG/ML Subcutaneous Solution Auto-injector Inject 30 mg into the skin. Every other month/every 8 weeks      DEXCOM G6 TRANSMITTER Does not apply Misc USE AS DIRECTED EVERY 90 DAYS 1 each 3    Thiamine HCl 100 MG Oral Tab Take 1 tablet (100 mg total) by mouth daily. 30 tablet 3    docusate sodium 100 MG Oral Cap Take 1 capsule (100 mg total) by mouth 2 (two) times daily.      denosumab 60 MG/ML Subcutaneous Solution Inject 1 mL (60 mg total) into the skin every 6 (six) months.      Cholecalciferol (VITAMIN D-3) 5000 UNITS Oral Tab Take 1 tablet (5,000 Units total) by mouth daily.      Albuterol Sulfate  (90 Base) MCG/ACT Inhalation Aero Soln Inhale 2 puffs into the lungs every 6 (six) hours as needed. PRN wheezing/SOB         Review of Systems: weight down- loss of appettie -questionably related to increased pain-  now weight is up   Not hungery- remains without appettie - not taking supplements - likes ensure but increases her blood sugar  No gerd at all on ppi bid   Notes some dysphagia -- stable but is careful with neg study   Sleeping - insomnia - recently not sleeping well-remains with increasing awakenings and this may be related to pain continues to report having to sleep sitting more upright  Told MOI in the past - awakens at night at times - choking at night - in the past  -    And had a machine- recent neg study sleeps poorly  Neurology -- MS is questionably worse may need treatment- plasma paresis -- speech and cant think well and generlized pain all over as flare and hard to walk       Physical Exam:  /50   Pulse 83   Resp 16   Ht 5' 6\" (1.676 m)   Wt 164 lb (74.4 kg)   LMP  (LMP Unknown)   SpO2 93%   BMI 26.47 kg/m²    Constitutional: comfortable .  Notes increased pain throughout left upper chest-no skin lesions or blisters notes--now seems all over no specific spot reports more tenderness on the right  HEENT: Head NC/AT. PEERL. Throat is clear no thrush   Cardio: Regular rate and  rhythm. Normal S1 and S2. No murmurs.regualr   Respiratory: Thorax symmetrical with no labored breathing.- no wheeze with good air entry sl crackles rt base - now all clear   GI:  Abd soft, non-tender.  Extremities: No clubbing or cyanosis. No LE edema. No calf tenderness.  Neurologic: A&Ox3. No gross motor deficits.  Skin: Warm, dry.no rashes or hives noted   Lymphatic: No cervical or supraclavicular lymphadenopathy.no jvd   Psych: Calm, cooperative. Pleasant affect.    Results:  Reviewed     Assessment/Plan:  #Pulmonary hypertension  -History of diastolic dysfunction  Last echo 12/21 with diastolic dysfunction and PAS 30 to 35 mmHg-stable fluid status without diuretic use   history of pulmonary emboli-12/21 seemingly unprovoked completed 3 months with negative follow-up CT decision made for aspirin no recurrence  8/23 no fluid issues  11.23- canceled recent visit - stable fluids   2/24 no fluid issues  6/24- echo in hosp with PAS 30-35mmHg   10/24- no swelling       #Asthma  Eosinophilic and severe-reports long history of allergies/testing  Reports ongoing recurrent flares requiring ambulances to the hospital-early summer and 6 weeks ago  PFTs 4/21 with normal flows and volumes minimally reduced DLCO 64% corrects to 86%.-Compared to 2018 slight decrease in TLC and residual volume.  Reports swelling questionable angioedema with any form of steroids  Reports requiring BiPAP during episodes  Overall remained stable at this time on MDIs  Notes improvement with Fasenra  Discussed questionable role of animals at home  5/23 clinically stable-recent visit with Dr. Willis  8/23 asthma is doing well on Fasenra-continue Breo for now  11.23- stable despite recent illness- cpm   2.24- remains stable - to see dr willis -remains on Fasenra and ICS/LABA  6/24- remains on fascnera and on breo- hesitant to stop Singulair- cpm   10/24 tighter now with change in weather - breo and singular and rescue and fasenra - recent smoke exposure          #GERD/chronic dysphagia  Remains on daily PPI denies any breakthrough symptoms  Recent upper scope without reported active issue-Dr. Richardson had suggested motility study  Had seen speech years ago with plans to revisit-abnormal swallow pattern-keeps food in her cheek  8/23 had esophagram reported as normal-to follow-up with GI  11.23 - seems all stable   2.24- all good on ppi BID -denies an active issue  10/24 remains on ppi BID     #Multiple sclerosis  Follows with Dr. Jeimy Cline  As unable to take steroids plans reportedly in place for PLEX-Will need line access reportedly  5/23 acute flare with ambulatory difficulties and pain prompted admission in April underwent Plex x5 with good response then to [facility]-remains with pain but ambulation is better -to see Jorge Luis  8.23- stopped tysabri- increased jcv - so had to stop-  Vermerity -- now started -   11.23- remains on [facility] -- may need PLEX   2.24- encouraged to call - increased foot drop and increased pain with increased brain fog thinks needs Plex  6/24- flare in April required PLEX in hosp   Speech issue as new issue-- not much better   10/24 stable - yvonne drop foot is worse-- L300 trying to get - - speech is better - still some stutter - swallowing off now related to throat pain after smoke exposure         #Dyspnea  Currently reports stability able to walk slowly  Able to keep up with some exercise  Reports preactivity albuterol helpful  5/23 notes more dyspnea when walking though tolerating physical therapy pretty well-denies any desaturation with activity noted  8/23 overall about the same  11.23 recent illness- denies any dyspnea now   2.24- at times -slight increase with walking though more difficult to walk walking now - occasionally  at rest needs to take deep breaths  10.24 recent worse - with change in weather and recent smoke -     # chest pain- anterior-   Had holter -   4/21 with questionably abnormal stress test denies any  further assessment  Follows with Dr. Forrest  5/23 continues-with significant increase with associated flare  Remains tender to touch parasternally-up to lower part of her neck  8.23- remains on issues -- hurts under rt breast -- and upper chest - left side upper and rt side lower - - lower belly pain is constant and all the time without change --plan for plain CT chest if not better after procedure  11.23 -- neg CT chest questionable inflammatory  2.24- had MRI spine and seeing april for lesion - told not related to pain process  6/24 now chest pain overall seems more diffuse some bony tenderness  10/24- for CTA r/o CAD - normal stress in 2022 - echo with mild pul htn 40mmHg       #History of Woody  Follows with hepatology at [facility]  Thought related to metabolic syndrome      #History of alpha thalassemia minor      # 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 Related to OSA_-  To try to recheck home study   10/24 reports needed O2 in the hosp and as above    # recent kidney stones   Thinks pain may be really related to kidney stones  Plans for repeat procedure  11.23 still with stones - watching   2.24- all stable     # Abdominal pain/pancreatic abnormality  Admitted to the ER last month some pancreatic dilatation with plans for EUS with Pandolfi--denies association with bowel movements      Plan- -- continue Breo inhaler once a day          -- rescue  inhaler as needed -           - use albuterol nebulizer as needed every 4 hours                    -- Reviewed with [facility] plan for overnight          - see me in 4 months     Raisa Hoyt MD  Pulmonary Medicine  10/28/24

## 2024-10-28 NOTE — PATIENT INSTRUCTIONS
Plan- -- continue Breo inhaler once a day          -- rescue inhaler as needed -           - use albuterol nebulizer as needed every 4 hours                    --          - see me in 4 months     Raisa Hoyt MD  Pulmonary Medicine  10/28/24

## 2024-10-30 ENCOUNTER — TELEPHONE (OUTPATIENT)
Facility: CLINIC | Age: 54
End: 2024-10-30

## 2024-10-30 DIAGNOSIS — J45.40 MODERATE PERSISTENT ASTHMA, UNSPECIFIED WHETHER COMPLICATED (HCC): ICD-10-CM

## 2024-10-30 RX ORDER — ALBUTEROL SULFATE 0.83 MG/ML
2.5 SOLUTION RESPIRATORY (INHALATION) 3 TIMES DAILY
Qty: 90 EACH | Refills: 11 | Status: SHIPPED | OUTPATIENT
Start: 2024-10-30

## 2024-10-30 NOTE — TELEPHONE ENCOUNTER
07/20/19 1424   C-SSRS (Frequent Screen)   2. Have you actually had any thoughts of killing yourself? Yes   3. Have you been thinking about how you might do this? No   4. Have you had these thoughts and had some intention of acting on them? No   5. Have you started to work out or worked out the details of how to kill yourself? Do you intend to carry out this plan?  No   6. Have you done anything, started to do anything, or prepared to do anything to end your life? No   Suicide Evaluation Low Risk   Nursing Suicide Assessment Note - Inpatient    Current assessment:    Current C-SSRS score: (P) Low Risk      Protective Factors / Reason for Living: Social supports    Interventions:   · 1 to 1 contact    Other Interventions Implemented:  Visual inspection of patient's environment completed. Items removed: none   Patient's pharmacy called. Made aware albuterol nebulizer medication has been re-entered. Per pharmacy technician, wants to know when patient obtained nebulizer machine. Technician made aware patient has had nebulizer machine for years. Per technician, medication now covered, patient has copay of $9 and change for a 30 day supply. Patient called, made aware prescription has been clarified, [facility] to contact patient when prescription is ready to be filled. Per patient, states she needs it, was told by Dr. Hoyt that her chest was tight. Patient will  prescription.

## 2024-11-04 ENCOUNTER — TELEPHONE (OUTPATIENT)
Dept: NEUROLOGY | Facility: CLINIC | Age: 54
End: 2024-11-04

## 2024-11-04 DIAGNOSIS — G35 MS (MULTIPLE SCLEROSIS) (HCC): Primary | ICD-10-CM

## 2024-11-04 NOTE — TELEPHONE ENCOUNTER
FDA mandated Tysabri reauthorization questionnaire completed online.  Patient is JCV positive, last index 1.22.  Last test date 4/22/24.    Has completed 28 Tysabri infusions with 0 courses of steroids in last 6 months.  Reauthorized to continue receiving infusion effective 12/6/24 - 6/6/2025; this is not a prior authorization for medication.    Information discussed with Dr. Payne prior to submission.      JCV order placed and mailed to patient.

## 2024-11-05 ENCOUNTER — HOSPITAL ENCOUNTER (OUTPATIENT)
Dept: BONE DENSITY | Age: 54
Discharge: HOME | End: 2024-11-05
Attending: SPECIALIST
Payer: MEDICARE

## 2024-11-05 DIAGNOSIS — Z13.820 SCREENING FOR OSTEOPOROSIS: ICD-10-CM

## 2024-11-05 PROCEDURE — 77080 DXA BONE DENSITY AXIAL: CPT | Performed by: SPECIALIST

## 2024-11-13 ENCOUNTER — HOSPITAL ENCOUNTER (OUTPATIENT)
Dept: MAMMOGRAPHY | Age: 54
Discharge: HOME | End: 2024-11-13
Attending: SPECIALIST
Payer: MEDICARE

## 2024-11-13 DIAGNOSIS — Z12.31 ENCOUNTER FOR SCREENING MAMMOGRAM FOR MALIGNANT NEOPLASM OF BREAST: ICD-10-CM

## 2024-11-13 PROCEDURE — 77063 BREAST TOMOSYNTHESIS BI: CPT | Performed by: SPECIALIST

## 2024-11-13 PROCEDURE — 77067 SCR MAMMO BI INCL CAD: CPT | Performed by: SPECIALIST

## 2024-11-14 LAB
INDEX VALUE: 1.09
JCV ANTIBODY: POSITIVE

## 2024-11-18 NOTE — IMAGING NOTE
Pt called, left detailed voicemail, instructed to arrive 45 minutes prior to gated study scheduled on 11/20 0800.  Park in [facility], eat a light breakfast/lunch, hydrate, hold caffeine/decaff/chocolate x 12 hours prior, hold long acting nitrates, take all meds especially beta blockers prescribed.  Pt encouraged to call with further questions.  Reviewed administration times for 2 metoprolol tabs for this exam.

## 2024-11-19 ENCOUNTER — ANESTHESIA (OUTPATIENT)
Dept: ENDOSCOPY | Facility: HOSPITAL | Age: 54
End: 2024-11-19
Payer: MEDICARE

## 2024-11-19 ENCOUNTER — ANESTHESIA EVENT (OUTPATIENT)
Dept: ENDOSCOPY | Facility: HOSPITAL | Age: 54
End: 2024-11-19
Payer: MEDICARE

## 2024-11-19 ENCOUNTER — HOSPITAL ENCOUNTER (OUTPATIENT)
Facility: HOSPITAL | Age: 54
Setting detail: HOSPITAL OUTPATIENT SURGERY
Discharge: HOME | End: 2024-11-19
Attending: INTERNAL MEDICINE | Admitting: INTERNAL MEDICINE
Payer: MEDICARE

## 2024-11-19 VITALS
OXYGEN SATURATION: 97 % | RESPIRATION RATE: 16 BRPM | HEIGHT: 66 IN | DIASTOLIC BLOOD PRESSURE: 78 MMHG | WEIGHT: 164 LBS | TEMPERATURE: 98 F | BODY MASS INDEX: 26.36 KG/M2 | SYSTOLIC BLOOD PRESSURE: 116 MMHG | HEART RATE: 72 BPM

## 2024-11-19 DIAGNOSIS — R10.2 PELVIC PAIN: ICD-10-CM

## 2024-11-19 DIAGNOSIS — R10.13 EPIGASTRIC ABDOMINAL PAIN: ICD-10-CM

## 2024-11-19 DIAGNOSIS — Z80.0 FAMILY HISTORY OF COLON CANCER: ICD-10-CM

## 2024-11-19 DIAGNOSIS — R10.31 RLQ ABDOMINAL PAIN: ICD-10-CM

## 2024-11-19 DIAGNOSIS — R10.13 EPIGASTRIC PAIN: ICD-10-CM

## 2024-11-19 DIAGNOSIS — K59.00 CONSTIPATION, UNSPECIFIED CONSTIPATION TYPE: ICD-10-CM

## 2024-11-19 DIAGNOSIS — K90.9 STEATORRHEA (HCC): ICD-10-CM

## 2024-11-19 DIAGNOSIS — R13.13 PHARYNGEAL DYSPHAGIA: ICD-10-CM

## 2024-11-19 DIAGNOSIS — R19.8 RECTAL PRESSURE: ICD-10-CM

## 2024-11-19 DIAGNOSIS — Z80.0 FAMILY HISTORY OF RECTAL CANCER: ICD-10-CM

## 2024-11-19 DIAGNOSIS — R13.19 ESOPHAGEAL DYSPHAGIA: ICD-10-CM

## 2024-11-19 DIAGNOSIS — K76.0 FATTY LIVER: ICD-10-CM

## 2024-11-19 DIAGNOSIS — K75.81 NASH (NONALCOHOLIC STEATOHEPATITIS): ICD-10-CM

## 2024-11-19 DIAGNOSIS — R10.84 GENERALIZED ABDOMINAL PAIN: ICD-10-CM

## 2024-11-19 DIAGNOSIS — K66.0 INTRA-ABDOMINAL ADHESIONS: ICD-10-CM

## 2024-11-19 DIAGNOSIS — K86.89 PANCREATIC DUCT DILATED (HCC): ICD-10-CM

## 2024-11-19 DIAGNOSIS — R10.30 LOWER ABDOMINAL PAIN: ICD-10-CM

## 2024-11-19 DIAGNOSIS — R11.0 NAUSEA: ICD-10-CM

## 2024-11-19 DIAGNOSIS — R10.9 RIGHT SIDED ABDOMINAL PAIN: ICD-10-CM

## 2024-11-19 DIAGNOSIS — R14.3 EXCESSIVE GAS: ICD-10-CM

## 2024-11-19 DIAGNOSIS — Z80.0 FAMILY HISTORY OF STOMACH CANCER: ICD-10-CM

## 2024-11-19 DIAGNOSIS — R09.A2 GLOBUS SENSATION: ICD-10-CM

## 2024-11-19 DIAGNOSIS — R93.3 ABNORMAL FINDING ON GI TRACT IMAGING: ICD-10-CM

## 2024-11-19 DIAGNOSIS — G35 MULTIPLE SCLEROSIS (HCC): ICD-10-CM

## 2024-11-19 DIAGNOSIS — K21.9 GASTROESOPHAGEAL REFLUX DISEASE WITHOUT ESOPHAGITIS: ICD-10-CM

## 2024-11-19 LAB — GLUCOSE BLD-MCNC: 128 MG/DL (ref 70–99)

## 2024-11-19 PROCEDURE — BD47ZZZ ULTRASONOGRAPHY OF GASTROINTESTINAL TRACT: ICD-10-PCS | Performed by: INTERNAL MEDICINE

## 2024-11-19 PROCEDURE — 0DB98ZX EXCISION OF DUODENUM, VIA NATURAL OR ARTIFICIAL OPENING ENDOSCOPIC, DIAGNOSTIC: ICD-10-PCS | Performed by: INTERNAL MEDICINE

## 2024-11-19 PROCEDURE — 0DB58ZX EXCISION OF ESOPHAGUS, VIA NATURAL OR ARTIFICIAL OPENING ENDOSCOPIC, DIAGNOSTIC: ICD-10-PCS | Performed by: INTERNAL MEDICINE

## 2024-11-19 PROCEDURE — 82962 GLUCOSE BLOOD TEST: CPT

## 2024-11-19 PROCEDURE — 88305 TISSUE EXAM BY PATHOLOGIST: CPT | Performed by: INTERNAL MEDICINE

## 2024-11-19 PROCEDURE — 0DB78ZX EXCISION OF STOMACH, PYLORUS, VIA NATURAL OR ARTIFICIAL OPENING ENDOSCOPIC, DIAGNOSTIC: ICD-10-PCS | Performed by: INTERNAL MEDICINE

## 2024-11-19 RX ORDER — SODIUM CHLORIDE, SODIUM LACTATE, POTASSIUM CHLORIDE, CALCIUM CHLORIDE 600; 310; 30; 20 MG/100ML; MG/100ML; MG/100ML; MG/100ML
INJECTION, SOLUTION INTRAVENOUS CONTINUOUS
Status: DISCONTINUED | OUTPATIENT
Start: 2024-11-19 | End: 2024-11-19

## 2024-11-19 RX ORDER — HYDROCODONE BITARTRATE AND ACETAMINOPHEN 5; 325 MG/1; MG/1
2 TABLET ORAL ONCE AS NEEDED
Status: DISCONTINUED | OUTPATIENT
Start: 2024-11-19 | End: 2024-11-19

## 2024-11-19 RX ORDER — HYDROMORPHONE HYDROCHLORIDE 1 MG/ML
0.2 INJECTION, SOLUTION INTRAMUSCULAR; INTRAVENOUS; SUBCUTANEOUS EVERY 5 MIN PRN
Status: DISCONTINUED | OUTPATIENT
Start: 2024-11-19 | End: 2024-11-19

## 2024-11-19 RX ORDER — HYDROCODONE BITARTRATE AND ACETAMINOPHEN 5; 325 MG/1; MG/1
1 TABLET ORAL ONCE AS NEEDED
Status: DISCONTINUED | OUTPATIENT
Start: 2024-11-19 | End: 2024-11-19

## 2024-11-19 RX ORDER — METOPROLOL TARTRATE 1 MG/ML
2.5 INJECTION, SOLUTION INTRAVENOUS ONCE
Status: DISCONTINUED | OUTPATIENT
Start: 2024-11-19 | End: 2024-11-19

## 2024-11-19 RX ORDER — HYDROMORPHONE HYDROCHLORIDE 1 MG/ML
0.4 INJECTION, SOLUTION INTRAMUSCULAR; INTRAVENOUS; SUBCUTANEOUS EVERY 5 MIN PRN
Status: DISCONTINUED | OUTPATIENT
Start: 2024-11-19 | End: 2024-11-19

## 2024-11-19 RX ORDER — NICOTINE POLACRILEX 4 MG
30 LOZENGE BUCCAL
Status: DISCONTINUED | OUTPATIENT
Start: 2024-11-19 | End: 2024-11-19

## 2024-11-19 RX ORDER — NALOXONE HYDROCHLORIDE 0.4 MG/ML
80 INJECTION, SOLUTION INTRAMUSCULAR; INTRAVENOUS; SUBCUTANEOUS AS NEEDED
Status: DISCONTINUED | OUTPATIENT
Start: 2024-11-19 | End: 2024-11-19

## 2024-11-19 RX ORDER — LIDOCAINE HYDROCHLORIDE 10 MG/ML
INJECTION, SOLUTION EPIDURAL; INFILTRATION; INTRACAUDAL; PERINEURAL AS NEEDED
Status: DISCONTINUED | OUTPATIENT
Start: 2024-11-19 | End: 2024-11-19 | Stop reason: SURG

## 2024-11-19 RX ORDER — PROCHLORPERAZINE EDISYLATE 5 MG/ML
5 INJECTION INTRAMUSCULAR; INTRAVENOUS EVERY 8 HOURS PRN
Status: DISCONTINUED | OUTPATIENT
Start: 2024-11-19 | End: 2024-11-19

## 2024-11-19 RX ORDER — NICOTINE POLACRILEX 4 MG
15 LOZENGE BUCCAL
Status: DISCONTINUED | OUTPATIENT
Start: 2024-11-19 | End: 2024-11-19

## 2024-11-19 RX ORDER — DEXTROSE MONOHYDRATE 25 G/50ML
50 INJECTION, SOLUTION INTRAVENOUS
Status: DISCONTINUED | OUTPATIENT
Start: 2024-11-19 | End: 2024-11-19

## 2024-11-19 RX ORDER — MEPERIDINE HYDROCHLORIDE 25 MG/ML
12.5 INJECTION INTRAMUSCULAR; INTRAVENOUS; SUBCUTANEOUS AS NEEDED
Status: DISCONTINUED | OUTPATIENT
Start: 2024-11-19 | End: 2024-11-19

## 2024-11-19 RX ORDER — MIDAZOLAM HYDROCHLORIDE 1 MG/ML
1 INJECTION INTRAMUSCULAR; INTRAVENOUS EVERY 5 MIN PRN
Status: DISCONTINUED | OUTPATIENT
Start: 2024-11-19 | End: 2024-11-19

## 2024-11-19 RX ORDER — ONDANSETRON 2 MG/ML
4 INJECTION INTRAMUSCULAR; INTRAVENOUS EVERY 6 HOURS PRN
Status: DISCONTINUED | OUTPATIENT
Start: 2024-11-19 | End: 2024-11-19

## 2024-11-19 RX ORDER — LABETALOL HYDROCHLORIDE 5 MG/ML
5 INJECTION, SOLUTION INTRAVENOUS EVERY 5 MIN PRN
Status: DISCONTINUED | OUTPATIENT
Start: 2024-11-19 | End: 2024-11-19

## 2024-11-19 RX ORDER — HYDROMORPHONE HYDROCHLORIDE 1 MG/ML
0.6 INJECTION, SOLUTION INTRAMUSCULAR; INTRAVENOUS; SUBCUTANEOUS EVERY 5 MIN PRN
Status: DISCONTINUED | OUTPATIENT
Start: 2024-11-19 | End: 2024-11-19

## 2024-11-19 RX ORDER — ACETAMINOPHEN 500 MG
1000 TABLET ORAL ONCE AS NEEDED
Status: DISCONTINUED | OUTPATIENT
Start: 2024-11-19 | End: 2024-11-19

## 2024-11-19 RX ADMIN — SODIUM CHLORIDE, SODIUM LACTATE, POTASSIUM CHLORIDE, CALCIUM CHLORIDE: 600; 310; 30; 20 INJECTION, SOLUTION INTRAVENOUS at 09:21:00

## 2024-11-19 RX ADMIN — LIDOCAINE HYDROCHLORIDE 50 MG: 10 INJECTION, SOLUTION EPIDURAL; INFILTRATION; INTRACAUDAL; PERINEURAL at 09:00:00

## 2024-11-19 NOTE — H&P
[facility]  Pre-op H and P    Kat Crook Patient Status:  Hospital Outpatient Surgery    3/11/1970 MRN [id number]   Location [facility] Attending Morales Tiwari MD   Date 2024 PCP Justin Huerta MD     CC: Abnormal imaging: New mild pancreatic ductal dilatation/diffuse pancreatic atrophy with mild nonspecific dilatation of the downstream pancreatic duct at the level of the pancreatic head measuring up to 3 mm , prominent bile duct  Chronic pain  Post cholecystectomy, GERD    History of Present Illness:  Kat Crook is a a(n) 54 year old female. Abnormal imaging: New mild pancreatic ductal dilatation/diffuse pancreatic atrophy with mild nonspecific dilatation of the downstream pancreatic duct at the level of the pancreatic head measuring up to 3 mm , prominent bile duct  Chronic pain  Post cholecystectomy, GERD    History:  Past Medical History:    Abdominal pain    Allergic rhinitis    Anemia    Anxiety    Anxiety state    Arthritis    Asthma (HCC)    Atypical mole    Autoimmune disease (HCC)    Back pain    Bad breath    Black stools    Bloating    Blood disorder    Thalassemia alpha, Sickle cell trait    Blood in urine    Blurred vision    Calculus of kidney    Cardiomyopathy (HCC)    last echo 2021: EF 55%    Chest pain    Constipation    Decorative tattoo    Deep vein thrombosis (HCC)    Depression    Diarrhea, unspecified    Dizziness    Easy bruising    Elevated liver enzymes    Endocrine disorder    Esophageal reflux    Fatigue    Fibromyalgia    Food intolerance    Frequent urination    Frequent UTI    Gastroparesis    Glaucoma    H. pylori infection    H/O  section    Headache disorder    Heart attack (HCC)    Heart palpitations    Heartburn    Hemorrhoids    Herniation of cervical intervertebral disc    High blood pressure    High cholesterol    History of blood clots    Unprovoked    History of blood transfusion    History of cardiac murmur     History of depression    Hoarseness, chronic    Hyperlipidemia    IBS (irritable bowel syndrome)    Irregular bowel habits    Leaking of urine    Liver disease    GIORDANO and Stage 2 fibrosis    Loss of appetite    Migraines    Multiple sclerosis (HCC)    dx 8 years ago    Muscle weakness    cane or walker prn    Myocardial infarction (HCC)    Nausea    Neuropathy    legs, hands, feet; bilateral    Night sweats    Osteoarthritis    Osteoporosis    KAMALJIT in her 30s    Osteoporosis    Pain with bowel movements    Painful urination    Problems with swallowing    Due to MS    Pulmonary embolism (HCC)    Renal disorder    kidney lesions / denies dialysis    Scoliosis of lumbar spine    Shortness of breath    Sickle cell trait (HCC)    Sleep apnea    no treatment    Sleep disturbance    Stress    Type II or unspecified type diabetes mellitus without mention of complication, not stated as uncontrolled    Uncomfortable fullness after meals    Unspecified disorder of thyroid    total thyroidectomy    Visual impairment    glasses    Wears glasses    Weight loss    Wheezing     Past Surgical History:   Procedure Laterality Date      1986    Cataract      Cath angio  2014    Cholecystectomy      Colonoscopy N/A 2015    Procedure: COLONOSCOPY;  Surgeon: Liz Tong DO;  Location:  ENDOSCOPY    Colonoscopy N/A 2021    Procedure: COLONOSCOPY;  Surgeon: Cedrick Da Silva MD;  Location:  ENDOSCOPY    Colonoscopy N/A 10/08/2021    Procedure: COLONOSCOPY, ESOPHAGOGASTRODUODENOSCOPY (EGD);  Surgeon: Darvin Richardson MD;  Location:  ENDOSCOPY    D & c  1990    Blighted ovum    Egd      Hysterectomy      total hystero.    Ir port a cath insertion exchnge check  2018    Lithotripsy      x 2    Lysis of adhesions      Needle biopsy liver  2016    Oophorectomy Bilateral     total hystero.    Other      dialysis catheter- left chest for plasmaphoresis    Other surgical  history       x 2    Port, indwelling, imp  2019    power port    Thyroidectomy  2013    benign MNG    Total abdom hysterectomy      Upper gi endoscopy performed  2014     Family History   Problem Relation Age of Onset    Hypertension Mother         Needs to be deleted    Cancer Mother         stomach cancer    Ovarian Cancer Mother         30'S    Colon Polyps Mother     Anemia Mother     Heart Attack Father     Other (Other) Father         COPD, emphysema    Asthma Father     Pulmonary Disease Father     Other (Other) Sister         rectal CA\    Ovarian Cancer Maternal Grandmother         30'S    Colon Polyps Maternal Grandmother     Diabetes Maternal Grandmother     Colon Cancer Maternal Grandmother     Anemia Maternal Grandmother     Cancer Maternal Grandmother     Anemia Daughter     Breast Cancer Maternal Aunt         60'S    Breast Cancer Paternal Cousin Female 64      reports that she has never smoked. She has never been exposed to tobacco smoke. She has never used smokeless tobacco. She reports that she does not drink alcohol and does not use drugs.    Allergies:  Allergies[1]    Medications:    Current Facility-Administered Medications:     glucose (Dex4) 15 GM/59ML oral liquid 15 g, 15 g, Oral, Q15 Min PRN **OR** glucose (Glutose) 40% oral gel 15 g, 15 g, Oral, Q15 Min PRN **OR** glucose-vitamin C (Dex-4) chewable tab 4 tablet, 4 tablet, Oral, Q15 Min PRN **OR** dextrose 50% injection 50 mL, 50 mL, Intravenous, Q15 Min PRN **OR** glucose (Dex4) 15 GM/59ML oral liquid 30 g, 30 g, Oral, Q15 Min PRN **OR** glucose (Glutose) 40% oral gel 30 g, 30 g, Oral, Q15 Min PRN **OR** glucose-vitamin C (Dex-4) chewable tab 8 tablet, 8 tablet, Oral, Q15 Min PRN    lactated ringers infusion, , Intravenous, Continuous    Physical Exam:    Height 5' 6\" (1.676 m), weight 164 lb (74.4 kg), not currently breastfeeding.    General: Appears alert, oriented x3 and in no acute distress.  CV: Normal rate   Lungs:  Normal effort   Skin: Warm and dry.  Laboratory Data:       Imaging:      Assessment/Plan/Procedure:  Patient Active Problem List   Diagnosis    Neuropathic pain of both legs    Elevated liver enzymes    Hyperparathyroidism (HCC)    Asthma (HCC)    Osteoporosis    Fibromyalgia    Goiter    Hypervitaminosis D    Demyelinating disease of central nervous system (HCC)    Tachycardia    Encephalopathy    AVN (avascular necrosis of bone) (HCC)    Gastroparesis    Fatty liver    Diabetes mellitus (HCC)    Generalized abdominal pain    Multiple sclerosis (HCC)    At risk for falling    Cardiomyopathy in other disease    Weakness of both lower extremities    CATIA (acute kidney injury) (HCC)    Hypokalemia    MS (multiple sclerosis) (HCC)    Hypotension    Anemia    Weakness generalized    Essential hypertension    Irritable bowel syndrome (IBS)    S/P laparoscopic surgery    Intra-abdominal adhesions    Dyspnea, paroxysmal nocturnal    Multiple sclerosis exacerbation (HCC)    Dysuria    History of plasmapheresis    Hyponatremia    Abdominal pain    Hyperglycemia    Sleep apnea    Dehydration    Abdominal pain, right upper quadrant    Allergic rhinitis    Chronic fatigue and malaise    Dilated idiopathic cardiomyopathy (HCC)    Dizziness    Dyspnea    Chest pain, unspecified    Nephrolithiasis    Undiagnosed cardiac murmurs    Sickle cell trait (HCC)    Pure hypercholesterolemia    GIORDANO (nonalcoholic steatohepatitis)    Myalgia and myositis, unspecified    Liver lesion    Interstitial cystitis    Family history of coronary arteriosclerosis    Type 1 diabetes mellitus with ketoacidosis without coma (HCC)    Urinary tract infection without hematuria, site unspecified    Controlled type 1 diabetes mellitus with hyperglycemia (HCC)    Chest pain of uncertain etiology    Abdominal pain of unknown etiology    Spinal stenosis    Anemia due to chronic blood loss    Aseptic necrosis of head or neck of femur    Bilateral tinnitus     Mild persistent asthma with acute exacerbation (HCC)    Generalized abdominal tenderness    Generalized edema    Hyperlipidemia    Muscle weakness    Disorder of nervous system due to type 2 diabetes mellitus (HCC)    Peripheral vascular disorder due to diabetes mellitus (HCC)    Polyneuropathy due to type 2 diabetes mellitus (HCC)    Vitamin D deficiency    Constipation    Right upper quadrant pain    Shortness of breath    Chronic pain disorder    Headache    Hypothyroidism    Idiopathic osteoporosis    Type 2 diabetes mellitus without complication, with long-term current use of insulin (HCC)    Fatigue    Esophageal reflux    Myalgia    Pulmonary embolism on left (HCC)    Microcytic anemia    Alpha-thalassemia (HCC)    Pulmonary infiltrate    Encounter for anticoagulation discussion and counseling    Right lower quadrant pain    Hemodialysis catheter dysfunction (HCC)    Primary hypertension    Age-related cataract of both eyes    Anxiety and depression    Palpitations    Photophobia of both eyes    Preglaucoma, unspecified, bilateral    Unsteady gait when walking    Weakness    Shock (HCC)    Acute cystitis without hematuria    Low serum cortisol level    Pancreatic duct dilated (HCC)    Intractable abdominal pain    Type 1 diabetes mellitus with hyperglycemia (HCC)    Pulmonary hypertension due to myeloproliferative disorder (HCC)    Abnormal finding on GI tract imaging       Abnormal imaging: New mild pancreatic ductal dilatation/diffuse pancreatic atrophy with mild nonspecific dilatation of the downstream pancreatic duct at the level of the pancreatic head measuring up to 3 mm , prominent bile duct  Chronic pain  Post cholecystectomy, GERD    Plan:  EGD/EUS  Morales Tiwari MD  11/19/2024  8:17 AM       [1]   Allergies  Allergen Reactions    Epinephrine OTHER (SEE COMMENTS)     Cardiac issues    Immune Globulin ANAPHYLAXIS    Latex SHORTNESS OF BREATH and OTHER (SEE COMMENTS)     WELTS    Methylprednisolone  SWELLING     Swelling of neck, throat and tongue  Injection, throat and tongue swelling      Ocrelizumab ANAPHYLAXIS and OTHER (SEE COMMENTS)     Difficulty breathing    Other SHORTNESS OF BREATH     ENVIRONMENTAL, ASTHMA EXACERBATION    Pcn [Penicillamine]     Penicillins OTHER (SEE COMMENTS)    Urea Tightness in Throat     Urea C-13 in Pranactin for H. Pylori test kit.    Midodrine ITCHING     Pt reports mouth pain and itching possibly r/t midodrine

## 2024-11-19 NOTE — DISCHARGE INSTRUCTIONS
Home Care Instructions for Gastroscopy/Endoscopic Ultrasound with Sedation    Diet:  - Resume your regular diet as tolerated unless otherwise instructed.  - Start with light meals to minimize bloating.  - Do not drink alcohol today.    Medication:  - If you have questions about resuming your normal medications, please contact your Primary Care Physician.    Activities:  - Take it easy today. Do not return to work today.  - Do not drive today.  - Do not operate any machinery today (including kitchen equipment).    Gastroscopy/Endoscopic Ultrasound:  - You may have a sore throat for 2-3 days following the exam. This is normal. Gargling with warm salt water (1/2 tsp salt to 1 glass warm water) or using throat lozenges will help.  - If you experience any sharp pain in your neck, abdomen or chest, vomiting of blood, oral temperature over 100 degrees Fahrenheit, light-headedness or dizziness, or any other problems, contact your doctor.    **If unable to reach your doctor, please go to the [facility] Emergency Room**    - Your referring physician will receive a full report of your examination.  - If you do not hear from your doctor's office within two weeks of your biopsy, please call them for your results.

## 2024-11-19 NOTE — ANESTHESIA PREPROCEDURE EVALUATION
PRE-OP EVALUATION    Patient Name: Kat Crook    Admit Diagnosis: Abnormal finding on GI tract imaging [R93.3]  RLQ abdominal pain [R10.31]  Generalized abdominal pain [R10.84]  Epigastric pain [R10.13]  Lower abdominal pain [R10.30]  Constipation, unspecified constipation type [K59.00]  Rectal pressure [R19.8]  Nausea [R11.0]  GIORDANO (nonalcoholic steatohepatitis) [K75.81]  Pelvic pain [R10.2]  Pharyngeal dysphagia [R13.13]  Family history of stomach cancer [Z80.0]  Family history of rectal cancer [Z80.0]  Gastroesophageal reflux disease without esophagitis [K21.9]  Family history of colon cancer [Z80.0]  Intra-abdominal adhesions [K66.0]  Multiple sclerosis (HCC) [G35]  Pancreatic duct dilated (HCC) [K86.89]  Right sided abdominal pain [R10.9]  Epigastric abdominal pain [R10.13]  Fatty liver [K76.0]  Globus sensation [R09.A2]  Steatorrhea (HCC) [K90.9]  Excessive gas [R14.3]  Esophageal dysphagia [R13.19]    Pre-op Diagnosis: Abnormal finding on GI tract imaging [R93.3]  RLQ abdominal pain [R10.31]  Generalized abdominal pain [R10.84]  Epigastric pain [R10.13]  Lower abdominal pain [R10.30]  Constipation, unspecified constipation type [K59.00]  Rectal pressure [R19.8]  Nausea [R11.0]  GIORDANO (nonalcoholic steatohepatitis) [K75.81]  Pelvic pain [R10.2]  Pharyngeal dysphagia [R13.13]  Family history of stomach cancer [Z80.0]  Family history of rectal cancer [Z80.0]  Gastroesophageal reflux disease without esophagitis [K21.9]  Family history of colon cancer [Z80.0]  Intra-abdominal adhesions [K66.0]  Multiple sclerosis (HCC) [G35]  Pancreatic duct dilated (HCC) [K86.89]  Right sided abdominal pain [R10.9]  Epigastric abdominal pain [R10.13]  Fatty liver [K76.0]  Globus sensation [R09.A2]  Steatorrhea (HCC) [K90.9]  Excessive gas [R14.3]  Esophageal dysphagia [R13.19]    ESOPHAGOGASTRODUODENOSCOPY (EGD), ENDOSCOPIC ULTRASOUND (EUS)    Anesthesia Procedure: ESOPHAGOGASTRODUODENOSCOPY (EGD), ENDOSCOPIC ULTRASOUND  (EUS)  ENDOSCOPIC ULTRASOUND (EUS)    Surgeons and Role:     * Morales Tiwari MD - Primary    Pre-op vitals reviewed.        Body mass index is 26.47 kg/m².    Current medications reviewed.  Hospital Medications:  • glucose (Dex4) 15 GM/59ML oral liquid 15 g  15 g Oral Q15 Min PRN    Or   • glucose (Glutose) 40% oral gel 15 g  15 g Oral Q15 Min PRN    Or   • glucose-vitamin C (Dex-4) chewable tab 4 tablet  4 tablet Oral Q15 Min PRN    Or   • dextrose 50% injection 50 mL  50 mL Intravenous Q15 Min PRN    Or   • glucose (Dex4) 15 GM/59ML oral liquid 30 g  30 g Oral Q15 Min PRN    Or   • glucose (Glutose) 40% oral gel 30 g  30 g Oral Q15 Min PRN    Or   • glucose-vitamin C (Dex-4) chewable tab 8 tablet  8 tablet Oral Q15 Min PRN   • lactated ringers infusion   Intravenous Continuous       Outpatient Medications:   Prescriptions Prior to Admission[1]    Allergies: Epinephrine, Immune globulin, Latex, Methylprednisolone, Ocrelizumab, Other, Pcn [penicillamine], Penicillins, Urea, and Midodrine      Anesthesia Evaluation    Patient summary reviewed.    Anesthetic Complications  (-) history of anesthetic complications         GI/Hepatic/Renal      (+) GERD          (+) liver disease                 Cardiovascular      ECG reviewed.  Exercise tolerance: good     MET: >4      (+) hypertension                                     Endo/Other      (+) diabetes  type 1, using insulin                         Pulmonary      (+) asthma           (-) recent URI   (+) sleep apnea       Neuro/Psych                 (+) neuromuscular disease             CATIA (acute kidney injury) (HCC) AVN (avascular necrosis of bone) (HCC)  Abdominal pain Abdominal pain of unknown etiology  Abdominal pain, right upper quadrant Allergic rhinitis  Alpha-thalassemia (HCC) Anemia  Anemia due to chronic blood loss Aseptic necrosis of head or neck of femur  Asthma At risk for falling  Bilateral tinnitus Cardiomyopathy in other disease  Chest pain of  uncertain etiology Chest pain, unspecified  Chronic fatigue and malaise Chronic pain disorder  Constipation Controlled type 1 diabetes mellitus with hyperglycemia (HCC)  Dehydration Demyelinating disease of central nervous system (HCC)  Dilated idiopathic cardiomyopathy (HCC) Disorder of nervous system due to type 2 diabetes mellitus (HCC)  Dizziness Dyspnea  Dyspnea, paroxysmal nocturnal Dysuria  Elevated liver enzymes Encephalopathy  Encounter for anticoagulation discussion and counseling Esophageal reflux  Essential hypertension Family history of coronary arteriosclerosis  Fatigue Fatty liver  Fibromyalgia Gastroparesis  Generalized abdominal pain Generalized abdominal tenderness  Generalized edema Goiter  Headache Hemodialysis catheter dysfunction (HCC)  History of plasmapheresis Hyperglycemia  Hyperlipidemia Hyperparathyroidism (HCC)  Hypervitaminosis D Hypokalemia  Hyponatremia Hypothyroidism  IDDM (insulin dependent diabetes mellitus) Idiopathic osteoporosis  Interstitial cystitis Intra-abdominal adhesions  Irritable bowel syndrome (IBS) Liver lesion  MS (multiple sclerosis) (HCC) Microcytic anemia  Mild persistent asthma with acute exacerbation Multiple sclerosis (HCC)  Multiple sclerosis exacerbation (HCC) Muscle weakness  Myalgia Myalgia and myositis, unspecified  GIORDANO (nonalcoholic steatohepatitis) Neuropathic pain of both legs  Osteoporosis Peripheral vascular disorder due to diabetes mellitus (HCC)  Polyneuropathy due to type 2 diabetes mellitus (HCC) Primary hypertension  Pulmonary embolism on left (HCC) Pulmonary infiltrate  Pure hypercholesterolemia   Right lower quadrant pain  Right upper quadrant pain   S/P laparoscopic surgery  Shortness of breath   Sickle cell trait (HCC)  Sleep apnea   Spinal stenosis  Tachycardia   Type 1 diabetes mellitus with ketoacidosis without coma (HCC)  Type 2 diabetes mellitus with other specified complication (HCC)   Undiagnosed cardiac murmurs  Urinary calculus,  unspecified Urinary tract infection without hematuria, site unspecified  Vitamin D deficiency Weakness generalized  Weakness of both lower extremities     Osteoporosis Fibromyalgia  IBS (irritable bowel syndrome)   H/O  section  Unspecified disorder of thyroid   Herniation of cervical intervertebral disc  Scoliosis of lumbar spine   Esophageal reflux  Elevated liver enzymes   Type II or unspecified type diabetes mellitus without mention of complication, not stated as uncontrolled  Autoimmune disease (HCC)   Sickle cell trait (HCC)  H. pylori infection   Endocrine disorder  Migraines   Myocardial infarction (HCC)  Glaucoma   Multiple sclerosis (HCC)  High blood pressure High cholesterol  Anxiety state   Heart attack (HCC)  Asthma   Abdominal pain  Uncomfortable fullness after meals   Bloating  Pain with bowel movements   Hemorrhoids  Irregular bowel habits   Diarrhea, unspecified  Constipation   Black stools  Food intolerance   Fatigue  Weight loss   Heart palpitations  Wheezing   Hoarseness, chronic  Calculus of kidney   Frequent UTI  Painful urination   Frequent urination  Leaking of urine   Atypical mole  Decorative tattoo   Back pain  Easy bruising   Anemia  Blurred vision   Bad breath  Night sweats   Loss of appetite  Headache disorder   Dizziness  Chest pain   Blood in urine  History of depression   Stress  Sleep apnea   Visual impairment  Liver disease Cardiomyopathy (HCC)  Neuropathy Muscle weakness  Deep vein thrombosis (HCC) Blood disorder  Renal disorder   Gastroparesis  Heartburn   Problems with swallowing  Sleep disturbance   History of blood transfusion  History of cardiac murmur   Shortness of breath  Arthritis   Wears glasses  Osteoarthritis   Osteoporosis        Past Surgical History:   Procedure Laterality Date   •   1986   • Cataract     • Cath angio  2014   • Cholecystectomy     • Colonoscopy N/A 2015    Procedure: COLONOSCOPY;  Surgeon: Alycia  DO Liz;  Location:  ENDOSCOPY   • Colonoscopy N/A 2021    Procedure: COLONOSCOPY;  Surgeon: Cedrick Da Silva MD;  Location:  ENDOSCOPY   • Colonoscopy N/A 10/08/2021    Procedure: COLONOSCOPY, ESOPHAGOGASTRODUODENOSCOPY (EGD);  Surgeon: Darvin Richardson MD;  Location:  ENDOSCOPY   • D & c  1990    Blighted ovum   • Egd     • Hysterectomy      total hystero.   • Ir port a cath insertion exchnge check  2018   • Lithotripsy      x 2   • Lysis of adhesions     • Needle biopsy liver     • Oophorectomy Bilateral     total hystero.   • Other      dialysis catheter- left chest for plasmaphoresis   • Other surgical history       x 2   • Port, indwelling, imp  2019    power port   • Thyroidectomy  2013    benign MNG   • Total abdom hysterectomy     • Upper gi endoscopy performed  2014     Social History     Socioeconomic History   • Marital status: Single   Tobacco Use   • Smoking status: Never     Passive exposure: Never   • Smokeless tobacco: Never   • Tobacco comments:     Never used it   Vaping Use   • Vaping status: Never Used   Substance and Sexual Activity   • Alcohol use: Never   • Drug use: Never   Other Topics Concern   • Caffeine Concern Yes     Comment: 1 can 3 x week   • Exercise Yes     Comment: walking     History   Drug Use Unknown     Available pre-op labs reviewed.  Lab Results   Component Value Date    WBC 7.3 2024    RBC 5.40 (H) 2024    HGB 12.1 2024    HCT 39.5 2024    MCV 73.1 (L) 2024    MCH 22.4 (L) 2024    MCHC 30.6 (L) 2024    RDW 20.2 2024    .0 (L) 2024     Lab Results   Component Value Date     2024    K 4.2 2024     2024    CO2 30.0 2024    BUN 13 2024    CREATSERUM 1.13 (H) 2024     (H) 2024    CA 9.7 2024            Airway      Mallampati: II  Mouth opening: >3 FB  TM distance: > 6 cm  Neck ROM: full  Cardiovascular      Rhythm: regular  Rate: normal     Dental             Pulmonary      Breath sounds clear to auscultation bilaterally.               Other findings        ASA: 3   Plan: MAC  NPO status verified and patient meets guidelines.    Post-procedure pain management plan discussed with surgeon and patient.      Plan/risks discussed with: patient            Present on Admission:  **None**             [1]   Medications Prior to Admission   Medication Sig Dispense Refill Last Dose/Taking   • Wheat Dextrin (BENEFIBER OR) Take by mouth as needed.   Taking As Needed   • linaCLOtide (LINZESS) 145 MCG Oral Cap    Taking   • natalizumab (TYSABRI) 300 mg/15mL Intravenous Conc Inject into the vein. Every 8 weeks   Taking   • carvedilol 3.125 MG Oral Tab Take 1 tablet (3.125 mg total) by mouth 2 (two) times daily.   Taking   • montelukast 10 MG Oral Tab Take 1 tablet (10 mg total) by mouth nightly.   Taking   • levothyroxine 88 MCG Oral Tab Take 1 tablet (88 mcg total) by mouth before breakfast.   Taking   • pantoprazole 20 MG Oral Tab EC Take 1 tablet (20 mg total) by mouth 2 (two) times daily.   Taking   • fluticasone furoate-vilanterol 200-25 MCG/ACT Inhalation Aerosol Powder, Breath Activated Inhale 1 puff into the lungs daily.   Taking   • LYLLANA 0.025 MG/24HR Transdermal Patch Biweekly Place 1 patch onto the skin twice a week.   Taking   • CELEBREX 100 MG Oral Cap Take 1 capsule (100 mg total) by mouth 2 (two) times daily.   Taking   • pregabalin 200 MG Oral Cap Take 1 capsule (200 mg total) by mouth 2 (two) times daily.   Taking   • Ciclopirox 8 % External Solution APPLY EVERY DAY   Taking   • HYDROcodone-acetaminophen  MG Oral Tab Take 1 tablet by mouth 4 (four) times daily as needed.   Taking As Needed   • baclofen 10 MG Oral Tab Take 1 tablet (10 mg total) by mouth 2 (two) times daily.   Taking   • FARXIGA 10 MG Oral Tab Take 1 tablet (10 mg total) by mouth daily.   Taking   • DULoxetine 60 MG Oral  Cap DR Particles Take 1 capsule (60 mg total) by mouth 2 (two) times daily.   Taking   • atorvastatin 20 MG Oral Tab Take 1 tablet (20 mg total) by mouth nightly.   Taking   • Insulin Lispro (HUMALOG) 100 UNIT/ML Subcutaneous Solution INJECT UP TO 70 UNITS VIA INSULIN PUMP DAILY 70 mL 2 Taking   • Benralizumab (FASENRA PEN) 30 MG/ML Subcutaneous Solution Auto-injector Inject 30 mg into the skin. Every other month/every 8 weeks   Taking   • Thiamine HCl 100 MG Oral Tab Take 1 tablet (100 mg total) by mouth daily. 30 tablet 3 Taking   • docusate sodium 100 MG Oral Cap Take 1 capsule (100 mg total) by mouth 2 (two) times daily.   Taking   • denosumab 60 MG/ML Subcutaneous Solution Inject 1 mL (60 mg total) into the skin every 6 (six) months.   Taking   • Cholecalciferol (VITAMIN D-3) 5000 UNITS Oral Tab Take 1 tablet (5,000 Units total) by mouth daily.   Taking   • Albuterol Sulfate  (90 Base) MCG/ACT Inhalation Aero Soln Inhale 2 puffs into the lungs every 6 (six) hours as needed. PRN wheezing/SOB   Taking As Needed   • albuterol (2.5 MG/3ML) 0.083% Inhalation Nebu Soln Take 3 mL (2.5 mg total) by nebulization 3 (three) times daily. 90 each 11    • FIASP FLEXTOUCH 100 UNIT/ML Subcutaneous Solution Pen-injector       • metoprolol tartrate 50 MG Oral Tab TAKE 1 TABLET BY MOUTH ONCE THE NIGHT BEFORE CTA AND 1 TABLET THE MORNING OF CTA      • NON FORMULARY Tandem insulin pump settings (Bolus IQ)  Mdnt 3.1 units/hr  0400 3.00 units/hr  0900 3.2 units/hr    Correction:   MN 1:35/110  1 PM 1:40  7 pm 1:35    CHO ratio:  MN 1:5g    Target glucose level - 100 and 80      • DEXCOM G6 SENSOR Does not apply Misc USE AS DIRECTED EVERY 10 DAYS 9 each 3    • EPINEPHrine 0.15 MG/0.3ML Injection Solution Auto-injector Jani pen/lower dose because pt is allergic to epinephrine      • Ondansetron HCl (ZOFRAN) 4 mg tablet Take 1 tablet (4 mg total) by mouth 3 (three) times daily as needed.      • DEXCOM G6 TRANSMITTER Does not  apply Misc USE AS DIRECTED EVERY 90 DAYS 1 each 3

## 2024-11-19 NOTE — ANESTHESIA POSTPROCEDURE EVALUATION
[facility]    Kat Crook Patient Status:  Hospital Outpatient Surgery   Age/Gender 54 year old female MRN [id number]   Location [facility] ENDOSCOPY PAIN CENTER Attending Morales Tiwari MD   Hosp Day # 0 PCP Justin Huerta MD       Anesthesia Post-op Note    ESOPHAGOGASTRODUODENOSCOPY (EGD) with biopsies, ENDOSCOPIC ULTRASOUND (EUS)    Procedure Summary       Date: 11/19/24 Room / Location:  ENDOSCOPY 02 /  ENDOSCOPY    Anesthesia Start: 0857 Anesthesia Stop: 0921    Procedures:       ESOPHAGOGASTRODUODENOSCOPY (EGD) with biopsies, ENDOSCOPIC ULTRASOUND (EUS)      ENDOSCOPIC ULTRASOUND (EUS) Diagnosis:       Abnormal finding on GI tract imaging      RLQ abdominal pain      Generalized abdominal pain      Epigastric pain      Lower abdominal pain      Constipation, unspecified constipation type      Rectal pressure      Nausea      GIORDANO (nonalcoholic steatohepatitis)      Pelvic pain      Pharyngeal dysphagia      Family history of stomach cancer      Family history of rectal cancer      Gastroesophageal reflux disease without esophagitis      Family history of colon cancer      Intra-abdominal adhesions      Multiple sclerosis (HCC)      Pancreatic duct dilated (HCC)      Right sided abdominal pain      Epigastric abdominal pain      Fatty liver      Globus sensation      Steatorrhea (HCC)      Excessive gas      Esophageal dysphagia      (chronic pancreatitis)    Surgeons: Morales Tiwari MD Anesthesiologist: Marc Valadez MD    Anesthesia Type: MAC ASA Status: 3            Anesthesia Type: MAC    Vitals Value Taken Time   /78 11/19/24 0927   Temp  11/19/24 0928   Pulse 74 11/19/24 0927   Resp 16 11/19/24 0915   SpO2 95 % 11/19/24 0927   Vitals shown include unfiled device data.    Patient Location: Endoscopy    Anesthesia Type: MAC    Airway Patency: patent    Postop Pain Control: adequate    Mental Status: mildly sedated but able to meaningfully participate in the post-anesthesia  evaluation    Nausea/Vomiting: none    Cardiopulmonary/Hydration status: stable euvolemic    Complications: no apparent anesthesia related complications    Postop vital signs: stable    Dental Exam: Unchanged from Preop    Patient to be discharged home when criteria met.

## 2024-11-19 NOTE — OPERATIVE REPORT
Kat Crook Patient Status:  Hospital Outpatient Surgery    3/11/1970 MRN [id number]   [facility] ENDOSCOPY PAIN CENTER Attending Morales Tiwari MD   Date 2024 PCP Justin Huerta MD     PREOPERATIVE DIAGNOSIS/INDICATION: Abnormal imaging: New mild pancreatic ductal dilatation/diffuse pancreatic atrophy with mild nonspecific dilatation of the downstream pancreatic duct at the level of the pancreatic head measuring up to 3 mm , prominent bile duct  Chronic generalized pain  Post cholecystectomy  GERD    POSTOPERTATIVE DIAGNOSIS: Chronic pancreatitis  PROCEDURE PERFORMED: EUS/EGD biopsy  TIME OUT WAS PERFORMED    SEDATION: MAC sedation provided by General Anesthesia    INFORMED CONSENT: Risks, benefits and alternatives to the procedure were explained to the patient including but not limited to bleeding, infection, perforation, adverse drug reactions, pancreatitis and the need for hospitalization and surgery if this occurs, the patient understands and agrees to procedure.  PROCEDURE DESCRIPTION: The upper endoscope and the therapeutic side viewing echoendoscope were introduced into the patient’s mouth, hypo pharynx, esophagus, stomach and the first and second portion of the duodenum, straightening of the endoscope was performed to obtain a direct view of the major ampulla, retroflexion was performed in the stomach with the EGD scope only. Careful examination of the above described areas was performed on withdrawal of the endoscope. The patient tolerated the procedure well and there were no immediate complications noted during the procedure, the patient was transported to the recovery area in stable condition.  FINDINGS:  ESOPHAGUS: Normal  GEJ: Normal  STOMACH: Normal  DUODENUM: Normal  MAJOR AMPULLA: Normal  ECHO: Imaging was performed through the esophagus, stomach and duodenum. The celiac axis and the left adrenal gland appear wnl, the visualized portions of the left hepatic lobe  appear wnl, the visualized pancreatic parenchyma showed honeycombing, hyperechoic stranding, hyperechoic duct wall and irregular duct morphology with narrowing of the duct at the genu without mass or tumor, the biliary tree appear wnl, the confluence of the portal vein, superior mesenteric vein and splenic vein appear wnl.  THERAPEUTICS: Cold forceps biopsies were performed in the duodenum, esophagus, antrum  RECOMMENDATIONS:   Post EUS/EGD precautions, watch for bleeding, infection, perforation, adverse drug reactions and pancreatitis.  Call status report post procedure.  Follow biopsies.    Morales Tiwari MD  11/19/2024  9:15 AM

## 2024-11-20 ENCOUNTER — HOSPITAL ENCOUNTER (OUTPATIENT)
Dept: CT IMAGING | Facility: HOSPITAL | Age: 54
Discharge: HOME | End: 2024-11-20
Attending: INTERNAL MEDICINE
Payer: MEDICARE

## 2024-11-20 VITALS — DIASTOLIC BLOOD PRESSURE: 65 MMHG | RESPIRATION RATE: 21 BRPM | HEART RATE: 54 BPM | SYSTOLIC BLOOD PRESSURE: 91 MMHG

## 2024-11-20 DIAGNOSIS — R07.9 CHEST PAIN: ICD-10-CM

## 2024-11-20 LAB
CREAT BLD-MCNC: 1.1 MG/DL
EGFRCR SERPLBLD CKD-EPI 2021: 59 ML/MIN/1.73M2 (ref 60–?)

## 2024-11-20 PROCEDURE — 82565 ASSAY OF CREATININE: CPT

## 2024-11-20 PROCEDURE — 75574 CT ANGIO HRT W/3D IMAGE: CPT | Performed by: INTERNAL MEDICINE

## 2024-11-20 RX ORDER — METOPROLOL TARTRATE 50 MG
100 TABLET ORAL ONCE
Status: DISCONTINUED | OUTPATIENT
Start: 2024-11-20 | End: 2024-11-22

## 2024-11-20 RX ORDER — METOPROLOL TARTRATE 1 MG/ML
5 INJECTION, SOLUTION INTRAVENOUS SEE ADMIN INSTRUCTIONS
Status: DISCONTINUED | OUTPATIENT
Start: 2024-11-20 | End: 2024-11-22

## 2024-11-20 RX ORDER — NITROGLYCERIN 0.4 MG/1
0.4 TABLET SUBLINGUAL EVERY 5 MIN PRN
Status: DISCONTINUED | OUTPATIENT
Start: 2024-11-20 | End: 2024-11-22

## 2024-11-20 RX ORDER — NITROGLYCERIN 0.4 MG/1
TABLET SUBLINGUAL
Status: COMPLETED
Start: 2024-11-20 | End: 2024-11-20

## 2024-11-20 RX ORDER — DILTIAZEM HYDROCHLORIDE 5 MG/ML
5 INJECTION INTRAVENOUS SEE ADMIN INSTRUCTIONS
Status: DISCONTINUED | OUTPATIENT
Start: 2024-11-20 | End: 2024-11-22

## 2024-11-20 RX ADMIN — NITROGLYCERIN 0.4 MG: 0.4 TABLET SUBLINGUAL at 08:19:00

## 2024-11-20 NOTE — IMAGING NOTE
Pt arrives to room CT 4 at 08:15. Working with CT tech SEGUNDO Rao. IV established by Caroline Floyd RN to right AC with 20 gauge angiocath using ultrasound IV. Pt denies long acting nitrates. Pt positioned on CT table comfortably. Procedure explained and questions answered. O2 applied via NC at 2 LPM. VSS as noted in flowsheet.     GFR = 59 (iSTAT creatinine 1.1 / used [employer] GFR calculator)   imaging started at 08:19    0.9NS flush followed by Omnipaque contrast at 08:23    omnipaque contrast = 65 mL  0.9NS = 100 mL  Average HR = 50    Pt tolerated procedure without complication. Denies s/sx of contrast reaction. IV dc'd intact at 08:25. Gauze and coban applied to site. Pt A/O x 4 and denies pain. Ambulatory and in stable condition. Dc'd to home at 08:32

## 2024-11-21 ENCOUNTER — TELEPHONE (OUTPATIENT)
Dept: NEUROLOGY | Facility: CLINIC | Age: 54
End: 2024-11-21

## 2024-11-21 NOTE — TELEPHONE ENCOUNTER
Tysabri infusion record received from [facility] infusion center for physician review.  No signs/symptoms of infusion reaction noted.  Patient infused on 11/21/2024. No observation period required.  Lot Number [id number] x1, Exp 12/2027  No new orders requested.  Orders active for 8 additional infusions.  Next infusion scheduled for 1/16/2025. Patient infusing every 8 weeks.  Placed in physician's box for review.    Recent JCV index = 1.09

## 2024-12-02 NOTE — PROGRESS NOTES
Hematology Clinic Follow Up Visit    Patient Name: Jovi Toledo  Medical Record Number: [id number]   YOB: 1970    PCP: Dr. Nate Norton   Other providers:  Dr. Giovanna Hassan (neuro)    Reason for Consultation:  Jovi Toledo was seen t • Decorative tattoo    • [facility]    • Diarrhea, unspecified    • Dizziness    • Easy bruising    • Elevated liver enzymes    • Endocrine disorder    • Esophageal reflux    • Extrinsic asthma, unspecified    • Fatigue    • Fibromyalgia ENDOSCOPY   • EGD     • IR PORT A CATH INSERTION EXCHNGE CHECK  09/12/2018   • LITHOTRIPSY      x 2   • LYSIS OF ADHESIONS     • NEEDLE BIOPSY LIVER  2016   • OTHER      dialysis catheter- left chest for plasmaphoresis   • OTHER SURGICAL HISTORY      C-sec daily., Disp: , Rfl:   pregabalin 200 MG Oral Cap, Take 200 mg by mouth 2 (two) times daily.   , Disp: , Rfl:   DEXCOM G6 SENSOR Does not apply Misc, USE AS DIRECTED EVERY 10 DAYS, Disp: 9 each, Rfl: 3  DEXCOM G6 TRANSMITTER Does not apply Misc, USE AS DIRE used    Alcohol use: Never    Drug use: Never      Family Medical History:  Family History   Problem Relation Age of Onset   • Heart Attack Father    • Other (Other) Father         COPD, emphysema   • Hypertension Mother         Needs to be deleted   • Can 16.4 01/10/2022    .0 01/10/2022    .0 12/09/2021    .0 12/08/2021     Lab Results   Component Value Date     (H) 12/09/2021    BUN 8 12/09/2021    BUNCREA 6.9 (L) 07/20/2021    CREATSERUM 0.84 12/09/2021    CREATSERUM 0.91 12/0 was negative.  -Will repeat CT chest at the 3-month mary to assess for evolution/resolution of pulmonary infiltrate which may be due to infarction though cannot exclude malignancy or infection.  -Obtain D-dimer at the 3-month mary to aid with risk assessme Risks/benefits discussed with patient/surrogate

## 2024-12-06 ENCOUNTER — HOSPITAL ENCOUNTER (OUTPATIENT)
Dept: CT IMAGING | Facility: HOSPITAL | Age: 54
Discharge: HOME | End: 2024-12-06
Attending: SPECIALIST
Payer: MEDICARE

## 2024-12-06 ENCOUNTER — HOSPITAL ENCOUNTER (OUTPATIENT)
Dept: GENERAL RADIOLOGY | Facility: HOSPITAL | Age: 54
Discharge: HOME | End: 2024-12-06
Attending: SPECIALIST
Payer: MEDICARE

## 2024-12-06 DIAGNOSIS — S89.91XA INJURY OF RIGHT LEG: ICD-10-CM

## 2024-12-06 DIAGNOSIS — M79.604 RIGHT LEG PAIN: ICD-10-CM

## 2024-12-06 PROCEDURE — 73590 X-RAY EXAM OF LOWER LEG: CPT | Performed by: SPECIALIST

## 2024-12-18 ENCOUNTER — HOSPITAL ENCOUNTER (OUTPATIENT)
Dept: GENERAL RADIOLOGY | Facility: HOSPITAL | Age: 54
Discharge: HOME | End: 2024-12-18
Attending: SPECIALIST
Payer: MEDICARE

## 2024-12-18 DIAGNOSIS — R07.9 CHEST PAIN: ICD-10-CM

## 2024-12-18 DIAGNOSIS — R05.9 COUGH: ICD-10-CM

## 2024-12-18 PROCEDURE — 71046 X-RAY EXAM CHEST 2 VIEWS: CPT | Performed by: SPECIALIST

## 2024-12-30 ENCOUNTER — HOSPITAL ENCOUNTER (INPATIENT)
Facility: HOSPITAL | Age: 54
LOS: 5 days | Discharge: HOME HEALTH | End: 2025-01-04
Attending: EMERGENCY MEDICINE | Admitting: HOSPITALIST
Payer: MEDICARE

## 2024-12-30 ENCOUNTER — APPOINTMENT (OUTPATIENT)
Dept: GENERAL RADIOLOGY | Facility: HOSPITAL | Age: 54
End: 2024-12-30
Attending: EMERGENCY MEDICINE
Payer: MEDICARE

## 2024-12-30 DIAGNOSIS — J45.901 EXACERBATION OF ASTHMA, UNSPECIFIED ASTHMA SEVERITY, UNSPECIFIED WHETHER PERSISTENT (HCC): ICD-10-CM

## 2024-12-30 DIAGNOSIS — J40 BRONCHITIS WITH ACUTE WHEEZING: ICD-10-CM

## 2024-12-30 DIAGNOSIS — J45.51 SEVERE PERSISTENT ASTHMA WITH EXACERBATION (HCC): ICD-10-CM

## 2024-12-30 DIAGNOSIS — J45.41 MODERATE PERSISTENT ASTHMA WITH EXACERBATION (HCC): Primary | ICD-10-CM

## 2024-12-30 LAB
ADENOVIRUS PCR:: NOT DETECTED
ALBUMIN SERPL-MCNC: 4 G/DL (ref 3.2–4.8)
ALBUMIN/GLOB SERPL: 1.3 {RATIO} (ref 1–2)
ALP LIVER SERPL-CCNC: 78 U/L
ALT SERPL-CCNC: 25 U/L
ANION GAP SERPL CALC-SCNC: 1 MMOL/L (ref 0–18)
AST SERPL-CCNC: 28 U/L (ref ?–34)
ATRIAL RATE: 70 BPM
B PARAPERT DNA SPEC QL NAA+PROBE: NOT DETECTED
B PERT DNA SPEC QL NAA+PROBE: NOT DETECTED
BASOPHILS # BLD AUTO: 0.01 X10(3) UL (ref 0–0.2)
BASOPHILS NFR BLD AUTO: 0.1 %
BILIRUB SERPL-MCNC: 0.5 MG/DL (ref 0.3–1.2)
BUN BLD-MCNC: 11 MG/DL (ref 9–23)
C PNEUM DNA SPEC QL NAA+PROBE: NOT DETECTED
CALCIUM BLD-MCNC: 8.6 MG/DL (ref 8.7–10.4)
CHLORIDE SERPL-SCNC: 109 MMOL/L (ref 98–112)
CO2 SERPL-SCNC: 28 MMOL/L (ref 21–32)
CORONAVIRUS 229E PCR:: NOT DETECTED
CORONAVIRUS HKU1 PCR:: NOT DETECTED
CORONAVIRUS NL63 PCR:: NOT DETECTED
CORONAVIRUS OC43 PCR:: NOT DETECTED
CREAT BLD-MCNC: 0.76 MG/DL
D DIMER PPP FEU-MCNC: <0.27 UG/ML FEU (ref ?–0.56)
EGFRCR SERPLBLD CKD-EPI 2021: 92 ML/MIN/1.73M2 (ref 60–?)
EOSINOPHIL # BLD AUTO: 0 X10(3) UL (ref 0–0.7)
EOSINOPHIL NFR BLD AUTO: 0 %
ERYTHROCYTE [DISTWIDTH] IN BLOOD BY AUTOMATED COUNT: 16.4 %
EST. AVERAGE GLUCOSE BLD GHB EST-MCNC: 183 MG/DL (ref 68–126)
FLUAV + FLUBV RNA SPEC NAA+PROBE: NEGATIVE
FLUAV + FLUBV RNA SPEC NAA+PROBE: NEGATIVE
FLUAV RNA SPEC QL NAA+PROBE: NOT DETECTED
FLUBV RNA SPEC QL NAA+PROBE: NOT DETECTED
GLOBULIN PLAS-MCNC: 3 G/DL (ref 2–3.5)
GLUCOSE BLD-MCNC: 74 MG/DL (ref 70–99)
HBA1C MFR BLD: 8 % (ref ?–5.7)
HCT VFR BLD AUTO: 37.2 %
HGB BLD-MCNC: 11.3 G/DL
IMM GRANULOCYTES # BLD AUTO: 0.01 X10(3) UL (ref 0–1)
IMM GRANULOCYTES NFR BLD: 0.1 %
LYMPHOCYTES # BLD AUTO: 3.56 X10(3) UL (ref 1–4)
LYMPHOCYTES NFR BLD AUTO: 47.5 %
MCH RBC QN AUTO: 22.2 PG (ref 26–34)
MCHC RBC AUTO-ENTMCNC: 30.4 G/DL (ref 31–37)
MCV RBC AUTO: 73.1 FL
METAPNEUMOVIRUS PCR:: NOT DETECTED
MONOCYTES # BLD AUTO: 0.8 X10(3) UL (ref 0.1–1)
MONOCYTES NFR BLD AUTO: 10.7 %
MYCOPLASMA PNEUMONIA PCR:: NOT DETECTED
NEUTROPHILS # BLD AUTO: 3.12 X10 (3) UL (ref 1.5–7.7)
NEUTROPHILS # BLD AUTO: 3.12 X10(3) UL (ref 1.5–7.7)
NEUTROPHILS NFR BLD AUTO: 41.6 %
NT-PROBNP SERPL-MCNC: 71 PG/ML (ref ?–125)
OSMOLALITY SERPL CALC.SUM OF ELEC: 284 MOSM/KG (ref 275–295)
P AXIS: 67 DEGREES
P-R INTERVAL: 130 MS
PARAINFLUENZA 1 PCR:: NOT DETECTED
PARAINFLUENZA 2 PCR:: NOT DETECTED
PARAINFLUENZA 3 PCR:: NOT DETECTED
PARAINFLUENZA 4 PCR:: NOT DETECTED
PLATELET # BLD AUTO: 139 10(3)UL (ref 150–450)
PLATELETS.RETICULATED NFR BLD AUTO: 8.8 % (ref 0–7)
POTASSIUM SERPL-SCNC: 3.7 MMOL/L (ref 3.5–5.1)
PROT SERPL-MCNC: 7 G/DL (ref 5.7–8.2)
Q-T INTERVAL: 424 MS
QRS DURATION: 76 MS
QTC CALCULATION (BEZET): 457 MS
R AXIS: 11 DEGREES
RBC # BLD AUTO: 5.09 X10(6)UL
RHINOVIRUS/ENTERO PCR:: DETECTED
RSV RNA SPEC NAA+PROBE: NEGATIVE
RSV RNA SPEC QL NAA+PROBE: NOT DETECTED
SARS-COV-2 RNA NPH QL NAA+NON-PROBE: NOT DETECTED
SARS-COV-2 RNA RESP QL NAA+PROBE: NOT DETECTED
SODIUM SERPL-SCNC: 138 MMOL/L (ref 136–145)
T AXIS: 67 DEGREES
TROPONIN I SERPL HS-MCNC: 6 NG/L
VENTRICULAR RATE: 70 BPM
WBC # BLD AUTO: 7.5 X10(3) UL (ref 4–11)

## 2024-12-30 PROCEDURE — 99223 1ST HOSP IP/OBS HIGH 75: CPT | Performed by: HOSPITALIST

## 2024-12-30 PROCEDURE — 71045 X-RAY EXAM CHEST 1 VIEW: CPT | Performed by: EMERGENCY MEDICINE

## 2024-12-30 RX ORDER — POLYETHYLENE GLYCOL 3350 17 G/17G
17 POWDER, FOR SOLUTION ORAL DAILY PRN
Status: DISCONTINUED | OUTPATIENT
Start: 2024-12-30 | End: 2025-01-04

## 2024-12-30 RX ORDER — DEXTROSE MONOHYDRATE 25 G/50ML
50 INJECTION, SOLUTION INTRAVENOUS
Status: DISCONTINUED | OUTPATIENT
Start: 2024-12-30 | End: 2025-01-02

## 2024-12-30 RX ORDER — NICOTINE POLACRILEX 4 MG
30 LOZENGE BUCCAL
Status: DISCONTINUED | OUTPATIENT
Start: 2024-12-30 | End: 2025-01-02

## 2024-12-30 RX ORDER — MONTELUKAST SODIUM 5 MG/1
10 TABLET, CHEWABLE ORAL ONCE
Status: COMPLETED | OUTPATIENT
Start: 2024-12-30 | End: 2024-12-30

## 2024-12-30 RX ORDER — PROCHLORPERAZINE EDISYLATE 5 MG/ML
5 INJECTION INTRAMUSCULAR; INTRAVENOUS EVERY 8 HOURS PRN
Status: DISCONTINUED | OUTPATIENT
Start: 2024-12-30 | End: 2025-01-04

## 2024-12-30 RX ORDER — NICOTINE POLACRILEX 4 MG
30 LOZENGE BUCCAL
Status: DISCONTINUED | OUTPATIENT
Start: 2024-12-30 | End: 2025-01-04

## 2024-12-30 RX ORDER — PREGABALIN 100 MG/1
200 CAPSULE ORAL 2 TIMES DAILY
Status: DISCONTINUED | OUTPATIENT
Start: 2024-12-30 | End: 2025-01-04

## 2024-12-30 RX ORDER — DULOXETIN HYDROCHLORIDE 30 MG/1
60 CAPSULE, DELAYED RELEASE ORAL 2 TIMES DAILY
Status: DISCONTINUED | OUTPATIENT
Start: 2024-12-30 | End: 2025-01-04

## 2024-12-30 RX ORDER — FLUTICASONE PROPIONATE AND SALMETEROL 500; 50 UG/1; UG/1
1 POWDER RESPIRATORY (INHALATION) 2 TIMES DAILY
Status: DISCONTINUED | OUTPATIENT
Start: 2024-12-30 | End: 2025-01-04

## 2024-12-30 RX ORDER — ENOXAPARIN SODIUM 100 MG/ML
40 INJECTION SUBCUTANEOUS DAILY
Status: DISCONTINUED | OUTPATIENT
Start: 2024-12-31 | End: 2025-01-04

## 2024-12-30 RX ORDER — BACLOFEN 10 MG/1
10 TABLET ORAL 2 TIMES DAILY
Status: DISCONTINUED | OUTPATIENT
Start: 2024-12-30 | End: 2025-01-04

## 2024-12-30 RX ORDER — IPRATROPIUM BROMIDE AND ALBUTEROL SULFATE 2.5; .5 MG/3ML; MG/3ML
3 SOLUTION RESPIRATORY (INHALATION) EVERY 2 HOUR PRN
Status: DISCONTINUED | OUTPATIENT
Start: 2024-12-30 | End: 2025-01-04

## 2024-12-30 RX ORDER — MONTELUKAST SODIUM 10 MG/1
10 TABLET ORAL NIGHTLY
Status: DISCONTINUED | OUTPATIENT
Start: 2024-12-31 | End: 2025-01-04

## 2024-12-30 RX ORDER — ALBUTEROL SULFATE 5 MG/ML
10 SOLUTION RESPIRATORY (INHALATION) ONCE
Status: COMPLETED | OUTPATIENT
Start: 2024-12-30 | End: 2024-12-30

## 2024-12-30 RX ORDER — HYDROCODONE BITARTRATE AND ACETAMINOPHEN 10; 325 MG/1; MG/1
1 TABLET ORAL 4 TIMES DAILY PRN
Status: DISCONTINUED | OUTPATIENT
Start: 2024-12-30 | End: 2025-01-04

## 2024-12-30 RX ORDER — DEXTROSE MONOHYDRATE 25 G/50ML
50 INJECTION, SOLUTION INTRAVENOUS
Status: DISCONTINUED | OUTPATIENT
Start: 2024-12-30 | End: 2025-01-04

## 2024-12-30 RX ORDER — ATORVASTATIN CALCIUM 20 MG/1
20 TABLET, FILM COATED ORAL NIGHTLY
Status: DISCONTINUED | OUTPATIENT
Start: 2024-12-30 | End: 2024-12-31

## 2024-12-30 RX ORDER — NICOTINE POLACRILEX 4 MG
15 LOZENGE BUCCAL
Status: DISCONTINUED | OUTPATIENT
Start: 2024-12-30 | End: 2025-01-04

## 2024-12-30 RX ORDER — LEVOTHYROXINE SODIUM 88 UG/1
88 TABLET ORAL
Status: DISCONTINUED | OUTPATIENT
Start: 2024-12-31 | End: 2025-01-04

## 2024-12-30 RX ORDER — SODIUM PHOSPHATE, DIBASIC AND SODIUM PHOSPHATE, MONOBASIC 7; 19 G/230ML; G/230ML
1 ENEMA RECTAL ONCE AS NEEDED
Status: DISCONTINUED | OUTPATIENT
Start: 2024-12-30 | End: 2025-01-04

## 2024-12-30 RX ORDER — MAGNESIUM SULFATE 1 G/100ML
1 INJECTION INTRAVENOUS ONCE
Status: COMPLETED | OUTPATIENT
Start: 2024-12-30 | End: 2024-12-30

## 2024-12-30 RX ORDER — CELECOXIB 100 MG/1
100 CAPSULE ORAL 2 TIMES DAILY
Status: DISCONTINUED | OUTPATIENT
Start: 2024-12-31 | End: 2025-01-04

## 2024-12-30 RX ORDER — GUAIFENESIN 600 MG/1
600 TABLET, EXTENDED RELEASE ORAL 2 TIMES DAILY
Status: DISCONTINUED | OUTPATIENT
Start: 2024-12-30 | End: 2025-01-04

## 2024-12-30 RX ORDER — ACETAMINOPHEN 500 MG
1000 TABLET ORAL EVERY 4 HOURS PRN
Status: DISCONTINUED | OUTPATIENT
Start: 2024-12-30 | End: 2024-12-31

## 2024-12-30 RX ORDER — NICOTINE POLACRILEX 4 MG
15 LOZENGE BUCCAL
Status: DISCONTINUED | OUTPATIENT
Start: 2024-12-30 | End: 2025-01-02

## 2024-12-30 RX ORDER — ALBUTEROL SULFATE 0.83 MG/ML
2.5 SOLUTION RESPIRATORY (INHALATION)
Status: DISCONTINUED | OUTPATIENT
Start: 2024-12-30 | End: 2024-12-31

## 2024-12-30 RX ORDER — PANTOPRAZOLE SODIUM 20 MG/1
20 TABLET, DELAYED RELEASE ORAL
Status: DISCONTINUED | OUTPATIENT
Start: 2024-12-30 | End: 2025-01-04

## 2024-12-30 RX ORDER — BISACODYL 10 MG
10 SUPPOSITORY, RECTAL RECTAL
Status: DISCONTINUED | OUTPATIENT
Start: 2024-12-30 | End: 2025-01-04

## 2024-12-30 RX ORDER — ONDANSETRON 2 MG/ML
4 INJECTION INTRAMUSCULAR; INTRAVENOUS EVERY 6 HOURS PRN
Status: DISCONTINUED | OUTPATIENT
Start: 2024-12-30 | End: 2025-01-04

## 2024-12-30 RX ORDER — MELATONIN
100 DAILY
Status: DISCONTINUED | OUTPATIENT
Start: 2024-12-31 | End: 2025-01-04

## 2024-12-30 RX ORDER — SENNOSIDES 8.6 MG
17.2 TABLET ORAL NIGHTLY PRN
Status: DISCONTINUED | OUTPATIENT
Start: 2024-12-30 | End: 2025-01-04

## 2024-12-30 RX ORDER — CARVEDILOL 3.12 MG/1
3.12 TABLET ORAL 2 TIMES DAILY WITH MEALS
Status: DISCONTINUED | OUTPATIENT
Start: 2024-12-30 | End: 2025-01-04

## 2024-12-30 RX ORDER — ECHINACEA PURPUREA EXTRACT 125 MG
1 TABLET ORAL
Status: DISCONTINUED | OUTPATIENT
Start: 2024-12-30 | End: 2025-01-04

## 2024-12-30 NOTE — ED PROVIDER NOTES
Patient Seen in: [facility] Emergency Department      History     Chief Complaint   Patient presents with    Shortness Of Breath     Stated Complaint: audible exp wheeze    Subjective:   HPI  Patient is a 53 yo F with a history of MS, asthma, alpha thalassemia minor,  who presents to ED for evaluation of SOB, cough, chest pain over past 3-4 days. Patient has been using her inhaler with minimal relief. She notes constant, nonexertional, nonpleuritic and nonradiating sharp CP. Pt has history of PE and states this does not feel similar. She is not currently on AC. Pt states she has been intubated in past for asthma as a child. She also has severe allergic reaction to solumedrol and cannot get any steroids. No fevers, hemoptysis, leg swelling, abd pain, vomiting. No known sick contacts. Pt does report increased weakness not as severe as prior MS flares. Asthma usually triggered by weather changes.    From chart review, pt was seen by pulmonology 10/28/2024 (Dr. Hoyt). Pt was admitted to [facility] over summer and [facility] in August for asthma exacerbation. She required bipap  in past from asthma. Pt has had allergy testing in past and states she swells up with everything. Pt is on fasnerra every other month.       Objective:     Past Medical History:    Abdominal pain    Allergic rhinitis    Anemia    Anxiety    Anxiety state    Arthritis    Asthma (HCC)    Atypical mole    Autoimmune disease (HCC)    Back pain    Bad breath    Black stools    Bloating    Blood disorder    Thalassemia alpha, Sickle cell trait    Blood in urine    Blurred vision    Calculus of kidney    Cardiomyopathy (HCC)    last echo 12/2021: EF 55%    Chest pain    Constipation    Decorative tattoo    Deep vein thrombosis (HCC)    Depression    Diarrhea, unspecified    Dizziness    Easy bruising    Elevated liver enzymes    Endocrine disorder    Esophageal reflux    Fatigue    Fibromyalgia    Food intolerance    Frequent urination    Frequent  UTI    Gastroparesis    Glaucoma    H. pylori infection    H/O  section    Headache disorder    Heart attack (HCC)    Heart palpitations    Heartburn    Hemorrhoids    Herniation of cervical intervertebral disc    High blood pressure    High cholesterol    History of blood clots    Unprovoked    History of blood transfusion    History of cardiac murmur    History of depression    Hoarseness, chronic    Hyperlipidemia    IBS (irritable bowel syndrome)    Irregular bowel habits    Leaking of urine    Liver disease    GIORDANO and Stage 2 fibrosis    Loss of appetite    Migraines    Multiple sclerosis (HCC)    dx 8 years ago    Muscle weakness    cane or walker prn    Myocardial infarction (HCC)    Nausea    Neuropathy    legs, hands, feet; bilateral    Night sweats    Osteoarthritis    Osteoporosis    KAMALJIT in her 30s    Osteoporosis    Pain with bowel movements    Painful urination    Problems with swallowing    Due to MS    Pulmonary embolism (HCC)    Renal disorder    kidney lesions / denies dialysis    Scoliosis of lumbar spine    Shortness of breath    Sickle cell trait (HCC)    Sleep apnea    no treatment    Sleep disturbance    Stress    Type II or unspecified type diabetes mellitus without mention of complication, not stated as uncontrolled    Uncomfortable fullness after meals    Unspecified disorder of thyroid    total thyroidectomy    Visual impairment    glasses    Wears glasses    Weight loss    Wheezing              Past Surgical History:   Procedure Laterality Date      1986    Cataract      Cath angio  2014    Cholecystectomy      Colonoscopy N/A 2015    Procedure: COLONOSCOPY;  Surgeon: Liz Tong DO;  Location:  ENDOSCOPY    Colonoscopy N/A 2021    Procedure: COLONOSCOPY;  Surgeon: Cedrick Da Silva MD;  Location:  ENDOSCOPY    Colonoscopy N/A 10/08/2021    Procedure: COLONOSCOPY, ESOPHAGOGASTRODUODENOSCOPY (EGD);  Surgeon: Darvin Richardson  MD;  Location:  ENDOSCOPY    D & c  1990    Blighted ovum    Egd      Hysterectomy      total hystero.    Ir port a cath insertion exchnge check  2018    Lithotripsy      x 2    Lysis of adhesions      Needle biopsy liver  2016    Oophorectomy Bilateral     total hystero.    Other      dialysis catheter- left chest for plasmaphoresis    Other surgical history       x 2    Port, indwelling, imp  2019    power port    Thyroidectomy  2013    benign MNG    Total abdom hysterectomy      Upper gi endoscopy performed  2014                Social History     Socioeconomic History    Marital status: Single   Tobacco Use    Smoking status: Never     Passive exposure: Never    Smokeless tobacco: Never    Tobacco comments:     Never used it   Vaping Use    Vaping status: Never Used   Substance and Sexual Activity    Alcohol use: Never    Drug use: Never   Other Topics Concern    Caffeine Concern Yes     Comment: 1 can 3 x week    Exercise Yes     Comment: walking     Social Drivers of Health     Financial Resource Strain: Low Risk  (2024)    Received from [facility]    Financial Resource Strain     In the past year, have you or any family members you live with been unable to get any of the following when it was really needed? Check all that apply.: None   Food Insecurity: No Food Insecurity (2024)    Food Insecurity     Food Insecurity: Never true   Transportation Needs: No Transportation Needs (2024)    Transportation Needs     Lack of Transportation: No   Physical Activity: Insufficiently Active (2022)    Received from [employer] [facility] [employer], [facility]    Exercise Vital Sign     Days of Exercise per Week: 3 days     Minutes of Exercise per Session: 20 min   Stress: Low Risk  (2024)    Received from [facility], [facility]    Stress     Stress is when someone feels tense, nervous, anxious, or  can't sleep at night because their mind is troubled. How stressed are you? : Not at all   Social Connections: Low Risk  (2/19/2024)    Received from [facility], [facility]    Social Connections     How often do you see or talk to people that you care about and feel close to? (For example: talking to friends on the phone, visiting friends or family, going to [employer] or club meetings): 5 or more times a week   Housing Stability: Low Risk  (8/27/2024)    Housing Stability     Housing Instability: No                  Physical Exam     ED Triage Vitals [12/30/24 1243]   /82   Pulse 80   Resp 20   Temp 96.7 °F (35.9 °C)   Temp src Temporal   SpO2 98 %   O2 Device None (Room air)       Current Vitals:   Vital Signs  BP: 120/75  Pulse: 67  Resp: 14  Temp: 96.7 °F (35.9 °C)  Temp src: Temporal  MAP (mmHg): 88    Oxygen Therapy  SpO2: 100 %  O2 Device: None (Room air)        Physical Exam  Vitals and nursing note reviewed.   Constitutional:       General: She is not in acute distress.     Appearance: She is not ill-appearing.   HENT:      Head: Normocephalic and atraumatic.      Mouth/Throat:      Mouth: Mucous membranes are moist.   Eyes:      Extraocular Movements: Extraocular movements intact.      Pupils: Pupils are equal, round, and reactive to light.   Cardiovascular:      Rate and Rhythm: Normal rate and regular rhythm.   Pulmonary:      Effort: Pulmonary effort is normal.      Breath sounds: Wheezing present.   Abdominal:      General: There is no distension.      Palpations: Abdomen is soft.      Tenderness: There is no abdominal tenderness.   Musculoskeletal:      Cervical back: Neck supple.      Right lower leg: No edema.      Left lower leg: No edema.   Skin:     General: Skin is warm and dry.      Capillary Refill: Capillary refill takes less than 2 seconds.   Neurological:      General: No focal deficit present.      Mental Status: She is alert.   Psychiatric:         Mood and  Affect: Mood normal.           ED Course     Labs Reviewed   CBC WITH DIFFERENTIAL WITH PLATELET - Abnormal; Notable for the following components:       Result Value    HGB 11.3 (*)     .0 (*)     Immature Platelet Fraction 8.8 (*)     MCV 73.1 (*)     MCH 22.2 (*)     MCHC 30.4 (*)     All other components within normal limits   COMP METABOLIC PANEL (14) - Abnormal; Notable for the following components:    Calcium, Total 8.6 (*)     All other components within normal limits   RESPIRATORY FLU EXPAND PANEL + COVID-19 - Abnormal; Notable for the following components:    Rhinovirus/Entero PCR: Detected (*)     All other components within normal limits    Narrative:     This test is intended for the simultaneous qualitative detection and differentiation of nucleic acids from multiple viral and bacterial respiratory organisms, including nucleic acid from Severe Acute Respiratory Syndrome Coronavirus 2 (SARS-CoV-2) in nasopharyngeal swab from individuals suspected of respiratory viral infection consistent with COVID-19 by their healthcare provider.    Test performed using the [employer] Respiratory Panel 2.1 (RP2.1) assay on the Everpix 2.0 System, [employer], [address], [place] [address].    This test is being used under the Food and Drug Administration's Emergency Use Authorization.    The authorized Fact Sheet for Healthcare Providers for this assay is available upon request from the laboratory.    SARS and MERS coronaviruses are not tested on this assay.   PRO BETA NATRIURETIC PEPTIDE - Normal   TROPONIN I HIGH SENSITIVITY - Normal   D-DIMER - Normal   SARS-COV-2/FLU A AND B/RSV BY PCR (GENEXPERT) - Normal    Narrative:     This test is intended for the qualitative detection and differentiation of SARS-CoV-2, influenza A, influenza B, and respiratory syncytial virus (RSV) viral RNA in nasopharyngeal or nares swabs from individuals suspected of respiratory viral infection consistent with COVID-19 by  their healthcare provider. Signs and symptoms of respiratory viral infection due to SARS-CoV-2, influenza, and RSV can be similar.    Test performed using the Xpert Xpress SARS-CoV-2/FLU/RSV (real time RT-PCR)  assay on the GeneXpert instrument, [employer], [place] [address].   This test is being used under the Food and Drug Administration's Emergency Use Authorization.    The authorized Fact Sheet for Healthcare Providers for this assay is available upon request from the laboratory.   RAINBOW DRAW LAVENDER   RAINBOW DRAW LIGHT GREEN   RAINBOW DRAW BLUE     EKG    Rate, intervals and axes as noted on EKG Report.  Rate: 70  Rhythm: Sinus Rhythm  Reading: NSR with ventricular rate of 70, no acute ST elevations or depressions, TWI in aVL, V1, V2, similar to prior.                   MDM      Patient is a 53 yo F with a history of MS, asthma who presents to ED for evaluation of SOB, cough, chest pain over past 3-4 days. VSS. Pt has diffuse exp wheezing on exam but no increased WOB. No peripheral edema.     Suspect most likely asthma exacerbation triggered by URI or weather. Differential also includes but not limited to pneumonia, ACS, fluid overload, PE. Will obtain labs, D-dimer, CXR, EKG. Will give hour long, IV mag. Pt cannot have steroids due to history of severe allergic reaction to solumedrol. She states that she has lip/tongue swelling with solumedrol and cannot have decadron or prednisone for this either.     Admission disposition: 12/30/2024  6:13 PM       ED Course as of 12/30/24 1957  ------------------------------------------------------------  Time: 12/30 1643  Comment: CXR independently reviewed and shows lourdes cute process.   ------------------------------------------------------------  Time: 12/30 1711  Comment: CBC shows no leukoctyosis, hgb 11.3. CMP unremarkable. Trop negative.  D-dimer negative. BNP wnl.   ------------------------------------------------------------  Time: 12/30 1816  Comment: Patient  reevaluated and does not appear tachypneic or have increased work of breathing but still persistently wheezing so ordered second hour-long.  Discussed with pulmonology who recommends continuing montelukast and frequent DuoNebs given pt cannot tolerate steroids. No further recs at this time. Ordered additional  IV mag and small amount of IVF. Patient admitted to [place] hospitalist.       Critical Care:  A total of 40 minutes of critical care time (exclusive of billable procedures) was administered to manage the patient's respiratory instability due to her asthma exacerbation requiring multiple duonebs.  This involved direct patient intervention, complex decision making, and/or extensive discussions with the patient, family, and clinical staff.    Medical Decision Making  Amount and/or Complexity of Data Reviewed  External Data Reviewed: notes.     Details: Pulm note  Labs: ordered. Decision-making details documented in ED Course.  Radiology: independent interpretation performed. Decision-making details documented in ED Course.  ECG/medicine tests: independent interpretation performed. Decision-making details documented in ED Course.  Discussion of management or test interpretation with external provider(s): Pulmonoogy, hospitalist    Risk  Decision regarding hospitalization.        Disposition and Plan     Clinical Impression:  1. Moderate persistent asthma with exacerbation (HCC)         Disposition:  Admit  12/30/2024  6:13 pm    Follow-up:  No follow-up provider specified.        Medications Prescribed:  Current Discharge Medication List              Supplementary Documentation:         Hospital Problems       Present on Admission  Date Reviewed: 10/28/2024            ICD-10-CM Noted POA    Asthma exacerbation (HCC) J45.901 12/30/2024 Unknown

## 2024-12-30 NOTE — ED INITIAL ASSESSMENT (HPI)
Pt c/o wheezing, SOB and CP that started a few days ago. Pt states she has been taking her inhalers and neb treatments without relief. Denies fever. 97% RA  Audible expiratory wheeze. Hx asthma

## 2024-12-31 PROBLEM — G47.33 OSA (OBSTRUCTIVE SLEEP APNEA): Status: ACTIVE | Noted: 2018-01-11

## 2024-12-31 PROBLEM — J40 BRONCHITIS WITH ACUTE WHEEZING: Status: ACTIVE | Noted: 2024-12-31

## 2024-12-31 LAB
ANION GAP SERPL CALC-SCNC: 5 MMOL/L (ref 0–18)
BASOPHILS # BLD AUTO: 0.02 X10(3) UL (ref 0–0.2)
BASOPHILS NFR BLD AUTO: 0.3 %
BUN BLD-MCNC: 12 MG/DL (ref 9–23)
CALCIUM BLD-MCNC: 7.9 MG/DL (ref 8.7–10.4)
CHLORIDE SERPL-SCNC: 109 MMOL/L (ref 98–112)
CO2 SERPL-SCNC: 27 MMOL/L (ref 21–32)
CREAT BLD-MCNC: 0.89 MG/DL
EGFRCR SERPLBLD CKD-EPI 2021: 76 ML/MIN/1.73M2 (ref 60–?)
EOSINOPHIL # BLD AUTO: 0.12 X10(3) UL (ref 0–0.7)
EOSINOPHIL NFR BLD AUTO: 1.9 %
ERYTHROCYTE [DISTWIDTH] IN BLOOD BY AUTOMATED COUNT: 16.3 %
GLUCOSE BLD-MCNC: 120 MG/DL (ref 70–99)
GLUCOSE BLD-MCNC: 128 MG/DL (ref 70–99)
GLUCOSE BLD-MCNC: 132 MG/DL (ref 70–99)
GLUCOSE BLD-MCNC: 146 MG/DL (ref 70–99)
GLUCOSE BLD-MCNC: 149 MG/DL (ref 70–99)
GLUCOSE BLD-MCNC: 157 MG/DL (ref 70–99)
HCT VFR BLD AUTO: 35.5 %
HGB BLD-MCNC: 10.9 G/DL
IMM GRANULOCYTES # BLD AUTO: 0.02 X10(3) UL (ref 0–1)
IMM GRANULOCYTES NFR BLD: 0.3 %
LYMPHOCYTES # BLD AUTO: 3.09 X10(3) UL (ref 1–4)
LYMPHOCYTES NFR BLD AUTO: 48.4 %
MAGNESIUM SERPL-MCNC: 2.7 MG/DL (ref 1.6–2.6)
MCH RBC QN AUTO: 22.4 PG (ref 26–34)
MCHC RBC AUTO-ENTMCNC: 30.7 G/DL (ref 31–37)
MCV RBC AUTO: 72.9 FL
MONOCYTES # BLD AUTO: 0.62 X10(3) UL (ref 0.1–1)
MONOCYTES NFR BLD AUTO: 9.7 %
NEUTROPHILS # BLD AUTO: 2.52 X10 (3) UL (ref 1.5–7.7)
NEUTROPHILS # BLD AUTO: 2.52 X10(3) UL (ref 1.5–7.7)
NEUTROPHILS NFR BLD AUTO: 39.4 %
OSMOLALITY SERPL CALC.SUM OF ELEC: 293 MOSM/KG (ref 275–295)
PLATELET # BLD AUTO: 139 10(3)UL (ref 150–450)
PLATELETS.RETICULATED NFR BLD AUTO: 6.7 % (ref 0–7)
POTASSIUM SERPL-SCNC: 4.2 MMOL/L (ref 3.5–5.1)
RBC # BLD AUTO: 4.87 X10(6)UL
SODIUM SERPL-SCNC: 141 MMOL/L (ref 136–145)
WBC # BLD AUTO: 6.4 X10(3) UL (ref 4–11)

## 2024-12-31 PROCEDURE — 99223 1ST HOSP IP/OBS HIGH 75: CPT | Performed by: INTERNAL MEDICINE

## 2024-12-31 PROCEDURE — 99233 SBSQ HOSP IP/OBS HIGH 50: CPT | Performed by: INTERNAL MEDICINE

## 2024-12-31 PROCEDURE — 99222 1ST HOSP IP/OBS MODERATE 55: CPT | Performed by: CLINICAL NURSE SPECIALIST

## 2024-12-31 RX ORDER — ALBUTEROL SULFATE 0.83 MG/ML
2.5 SOLUTION RESPIRATORY (INHALATION)
Status: DISCONTINUED | OUTPATIENT
Start: 2024-12-31 | End: 2025-01-04

## 2024-12-31 RX ORDER — ATORVASTATIN CALCIUM 40 MG/1
40 TABLET, FILM COATED ORAL NIGHTLY
Status: DISCONTINUED | OUTPATIENT
Start: 2024-12-31 | End: 2025-01-04

## 2024-12-31 RX ORDER — ACETAMINOPHEN 500 MG
500 TABLET ORAL EVERY 4 HOURS PRN
Status: DISCONTINUED | OUTPATIENT
Start: 2024-12-31 | End: 2025-01-04

## 2024-12-31 RX ORDER — LORAZEPAM 0.5 MG/1
0.5 TABLET ORAL ONCE
Status: DISCONTINUED | OUTPATIENT
Start: 2024-12-31 | End: 2025-01-04

## 2024-12-31 RX ORDER — BUTALBITAL, ACETAMINOPHEN AND CAFFEINE 50; 325; 40 MG/1; MG/1; MG/1
1 TABLET ORAL EVERY 4 HOURS PRN
Status: DISCONTINUED | OUTPATIENT
Start: 2024-12-31 | End: 2025-01-04

## 2024-12-31 RX ORDER — BENZONATATE 200 MG/1
200 CAPSULE ORAL 3 TIMES DAILY PRN
Status: DISCONTINUED | OUTPATIENT
Start: 2024-12-31 | End: 2025-01-04

## 2024-12-31 RX ORDER — NYSTATIN 100000 [USP'U]/ML
5 SUSPENSION ORAL
Status: DISCONTINUED | OUTPATIENT
Start: 2024-12-31 | End: 2025-01-04

## 2024-12-31 RX ORDER — INSULIN DEGLUDEC 100 U/ML
18 INJECTION, SOLUTION SUBCUTANEOUS DAILY
Status: DISCONTINUED | OUTPATIENT
Start: 2024-12-31 | End: 2024-12-31

## 2024-12-31 RX ORDER — AZITHROMYCIN 250 MG/1
500 TABLET, FILM COATED ORAL DAILY
Status: COMPLETED | OUTPATIENT
Start: 2024-12-31 | End: 2024-12-31

## 2024-12-31 RX ORDER — BUDESONIDE 0.5 MG/2ML
0.5 INHALANT ORAL
Status: DISCONTINUED | OUTPATIENT
Start: 2024-12-31 | End: 2025-01-04

## 2024-12-31 RX ORDER — AZITHROMYCIN 250 MG/1
250 TABLET, FILM COATED ORAL DAILY
Status: COMPLETED | OUTPATIENT
Start: 2025-01-01 | End: 2025-01-04

## 2024-12-31 RX ORDER — INSULIN DEGLUDEC 100 U/ML
15 INJECTION, SOLUTION SUBCUTANEOUS DAILY
Status: DISCONTINUED | OUTPATIENT
Start: 2024-12-31 | End: 2025-01-03

## 2024-12-31 NOTE — CM/SW NOTE
Patient is current with [facility], F2F/order placed.     SW/CM to remain available for support and/or discharge planning.    RADHA Tomlinson  Discharge Planner

## 2024-12-31 NOTE — PLAN OF CARE
A&OX4. Maintaining O2 on 2L,CPAP at night. Tele, NSR. Denies pain. Up with SBA.Port A Cath on R chest,accessed. Skin C/D/I.CGM on L Lower abdomen.. Carb controlled diet. GREG foot drop.No further needs at this time. Continue POC. Safety precaution in place.   Problem: Patient/Family Goals  Goal: Patient/Family Long Term Goal  Description: Patient's Long Term Goal: Discharge to home    Interventions:  - Follow POC  - See additional Care Plan goals for specific interventions  Outcome: Progressing  Goal: Patient/Family Short Term Goal  Description: Patient's Short Term Goal: 12/30 NOC;sleep,rest    Interventions:   - Cluster care  - See additional Care Plan goals for specific interventions  Outcome: Progressing

## 2024-12-31 NOTE — H&P
[facility]     Kat Crook Patient Status:  Emergency    3/11/1970 MRN [id number]   Location [facility] EMERGENCY DEPARTMENT Attending Shannan Winchester DO   Hosp Day # 0 PCP Justin Huerta MD     Chief Complaint:   Chief Complaint   Patient presents with    Shortness Of Breath       Subjective:    History of Present Illness:     Kat Crook is a 54 year old female with a past medical history of asthma, cardiomyopathy, hypertension, diabetes, multiple sclerosis and alpha thalassemia minor.  She comes to the emergency room now secondary to increasing dyspnea and cough for the past 3 to 4 days.  She has been using her inhaler with minimal relief.  She has had chest discomfort that she states is nonexertional and nonpleuritic and does not radiate.  In the emergency room the patient's viral panel is positive for rhinovirus/enterovirus.  She states that she has a severe allergy to steroids and has been given breathing treatments with some treatment.    History/Other:    Past Medical History:  Past Medical History:    Abdominal pain    Allergic rhinitis    Anemia    Anxiety    Anxiety state    Arthritis    Asthma (HCC)    Atypical mole    Autoimmune disease (HCC)    Back pain    Bad breath    Black stools    Bloating    Blood disorder    Thalassemia alpha, Sickle cell trait    Blood in urine    Blurred vision    Calculus of kidney    Cardiomyopathy (HCC)    last echo 2021: EF 55%    Chest pain    Constipation    Decorative tattoo    Deep vein thrombosis (HCC)    Depression    Diarrhea, unspecified    Dizziness    Easy bruising    Elevated liver enzymes    Endocrine disorder    Esophageal reflux    Fatigue    Fibromyalgia    Food intolerance    Frequent urination    Frequent UTI    Gastroparesis    Glaucoma    H. pylori infection    H/O  section    Headache disorder    Heart attack (HCC)    Heart palpitations    Heartburn    Hemorrhoids    Herniation of cervical  intervertebral disc    High blood pressure    High cholesterol    History of blood clots    Unprovoked    History of blood transfusion    History of cardiac murmur    History of depression    Hoarseness, chronic    Hyperlipidemia    IBS (irritable bowel syndrome)    Irregular bowel habits    Leaking of urine    Liver disease    GIORDANO and Stage 2 fibrosis    Loss of appetite    Migraines    Multiple sclerosis (HCC)    dx 8 years ago    Muscle weakness    cane or walker prn    Myocardial infarction (HCC)    Nausea    Neuropathy    legs, hands, feet; bilateral    Night sweats    Osteoarthritis    Osteoporosis    KAMALJIT in her 30s    Osteoporosis    Pain with bowel movements    Painful urination    Problems with swallowing    Due to MS    Pulmonary embolism (HCC)    Renal disorder    kidney lesions / denies dialysis    Scoliosis of lumbar spine    Shortness of breath    Sickle cell trait (HCC)    Sleep apnea    no treatment    Sleep disturbance    Stress    Type II or unspecified type diabetes mellitus without mention of complication, not stated as uncontrolled    Uncomfortable fullness after meals    Unspecified disorder of thyroid    total thyroidectomy    Visual impairment    glasses    Wears glasses    Weight loss    Wheezing     Past Surgical History:   Past Surgical History:   Procedure Laterality Date      1986    Cataract      Cath angio  2014    Cholecystectomy      Colonoscopy N/A 2015    Procedure: COLONOSCOPY;  Surgeon: Liz Tong DO;  Location:  ENDOSCOPY    Colonoscopy N/A 2021    Procedure: COLONOSCOPY;  Surgeon: Cedrick Da Silva MD;  Location:  ENDOSCOPY    Colonoscopy N/A 10/08/2021    Procedure: COLONOSCOPY, ESOPHAGOGASTRODUODENOSCOPY (EGD);  Surgeon: Darvin Richardson MD;  Location:  ENDOSCOPY    D & c  1990    Blighted ovum    Egd      Hysterectomy      total hystero.    Ir port a cath insertion exchnge check  2018    Lithotripsy       x 2    Lysis of adhesions      Needle biopsy liver  2016    Oophorectomy Bilateral     total hystero.    Other      dialysis catheter- left chest for plasmaphoresis    Other surgical history       x 2    Port, indwelling, imp  2019    power port    Thyroidectomy  2013    benign MNG    Total abdom hysterectomy      Upper gi endoscopy performed  2014      Family History:   Family History   Problem Relation Age of Onset    Hypertension Mother         Needs to be deleted    Cancer Mother         stomach cancer    Ovarian Cancer Mother         30'S    Colon Polyps Mother     Anemia Mother     Heart Attack Father     Other (Other) Father         COPD, emphysema    Asthma Father     Pulmonary Disease Father     Other (Other) Sister         rectal CA\    Ovarian Cancer Maternal Grandmother         30'S    Colon Polyps Maternal Grandmother     Diabetes Maternal Grandmother     Colon Cancer Maternal Grandmother     Anemia Maternal Grandmother     Cancer Maternal Grandmother     Anemia Daughter     Breast Cancer Maternal Aunt         60'S    Breast Cancer Paternal Cousin Female 64     Social History:    reports that she has never smoked. She has never been exposed to tobacco smoke. She has never used smokeless tobacco. She reports that she does not drink alcohol and does not use drugs.     Allergies: Allergies[1]    Medications:  Medications Ordered Prior to Encounter[2]    Review of Systems:   A comprehensive review of systems was completed.    Pertinent positives and negatives noted in the HPI.    Objective:   Physical Exam:    /75   Pulse 67   Temp 96.7 °F (35.9 °C) (Temporal)   Resp 14   Ht 5' 6\" (1.676 m)   Wt 164 lb (74.4 kg)   LMP  (LMP Unknown)   SpO2 100%   BMI 26.47 kg/m²   General: No acute distress, Alert  Respiratory: b/l wheezes  Cardiovascular: S1, S2. Right chest port  Abdomen: Soft, Non-tender, Non-distended, Positive bowel sounds  Neuro: No new focal  deficits  Extremities: No edema      Results:    Labs:      Labs Last 24 Hours:  Recent Labs   Lab 12/30/24  1544   WBC 7.5   HGB 11.3*   MCV 73.1*   .0*       Recent Labs   Lab 12/30/24  1544   GLU 74   BUN 11   CREATSERUM 0.76   CA 8.6*   ALB 4.0      K 3.7      CO2 28.0   ALKPHO 78   AST 28   ALT 25   BILT 0.5   TP 7.0       Estimated Creatinine Clearance: 77.4 mL/min (based on SCr of 0.76 mg/dL).    Recent Labs   Lab 12/30/24  1544   TROPHS 6       No results for input(s): \"PTP\", \"INR\" in the last 168 hours.    No results for input(s): \"TROP\", \"CK\" in the last 168 hours.      Imaging: Imaging data reviewed in Epic.    Assessment & Plan:      #Viral Bronchitis   RVP +ve for rhinovirus/enterovirus  Symptomatic care  CXR neg    #Acute Asthma flare  Due to above  Allergy to steroids  Will have her pulmonologist see  On [facility] as OP  Cont. Inhalers  BD protocol    #pHTN    # Multiple sclerosis  On Tysabri  Typically gets PLEX for flares given her steroid allergy    # Chronic atypical chest pain with mild CAD  Sees [facility] as OP  Had CTA gated coronary arteries 11/20/2024 which showed mild proximal LAD disease  Cont. BB/statin    #DM2  Insulin pump for basal rate  IC 1:10g  ICF 1:30>140  Cont. To monitor and adjust as needed  Hold orals for now    #Chronic pancreatitis      All diagnosis' and recommendations discussed with patient and/or family in detail.      Plan of care discussed with ED physician      Gurvinder Sanchez MD    Supplementary Documentation:     The 21st Century Cures Act makes medical notes like these available to patients in the interest of transparency. Please be advised this is a medical document. Medical documents are intended to carry relevant information, facts as evident, and the clinical opinion of the practitioner. The medical note is intended as peer to peer communication and may appear blunt or direct. It is written in medical language and may contain abbreviations or verbiage  that are unfamiliar.                                         [1]   Allergies  Allergen Reactions    Epinephrine OTHER (SEE COMMENTS)     Cardiac issues    Immune Globulin ANAPHYLAXIS    Latex SHORTNESS OF BREATH and OTHER (SEE COMMENTS)     WELTS    Methylprednisolone SWELLING     Swelling of neck, throat and tongue  Injection, throat and tongue swelling      Ocrelizumab ANAPHYLAXIS and OTHER (SEE COMMENTS)     Difficulty breathing    Other SHORTNESS OF BREATH     ENVIRONMENTAL, ASTHMA EXACERBATION    Pcn [Penicillamine]     Penicillins OTHER (SEE COMMENTS)    Urea Tightness in Throat     Urea C-13 in Pranactin for H. Pylori test kit.    Midodrine ITCHING     Pt reports mouth pain and itching possibly r/t midodrine   [2]   No current facility-administered medications on file prior to encounter.     Current Outpatient Medications on File Prior to Encounter   Medication Sig Dispense Refill    albuterol (2.5 MG/3ML) 0.083% Inhalation Nebu Soln Take 3 mL (2.5 mg total) by nebulization 3 (three) times daily. 90 each 11    Wheat Dextrin (BENEFIBER OR) Take by mouth as needed.      FIASP FLEXTOUCH 100 UNIT/ML Subcutaneous Solution Pen-injector       metoprolol tartrate 50 MG Oral Tab TAKE 1 TABLET BY MOUTH ONCE THE NIGHT BEFORE CTA AND 1 TABLET THE MORNING OF CTA      linaCLOtide (LINZESS) 145 MCG Oral Cap       natalizumab (TYSABRI) 300 mg/15mL Intravenous Conc Inject into the vein. Every 8 weeks      carvedilol 3.125 MG Oral Tab Take 1 tablet (3.125 mg total) by mouth 2 (two) times daily.      montelukast 10 MG Oral Tab Take 1 tablet (10 mg total) by mouth nightly.      levothyroxine 88 MCG Oral Tab Take 1 tablet (88 mcg total) by mouth before breakfast.      pantoprazole 20 MG Oral Tab EC Take 1 tablet (20 mg total) by mouth 2 (two) times daily.      fluticasone furoate-vilanterol 200-25 MCG/ACT Inhalation Aerosol Powder, Breath Activated Inhale 1 puff into the lungs daily.      LYLLANA 0.025 MG/24HR Transdermal Patch  Biweekly Place 1 patch onto the skin twice a week.      CELEBREX 100 MG Oral Cap Take 1 capsule (100 mg total) by mouth 2 (two) times daily.      pregabalin 200 MG Oral Cap Take 1 capsule (200 mg total) by mouth 2 (two) times daily.      Ciclopirox 8 % External Solution APPLY EVERY DAY      HYDROcodone-acetaminophen  MG Oral Tab Take 1 tablet by mouth 4 (four) times daily as needed.      baclofen 10 MG Oral Tab Take 1 tablet (10 mg total) by mouth 2 (two) times daily.      NON FORMULARY Tandem insulin pump settings (Bolus IQ)  Mdnt 3.1 units/hr  0400 3.00 units/hr  0900 3.2 units/hr    Correction:   MN 1:35/110  1 PM 1:40  7 pm 1:35    CHO ratio:  MN 1:5g    Target glucose level - 100 and 80      FARXIGA 10 MG Oral Tab Take 1 tablet (10 mg total) by mouth daily.      DULoxetine 60 MG Oral Cap DR Particles Take 1 capsule (60 mg total) by mouth 2 (two) times daily.      atorvastatin 20 MG Oral Tab Take 1 tablet (20 mg total) by mouth nightly.      DEXCOM G6 SENSOR Does not apply Misc USE AS DIRECTED EVERY 10 DAYS 9 each 3    Insulin Lispro (HUMALOG) 100 UNIT/ML Subcutaneous Solution INJECT UP TO 70 UNITS VIA INSULIN PUMP DAILY 70 mL 2    EPINEPHrine 0.15 MG/0.3ML Injection Solution Auto-injector Jani pen/lower dose because pt is allergic to epinephrine      Ondansetron HCl (ZOFRAN) 4 mg tablet Take 1 tablet (4 mg total) by mouth 3 (three) times daily as needed.      Benralizumab (FASENRA PEN) 30 MG/ML Subcutaneous Solution Auto-injector Inject 30 mg into the skin. Every other month/every 8 weeks      DEXCOM G6 TRANSMITTER Does not apply Misc USE AS DIRECTED EVERY 90 DAYS 1 each 3    Thiamine HCl 100 MG Oral Tab Take 1 tablet (100 mg total) by mouth daily. 30 tablet 3    docusate sodium 100 MG Oral Cap Take 1 capsule (100 mg total) by mouth 2 (two) times daily.      denosumab 60 MG/ML Subcutaneous Solution Inject 1 mL (60 mg total) into the skin every 6 (six) months.      Cholecalciferol (VITAMIN D-3) 5000  UNITS Oral Tab Take 1 tablet (5,000 Units total) by mouth daily.      Albuterol Sulfate  (90 Base) MCG/ACT Inhalation Aero Soln Inhale 2 puffs into the lungs every 6 (six) hours as needed. PRN wheezing/SOB

## 2024-12-31 NOTE — CONSULTS
[facility]  Diabetes Consult Note    Kat Crook Patient Status:  Inpatient    3/11/1970 MRN [id number]   Location [facility] 5NW-A Attending Liliana Gallardo MD   Hosp Day # 1 PCP Justin Huerta MD     Reason for Consult:   Management recommendations in setting of uncontrolled T2DM      Provider Requesting Consult:  Dr. Sanchez ([facility])      Diagnosis:  Patient Active Problem List   Diagnosis    Neuropathic pain of both legs    Elevated liver enzymes    Hyperparathyroidism (HCC)    Asthma (HCC)    Osteoporosis    Fibromyalgia    Goiter    Hypervitaminosis D    Demyelinating disease of central nervous system (HCC)    Tachycardia    Encephalopathy    AVN (avascular necrosis of bone) (HCC)    Gastroparesis    Fatty liver    Diabetes mellitus (HCC)    Generalized abdominal pain    Multiple sclerosis (HCC)    At risk for falling    Cardiomyopathy in other disease    Weakness of both lower extremities    CATIA (acute kidney injury) (HCC)    Hypokalemia    MS (multiple sclerosis) (HCC)    Hypotension    Anemia    Weakness generalized    Essential hypertension    Irritable bowel syndrome (IBS)    S/P laparoscopic surgery    Intra-abdominal adhesions    Dyspnea, paroxysmal nocturnal    Multiple sclerosis exacerbation (HCC)    Dysuria    History of plasmapheresis    Hyponatremia    Abdominal pain    Hyperglycemia    Sleep apnea    Dehydration    Abdominal pain, right upper quadrant    Allergic rhinitis    Chronic fatigue and malaise    Dilated idiopathic cardiomyopathy (HCC)    Dizziness    Dyspnea    Chest pain, unspecified    Nephrolithiasis    Undiagnosed cardiac murmurs    Sickle cell trait (HCC)    Pure hypercholesterolemia    GIORDANO (nonalcoholic steatohepatitis)    Myalgia and myositis, unspecified    Liver lesion    Interstitial cystitis    Family history of coronary arteriosclerosis    Type 1 diabetes mellitus with ketoacidosis without coma (HCC)    Urinary tract infection without hematuria,  site unspecified    Controlled type 1 diabetes mellitus with hyperglycemia (HCC)    Chest pain of uncertain etiology    Abdominal pain of unknown etiology    Spinal stenosis    Anemia due to chronic blood loss    Aseptic necrosis of head or neck of femur    Bilateral tinnitus    Mild persistent asthma with acute exacerbation (HCC)    Generalized abdominal tenderness    Generalized edema    Hyperlipidemia    Muscle weakness    Disorder of nervous system due to type 2 diabetes mellitus (HCC)    Peripheral vascular disorder due to diabetes mellitus (HCC)    Polyneuropathy due to type 2 diabetes mellitus (HCC)    Vitamin D deficiency    Constipation    Right upper quadrant pain    Shortness of breath    Chronic pain disorder    Headache    Hypothyroidism    Idiopathic osteoporosis    Type 2 diabetes mellitus without complication, with long-term current use of insulin (HCC)    Fatigue    Esophageal reflux    Myalgia    Pulmonary embolism on left (HCC)    Microcytic anemia    Alpha-thalassemia (HCC)    Pulmonary infiltrate    Encounter for anticoagulation discussion and counseling    Right lower quadrant pain    Hemodialysis catheter dysfunction (HCC)    Primary hypertension    Age-related cataract of both eyes    Anxiety and depression    Palpitations    Photophobia of both eyes    Preglaucoma, unspecified, bilateral    Unsteady gait when walking    Weakness    Shock (HCC)    Acute cystitis without hematuria    Low serum cortisol level    Pancreatic duct dilated (HCC)    Intractable abdominal pain    Type 1 diabetes mellitus with hyperglycemia (HCC)    Pulmonary hypertension due to myeloproliferative disorder (HCC)    Abnormal finding on GI tract imaging    Asthma exacerbation (HCC)    Moderate persistent asthma with exacerbation (HCC)         Medical History:  Past Medical History:    Abdominal pain    Allergic rhinitis    Anemia    Anxiety    Anxiety state    Arthritis    Asthma (HCC)    Atypical mole    Autoimmune  disease (HCC)    Back pain    Bad breath    Black stools    Bloating    Blood disorder    Thalassemia alpha, Sickle cell trait    Blood in urine    Blurred vision    Calculus of kidney    Cardiomyopathy (HCC)    last echo 2021: EF 55%    Chest pain    Constipation    Decorative tattoo    Depression    Diarrhea, unspecified    Dizziness    Easy bruising    Elevated liver enzymes    Endocrine disorder    Esophageal reflux    Fatigue    Fibromyalgia    Food intolerance    Frequent urination    Frequent UTI    Gastroparesis    Glaucoma    H. pylori infection    H/O  section    Headache disorder    Heart attack (HCC)    Heart palpitations    Heartburn    Hemorrhoids    Herniation of cervical intervertebral disc    High blood pressure    High cholesterol    History of blood clots    Unprovoked    History of blood transfusion    History of cardiac murmur    History of depression    Hoarseness, chronic    Hyperlipidemia    IBS (irritable bowel syndrome)    Irregular bowel habits    Leaking of urine    Liver disease    GIORDANO and Stage 2 fibrosis    Loss of appetite    Migraines    Multiple sclerosis (HCC)    dx 8 years ago    Muscle weakness    cane or walker prn    Myocardial infarction (HCC)    Nausea    Neuropathy    legs, hands, feet; bilateral    Night sweats    Osteoarthritis    Osteoporosis    KAMALJIT in her 30s    Osteoporosis    Pain with bowel movements    Painful urination    Problems with swallowing    Due to MS    Pulmonary embolism (HCC)    Scoliosis of lumbar spine    Shortness of breath    Sickle cell trait (HCC)    Sleep apnea    no treatment    Sleep disturbance    Stress    Type II or unspecified type diabetes mellitus without mention of complication, not stated as uncontrolled    Uncomfortable fullness after meals    Unspecified disorder of thyroid    total thyroidectomy    Visual impairment    glasses    Wears glasses    Weight loss    Wheezing     Past Surgical History:   Procedure Laterality Date       1986    Cataract      Cath angio  2014    Cholecystectomy      Colonoscopy N/A 2015    Procedure: COLONOSCOPY;  Surgeon: Liz Tong DO;  Location:  ENDOSCOPY    Colonoscopy N/A 2021    Procedure: COLONOSCOPY;  Surgeon: Cedrick Da Silva MD;  Location:  ENDOSCOPY    Colonoscopy N/A 10/08/2021    Procedure: COLONOSCOPY, ESOPHAGOGASTRODUODENOSCOPY (EGD);  Surgeon: Darvin Richardson MD;  Location:  ENDOSCOPY    D & c  1990    Blighted ovum    Egd      Hysterectomy      total hystero.    Ir port a cath insertion exchnge check  2018    Lithotripsy      x 2    Lysis of adhesions      Needle biopsy liver  2016    Oophorectomy Bilateral     total hystero.    Other      dialysis catheter- left chest for plasmaphoresis    Other surgical history       x 2    Port, indwelling, imp  2019    power port    Thyroidectomy  2013    benign MNG    Total abdom hysterectomy      Upper gi endoscopy performed  2014     Family History   Problem Relation Age of Onset    Hypertension Mother         Needs to be deleted    Cancer Mother         stomach cancer    Ovarian Cancer Mother         30'S    Colon Polyps Mother     Anemia Mother     Heart Attack Father     Other (Other) Father         COPD, emphysema    Asthma Father     Pulmonary Disease Father     Other (Other) Sister         rectal CA\    Ovarian Cancer Maternal Grandmother         30'S    Colon Polyps Maternal Grandmother     Diabetes Maternal Grandmother     Colon Cancer Maternal Grandmother     Anemia Maternal Grandmother     Cancer Maternal Grandmother     Anemia Daughter     Breast Cancer Maternal Aunt         60'S    Breast Cancer Paternal Cousin Female 64         Diabetes history:  Type:  T2DM  Onset:  w/ MS diagnosis  Family history of DM: none      Allergies: Allergies[1]      Medications: Complete list reviewed. Active diabetes medications include degludec and  aspart.      Labs:  Recent Labs   Lab 12/31/24  0018 12/31/24  0547   PGLU 157* 120*     Recent Labs     12/30/24  1544 12/31/24  0018 12/31/24  0547 12/31/24  0633     --   --  141     --   --  109   CO2 28.0  --   --  27.0   BUN 11  --   --  12   CREATSERUM 0.76  --   --  0.89   A1C 8.0*  --   --   --    PGLU  --    < > 120*  --    CA 8.6*  --   --  7.9*   ALB 4.0  --   --   --     < > = values in this interval not displayed.                    History of Present Illness: Kat Crook is a 54 year old female with a PMH of T2DM (on insulin pump), MS, asthma, alpha thalassemia minor, HTN and cardiomyopathy admitted 12/30/2024 with complaints of wheezing and SOB x2-3 days. ED evaluation revealed asthma exacerbation caused by rhinovirus and enterovirus c/b viral bronchitis.        Assessment/Recommendations:  Upon interview today, patient states she follows with Dr. Andrew ([facility] endocrinology) and has worn the Tandem t:slim insulin pump for many years. Patient states she discontinued her insulin pump overnight (pump is currently without battery in her purse) due to person concerns with keeping it on while inpatient; patient states she has had multiple hypoglycemic episodes while wearing pump during previous admissions and would prefer to avoid that with switching to basal/bolus injections while inpatient. Patient states she does still have Dexcom G7 CGM in place to LLQ of abdomen.     Explained to patient that we will continue basal/bolus insulin injections while inpatient. However, recommended patient not to restart insulin pump for 24 hours after last basal insulin injection to avoid overlap of basal insulins. Patient states understanding of and willingness to participate in plan of care.       Plan:  Inpatient recommendations -   Degludec = initiate at 15u every day  Based on pump interrogation, may require upwards of ~20u every day   Aspart = continue at 1:30>140 & 1:10gm CHO  Discharge recommendations  -   Restart Tandem t:slim insulin pump 24 hours after last basal insulin administration      Prescription Recommendations:  Medicare:  Insulin:   Novolog, Fiasp, Apidra, Admelog, Levemir, Lantus, Basaglar, Tresiba, Toujeo  (If no secondary insurance, may need prior auth)  Supplies:  BD pen needles (Anjelica)  Glucometer:  OneTouch      Provider F/U Recommendations:  PCP - Dr. Huerta  Endocrinology - Dr. Andrew & MORGAN Brooks ([place])      A total of 60 minutes were spent with the patient, 100% was spent counseling and coordinating care for T2 diabetes self-management including nutrition, exercise, blood glucose monitoring, insulin administration, medications, treatment options, follow-up coordination and resources.    Margoth Romo, APRN  12/31/2024  10:38 AM       [1]   Allergies  Allergen Reactions    Epinephrine OTHER (SEE COMMENTS)     Cardiac issues    Immune Globulin ANAPHYLAXIS    Latex SHORTNESS OF BREATH and OTHER (SEE COMMENTS)     WELTS    Methylprednisolone SWELLING     Swelling of neck, throat and tongue  Injection, throat and tongue swelling      Ocrelizumab ANAPHYLAXIS and OTHER (SEE COMMENTS)     Difficulty breathing    Other SHORTNESS OF BREATH     ENVIRONMENTAL, ASTHMA EXACERBATION    Pcn [Penicillamine]     Penicillins OTHER (SEE COMMENTS)    Urea Tightness in Throat     Urea C-13 in Pranactin for H. Pylori test kit.    Midodrine ITCHING     Pt reports mouth pain and itching possibly r/t midodrine

## 2024-12-31 NOTE — PROGRESS NOTES
[facility]   part of [facility]     Hospitalist Progress Note     Kat Crook Patient Status:  Inpatient    3/11/1970 MRN [id number]   Location [facility] 5NW-A Attending Liliana Gallardo MD   Hosp Day # 1 PCP Justin Huerta MD     Chief Complaint: SOB    Subjective:     Patient reports headache. SOB, wheezing without improvement.     Objective:    Review of Systems:   A comprehensive review of systems was completed; pertinent positive and negatives stated in subjective.    Vital signs:  Temp:  [96.7 °F (35.9 °C)-98 °F (36.7 °C)] 98 °F (36.7 °C)  Pulse:  [61-80] 67  Resp:  [12-21] 19  BP: ()/(61-82) 116/70  SpO2:  [96 %-100 %] 96 %    Physical Exam:    General: No acute distress  Respiratory: No wheezes, no rhonchi  Cardiovascular: S1, S2, regular rate and rhythm  Abdomen: Soft, Non-tender, non-distended, positive bowel sounds  Neuro: No new focal deficits.   Extremities: No edema    Diagnostic Data:    Labs:  Recent Labs   Lab 24  1544 24  0633   WBC 7.5 6.4   HGB 11.3* 10.9*   MCV 73.1* 72.9*   .0* 139.0*       Recent Labs   Lab 24  1544 24  0633   GLU 74 128*   BUN 11 12   CREATSERUM 0.76 0.89   CA 8.6* 7.9*   ALB 4.0  --     141   K 3.7 4.2    109   CO2 28.0 27.0   ALKPHO 78  --    AST 28  --    ALT 25  --    BILT 0.5  --    TP 7.0  --        Estimated Glomerular Filtration Rate: 76.2 mL/min/1.73m2 (by CKD-EPI based on SCr of 0.89 mg/dL).    Recent Labs   Lab 24  1544   TROPHS 6       No results for input(s): \"PTP\", \"INR\" in the last 168 hours.               Microbiology    No results found for this visit on 24.      Imaging: Reviewed in Epic.    Medications:    atorvastatin  40 mg Oral Nightly    albuterol  2.5 mg Nebulization 6 times per day    fluticasone-salmeterol  1 puff Inhalation BID    DULoxetine  60 mg Oral BID    celecoxib  100 mg Oral BID    carvedilol  3.125 mg Oral BID with meals    baclofen  10 mg Oral BID     enoxaparin  40 mg Subcutaneous Daily    guaiFENesin ER  600 mg Oral BID    levothyroxine  88 mcg Oral Before breakfast    montelukast  10 mg Oral Nightly    pantoprazole  20 mg Oral BID AC    pregabalin  200 mg Oral BID    thiamine  100 mg Oral Daily    insulin aspart  1-68 Units Subcutaneous TID CC    insulin aspart  1-10 Units Subcutaneous TID AC and HS       Assessment & Plan:      #Viral Bronchitis   RVP +ve for rhinovirus/enterovirus  Symptomatic care  CXR neg     #Acute Asthma flare  Due to above  Allergy to steroids  Pulm consulted  On Fasenra as OP  Cont. Inhalers  BD protocol     #pHTN     # Multiple sclerosis  On Tysabri  Typically gets PLEX for flares given her steroid allergy     # Chronic atypical chest pain with mild CAD  Sees [facility] as [facility]  Had CTA gated coronary arteries 11/20/2024 which showed mild proximal LAD disease  Cont. BB/statin     #DM2  Insulin pump for basal rate  IC 1:10g  ICF 1:30>140  Cont. To monitor and adjust as needed  Hold orals for now     #Chronic pancreatitis      Liliana Gallardo MD    Supplementary Documentation:     Quality:  DVT Mechanical Prophylaxis:     Early ambuation  DVT Pharmacologic Prophylaxis   Medication    enoxaparin (Lovenox) 40 MG/0.4ML SUBQ injection 40 mg                Code Status: Full Code  Panda: No urinary catheter in place  Panda Duration (in days):   Central line:    KAYLEY:     Discharge is dependent on: course  At this point Ms. Crook is expected to be discharge to: home pending improving symptoms    The 21st Century Cures Act makes medical notes like these available to patients in the interest of transparency. Please be advised this is a medical document. Medical documents are intended to carry relevant information, facts as evident, and the clinical opinion of the practitioner. The medical note is intended as peer to peer communication and may appear blunt or direct. It is written in medical language and may contain abbreviations or verbiage that are  unfamiliar.              **Certification      PHYSICIAN Certification of Need for Inpatient Hospitalization - Initial Certification    Patient will require inpatient services that will reasonably be expected to span two midnight's based on the clinical documentation in H+P.   Based on patients current state of illness, I anticipate that, after discharge, patient will require TBD.

## 2024-12-31 NOTE — PROGRESS NOTES
NURSING ADMISSION NOTE      Patient admitted via Cart  Oriented to room 524  Safety precautions initiated.  Bed in low position.  Call light in reach.

## 2024-12-31 NOTE — CONSULTS
[facility]  Report of Consultation    Kat Crook Patient Status:  Inpatient    3/11/1970 MRN [id number]   Location [facility] 5NW-A Attending Liliana Gallardo MD   Hosp Day # 1 PCP Justin Huerta MD     Reason for Consultation:  Flare of asthma in response to rhinoviral infection    History of Present Illness:  Kat Crook is a a(n) 54 year old female.  Never smoker with an intensive medical history that includes eosinophilic asthma with long history of allergies most recently much better controlled on Fasenra, history diastolic dysfunction, mild pulmonary hypertension, pulmonary emboli  without recurrence, sleep disorder with nocturnal hypoxia-maintained on CPAP , GIORDANO, multiple sclerosis thinks recent flare with intermittent PLEX, kidney stones, most recently abdominal pain thought related to pancreatic atrophy.-She has been doing well on Breo and rescue-then on Thursday developed acute onset of mild rhinorrhea with marked increasing cough and shortness of breath.  She reports cough last 2 days has become productive of yellow occasional slight green she was unaware of any fevers has had no nausea vomiting or diarrhea and tested positive for rhino virus.  Currently sats are 98% on room air though she feels limited.  She reports questionable angioedema to oral systemic steroids therefore is limited.    History:  Past Medical History:    Abdominal pain    Allergic rhinitis    Anemia    Anxiety    Anxiety state    Arthritis    Asthma (HCC)    Atypical mole    Autoimmune disease (HCC)    Back pain    Bad breath    Black stools    Bloating    Blood disorder    Thalassemia alpha, Sickle cell trait    Blood in urine    Blurred vision    Calculus of kidney    Cardiomyopathy (HCC)    last echo 2021: EF 55%    Chest pain    Constipation    Decorative tattoo    Depression    Diarrhea, unspecified    Dizziness    Easy bruising    Elevated liver enzymes    Endocrine disorder    Esophageal reflux     Fatigue    Fibromyalgia    Food intolerance    Frequent urination    Frequent UTI    Gastroparesis    Glaucoma    H. pylori infection    H/O  section    Headache disorder    Heart attack (HCC)    Heart palpitations    Heartburn    Hemorrhoids    Herniation of cervical intervertebral disc    High blood pressure    High cholesterol    History of blood clots    Unprovoked    History of blood transfusion    History of cardiac murmur    History of depression    Hoarseness, chronic    Hyperlipidemia    IBS (irritable bowel syndrome)    Irregular bowel habits    Leaking of urine    Liver disease    GIORDANO and Stage 2 fibrosis    Loss of appetite    Migraines    Multiple sclerosis (HCC)    dx 8 years ago    Muscle weakness    cane or walker prn    Myocardial infarction (HCC)    Nausea    Neuropathy    legs, hands, feet; bilateral    Night sweats    Osteoarthritis    Osteoporosis    KAMALJIT in her 30s    Osteoporosis    Pain with bowel movements    Painful urination    Problems with swallowing    Due to MS    Pulmonary embolism (HCC)    Scoliosis of lumbar spine    Shortness of breath    Sickle cell trait (HCC)    Sleep apnea    no treatment    Sleep disturbance    Stress    Type II or unspecified type diabetes mellitus without mention of complication, not stated as uncontrolled    Uncomfortable fullness after meals    Unspecified disorder of thyroid    total thyroidectomy    Visual impairment    glasses    Wears glasses    Weight loss    Wheezing     Family History   Problem Relation Age of Onset    Hypertension Mother         Needs to be deleted    Cancer Mother         stomach cancer    Ovarian Cancer Mother         30'S    Colon Polyps Mother     Anemia Mother     Heart Attack Father     Other (Other) Father         COPD, emphysema    Asthma Father     Pulmonary Disease Father     Other (Other) Sister         rectal CA\    Ovarian Cancer Maternal Grandmother         30'S    Colon Polyps Maternal Grandmother      Diabetes Maternal Grandmother     Colon Cancer Maternal Grandmother     Anemia Maternal Grandmother     Cancer Maternal Grandmother     Anemia Daughter     Breast Cancer Maternal Aunt         60'S    Breast Cancer Paternal Cousin Female 64      reports that she has never smoked. She has never been exposed to tobacco smoke. She has never used smokeless tobacco. She reports that she does not drink alcohol and does not use drugs.    Allergies:  Allergies[1]    Medications:  Prescriptions Prior to Admission[2]    Review of Systems:    Constitutional: Thinks her weight is down a bit  Eyes: Denies any specific issues  Ears, nose, mouth, throat, and face: Negative vocal cord granuloma now seems resolved denies any sore throat  Respiratory: Coughing secondary to chest pain and shortness of breath  Cardiovascular: Denies any of her cardiac pain denies palpitations denies any significant reflux or heartburn continues with  Gastrointestinal: Abdominal pain  Musculoskeletal: Diffuse mild joint swelling throughout no lower extremity edema  Neurological: Unaware of any specific new deficit--remains with some difficulty swallowing     All other review of systems are negative.-Continues to sleep poorly with frequent awakenings with dyspnea    Vital signs in last 24 hours:  Patient Vitals for the past 24 hrs:   BP Temp Temp src Pulse Resp SpO2 Weight   12/31/24 1100 115/73 98 °F (36.7 °C) Oral 71 18 93 % --   12/31/24 0830 116/70 98 °F (36.7 °C) Oral 67 19 96 % --   12/31/24 0020 97/61 97.6 °F (36.4 °C) Oral 68 19 96 % --   12/30/24 2148 -- -- -- -- -- -- 164 lb (74.4 kg)   12/30/24 2144 106/69 97.7 °F (36.5 °C) Oral 75 19 100 % --   12/30/24 1900 120/75 -- -- 67 14 100 % --   12/30/24 1830 121/81 -- -- 69 18 100 % --   12/30/24 1730 120/80 -- -- 66 16 100 % --   12/30/24 1630 116/77 -- -- 64 14 97 % --   12/30/24 1600 119/82 -- -- 61 12 100 % --   12/30/24 1500 127/82 -- -- 63 15 96 % --         Physical Exam:   General: alert,  cooperative, oriented.    Harsh frequent cough   Head: Normocephalic, without obvious abnormality, atraumatic.   Eyes/Nose: No obvious lesion   Throat: Lips, mucosa, and tongue normal.  No thrush noted.  Minimally erythematous   Neck: trachea midline, no adenopathy, no thyromegaly. No JVD.   Lungs: Tight expiratory rhonchi bilaterally minimal crackles at the bases   Chest wall: No tenderness or deformity.   Heart: Regular rate and rhythm short murmur   Abdomen: soft, non-distended, no masses, no guarding, no     Rebound.  Mildly protuberant seems nontender no obvious hepatosplenomegaly or ascites   Extremity: No significant edema nontender   Skin: No rashes or lesions.   Neurological: Alert, interactive, no focal deficits    Lab Data Review:  Lab Results   Component Value Date    WBC 6.4 12/31/2024    HGB 10.9 12/31/2024    HCT 35.5 12/31/2024    .0 12/31/2024    CREATSERUM 0.89 12/31/2024    BUN 12 12/31/2024     12/31/2024    K 4.2 12/31/2024     12/31/2024    CO2 27.0 12/31/2024     12/31/2024    CA 7.9 12/31/2024    ALB 4.0 12/30/2024    ALKPHO 78 12/30/2024    BILT 0.5 12/30/2024    TP 7.0 12/30/2024    AST 28 12/30/2024    ALT 25 12/30/2024    DDIMER <0.27 12/30/2024    MG 2.7 12/31/2024    PGLU 146 12/31/2024   Negative D-dimer    Cultures:   Positive rhinovirus enterovirus    Radiology:  XR CHEST AP PORTABLE  (CPT=71045)    Result Date: 12/30/2024  CONCLUSION:  Right-sided chest port is in stable position.  There is otherwise no evidence of active cardiopulmonary disease.   LOCATION:  [facility]      Dictated by (CST): Kamar Lopez MD on 12/30/2024 at 3:30 PM     Finalized by (CST): Kamar Lopez MD on 12/30/2024 at 3:31 PM      Chest x-ray from 12/18 and yesterday without demonstrable pulmonary infiltrates or ILD no evidence of pleural effusion    Assessment and Plan:  Patient Active Problem List   Diagnosis    Neuropathic pain of both legs    Elevated liver enzymes     Hyperparathyroidism (HCC)    Asthma (HCC)    Osteoporosis    Fibromyalgia    Goiter    Hypervitaminosis D    Demyelinating disease of central nervous system (HCC)    Tachycardia    Encephalopathy    AVN (avascular necrosis of bone) (HCC)    Gastroparesis    Fatty liver    Diabetes mellitus (HCC)    Generalized abdominal pain    Multiple sclerosis (HCC)    At risk for falling    Cardiomyopathy in other disease    Weakness of both lower extremities    CATIA (acute kidney injury) (HCC)    Hypokalemia    MS (multiple sclerosis) (HCC)    Hypotension    Anemia    Weakness generalized    Essential hypertension    Irritable bowel syndrome (IBS)    S/P laparoscopic surgery    Intra-abdominal adhesions    Dyspnea, paroxysmal nocturnal    Multiple sclerosis exacerbation (HCC)    Dysuria    History of plasmapheresis    Hyponatremia    Abdominal pain    Hyperglycemia    Sleep apnea    Dehydration    Abdominal pain, right upper quadrant    Allergic rhinitis    Chronic fatigue and malaise    Dilated idiopathic cardiomyopathy (HCC)    Dizziness    Dyspnea    Chest pain, unspecified    Nephrolithiasis    Undiagnosed cardiac murmurs    Sickle cell trait (HCC)    Pure hypercholesterolemia    GIORDANO (nonalcoholic steatohepatitis)    Myalgia and myositis, unspecified    Liver lesion    Interstitial cystitis    Family history of coronary arteriosclerosis    Type 1 diabetes mellitus with ketoacidosis without coma (HCC)    Urinary tract infection without hematuria, site unspecified    Controlled type 1 diabetes mellitus with hyperglycemia (HCC)    Chest pain of uncertain etiology    Abdominal pain of unknown etiology    Spinal stenosis    Anemia due to chronic blood loss    Aseptic necrosis of head or neck of femur    Bilateral tinnitus    Mild persistent asthma with acute exacerbation (HCC)    Generalized abdominal tenderness    Generalized edema    Hyperlipidemia    Muscle weakness    Disorder of nervous system due to type 2 diabetes  mellitus (HCC)    Peripheral vascular disorder due to diabetes mellitus (HCC)    Polyneuropathy due to type 2 diabetes mellitus (HCC)    Vitamin D deficiency    Constipation    Right upper quadrant pain    Shortness of breath    Chronic pain disorder    Headache    Hypothyroidism    Idiopathic osteoporosis    Type 2 diabetes mellitus without complication, with long-term current use of insulin (HCC)    Fatigue    Esophageal reflux    Myalgia    Pulmonary embolism on left (HCC)    Microcytic anemia    Alpha-thalassemia (HCC)    Pulmonary infiltrate    Encounter for anticoagulation discussion and counseling    Right lower quadrant pain    Hemodialysis catheter dysfunction (HCC)    Primary hypertension    Age-related cataract of both eyes    Anxiety and depression    Palpitations    Photophobia of both eyes    Preglaucoma, unspecified, bilateral    Unsteady gait when walking    Weakness    Shock (HCC)    Acute cystitis without hematuria    Low serum cortisol level    Pancreatic duct dilated (HCC)    Intractable abdominal pain    Type 1 diabetes mellitus with hyperglycemia (HCC)    Pulmonary hypertension due to myeloproliferative disorder (HCC)    Abnormal finding on GI tract imaging    Asthma exacerbation (HCC)    Moderate persistent asthma with exacerbation ([facility])       Assessment:  Rhino/enterovirus infection  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 Forrest  Multiple sclerosis remains on Tysabri-no new deficits    Plan:  Add nebulized steroids  Continue ICS/LABA--add LAMA  Continue nebulized  bronchodilators  Add Zithromax for possible bacterial bronchitis and for its anti-inflammatory benefits  Check overnight oximetry get download from machine and follow-up for need for change  Discussed at length with patient at bedside      Raisa Hoyt MD  12/31/2024  2:48 PM         [1]   Allergies  Allergen Reactions    Epinephrine OTHER (SEE COMMENTS)     Cardiac issues    Immune Globulin ANAPHYLAXIS    Latex SHORTNESS OF BREATH and OTHER (SEE COMMENTS)     WELTS    Methylprednisolone SWELLING     Swelling of neck, throat and tongue  Injection, throat and tongue swelling      Ocrelizumab ANAPHYLAXIS and OTHER (SEE COMMENTS)     Difficulty breathing    Other SHORTNESS OF BREATH     ENVIRONMENTAL, ASTHMA EXACERBATION    Pcn [Penicillamine]     Penicillins OTHER (SEE COMMENTS)    Urea Tightness in Throat     Urea C-13 in Pranactin for H. Pylori test kit.    Midodrine ITCHING     Pt reports mouth pain and itching possibly r/t midodrine   [2]   Medications Prior to Admission   Medication Sig Dispense Refill Last Dose/Taking    albuterol (2.5 MG/3ML) 0.083% Inhalation Nebu Soln Take 3 mL (2.5 mg total) by nebulization 3 (three) times daily. 90 each 11 12/29/2024    FIASP FLEXTOUCH 100 UNIT/ML Subcutaneous Solution Pen-injector    12/30/2024 Morning    metoprolol tartrate 50 MG Oral Tab TAKE 1 TABLET BY MOUTH ONCE THE NIGHT BEFORE CTA AND 1 TABLET THE MORNING OF CTA   Past Month    linaCLOtide (LINZESS) 145 MCG Oral Cap    12/30/2024 Morning    natalizumab (TYSABRI) 300 mg/15mL Intravenous Conc Inject into the vein. Every 8 weeks   Past Month    carvedilol 3.125 MG Oral Tab Take 1 tablet (3.125 mg total) by mouth 2 (two) times daily.   12/30/2024 Morning    montelukast 10 MG Oral Tab Take 1 tablet (10 mg total) by mouth nightly.   12/30/2024 Evening    levothyroxine 88 MCG Oral Tab Take 1 tablet (88 mcg total) by mouth before breakfast.   12/30/2024 Morning    pantoprazole 20 MG Oral Tab EC Take 1 tablet (20 mg total)  by mouth 2 (two) times daily.   12/30/2024 Morning    fluticasone furoate-vilanterol 200-25 MCG/ACT Inhalation Aerosol Powder, Breath Activated Inhale 1 puff into the lungs daily.   12/30/2024 Morning    LYLLANA 0.025 MG/24HR Transdermal Patch Biweekly Place 1 patch onto the skin twice a week.   Past Week    CELEBREX 100 MG Oral Cap Take 1 capsule (100 mg total) by mouth 2 (two) times daily.   12/30/2024 Noon    pregabalin 200 MG Oral Cap Take 1 capsule (200 mg total) by mouth 2 (two) times daily.   12/30/2024 Noon    Ciclopirox 8 % External Solution APPLY EVERY DAY   Past Week    HYDROcodone-acetaminophen  MG Oral Tab Take 1 tablet by mouth 4 (four) times daily as needed.   12/30/2024 Noon    baclofen 10 MG Oral Tab Take 1 tablet (10 mg total) by mouth 2 (two) times daily.   12/30/2024 Morning    FARXIGA 10 MG Oral Tab Take 1 tablet (10 mg total) by mouth daily.   12/30/2024 Noon    DULoxetine 60 MG Oral Cap DR Particles Take 1 capsule (60 mg total) by mouth 2 (two) times daily.   12/30/2024 Noon    atorvastatin 40 MG Oral Tab Take 1 tablet (40 mg total) by mouth nightly.   12/29/2024 Evening    Insulin Lispro (HUMALOG) 100 UNIT/ML Subcutaneous Solution INJECT UP TO 70 UNITS VIA INSULIN PUMP DAILY 70 mL 2 12/30/2024 Morning    Benralizumab (FASENRA PEN) 30 MG/ML Subcutaneous Solution Auto-injector Inject 30 mg into the skin. Every other month/every 8 weeks   Past Month    Thiamine HCl 100 MG Oral Tab Take 1 tablet (100 mg total) by mouth daily. 30 tablet 3 12/30/2024 Morning    docusate sodium 100 MG Oral Cap Take 1 capsule (100 mg total) by mouth 2 (two) times daily.   12/30/2024 Morning    denosumab 60 MG/ML Subcutaneous Solution Inject 1 mL (60 mg total) into the skin every 6 (six) months.   Past Month    Cholecalciferol (VITAMIN D-3) 5000 UNITS Oral Tab Take 1 tablet (5,000 Units total) by mouth daily.   12/30/2024 Morning    Albuterol Sulfate  (90 Base) MCG/ACT Inhalation Aero Soln Inhale 2 puffs  into the lungs every 6 (six) hours as needed. PRN wheezing/SOB   12/29/2024 Morning    NON FORMULARY Tandem insulin pump settings (Bolus IQ)  Mdnt 3.1 units/hr  0400 3.00 units/hr  0900 3.2 units/hr    Correction:   MN 1:35/110  1 PM 1:40  7 pm 1:35    CHO ratio:  MN 1:5g    Target glucose level - 100 and 80   Unknown    DEXCOM G6 SENSOR Does not apply Misc USE AS DIRECTED EVERY 10 DAYS 9 each 3     EPINEPHrine 0.15 MG/0.3ML Injection Solution Auto-injector Inject 0.3 mL (0.15 mg total) into the muscle as needed for Anaphylaxis. Jani pen/lower dose because pt is allergic to epinephrine       Ondansetron HCl (ZOFRAN) 4 mg tablet Take 1 tablet (4 mg total) by mouth every 8 (eight) hours as needed for Nausea.       DEXCOM G6 TRANSMITTER Does not apply Misc USE AS DIRECTED EVERY 90 DAYS 1 each 3

## 2024-12-31 NOTE — DISCHARGE INSTRUCTIONS
Resume services with [facility]  [phone]    [facility] - home nebulizer machine   Phone: [phone]  Fax: [phone]

## 2024-12-31 NOTE — PLAN OF CARE
Pt is alert and oriented x4, RA on days.cpap at night. Nebs. Tele NSR. C/o headache Fioricet and norco given, she states its better but she still has it , also hot packs were given. Valentina cath to right chest. CGM noted to left lowe abdomen, she isnt using it. + rhino virus. Accu checks Qid, will continue to monitor

## 2025-01-01 LAB
ANION GAP SERPL CALC-SCNC: 7 MMOL/L (ref 0–18)
BASOPHILS # BLD AUTO: 0.01 X10(3) UL (ref 0–0.2)
BASOPHILS NFR BLD AUTO: 0.2 %
BUN BLD-MCNC: 13 MG/DL (ref 9–23)
CALCIUM BLD-MCNC: 8 MG/DL (ref 8.7–10.4)
CHLORIDE SERPL-SCNC: 109 MMOL/L (ref 98–112)
CO2 SERPL-SCNC: 26 MMOL/L (ref 21–32)
CREAT BLD-MCNC: 0.93 MG/DL
EGFRCR SERPLBLD CKD-EPI 2021: 72 ML/MIN/1.73M2 (ref 60–?)
EOSINOPHIL # BLD AUTO: 0 X10(3) UL (ref 0–0.7)
EOSINOPHIL NFR BLD AUTO: 0 %
ERYTHROCYTE [DISTWIDTH] IN BLOOD BY AUTOMATED COUNT: 16.4 %
GLUCOSE BLD-MCNC: 143 MG/DL (ref 70–99)
GLUCOSE BLD-MCNC: 146 MG/DL (ref 70–99)
GLUCOSE BLD-MCNC: 152 MG/DL (ref 70–99)
GLUCOSE BLD-MCNC: 94 MG/DL (ref 70–99)
GLUCOSE BLD-MCNC: 96 MG/DL (ref 70–99)
HCT VFR BLD AUTO: 35.5 %
HGB BLD-MCNC: 10.7 G/DL
IMM GRANULOCYTES # BLD AUTO: 0.01 X10(3) UL (ref 0–1)
IMM GRANULOCYTES NFR BLD: 0.2 %
LYMPHOCYTES # BLD AUTO: 3.92 X10(3) UL (ref 1–4)
LYMPHOCYTES NFR BLD AUTO: 59.8 %
MCH RBC QN AUTO: 22.2 PG (ref 26–34)
MCHC RBC AUTO-ENTMCNC: 30.1 G/DL (ref 31–37)
MCV RBC AUTO: 73.8 FL
MONOCYTES # BLD AUTO: 0.51 X10(3) UL (ref 0.1–1)
MONOCYTES NFR BLD AUTO: 7.8 %
NEUTROPHILS # BLD AUTO: 2.1 X10 (3) UL (ref 1.5–7.7)
NEUTROPHILS # BLD AUTO: 2.1 X10(3) UL (ref 1.5–7.7)
NEUTROPHILS NFR BLD AUTO: 32 %
OSMOLALITY SERPL CALC.SUM OF ELEC: 294 MOSM/KG (ref 275–295)
PLATELET # BLD AUTO: 138 10(3)UL (ref 150–450)
PLATELETS.RETICULATED NFR BLD AUTO: 7.8 % (ref 0–7)
POTASSIUM SERPL-SCNC: 4 MMOL/L (ref 3.5–5.1)
RBC # BLD AUTO: 4.81 X10(6)UL
SODIUM SERPL-SCNC: 142 MMOL/L (ref 136–145)
WBC # BLD AUTO: 6.6 X10(3) UL (ref 4–11)

## 2025-01-01 PROCEDURE — 99232 SBSQ HOSP IP/OBS MODERATE 35: CPT | Performed by: INTERNAL MEDICINE

## 2025-01-01 PROCEDURE — 99232 SBSQ HOSP IP/OBS MODERATE 35: CPT | Performed by: HOSPITALIST

## 2025-01-01 RX ORDER — ALPRAZOLAM 0.25 MG/1
0.25 TABLET ORAL 3 TIMES DAILY PRN
Status: DISCONTINUED | OUTPATIENT
Start: 2025-01-01 | End: 2025-01-04

## 2025-01-01 RX ORDER — SODIUM CHLORIDE 9 MG/ML
INJECTION, SOLUTION INTRAVENOUS CONTINUOUS
Status: ACTIVE | OUTPATIENT
Start: 2025-01-01 | End: 2025-01-02

## 2025-01-01 NOTE — PLAN OF CARE
Pt a/ox4. Maintaining sats on room air. SOB with activity and occasionally at rest. Audible wheezing at times, expiratory wheezing on auscultation. Nebs scheduled per pulm, pt unable to take steroids due to previous reaction requiring epi.  SR on tele.   /63 this am, down to 86/48 this evening, MD notified. Holding 1800 coreg dose. IVF per MD orders.   Xanax dose given this afternoon as patient is getting worked up over the \"shaky\" feeling that the nebulizers are giving her.   Blood sugars controlled with long acting insulin and carb coverage. CGM to LLQ, no pump.   Plan to home once symptoms have resolved and patient is stable from a respiratory status.

## 2025-01-01 NOTE — RESPIRATORY THERAPY NOTE
MOI - Equipment Use Daily Summary:                  . Set Mode: AUTO CPAP WITH C-FLEX                . Usage in hours: 6.2                . 90% Pressure (EPAP) level: 6                . 90% Insp.Pressure (IPAP):                . AHI: 7.8                . Supplemental Oxygen:    LPM                . Comments:

## 2025-01-01 NOTE — PROGRESS NOTES
[facility]   part of [facility]     Hospitalist Progress Note     Kat Crook Patient Status:  Inpatient    3/11/1970 MRN [id number]   Location [facility] 5NW-A Attending Ace Davis MD   Hosp Day # 2 PCP Justin Huerta MD     Subjective:   Very sob  No CP  No fevers  Wheezing +++   Objective:    Review of Systems:   A comprehensive review of systems was completed; pertinent positive and negatives stated in subjective.  Vital signs:  Temp:  [97.9 °F (36.6 °C)-98.1 °F (36.7 °C)] 98 °F (36.7 °C)  Pulse:  [60-71] 66  Resp:  [17-19] 17  BP: (101-116)/(62-79) 114/63  SpO2:  [93 %-98 %] 97 %  FiO2 (%):  [21 %] 21 %  Physical Exam:    General: No acute distress, mild tachypnea   Respiratory: diffuse wheezing b/l   Cardiovascular: S1, S2, RRR  Abdomen: Soft, NT/ND, +BS  Extremities: no edema    Diagnostic Data:    Labs:  Recent Labs   Lab 24  1544 24  0633 25  0633   WBC 7.5 6.4 6.6   HGB 11.3* 10.9* 10.7*   MCV 73.1* 72.9* 73.8*   .0* 139.0* 138.0*     Recent Labs   Lab 24  1544 24  0633 25  0633   GLU 74 128* 94   BUN 11 12 13   CREATSERUM 0.76 0.89 0.93   CA 8.6* 7.9* 8.0*   ALB 4.0  --   --     141 142   K 3.7 4.2 4.0    109 109   CO2 28.0 27.0 26.0   ALKPHO 78  --   --    AST 28  --   --    ALT 25  --   --    BILT 0.5  --   --    TP 7.0  --   --      Estimated Creatinine Clearance: 63.2 mL/min (based on SCr of 0.93 mg/dL).  No results for input(s): \"PTP\", \"INR\" in the last 168 hours.     Microbiology  No results found for this visit on 24.  Imaging: Reviewed in Epic.  Medications:    atorvastatin  40 mg Oral Nightly    albuterol  2.5 mg Nebulization 6 times per day    insulin degludec  15 Units Subcutaneous Daily    budesonide  0.5 mg Nebulization 2 times daily    umeclidinium bromide  1 puff Inhalation Daily    nystatin  5 mL Oral TID    azithromycin  250 mg Oral Daily    LORazepam  0.5 mg Oral Once    fluticasone-salmeterol  1 puff  Inhalation BID    DULoxetine  60 mg Oral BID    celecoxib  100 mg Oral BID    carvedilol  3.125 mg Oral BID with meals    baclofen  10 mg Oral BID    enoxaparin  40 mg Subcutaneous Daily    guaiFENesin ER  600 mg Oral BID    levothyroxine  88 mcg Oral Before breakfast    montelukast  10 mg Oral Nightly    pantoprazole  20 mg Oral BID AC    pregabalin  200 mg Oral BID    thiamine  100 mg Oral Daily    insulin aspart  1-68 Units Subcutaneous TID CC    insulin aspart  1-10 Units Subcutaneous TID AC and HS       Assessment & Plan:    #Viral Bronchitis   RVP +ve for rhinovirus/enterovirus  Symptomatic care  CXR neg  * pt states anaphylaxis to IV steroids with tongue swelling and s/p epi shot and had a \"heart attack\"- updated Pulm and will hold on IV steroids  Cont O2 as needed and nebs - appreciate pulm recs      #Acute Asthma flare 2/2 above- as above   On Fasenra as OP  Zithro for anti-inflammatory benefit      #pHTN     # Multiple sclerosis  On Tysabri  Typically gets PLEX for flares given her steroid allergy     # Chronic atypical chest pain with mild CAD  Sees [facility] as OP  Had CTA gated coronary arteries 11/20/2024 which showed mild proximal LAD disease  Cont. BB/statin     #DM2  Insulin pump for basal rate  IC 1:10g  ICF 1:30>140  Endo consulted   Hold orals for now     #Chronic pancreatitis         Supplementary Documentation:   Quality:  DVT Mechanical Prophylaxis:     Early ambuation  DVT Pharmacologic Prophylaxis   Medication    heparin (Porcine) 100 Units/mL lock flush 500 Units    enoxaparin (Lovenox) 40 MG/0.4ML SUBQ injection 40 mg                Code Status: Full Code  Panda: No urinary catheter in place  Panda Duration (in days):   Central line:    KAYLEY:   At this point Ms. Crook is expected to be discharge to: home     The 21st Century Cures Act makes medical notes like these available to patients in the interest of transparency. Please be advised this is a medical document. Medical documents are intended  to carry relevant information, facts as evident, and the clinical opinion of the practitioner. The medical note is intended as peer to peer communication and may appear blunt or direct. It is written in medical language and may contain abbreviations or verbiage that are unfamiliar.

## 2025-01-01 NOTE — PLAN OF CARE
A&OX4. Maintaining O2 on RA,CPAP at night.SAT monitoring tonight as per Pulm. Tele, NSR. Scheduled meds given for pain. Up add kolton. Port a cath on R chest,SL. Skin C/D/I. Carb controlled diet. No further needs at this time. Continue POC. Safety precaution in place.   Problem: Patient/Family Goals  Goal: Patient/Family Long Term Goal  Description: Patient's Long Term Goal: Discharge to home    Interventions:  - Follow POC  - See additional Care Plan goals for specific interventions  Outcome: Progressing  Goal: Patient/Family Short Term Goal  Description: Patient's Short Term Goal: 12/30 NOC;sleep,rest  12/31 NOC;SAT monitoring    Interventions:   - Cluster care  - See additional Care Plan goals for specific interventions  Outcome: Progressing

## 2025-01-01 NOTE — PROGRESS NOTES
[facility] Pulmonary Progress Note    Kat Crook Patient Status:  Inpatient    3/11/1970 MRN [id number]   Location [facility] 5NW-A Attending Ace Davis MD   Hosp Day # 2 PCP Justin Huerta MD     Subjective:  Kat Crook is a(n) 54 year old female who is admitted for asthma exacerbation.    Overnight: no acute events overnight, having still significant wheezing    Objective:  BP (!) 86/48 (BP Location: Left arm)   Pulse 66   Temp 98.2 °F (36.8 °C) (Oral)   Resp 16   Ht 5' 6\" (1.676 m)   Wt 164 lb (74.4 kg)   LMP  (LMP Unknown)   SpO2 99%   BMI 26.47 kg/m²     Temp (24hrs), Av °F (36.7 °C), Min:97.8 °F (36.6 °C), Max:98.2 °F (36.8 °C)      Intake/Output:  No intake or output data in the 24 hours ending 25 1543    Physical Exam:   General: alert, cooperative, oriented.  No respiratory distress.   Head: Normocephalic, without obvious abnormality, atraumatic.   Throat: Lips, mucosa, and tongue normal.  No thrush noted.   Neck: trachea midline, no adenopathy, no thyromegaly. No JVD.   Lungs: wheezes bilaterally   Chest wall: No tenderness or deformity.   Heart: regular rate and rhythm, S1, S2 normal, no murmur, click, rub or gallop   Abdomen: soft, non-distended, no masses, no guarding, no     Rebound.   Extremity: No edema or cyanosis   Skin: No rashes or lesions.   Neurological: Alert, interactive, no focal deficits    Lab Data Review:  Recent Labs     24  1544 24  0633 25  0633   WBC 7.5 6.4 6.6   HGB 11.3* 10.9* 10.7*   .0* 139.0* 138.0*     Recent Labs     24  1544 24  0633 25  0633    141 142   K 3.7 4.2 4.0    109 109   CO2 28.0 27.0 26.0   BUN 11 12 13   CREATSERUM 0.76 0.89 0.93     No results for input(s): \"PTP\", \"INR\", \"PTT\" in the last 168 hours.    Cultures:   No results found for this visit on 24.    Radiology:  XR CHEST AP PORTABLE  (CPT=71045)  Narrative: PROCEDURE:  XR CHEST AP PORTABLE  (CPT=71045)      TECHNIQUE:  AP chest radiograph was obtained.     COMPARISON:  [facility] , XR, XR CHEST AP PORTABLE  (CPT=71045), 4/12/2024, 10:41 AM.     INDICATIONS:  SOB, audible wheeze     PATIENT STATED HISTORY: (As transcribed by Technologist)           FINDINGS:  Right-sided chest port is noted with subclavian catheter.  Tip near SVC/RA junction.  Lungs are clear.  Heart size is within normal limits.  Aorta is atherosclerotic.  Chest wall structures are unremarkable.                   Impression: CONCLUSION:  Right-sided chest port is in stable position.  There is otherwise no evidence of active cardiopulmonary disease.        LOCATION:  [facility]                 Dictated by (CST): Kamar Lopez MD on 12/30/2024 at 3:30 PM       Finalized by (CST): Kamar Lopez MD on 12/30/2024 at 3:31 PM         Medications reviewed     Assessment and Plan:   Patient Active Problem List   Diagnosis    Neuropathic pain of both legs    Elevated liver enzymes    Hyperparathyroidism (HCC)    Asthma (HCC)    Osteoporosis    Fibromyalgia    Goiter    Hypervitaminosis D    Demyelinating disease of central nervous system (HCC)    Tachycardia    Encephalopathy    AVN (avascular necrosis of bone) (HCC)    Gastroparesis    Fatty liver    Diabetes mellitus (HCC)    Generalized abdominal pain    Multiple sclerosis (HCC)    At risk for falling    Cardiomyopathy in other disease    Weakness of both lower extremities    CATIA (acute kidney injury) (HCC)    Hypokalemia    MS (multiple sclerosis) (HCC)    Hypotension    Anemia    Weakness generalized    Essential hypertension    Irritable bowel syndrome (IBS)    S/P laparoscopic surgery    Intra-abdominal adhesions    Dyspnea, paroxysmal nocturnal    Multiple sclerosis exacerbation (HCC)    Dysuria    History of plasmapheresis    Hyponatremia    Abdominal pain    Hyperglycemia    MOI (obstructive sleep apnea)    Dehydration    Abdominal pain, right upper quadrant    Allergic rhinitis    Chronic fatigue and  malaise    Dilated idiopathic cardiomyopathy (HCC)    Dizziness    Dyspnea    Chest pain, unspecified    Nephrolithiasis    Undiagnosed cardiac murmurs    Sickle cell trait (HCC)    Pure hypercholesterolemia    GIORDANO (nonalcoholic steatohepatitis)    Myalgia and myositis, unspecified    Liver lesion    Interstitial cystitis    Family history of coronary arteriosclerosis    Type 1 diabetes mellitus with ketoacidosis without coma (HCC)    Urinary tract infection without hematuria, site unspecified    Controlled type 1 diabetes mellitus with hyperglycemia (HCC)    Chest pain of uncertain etiology    Abdominal pain of unknown etiology    Spinal stenosis    Anemia due to chronic blood loss    Aseptic necrosis of head or neck of femur    Bilateral tinnitus    Mild persistent asthma with acute exacerbation (HCC)    Generalized abdominal tenderness    Generalized edema    Hyperlipidemia    Muscle weakness    Disorder of nervous system due to type 2 diabetes mellitus (HCC)    Peripheral vascular disorder due to diabetes mellitus (HCC)    Polyneuropathy due to type 2 diabetes mellitus (HCC)    Vitamin D deficiency    Constipation    Right upper quadrant pain    Shortness of breath    Chronic pain disorder    Headache    Hypothyroidism    Idiopathic osteoporosis    Type 2 diabetes mellitus without complication, with long-term current use of insulin (HCC)    Fatigue    Esophageal reflux    Myalgia    Pulmonary embolism on left (HCC)    Microcytic anemia    Alpha-thalassemia (HCC)    Pulmonary infiltrate    Encounter for anticoagulation discussion and counseling    Right lower quadrant pain    Hemodialysis catheter dysfunction (HCC)    Primary hypertension    Age-related cataract of both eyes    Anxiety and depression    Palpitations    Photophobia of both eyes    Preglaucoma, unspecified, bilateral    Unsteady gait when walking    Weakness    Shock (HCC)    Acute cystitis without hematuria    Low serum cortisol level     Pancreatic duct dilated (HCC)    Intractable abdominal pain    Type 1 diabetes mellitus with hyperglycemia (HCC)    Pulmonary hypertension due to myeloproliferative disorder (HCC)    Abnormal finding on GI tract imaging    Asthma exacerbation (HCC)    Moderate persistent asthma with exacerbation (HCC)    Bronchitis with acute wheezing       Assessment:  Rhino/enterovirus infection  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 Forrest  Multiple sclerosis remains on Tysabri-no new deficits     Plan:  Continue nebulized steroids  Continue ongoing bronchodilators  Continue nebulized bronchodilators  Would continue azithro for 5 total days  Avoid IV steroids/oral steroids as much as possible as history of allergy      Desiree Archer MD  [facility]  Office: [phone]

## 2025-01-01 NOTE — PROGRESS NOTES
BP low this afternoon. 86/48. MD notified. Patient was 114/63 this am, am dose of coreg given.   MD orders received to give 500ml NS bolus, then to run 0.9NS at 100 ml/hr.

## 2025-01-02 LAB
GLUCOSE BLD-MCNC: 105 MG/DL (ref 70–99)
GLUCOSE BLD-MCNC: 127 MG/DL (ref 70–99)
GLUCOSE BLD-MCNC: 130 MG/DL (ref 70–99)
GLUCOSE BLD-MCNC: 139 MG/DL (ref 70–99)

## 2025-01-02 PROCEDURE — 99232 SBSQ HOSP IP/OBS MODERATE 35: CPT | Performed by: STUDENT IN AN ORGANIZED HEALTH CARE EDUCATION/TRAINING PROGRAM

## 2025-01-02 PROCEDURE — 99232 SBSQ HOSP IP/OBS MODERATE 35: CPT | Performed by: INTERNAL MEDICINE

## 2025-01-02 NOTE — PROGRESS NOTES
[facility] Pulmonary Progress Note    Kat Crook Patient Status:  Inpatient    3/11/1970 MRN [id number]   Location [facility] 5NW-A Attending Th[place], Alvaro Woods, *   Hosp Day # 3 PCP Justin Huerta MD     Subjective:    Overnight: No acute events over night. Afebrile. ON RA. Complaints of SOB at rest and activity, cough, and wheezing.     Objective:  /67 (BP Location: Left arm)   Pulse 66   Temp 97.6 °F (36.4 °C) (Oral)   Resp 18   Ht 5' 6\" (1.676 m)   Wt 164 lb (74.4 kg)   LMP  (LMP Unknown)   SpO2 95%   BMI 26.47 kg/m²     Temp (24hrs), Av.9 °F (36.6 °C), Min:97.6 °F (36.4 °C), Max:98.2 °F (36.8 °C)      Intake/Output:  No intake or output data in the 24 hours ending 25 0811    Physical Exam:   General: alert, cooperative, oriented.  No respiratory distress.   Head: Normocephalic, without obvious abnormality, atraumatic.   Throat: Lips, mucosa, and tongue normal.  No thrush noted.   Neck: trachea midline, no adenopathy, no thyromegaly. No JVD.   Lungs: diminished breath sounds bilaterally, wheezes R apex, L apex   Chest wall: No tenderness or deformity.   Heart: regular rate and rhythm, S1, S2 normal, no murmur, click, rub or gallop   Abdomen: soft, non-distended, no masses, no guarding, no     Rebound.   Extremity: No edema or cyanosis   Skin: No rashes or lesions.   Neurological: Alert, interactive, no focal deficits    Lab Data Review:  Recent Labs     24  1544 24  0633 25  0633   WBC 7.5 6.4 6.6   HGB 11.3* 10.9* 10.7*   .0* 139.0* 138.0*     Recent Labs     24  1544 24  0633 25  0633    141 142   K 3.7 4.2 4.0    109 109   CO2 28.0 27.0 26.0   BUN 11 12 13   CREATSERUM 0.76 0.89 0.93     No results for input(s): \"PTP\", \"INR\", \"PTT\" in the last 168 hours.    Cultures:   No results found for this visit on 24.    Radiology:  XR CHEST AP PORTABLE  (CPT=71045)  Narrative: PROCEDURE:  XR CHEST AP PORTABLE  (CPT=71045)      TECHNIQUE:  AP chest radiograph was obtained.     COMPARISON:  [facility] , XR, XR CHEST AP PORTABLE  (CPT=71045), 4/12/2024, 10:41 AM.     INDICATIONS:  SOB, audible wheeze     PATIENT STATED HISTORY: (As transcribed by Technologist)           FINDINGS:  Right-sided chest port is noted with subclavian catheter.  Tip near SVC/RA junction.  Lungs are clear.  Heart size is within normal limits.  Aorta is atherosclerotic.  Chest wall structures are unremarkable.                   Impression: CONCLUSION:  Right-sided chest port is in stable position.  There is otherwise no evidence of active cardiopulmonary disease.        LOCATION:  [facility]                 Dictated by (CST): Kamar Lopez MD on 12/30/2024 at 3:30 PM       Finalized by (CST): Kamar Lopez MD on 12/30/2024 at 3:31 PM         Medications reviewed     Assessment and Plan:   Patient Active Problem List   Diagnosis    Neuropathic pain of both legs    Elevated liver enzymes    Hyperparathyroidism (HCC)    Asthma (HCC)    Osteoporosis    Fibromyalgia    Goiter    Hypervitaminosis D    Demyelinating disease of central nervous system (HCC)    Tachycardia    Encephalopathy    AVN (avascular necrosis of bone) (HCC)    Gastroparesis    Fatty liver    Diabetes mellitus (HCC)    Generalized abdominal pain    Multiple sclerosis (HCC)    At risk for falling    Cardiomyopathy in other disease    Weakness of both lower extremities    CATIA (acute kidney injury) (HCC)    Hypokalemia    MS (multiple sclerosis) (HCC)    Hypotension    Anemia    Weakness generalized    Essential hypertension    Irritable bowel syndrome (IBS)    S/P laparoscopic surgery    Intra-abdominal adhesions    Dyspnea, paroxysmal nocturnal    Multiple sclerosis exacerbation (HCC)    Dysuria    History of plasmapheresis    Hyponatremia    Abdominal pain    Hyperglycemia    MOI (obstructive sleep apnea)    Dehydration    Abdominal pain, right upper quadrant    Allergic rhinitis    Chronic fatigue and  malaise    Dilated idiopathic cardiomyopathy (HCC)    Dizziness    Dyspnea    Chest pain, unspecified    Nephrolithiasis    Undiagnosed cardiac murmurs    Sickle cell trait (HCC)    Pure hypercholesterolemia    GIORDANO (nonalcoholic steatohepatitis)    Myalgia and myositis, unspecified    Liver lesion    Interstitial cystitis    Family history of coronary arteriosclerosis    Type 1 diabetes mellitus with ketoacidosis without coma (HCC)    Urinary tract infection without hematuria, site unspecified    Controlled type 1 diabetes mellitus with hyperglycemia (HCC)    Chest pain of uncertain etiology    Abdominal pain of unknown etiology    Spinal stenosis    Anemia due to chronic blood loss    Aseptic necrosis of head or neck of femur    Bilateral tinnitus    Mild persistent asthma with acute exacerbation (HCC)    Generalized abdominal tenderness    Generalized edema    Hyperlipidemia    Muscle weakness    Disorder of nervous system due to type 2 diabetes mellitus (HCC)    Peripheral vascular disorder due to diabetes mellitus (HCC)    Polyneuropathy due to type 2 diabetes mellitus (HCC)    Vitamin D deficiency    Constipation    Right upper quadrant pain    Shortness of breath    Chronic pain disorder    Headache    Hypothyroidism    Idiopathic osteoporosis    Type 2 diabetes mellitus without complication, with long-term current use of insulin (HCC)    Fatigue    Esophageal reflux    Myalgia    Pulmonary embolism on left (HCC)    Microcytic anemia    Alpha-thalassemia (HCC)    Pulmonary infiltrate    Encounter for anticoagulation discussion and counseling    Right lower quadrant pain    Hemodialysis catheter dysfunction (HCC)    Primary hypertension    Age-related cataract of both eyes    Anxiety and depression    Palpitations    Photophobia of both eyes    Preglaucoma, unspecified, bilateral    Unsteady gait when walking    Weakness    Shock (HCC)    Acute cystitis without hematuria    Low serum cortisol level     Pancreatic duct dilated (HCC)    Intractable abdominal pain    Type 1 diabetes mellitus with hyperglycemia (HCC)    Pulmonary hypertension due to myeloproliferative disorder (HCC)    Abnormal finding on GI tract imaging    Asthma exacerbation (HCC)    Moderate persistent asthma with exacerbation (HCC)    Bronchitis with acute wheezing     Assessment:  Rhino/enterovirus infection  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 Forrest  Multiple sclerosis remains on Tysabri-no new deficits  Avoid IV steroids/oral steroids as much as possible as history of allergy     Plan:  Continue nebulized steroids, Avoid IV steroids/oral steroids as much as possible as history of allergy  Continue ongoing bronchodilators  Continue nebulized bronchodilators  Would continue azithro for 5 total days, 1/5/2025  Encourage increase activity  OOB with meals  Add flutter valve     Discussed with patient, Becca CRAWFORD, and Dr Archer     Is this a shared or split note between Advanced Practice Provider and Physician? Yes   SHON Espinosa  [facility]  Office: [phone]

## 2025-01-02 NOTE — PLAN OF CARE
Problem: Patient/Family Goals  Goal: Patient/Family Long Term Goal  Description: Patient's Long Term Goal: Discharge to home    Interventions:  - Follow POC  - See additional Care Plan goals for specific interventions  Outcome: Progressing  Goal: Patient/Family Short Term Goal  Description: Patient's Short Term Goal: 12/30 NOC;sleep,rest  12/31 NOC;SAT monitoring  1/1 noc: rest, sleep    Interventions:   - Cluster care  - See additional Care Plan goals for specific interventions  Outcome: Progressing     Problem: CARDIOVASCULAR - ADULT  Goal: Maintains optimal cardiac output and hemodynamic stability  Description: INTERVENTIONS:  - Monitor vital signs, rhythm, and trends  - Monitor for bleeding, hypotension and signs of decreased cardiac output  - Evaluate effectiveness of vasoactive medications to optimize hemodynamic stability  - Monitor arterial and/or venous puncture sites for bleeding and/or hematoma  - Assess quality of pulses, skin color and temperature  - Assess for signs of decreased coronary artery perfusion - ex. Angina  - Evaluate fluid balance, assess for edema, trend weights  Outcome: Progressing     Problem: RESPIRATORY - ADULT  Goal: Achieves optimal ventilation and oxygenation  Description: INTERVENTIONS:  - Assess for changes in respiratory status  - Assess for changes in mentation and behavior  - Position to facilitate oxygenation and minimize respiratory effort  - Oxygen supplementation based on oxygen saturation or ABGs  - Provide Smoking Cessation handout, if applicable  - Encourage broncho-pulmonary hygiene including cough, deep breathe, Incentive Spirometry  - Assess the need for suctioning and perform as needed  - Assess and instruct to report SOB or any respiratory difficulty  - Respiratory Therapy support as indicated  - Manage/alleviate anxiety  - Monitor for signs/symptoms of CO2 retention  Outcome: Progressing

## 2025-01-02 NOTE — PROGRESS NOTES
Rescheduled dinner carb coverage due to patient having not ordered yet. Will endorse to next shift if tray does not arrive prior to shift change.

## 2025-01-02 NOTE — PROGRESS NOTES
[facility]   part of [facility]     Hospitalist Progress Note     Kat Crook Patient Status:  Inpatient    3/11/1970 MRN [id number]   Location [facility] 5NW-A Attending Elier, Alvaro Woods, *   Hosp Day # 3 PCP Justin Huerta MD     Chief Complaint: SOB    Subjective:      - Satting well on RA, though still having significant wheezing despite just getting nebulizer therapy    - Symptoms started 7 days ago    - Denies new f/c, abd pain, n/v    Objective:    Review of Systems:   A comprehensive review of systems was completed; pertinent positive and negatives stated in subjective.    Vital signs:  Temp:  [97.6 °F (36.4 °C)-98.2 °F (36.8 °C)] 97.6 °F (36.4 °C)  Pulse:  [58-77] 66  Resp:  [16-18] 18  BP: ()/(48-69) 110/67  SpO2:  [95 %-100 %] 95 %    Physical Exam:    General: No acute distress  Respiratory: + diffuse expiratory wheezes bilaterally, + rhonchi  Cardiovascular: S1, S2, regular rate and rhythm  Abdomen: Soft, Non-tender, non-distended, positive bowel sounds  Neuro: No new focal deficits.   Extremities: no edema    Diagnostic Data:    Labs:  Recent Labs   Lab 24  1544 24  0633 25  0633   WBC 7.5 6.4 6.6   HGB 11.3* 10.9* 10.7*   MCV 73.1* 72.9* 73.8*   .0* 139.0* 138.0*       Recent Labs   Lab 24  1544 24  0633 25  0633   GLU 74 128* 94   BUN 11 12 13   CREATSERUM 0.76 0.89 0.93   CA 8.6* 7.9* 8.0*   ALB 4.0  --   --     141 142   K 3.7 4.2 4.0    109 109   CO2 28.0 27.0 26.0   ALKPHO 78  --   --    AST 28  --   --    ALT 25  --   --    BILT 0.5  --   --    TP 7.0  --   --        Estimated Creatinine Clearance: 63.2 mL/min (based on SCr of 0.93 mg/dL).    Recent Labs   Lab 24  1544   TROPHS 6       No results for input(s): \"PTP\", \"INR\" in the last 168 hours.               Microbiology    No results found for this visit on 24.      Imaging: Reviewed in Epic.    Medications:    atorvastatin  40 mg Oral Nightly     albuterol  2.5 mg Nebulization 6 times per day    insulin degludec  15 Units Subcutaneous Daily    budesonide  0.5 mg Nebulization 2 times daily    umeclidinium bromide  1 puff Inhalation Daily    nystatin  5 mL Oral TID    azithromycin  250 mg Oral Daily    LORazepam  0.5 mg Oral Once    fluticasone-salmeterol  1 puff Inhalation BID    DULoxetine  60 mg Oral BID    celecoxib  100 mg Oral BID    [Held by provider] carvedilol  3.125 mg Oral BID with meals    baclofen  10 mg Oral BID    enoxaparin  40 mg Subcutaneous Daily    guaiFENesin ER  600 mg Oral BID    levothyroxine  88 mcg Oral Before breakfast    montelukast  10 mg Oral Nightly    pantoprazole  20 mg Oral BID AC    pregabalin  200 mg Oral BID    thiamine  100 mg Oral Daily    insulin aspart  1-68 Units Subcutaneous TID CC    insulin aspart  1-10 Units Subcutaneous TID AC and HS       Assessment & Plan:      #Rhino/enterovirus bronchitis    - severe anaphylactic reaction with IV steroids; avoiding use   -  Duonebs, inhalers per pulm    - Pulm on consult   - d-dimer, trop, BNP all negative    #Acute Asthma flare 2/2 above- as above   On Fasenra as OP  Zithro for anti-inflammatory benefit      #pHTN     # Multiple sclerosis  On Tysabri  Typically gets PLEX for flares given her steroid allergy     # Chronic atypical chest pain with mild CAD  Sees [facility] as OP  Had CTA gated coronary arteries 11/20/2024 which showed mild proximal LAD disease  Cont. BB/statin     #DM2  Degludec   IC 1:10g  ICF 1:30>140  Diabetes APRN consulted   Hold orals for now    Alvaro Thapa MD    Supplementary Documentation:     Quality:  DVT Mechanical Prophylaxis:     Early ambuation  DVT Pharmacologic Prophylaxis   Medication    heparin (Porcine) 100 Units/mL lock flush 500 Units    enoxaparin (Lovenox) 40 MG/0.4ML SUBQ injection 40 mg                Code Status: Full Code  Panda: No urinary catheter in place  Panda Duration (in days):   Central line:    KAYLEY:     The 21st  Century Cures Act makes medical notes like these available to patients in the interest of transparency. Please be advised this is a medical document. Medical documents are intended to carry relevant information, facts as evident, and the clinical opinion of the practitioner. The medical note is intended as peer to peer communication and may appear blunt or direct. It is written in medical language and may contain abbreviations or verbiage that are unfamiliar.

## 2025-01-02 NOTE — PLAN OF CARE
Patient A&Ox4. Patient resting in bed, no apparent distress. Patient resting on room air, room air, CPAP noc. Patient  on tele/, vital sign monitoring.   QID ACHS, IVF, Nebs, Unit draw, Isolation precautions, Abx, Limb precautions  Safety/fall precautions in place. Call light in reach.        Problem: Patient/Family Goals  Goal: Patient/Family Long Term Goal  Description: Patient's Long Term Goal: Discharge to home    Interventions:  - Follow POC  - See additional Care Plan goals for specific interventions  Outcome: Progressing  Goal: Patient/Family Short Term Goal  Description: Patient's Short Term Goal: 12/30 NOC;sleep,rest  12/31 NOC;SAT monitoring  1/1 noc: rest, sleep    Interventions:   - Cluster care  - See additional Care Plan goals for specific interventions  Outcome: Progressing     Problem: CARDIOVASCULAR - ADULT  Goal: Maintains optimal cardiac output and hemodynamic stability  Description: INTERVENTIONS:  - Monitor vital signs, rhythm, and trends  - Monitor for bleeding, hypotension and signs of decreased cardiac output  - Evaluate effectiveness of vasoactive medications to optimize hemodynamic stability  - Monitor arterial and/or venous puncture sites for bleeding and/or hematoma  - Assess quality of pulses, skin color and temperature  - Assess for signs of decreased coronary artery perfusion - ex. Angina  - Evaluate fluid balance, assess for edema, trend weights  Outcome: Progressing     Problem: RESPIRATORY - ADULT  Goal: Achieves optimal ventilation and oxygenation  Description: INTERVENTIONS:  - Assess for changes in respiratory status  - Assess for changes in mentation and behavior  - Position to facilitate oxygenation and minimize respiratory effort  - Oxygen supplementation based on oxygen saturation or ABGs  - Provide Smoking Cessation handout, if applicable  - Encourage broncho-pulmonary hygiene including cough, deep breathe, Incentive Spirometry  - Assess the need for suctioning and  perform as needed  - Assess and instruct to report SOB or any respiratory difficulty  - Respiratory Therapy support as indicated  - Manage/alleviate anxiety  - Monitor for signs/symptoms of CO2 retention  Outcome: Progressing

## 2025-01-03 LAB
GLUCOSE BLD-MCNC: 143 MG/DL (ref 70–99)
GLUCOSE BLD-MCNC: 75 MG/DL (ref 70–99)
GLUCOSE BLD-MCNC: 78 MG/DL (ref 70–99)
GLUCOSE BLD-MCNC: 83 MG/DL (ref 70–99)

## 2025-01-03 PROCEDURE — 99232 SBSQ HOSP IP/OBS MODERATE 35: CPT | Performed by: INTERNAL MEDICINE

## 2025-01-03 PROCEDURE — 99232 SBSQ HOSP IP/OBS MODERATE 35: CPT | Performed by: STUDENT IN AN ORGANIZED HEALTH CARE EDUCATION/TRAINING PROGRAM

## 2025-01-03 RX ORDER — INSULIN DEGLUDEC 100 U/ML
14 INJECTION, SOLUTION SUBCUTANEOUS DAILY
Status: DISCONTINUED | OUTPATIENT
Start: 2025-01-03 | End: 2025-01-04

## 2025-01-03 RX ORDER — TRAZODONE HYDROCHLORIDE 50 MG/1
50 TABLET, FILM COATED ORAL NIGHTLY
Status: DISCONTINUED | OUTPATIENT
Start: 2025-01-03 | End: 2025-01-04

## 2025-01-03 NOTE — PLAN OF CARE
Pt is A&Ox4. Wears glasses. VSS, afebrile. SPO2 maintained on 2L, RA-BL. MOI- cpap at [facility]. [place]. Productive cough. Inhalers. Nebs. Tele, NSR. Lovenox. Last BM 1/1. Carb controlled diet, QID accucheks. Voids. Up ad kolton. Denies pain. PIV, TKO. No further needs at this time, continue POC. Safety precautions in place.     Problem: Patient/Family Goals  Goal: Patient/Family Long Term Goal  Description: Patient's Long Term Goal: Discharge to home    Interventions:  - Follow POC  - See additional Care Plan goals for specific interventions  Outcome: Progressing  Goal: Patient/Family Short Term Goal  Description: Patient's Short Term Goal: 12/30 NOC;sleep,rest  12/31 NOC;SAT monitoring  1/1 noc: rest, sleep  1/2 NOC: sleep    Interventions:   - Cluster care  - See additional Care Plan goals for specific interventions  Outcome: Progressing     Problem: CARDIOVASCULAR - ADULT  Goal: Maintains optimal cardiac output and hemodynamic stability  Description: INTERVENTIONS:  - Monitor vital signs, rhythm, and trends  - Monitor for bleeding, hypotension and signs of decreased cardiac output  - Evaluate effectiveness of vasoactive medications to optimize hemodynamic stability  - Monitor arterial and/or venous puncture sites for bleeding and/or hematoma  - Assess quality of pulses, skin color and temperature  - Assess for signs of decreased coronary artery perfusion - ex. Angina  - Evaluate fluid balance, assess for edema, trend weights  Outcome: Progressing     Problem: RESPIRATORY - ADULT  Goal: Achieves optimal ventilation and oxygenation  Description: INTERVENTIONS:  - Assess for changes in respiratory status  - Assess for changes in mentation and behavior  - Position to facilitate oxygenation and minimize respiratory effort  - Oxygen supplementation based on oxygen saturation or ABGs  - Provide Smoking Cessation handout, if applicable  - Encourage broncho-pulmonary hygiene including cough, deep breathe, Incentive Spirometry  -  Assess the need for suctioning and perform as needed  - Assess and instruct to report SOB or any respiratory difficulty  - Respiratory Therapy support as indicated  - Manage/alleviate anxiety  - Monitor for signs/symptoms of CO2 retention  Outcome: Progressing

## 2025-01-03 NOTE — CM/SW NOTE
RAKESH acknowledged order for help coordinating CPAP and neb machine at home. Informed MD CAMERON unable to order CPAP in the hospital setting. Sent referral for home neb in aidin.    RAKESH/CHAPARRITA to remain available for support and/or discharge planning.    Addendum 11:45am: Spoke with Kia from [facility], she states home nebulizer is approved. She will contact patient to arrange delivery tomorrow.         RADHA Tomlinson  Discharge Planner

## 2025-01-03 NOTE — PLAN OF CARE
Patient is a/o x4. On room air-2L. Neb txs. Productive cough w phlegm. NSR on tele. Carb control diet. QID accu-checks. Voids. IV zithro. Up self. VSS. Safety precautions in place. No further needs at this time.   Problem: Patient/Family Goals  Goal: Patient/Family Long Term Goal  Description: Patient's Long Term Goal: Discharge to home    Interventions:  - Follow POC  - See additional Care Plan goals for specific interventions  Outcome: Progressing  Goal: Patient/Family Short Term Goal  Description: Patient's Short Term Goal: 12/30 NOC;sleep,rest  12/31 NOC;SAT monitoring  1/1 noc: rest, sleep  1/2 NOC: sleep    Interventions:   - Cluster care  - See additional Care Plan goals for specific interventions  Outcome: Progressing

## 2025-01-03 NOTE — PROGRESS NOTES
[facility]   part of [facility]     Hospitalist Progress Note     Kat Crook Patient Status:  Inpatient    3/11/1970 MRN [id number]   Location [facility] 5NW-A Attending Elier, Alvaro Woods, *   Hosp Day # 4 PCP Justin Huerta MD     Chief Complaint: SOB    Subjective:      - Intermittently on 2L NC overnight for comfort, but otherwise satting well on RA   -Feels slightly improved since yesterday, though still feeling chest tightness at around 3-4 hr mary from last neb treatment   - Wants help with coordination of new neb and CPAP machines at home, states they are ~10 years old now   - Denies other acute complaints     Objective:    Review of Systems:   A comprehensive review of systems was completed; pertinent positive and negatives stated in subjective.    Vital signs:  Temp:  [97 °F (36.1 °C)-98.4 °F (36.9 °C)] 98.4 °F (36.9 °C)  Pulse:  [59-84] 68  Resp:  [16-27] 18  BP: (110-121)/(60-74) 121/73  SpO2:  [96 %-100 %] 97 %    Physical Exam:    General: No acute distress  Respiratory: + diffuse expiratory wheezes bilaterally improved, + rhonchi  Cardiovascular: S1, S2, regular rate and rhythm  Abdomen: Soft, Non-tender, non-distended, positive bowel sounds  Neuro: No new focal deficits.   Extremities: no edema    Diagnostic Data:    Labs:  Recent Labs   Lab 24  1544 24  0633 25  0633   WBC 7.5 6.4 6.6   HGB 11.3* 10.9* 10.7*   MCV 73.1* 72.9* 73.8*   .0* 139.0* 138.0*       Recent Labs   Lab 24  1544 24  0633 25  0633   GLU 74 128* 94   BUN 11 12 13   CREATSERUM 0.76 0.89 0.93   CA 8.6* 7.9* 8.0*   ALB 4.0  --   --     141 142   K 3.7 4.2 4.0    109 109   CO2 28.0 27.0 26.0   ALKPHO 78  --   --    AST 28  --   --    ALT 25  --   --    BILT 0.5  --   --    TP 7.0  --   --        Estimated Creatinine Clearance: 63.2 mL/min (based on SCr of 0.93 mg/dL).    Recent Labs   Lab 24  1544   TROPHS 6       No results for input(s): \"PTP\",  \"INR\" in the last 168 hours.               Microbiology    No results found for this visit on 12/30/24.      Imaging: Reviewed in Epic.    Medications:    atorvastatin  40 mg Oral Nightly    albuterol  2.5 mg Nebulization 6 times per day    insulin degludec  15 Units Subcutaneous Daily    budesonide  0.5 mg Nebulization 2 times daily    umeclidinium bromide  1 puff Inhalation Daily    nystatin  5 mL Oral TID    azithromycin  250 mg Oral Daily    LORazepam  0.5 mg Oral Once    fluticasone-salmeterol  1 puff Inhalation BID    DULoxetine  60 mg Oral BID    celecoxib  100 mg Oral BID    carvedilol  3.125 mg Oral BID with meals    baclofen  10 mg Oral BID    enoxaparin  40 mg Subcutaneous Daily    guaiFENesin ER  600 mg Oral BID    levothyroxine  88 mcg Oral Before breakfast    montelukast  10 mg Oral Nightly    pantoprazole  20 mg Oral BID AC    pregabalin  200 mg Oral BID    thiamine  100 mg Oral Daily    insulin aspart  1-68 Units Subcutaneous TID CC    insulin aspart  1-10 Units Subcutaneous TID AC and HS       Assessment & Plan:      #Rhino/enterovirus bronchitis    - severe anaphylactic reaction with IV steroids; avoiding use   -  Duonebs, inhalers per pulm    - Pulm on consult   - d-dimer, trop, BNP all negative  -  SW to help with coordinating neb/CPAP at home    - Pt requires home nebulizer for treatment of bronchitis/asthma exacerbation at discharge    #Acute Asthma flare 2/2 above- as above   On Fasenra as OP  Zithro for anti-inflammatory benefit      #pHTN     # Multiple sclerosis  On Tysabri  Typically gets PLEX for flares given her steroid allergy     # Chronic atypical chest pain with mild CAD  Sees [facility] as OP  Had CTA gated coronary arteries 11/20/2024 which showed mild proximal LAD disease  Cont. BB/statin     #DM2  Degludec   IC 1:10g  ICF 1:30>140  Diabetes APRN consulted   Hold orals for now    Alvaro Thapa MD    Supplementary Documentation:     Quality:  DVT Mechanical Prophylaxis:     Early  ambuation  DVT Pharmacologic Prophylaxis   Medication    heparin (Porcine) 100 Units/mL lock flush 500 Units    enoxaparin (Lovenox) 40 MG/0.4ML SUBQ injection 40 mg                Code Status: Full Code  Panda: No urinary catheter in place  Panda Duration (in days):   Central line:    KAYLEY:     The 21st Century Cures Act makes medical notes like these available to patients in the interest of transparency. Please be advised this is a medical document. Medical documents are intended to carry relevant information, facts as evident, and the clinical opinion of the practitioner. The medical note is intended as peer to peer communication and may appear blunt or direct. It is written in medical language and may contain abbreviations or verbiage that are unfamiliar.

## 2025-01-03 NOTE — PLAN OF CARE
Brief Endocrine Note    Covering for [facility] DM service.     BG have been very well controlled. Fasting BG slightly low today, will reduce Tresiba slightly.     Continue current aspart.     If discharged over the weekend, recommend patient reconnect her pump 24hrs after last dose of Tresiba was given. Follow up with [facility].     Sandra Bishop DO

## 2025-01-03 NOTE — PROGRESS NOTES
[facility] Pulmonary Progress Note    Kat Crook Patient Status:  Inpatient    3/11/1970 MRN [id number]   Location [facility] 5NW-A Attending Thapa, Alvaro Woods, *   Hosp Day # 4 PCP Justin Huerta MD     Subjective:    Overnight:  No acute events over night. Afebrile. On RA. Wore cpap overnight and feels like it helps with her breathing, notices she desats while sleeping. Complaints of SOB at rest and worse activity, cough, and wheezing. Feels better than yesterday.     Objective:  /73 (BP Location: Left arm)   Pulse 68   Temp 98.4 °F (36.9 °C) (Oral)   Resp 18   Ht 5' 6\" (1.676 m)   Wt 164 lb (74.4 kg)   LMP  (LMP Unknown)   SpO2 97%   BMI 26.47 kg/m²     Temp (24hrs), Av.9 °F (36.6 °C), Min:97 °F (36.1 °C), Max:98.4 °F (36.9 °C)      Intake/Output:  No intake or output data in the 24 hours ending 25 0852    Physical Exam:   General: alert, cooperative, oriented.  No respiratory distress.   Head: Normocephalic, without obvious abnormality, atraumatic.   Throat: Lips, mucosa, and tongue normal.  No thrush noted.   Neck: trachea midline, no adenopathy, no thyromegaly. No JVD.   Lungs: diminished breath sounds bilaterally, wheezes R anterior, L anterior   Chest wall: No tenderness or deformity.   Heart: regular rate and rhythm   Abdomen: soft, non-distended, no masses, no guarding, no     Rebound.   Extremity: No edema or cyanosis   Skin: No rashes or lesions.   Neurological: Alert, interactive, no focal deficits    Lab Data Review:  Recent Labs     25  0633   WBC 6.6   HGB 10.7*   .0*     Recent Labs     25  0633      K 4.0      CO2 26.0   BUN 13   CREATSERUM 0.93     No results for input(s): \"PTP\", \"INR\", \"PTT\" in the last 168 hours.    Cultures:   No results found for this visit on 24.    Radiology:  XR CHEST AP PORTABLE  (CPT=71045)  Narrative: PROCEDURE:  XR CHEST AP PORTABLE  (CPT=71045)     TECHNIQUE:  AP chest radiograph was obtained.      COMPARISON:  [facility] , XR, XR CHEST AP PORTABLE  (CPT=71045), 4/12/2024, 10:41 AM.     INDICATIONS:  SOB, audible wheeze     PATIENT STATED HISTORY: (As transcribed by Technologist)           FINDINGS:  Right-sided chest port is noted with subclavian catheter.  Tip near SVC/RA junction.  Lungs are clear.  Heart size is within normal limits.  Aorta is atherosclerotic.  Chest wall structures are unremarkable.                   Impression: CONCLUSION:  Right-sided chest port is in stable position.  There is otherwise no evidence of active cardiopulmonary disease.        LOCATION:  [facility]                 Dictated by (CST): Kamar Lopez MD on 12/30/2024 at 3:30 PM       Finalized by (CST): Kamar Lopez MD on 12/30/2024 at 3:31 PM         Medications reviewed     Assessment and Plan:   Patient Active Problem List   Diagnosis    Neuropathic pain of both legs    Elevated liver enzymes    Hyperparathyroidism (HCC)    Asthma (HCC)    Osteoporosis    Fibromyalgia    Goiter    Hypervitaminosis D    Demyelinating disease of central nervous system (HCC)    Tachycardia    Encephalopathy    AVN (avascular necrosis of bone) (HCC)    Gastroparesis    Fatty liver    Diabetes mellitus (HCC)    Generalized abdominal pain    Multiple sclerosis (HCC)    At risk for falling    Cardiomyopathy in other disease    Weakness of both lower extremities    CATIA (acute kidney injury) (HCC)    Hypokalemia    MS (multiple sclerosis) (HCC)    Hypotension    Anemia    Weakness generalized    Essential hypertension    Irritable bowel syndrome (IBS)    S/P laparoscopic surgery    Intra-abdominal adhesions    Dyspnea, paroxysmal nocturnal    Multiple sclerosis exacerbation (HCC)    Dysuria    History of plasmapheresis    Hyponatremia    Abdominal pain    Hyperglycemia    MOI (obstructive sleep apnea)    Dehydration    Abdominal pain, right upper quadrant    Allergic rhinitis    Chronic fatigue and malaise    Dilated idiopathic cardiomyopathy (HCC)     Dizziness    Dyspnea    Chest pain, unspecified    Nephrolithiasis    Undiagnosed cardiac murmurs    Sickle cell trait (HCC)    Pure hypercholesterolemia    GIORDANO (nonalcoholic steatohepatitis)    Myalgia and myositis, unspecified    Liver lesion    Interstitial cystitis    Family history of coronary arteriosclerosis    Type 1 diabetes mellitus with ketoacidosis without coma (HCC)    Urinary tract infection without hematuria, site unspecified    Controlled type 1 diabetes mellitus with hyperglycemia (HCC)    Chest pain of uncertain etiology    Abdominal pain of unknown etiology    Spinal stenosis    Anemia due to chronic blood loss    Aseptic necrosis of head or neck of femur    Bilateral tinnitus    Mild persistent asthma with acute exacerbation (HCC)    Generalized abdominal tenderness    Generalized edema    Hyperlipidemia    Muscle weakness    Disorder of nervous system due to type 2 diabetes mellitus (HCC)    Peripheral vascular disorder due to diabetes mellitus (HCC)    Polyneuropathy due to type 2 diabetes mellitus (HCC)    Vitamin D deficiency    Constipation    Right upper quadrant pain    Shortness of breath    Chronic pain disorder    Headache    Hypothyroidism    Idiopathic osteoporosis    Type 2 diabetes mellitus without complication, with long-term current use of insulin (HCC)    Fatigue    Esophageal reflux    Myalgia    Pulmonary embolism on left (HCC)    Microcytic anemia    Alpha-thalassemia (HCC)    Pulmonary infiltrate    Encounter for anticoagulation discussion and counseling    Right lower quadrant pain    Hemodialysis catheter dysfunction (HCC)    Primary hypertension    Age-related cataract of both eyes    Anxiety and depression    Palpitations    Photophobia of both eyes    Preglaucoma, unspecified, bilateral    Unsteady gait when walking    Weakness    Shock (HCC)    Acute cystitis without hematuria    Low serum cortisol level    Pancreatic duct dilated (HCC)    Intractable abdominal pain     Type 1 diabetes mellitus with hyperglycemia (HCC)    Pulmonary hypertension due to myeloproliferative disorder (HCC)    Abnormal finding on GI tract imaging    Asthma exacerbation (HCC)    Moderate persistent asthma with exacerbation (HCC)    Bronchitis with acute wheezing     Assessment:  Rhino/enterovirus infection  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 Forrest  Multiple sclerosis remains on Tysabri-no new deficits  Avoid IV steroids/oral steroids as much as possible as history of allergy     Plan:  Continue nebulized steroids, Avoid IV steroids/oral steroids as much as possible as history of allergy, ideally would strat with steroids but cannot due to allergy  Continue nebulized steroids  Continue ongoing bronchodilators  Continue nebulized bronchodilators  Would continue azithro for 5 total days, 1/5/2025  Encourage increase activity  OOB with meals  Add flutter valve   Will need an outpatient sleep study   Anticipate discharge in 1-2 days     Discussed with patient, Juana CRAWFORD, and Dr Archer      Is this a shared or split note between Advanced Practice Provider and Physician? Yes   SHON Espinosa  [facility] Pulmonary Medicine  Office: [phone]

## 2025-01-04 VITALS
WEIGHT: 164 LBS | HEIGHT: 66 IN | SYSTOLIC BLOOD PRESSURE: 95 MMHG | TEMPERATURE: 98 F | HEART RATE: 66 BPM | DIASTOLIC BLOOD PRESSURE: 57 MMHG | OXYGEN SATURATION: 93 % | RESPIRATION RATE: 18 BRPM | BODY MASS INDEX: 26.36 KG/M2

## 2025-01-04 LAB
GLUCOSE BLD-MCNC: 114 MG/DL (ref 70–99)
GLUCOSE BLD-MCNC: 76 MG/DL (ref 70–99)

## 2025-01-04 PROCEDURE — 99239 HOSP IP/OBS DSCHRG MGMT >30: CPT | Performed by: STUDENT IN AN ORGANIZED HEALTH CARE EDUCATION/TRAINING PROGRAM

## 2025-01-04 PROCEDURE — 99232 SBSQ HOSP IP/OBS MODERATE 35: CPT | Performed by: INTERNAL MEDICINE

## 2025-01-04 RX ORDER — IPRATROPIUM BROMIDE AND ALBUTEROL SULFATE 2.5; .5 MG/3ML; MG/3ML
3 SOLUTION RESPIRATORY (INHALATION) EVERY 6 HOURS PRN
Qty: 120 ML | Refills: 2 | Status: SHIPPED | OUTPATIENT
Start: 2025-01-04

## 2025-01-04 RX ORDER — BUDESONIDE 0.5 MG/2ML
0.5 INHALANT ORAL 2 TIMES DAILY
Qty: 120 ML | Refills: 3 | Status: SHIPPED | OUTPATIENT
Start: 2025-01-04 | End: 2025-01-16

## 2025-01-04 NOTE — PLAN OF CARE
Problem: Patient/Family Goals  Goal: Patient/Family Short Term Goal  Description: Patient's Short Term Goal: 12/30 NOC;sleep,rest  12/31 NOC;SAT monitoring  1/1 noc: rest, sleep  1/2 NOC: sleep  1/3 nocs: sleep  Interventions:   - scheduled trazadone ordered  - Cluster care  - See additional Care Plan goals for specific interventions  Outcome: Progressing     Problem: CARDIOVASCULAR - ADULT  Goal: Maintains optimal cardiac output and hemodynamic stability  Description: INTERVENTIONS:  - Monitor vital signs, rhythm, and trends  - Monitor for bleeding, hypotension and signs of decreased cardiac output  - Evaluate effectiveness of vasoactive medications to optimize hemodynamic stability  - Monitor arterial and/or venous puncture sites for bleeding and/or hematoma  - Assess quality of pulses, skin color and temperature  - Assess for signs of decreased coronary artery perfusion - ex. Angina  - Evaluate fluid balance, assess for edema, trend weights  Outcome: Progressing     Problem: RESPIRATORY - ADULT  Goal: Achieves optimal ventilation and oxygenation  Description: INTERVENTIONS:  - Assess for changes in respiratory status  - Assess for changes in mentation and behavior  - Position to facilitate oxygenation and minimize respiratory effort  - Oxygen supplementation based on oxygen saturation or ABGs  - Provide Smoking Cessation handout, if applicable  - Encourage broncho-pulmonary hygiene including cough, deep breathe, Incentive Spirometry  - Assess the need for suctioning and perform as needed  - Assess and instruct to report SOB or any respiratory difficulty  - Respiratory Therapy support as indicated  - Manage/alleviate anxiety  - Monitor for signs/symptoms of CO2 retention  Outcome: Progressing       Patient received tonight alert and oriented x4, vss.  She is on room air, breath sounds are clear but diminished, she admits to some SOB with exertion.  Tele sr.  Pt had requested a sleeping aid on day shift, new  order received for trazadone, new order explained to pt and she verbalized understanding.  Safety and comfort measures provided.

## 2025-01-04 NOTE — PROGRESS NOTES
[facility] Pulmonary Progress Note    Kat Crook Patient Status:  Inpatient    3/11/1970 MRN [id number]   Location [facility] 5NW-A Attending Thapa, Alvaro Woods, *   Hosp Day # 5 PCP Justin Huerta MD     Subjective:    Overnight:  feeling much better, no wheezing today    Objective:  BP 95/57 (BP Location: Left arm)   Pulse 66   Temp 97.8 °F (36.6 °C) (Oral)   Resp 18   Ht 5' 6\" (1.676 m)   Wt 164 lb (74.4 kg)   LMP  (LMP Unknown)   SpO2 93%   BMI 26.47 kg/m²     Temp (24hrs), Av.8 °F (36.6 °C), Min:97.5 °F (36.4 °C), Max:98.1 °F (36.7 °C)      Intake/Output:  No intake or output data in the 24 hours ending 25 1352    Physical Exam:   General: alert, cooperative, oriented.  No respiratory distress.   Head: Normocephalic, without obvious abnormality, atraumatic.   Throat: Lips, mucosa, and tongue normal.  No thrush noted.   Neck: trachea midline, no adenopathy, no thyromegaly. No JVD.   Lungs: no longer wheezing today   Chest wall: No tenderness or deformity.   Heart: regular rate and rhythm   Abdomen: soft, non-distended, no masses, no guarding, no     Rebound.   Extremity: No edema or cyanosis   Skin: No rashes or lesions.   Neurological: Alert, interactive, no focal deficits    Lab Data Review:  Recent Labs     25  0633   WBC 6.6   HGB 10.7*   .0*     Recent Labs     25  0633      K 4.0      CO2 26.0   BUN 13   CREATSERUM 0.93     No results for input(s): \"PTP\", \"INR\", \"PTT\" in the last 168 hours.    Cultures:   No results found for this visit on 24.    Radiology:  XR CHEST AP PORTABLE  (CPT=71045)  Narrative: PROCEDURE:  XR CHEST AP PORTABLE  (CPT=71045)     TECHNIQUE:  AP chest radiograph was obtained.     COMPARISON:  [place] , XR, XR CHEST AP PORTABLE  (CPT=71045), 2024, 10:41 AM.     INDICATIONS:  SOB, audible wheeze     PATIENT STATED HISTORY: (As transcribed by Technologist)           FINDINGS:  Right-sided chest port is noted with  subclavian catheter.  Tip near SVC/RA junction.  Lungs are clear.  Heart size is within normal limits.  Aorta is atherosclerotic.  Chest wall structures are unremarkable.                   Impression: CONCLUSION:  Right-sided chest port is in stable position.  There is otherwise no evidence of active cardiopulmonary disease.        LOCATION:  [facility]                 Dictated by (CST): Kamar Lopez MD on 12/30/2024 at 3:30 PM       Finalized by (CST): Kamar Lopez MD on 12/30/2024 at 3:31 PM         Medications reviewed     Assessment and Plan:   Patient Active Problem List   Diagnosis    Neuropathic pain of both legs    Elevated liver enzymes    Hyperparathyroidism (HCC)    Asthma (HCC)    Osteoporosis    Fibromyalgia    Goiter    Hypervitaminosis D    Demyelinating disease of central nervous system (HCC)    Tachycardia    Encephalopathy    AVN (avascular necrosis of bone) (HCC)    Gastroparesis    Fatty liver    Diabetes mellitus (HCC)    Generalized abdominal pain    Multiple sclerosis (HCC)    At risk for falling    Cardiomyopathy in other disease    Weakness of both lower extremities    CATIA (acute kidney injury) (HCC)    Hypokalemia    MS (multiple sclerosis) (HCC)    Hypotension    Anemia    Weakness generalized    Essential hypertension    Irritable bowel syndrome (IBS)    S/P laparoscopic surgery    Intra-abdominal adhesions    Dyspnea, paroxysmal nocturnal    Multiple sclerosis exacerbation (HCC)    Dysuria    History of plasmapheresis    Hyponatremia    Abdominal pain    Hyperglycemia    MOI (obstructive sleep apnea)    Dehydration    Abdominal pain, right upper quadrant    Allergic rhinitis    Chronic fatigue and malaise    Dilated idiopathic cardiomyopathy (HCC)    Dizziness    Dyspnea    Chest pain, unspecified    Nephrolithiasis    Undiagnosed cardiac murmurs    Sickle cell trait (HCC)    Pure hypercholesterolemia    GIORDANO (nonalcoholic steatohepatitis)    Myalgia and myositis, unspecified    Liver  lesion    Interstitial cystitis    Family history of coronary arteriosclerosis    Type 1 diabetes mellitus with ketoacidosis without coma (HCC)    Urinary tract infection without hematuria, site unspecified    Controlled type 1 diabetes mellitus with hyperglycemia (HCC)    Chest pain of uncertain etiology    Abdominal pain of unknown etiology    Spinal stenosis    Anemia due to chronic blood loss    Aseptic necrosis of head or neck of femur    Bilateral tinnitus    Mild persistent asthma with acute exacerbation (HCC)    Generalized abdominal tenderness    Generalized edema    Hyperlipidemia    Muscle weakness    Disorder of nervous system due to type 2 diabetes mellitus (HCC)    Peripheral vascular disorder due to diabetes mellitus (HCC)    Polyneuropathy due to type 2 diabetes mellitus (HCC)    Vitamin D deficiency    Constipation    Right upper quadrant pain    Shortness of breath    Chronic pain disorder    Headache    Hypothyroidism    Idiopathic osteoporosis    Type 2 diabetes mellitus without complication, with long-term current use of insulin (HCC)    Fatigue    Esophageal reflux    Myalgia    Pulmonary embolism on left (HCC)    Microcytic anemia    Alpha-thalassemia (HCC)    Pulmonary infiltrate    Encounter for anticoagulation discussion and counseling    Right lower quadrant pain    Hemodialysis catheter dysfunction (HCC)    Primary hypertension    Age-related cataract of both eyes    Anxiety and depression    Palpitations    Photophobia of both eyes    Preglaucoma, unspecified, bilateral    Unsteady gait when walking    Weakness    Shock (HCC)    Acute cystitis without hematuria    Low serum cortisol level    Pancreatic duct dilated (HCC)    Intractable abdominal pain    Type 1 diabetes mellitus with hyperglycemia (HCC)    Pulmonary hypertension due to myeloproliferative disorder (HCC)    Abnormal finding on GI tract imaging    Asthma exacerbation (HCC)    Moderate persistent asthma with exacerbation  (HCC)    Bronchitis with acute wheezing     Assessment:  Rhino/enterovirus infection  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 Forrest  Multiple sclerosis remains on Tysabri-no new deficits  Avoid IV steroids/oral steroids as much as possible as history of allergy     Plan:  Continue nebulized steroids, Avoid IV steroids/oral steroids as much as possible as history of allergy, ideally would strat with steroids but cannot due to allergy  Continue nebulized steroids  Continue ongoing bronchodilators  Continue nebulized bronchodilators  Finish azithro today  Encourage increase activity  OOB with meals  Add flutter valve   Will need an outpatient sleep study   If walk is ok, ok for dc from my perspective

## 2025-01-04 NOTE — PROGRESS NOTES
01/04/25 1331   Mobility   O2 walk? Yes   SPO2% on Room Air at Rest 96   SPO2% Ambulation on Room Air 90

## 2025-01-04 NOTE — PROGRESS NOTES
[facility]   part of [facility]     Hospitalist Progress Note     Kat Crook Patient Status:  Inpatient    3/11/1970 MRN [id number]   Location [facility] 5NW-A Attending Alvaro Thapa, *   Hosp Day # 5 PCP Justin Huerta MD     Chief Complaint: SOB    Subjective:      - Feeling breathing improved, can go longer without neb treatments, and was able to ambulate around halls with less difficulty   - Denies other acute complaints   - Home nebulizer machine to be delivered today     Objective:    Review of Systems:   A comprehensive review of systems was completed; pertinent positive and negatives stated in subjective.    Vital signs:  Temp:  [97.5 °F (36.4 °C)-98.4 °F (36.9 °C)] 97.5 °F (36.4 °C)  Pulse:  [55-72] 55  Resp:  [16-18] 17  BP: ()/(60-73) 106/71  SpO2:  [97 %-100 %] 100 %    Physical Exam:    General: No acute distress  Respiratory: + trace expiratory wheezes bilaterally improved, + rhonchi  Cardiovascular: S1, S2, regular rate and rhythm  Abdomen: Soft, Non-tender, non-distended, positive bowel sounds  Neuro: No new focal deficits.   Extremities: no edema    Diagnostic Data:    Labs:  Recent Labs   Lab 24  1544 24  0633 25  0633   WBC 7.5 6.4 6.6   HGB 11.3* 10.9* 10.7*   MCV 73.1* 72.9* 73.8*   .0* 139.0* 138.0*       Recent Labs   Lab 24  1544 24  0633 25  0633   GLU 74 128* 94   BUN 11 12 13   CREATSERUM 0.76 0.89 0.93   CA 8.6* 7.9* 8.0*   ALB 4.0  --   --     141 142   K 3.7 4.2 4.0    109 109   CO2 28.0 27.0 26.0   ALKPHO 78  --   --    AST 28  --   --    ALT 25  --   --    BILT 0.5  --   --    TP 7.0  --   --        Estimated Creatinine Clearance: 63.2 mL/min (based on SCr of 0.93 mg/dL).    Recent Labs   Lab 24  1544   TROPHS 6       No results for input(s): \"PTP\", \"INR\" in the last 168 hours.               Microbiology    No results found for this visit on 24.      Imaging: Reviewed in  Epic.    Medications:    insulin degludec  14 Units Subcutaneous Daily    traZODone  50 mg Oral Nightly    atorvastatin  40 mg Oral Nightly    albuterol  2.5 mg Nebulization 6 times per day    budesonide  0.5 mg Nebulization 2 times daily    umeclidinium bromide  1 puff Inhalation Daily    nystatin  5 mL Oral TID    azithromycin  250 mg Oral Daily    LORazepam  0.5 mg Oral Once    fluticasone-salmeterol  1 puff Inhalation BID    DULoxetine  60 mg Oral BID    celecoxib  100 mg Oral BID    carvedilol  3.125 mg Oral BID with meals    baclofen  10 mg Oral BID    enoxaparin  40 mg Subcutaneous Daily    guaiFENesin ER  600 mg Oral BID    levothyroxine  88 mcg Oral Before breakfast    montelukast  10 mg Oral Nightly    pantoprazole  20 mg Oral BID AC    pregabalin  200 mg Oral BID    thiamine  100 mg Oral Daily    insulin aspart  1-68 Units Subcutaneous TID CC    insulin aspart  1-10 Units Subcutaneous TID AC and HS       Assessment & Plan:      #Rhino/enterovirus bronchitis , improving    - severe anaphylactic reaction with IV steroids; avoiding use   -  Duonebs, inhalers per pulm    - Pulm on consult   - d-dimer, trop, BNP all negative  -  SW to help with coordinating neb/CPAP at home    - Pt requires home nebulizer for treatment of bronchitis/asthma exacerbation at discharge    #Acute Asthma flare 2/2 above- as above   On Fasenra as OP  Zithro for anti-inflammatory benefit      #pHTN     # Multiple sclerosis  On Tysabri  Typically gets PLEX for flares given her steroid allergy     # Chronic atypical chest pain with mild CAD  Sees [facility] as OP  Had CTA gated coronary arteries 11/20/2024 which showed mild proximal LAD disease  Cont. BB/statin     #DM2  Degludec   IC 1:10g  ICF 1:30>140  Diabetes APRN consulted   Hold orals for now    Hopefully home in next 1-2 days pending improvement     Alvaro Thapa MD    Supplementary Documentation:     Quality:  DVT Mechanical Prophylaxis:     Early ambuation  DVT Pharmacologic  Prophylaxis   Medication    heparin (Porcine) 100 Units/mL lock flush 500 Units    enoxaparin (Lovenox) 40 MG/0.4ML SUBQ injection 40 mg                Code Status: Full Code  Panda: No urinary catheter in place  Panda Duration (in days):   Central line:    KAYLEY:     The 21st Century Cures Act makes medical notes like these available to patients in the interest of transparency. Please be advised this is a medical document. Medical documents are intended to carry relevant information, facts as evident, and the clinical opinion of the practitioner. The medical note is intended as peer to peer communication and may appear blunt or direct. It is written in medical language and may contain abbreviations or verbiage that are unfamiliar.

## 2025-01-04 NOTE — PLAN OF CARE
Patient is a/o x4. On room air. O2 walk today on room air. Tolerated well. NSR on tele. Lovenox. Carb control diet. QID accu-checks. IV zithro. Up self. Denies pain today. Discharge planning.   Problem: Patient/Family Goals  Goal: Patient/Family Long Term Goal  Description: Patient's Long Term Goal: Discharge to home    Interventions:  - Follow POC  - See additional Care Plan goals for specific interventions  Outcome: Progressing  Goal: Patient/Family Short Term Goal  Description: Patient's Short Term Goal: 12/30 NOC;sleep,rest  12/31 NOC;SAT monitoring  1/1 noc: rest, sleep  1/2 NOC: sleep  1/3 nocs: sleep  Interventions:   - scheduled trazadone ordered  - Cluster care  - See additional Care Plan goals for specific interventions  Outcome: Progressing

## 2025-01-05 NOTE — PROGRESS NOTES
NURSING DISCHARGE NOTE    Discharged Home via Wheelchair.  Accompanied by Support staff  Belongings Taken by patient/family.    AVS explained. Answered all questions and concerns. Port saline locked and decannulated. VSS. No further needs at this time.

## 2025-01-06 ENCOUNTER — PATIENT OUTREACH (OUTPATIENT)
Dept: CASE MANAGEMENT | Age: 55
End: 2025-01-06

## 2025-01-06 NOTE — PROGRESS NOTES
Called pt to schedule a hospital follow-up appt :      DM / PCP Request :    Last A1C Value: 8% Date: [12/30/2024]     Call Justin Huerta in 1 week   Specialty: Internal Medicine, IP Consult to Primary Care For follow-up after hospitilization [facility]   [address] [facility] [address]   [phone]  Friday 1/10 @10:30am w/ Dr. Huerta (existing appt)    Spoke with pt and she confirmed an existing HFU appt.      Closing encounter

## 2025-01-06 NOTE — DISCHARGE SUMMARY
[facility] HOSPITALIST  DISCHARGE SUMMARY     Kat Crook Patient Status:  Inpatient    3/11/1970 MRN [id number]   Location [facility] 5NW-A Attending No att. providers found   Hosp Day # 5 PCP Justin Huerta MD     Date of Admission: 2024  Date of Discharge: 2025  Discharge Disposition: Home Health Care Services    Admitting Diagnosis:   Moderate persistent asthma with exacerbation (HCC) [J45.41]    Hospital Discharge Diagnoses:   Asthma exacerbation 2/2 viral bronchitis   Rhino/enterovirus infecteion  pHTN  Multiple sclerosis   CAD  DM2    Lace+ Score: 79  59-90 High Risk  29-58 Medium Risk  0-28   Low Risk.    TCM Follow-Up Recommendation:  LACE > 58: High Risk of readmission after discharge from the hospital.        Discharge Diagnosis:   Asthma exacerbation 2/2 viral bronchitis     History of Present Illness: Kat Crook is a 54 year old female with a past medical history of asthma, cardiomyopathy, hypertension, diabetes, multiple sclerosis and alpha thalassemia minor.  She comes to the emergency room now secondary to increasing dyspnea and cough for the past 3 to 4 days.  She has been using her inhaler with minimal relief.  She has had chest discomfort that she states is nonexertional and nonpleuritic and does not radiate.  In the emergency room the patient's viral panel is positive for rhinovirus/enterovirus.  She states that she has a severe allergy to steroids and has been given breathing treatments with some treatment.    Brief Synopsis: Presented for wheezing, viral URI symptoms. Found to be in asthma exacerbation, started on Duonebs, pulm consulted. Pt has severe anaphylactic reaction to IV steroids in the past and thus steroids held for exacerbation. She gradually improved with supportive therapy and cleared for DC to home with continued home nebulizer therapy and pulm f/u. Home nebulizer machine coordinated by SW prior to DC . All diagnoses discussed with patient, they demonstrated  understanding and plan of care and risks reviewed and in agreement.     Procedures during hospitalization:       Incidental or significant findings and recommendations (brief descriptions):      Lab/Test results pending at Discharge:       Consultants:  Pulmonology     Discharge Medication List:     Discharge Medications        START taking these medications        Instructions Prescription details   budesonide 0.5 MG/2ML Susp  Commonly known as: Pulmicort      Take 2 mL (0.5 mg total) by nebulization 2 (two) times daily.   Quantity: 120 mL  Refills: 3     Full Kit Nebulizer Set Misc      1 kit As Directed.   Quantity: 1 each  Refills: 0     ipratropium-albuterol 0.5-2.5 (3) MG/3ML Soln  Commonly known as: Duoneb      Take 3 mL by nebulization every 6 (six) hours as needed.   Quantity: 120 mL  Refills: 2            CHANGE how you take these medications        Instructions Prescription details   albuterol (2.5 MG/3ML) 0.083% Nebu  Commonly known as: Ventolin  What changed: Another medication with the same name was removed. Continue taking this medication, and follow the directions you see here.      Take 3 mL (2.5 mg total) by nebulization 3 (three) times daily.   Quantity: 90 each  Refills: 11            CONTINUE taking these medications        Instructions Prescription details   atorvastatin 40 MG Tabs  Commonly known as: Lipitor      Take 1 tablet (40 mg total) by mouth nightly.   Refills: 0     baclofen 10 MG Tabs  Commonly known as: Lioresal      Take 1 tablet (10 mg total) by mouth 2 (two) times daily.   Refills: 0     carvedilol 3.125 MG Tabs  Commonly known as: Coreg      Take 1 tablet (3.125 mg total) by mouth 2 (two) times daily.   Refills: 0     CeleBREX 100 MG Caps  Generic drug: celecoxib      Take 1 capsule (100 mg total) by mouth 2 (two) times daily.   Refills: 0     Cholecalciferol 125 MCG (5000 UT) Tabs  Commonly known as: VITAMIN D-3      Take 1 tablet (5,000 Units total) by mouth daily.   Refills:  0     Ciclopirox 8 % Soln      APPLY EVERY DAY   Refills: 0     denosumab 60 MG/ML Soln  Commonly known as: PROLIA      Inject 1 mL (60 mg total) into the skin every 6 (six) months.   Refills: 0     Dexcom G6 Sensor Misc      USE AS DIRECTED EVERY 10 DAYS   Quantity: 9 each  Refills: 3     Dexcom G6 Transmitter Misc      USE AS DIRECTED EVERY 90 DAYS   Quantity: 1 each  Refills: 3     docusate sodium 100 MG Caps  Commonly known as: Colace      Take 1 capsule (100 mg total) by mouth 2 (two) times daily.   Refills: 0     DULoxetine 60 MG Cpep  Commonly known as: Cymbalta      Take 1 capsule (60 mg total) by mouth 2 (two) times daily.   Refills: 0     EPINEPHrine 0.15 MG/0.3ML Soaj  Commonly known as: EpiPen Jr      Inject 0.3 mL (0.15 mg total) into the muscle as needed for Anaphylaxis. Jani pen/lower dose because pt is allergic to epinephrine   Refills: 0     Farxiga 10 MG Tabs  Generic drug: dapagliflozin      Take 1 tablet (10 mg total) by mouth daily.   Refills: 0     Fasenra Pen 30 MG/ML Soaj  Generic drug: Benralizumab      Inject 30 mg into the skin. Every other month/every 8 weeks   Refills: 0     Fiasp FlexTouch 100 UNIT/ML Sopn  Generic drug: Insulin Aspart (w/Niacinamide)       Refills: 0     fluticasone furoate-vilanterol 200-25 MCG/ACT Aepb  Commonly known as: Breo Ellipta      Inhale 1 puff into the lungs daily.   Refills: 0     HYDROcodone-acetaminophen  MG Tabs  Commonly known as: Norco      Take 1 tablet by mouth 4 (four) times daily as needed.   Refills: 0     insulin lispro 100 UNIT/ML Soln  Commonly known as: HumaLOG      INJECT UP TO 70 UNITS VIA INSULIN PUMP DAILY   Quantity: 70 mL  Refills: 2     levothyroxine 88 MCG Tabs  Commonly known as: Synthroid      Take 1 tablet (88 mcg total) by mouth before breakfast.   Refills: 0     Linzess 145 MCG Caps  Generic drug: linaCLOtide       Refills: 0     Lyllana 0.025 MG/24HR Pttw  Generic drug: estradiol      Place 1 patch onto the skin twice a  week.   Refills: 0     metoprolol tartrate 50 MG Tabs  Commonly known as: Lopressor      TAKE 1 TABLET BY MOUTH ONCE THE NIGHT BEFORE CTA AND 1 TABLET THE MORNING OF CTA   Refills: 0     montelukast 10 MG Tabs  Commonly known as: Singulair      Take 1 tablet (10 mg total) by mouth nightly.   Refills: 0     NON FORMULARY      Tandem insulin pump settings (Bolus IQ)  Mdnt 3.1 units/hr  0400 3.00 units/hr  0900 3.2 units/hr    Correction:   MN 1:35/110  1 PM 1:40  7 pm 1:35    CHO ratio:  MN 1:5g    Target glucose level - 100 and 80   Refills: 0     ondansetron 4 mg tablet  Commonly known as: Zofran      Take 1 tablet (4 mg total) by mouth every 8 (eight) hours as needed for Nausea.   Refills: 0     pantoprazole 20 MG Tbec  Commonly known as: Protonix      Take 1 tablet (20 mg total) by mouth 2 (two) times daily.   Refills: 0     pregabalin 200 MG Caps  Commonly known as: LYRICA      Take 1 capsule (200 mg total) by mouth 2 (two) times daily.   Refills: 0     thiamine 100 MG Tabs  Commonly known as: Vitamin B1      Take 1 tablet (100 mg total) by mouth daily.   Quantity: 30 tablet  Refills: 3     Tysabri 300 MG/15ML Conc  Generic drug: natalizumab      Inject into the vein. Every 8 weeks   Refills: 0               Where to Get Your Medications        [id number] - [place] [id number] AT [facility], [phone], [phone]  [address], [facility] [address]      Phone: [phone]   budesonide 0.5 MG/2ML Susp  Full Kit Nebulizer Set Misc  ipratropium-albuterol 0.5-2.5 (3) MG/3ML Soln         [place]    Follow-up appointment:   Justin Huerta MD  [address] [facility] [address] [phone]    Call in 1 week(s)  For follow-up after hospitilization      Vital signs:       Physical Exam:  See exam from day of discharge  note  -----------------------------------------------------------------------------------------------  PATIENT DISCHARGE INSTRUCTIONS: See electronic chart    Alvaro Thapa MD 1/6/2025    Time spent:  33 minutes

## 2025-01-16 ENCOUNTER — TELEPHONE (OUTPATIENT)
Facility: CLINIC | Age: 55
End: 2025-01-16

## 2025-01-16 ENCOUNTER — TELEPHONE (OUTPATIENT)
Dept: NEUROLOGY | Facility: CLINIC | Age: 55
End: 2025-01-16

## 2025-01-16 DIAGNOSIS — J45.909 MODERATE ASTHMA, UNSPECIFIED WHETHER COMPLICATED, UNSPECIFIED WHETHER PERSISTENT (HCC): Primary | ICD-10-CM

## 2025-01-16 DIAGNOSIS — J45.901 EXACERBATION OF ASTHMA, UNSPECIFIED ASTHMA SEVERITY, UNSPECIFIED WHETHER PERSISTENT (HCC): ICD-10-CM

## 2025-01-16 DIAGNOSIS — J40 BRONCHITIS WITH ACUTE WHEEZING: ICD-10-CM

## 2025-01-16 RX ORDER — BUDESONIDE 0.5 MG/2ML
0.5 INHALANT ORAL 2 TIMES DAILY
Qty: 120 ML | Refills: 3 | Status: SHIPPED | OUTPATIENT
Start: 2025-01-16

## 2025-01-16 NOTE — TELEPHONE ENCOUNTER
Tysabri infusion record received from [facility] infusion center for physician review.  No signs/symptoms of infusion reaction noted.  Patient infused on 1/16/2025. No observation period required.  Lot Number [id number] x1, Exp 12/2027  No new orders requested.  Orders active for 7 additional infusions.  Next infusion scheduled for 3/13/2025. Patient infusing every 8 weeks.  Placed in physician's box for review.     Recent JCV index = 1.09

## 2025-01-16 NOTE — TELEPHONE ENCOUNTER
Received notification from [employer] to to prior authorization on patient's order for Budesonide 0.5 mg-2 ml suspension. Order re-entered with appropriate diagnosis code and for medication to be billed under Medicare Part B.

## 2025-01-21 ENCOUNTER — TELEPHONE (OUTPATIENT)
Facility: CLINIC | Age: 55
End: 2025-01-21

## 2025-01-21 NOTE — TELEPHONE ENCOUNTER
[facility] called regarding fax received budesonide neb solution needed a prior authorization. Prescription was re-sent with appropriate diagnosis code. [facility] called, per pharmacist medication is ready for . [employer] message sent to patient making aware prescription is ready for .

## 2025-01-31 ENCOUNTER — TELEPHONE (OUTPATIENT)
Age: 55
End: 2025-01-31

## 2025-02-25 ENCOUNTER — TELEPHONE (OUTPATIENT)
Facility: CLINIC | Age: 55
End: 2025-02-25

## 2025-02-25 ENCOUNTER — PATIENT MESSAGE (OUTPATIENT)
Facility: CLINIC | Age: 55
End: 2025-02-25

## 2025-02-25 ENCOUNTER — OFFICE VISIT (OUTPATIENT)
Facility: CLINIC | Age: 55
End: 2025-02-25
Payer: MEDICARE

## 2025-02-25 VITALS
WEIGHT: 167 LBS | OXYGEN SATURATION: 100 % | HEIGHT: 66 IN | SYSTOLIC BLOOD PRESSURE: 118 MMHG | RESPIRATION RATE: 16 BRPM | BODY MASS INDEX: 26.84 KG/M2 | DIASTOLIC BLOOD PRESSURE: 82 MMHG | HEART RATE: 68 BPM

## 2025-02-25 DIAGNOSIS — G47.33 OSA (OBSTRUCTIVE SLEEP APNEA): Primary | ICD-10-CM

## 2025-02-25 DIAGNOSIS — J45.901 EXACERBATION OF ASTHMA, UNSPECIFIED ASTHMA SEVERITY, UNSPECIFIED WHETHER PERSISTENT (HCC): ICD-10-CM

## 2025-02-25 DIAGNOSIS — J45.40 MODERATE PERSISTENT ASTHMA, UNSPECIFIED WHETHER COMPLICATED (HCC): ICD-10-CM

## 2025-02-25 PROCEDURE — 99214 OFFICE O/P EST MOD 30 MIN: CPT | Performed by: INTERNAL MEDICINE

## 2025-02-25 RX ORDER — HUMAN INSULIN 100 [IU]/ML
100 INJECTION, SOLUTION SUBCUTANEOUS DAILY
COMMUNITY

## 2025-02-25 RX ORDER — ALBUTEROL SULFATE 90 UG/1
2 INHALANT RESPIRATORY (INHALATION) EVERY 4 HOURS PRN
Qty: 1 EACH | Refills: 11 | Status: SHIPPED | OUTPATIENT
Start: 2025-02-25

## 2025-02-25 RX ORDER — GLUCAGON INJECTION, SOLUTION 1 MG/.2ML
1 INJECTION, SOLUTION SUBCUTANEOUS AS NEEDED
COMMUNITY
Start: 2024-12-19

## 2025-02-25 NOTE — TELEPHONE ENCOUNTER
Received a PA request for Ipratropium albuterol.  Key:  [id number]  Ordered by Dr. Archer on 1-4-25 as in patient.    Pt saw Dr. Hoyt today and was advised to use Albuterol nebs every 4 hours as needed.    Left message for pt to call back to discuss nebulizer use.

## 2025-02-25 NOTE — PROGRESS NOTES
Pulmonary Consult Note    History of Present Illness:  Kat Crook is a 54 year old female presenting to pulmonary clinic today for beginning of summer bad attack at [facility] -   6 weeks ago- then to [facility]- admitted one night - left   related to weather in summer -  Trigger - burning garbage and leaves-   Allergic to steroids-- swells up - mouth and throat - reaction - last 3 yrs ago -   Remains on fasnerra every other month - at home   Treated with bipap   Now feels OK_ always with dyspnea - walks - slows down - breaths hard with stairs - uses albuterol prior to activity -- able to exercise   Rare cough-- chest tightness and wheezing   Had allergy testing then gets asthma- -- reacts to \"everything\"   Has dog and cats at home     Flare of MS-- pain and difficulty - walking -  In hosp with PLEX x 5 April/May- then to [facility]  Pt at home now   Some shortness of breath - prior to hosp and during the hosp -   Ongoing throat   Walking to parking lot - using albuterol and helps some - not preactivity   Remains on fascerna every 8 weeks -   Coughing is bad at night -   Recent endo with [facility]- dysphagia is still bad- chokes on liquids and solids - no swallow study - puts food in cheek   breo - daily and rescue once every other day   sats all good at [facility]- was given oxygen at night - -  A lot problems at night- coughing worse   Nightmares with melatonin   Follows with Dr. Becker with recent visit    8.23 Had kidney stone that got bigger and then a second one and needs repeat lithotripsy -with stent - and repeat lithrotrispy  and dilation - -- increased pain- -now on the 6th    Remains with dyspnea- thinks sl better with Breo 200 -   Back on breo 100 - rescue - 2-3 times per day 3-4 times per week - - beneiftting   Asthma is so good- fascenra-- every 8 weeks   Not much  coughing -- mucus from sinus   Swallowing told fine on UGI with [facility]- - normal -- but coughed a lot after test and with increased mucus -- to see in  follow up- -   Norco 4 times a day for years-does not think helping much  Atypical pain pattern thought perhaps related to MS unclear  Today with significant increase in right anterior axillary line  Worse with expiration     11.23- - sick for awhile - 3 weeks - neg covid- - antibiotics helped - no fever - not well- no energy - not eating better now   Remains on breo 100 -   Now with mucus - heavy - from sinuses more than chest - - was green - now yellow - occasionally  blood -   Some rescue use and nebulizer - increased to twice a day   Remains with chest pain -- left side and right side left ant chest - not palpable pain - - some with deep breathing -   Remains with fatigue - ? Thyroid - to see primary   Remains on fasenra without issues - every 8 weeks - at home     Got flu shot - covid not yet   Neuro recommended PLEX - for weakness and so tired   Needed O2 during the night while in   Awakenings ongoing with some ocughing --     2.24- on vumerity  -- [employer] went up  not working as well - -increased can't think well and legs are weaker - to see echerverri-  Foot drop left and right ? Starting -   No recent plex -   Gets more short of breath-- walking in here - noted dypsnea -   Spacing out now -   No O2 use - has none   Not sleeping well- - not supine - not flat-- can't breath - had an epsiode during sleep study - legs hurt   No coughing - some in middle of night -   Increased nasal drainage-- all clear - flonase no help   Remains on fasenra- doing well - occasionally  nasal spray - singular - no dreams     6/24- in hosp with flare and needed PLEX_ - was shock ?post plex   Had PT after - still in speech - has been affected - in past but now worse - - in speech   Gets tired with talking   Has been feeling good -- some coughing- rain and the hot-   Told in hosp has sleep apnea - with desats and started on cpap - told better -  Awaknes in the night   Chest pain hurting - across front -   No gerd noted - throat clearing  - - maybe hard to talk and using muscle   Thinks slept better with cpap -   Remains on singular nightly and thinks needs   Some rescue  when hot  10/24-  started with abd pain - in hosp with pancreatic abnormality - for EUS- with pandolfi - -atrophy - to look -   Mild upper to lower below belly button - when active its bad- -   BM help - linzess - helped the pain - better now   - ? Nerve pain as well   Breathing not good- change in weather - tighter and coughing - - needed O2 at night -   Snoring is worse - - and using O2 at night in the hosp - not during the day   Remains on fasnera and breo - using albuterol   -was in the hospital in January with a flare of asthma related to rhinovirus-increased ICS avoiding prednisone--did well and now much improved  Notes dyspnea in the evenings most evenings usually at rest  Questionable minimal benefit from albuterol  Notes some dyspnea at night in bed prior to falling asleep denies any awakenings after falling asleep unless noting GERD            Past Medical History:   Past Medical History:    Abdominal pain    Allergic rhinitis    Anemia    Anxiety    Anxiety state    Arthritis    Asthma (HCC)    Atypical mole    Autoimmune disease (HCC)    Back pain    Bad breath    Black stools    Bloating    Blood disorder    Thalassemia alpha, Sickle cell trait    Blood in urine    Blurred vision    Calculus of kidney    Cardiomyopathy (HCC)    last echo 2021: EF 55%    Chest pain    Constipation    Decorative tattoo    Depression    Diarrhea, unspecified    Dizziness    Easy bruising    Elevated liver enzymes    Endocrine disorder    Esophageal reflux    Fatigue    Fibromyalgia    Food intolerance    Frequent urination    Frequent UTI    Gastroparesis    Glaucoma    H. pylori infection    H/O  section    Headache disorder    Heart attack (HCC)    Heart palpitations    Heartburn    Hemorrhoids    Herniation of cervical intervertebral disc    High blood pressure    High  cholesterol    History of blood clots    Unprovoked    History of blood transfusion    History of cardiac murmur    History of depression    Hoarseness, chronic    Hyperlipidemia    IBS (irritable bowel syndrome)    Irregular bowel habits    Leaking of urine    Liver disease    GIORDANO and Stage 2 fibrosis    Loss of appetite    Migraines    Multiple sclerosis (HCC)    dx 8 years ago    Muscle weakness    cane or walker prn    Myocardial infarction (HCC)    Nausea    Neuropathy    legs, hands, feet; bilateral    Night sweats    Osteoarthritis    Osteoporosis    KAMALJIT in her 30s    Osteoporosis    Pain with bowel movements    Painful urination    Problems with swallowing    Due to MS    Pulmonary embolism (HCC)    Scoliosis of lumbar spine    Shortness of breath    Sickle cell trait    Sleep apnea    no treatment    Sleep disturbance    Stress    Type II or unspecified type diabetes mellitus without mention of complication, not stated as uncontrolled    Uncomfortable fullness after meals    Unspecified disorder of thyroid    total thyroidectomy    Visual impairment    glasses    Wears glasses    Weight loss    Wheezing    cardiac -- one heart attack - - during allergic reaction to steriods - epin-   Dr munoz- diastolic dys and pul htn   HX PE-- told unprovoked - no dvt- completed AC - remains on asa   No cancers   4 surgeries this year - abd - cysto with urethal dilation - stent for kidney stone- with removal - dr ball        Past Surgical History:   Past Surgical History:   Procedure Laterality Date      1986    Cataract      Cath angio  2014    Cholecystectomy      Colonoscopy N/A 2015    Procedure: COLONOSCOPY;  Surgeon: Liz Tong DO;  Location:  ENDOSCOPY    Colonoscopy N/A 2021    Procedure: COLONOSCOPY;  Surgeon: Cedrick Da Silva MD;  Location:  ENDOSCOPY    Colonoscopy N/A 10/08/2021    Procedure: COLONOSCOPY, ESOPHAGOGASTRODUODENOSCOPY (EGD);   Surgeon: Darvin Richardson MD;  Location:  ENDOSCOPY    D & c  1990    Blighted ovum    Egd      Hysterectomy      total hystero.    Ir port a cath insertion exchnge check  2018    Lithotripsy      x 2    Lysis of adhesions      Needle biopsy liver  2016    Oophorectomy Bilateral     total hystero.    Other      dialysis catheter- left chest for plasmaphoresis    Other surgical history       x 2    Port, indwelling, imp  2019    power port    Thyroidectomy  2013    benign MNG    Total abdom hysterectomy      Upper gi endoscopy performed  2014       Family Medical History:   Family History   Problem Relation Age of Onset    Hypertension Mother         Needs to be deleted    Cancer Mother         stomach cancer    Ovarian Cancer Mother         30'S    Colon Polyps Mother     Anemia Mother     Heart Attack Father     Other (Other) Father         COPD, emphysema    Asthma Father     Pulmonary Disease Father     Other (Other) Sister         rectal CA\    Ovarian Cancer Maternal Grandmother         30'S    Colon Polyps Maternal Grandmother     Diabetes Maternal Grandmother     Colon Cancer Maternal Grandmother     Anemia Maternal Grandmother     Cancer Maternal Grandmother     Anemia Daughter     Breast Cancer Maternal Aunt         60'S    Breast Cancer Paternal Cousin Female 64       Social History: Social History    Socioeconomic History      Marital status: Single    Tobacco Use      Smoking status: Never      Smokeless tobacco: Never    Vaping Use      Vaping Use: Never used    Substance and Sexual Activity      Alcohol use: Never      Drug use: Never    Other Topics      Concerns:        Caffeine Concern: No        Exercise: Yes          walks alot  No working - no chemicals  One dog and 2 cats - allergic to them -- not too bad     Allergies: Epinephrine, Immune globulin, Latex, Methylprednisolone, Ocrelizumab, Other, Urea, Pcn [penicillamine], Penicillins, and Midodrine      Medications:   Current Outpatient Medications   Medication Sig Dispense Refill    GVOKE HYPOPEN 2-PACK 1 MG/0.2ML Subcutaneous injection Inject 0.2 mL (1 mg total) into the skin as needed.      NOVOLIN R 100 UNIT/ML Injection Solution 1 mL (100 Units total) daily.      budesonide 0.5 MG/2ML Inhalation Suspension Take 2 mL (0.5 mg total) by nebulization 2 (two) times daily. 120 mL 3    Respiratory Therapy Supplies (FULL KIT NEBULIZER SET) Does not apply Misc 1 kit As Directed. 1 each 0    ipratropium-albuterol 0.5-2.5 (3) MG/3ML Inhalation Solution Take 3 mL by nebulization every 6 (six) hours as needed. 120 mL 2    albuterol (2.5 MG/3ML) 0.083% Inhalation Nebu Soln Take 3 mL (2.5 mg total) by nebulization 3 (three) times daily. 90 each 11    FIASP FLEXTOUCH 100 UNIT/ML Subcutaneous Solution Pen-injector       metoprolol tartrate 50 MG Oral Tab TAKE 1 TABLET BY MOUTH ONCE THE NIGHT BEFORE CTA AND 1 TABLET THE MORNING OF CTA      linaCLOtide (LINZESS) 145 MCG Oral Cap       natalizumab (TYSABRI) 300 mg/15mL Intravenous Conc Inject into the vein. Every 8 weeks      carvedilol 3.125 MG Oral Tab Take 1 tablet (3.125 mg total) by mouth 2 (two) times daily.      montelukast 10 MG Oral Tab Take 1 tablet (10 mg total) by mouth nightly.      levothyroxine 88 MCG Oral Tab Take 1 tablet (88 mcg total) by mouth before breakfast.      pantoprazole 20 MG Oral Tab EC Take 1 tablet (20 mg total) by mouth 2 (two) times daily.      fluticasone furoate-vilanterol 200-25 MCG/ACT Inhalation Aerosol Powder, Breath Activated Inhale 1 puff into the lungs daily.      LYLLANA 0.025 MG/24HR Transdermal Patch Biweekly Place 1 patch onto the skin twice a week.      CELEBREX 100 MG Oral Cap Take 1 capsule (100 mg total) by mouth 2 (two) times daily.      pregabalin 200 MG Oral Cap Take 1 capsule (200 mg total) by mouth 2 (two) times daily.      Ciclopirox 8 % External Solution APPLY EVERY DAY      HYDROcodone-acetaminophen  MG Oral Tab  Take 1 tablet by mouth 4 (four) times daily as needed.      baclofen 10 MG Oral Tab Take 1 tablet (10 mg total) by mouth 2 (two) times daily.      NON FORMULARY Tandem insulin pump settings (Bolus IQ)  Mdnt 3.1 units/hr  0400 3.00 units/hr  0900 3.2 units/hr    Correction:   MN 1:35/110  1 PM 1:40  7 pm 1:35    CHO ratio:  MN 1:5g    Target glucose level - 100 and 80      FARXIGA 10 MG Oral Tab Take 1 tablet (10 mg total) by mouth daily.      DULoxetine 60 MG Oral Cap DR Particles Take 1 capsule (60 mg total) by mouth 2 (two) times daily.      atorvastatin 40 MG Oral Tab Take 1 tablet (40 mg total) by mouth nightly.      DEXCOM G6 SENSOR Does not apply Misc USE AS DIRECTED EVERY 10 DAYS 9 each 3    Insulin Lispro (HUMALOG) 100 UNIT/ML Subcutaneous Solution INJECT UP TO 70 UNITS VIA INSULIN PUMP DAILY 70 mL 2    EPINEPHrine 0.15 MG/0.3ML Injection Solution Auto-injector Inject 0.3 mL (0.15 mg total) into the muscle as needed for Anaphylaxis. Jani pen/lower dose because pt is allergic to epinephrine      Ondansetron HCl (ZOFRAN) 4 mg tablet Take 1 tablet (4 mg total) by mouth every 8 (eight) hours as needed for Nausea.      Benralizumab (FASENRA PEN) 30 MG/ML Subcutaneous Solution Auto-injector Inject 30 mg into the skin. Every other month/every 8 weeks      DEXCOM G6 TRANSMITTER Does not apply Misc USE AS DIRECTED EVERY 90 DAYS 1 each 3    Thiamine HCl 100 MG Oral Tab Take 1 tablet (100 mg total) by mouth daily. 30 tablet 3    docusate sodium 100 MG Oral Cap Take 1 capsule (100 mg total) by mouth 2 (two) times daily.      denosumab 60 MG/ML Subcutaneous Solution Inject 1 mL (60 mg total) into the skin every 6 (six) months.      Cholecalciferol (VITAMIN D-3) 5000 UNITS Oral Tab Take 1 tablet (5,000 Units total) by mouth daily.         Review of Systems: weight down- loss of appettie -questionably related to increased pain-  now weight is up   Not hungery- remains without appettie - not taking supplements - likes  ensure but increases her blood sugar  No gerd at all on ppi bid   Notes some dysphagia -- stable but is careful with neg study   Sleeping - insomnia - recently not sleeping well-remains with increasing awakenings and this may be related to pain continues to report having to sleep sitting more upright  Told MOI in the past - awakens at night at times - choking at night - in the past  -    And had a machine- recent neg study sleeps poorly  Neurology -- MS is questionably worse may need treatment- plasma paresis -- speech and cant think well and generlized pain all over as flare and hard to walk -now  think MS is very stable      Physical Exam:  /82   Pulse 68   Resp 16   Ht 5' 6\" (1.676 m)   Wt 167 lb (75.8 kg)   LMP  (LMP Unknown)   SpO2 100%   BMI 26.95 kg/m²    Constitutional: comfortable .  Currently denies any chest pain or tenderness  HEENT: Head NC/AT. PEERL. Throat is clear no thrush   Cardio: Regular rate and rhythm. Normal S1 and S2. No murmurs.regualr   Respiratory: Thorax symmetrical with no labored breathing.- no wheeze with good air entry sl crackles rt base - now all clear   GI:  Abd soft, non-tender.  Extremities: No clubbing or cyanosis. No LE edema. No calf tenderness.  Neurologic: A&Ox3. No gross motor deficits.  Skin: Warm, dry.no rashes or hives noted   Lymphatic: No cervical or supraclavicular lymphadenopathy.no jvd   Psych: Calm, cooperative. Pleasant affect.    Results:  Reviewed     Assessment/Plan:  #Pulmonary hypertension  -History of diastolic dysfunction  Last echo 12/21 with diastolic dysfunction and PAS 30 to 35 mmHg-stable fluid status without diuretic use   history of pulmonary emboli-12/21 seemingly unprovoked completed 3 months with negative follow-up CT decision made for aspirin no recurrence  8/23 no fluid issues  11.23- canceled recent visit - stable fluids   2/24 no fluid issues  6/24- echo in hosp with PAS 30-35mmHg   10/24- no swelling   2/25 fluid status remains  stable      #Asthma  Eosinophilic and severe-reports long history of allergies/testing  Reports ongoing recurrent flares requiring ambulances to the hospital-early summer and 6 weeks ago  PFTs 4/21 with normal flows and volumes minimally reduced DLCO 64% corrects to 86%.-Compared to 2018 slight decrease in TLC and residual volume.  Reports swelling questionable angioedema with any form of steroids  Reports requiring BiPAP during episodes  Overall remained stable at this time on MDIs  Notes improvement with Fasenra  Discussed questionable role of animals at home  5/23 clinically stable-recent visit with Dr. Becker  8/23 asthma is doing well on Fasenra-continue Breo for now  11.23- stable despite recent illness- cpm   2.24- remains stable - to see dr becker -remains on Fasenra and ICS/LABA  6/24- remains on fascnera and on breo- hesitant to stop Singulair- cpm   10/24 tighter now with change in weather - breo and singular and rescue and fasenra - recent smoke exposure   2/25 hospitalized with rhinovirus now much improved on baseline medication and Fasenra        #GERD/chronic dysphagia  Remains on daily PPI denies any breakthrough symptoms  Recent upper scope without reported active issue-Dr. Richardson had suggested motility study  Had seen speech years ago with plans to revisit-abnormal swallow pattern-keeps food in her cheek  8/23 had esophagram reported as normal-to follow-up with GI  11.23 - seems all stable   2.24- all good on ppi BID -denies an active issue  10/24 remains on ppi BID   2/25 questionable episodes of GERD at night-remains on PPI 20mg  twice daily    #Multiple sclerosis  Follows with Dr. Jeimy Cline  As unable to take steroids plans reportedly in place for PLEX-Will need line access reportedly  5/23 acute flare with ambulatory difficulties and pain prompted admission in April underwent Plex x5 with good response then to [facility]-remains with pain but ambulation is better -to see Jorge Luis  8.23-  stopped tysabri- increased jcv - so had to stop-  Vermerity -- now started -   11.23- remains on vermerity -- may need PLEX   2.24- encouraged to call - increased foot drop and increased pain with increased brain fog thinks needs Plex  6/24- flare in April required PLEX in hosp   Speech issue as new issue-- not much better   10/24 stable - yvonne drop foot is worse-- L300 trying to get - - speech is better - still some stutter - swallowing off now related to throat pain after smoke exposure   2/25 speech overall is improved swallowing not an issue overall she think she is stable        #Dyspnea  Currently reports stability able to walk slowly  Able to keep up with some exercise  Reports preactivity albuterol helpful  5/23 notes more dyspnea when walking though tolerating physical therapy pretty well-denies any desaturation with activity noted  8/23 overall about the same  11.23 recent illness- denies any dyspnea now   2.24- at times -slight increase with walking though more difficult to walk walking now - occasionally  at rest needs to take deep breaths  10.24 recent worse - with change in weather and recent smoke -   2/25-dyspnea during the day is much improved-remains with dyspnea at rest in the evenings etiology unclear    # chest pain- anterior-   Had holter -   4/21 with questionably abnormal stress test denies any further assessment  Follows with Dr. Forrest  5/23 continues-with significant increase with associated flare  Remains tender to touch parasternally-up to lower part of her neck  8.23- remains on issues -- hurts under rt breast -- and upper chest - left side upper and rt side lower - - lower belly pain is constant and all the time without change --plan for plain CT chest if not better after procedure  11.23 -- neg CT chest questionable inflammatory  2.24- had MRI spine and seeing april for lesion - told not related to pain process  6/24 now chest pain overall seems more diffuse some bony  tenderness  10/24- for CTA r/o CAD - normal stress in 2022 - echo with mild pul htn 40mmHg       #History of Woody  Follows with hepatology at [facility]  Thought related to metabolic syndrome      #History of alpha thalassemia minor      # 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 Related to OSA_-  To try to recheck home study   10/24 reports needed O2 in the hosp and as above  2/25 continues to be an issue will review hospitalization if able-plan for overnight oximetry    # recent kidney stones   Thinks pain may be really related to kidney stones  Plans for repeat procedure  11.23 still with stones - watching   2.24- all stable     # Abdominal pain/pancreatic abnormality  Admitted to the ER  some pancreatic dilatation with plans for EUS with Te--denies association with bowel movements      Plan- -- continue Breo inhaler once a day          -- rescue inhaler as needed -           - use albuterol nebulizer as needed every 4 hours                    -- Reviewed with [facility] plan for overnight          - see me in 4 months     Raisa Hoyt MD  Pulmonary Medicine  10/28/24

## 2025-02-25 NOTE — TELEPHONE ENCOUNTER
Received a call from [facility] to clarify directions on Albuterol  Pt to use albuterol every 4 hrs.

## 2025-02-25 NOTE — PATIENT INSTRUCTIONS
Plan-- continue Breo - daily             -- duoneb 1-2 times per day             -- continue albuterol puffer as needed -             - for repeat overnight -             - see me in 3-4 months     Raisa steinberg MD

## 2025-03-17 ENCOUNTER — TELEPHONE (OUTPATIENT)
Dept: NEUROLOGY | Facility: CLINIC | Age: 55
End: 2025-03-17

## 2025-03-17 NOTE — TELEPHONE ENCOUNTER
Tysabri infusion record received from [facility] infusion center for physician review.  No signs/symptoms of infusion reaction noted.  Patient infused on 03/13/2025. No observation period required.  Lot Number [id number] x1, Exp 12/2027  No new orders requested.  Orders active for 6 additional infusions.  Next infusion scheduled for 05/08/2025. Patient infusing every 8 weeks.  Placed in physician's box for review.     Recent JCV index = 1.09

## 2025-03-20 ENCOUNTER — OFFICE VISIT (OUTPATIENT)
Dept: NEUROLOGY | Facility: CLINIC | Age: 55
End: 2025-03-20
Payer: MEDICARE

## 2025-03-20 VITALS
HEIGHT: 66 IN | RESPIRATION RATE: 16 BRPM | WEIGHT: 167 LBS | BODY MASS INDEX: 26.84 KG/M2 | SYSTOLIC BLOOD PRESSURE: 121 MMHG | HEART RATE: 73 BPM | DIASTOLIC BLOOD PRESSURE: 61 MMHG

## 2025-03-20 DIAGNOSIS — G57.93 NEUROPATHIC PAIN OF BOTH LEGS: ICD-10-CM

## 2025-03-20 DIAGNOSIS — G35 MS (MULTIPLE SCLEROSIS) (HCC): Primary | ICD-10-CM

## 2025-03-20 PROCEDURE — 99214 OFFICE O/P EST MOD 30 MIN: CPT | Performed by: OTHER

## 2025-03-20 NOTE — PATIENT INSTRUCTIONS
Refill policies:    Allow 2-3 business days for refills; controlled substances may take longer.  Contact your pharmacy at least 5 days prior to running out of medication and have them send an electronic request or submit request through the “request refill” option in your [employer] account.  Refills are not addressed on weekends; covering physicians do not authorize routine medications on weekends.  No narcotics or controlled substances are refilled after noon on Fridays or by on call physicians.  By law, narcotics must be electronically prescribed.  A 30 day supply with no refills is the maximum allowed.  If your prescription is due for a refill, you may be due for a follow up appointment.  To best provide you care, patients receiving routine medications need to be seen at least once a year.  Patients receiving narcotic/controlled substance medications need to be seen at least once every 3 months.  In the event that your preferred pharmacy does not have the requested medication in stock (e.g. Backordered), it is your responsibility to find another pharmacy that has the requested medication available.  We will gladly send a new prescription to that pharmacy at your request.    Scheduling Tests:    If your physician has ordered radiology tests such as MRI or CT scans, please contact Central Scheduling at [phone] right away to schedule the test.  Once scheduled, the [facility] Centralized Referral Team will work with your insurance carrier to obtain pre-certification or prior authorization.  Depending on your insurance carrier, approval may take 3-10 days.  It is highly recommended patients assure they have received an authorization before having a test performed.  If test is done without insurance authorization, patient may be responsible for the entire amount billed.      Precertification and Prior Authorizations:  If your physician has recommended that you have a procedure or additional testing performed the [facility]  Centralized Referral Team will contact your insurance carrier to obtain pre-certification or prior authorization.    You are strongly encouraged to contact your insurance carrier to verify that your procedure/test has been approved and is a COVERED benefit.  Although the [facility] Centralized Referral Team does its due diligence, the insurance carrier gives the disclaimer that \"Although the procedure is authorized, this does not guarantee payment.\"    Ultimately the patient is responsible for payment.   Thank you for your understanding in this matter.  Paperwork Completion:  If you require FMLA or disability paperwork for your recovery, please make sure to either drop it off or have it faxed to our office at [phone]. Be sure the form has your name and date of birth on it.  The form will be faxed to our Forms Department and they will complete it for you.  There is a 25$ fee for all forms that need to be filled out.  Please be aware there is a 10-14 day turnaround time.  You will need to sign a release of information (DAYAN) form if your paperwork does not come with one.  You may call the Forms Department with any questions at [phone].  Their fax number is [phone].

## 2025-03-20 NOTE — PROGRESS NOTES
NEUROLOGY  [facility]       Kat Crook  3/11/1970  Primary Care Provider:  Justin Huerta MD    3/20/2025  55 year old yo,  was last seen on:: August 2024    Seen for/plans last visit:  MS follow up     Previous visit and existing record notes reviewed in preparation for the face to face visit.  Relevant labs and studies reviewed and will be noted in relevant areas of this record.  Accompanied visit:     (x) No.      Present condition:  Since last visit she has not had any relapse of MS   Hospitalized 2x for non-MS related problems:  Abdominal Pain and Asthma Attack    She went through [facility] for her foot drop but it was too expensive ($12K)    Past History update/new problem(s): nodules in lungs being watched    Review of Systems:  Review of Systems:  Denies systemic symptoms     No CP or SOB.  No GI or  symptoms. Relevant Neuro as noted above.      Medications:      Current Outpatient Medications:     GVOKE HYPOPEN 2-PACK 1 MG/0.2ML Subcutaneous injection, Inject 0.2 mL (1 mg total) into the skin as needed., Disp: , Rfl:     NOVOLIN R 100 UNIT/ML Injection Solution, 1 mL (100 Units total) daily., Disp: , Rfl:     albuterol 108 (90 Base) MCG/ACT Inhalation Aero Soln, Inhale 2 puffs into the lungs every 4 (four) hours as needed. inhale 2 puff by inhalation route  every 4 - 6 hours as needed, Disp: 1 each, Rfl: 11    budesonide 0.5 MG/2ML Inhalation Suspension, Take 2 mL (0.5 mg total) by nebulization 2 (two) times daily., Disp: 120 mL, Rfl: 3    Respiratory Therapy Supplies (FULL KIT NEBULIZER SET) Does not apply Misc, 1 kit As Directed., Disp: 1 each, Rfl: 0    ipratropium-albuterol 0.5-2.5 (3) MG/3ML Inhalation Solution, Take 3 mL by nebulization every 6 (six) hours as needed., Disp: 120 mL, Rfl: 2    albuterol (2.5 MG/3ML) 0.083% Inhalation Nebu Soln, Take 3 mL (2.5 mg total) by nebulization 3 (three) times daily., Disp: 90 each, Rfl: 11    metoprolol tartrate 50 MG Oral Tab, TAKE 1  TABLET BY MOUTH ONCE THE NIGHT BEFORE CTA AND 1 TABLET THE MORNING OF CTA, Disp: , Rfl:     linaCLOtide (LINZESS) 145 MCG Oral Cap, , Disp: , Rfl:     natalizumab (TYSABRI) 300 mg/15mL Intravenous Conc, Inject into the vein. Every 8 weeks, Disp: , Rfl:     carvedilol 3.125 MG Oral Tab, Take 1 tablet (3.125 mg total) by mouth 2 (two) times daily., Disp: , Rfl:     montelukast 10 MG Oral Tab, Take 1 tablet (10 mg total) by mouth nightly., Disp: , Rfl:     levothyroxine 88 MCG Oral Tab, Take 1 tablet (88 mcg total) by mouth before breakfast., Disp: , Rfl:     pantoprazole 20 MG Oral Tab EC, Take 1 tablet (20 mg total) by mouth 2 (two) times daily., Disp: , Rfl:     fluticasone furoate-vilanterol 200-25 MCG/ACT Inhalation Aerosol Powder, Breath Activated, Inhale 1 puff into the lungs daily., Disp: , Rfl:     LYLLANA 0.025 MG/24HR Transdermal Patch Biweekly, Place 1 patch onto the skin twice a week., Disp: , Rfl:     CELEBREX 100 MG Oral Cap, Take 1 capsule (100 mg total) by mouth 2 (two) times daily., Disp: , Rfl:     pregabalin 200 MG Oral Cap, Take 1 capsule (200 mg total) by mouth 2 (two) times daily., Disp: , Rfl:     Ciclopirox 8 % External Solution, APPLY EVERY DAY, Disp: , Rfl:     HYDROcodone-acetaminophen  MG Oral Tab, Take 1 tablet by mouth 4 (four) times daily as needed., Disp: , Rfl:     baclofen 10 MG Oral Tab, Take 1 tablet (10 mg total) by mouth 2 (two) times daily., Disp: , Rfl:     NON FORMULARY, Tandem insulin pump settings (Bolus IQ) Mdnt 3.1 units/hr 0400 3.00 units/hr 0900 3.2 units/hr  Correction:  MN 1:35/110 1 PM 1:40 7 pm 1:35  CHO ratio: MN 1:5g  Target glucose level - 100 and 80, Disp: , Rfl:     FARXIGA 10 MG Oral Tab, Take 1 tablet (10 mg total) by mouth daily., Disp: , Rfl:     DULoxetine 60 MG Oral Cap DR Particles, Take 1 capsule (60 mg total) by mouth 2 (two) times daily., Disp: , Rfl:     atorvastatin 40 MG Oral Tab, Take 1 tablet (40 mg total) by mouth nightly., Disp: , Rfl:      DEXCOM G6 SENSOR Does not apply Misc, USE AS DIRECTED EVERY 10 DAYS, Disp: 9 each, Rfl: 3    EPINEPHrine 0.15 MG/0.3ML Injection Solution Auto-injector, Inject 0.3 mL (0.15 mg total) into the muscle as needed for Anaphylaxis. Jani pen/lower dose because pt is allergic to epinephrine, Disp: , Rfl:     Ondansetron HCl (ZOFRAN) 4 mg tablet, Take 1 tablet (4 mg total) by mouth every 8 (eight) hours as needed for Nausea., Disp: , Rfl:     Benralizumab (FASENRA PEN) 30 MG/ML Subcutaneous Solution Auto-injector, Inject 30 mg into the skin. Every other month/every 8 weeks, Disp: , Rfl:     DEXCOM G6 TRANSMITTER Does not apply Misc, USE AS DIRECTED EVERY 90 DAYS, Disp: 1 each, Rfl: 3    Thiamine HCl 100 MG Oral Tab, Take 1 tablet (100 mg total) by mouth daily., Disp: 30 tablet, Rfl: 3    docusate sodium 100 MG Oral Cap, Take 1 capsule (100 mg total) by mouth 2 (two) times daily., Disp: , Rfl:     denosumab 60 MG/ML Subcutaneous Solution, Inject 1 mL (60 mg total) into the skin every 6 (six) months., Disp: , Rfl:     Cholecalciferol (VITAMIN D-3) 5000 UNITS Oral Tab, Take 1 tablet (5,000 Units total) by mouth daily., Disp: , Rfl:   PRN:     Allergies:  Allergies[1]       EXAM:  /61 (BP Location: Left arm, Patient Position: Sitting, Cuff Size: large)   Pulse 73   Resp 16   Ht 66\"   Wt 167 lb (75.8 kg)   LMP  (LMP Unknown)   BMI 26.95 kg/m²   Looks stated age  General Exam:  HENT:  pink conjunctiva anicteric sclerae  Neck no adenopathy, thyroid normal  Heart and Lungs:  normal  Extremities: no cyanosis, skin changes    NEURO  Alert oriented fluent speech  CN grossly normal  Lowers 5/5 except still with foot drop bilaterally which affects her gait      INTERPRETATION of RELEVANT LABS and other DATA:          Problem/s Identified this visit:   1. MS (multiple sclerosis) (HCC)    2. Neuropathic pain of both legs          Discussion plus Diagnostics & Treatment Orders:      Procedures    Immunoglobulin free LT chains  blood    MISCELLANEOUS LAB TESTING [[facility] ONLY]     We are getting Plasma concentration of Tysabri 1-2 weeks before next infusion to make sure the level is still therapeutic        (x) Discussed potential side effects of any treatment relevant to above.  Includes explanation of tests as necessary.    Return in about 6 months (around 9/20/2025).      Patient understands that if needed, based on condition and or test results, follow up will be readjusted      Rao Payne MD  Vascular & General Neurology  Director, Multiple Sclerosis Program  [facility]  3/20/2025, Time completed 1:11 PM    Decision making:  ( x ) labs reviewed/ordered - 1  (  ) new diagnosis: - 1  ( x) Images & studies independently reviewed -non F2F  (  ) Case/studies discussed with other caregivers - -non F2F  (  ) Telephone time with patiern or authorized Fam member--non F2F  ( x ) other records reviewed --non F2F including consultations  (  ) [facility] meetings - patient not present --non F2F  (  ) Independent Historian obtained    Non Face to Face CPT code 71736/79442 applies as documented above    PROCEDURE DONE     (   ) see notes        After visit, patient was escorted out and handed-off discharge process and instructions to the check out desk.  No additional issues relevant to visit were raised to staff at this time interval.        This document is to be interpreted as my current opinion regarding the case as of the stated date of service based on the information available to me at this time and may supersedes any prior opinion expressed either orally or in writing.  Services rendered are only within the scope of direct medical care  Sometimes, reports may have been prepared partially using a speech recognition software technology.  If a word or phrase is confusing or out of context, please do not hesitate to call for clarification.              [1]   Allergies  Allergen Reactions    Epinephrine OTHER (SEE COMMENTS)      Cardiac issues    Immune Globulin ANAPHYLAXIS    Latex SHORTNESS OF BREATH and OTHER (SEE COMMENTS)     WELTS    Methylprednisolone SWELLING     Swelling of neck, throat and tongue  Injection, throat and tongue swelling      Ocrelizumab ANAPHYLAXIS and OTHER (SEE COMMENTS)     Difficulty breathing    Other SHORTNESS OF BREATH     ENVIRONMENTAL, ASTHMA EXACERBATION    Urea Tightness in Throat     Urea C-13 in Pranactin for H. Pylori test kit.    Pcn [Penicillamine]     Penicillins OTHER (SEE COMMENTS)    Midodrine ITCHING     Pt reports mouth pain and itching possibly r/t midodrine

## 2025-04-10 ENCOUNTER — APPOINTMENT (OUTPATIENT)
Dept: CT IMAGING | Facility: HOSPITAL | Age: 55
End: 2025-04-10
Attending: EMERGENCY MEDICINE
Payer: MEDICARE

## 2025-04-10 ENCOUNTER — HOSPITAL ENCOUNTER (INPATIENT)
Facility: HOSPITAL | Age: 55
LOS: 1 days | Discharge: HOME | End: 2025-04-12
Attending: EMERGENCY MEDICINE | Admitting: HOSPITALIST
Payer: MEDICARE

## 2025-04-10 ENCOUNTER — HOSPITAL ENCOUNTER (INPATIENT)
Facility: HOSPITAL | Age: 55
LOS: 1 days | Discharge: HOME HEALTH | End: 2025-04-12
Attending: EMERGENCY MEDICINE | Admitting: HOSPITALIST
Payer: MEDICARE

## 2025-04-10 DIAGNOSIS — G35 MULTIPLE SCLEROSIS (HCC): ICD-10-CM

## 2025-04-10 DIAGNOSIS — G35 EXACERBATION OF MULTIPLE SCLEROSIS (HCC): Primary | ICD-10-CM

## 2025-04-10 LAB
ALBUMIN SERPL-MCNC: 4.5 G/DL (ref 3.2–4.8)
ALBUMIN/GLOB SERPL: 1.7 {RATIO} (ref 1–2)
ALP LIVER SERPL-CCNC: 72 U/L (ref 46–118)
ALT SERPL-CCNC: 17 U/L (ref 10–49)
ANION GAP SERPL CALC-SCNC: 9 MMOL/L (ref 0–18)
AST SERPL-CCNC: 22 U/L (ref ?–34)
BASOPHILS # BLD AUTO: 0.02 X10(3) UL (ref 0–0.2)
BASOPHILS NFR BLD AUTO: 0.3 %
BILIRUB SERPL-MCNC: 0.4 MG/DL (ref 0.3–1.2)
BILIRUB UR QL STRIP.AUTO: NEGATIVE
BUN BLD-MCNC: 11 MG/DL (ref 9–23)
CALCIUM BLD-MCNC: 9 MG/DL (ref 8.7–10.6)
CHLORIDE SERPL-SCNC: 107 MMOL/L (ref 98–112)
CLARITY UR REFRACT.AUTO: CLEAR
CO2 SERPL-SCNC: 28 MMOL/L (ref 21–32)
CREAT BLD-MCNC: 1.11 MG/DL (ref 0.55–1.02)
EGFRCR SERPLBLD CKD-EPI 2021: 58 ML/MIN/1.73M2 (ref 60–?)
EOSINOPHIL # BLD AUTO: 0.11 X10(3) UL (ref 0–0.7)
EOSINOPHIL NFR BLD AUTO: 1.5 %
ERYTHROCYTE [DISTWIDTH] IN BLOOD BY AUTOMATED COUNT: 17.2 %
EST. AVERAGE GLUCOSE BLD GHB EST-MCNC: 157 MG/DL (ref 68–126)
GLOBULIN PLAS-MCNC: 2.6 G/DL (ref 2–3.5)
GLUCOSE BLD-MCNC: 120 MG/DL (ref 70–99)
GLUCOSE BLD-MCNC: 141 MG/DL (ref 70–99)
GLUCOSE BLD-MCNC: 198 MG/DL (ref 70–99)
GLUCOSE BLD-MCNC: 81 MG/DL (ref 70–99)
GLUCOSE UR STRIP.AUTO-MCNC: >1000 MG/DL
HBA1C MFR BLD: 7.1 % (ref ?–5.7)
HCT VFR BLD AUTO: 38 % (ref 35–48)
HGB BLD-MCNC: 11.3 G/DL (ref 12–16)
IMM GRANULOCYTES # BLD AUTO: 0.02 X10(3) UL (ref 0–1)
IMM GRANULOCYTES NFR BLD: 0.3 %
KETONES UR STRIP.AUTO-MCNC: NEGATIVE MG/DL
LEUKOCYTE ESTERASE UR QL STRIP.AUTO: NEGATIVE
LYMPHOCYTES # BLD AUTO: 3.97 X10(3) UL (ref 1–4)
LYMPHOCYTES NFR BLD AUTO: 53.1 %
MCH RBC QN AUTO: 22.1 PG (ref 26–34)
MCHC RBC AUTO-ENTMCNC: 29.7 G/DL (ref 31–37)
MCV RBC AUTO: 74.4 FL (ref 80–100)
MONOCYTES # BLD AUTO: 0.57 X10(3) UL (ref 0.1–1)
MONOCYTES NFR BLD AUTO: 7.6 %
NEUTROPHILS # BLD AUTO: 2.78 X10 (3) UL (ref 1.5–7.7)
NEUTROPHILS # BLD AUTO: 2.78 X10(3) UL (ref 1.5–7.7)
NEUTROPHILS NFR BLD AUTO: 37.2 %
NITRITE UR QL STRIP.AUTO: NEGATIVE
OSMOLALITY SERPL CALC.SUM OF ELEC: 299 MOSM/KG (ref 275–295)
PH UR STRIP.AUTO: 6 [PH] (ref 5–8)
PLATELET # BLD AUTO: 151 10(3)UL (ref 150–450)
PLATELETS.RETICULATED NFR BLD AUTO: 8.9 % (ref 0–7)
POTASSIUM SERPL-SCNC: 3.9 MMOL/L (ref 3.5–5.1)
PROT SERPL-MCNC: 7.1 G/DL (ref 5.7–8.2)
PROT UR STRIP.AUTO-MCNC: NEGATIVE MG/DL
RBC # BLD AUTO: 5.11 X10(6)UL (ref 3.8–5.3)
RBC UR QL AUTO: NEGATIVE
SODIUM SERPL-SCNC: 144 MMOL/L (ref 136–145)
SP GR UR STRIP.AUTO: 1.03 (ref 1–1.03)
UROBILINOGEN UR STRIP.AUTO-MCNC: NORMAL MG/DL
WBC # BLD AUTO: 7.5 X10(3) UL (ref 4–11)

## 2025-04-10 PROCEDURE — 5A09357 ASSISTANCE WITH RESPIRATORY VENTILATION, LESS THAN 24 CONSECUTIVE HOURS, CONTINUOUS POSITIVE AIRWAY PRESSURE: ICD-10-PCS | Performed by: STUDENT IN AN ORGANIZED HEALTH CARE EDUCATION/TRAINING PROGRAM

## 2025-04-10 PROCEDURE — 72131 CT LUMBAR SPINE W/O DYE: CPT | Performed by: EMERGENCY MEDICINE

## 2025-04-10 PROCEDURE — 99223 1ST HOSP IP/OBS HIGH 75: CPT | Performed by: STUDENT IN AN ORGANIZED HEALTH CARE EDUCATION/TRAINING PROGRAM

## 2025-04-10 PROCEDURE — 72125 CT NECK SPINE W/O DYE: CPT | Performed by: EMERGENCY MEDICINE

## 2025-04-10 PROCEDURE — 72128 CT CHEST SPINE W/O DYE: CPT | Performed by: EMERGENCY MEDICINE

## 2025-04-10 PROCEDURE — 70450 CT HEAD/BRAIN W/O DYE: CPT | Performed by: EMERGENCY MEDICINE

## 2025-04-10 RX ORDER — MONTELUKAST SODIUM 10 MG/1
10 TABLET ORAL NIGHTLY
Status: DISCONTINUED | OUTPATIENT
Start: 2025-04-10 | End: 2025-04-12

## 2025-04-10 RX ORDER — ALBUTEROL SULFATE 90 UG/1
2 INHALANT RESPIRATORY (INHALATION) EVERY 4 HOURS PRN
Status: DISCONTINUED | OUTPATIENT
Start: 2025-04-10 | End: 2025-04-12

## 2025-04-10 RX ORDER — BUDESONIDE 0.5 MG/2ML
0.5 INHALANT ORAL 2 TIMES DAILY
Status: DISCONTINUED | OUTPATIENT
Start: 2025-04-10 | End: 2025-04-10

## 2025-04-10 RX ORDER — DIPHENHYDRAMINE HYDROCHLORIDE 50 MG/ML
25 INJECTION, SOLUTION INTRAMUSCULAR; INTRAVENOUS ONCE
Status: COMPLETED | OUTPATIENT
Start: 2025-04-10 | End: 2025-04-10

## 2025-04-10 RX ORDER — LEVOTHYROXINE SODIUM 88 UG/1
88 TABLET ORAL
Status: DISCONTINUED | OUTPATIENT
Start: 2025-04-11 | End: 2025-04-12

## 2025-04-10 RX ORDER — ALBUTEROL SULFATE 0.83 MG/ML
2.5 SOLUTION RESPIRATORY (INHALATION) 3 TIMES DAILY
Status: DISCONTINUED | OUTPATIENT
Start: 2025-04-10 | End: 2025-04-10

## 2025-04-10 RX ORDER — HYDROMORPHONE HYDROCHLORIDE 1 MG/ML
0.4 INJECTION, SOLUTION INTRAMUSCULAR; INTRAVENOUS; SUBCUTANEOUS EVERY 2 HOUR PRN
Refills: 0 | Status: DISCONTINUED | OUTPATIENT
Start: 2025-04-10 | End: 2025-04-12

## 2025-04-10 RX ORDER — ONDANSETRON 2 MG/ML
4 INJECTION INTRAMUSCULAR; INTRAVENOUS EVERY 6 HOURS PRN
Status: DISCONTINUED | OUTPATIENT
Start: 2025-04-10 | End: 2025-04-12

## 2025-04-10 RX ORDER — PREGABALIN 50 MG/1
50 CAPSULE ORAL EVERY MORNING
COMMUNITY
Start: 2025-03-07

## 2025-04-10 RX ORDER — DEXTROSE MONOHYDRATE 25 G/50ML
50 INJECTION, SOLUTION INTRAVENOUS
Status: DISCONTINUED | OUTPATIENT
Start: 2025-04-10 | End: 2025-04-12

## 2025-04-10 RX ORDER — PANTOPRAZOLE SODIUM 20 MG/1
20 TABLET, DELAYED RELEASE ORAL 2 TIMES DAILY
Status: DISCONTINUED | OUTPATIENT
Start: 2025-04-10 | End: 2025-04-12

## 2025-04-10 RX ORDER — CELECOXIB 100 MG/1
100 CAPSULE ORAL 2 TIMES DAILY
Status: DISCONTINUED | OUTPATIENT
Start: 2025-04-10 | End: 2025-04-12

## 2025-04-10 RX ORDER — BENZONATATE 100 MG/1
200 CAPSULE ORAL 3 TIMES DAILY PRN
Status: DISCONTINUED | OUTPATIENT
Start: 2025-04-10 | End: 2025-04-12

## 2025-04-10 RX ORDER — NICOTINE POLACRILEX 4 MG
15 LOZENGE BUCCAL
Status: DISCONTINUED | OUTPATIENT
Start: 2025-04-10 | End: 2025-04-12

## 2025-04-10 RX ORDER — PREGABALIN 50 MG/1
50 CAPSULE ORAL EVERY MORNING
Status: DISCONTINUED | OUTPATIENT
Start: 2025-04-11 | End: 2025-04-12

## 2025-04-10 RX ORDER — HYDROMORPHONE HYDROCHLORIDE 1 MG/ML
0.2 INJECTION, SOLUTION INTRAMUSCULAR; INTRAVENOUS; SUBCUTANEOUS EVERY 2 HOUR PRN
Refills: 0 | Status: DISCONTINUED | OUTPATIENT
Start: 2025-04-10 | End: 2025-04-12

## 2025-04-10 RX ORDER — CARVEDILOL 3.12 MG/1
3.12 TABLET ORAL 2 TIMES DAILY
Status: DISCONTINUED | OUTPATIENT
Start: 2025-04-10 | End: 2025-04-12

## 2025-04-10 RX ORDER — HYDROCODONE BITARTRATE AND ACETAMINOPHEN 10; 325 MG/1; MG/1
1 TABLET ORAL 4 TIMES DAILY PRN
Status: DISCONTINUED | OUTPATIENT
Start: 2025-04-10 | End: 2025-04-12

## 2025-04-10 RX ORDER — IPRATROPIUM BROMIDE AND ALBUTEROL SULFATE 2.5; .5 MG/3ML; MG/3ML
3 SOLUTION RESPIRATORY (INHALATION) EVERY 6 HOURS PRN
Status: DISCONTINUED | OUTPATIENT
Start: 2025-04-10 | End: 2025-04-12

## 2025-04-10 RX ORDER — ACETAMINOPHEN 500 MG
500 TABLET ORAL EVERY 4 HOURS PRN
Status: DISCONTINUED | OUTPATIENT
Start: 2025-04-10 | End: 2025-04-12

## 2025-04-10 RX ORDER — PROCHLORPERAZINE EDISYLATE 5 MG/ML
5 INJECTION INTRAMUSCULAR; INTRAVENOUS EVERY 8 HOURS PRN
Status: DISCONTINUED | OUTPATIENT
Start: 2025-04-10 | End: 2025-04-12

## 2025-04-10 RX ORDER — MORPHINE SULFATE 2 MG/ML
2 INJECTION, SOLUTION INTRAMUSCULAR; INTRAVENOUS EVERY 2 HOUR PRN
Status: DISCONTINUED | OUTPATIENT
Start: 2025-04-10 | End: 2025-04-10

## 2025-04-10 RX ORDER — HYDROMORPHONE HYDROCHLORIDE 1 MG/ML
0.5 INJECTION, SOLUTION INTRAMUSCULAR; INTRAVENOUS; SUBCUTANEOUS EVERY 30 MIN PRN
Refills: 0 | Status: DISCONTINUED | OUTPATIENT
Start: 2025-04-10 | End: 2025-04-10 | Stop reason: ALTCHOICE

## 2025-04-10 RX ORDER — BACLOFEN 10 MG/1
10 TABLET ORAL 2 TIMES DAILY
Status: DISCONTINUED | OUTPATIENT
Start: 2025-04-10 | End: 2025-04-12

## 2025-04-10 RX ORDER — ECHINACEA PURPUREA EXTRACT 125 MG
1 TABLET ORAL
Status: DISCONTINUED | OUTPATIENT
Start: 2025-04-10 | End: 2025-04-12

## 2025-04-10 RX ORDER — POLYETHYLENE GLYCOL 3350 17 G/17G
17 POWDER, FOR SOLUTION ORAL DAILY PRN
Status: DISCONTINUED | OUTPATIENT
Start: 2025-04-10 | End: 2025-04-12

## 2025-04-10 RX ORDER — HYDROMORPHONE HYDROCHLORIDE 1 MG/ML
0.8 INJECTION, SOLUTION INTRAMUSCULAR; INTRAVENOUS; SUBCUTANEOUS EVERY 2 HOUR PRN
Refills: 0 | Status: DISCONTINUED | OUTPATIENT
Start: 2025-04-10 | End: 2025-04-12

## 2025-04-10 RX ORDER — MORPHINE SULFATE 4 MG/ML
4 INJECTION, SOLUTION INTRAMUSCULAR; INTRAVENOUS EVERY 2 HOUR PRN
Status: DISCONTINUED | OUTPATIENT
Start: 2025-04-10 | End: 2025-04-10

## 2025-04-10 RX ORDER — HYDROMORPHONE HYDROCHLORIDE 1 MG/ML
0.5 INJECTION, SOLUTION INTRAMUSCULAR; INTRAVENOUS; SUBCUTANEOUS ONCE
Refills: 0 | Status: COMPLETED | OUTPATIENT
Start: 2025-04-10 | End: 2025-04-10

## 2025-04-10 RX ORDER — MORPHINE SULFATE 4 MG/ML
4 INJECTION, SOLUTION INTRAMUSCULAR; INTRAVENOUS ONCE
Status: DISCONTINUED | OUTPATIENT
Start: 2025-04-10 | End: 2025-04-10

## 2025-04-10 RX ORDER — NICOTINE POLACRILEX 4 MG
30 LOZENGE BUCCAL
Status: DISCONTINUED | OUTPATIENT
Start: 2025-04-10 | End: 2025-04-12

## 2025-04-10 RX ORDER — SODIUM PHOSPHATE, DIBASIC AND SODIUM PHOSPHATE, MONOBASIC 7; 19 G/230ML; G/230ML
1 ENEMA RECTAL ONCE AS NEEDED
Status: DISCONTINUED | OUTPATIENT
Start: 2025-04-10 | End: 2025-04-12

## 2025-04-10 RX ORDER — DULOXETIN HYDROCHLORIDE 60 MG/1
60 CAPSULE, DELAYED RELEASE ORAL 2 TIMES DAILY
Status: DISCONTINUED | OUTPATIENT
Start: 2025-04-10 | End: 2025-04-12

## 2025-04-10 RX ORDER — MORPHINE SULFATE 2 MG/ML
1 INJECTION, SOLUTION INTRAMUSCULAR; INTRAVENOUS EVERY 2 HOUR PRN
Status: DISCONTINUED | OUTPATIENT
Start: 2025-04-10 | End: 2025-04-10

## 2025-04-10 RX ORDER — ATORVASTATIN CALCIUM 40 MG/1
40 TABLET, FILM COATED ORAL NIGHTLY
Status: DISCONTINUED | OUTPATIENT
Start: 2025-04-10 | End: 2025-04-12

## 2025-04-10 RX ORDER — BISACODYL 10 MG
10 SUPPOSITORY, RECTAL RECTAL
Status: DISCONTINUED | OUTPATIENT
Start: 2025-04-10 | End: 2025-04-12

## 2025-04-10 RX ORDER — FLUTICASONE PROPIONATE AND SALMETEROL 500; 50 UG/1; UG/1
1 POWDER RESPIRATORY (INHALATION) 2 TIMES DAILY
Status: DISCONTINUED | OUTPATIENT
Start: 2025-04-10 | End: 2025-04-12

## 2025-04-10 RX ORDER — SENNOSIDES 8.6 MG
17.2 TABLET ORAL NIGHTLY PRN
Status: DISCONTINUED | OUTPATIENT
Start: 2025-04-10 | End: 2025-04-12

## 2025-04-10 RX ORDER — ENOXAPARIN SODIUM 100 MG/ML
40 INJECTION SUBCUTANEOUS NIGHTLY
Status: DISCONTINUED | OUTPATIENT
Start: 2025-04-10 | End: 2025-04-12

## 2025-04-10 RX ORDER — GUAIFENESIN 600 MG/1
600 TABLET, EXTENDED RELEASE ORAL 2 TIMES DAILY PRN
Status: DISCONTINUED | OUTPATIENT
Start: 2025-04-10 | End: 2025-04-12

## 2025-04-10 NOTE — H&P
[facility]     Kat Crook Patient Status:  Emergency    3/11/1970 MRN [id number]   Location [facility] EMERGENCY DEPARTMENT Attending Geremias Louie MD   Hosp Day # 0 PCP Justin Huerta MD     Chief Complaint: Fall, possible MS flare    Subjective:    History of Present Illness:     Kat Crook is a 55 year old female with past medical history of multiple sclerosis, diabetes type 2, hyperlipidemia, hypertension, anxiety, depression who presents ED for fall and concern for MS flare.  Patient reports she fell on Saturday while sleeping and hit the back of her head while trying to stand up.  She denies loss of consciousness.  Since she hit her head she has had severe pain in the back of her head that radiates down her spine.  She began having stuttering in her speech afterwards.  She was concerned that the pain exacerbated her MS.  She usually gets plasmapheresis for her MS flares as she has a allergic reaction to steroids. She denies any fevers, chills, nausea, vomiting, abdominal pain, headaches, dizziness.    History/Other:    Past Medical History:  Past Medical History[1]  Past Surgical History:   Past Surgical History[2]   Family History:   Family History[3]  Social History:    reports that she has never smoked. She has never been exposed to tobacco smoke. She has never used smokeless tobacco. She reports that she does not drink alcohol and does not use drugs.     Allergies: Allergies[4]    Medications:  Medications Ordered Prior to Encounter[5]    Review of Systems:   A comprehensive review of systems was completed.    Pertinent positives and negatives noted in the HPI.    Objective:   Physical Exam:    /79   Pulse 66   Temp 98.1 °F (36.7 °C) (Temporal)   Resp 18   Wt 170 lb (77.1 kg)   LMP  (LMP Unknown)   SpO2 94%   BMI 27.44 kg/m²   General: No acute distress, Alert  Respiratory: No rhonchi, no wheezes  Cardiovascular: S1, S2. Regular rate and  rhythm  Abdomen: Soft, Non-tender, non-distended, positive bowel sounds  Neuro: No new focal deficits  Extremities: No edema    Results:    Labs:      Labs Last 24 Hours:    Recent Labs   Lab 04/10/25  1151   RBC 5.11   HGB 11.3*   HCT 38.0   MCV 74.4*   MCH 22.1*   MCHC 29.7*   RDW 17.2   NEPRELIM 2.78   WBC 7.5   .0       Recent Labs   Lab 04/10/25  1151   *   BUN 11   CREATSERUM 1.11*   EGFRCR 58*   CA 9.0   ALB 4.5      K 3.9      CO2 28.0   ALKPHO 72   AST 22   ALT 17   BILT 0.4   TP 7.1       Estimated Glomerular Filtration Rate: 58 mL/min/1.73m2 (A) (result from lab).    Lab Results   Component Value Date    PT 13.4 05/01/2014    PT 12.9 11/06/2013    PT 13.0 11/05/2013    INR 1.08 04/10/2024    INR 1.00 04/06/2024    INR 1.01 04/19/2023       No results for input(s): \"TROP\", \"TROPHS\", \"CK\" in the last 168 hours.    No results for input(s): \"TROP\", \"PBNP\" in the last 168 hours.    No results for input(s): \"PCT\" in the last 168 hours.    Imaging: Imaging data reviewed in Epic.    Assessment & Plan:      #Fall  #Head trauma  -CT head reviewed and shows no acute abnormalities  -CT C-spine reviewed and shows no acute abnormalities  -CT T-spine reviewed and shows no acute abnormalities  -CT L-spine reviewed and shows no acute abnormalities    #Possible MS flare  -Patient reports allergic reaction to the steroids  -Patient normally gets plasmapheresis for flareups  -Neuro consulted    #Diabetes type 2  -Hyperglycemia protocol  -Insulin pump  -Diabetes APRN consulted     #CAD  -Aspirin, statin, carvedilol    #Hypertension  -Carvedilol    #Hyperlipidemia  -Statin    #Asthma  -Home inhalers    #Anxiety  #Depression  -Duloxetine    #Microcytic anemia  -Monitor hemoglobin         Plan of care discussed with patient    Remedios Thapa DO    Supplementary Documentation:     The 21st Century Cures Act makes medical notes like these available to patients in the interest of transparency. Please be  advised this is a medical document. Medical documents are intended to carry relevant information, facts as evident, and the clinical opinion of the practitioner. The medical note is intended as peer to peer communication and may appear blunt or direct. It is written in medical language and may contain abbreviations or verbiage that are unfamiliar.                                       [1]   Past Medical History:   Abdominal pain    Allergic rhinitis    Anemia    Anxiety    Anxiety state    Arthritis    Asthma (HCC)    Atypical mole    Autoimmune disease (HCC)    Back pain    Bad breath    Black stools    Bloating    Blood disorder    Thalassemia alpha, Sickle cell trait    Blood in urine    Blurred vision    Calculus of kidney    Cardiomyopathy (HCC)    last echo 2021: EF 55%    Chest pain    Constipation    Decorative tattoo    Depression    Diarrhea, unspecified    Dizziness    Easy bruising    Elevated liver enzymes    Endocrine disorder    Esophageal reflux    Fatigue    Fibromyalgia    Food intolerance    Frequent urination    Frequent UTI    Gastroparesis    Glaucoma    H. pylori infection    H/O  section    Headache disorder    Heart attack (HCC)    Heart palpitations    Heartburn    Hemorrhoids    Herniation of cervical intervertebral disc    High blood pressure    High cholesterol    History of blood clots    Unprovoked    History of blood transfusion    History of cardiac murmur    History of depression    Hoarseness, chronic    Hyperlipidemia    IBS (irritable bowel syndrome)    Irregular bowel habits    Leaking of urine    Liver disease    GIORDANO and Stage 2 fibrosis    Loss of appetite    Migraines    Multiple sclerosis (HCC)    dx 8 years ago    Muscle weakness    cane or walker prn    Myocardial infarction (HCC)    Nausea    Neuropathy    legs, hands, feet; bilateral    Night sweats    Osteoarthritis    Osteoporosis    KAMALJIT in her 30s    Osteoporosis    Pain with bowel movements    Painful  urination    Problems with swallowing    Due to MS    Pulmonary embolism (HCC)    Scoliosis of lumbar spine    Shortness of breath    Sickle cell trait    Sleep apnea    no treatment    Sleep disturbance    Stress    Type II or unspecified type diabetes mellitus without mention of complication, not stated as uncontrolled    Uncomfortable fullness after meals    Unspecified disorder of thyroid    total thyroidectomy    Visual impairment    glasses    Wears glasses    Weight loss    Wheezing   [2]   Past Surgical History:  Procedure Laterality Date      1986    Cataract      Cath angio  2014    Cholecystectomy      Colonoscopy N/A 2015    Procedure: COLONOSCOPY;  Surgeon: Liz Tong DO;  Location:  ENDOSCOPY    Colonoscopy N/A 2021    Procedure: COLONOSCOPY;  Surgeon: Cedrick Da Silva MD;  Location:  ENDOSCOPY    Colonoscopy N/A 10/08/2021    Procedure: COLONOSCOPY, ESOPHAGOGASTRODUODENOSCOPY (EGD);  Surgeon: Darvin Richardson MD;  Location:  ENDOSCOPY    D & c  1990    Blighted ovum    Egd      Hysterectomy      total hystero.    Ir port a cath insertion exchnge check  2018    Lithotripsy      x 2    Lysis of adhesions      Needle biopsy liver  2016    Oophorectomy Bilateral     total hystero.    Other      dialysis catheter- left chest for plasmaphoresis    Other surgical history       x 2    Port, indwelling, imp  2019    power port    Thyroidectomy  2013    benign MNG    Total abdom hysterectomy      Upper gi endoscopy performed  2014   [3]   Family History  Problem Relation Age of Onset    Hypertension Mother         Needs to be deleted    Cancer Mother         stomach cancer    Ovarian Cancer Mother         30'S    Colon Polyps Mother     Anemia Mother     Heart Attack Father     Other (Other) Father         COPD, emphysema    Asthma Father     Pulmonary Disease Father     Other (Other) Sister         rectal CA\     Ovarian Cancer Maternal Grandmother         30'S    Colon Polyps Maternal Grandmother     Diabetes Maternal Grandmother     Colon Cancer Maternal Grandmother     Anemia Maternal Grandmother     Cancer Maternal Grandmother     Anemia Daughter     Breast Cancer Maternal Aunt         60'S    Breast Cancer Paternal Cousin Female 64   [4]   Allergies  Allergen Reactions    Epinephrine OTHER (SEE COMMENTS)     Cardiac issues    Immune Globulin ANAPHYLAXIS    Latex SHORTNESS OF BREATH and OTHER (SEE COMMENTS)     WELTS    Methylprednisolone SWELLING     Swelling of neck, throat and tongue  Injection, throat and tongue swelling      Ocrelizumab ANAPHYLAXIS and OTHER (SEE COMMENTS)     Difficulty breathing    Other SHORTNESS OF BREATH     ENVIRONMENTAL, ASTHMA EXACERBATION    Urea Tightness in Throat     Urea C-13 in Pranactin for H. Pylori test kit.    Pcn [Penicillamine]     Penicillins OTHER (SEE COMMENTS)    Midodrine ITCHING     Pt reports mouth pain and itching possibly r/t midodrine   [5]   No current facility-administered medications on file prior to encounter.     Current Outpatient Medications on File Prior to Encounter   Medication Sig Dispense Refill    GVOKE HYPOPEN 2-PACK 1 MG/0.2ML Subcutaneous injection Inject 0.2 mL (1 mg total) into the skin as needed.      NOVOLIN R 100 UNIT/ML Injection Solution 1 mL (100 Units total) daily.      albuterol 108 (90 Base) MCG/ACT Inhalation Aero Soln Inhale 2 puffs into the lungs every 4 (four) hours as needed. inhale 2 puff by inhalation route  every 4 - 6 hours as needed 1 each 11    budesonide 0.5 MG/2ML Inhalation Suspension Take 2 mL (0.5 mg total) by nebulization 2 (two) times daily. 120 mL 3    Respiratory Therapy Supplies (FULL KIT NEBULIZER SET) Does not apply Misc 1 kit As Directed. 1 each 0    ipratropium-albuterol 0.5-2.5 (3) MG/3ML Inhalation Solution Take 3 mL by nebulization every 6 (six) hours as needed. 120 mL 2    albuterol (2.5 MG/3ML) 0.083% Inhalation  Nebu Soln Take 3 mL (2.5 mg total) by nebulization 3 (three) times daily. 90 each 11    metoprolol tartrate 50 MG Oral Tab TAKE 1 TABLET BY MOUTH ONCE THE NIGHT BEFORE CTA AND 1 TABLET THE MORNING OF CTA      linaCLOtide (LINZESS) 145 MCG Oral Cap       natalizumab (TYSABRI) 300 mg/15mL Intravenous Conc Inject into the vein. Every 8 weeks      carvedilol 3.125 MG Oral Tab Take 1 tablet (3.125 mg total) by mouth 2 (two) times daily.      montelukast 10 MG Oral Tab Take 1 tablet (10 mg total) by mouth nightly.      levothyroxine 88 MCG Oral Tab Take 1 tablet (88 mcg total) by mouth before breakfast.      pantoprazole 20 MG Oral Tab EC Take 1 tablet (20 mg total) by mouth 2 (two) times daily.      fluticasone furoate-vilanterol 200-25 MCG/ACT Inhalation Aerosol Powder, Breath Activated Inhale 1 puff into the lungs daily.      LYLLANA 0.025 MG/24HR Transdermal Patch Biweekly Place 1 patch onto the skin twice a week.      CELEBREX 100 MG Oral Cap Take 1 capsule (100 mg total) by mouth 2 (two) times daily.      pregabalin 200 MG Oral Cap Take 1 capsule (200 mg total) by mouth 2 (two) times daily.      Ciclopirox 8 % External Solution APPLY EVERY DAY      HYDROcodone-acetaminophen  MG Oral Tab Take 1 tablet by mouth 4 (four) times daily as needed.      baclofen 10 MG Oral Tab Take 1 tablet (10 mg total) by mouth 2 (two) times daily.      NON FORMULARY Tandem insulin pump settings (Bolus IQ)  Mdnt 3.1 units/hr  0400 3.00 units/hr  0900 3.2 units/hr    Correction:   MN 1:35/110  1 PM 1:40  7 pm 1:35    CHO ratio:  MN 1:5g    Target glucose level - 100 and 80      FARXIGA 10 MG Oral Tab Take 1 tablet (10 mg total) by mouth daily.      DULoxetine 60 MG Oral Cap DR Particles Take 1 capsule (60 mg total) by mouth 2 (two) times daily.      atorvastatin 40 MG Oral Tab Take 1 tablet (40 mg total) by mouth nightly.      DEXCOM G6 SENSOR Does not apply Misc USE AS DIRECTED EVERY 10 DAYS 9 each 3    EPINEPHrine 0.15 MG/0.3ML  Injection Solution Auto-injector Inject 0.3 mL (0.15 mg total) into the muscle as needed for Anaphylaxis. Jani pen/lower dose because pt is allergic to epinephrine      Ondansetron HCl (ZOFRAN) 4 mg tablet Take 1 tablet (4 mg total) by mouth every 8 (eight) hours as needed for Nausea.      Benralizumab (FASENRA PEN) 30 MG/ML Subcutaneous Solution Auto-injector Inject 30 mg into the skin. Every other month/every 8 weeks      DEXCOM G6 TRANSMITTER Does not apply Misc USE AS DIRECTED EVERY 90 DAYS 1 each 3    Thiamine HCl 100 MG Oral Tab Take 1 tablet (100 mg total) by mouth daily. 30 tablet 3    docusate sodium 100 MG Oral Cap Take 1 capsule (100 mg total) by mouth 2 (two) times daily.      denosumab 60 MG/ML Subcutaneous Solution Inject 1 mL (60 mg total) into the skin every 6 (six) months.      Cholecalciferol (VITAMIN D-3) 5000 UNITS Oral Tab Take 1 tablet (5,000 Units total) by mouth daily.

## 2025-04-10 NOTE — ED QUICK NOTES
Patient has a history of MS. States she fell out of bed on Saturday. Has posterior head pain, thoracic back pain and tailbone pain. States she believes it caused an exacerbation of her MS, now her pain is uncontrolled and she's stuttering. Tried Norco at home without relief.

## 2025-04-10 NOTE — ED INITIAL ASSESSMENT (HPI)
55YF c/c of fall Pt state that she fall out of bed on Saturday landing on her R sided Pt state that she been having R sided pain since that not improving

## 2025-04-10 NOTE — ED QUICK NOTES
Orders for admission, patient is aware of plan and ready to go upstairs. Any questions, please call ED RN Dominique at extension [phone].     Patient Covid vaccination status: Fully vaccinated     COVID Test Ordered in ED: None    COVID Suspicion at Admission: N/A    Running Infusions: Medication Infusions[1] None    Mental Status/LOC at time of transport: Alert, oriented X4. Port accessed. Stuttering speech, shuffled gait. Hx of MS.    Other pertinent information:   CIWA score: N/A   NIH score:  N/A             [1]

## 2025-04-10 NOTE — ED PROVIDER NOTES
Patient Seen in: [facility] Emergency Department      History     Chief Complaint   Patient presents with    Trauma     Stated Complaint: fall saturday while sleeping, pain to back of head and right side of chest.   r*    Subjective:   HPI      Patient is 55-year-old female complains of pain in the head neck and entire back.  Patient fell Saturday while sleeping and then hit the back of her head while trying to stand up.  There is no LOC but complains of severe pain in the back of head and down to her spine.  She has history of MS and receives plasmapheresis.  Feels like the pain is exacerbating her MS that she is now stuttering her speech.  No loss of consciousness no fevers no chest pain or shortness of breath no abdominal pain nausea vomit diarrhea.  No focal weakness.  No other complaints.    Objective:     Past Medical History:    Abdominal pain    Allergic rhinitis    Anemia    Anxiety    Anxiety state    Arthritis    Asthma (HCC)    Atypical mole    Autoimmune disease (HCC)    Back pain    Bad breath    Black stools    Bloating    Blood disorder    Thalassemia alpha, Sickle cell trait    Blood in urine    Blurred vision    Calculus of kidney    Cardiomyopathy (HCC)    last echo 2021: EF 55%    Chest pain    Constipation    Decorative tattoo    Depression    Diarrhea, unspecified    Dizziness    Easy bruising    Elevated liver enzymes    Endocrine disorder    Esophageal reflux    Fatigue    Fibromyalgia    Food intolerance    Frequent urination    Frequent UTI    Gastroparesis    Glaucoma    H. pylori infection    H/O  section    Headache disorder    Heart attack (HCC)    Heart palpitations    Heartburn    Hemorrhoids    Herniation of cervical intervertebral disc    High blood pressure    High cholesterol    History of blood clots    Unprovoked    History of blood transfusion    History of cardiac murmur    History of depression    Hoarseness, chronic    Hyperlipidemia    IBS (irritable bowel  syndrome)    Irregular bowel habits    Leaking of urine    Liver disease    GIORDANO and Stage 2 fibrosis    Loss of appetite    Migraines    Multiple sclerosis (HCC)    dx 8 years ago    Muscle weakness    cane or walker prn    Myocardial infarction (HCC)    Nausea    Neuropathy    legs, hands, feet; bilateral    Night sweats    Osteoarthritis    Osteoporosis    KAMALJIT in her 30s    Osteoporosis    Pain with bowel movements    Painful urination    Problems with swallowing    Due to MS    Pulmonary embolism (HCC)    Scoliosis of lumbar spine    Shortness of breath    Sickle cell trait    Sleep apnea    no treatment    Sleep disturbance    Stress    Type II or unspecified type diabetes mellitus without mention of complication, not stated as uncontrolled    Uncomfortable fullness after meals    Unspecified disorder of thyroid    total thyroidectomy    Visual impairment    glasses    Wears glasses    Weight loss    Wheezing              No pertinent past surgical history.              No pertinent social history.                Physical Exam     ED Triage Vitals   BP 04/10/25 1123 115/79   Pulse 04/10/25 1123 75   Resp 04/10/25 1123 18   Temp 04/10/25 1125 98.1 °F (36.7 °C)   Temp src 04/10/25 1125 Temporal   SpO2 04/10/25 1123 98 %   O2 Device 04/10/25 1123 None (Room air)       Current Vitals:   Vital Signs  BP: 128/81  Pulse: 62  Resp: 18  Temp: 98.1 °F (36.7 °C)  Temp src: Temporal  MAP (mmHg): 96    Oxygen Therapy  SpO2: 100 %  O2 Device: None (Room air)        Physical Exam  Vitals and nursing note reviewed.   Constitutional:       General: She is not in acute distress.     Appearance: She is well-developed. She is not toxic-appearing.   HENT:      Head: Normocephalic and atraumatic.   Eyes:      General: No scleral icterus.     Conjunctiva/sclera: Conjunctivae normal.   Cardiovascular:      Rate and Rhythm: Normal rate.   Pulmonary:      Effort: Pulmonary effort is normal. No respiratory distress.   Abdominal:       General: There is no distension.      Tenderness: There is no abdominal tenderness.   Musculoskeletal:         General: Tenderness (Thoracolumbar spine) present. Normal range of motion.      Cervical back: Normal range of motion and neck supple. No tenderness.   Skin:     General: Skin is warm and dry.      Findings: No rash.   Neurological:      Mental Status: She is alert and oriented to person, place, and time.      Cranial Nerves: No cranial nerve deficit.      Motor: No weakness or abnormal muscle tone.      Coordination: Coordination normal.      Comments: Stuttering speech   Psychiatric:         Behavior: Behavior normal.            ED Course     Labs Reviewed   CBC WITH DIFFERENTIAL WITH PLATELET - Abnormal; Notable for the following components:       Result Value    HGB 11.3 (*)     Immature Platelet Fraction 8.9 (*)     MCV 74.4 (*)     MCH 22.1 (*)     MCHC 29.7 (*)     All other components within normal limits   COMP METABOLIC PANEL (14) - Abnormal; Notable for the following components:    Glucose 120 (*)     Creatinine 1.11 (*)     Calculated Osmolality 299 (*)     eGFR-Cr 58 (*)     All other components within normal limits   URINALYSIS WITH CULTURE REFLEX - Abnormal; Notable for the following components:    Glucose Urine >1000 (*)     All other components within normal limits                    MDM                     -Comorbidities did add complexity to the management are mentioned in the HPI above        -I personally reviewed the prior external notes and the medical record to obtain additional history reviewed office visit from March 20-with neurology.  Also suffers from neuropathic pain in the legs.        -DDX: Includes but not limited to spinal fracture dislocation intracranial hemorrhage which is a medical condition that can pose a threat to life/function           -I personally reviewed the CT findings and it shows no fracture or ICH  Please refer to radiology report for official  interpretation  CT SPINE THORACIC (CPT=72128)   Final Result   PROCEDURE:  CT SPINE THORACIC (CPT=72128)       COMPARISON:  None.       INDICATIONS:  fall saturday while sleeping, pain to back of head and right    side of chest.   reports it is starting to mess with her MS.  difficulty    speaking since ssaturday       TECHNIQUE:  Noncontrast enhanced CT scanning is performed through the    thoracic spine.  Multiplanar reconstructions are generated.  Dose    reduction techniques were used. Dose information is transmitted to the ACR    (American College of Radiology) NRDR    (National Radiology Data Registry) which includes the Dose Index Registry.       PATIENT STATED HISTORY: (As transcribed by Technologist)  Pt states that    she fell out of bed on Saturday landing on her R sided Pt states that she    has been having R sided body pain since.            FINDINGS:     PARASPINAL AREA:  Within normal limits.   DISC LEVELS:  Normal for age.   OTHER:  Mild sigmoid scoliosis.  No acute fracture.                         =====   CONCLUSION:  No acute thoracic spine fracture.           LOCATION:  [facility]           Dictated by (CST): Elier Romo MD on 4/10/2025 at 1:37 PM        Finalized by (CST): Elier Romo MD on 4/10/2025 at 1:39 PM         CT SPINE LUMBAR (CPT=72131)   Final Result   PROCEDURE:  CT SPINE LUMBAR (CPT=72131)       COMPARISON:  None.       INDICATIONS:  fall saturday while sleeping, pain to back of head and right    side of chest.   reports it is starting to mess with her MS.  difficulty    speaking since ssaturday       TECHNIQUE:  Noncontrast CT scanning is performed through the lumbar spine.     Multiplanar reconstructions are generated.  Dose reduction techniques    were used. Dose information is transmitted to the ACR (American College of    Radiology) NRDR (National    Radiology Data Registry) which includes the Dose Index Registry.       PATIENT STATED HISTORY: (As transcribed by Technologist)   Pt states that    she fell out of bed on Saturday landing on her R sided Pt states that she    has been having R sided body pain since.            FINDINGS:     The lumbar spine shows no evidence of fracture, dislocation, or    subluxation. Ligamentous injury cannot be excluded on CT scan. The lumbar    alignment is well-maintained. No destructive osseous lesion is identified.    The pre vertebral and stone vertebral soft    tissues are normal.  Mild dextrocurvature.       LUMBAR DISC LEVELS:   L1-L2:  No significant disc/facet abnormality, spinal stenosis, or    foraminal stenosis.   L2-L3:  No significant disc/facet abnormality, spinal stenosis, or    foraminal stenosis.   L3-L4:  No significant disc/facet abnormality, spinal stenosis, or    foraminal stenosis.   L4-L5:  No significant disc/facet abnormality, spinal stenosis, or    foraminal stenosis.   L5-S1:  No significant disc/facet abnormality, spinal stenosis, or    foraminal stenosis.                         =====   CONCLUSION:  No acute lumbar spine fracture.           LOCATION:  [facility]           Dictated by (CST): Elier Romo MD on 4/10/2025 at 1:39 PM        Finalized by (CST): Elier Romo MD on 4/10/2025 at 1:41 PM         CT SPINE CERVICAL (CPT=72125)   Final Result   PROCEDURE:  CT SPINE CERVICAL (CPT=72125)       COMPARISON:  None.       INDICATIONS:  fall saturday while sleeping, pain to back of head and right    side of chest.   reports it is starting to mess with her MS.  difficulty    speaking since ssaturday       TECHNIQUE:  Noncontrast CT scanning of the cervical spine is performed    from the skull base through C7.  Multiplanar reconstructions are    generated.  Dose reduction techniques were used. Dose information is    transmitted to the ACR (American College of    Radiology) NRDR (National Radiology Data Registry) which includes the Dose    Index Registry.       PATIENT STATED HISTORY: (As transcribed by Technologist)  Pt states that     she fell out of bed on Saturday landing on her R sided Pt states that she    has been having R sided body pain since.            FINDINGS:     The cervical spine shows no evidence of fracture.  There is mild reversal    of the cervical lordosis The cervical vertebral alignment demonstrates no    subluxation. Ligamentous injury cannot be excluded on CT scan. There are    normal cervical basilar    relationships. The odontoid process is intact. No destructive osseous    lesion is identified. The pre vertebral and stone vertebral soft tissues    are normal.       CERVICAL DISC LEVELS:   C2-C3:  No significant disc/facet abnormality, spinal stenosis, or    foraminal stenosis.    C3-C4:  No significant disc/facet abnormality, spinal stenosis, or    foraminal stenosis.   C4-C5:  No significant disc/facet abnormality, spinal stenosis, or    foraminal stenosis.   C5-C6:  No significant disc/facet abnormality, spinal stenosis, or    foraminal stenosis.   C6-C7:  No significant disc/facet abnormality, spinal stenosis, or    foraminal stenosis.   C7-T1:  No significant disc/facet abnormality, spinal stenosis, or    foraminal stenosis.                         =====   CONCLUSION:  No acute cervical spine fracture.               LOCATION:  [facility]           Dictated by (CST): Elier Romo MD on 4/10/2025 at 1:35 PM        Finalized by (CST): Elier Romo MD on 4/10/2025 at 1:37 PM         CT BRAIN OR HEAD (CPT=70450)   Final Result   PROCEDURE:  CT BRAIN OR HEAD (33552)       COMPARISON:  [facility] , CT, CT BRAIN OR HEAD (42076), 2/05/2021, 9:07 PM.       INDICATIONS:  fall saturday while sleeping, pain to back of head and right    side of chest.   reports it is starting to mess with her MS.  difficulty    speaking since ssaturday       TECHNIQUE:  Noncontrast CT scanning is performed through the brain. Dose    reduction techniques were used. Dose information is transmitted to the ACR    (American College of Radiology) NRDR  (National Radiology Data Registry)    which includes the Dose Index    Registry.       PATIENT STATED HISTORY: (As transcribed by Technologist)  Pt states that    she fell out of bed on Saturday landing on her R sided Pt states that she    has been having R sided body pain since.            FINDINGS:     VENTRICLES/SULCI:  Ventricles and sulci are normal in size.     INTRACRANIAL:  There are no abnormal extraaxial fluid collections.  There    is no midline shift.  There are no intraparenchymal brain abnormalities.     There is nothing specific for acute infarct.  There is no hemorrhage or    mass lesion.     SINUSES:           No sign of acute sinusitis.     MASTOIDS:          No sign of acute inflammation.   SKULL:             No evidence for fracture or osseous abnormality.   OTHER:             None.                         =====   CONCLUSION:  No acute intracranial abnormality.               LOCATION:  [facility]           Dictated by (CST): Elier Romo MD on 4/10/2025 at 1:34 PM        Finalized by (CST): Elier Romo MD on 4/10/2025 at 1:35 PM             Labs Reviewed   CBC WITH DIFFERENTIAL WITH PLATELET - Abnormal; Notable for the following components:       Result Value    HGB 11.3 (*)     Immature Platelet Fraction 8.9 (*)     MCV 74.4 (*)     MCH 22.1 (*)     MCHC 29.7 (*)     All other components within normal limits   COMP METABOLIC PANEL (14) - Abnormal; Notable for the following components:    Glucose 120 (*)     Creatinine 1.11 (*)     Calculated Osmolality 299 (*)     eGFR-Cr 58 (*)     All other components within normal limits   URINALYSIS WITH CULTURE REFLEX - Abnormal; Notable for the following components:    Glucose Urine >1000 (*)     All other components within normal limits            Medications   HYDROmorphone (Dilaudid) 1 MG/ML injection 0.5 mg (0.5 mg Intravenous Given 4/10/25 1403)   HYDROmorphone (Dilaudid) 1 MG/ML injection 0.5 mg (0.5 mg Intravenous Given 4/10/25 1151)     Patient  still having ongoing pain despite Dilaudid.  Speech is still stuttering, will admit for possible MS exacerbation.  Discussed with neurology and hospitalist service patient be admitted for further care.  Admission disposition: 4/10/2025  2:19 PM           Medical Decision Making      Disposition and Plan     Clinical Impression:  1. Exacerbation of multiple sclerosis (HCC)         Disposition:  Admit  4/10/2025  2:19 pm    Follow-up:  No follow-up provider specified.        Medications Prescribed:  Current Discharge Medication List              Supplementary Documentation:         Hospital Problems       Present on Admission  Date Reviewed: 3/20/2025          ICD-10-CM Noted POA    * (Principal) Exacerbation of multiple sclerosis (HCC) G35 4/10/2025 Unknown

## 2025-04-11 ENCOUNTER — APPOINTMENT (OUTPATIENT)
Dept: MRI IMAGING | Facility: HOSPITAL | Age: 55
End: 2025-04-11
Attending: STUDENT IN AN ORGANIZED HEALTH CARE EDUCATION/TRAINING PROGRAM
Payer: MEDICARE
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
## 2025-04-11 LAB
ALBUMIN SERPL-MCNC: 4.1 G/DL (ref 3.2–4.8)
ALBUMIN/GLOB SERPL: 1.7 {RATIO} (ref 1–2)
ALP LIVER SERPL-CCNC: 74 U/L (ref 46–118)
ALT SERPL-CCNC: 16 U/L (ref 10–49)
ANION GAP SERPL CALC-SCNC: 8 MMOL/L (ref 0–18)
AST SERPL-CCNC: 19 U/L (ref ?–34)
BASOPHILS # BLD AUTO: 0.02 X10(3) UL (ref 0–0.2)
BASOPHILS NFR BLD AUTO: 0.3 %
BILIRUB SERPL-MCNC: 0.3 MG/DL (ref 0.3–1.2)
BUN BLD-MCNC: 15 MG/DL (ref 9–23)
CALCIUM BLD-MCNC: 9 MG/DL (ref 8.7–10.6)
CHLORIDE SERPL-SCNC: 106 MMOL/L (ref 98–112)
CO2 SERPL-SCNC: 32 MMOL/L (ref 21–32)
CREAT BLD-MCNC: 1.04 MG/DL (ref 0.55–1.02)
EGFRCR SERPLBLD CKD-EPI 2021: 63 ML/MIN/1.73M2 (ref 60–?)
EOSINOPHIL # BLD AUTO: 0 X10(3) UL (ref 0–0.7)
EOSINOPHIL NFR BLD AUTO: 0 %
ERYTHROCYTE [DISTWIDTH] IN BLOOD BY AUTOMATED COUNT: 17.4 %
GLOBULIN PLAS-MCNC: 2.4 G/DL (ref 2–3.5)
GLUCOSE BLD-MCNC: 123 MG/DL (ref 70–99)
GLUCOSE BLD-MCNC: 135 MG/DL (ref 70–99)
GLUCOSE BLD-MCNC: 166 MG/DL (ref 70–99)
GLUCOSE BLD-MCNC: 176 MG/DL (ref 70–99)
GLUCOSE BLD-MCNC: 193 MG/DL (ref 70–99)
HCT VFR BLD AUTO: 36.6 % (ref 35–48)
HGB BLD-MCNC: 10.9 G/DL (ref 12–16)
IMM GRANULOCYTES # BLD AUTO: 0.02 X10(3) UL (ref 0–1)
IMM GRANULOCYTES NFR BLD: 0.3 %
LYMPHOCYTES # BLD AUTO: 3.15 X10(3) UL (ref 1–4)
LYMPHOCYTES NFR BLD AUTO: 49.6 %
MCH RBC QN AUTO: 22.6 PG (ref 26–34)
MCHC RBC AUTO-ENTMCNC: 29.8 G/DL (ref 31–37)
MCV RBC AUTO: 75.9 FL (ref 80–100)
MONOCYTES # BLD AUTO: 0.58 X10(3) UL (ref 0.1–1)
MONOCYTES NFR BLD AUTO: 9.1 %
NEUTROPHILS # BLD AUTO: 2.58 X10 (3) UL (ref 1.5–7.7)
NEUTROPHILS # BLD AUTO: 2.58 X10(3) UL (ref 1.5–7.7)
NEUTROPHILS NFR BLD AUTO: 40.7 %
OSMOLALITY SERPL CALC.SUM OF ELEC: 307 MOSM/KG (ref 275–295)
PLATELET # BLD AUTO: 138 10(3)UL (ref 150–450)
PLATELETS.RETICULATED NFR BLD AUTO: 8.4 % (ref 0–7)
POTASSIUM SERPL-SCNC: 3.8 MMOL/L (ref 3.5–5.1)
PROT SERPL-MCNC: 6.5 G/DL (ref 5.7–8.2)
RBC # BLD AUTO: 4.82 X10(6)UL (ref 3.8–5.3)
SODIUM SERPL-SCNC: 146 MMOL/L (ref 136–145)
WBC # BLD AUTO: 6.4 X10(3) UL (ref 4–11)

## 2025-04-11 PROCEDURE — 99223 1ST HOSP IP/OBS HIGH 75: CPT | Performed by: OTHER

## 2025-04-11 PROCEDURE — 72146 MRI CHEST SPINE W/O DYE: CPT | Performed by: STUDENT IN AN ORGANIZED HEALTH CARE EDUCATION/TRAINING PROGRAM

## 2025-04-11 PROCEDURE — 72141 MRI NECK SPINE W/O DYE: CPT | Performed by: STUDENT IN AN ORGANIZED HEALTH CARE EDUCATION/TRAINING PROGRAM

## 2025-04-11 PROCEDURE — 99232 SBSQ HOSP IP/OBS MODERATE 35: CPT | Performed by: STUDENT IN AN ORGANIZED HEALTH CARE EDUCATION/TRAINING PROGRAM

## 2025-04-11 PROCEDURE — 99222 1ST HOSP IP/OBS MODERATE 55: CPT | Performed by: CLINICAL NURSE SPECIALIST

## 2025-04-11 PROCEDURE — 70551 MRI BRAIN STEM W/O DYE: CPT | Performed by: STUDENT IN AN ORGANIZED HEALTH CARE EDUCATION/TRAINING PROGRAM

## 2025-04-11 RX ORDER — LORAZEPAM 2 MG/ML
1 INJECTION INTRAMUSCULAR ONCE
Status: COMPLETED | OUTPATIENT
Start: 2025-04-11 | End: 2025-04-11

## 2025-04-11 RX ORDER — INSULIN DEGLUDEC 100 U/ML
15 INJECTION, SOLUTION SUBCUTANEOUS DAILY
Status: DISCONTINUED | OUTPATIENT
Start: 2025-04-12 | End: 2025-04-11

## 2025-04-11 RX ORDER — INSULIN DEGLUDEC 100 U/ML
20 INJECTION, SOLUTION SUBCUTANEOUS DAILY
Status: DISCONTINUED | OUTPATIENT
Start: 2025-04-11 | End: 2025-04-11

## 2025-04-11 RX ORDER — INSULIN DEGLUDEC 100 U/ML
15 INJECTION, SOLUTION SUBCUTANEOUS DAILY
Status: DISCONTINUED | OUTPATIENT
Start: 2025-04-11 | End: 2025-04-12

## 2025-04-11 NOTE — CONSULTS
[facility]  MANUEL Neurology Consult Note    Kat Crook Patient Status:  Observation    3/11/1970 MRN [id number]   Location [facility] 3NE-A Attending Remedios Thapa, DO   Hosp Day # 0 PCP Justin Huerta MD     REASON FOR EVALUATION:  Pain    HISTORY OF PRESENT ILLNESS:  Ms. Crook is a 55-year-old woman with hypertension, dyslipidemia, diabetes mellitus, coronary artery disease and multiple sclerosis who was hospitalized after a fall out of her bed and pain.  She is concerned that she is having a \"MS flare\" triggered by this fall out of her bed because she is noticing excruciating pain between her shoulder blades and in both of her legs extending from her hips down toward her toes.  The fall was precipitated by acting out a dream; she recalls having a dream of trying to roll down a hill when getting the momentum to roll she apparently did this in real life and landed on the floor of her cousin's house where she was visiting she landed on her back and hit the back of her head, which is where she has pain currently.  She then started to notice the severe pain in her legs and she started stuttering, which has happened before in the setting of perceived MS flares for which she apparently gets plasma exchange.  She has no new numbness or weakness.  She has no new visual difficulty.    PAST MEDICAL HISTORY:  Past Medical History:    Abdominal pain    Allergic rhinitis    Anemia    Anxiety    Anxiety state    Arthritis    Asthma (HCC)    Atypical mole    Autoimmune disease (HCC)    Back pain    Bad breath    Black stools    Bloating    Blood disorder    Thalassemia alpha, Sickle cell trait    Blood in urine    Blurred vision    Calculus of kidney    Cardiomyopathy (HCC)    last echo 2021: EF 55%    Chest pain    Constipation    Decorative tattoo    Depression    Diarrhea, unspecified    Dizziness    Easy bruising    Elevated liver enzymes    Endocrine disorder    Esophageal reflux    Fatigue    Fibromyalgia     Food intolerance    Frequent urination    Frequent UTI    Gastroparesis    Glaucoma    H. pylori infection    H/O  section    Headache disorder    Heart attack (HCC)    Heart palpitations    Heartburn    Hemorrhoids    Herniation of cervical intervertebral disc    High blood pressure    High cholesterol    History of blood clots    Unprovoked    History of blood transfusion    History of cardiac murmur    History of depression    Hoarseness, chronic    Hyperlipidemia    IBS (irritable bowel syndrome)    Irregular bowel habits    Leaking of urine    Liver disease    GIORDANO and Stage 2 fibrosis    Loss of appetite    Migraines    Multiple sclerosis (HCC)    dx 8 years ago    Muscle weakness    cane or walker prn    Myocardial infarction (HCC)    Nausea    Neuropathy    legs, hands, feet; bilateral    Night sweats    Osteoarthritis    Osteoporosis    KAMALJIT in her 30s    Osteoporosis    Pain with bowel movements    Painful urination    Problems with swallowing    Due to MS    Pulmonary embolism (HCC)    Scoliosis of lumbar spine    Shortness of breath    Sickle cell trait    Sleep apnea    no treatment    Sleep disturbance    Stress    Type II or unspecified type diabetes mellitus without mention of complication, not stated as uncontrolled    Uncomfortable fullness after meals    Unspecified disorder of thyroid    total thyroidectomy    Visual impairment    glasses    Wears glasses    Weight loss    Wheezing       PAST SURGICAL HISTORY:  Past Surgical History:   Procedure Laterality Date      1986    Cataract      Cath angio  2014    Cholecystectomy      Colonoscopy N/A 2015    Procedure: COLONOSCOPY;  Surgeon: Liz Tong DO;  Location:  ENDOSCOPY    Colonoscopy N/A 2021    Procedure: COLONOSCOPY;  Surgeon: Cedrick Da Silva MD;  Location:  ENDOSCOPY    Colonoscopy N/A 10/08/2021    Procedure: COLONOSCOPY, ESOPHAGOGASTRODUODENOSCOPY (EGD);  Surgeon: Dylan  Darvin MADRID MD;  Location:  ENDOSCOPY    D & c  1990    Blighted ovum    Egd      Hysterectomy      total hystero.    Ir port a cath insertion exchnge check  2018    Lithotripsy      x 2    Lysis of adhesions      Needle biopsy liver  2016    Oophorectomy Bilateral     total hystero.    Other      dialysis catheter- left chest for plasmaphoresis    Other surgical history       x 2    Port, indwelling, imp  2019    power port    Thyroidectomy  2013    benign MNG    Total abdom hysterectomy      Upper gi endoscopy performed  2014       FAMILY HISTORY:  family history includes Anemia in her daughter, maternal grandmother, and mother; Asthma in her father; Breast Cancer in her maternal aunt; Breast Cancer (age of onset: 64) in her paternal cousin female; Cancer in her maternal grandmother and mother; Colon Cancer in her maternal grandmother; Colon Polyps in her maternal grandmother and mother; Diabetes in her maternal grandmother; Heart Attack in her father; Hypertension in her mother; Other in her father and sister; Ovarian Cancer in her maternal grandmother and mother; Pulmonary Disease in her father.    SOCIAL HISTORY:   reports that she has never smoked. She has never been exposed to tobacco smoke. She has never used smokeless tobacco. She reports that she does not drink alcohol and does not use drugs.    ALLERGIES:  Allergies   Allergen Reactions    Epinephrine OTHER (SEE COMMENTS)     Cardiac issues    Immune Globulin ANAPHYLAXIS    Latex SHORTNESS OF BREATH and OTHER (SEE COMMENTS)     WELTS    Methylprednisolone SWELLING     Swelling of neck, throat and tongue  Injection, throat and tongue swelling      Ocrelizumab ANAPHYLAXIS and OTHER (SEE COMMENTS)     Difficulty breathing    Other SHORTNESS OF BREATH     ENVIRONMENTAL, ASTHMA EXACERBATION    Urea Tightness in Throat     Urea C-13 in Pranactin for H. Pylori test kit.    Pcn [Penicillamine]     Penicillins OTHER (SEE  COMMENTS)    Midodrine ITCHING     Pt reports mouth pain and itching possibly r/t midodrine       MEDICATIONS:  No current outpatient medications on file.     Current Facility-Administered Medications   Medication Dose Route Frequency    insulin degludec (Tresiba) 100 units/mL flextouch 15 Units  15 Units Subcutaneous Daily    albuterol (Ventolin HFA) 108 (90 Base) MCG/ACT inhaler 2 puff  2 puff Inhalation Q4H PRN    atorvastatin (Lipitor) tab 40 mg  40 mg Oral Nightly    baclofen (Lioresal) tab 10 mg  10 mg Oral BID    carvedilol (Coreg) tab 3.125 mg  3.125 mg Oral BID    celecoxib (CeleBREX) cap 100 mg  100 mg Oral BID    DULoxetine (Cymbalta) DR cap 60 mg  60 mg Oral BID    fluticasone-salmeterol (Advair Diskus) 500-50 MCG/ACT inhaler 1 puff  1 puff Inhalation BID    HYDROcodone-acetaminophen (Norco)  MG per tab 1 tablet  1 tablet Oral QID PRN    ipratropium-albuterol (Duoneb) 0.5-2.5 (3) MG/3ML inhalation solution 3 mL  3 mL Nebulization Q6H PRN    levothyroxine (Synthroid) tab 88 mcg  88 mcg Oral Before breakfast    montelukast (Singulair) tab 10 mg  10 mg Oral Nightly    pantoprazole (Protonix) DR tab 20 mg  20 mg Oral BID    pregabalin (Lyrica) cap 200 mg  200 mg Oral BID    acetaminophen (Tylenol Extra Strength) tab 500 mg  500 mg Oral Q4H PRN    melatonin tab 3 mg  3 mg Oral Nightly PRN    guaiFENesin ER (Mucinex) 12 hr tab 600 mg  600 mg Oral BID PRN    benzonatate (Tessalon) cap 200 mg  200 mg Oral TID PRN    glycerin-hypromellose- (Artificial Tears) 0.2-0.2-1 % ophthalmic solution 1 drop  1 drop Both Eyes QID PRN    sodium chloride (Saline Mist) 0.65 % nasal solution 1 spray  1 spray Each Nare Q3H PRN    enoxaparin (Lovenox) 40 MG/0.4ML SUBQ injection 40 mg  40 mg Subcutaneous Nightly    ondansetron (Zofran) 4 MG/2ML injection 4 mg  4 mg Intravenous Q6H PRN    prochlorperazine (Compazine) 10 MG/2ML injection 5 mg  5 mg Intravenous Q8H PRN    polyethylene glycol (PEG 3350) (Miralax) 17 g oral  packet 17 g  17 g Oral Daily PRN    sennosides (Senokot) tab 17.2 mg  17.2 mg Oral Nightly PRN    bisacodyl (Dulcolax) 10 MG rectal suppository 10 mg  10 mg Rectal Daily PRN    fleet enema (Fleet) rectal enema 133 mL  1 enema Rectal Once PRN    insulin aspart (NovoLOG) 100 Units/mL FlexPen 1-68 Units  1-68 Units Subcutaneous TID CC    glucose (Dex4) 15 GM/59ML oral liquid 15 g  15 g Oral Q15 Min PRN    Or    glucose (Glutose) 40% oral gel 15 g  15 g Oral Q15 Min PRN    Or    glucose-vitamin C (Dex-4) chewable tab 4 tablet  4 tablet Oral Q15 Min PRN    Or    dextrose 50% injection 50 mL  50 mL Intravenous Q15 Min PRN    Or    glucose (Dex4) 15 GM/59ML oral liquid 30 g  30 g Oral Q15 Min PRN    Or    glucose (Glutose) 40% oral gel 30 g  30 g Oral Q15 Min PRN    Or    glucose-vitamin C (Dex-4) chewable tab 8 tablet  8 tablet Oral Q15 Min PRN    insulin aspart (NovoLOG) 100 Units/mL FlexPen 1-30 Units  1-30 Units Subcutaneous TID AC and HS    HYDROmorphone (Dilaudid) 1 MG/ML injection 0.2 mg  0.2 mg Intravenous Q2H PRN    Or    HYDROmorphone (Dilaudid) 1 MG/ML injection 0.4 mg  0.4 mg Intravenous Q2H PRN    Or    HYDROmorphone (Dilaudid) 1 MG/ML injection 0.8 mg  0.8 mg Intravenous Q2H PRN    pregabalin (Lyrica) cap 50 mg  50 mg Oral QAM       REVIEW OF SYSTEMS:  A 10-point system was reviewed.  Pertinent positives and negatives are noted in HPI.      PHYSICAL EXAMINATION:  VITAL SIGNS: /75 (BP Location: Left arm)   Pulse 63   Temp 98.1 °F (36.7 °C) (Oral)   Resp 17   Wt 170 lb 6.7 oz (77.3 kg)   LMP  (LMP Unknown)   SpO2 97%   BMI 27.51 kg/m²   GENERAL:  Patient is a 55 year old female in no acute distress. She is comfortably eating while verbalizing how excruciating her pain is.  HEART:  Regular rate and rhythm.  SKIN: Warm, dry, no rashes  PSYCH: stammering inconsistently, always on the first syllable of a word irrespective of the phoneme, worse when talking about her pain, at times can speak at length  without stuttering at all.    NEUROLOGICAL:   Mental status: Oriented to person, place, and time  Speech & Language: Fluent, no dysarthria; stammering as above  Comprehension: Intact  Cranial Nerves: VFF, PERRL, EOMI, no nystagmus, facial sensation intact, face symmetric, tongue midline, shoulder shrug equal  Motor: somewhat limited by pain but she can give at least brief effort and strength is normal in all flexors and extensors   Sensory: Intact to light touch in all limbs  Coordination: FTN intact  Tendon Reflexes: normal for habitus, no asymmetry  Gait: Deferred    DIAGNOSTIC DATA:  Labs:  Recent Labs   Lab 04/10/25  1151 04/11/25  0620   RBC 5.11 4.82   HGB 11.3* 10.9*   HCT 38.0 36.6   MCV 74.4* 75.9*   MCH 22.1* 22.6*   MCHC 29.7* 29.8*   RDW 17.2 17.4   NEPRELIM 2.78 2.58   WBC 7.5 6.4   .0 138.0*         Recent Labs   Lab 04/10/25  1151 04/11/25  0620   * 166*   BUN 11 15   CREATSERUM 1.11* 1.04*   EGFRCR 58* 63   CA 9.0 9.0    146*   K 3.9 3.8    106   CO2 28.0 32.0         IMAGING:  CT SPINE LUMBAR (CPT=72131)  Result Date: 4/10/2025  CONCLUSION:  No acute lumbar spine fracture.   LOCATION:  [id number]   Dictated by (CST): Elier Romo MD on 4/10/2025 at 1:39 PM     Finalized by (CST): Elier Romo MD on 4/10/2025 at 1:41 PM       CT SPINE THORACIC (CPT=72128)  Result Date: 4/10/2025  CONCLUSION:  No acute thoracic spine fracture.   LOCATION:  [id number]   Dictated by (CST): Elier Romo MD on 4/10/2025 at 1:37 PM     Finalized by (CST): Elier Romo MD on 4/10/2025 at 1:39 PM       CT SPINE CERVICAL (CPT=72125)  Result Date: 4/10/2025  CONCLUSION:  No acute cervical spine fracture.    LOCATION:  [id number]   Dictated by (CST): Elier Romo MD on 4/10/2025 at 1:35 PM     Finalized by (CST): Elier Romo MD on 4/10/2025 at 1:37 PM       CT BRAIN OR HEAD (CPT=70450)  Result Date: 4/10/2025  CONCLUSION:  No acute intracranial abnormality.    LOCATION:  [id number]   Dictated by (CST):  Elier Romo MD on 4/10/2025 at 1:34 PM     Finalized by (CST): Elier Romo MD on 4/10/2025 at 1:35 PM           ASSESSMENT:  Ms. Crook is a 55-year-old woman with hypertension, dyslipidemia, diabetes mellitus, coronary artery disease and multiple sclerosis who is experiencing pain after a mechanical fall, no compelling clinical evidence of multiple sclerosis exacerbation or pseudo exacerbation.    PLAN:  Principal Problem:    Exacerbation of multiple sclerosis (HCC)  No further urgent neurological diagnostics or therapeutics, inclusive of plasma exchange, are indicated.  Putting aside she would be very old for a multiple sclerosis relapse, she has no signs or symptoms of a new neurological deficit.  She has pain and stammering, which is psychological in nature.  She has insight into the fact that the stammering will improve when her pain does.    Would pursue pain control for musculoskeletal pain per routine.  I do not think migraine cocktails will be particularly helpful and there are no other neurologically directed pain regimens to pursue under the circumstances.    She can follow up with her established neurologist, Dr. Payne, when she is out of the hospital.  She can dismiss at the discretion of her attending physician.  Neurology will sign off but call back should concerns arise.    Quang Grady MD

## 2025-04-11 NOTE — CM/SW NOTE
04/11/25 1000   CM/SW Referral Data   Referral Source Social Work (self-referral)   Reason for Referral Discharge planning   Informant Patient;EMR   Medical Hx   Does patient have an established PCP? Yes   Patient Info   Patient's Current Mental Status at Time of Assessment Alert;Oriented   Patient's Home Environment House   Number of Levels in Home 1   Patient lives with Son   Patient Status Prior to Admission   Services in place prior to admission Home Health Care;DME/Supplies at home;detention Home Care   Home Health Provider Info [facility]   Type of DME/Supplies Wheeled Walker;Straight Cane   detention Home Care Provider Private Duty   Discharge Needs   Anticipated D/C needs Home health care     SW self-referred for discharge planning. Pt is a 56 y/o female admitted with exacerbation of multiple sclerosis. PT eval pending.   SW received message from [facility] liaison Reshma stating pt is current for RN services.   SW met with pt who is alert and oriented at bedside to discuss discharge planning.     Pt lives with her son in a ranch style home. Pt stated her son assists her when needed and she has a caregiver M-F for a few hours/day in the evenings. Pt stated she has a walker and cane. Pt stated she is working on obtaining a motorized scooter/wheelchair d/t drop foot.     Pt confirmed she is current with [facility] and plans to continue with [facility] at discharge. Pt stated she is open to HHPT/OT if needed. Pt denied any further needs at this time.     CORETTA entered for [facility]. SW will continue to follow.     RADHA Gilliam  Discharge Planner

## 2025-04-11 NOTE — HOME CARE LIAISON
Patient is current with [facility].  Please enter CORETTA order upon discharge if goes inpatient.  RN.

## 2025-04-11 NOTE — PLAN OF CARE
NURSING ADMISSION NOTE      Patient admitted via Cart  Oriented to room.  Safety precautions initiated.  Bed in low position.  Call light in reach.    Pt A&Ox4. Pt having complaints on blurry vision, BLE hypersensitivity/weakness, please see flow sheets for neuro assessment. Miguel A MAN  aware of neurology consult. Plans for neuro checks and pain management. Pt c/o tongue swelling( more on R side) and R- sided of her face feeling swollen. Upon examination , Pt did not appear swollen or in distress. PRN benadryl given and case discussed with hospitalist.       Problem: PAIN - ADULT  Goal: Verbalizes/displays adequate comfort level or patient's stated pain goal  Description: INTERVENTIONS:- Encourage pt to monitor pain and request assistance- Assess pain using appropriate pain scale- Administer analgesics based on type and severity of pain and evaluate response- Implement non-pharmacological measures as appropriate and evaluate response- Consider cultural and social influences on pain and pain management- Manage/alleviate anxiety- Utilize distraction and/or relaxation techniques- Monitor for opioid side effects- Notify MD/LIP if interventions unsuccessful or patient reports new pain- Anticipate increased pain with activity and pre-medicate as appropriate  Outcome: Progressing     Problem: SAFETY ADULT - FALL  Goal: Free from fall injury  Description: INTERVENTIONS:- Assess pt frequently for physical needs- Identify cognitive and physical deficits and behaviors that affect risk of falls.- [place] fall precautions as indicated by assessment.- Educate pt/family on patient safety including physical limitations- Instruct pt to call for assistance with activity based on assessment- Modify environment to reduce risk of injury- Provide assistive devices as appropriate- Consider OT/PT consult to assist with strengthening/mobility- Encourage toileting schedule  Outcome: Progressing

## 2025-04-11 NOTE — PROGRESS NOTES
Assumed care of patient at 0700. Neuro checks q4. Continues with stuttering and slow speech. Also continues with generalized weakness with pain to lower neck/upper back on assessment. Prn pain meds and hot/cold therapy on shift. Worked with PT/OT and walked in halls. Up in chair on shift. Bilat foot drop: high fall risk. Likely discharge tomorrow if strength continues to improve. All Needs met on shift.

## 2025-04-11 NOTE — PLAN OF CARE
Received pt in her bed resting alert and coherent. NQ4 with no drift. Port a cath on her right chest for pheresis. Dilaudid for pain. Ad kolton. Concarb diet. 3Units of short acting given. Qid Accu checks. For pain mngt and neuros.  Cpap at night. Bed in lowest position, bed alarm on. Safety and fall prec maintained. Will cont poc      Problem: PAIN - ADULT  Goal: Verbalizes/displays adequate comfort level or patient's stated pain goal  Description: INTERVENTIONS:- Encourage pt to monitor pain and request assistance- Assess pain using appropriate pain scale- Administer analgesics based on type and severity of pain and evaluate response- Implement non-pharmacological measures as appropriate and evaluate response- Consider cultural and social influences on pain and pain management- Manage/alleviate anxiety- Utilize distraction and/or relaxation techniques- Monitor for opioid side effects- Notify MD/LIP if interventions unsuccessful or patient reports new pain- Anticipate increased pain with activity and pre-medicate as appropriate  Outcome: Progressing     Problem: SAFETY ADULT - FALL  Goal: Free from fall injury  Description: INTERVENTIONS:- Assess pt frequently for physical needs- Identify cognitive and physical deficits and behaviors that affect risk of falls.- [place] fall precautions as indicated by assessment.- Educate pt/family on patient safety including physical limitations- Instruct pt to call for assistance with activity based on assessment- Modify environment to reduce risk of injury- Provide assistive devices as appropriate- Consider OT/PT consult to assist with strengthening/mobility- Encourage toileting schedule  Outcome: Progressing

## 2025-04-11 NOTE — CONSULTS
[facility]  Diabetes Consult Note    Kat Crook Patient Status:  Observation    3/11/1970 MRN [id number]   Location [facility] 3NE-A Attending Remedios Thapa,    Hosp Day # 0 PCP Justin Huerta MD       Reason for Consult:   Management recommendations in setting of uncontrolled T2DM      Provider Requesting Consult:  Dr. Remedios Thapa ([place] hospitalist)      Diagnosis:  Problem List[1]      Medical History:  Past Medical History[2]  Past Surgical History[3]  Family History[4]      Diabetes history:  Type:  T2DM  Onset:  w/ MS diagnosis  Family history of DM: none      Allergies: Allergies[5]      Medications: Complete list reviewed. Active diabetes medications include degludec and aspart.  Diabetic Medications              Insulin Regular Human 100 UNIT/ML Injection Solution (Taking) 100 Units by Insulin pump route daily.    GVOKE HYPOPEN 2-PACK 1 MG/0.2ML Subcutaneous injection (Taking As Needed) Inject 0.2 mL (1 mg total) into the skin as needed.    FARXIGA 10 MG Oral Tab (Taking) Take 1 tablet (10 mg total) by mouth daily. in the afternoon              Vital Signs:  Blood pressure 105/62, pulse 66, temperature 98 °F (36.7 °C), temperature source Oral, resp. rate 16, weight 170 lb 6.7 oz (77.3 kg), SpO2 100%, not currently breastfeeding.       Review of Systems:  General: No acute distress. Alert and oriented x 3. Resting comfortably in bed  HEENT: Moist mucous membranes  Respiratory: breathing comfortably  Cardiovascular:  Regular rate   Psychiatric: Appropriate mood and affect.      Labs:  Recent Labs   Lab 04/10/25  1437 04/10/25  1617 04/10/25  2023 04/11/25  0544 25  1148   PGLU 81 141* 198* 193* 123*     Recent Labs     04/10/25  1151 04/10/25  1437 25  0620 25  1148     --  146*  --      --  106  --    CO2 28.0  --  32.0  --    BUN 11  --  15  --    CREATSERUM 1.11*  --  1.04*  --    A1C 7.1*  --   --   --    PGLU  --    < >  --  123*   CA 9.0  --  9.0  --     ALB 4.5  --  4.1  --     < > = values in this interval not displayed.                    History of Present Illness: Kat Crook is a 54 year old female with a PMH of T2DM (on insulin pump), MS, asthma, alpha thalassemia minor, HTN and cardiomyopathy admitted 4/10/2025 with complaints of multiple falls in past 2 days, believes related to MS flair. Patient with Tandem t:slim insulin pump wear at home; self-discontinued in ED with initiation of basal/bolus subcutaneous insulin.         Assessment/Recommendations:  Upon interview today, patient states she was diagnosed with T2DM in 2013 w/ MS diagnosis; states she follows with Dr. Andrew ([facility] endocrinology) for insulin pump management. Patient states she self-discontinued insulin pump in ED as she prefers to manage T2DM w/ subcutaneous insulin injections while admitted; states she has had multiple hypoglycemic episodes while admitted with insulin pump in place in the past and believes those episodes are less likely to occur with subcutaneous insulin.     Today, explained to patient will order basal and bolus insulin at doses similar to insulin pump settings while admitted. Plan for reinitiation of insulin pump at discharge; reminded patient to restart ~24 hours after last long-acting insulin (degludec) dose. Patient states understanding of and willingness to participate in plan of care.      Plan:  Inpatient recommendations -   Degludec = given discontinuation of insulin pump, recommend initiating at 15u every day   Pump back-up plan is 20u every day; may need to be increased to 20u tomorrow  Aspart = given discontinuation of insulin pump, recommend initiating at 1:35>140 & 1:5gm CHO  Similar to patient's insulin pump settings  Discharge recommendations -   Restart Tandem t:slim insulin pump 24 hours after last long-acting insulin (degludec) dose      Prescription Recommendations:  Medicare:  Insulin:   Novolog, Fiasp, Apidra, Admelog, Levemir, Lantus, Basaglar,  Marie Ferguson  (If no secondary insurance, may need prior auth)  Supplies:  BD pen needles (Anjelica)  Glucometer:  OneTouch      Provider follow up recommendations:  PCP - Dr. Huerta (OS)  Endocrinology/Diabetes Center - MORGAN Brooks ([facility] endocrinology)      A total of 60 minutes were spent with the patient, 100% was spent counseling and coordinating care for T2 diabetes self-management including nutrition, exercise, blood glucose monitoring, insulin administration, medications, treatment options, follow-up coordination and resources.       Margoth Romo, APRN  4/11/2025  11:53 AM       [1]   Patient Active Problem List  Diagnosis    Neuropathic pain of both legs    Elevated liver enzymes    Hyperparathyroidism (HCC)    Asthma (HCC)    Osteoporosis    Fibromyalgia    Goiter    Hypervitaminosis D    Demyelinating disease of central nervous system (HCC)    Tachycardia    Encephalopathy    AVN (avascular necrosis of bone) (HCC)    Gastroparesis    Fatty liver    Diabetes mellitus (HCC)    Generalized abdominal pain    Multiple sclerosis (HCC)    At risk for falling    Cardiomyopathy in other disease    Weakness of both lower extremities    CATIA (acute kidney injury)    Hypokalemia    MS (multiple sclerosis) (HCC)    Hypotension    Anemia    Weakness generalized    Essential hypertension    Irritable bowel syndrome (IBS)    S/P laparoscopic surgery    Intra-abdominal adhesions    Dyspnea, paroxysmal nocturnal    Multiple sclerosis exacerbation (HCC)    Dysuria    History of plasmapheresis    Hyponatremia    Abdominal pain    Hyperglycemia    MOI (obstructive sleep apnea)    Dehydration    Abdominal pain, right upper quadrant    Allergic rhinitis    Chronic fatigue and malaise    Dilated idiopathic cardiomyopathy (HCC)    Dizziness    Dyspnea    Chest pain, unspecified    Nephrolithiasis    Undiagnosed cardiac murmurs    Sickle cell trait    Pure hypercholesterolemia    GIORDANO (nonalcoholic steatohepatitis)    Myalgia  and myositis, unspecified    Liver lesion    Interstitial cystitis    Family history of coronary arteriosclerosis    Type 1 diabetes mellitus with ketoacidosis without coma (HCC)    Urinary tract infection without hematuria, site unspecified    Controlled type 1 diabetes mellitus with hyperglycemia (HCC)    Chest pain of uncertain etiology    Abdominal pain of unknown etiology    Spinal stenosis    Anemia due to chronic blood loss    Aseptic necrosis of head or neck of femur    Bilateral tinnitus    Mild persistent asthma with acute exacerbation (HCC)    Generalized abdominal tenderness    Generalized edema    Hyperlipidemia    Muscle weakness    Disorder of nervous system due to type 2 diabetes mellitus (HCC)    Peripheral vascular disorder due to diabetes mellitus (HCC)    Polyneuropathy due to type 2 diabetes mellitus (HCC)    Vitamin D deficiency    Constipation    Right upper quadrant pain    Shortness of breath    Chronic pain disorder    Headache    Hypothyroidism    Idiopathic osteoporosis    Type 2 diabetes mellitus without complication, with long-term current use of insulin (HCC)    Fatigue    Esophageal reflux    Myalgia    Pulmonary embolism on left (HCC)    Microcytic anemia    Alpha-thalassemia (HCC)    Pulmonary infiltrate    Encounter for anticoagulation discussion and counseling    Right lower quadrant pain    Hemodialysis catheter dysfunction    Primary hypertension    Age-related cataract of both eyes    Anxiety and depression    Palpitations    Photophobia of both eyes    Preglaucoma, unspecified, bilateral    Unsteady gait when walking    Weakness    Shock (HCC)    Acute cystitis without hematuria    Low serum cortisol level    Pancreatic duct dilated (HCC)    Intractable abdominal pain    Type 1 diabetes mellitus with hyperglycemia (HCC)    Pulmonary hypertension due to myeloproliferative disorder (HCC)    Abnormal finding on GI tract imaging    Asthma exacerbation (HCC)    Moderate persistent  asthma with exacerbation (HCC)    Bronchitis with acute wheezing    Exacerbation of multiple sclerosis (HCC)   [2]   Past Medical History:   Abdominal pain    Allergic rhinitis    Anemia    Anxiety    Anxiety state    Arthritis    Asthma (HCC)    Atypical mole    Autoimmune disease (HCC)    Back pain    Bad breath    Black stools    Bloating    Blood disorder    Thalassemia alpha, Sickle cell trait    Blood in urine    Blurred vision    Calculus of kidney    Cardiomyopathy (HCC)    last echo 2021: EF 55%    Chest pain    Constipation    Decorative tattoo    Depression    Diarrhea, unspecified    Dizziness    Easy bruising    Elevated liver enzymes    Endocrine disorder    Esophageal reflux    Fatigue    Fibromyalgia    Food intolerance    Frequent urination    Frequent UTI    Gastroparesis    Glaucoma    H. pylori infection    H/O  section    Headache disorder    Heart attack (HCC)    Heart palpitations    Heartburn    Hemorrhoids    Herniation of cervical intervertebral disc    High blood pressure    High cholesterol    History of blood clots    Unprovoked    History of blood transfusion    History of cardiac murmur    History of depression    Hoarseness, chronic    Hyperlipidemia    IBS (irritable bowel syndrome)    Irregular bowel habits    Leaking of urine    Liver disease    GIORDANO and Stage 2 fibrosis    Loss of appetite    Migraines    Multiple sclerosis (HCC)    dx 8 years ago    Muscle weakness    cane or walker prn    Myocardial infarction (HCC)    Nausea    Neuropathy    legs, hands, feet; bilateral    Night sweats    Osteoarthritis    Osteoporosis    KAMALJIT in her 30s    Osteoporosis    Pain with bowel movements    Painful urination    Problems with swallowing    Due to MS    Pulmonary embolism (HCC)    Scoliosis of lumbar spine    Shortness of breath    Sickle cell trait    Sleep apnea    no treatment    Sleep disturbance    Stress    Type II or unspecified type diabetes mellitus without mention  of complication, not stated as uncontrolled    Uncomfortable fullness after meals    Unspecified disorder of thyroid    total thyroidectomy    Visual impairment    glasses    Wears glasses    Weight loss    Wheezing   [3]   Past Surgical History:  Procedure Laterality Date      1986    Cataract      Cath angio  2014    Cholecystectomy      Colonoscopy N/A 2015    Procedure: COLONOSCOPY;  Surgeon: Liz Tong DO;  Location:  ENDOSCOPY    Colonoscopy N/A 2021    Procedure: COLONOSCOPY;  Surgeon: Cedrick Da Silva MD;  Location:  ENDOSCOPY    Colonoscopy N/A 10/08/2021    Procedure: COLONOSCOPY, ESOPHAGOGASTRODUODENOSCOPY (EGD);  Surgeon: Darvin Richardson MD;  Location:  ENDOSCOPY    D & c  1990    Blighted ovum    Egd      Hysterectomy      total hystero.    Ir port a cath insertion exchnge check  2018    Lithotripsy      x 2    Lysis of adhesions      Needle biopsy liver  2016    Oophorectomy Bilateral     total hystero.    Other      dialysis catheter- left chest for plasmaphoresis    Other surgical history       x 2    Port, indwelling, imp  2019    power port    Thyroidectomy  2013    benign MNG    Total abdom hysterectomy      Upper gi endoscopy performed  2014   [4]   Family History  Problem Relation Age of Onset    Hypertension Mother         Needs to be deleted    Cancer Mother         stomach cancer    Ovarian Cancer Mother         30'S    Colon Polyps Mother     Anemia Mother     Heart Attack Father     Other (Other) Father         COPD, emphysema    Asthma Father     Pulmonary Disease Father     Other (Other) Sister         rectal CA\    Ovarian Cancer Maternal Grandmother         30'S    Colon Polyps Maternal Grandmother     Diabetes Maternal Grandmother     Colon Cancer Maternal Grandmother     Anemia Maternal Grandmother     Cancer Maternal Grandmother     Anemia Daughter     Breast Cancer Maternal Aunt          60'S    Breast Cancer Paternal Cousin Female 64   [5]   Allergies  Allergen Reactions    Epinephrine OTHER (SEE COMMENTS)     Cardiac issues    Immune Globulin ANAPHYLAXIS    Latex SHORTNESS OF BREATH and OTHER (SEE COMMENTS)     WELTS    Methylprednisolone SWELLING     Swelling of neck, throat and tongue  Injection, throat and tongue swelling      Ocrelizumab ANAPHYLAXIS and OTHER (SEE COMMENTS)     Difficulty breathing    Other SHORTNESS OF BREATH     ENVIRONMENTAL, ASTHMA EXACERBATION    Urea Tightness in Throat     Urea C-13 in Pranactin for H. Pylori test kit.    Pcn [Penicillamine]     Penicillins OTHER (SEE COMMENTS)    Midodrine ITCHING     Pt reports mouth pain and itching possibly r/t midodrine

## 2025-04-11 NOTE — OCCUPATIONAL THERAPY NOTE
OCCUPATIONAL THERAPY ORTHO EVALUATION - INPATIENT     Room Number: 3622/3622-A  Evaluation Date: 4/11/2025  Type of Evaluation: Initial  Presenting Problem: MS exacerbation, fall    Physician Order: IP Consult to Occupational Therapy  Reason for Therapy: ADL/IADL Dysfunction and Discharge Planning    OCCUPATIONAL THERAPY ASSESSMENT   Patient is currently functioning below baseline with self-care and functional mobility. Prior to admission, patient's baseline is mod I to supervision w/ BADLs and use of cane.  Patient is requiring minimum assistance as a result of the following impairments: decreased functional strength, decreased functional reach, decreased endurance, pain, impaired   balance, impaired coordination, impaired motor planning, difficulty maintaining precautions, medical status, decreased insight to deficits, and decreased safety awareness. Occupational Therapy will continue to follow for duration of hospitalization.    Patient will benefit from continued skilled OT Services at discharge to promote prior level of function and safety with additional support and return home with home health OT    History Related to Current Admission: Patient is a 55 year old female admitted on 4/10/2025 with Presenting Problem: MS exacerbation, fall. Co-Morbidities : DM 1, Hx of MS, HTN, hx of anxiety and depression, Hyperglycemia, Hx of BL drop foot      FUNCTIONAL TRANSFER ASSESSMENT  Sit to Stand: Edge of Bed; Chair  Edge of Bed: Contact Guard Assist  Chair: Contact Guard Assist  Toilet Transfer: Minimal Assist    BED MOBILITY  Rolling: Minimal Assist  Supine to Sit : Minimal Assist  Sit to Supine (OT): Minimal Assist  Scooting: min A    BALANCE ASSESSMENT     FUNCTIONAL ADL ASSESSMENT     ACTIVITY TOLERANCE:   WFL    Cognition  WFL    Upper extremity  BUE AROM WFL    EDUCATION PROVIDED  Patient's Response to Education: Verbalized Understanding; Requires Further Education    Equipment used: RW  Demonstrates functional  use, Would benefit from additional trial     Therapist comments: OT educated patient on safety,  sequencing, energy conservation, pain management, home modifications and adaptive equipment to increase independence with ADLs.    Patient w/ h/o B foot drop and reports she does not wear braces d/t causes pain at the hips.    Patient was able to doff socks and charli shoes with set-up using crossed leg method while EOB with no LOB noted.    Walked w/ use of walker, slow william and occasionally catching tip of shoe on floor,however no LOB.    Educated on energy conservation, work simplification and adaptive techniques.  Patient able to verbalize 3 strategies to complete ADL while incorporating modified strategies.      Patient End of Session: Up in chair, Needs met, Call light within reach, RN aware of session/findings, All patient questions and concerns addressed, Alarm set, Discussed recommendations with /    OCCUPATIONAL PROFILE    HOME SITUATION  Type of Home: House  Home Layout: One level  Lives With: Son, Caregiver part-time ([employer] program)    Toilet and Equipment: Comfort height toilet, Grab bar  Shower/Tub and Equipment: Walk-in shower, Grab bar, Shower chair, Hand-held showerhead  Other Equipment: Reacher       Hand Dominance: Right  Drives: Yes (occasionally)  Patient Regularly Uses: Rollator, Rolling walker, Cane    Prior Level of Function: Mod I to supervision level with BADLs w/ no use of adaptive device in the home and occasional use of cane when feeling off balance.  Patient also owns a walker that she uses when leaving the home.  Patient reports her son does most of the driving, IADLs.  Patient states she does drive occ and only short distances.    SUBJECTIVE   PAIN ASSESSMENT  Rating: Unable to rate  Location: Bottom of feet, knees, back right side of body, back of head  Management Techniques: Nurse notified    OBJECTIVE  Precautions: Bed/chair alarm  Fall Risk: High fall  risk    WEIGHT BEARING RESTRICTION       ASSESSMENTS    AM-PAC '6-Clicks' Inpatient Daily Activity Short Form  -   Putting on and taking off regular lower body clothing?: A Little  -   Bathing (including washing, rinsing, drying)?: A Little  -   Toileting, which includes using toilet, bedpan or urinal? : A Little  -   Putting on and taking off regular upper body clothing?: A Little  -   Taking care of personal grooming such as brushing teeth?: A Little  -   Eating meals?: None    AM-PAC Score:  Score: 19  Approx Degree of Impairment: 42.8%  Standardized Score (AM-PAC Scale): 40.22    PLAN  OT Device Recommendations: TBD  OT Treatment Plan: Balance activities, Energy conservation/work simplification techniques, ADL training, Functional transfer training, UE strengthening/ROM, Endurance training, Patient/Family education, Patient/Family training, Cognitive reorientation, Equipment eval/education, Compensatory technique education  Rehab Potential : Good  Frequency: 3-5x/week  Number of Visits to Meet Established Goals: 7    ADL Goals  Patient will perform toileting with supervision and AE PRN  Patient will perform LB dressing with supervision and AE PRN    Functional Transfer Goals  Patient will perform bed mobility supine to sit with supervision  Patient will perform bed mobility sit to supine with supervision  Patient will perform toilet transfer with supervision    Additional Goals:  Patient will state precautions and maintain during ADL      Patient Evaluation Complexity Level:   Occupational Profile/Medical History MODERATE - Expanded review of history including review of medical or therapy record   Specific performance deficits impacting engagement in ADL/IADL MODERATE  3 - 5 performance deficits   Client Assessment/Performance Deficits MODERATE - Comorbidities and min to mod modifications of tasks    Clinical Decision Making LOW - Analysis of occupational profile, problem-focused assessments, limited treatment  options    Overall Complexity LOW     OT Session Time: 30 minutes  Self-Care Home Management: 15 minutes

## 2025-04-11 NOTE — PROGRESS NOTES
[facility]   part of [facility]     Hospitalist Progress Note     Kat Crook Patient Status:  Observation    3/11/1970 MRN [id number]   Location [facility] 3NE-A Attending Remedios Thapa,    Hosp Day # 0 PCP Justin Huerta MD     Chief Complaint: Fall, concern for MS flare    Subjective:     Patient continues to have pain but reports it is controlled with pain medication.  She continues to have weakness and stuttering    Objective:    Review of Systems:   A comprehensive review of systems was completed; pertinent positive and negatives stated in subjective.    Vital signs:  Temp:  [98 °F (36.7 °C)-98.9 °F (37.2 °C)] 98 °F (36.7 °C)  Pulse:  [62-77] 66  Resp:  [16-18] 16  BP: ()/(62-85) 105/62  SpO2:  [94 %-100 %] 100 %    Physical Exam:    General: No acute distress  Respiratory: No wheezes, no rhonchi  Cardiovascular: S1, S2, regular rate and rhythm  Abdomen: Soft, Non-tender, non-distended, positive bowel sounds  Neuro: No new focal deficits.  Speech stuttering  Extremities: No edema    Diagnostic Data:    Labs:  Recent Labs   Lab 04/10/25  1151 25  0620   WBC 7.5 6.4   HGB 11.3* 10.9*   MCV 74.4* 75.9*   .0 138.0*       Recent Labs   Lab 04/10/25  1151 25  0620   * 166*   BUN 11 15   CREATSERUM 1.11* 1.04*   CA 9.0 9.0   ALB 4.5 4.1    146*   K 3.9 3.8    106   CO2 28.0 32.0   ALKPHO 72 74   AST 22 19   ALT 17 16   BILT 0.4 0.3   TP 7.1 6.5       Estimated Glomerular Filtration Rate: 63 mL/min/1.73m2 (result from lab).    No results for input(s): \"TROP\", \"TROPHS\", \"CK\" in the last 168 hours.    No results for input(s): \"PTP\", \"INR\" in the last 168 hours.               Microbiology    No results found for this visit on 04/10/25.      Imaging: Reviewed in Epic.    Medications: Scheduled Medications[1]    Assessment & Plan:      #Fall  #Head trauma  -CT head reviewed and shows no acute abnormalities  -CT C-spine reviewed and shows no acute  abnormalities  -CT T-spine reviewed and shows no acute abnormalities  -CT L-spine reviewed and shows no acute abnormalities     #Possible MS flare  -Patient reports allergic reaction to the steroids  -Patient normally gets plasmapheresis for flareups  -Neuro consulted     #Diabetes type 2  -Hyperglycemia protocol  -Insulin pump  -Diabetes APRN consulted      #CAD  -Aspirin, statin, carvedilol     #Hypertension  -Carvedilol     #Hyperlipidemia  -Statin     #Asthma  -Home inhalers     #Anxiety  #Depression  -Duloxetine     #Microcytic anemia  -Monitor hemoglobin       Remedios Thapa DO    Supplementary Documentation:     Quality:  DVT Mechanical Prophylaxis:   SCDs,    DVT Pharmacologic Prophylaxis   Medication    enoxaparin (Lovenox) 40 MG/0.4ML SUBQ injection 40 mg                Code Status: Full Code  Panda: No urinary catheter in place  Panda Duration (in days):   Central line:    KAYLEY: 4/11/2025    Discharge is dependent on: Clinical improvement  At this point Ms. Crook is expected to be discharge to: Home    The 21st Century Cures Act makes medical notes like these available to patients in the interest of transparency. Please be advised this is a medical document. Medical documents are intended to carry relevant information, facts as evident, and the clinical opinion of the practitioner. The medical note is intended as peer to peer communication and may appear blunt or direct. It is written in medical language and may contain abbreviations or verbiage that are unfamiliar.              **Certification      PHYSICIAN Certification of Need for Inpatient Hospitalization - Initial Certification    Patient will require inpatient services that will reasonably be expected to span two midnight's based on the clinical documentation in H+P.   Based on patients current state of illness, I anticipate that, after discharge, patient will require TBD.           [1]    atorvastatin  40 mg Oral Nightly    baclofen  10 mg Oral BID     carvedilol  3.125 mg Oral BID    celecoxib  100 mg Oral BID    DULoxetine  60 mg Oral BID    fluticasone-salmeterol  1 puff Inhalation BID    levothyroxine  88 mcg Oral Before breakfast    montelukast  10 mg Oral Nightly    pantoprazole  20 mg Oral BID    pregabalin  200 mg Oral BID    enoxaparin  40 mg Subcutaneous Nightly    insulin aspart  1-68 Units Subcutaneous TID CC    insulin aspart  1-30 Units Subcutaneous TID AC and HS    pregabalin  50 mg Oral QAM

## 2025-04-11 NOTE — PHYSICAL THERAPY NOTE
PHYSICAL THERAPY EVALUATION - INPATIENT     Room Number: 3622/3622-A  Evaluation Date: 4/11/2025  Type of Evaluation: Initial  Physician Order: PT Eval and Treat    Presenting Problem: Exacerbation of multiple sclerosis  Co-Morbidities : DM 1, Hx of MS, HTN, hx of anxiety and depression, Hyperglycemia, Hx of BL drop foot  Reason for Therapy: Mobility Dysfunction and Discharge Planning    PHYSICAL THERAPY ASSESSMENT   Patient is a 55 year old female admitted 4/10/2025 for Exacerbation of multiple sclerosis.  Prior to admission, patient's baseline is Mod I.  Patient is currently functioning near baseline with bed mobility, transfers, and gait.  Patient is requiring minimal assist as a result of the following impairments: decreased endurance/aerobic capacity, impaired Gait balance, impaired motor planning, and Poor gait pattern .  Physical Therapy will continue to follow for duration of hospitalization.    Patient will benefit from continued skilled PT Services at discharge to promote prior level of function.  Anticipate patient will return home with home health PT.    PLAN DURING HOSPITALIZATION  Nursing Mobility Recommendation : 1 Assist  PT Device Recommendation: Rolling walker  PT Treatment Plan: Bed mobility, Body mechanics, Gait training, Strengthening, Stair training, Transfer training, Balance training  Rehab Potential : Good  Frequency (Obs): 3-5x/week     CURRENT GOALS    Goal #1 Patient is able to demonstrate supine - sit EOB @ level: supervision     Goal #2 Patient is able to demonstrate transfers EOB to/from BSC at assistance level: supervision     Goal #3 Patient is able to ambulate 150 feet with assist device: walker - rolling at assistance level: supervision     Goal #4    Goal #5    Goal #6    Goal Comments: Goals established on 4/11/2025      PHYSICAL THERAPY MEDICAL/SOCIAL HISTORY  History related to current admission: Patient is a 55 year old female admitted on 4/10/2025 from Home for Exacerbation  of multiple sclerosis.      HOME SITUATION  Type of Home: House  Home Layout: One level  Stairs to Enter : 1                  Lives With: Son               Prior Level of [place]: Pt states that she lives in a house with her son. Pt reports that previously pt is ambulating with no assisted device, however lately pt requires the use of a RW. Pt reports no history of falls. Pt states that she was IND with her ADLs.    SUBJECTIVE  \"I feel better\"    OBJECTIVE  Precautions: None  Fall Risk: High fall risk    WEIGHT BEARING RESTRICTION     PAIN ASSESSMENT  Ratin  Location: Pt reports no pain       COGNITION  Overall Cognitive Status:  WFL - within functional limits    RANGE OF MOTION AND STRENGTH ASSESSMENT  Upper extremity ROM and strength are within functional limits     Lower extremity ROM is within functional limits     Lower extremity strength is within functional limits     BALANCE  Static Sitting: Fair +  Dynamic Sitting: Fair +  Static Standing: Fair -  Dynamic Standing: Fair -    ADDITIONAL TESTS                                    ACTIVITY TOLERANCE                         O2 WALK       NEUROLOGICAL FINDINGS                        AM-PAC '6-Clicks' INPATIENT SHORT FORM - BASIC MOBILITY  How much difficulty does the patient currently have...  Patient Difficulty: Turning over in bed (including adjusting bedclothes, sheets and blankets)?: A Little   Patient Difficulty: Sitting down on and standing up from a chair with arms (e.g., wheelchair, bedside commode, etc.): A Little   Patient Difficulty: Moving from lying on back to sitting on the side of the bed?: A Little   How much help from another person does the patient currently need...   Help from Another: Moving to and from a bed to a chair (including a wheelchair)?: A Little   Help from Another: Need to walk in hospital room?: A Little   Help from Another: Climbing 3-5 steps with a railing?: A Little     AM-PAC Score:  Raw Score: 18   Approx Degree of  Impairment: 46.58%   Standardized Score (AM-PAC Scale): 43.63   CMS Modifier (G-Code): CK    FUNCTIONAL ABILITY STATUS  Gait Assessment   Functional Mobility/Gait Assessment  Gait Assistance: Minimum assistance  Distance (ft): 75  Assistive Device: Rolling walker  Pattern: Shuffle (Decrease foot clearance,)    Skilled Therapy Provided     Bed Mobility:  Rolling: NT  Supine to sit: CGA   Sit to supine: CGA     Transfer Mobility:  Sit to stand: CGA   Stand to sit: CGA  Gait = Parviz 75 using RW    Therapist's Comments: Pt required to use her shoes when ambulating. While ambulating pt demonstrated times that her toe's would get caught however pt is able to self correct. Pt at times required to stop secondary to increase pain on the R shoulder. Educated pt the need of taking short breaks to prevent R shoulder pain flair ups as pt requires the use of the UE more at this time when ambulating.     Exercise/Education Provided:  Bed mobility  Body mechanics  Gait training  Transfer training    Patient End of Session: Up in chair, Needs met, Call light within reach, RN aware of session/findings, All patient questions and concerns addressed      Patient Evaluation Complexity Level:  History Moderate - 1 or 2 personal factors and/or co-morbidities   Examination of body systems Low -  addressing 1-2 elements   Clinical Presentation Low- Stable   Clinical Decision Making Low Complexity       PT Session Time: 23 minutes  Therapeutic Activity: 15 minutes

## 2025-04-11 NOTE — CM/SW NOTE
Chart reviewed and noted therapy is anticipating pt will return home with home healthcare. Anticipated therapy need: Home with Home Healthcare    SW placed HH order/F2F and attached them to referral in [facility] for [facility]. SW will continue to remain available.     RADHA Gilliam  Discharge Planner

## 2025-04-12 VITALS
HEART RATE: 78 BPM | RESPIRATION RATE: 16 BRPM | OXYGEN SATURATION: 97 % | TEMPERATURE: 98 F | WEIGHT: 170.44 LBS | BODY MASS INDEX: 28 KG/M2 | DIASTOLIC BLOOD PRESSURE: 70 MMHG | SYSTOLIC BLOOD PRESSURE: 103 MMHG

## 2025-04-12 LAB
GLUCOSE BLD-MCNC: 127 MG/DL (ref 70–99)
GLUCOSE BLD-MCNC: 133 MG/DL (ref 70–99)

## 2025-04-12 PROCEDURE — 99239 HOSP IP/OBS DSCHRG MGMT >30: CPT | Performed by: STUDENT IN AN ORGANIZED HEALTH CARE EDUCATION/TRAINING PROGRAM

## 2025-04-12 RX ORDER — HYDROCODONE BITARTRATE AND ACETAMINOPHEN 10; 325 MG/1; MG/1
1 TABLET ORAL 4 TIMES DAILY PRN
Qty: 15 TABLET | Refills: 0 | Status: SHIPPED | OUTPATIENT
Start: 2025-04-12

## 2025-04-12 NOTE — PLAN OF CARE
Assumed patient care at 1930. Pt oriented x 4. Sinus on tele. Vitals stable, room air. Neuro checks q 4. Pain management with prn norco and dilaudid. Pending MRI. All safety precaution measures are in place. Call light is within reach. Pt updated on poc and verbalizes understanding. Not noted to be in any form of distress. Plan of care ongoing.     Problem: PAIN - ADULT  Goal: Verbalizes/displays adequate comfort level or patient's stated pain goal  Description: INTERVENTIONS:- Encourage pt to monitor pain and request assistance- Assess pain using appropriate pain scale- Administer analgesics based on type and severity of pain and evaluate response- Implement non-pharmacological measures as appropriate and evaluate response- Consider cultural and social influences on pain and pain management- Manage/alleviate anxiety- Utilize distraction and/or relaxation techniques- Monitor for opioid side effects- Notify MD/LIP if interventions unsuccessful or patient reports new pain- Anticipate increased pain with activity and pre-medicate as appropriate  Outcome: Progressing     Problem: SAFETY ADULT - FALL  Goal: Free from fall injury  Description: INTERVENTIONS:- Assess pt frequently for physical needs- Identify cognitive and physical deficits and behaviors that affect risk of falls.- [place] fall precautions as indicated by assessment.- Educate pt/family on patient safety including physical limitations- Instruct pt to call for assistance with activity based on assessment- Modify environment to reduce risk of injury- Provide assistive devices as appropriate- Consider OT/PT consult to assist with strengthening/mobility- Encourage toileting schedule  Outcome: Progressing

## 2025-04-12 NOTE — DISCHARGE SUMMARY
[facility]     Kat Crook Patient Status:  Inpatient    3/11/1970 MRN [id number]   Location [facility] 3NE-A Attending No att. providers found   Hosp Day # 1 PCP Justin Huerta MD     Date of Admission: 4/10/2025  Date of Discharge:  2025     Discharge Disposition: Home or Self Care    Discharge Diagnosis:  #Fall  #Head trauma  -CT head reviewed and shows no acute abnormalities  -CT C-spine reviewed and shows no acute abnormalities  -CT T-spine reviewed and shows no acute abnormalities  -CT L-spine reviewed and shows no acute abnormalities     #Possible MS flare  -Patient reports allergic reaction to the steroids  -Patient normally gets plasmapheresis for flareups  -Neuro consulted     #Diabetes type 2  -Hyperglycemia protocol  -Insulin pump  -Diabetes APRN consulted      #CAD  -Aspirin, statin, carvedilol     #Hypertension  -Carvedilol     #Hyperlipidemia  -Statin     #Asthma  -Home inhalers     #Anxiety  #Depression  -Duloxetine     #Microcytic anemia  -Monitor hemoglobin    History of Present Illness: Kat Crook is a 55 year old female with past medical history of multiple sclerosis, diabetes type 2, hyperlipidemia, hypertension, anxiety, depression who presents ED for fall and concern for MS flare.  Patient reports she fell on Saturday while sleeping and hit the back of her head while trying to stand up.  She denies loss of consciousness.  Since she hit her head she has had severe pain in the back of her head that radiates down her spine.  She began having stuttering in her speech afterwards.  She was concerned that the pain exacerbated her MS.  She usually gets plasmapheresis for her MS flares as she has a allergic reaction to steroids. She denies any fevers, chills, nausea, vomiting, abdominal pain, headaches, dizziness.        Brief Synopsis: CT head, CT C spine, CT T spine, CT L spine, MRI head, MRI C spine showed no abnormalities. Neurology was consulted and  recommended pain management. Patient was discharged home with outpatient follow up.    Lace+ Score: 73  59-90 High Risk  29-58 Medium Risk  0-28   Low Risk       TCM Follow-Up Recommendation:  LACE > 58: High Risk of readmission after discharge from the hospital.  **Certification    Admission date was 4/10/2025.  Inpatient stay was shorter than expected.  Patient's <principal problem not specified> was initially serious enough to expect a more lengthy hospitalization but patient improved faster than expected.                 Procedures during hospitalization:   None    Incidental or significant findings and recommendations (brief descriptions):  See brief synopsis above     Lab/Test results pending at Discharge:   None     Consultants:  Neuro   Diabetes APRN    Discharge Medication List:     Discharge Medications        CHANGE how you take these medications        Instructions Prescription details   HYDROcodone-acetaminophen  MG Tabs  Commonly known as: Norco  What changed: reasons to take this      Take 1 tablet by mouth 4 (four) times daily as needed for Pain.   Quantity: 15 tablet  Refills: 0     Linzess 145 MCG Caps  Generic drug: linaCLOtide  What changed:   when to take this  reasons to take this      Take 145 mcg by mouth every morning before breakfast.   Refills: 0            CONTINUE taking these medications        Instructions Prescription details   albuterol 108 (90 Base) MCG/ACT Aers  Commonly known as: Ventolin HFA      Inhale 2 puffs into the lungs every 4 (four) hours as needed. inhale 2 puff by inhalation route  every 4 - 6 hours as needed   Quantity: 1 each  Refills: 11     atorvastatin 40 MG Tabs  Commonly known as: Lipitor      Take 1 tablet (40 mg total) by mouth every evening.   Refills: 0     baclofen 10 MG Tabs  Commonly known as: Lioresal      Take 1 tablet (10 mg total) by mouth 2 (two) times daily.   Refills: 0     carvedilol 3.125 MG Tabs  Commonly known as: Coreg      Take 1 tablet  (3.125 mg total) by mouth in the morning and 1 tablet (3.125 mg total) in the evening. Take with meals.   Refills: 0     CeleBREX 100 MG Caps  Generic drug: celecoxib      Take 1 capsule (100 mg total) by mouth 2 (two) times daily. in afternoon and at bedtime   Refills: 0     Cholecalciferol 125 MCG (5000 UT) Tabs  Commonly known as: VITAMIN D-3      Take 1 tablet (5,000 Units total) by mouth in the morning.   Refills: 0     Ciclopirox 8 % Soln      Apply topically daily. to affected toenail(s) as directed   Refills: 0     denosumab 60 MG/ML Soln  Commonly known as: PROLIA      Inject 1 mL (60 mg total) into the skin every 6 (six) months.   Refills: 0     docusate sodium 100 MG Caps  Commonly known as: Colace      Take 1 capsule (100 mg total) by mouth in the morning and 1 capsule (100 mg total) in the evening.   Refills: 0     DULoxetine 60 MG Cpep  Commonly known as: Cymbalta      Take 1 capsule (60 mg total) by mouth 2 (two) times daily. in the afternoon and at bedtime   Refills: 0     EPINEPHrine 0.15 MG/0.3ML Soaj  Commonly known as: EpiPen Jr      Inject 0.3 mL (0.15 mg total) into the muscle as needed for Anaphylaxis. Jani pen/lower dose because pt is allergic to epinephrine   Refills: 0     Farxiga 10 MG Tabs  Generic drug: dapagliflozin      Take 1 tablet (10 mg total) by mouth daily. in the afternoon   Refills: 0     Fasenra Pen 30 MG/ML Soaj  Generic drug: Benralizumab      Inject 30 mg into the skin Every 8 (eight) weeks.   Refills: 0     fluticasone furoate-vilanterol 200-25 MCG/ACT Aepb  Commonly known as: Breo Ellipta      Inhale 1 puff into the lungs in the morning.   Refills: 0     Full Kit Nebulizer Set Misc      1 kit As Directed.   Quantity: 1 each  Refills: 0     Gvoke HypoPen 2-Pack 1 MG/0.2ML injection  Generic drug: glucagon      Inject 0.2 mL (1 mg total) into the skin as needed.   Refills: 0     Insulin Regular Human 100 UNIT/ML Soln      100 Units by Insulin pump route daily.   Refills:  0     ipratropium-albuterol 0.5-2.5 (3) MG/3ML Soln  Commonly known as: Duoneb      Take 3 mL by nebulization every 6 (six) hours as needed.   Quantity: 120 mL  Refills: 2     levothyroxine 88 MCG Tabs  Commonly known as: Synthroid      Take 1 tablet (88 mcg total) by mouth every morning before breakfast.   Refills: 0     Lyllana 0.025 MG/24HR Pttw  Generic drug: estradiol      Place 1 patch onto the skin twice a week.   Refills: 0     montelukast 10 MG Tabs  Commonly known as: Singulair      Take 1 tablet (10 mg total) by mouth nightly.   Refills: 0     NON FORMULARY      Tandem insulin pump settings (Bolus IQ)  Mdnt 3.1 units/hr  0400 3.00 units/hr  0900 3.2 units/hr    Correction:   MN 1:35/110  1 PM 1:40  7 pm 1:35    CHO ratio:  MN 1:5g    Target glucose level - 100 and 80   Refills: 0     ondansetron 4 mg tablet  Commonly known as: Zofran      Take 1 tablet (4 mg total) by mouth 3 (three) times daily as needed for Nausea.   Refills: 0     pantoprazole 20 MG Tbec  Commonly known as: Protonix      Take 1 tablet (20 mg total) by mouth in the morning and 1 tablet (20 mg total) in the evening. Take before meals.   Refills: 0     pregabalin 200 MG Caps  Commonly known as: LYRICA      Take 1 capsule (200 mg total) by mouth 2 (two) times daily. in the afternoon and at bedtime   Refills: 0     pregabalin 50 MG Caps  Commonly known as: Lyrica      Take 1 capsule (50 mg total) by mouth every morning.   Refills: 0     thiamine 100 MG Tabs  Commonly known as: Vitamin B1      Take 1 tablet (100 mg total) by mouth daily.   Quantity: 30 tablet  Refills: 3     Tysabri 300 MG/15ML Conc  Generic drug: natalizumab      Inject 15 mL (300 mg total) into the vein Every 8 (eight) weeks.   Refills: 0            ASK your doctor about these medications        Instructions Prescription details   albuterol (2.5 MG/3ML) 0.083% Nebu  Commonly known as: Ventolin      Take 3 mL (2.5 mg total) by nebulization 3 (three) times daily.   Quantity:  90 each  Refills: 11     budesonide 0.5 MG/2ML Susp  Commonly known as: Pulmicort      Take 2 mL (0.5 mg total) by nebulization 2 (two) times daily.   Quantity: 120 mL  Refills: 3               Where to Get Your Medications        These medications were sent to [id number] - [place], [place] - [address] AT [facility], [phone], [phone]  [address],  [address]      Phone: [phone]   HYDROcodone-acetaminophen  MG Tabs         [place]     Follow-up appointment:   Margoth Guillen, APN  [address] [facility] [address]  [address]  [place] [id number] [phone]    Follow up on 2025 at 11A    Justin Huerta MD  [address] [facility] [id number] [phone]    Follow up in 1 week(s)      Rao Payne MD  [address] [facility] [id number] [phone]    Follow up in 1 week(s)      Marc Vick MD  [address]  [address]  [facility] [id number] [phone]    Follow up in 1 week(s)      Appointments for Next 30 Days 2025 - 2025      None            Vital signs:       Physical Exam:    General: No acute distress   Lungs: clear to auscultation  Cardiovascular: S1, S2  Abdomen: Soft      -----------------------------------------------------------------------------------------------  PATIENT DISCHARGE INSTRUCTIONS: See electronic chart    Remedios Thapa DO    Total time spent on discharge plannin minutes     The  Cures Act makes medical notes like these available to patients in the interest of transparency. Please be advised this is a medical document. Medical documents are intended to carry relevant information, facts as evident, and the clinical opinion of the practitioner. The medical note is intended as peer to peer communication and may appear blunt or direct. It is written in medical language and may contain abbreviations or verbiage that are unfamiliar.

## 2025-04-12 NOTE — PLAN OF CARE
Assumed care at 0730, pt. A&O x4, on RA during the day, CPAP at night, NSR on tele. VSS. Up x1/walker. C/o spinal pain that spreads to the chest, MD notified, PRN Norco given per MAR and hot packs applied to spine. Q4 neuro checks. QID accuchecks. Port saline locked, unit draw. Fall and safety precautions in place. Plan of care ongoing.     Problem: PAIN - ADULT  Goal: Verbalizes/displays adequate comfort level or patient's stated pain goal  Description: INTERVENTIONS:- Encourage pt to monitor pain and request assistance- Assess pain using appropriate pain scale- Administer analgesics based on type and severity of pain and evaluate response- Implement non-pharmacological measures as appropriate and evaluate response- Consider cultural and social influences on pain and pain management- Manage/alleviate anxiety- Utilize distraction and/or relaxation techniques- Monitor for opioid side effects- Notify MD/LIP if interventions unsuccessful or patient reports new pain- Anticipate increased pain with activity and pre-medicate as appropriate  Outcome: Progressing     Problem: SAFETY ADULT - FALL  Goal: Free from fall injury  Description: INTERVENTIONS:- Assess pt frequently for physical needs- Identify cognitive and physical deficits and behaviors that affect risk of falls.- [place] fall precautions as indicated by assessment.- Educate pt/family on patient safety including physical limitations- Instruct pt to call for assistance with activity based on assessment- Modify environment to reduce risk of injury- Provide assistive devices as appropriate- Consider OT/PT consult to assist with strengthening/mobility- Encourage toileting schedule  Outcome: Progressing
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
## 2025-04-15 NOTE — PROGRESS NOTES
Physician Clarification    Additional information related to the diagnosis of multiple sclerosis    Multiple sclerosis flare ruled out    This note is part of the patient's medical record.

## 2025-04-21 ENCOUNTER — OFFICE VISIT (OUTPATIENT)
Dept: PAIN CLINIC | Facility: CLINIC | Age: 55
End: 2025-04-21
Payer: MEDICARE

## 2025-04-21 ENCOUNTER — OFFICE VISIT (OUTPATIENT)
Dept: NEUROLOGY | Facility: CLINIC | Age: 55
End: 2025-04-21
Payer: MEDICARE

## 2025-04-21 VITALS
OXYGEN SATURATION: 98 % | SYSTOLIC BLOOD PRESSURE: 112 MMHG | RESPIRATION RATE: 18 BRPM | HEART RATE: 68 BPM | DIASTOLIC BLOOD PRESSURE: 70 MMHG

## 2025-04-21 VITALS — OXYGEN SATURATION: 95 % | DIASTOLIC BLOOD PRESSURE: 82 MMHG | SYSTOLIC BLOOD PRESSURE: 114 MMHG | HEART RATE: 66 BPM

## 2025-04-21 DIAGNOSIS — G35 MS (MULTIPLE SCLEROSIS) (HCC): Primary | ICD-10-CM

## 2025-04-21 DIAGNOSIS — M54.50 LOW BACK PAIN WITHOUT SCIATICA, UNSPECIFIED BACK PAIN LATERALITY, UNSPECIFIED CHRONICITY: Primary | ICD-10-CM

## 2025-04-21 DIAGNOSIS — M79.7 FIBROMYALGIA: ICD-10-CM

## 2025-04-21 DIAGNOSIS — G35 MULTIPLE SCLEROSIS (HCC): ICD-10-CM

## 2025-04-21 DIAGNOSIS — R51.9 OCCIPITAL PAIN: ICD-10-CM

## 2025-04-21 DIAGNOSIS — M21.372 FOOT DROP, BILATERAL: ICD-10-CM

## 2025-04-21 DIAGNOSIS — M21.371 FOOT DROP, BILATERAL: ICD-10-CM

## 2025-04-21 DIAGNOSIS — G57.93 NEUROPATHIC PAIN OF BOTH LEGS: ICD-10-CM

## 2025-04-21 PROCEDURE — 99214 OFFICE O/P EST MOD 30 MIN: CPT | Performed by: OTHER

## 2025-04-21 PROCEDURE — G2211 COMPLEX E/M VISIT ADD ON: HCPCS | Performed by: NURSE PRACTITIONER

## 2025-04-21 PROCEDURE — 99214 OFFICE O/P EST MOD 30 MIN: CPT | Performed by: NURSE PRACTITIONER

## 2025-04-21 NOTE — PROGRESS NOTES
HPI:   Kat Crook presents with complaints of pain on the posterior aspect of her head and on her lower lumbar spine.  Patient states that she fell on April 12, 2025.  She reports that she was having a nightmare and in the nightmare she was rolling over and she rolled over and fell out of bed.  Patient thought that she was having an MS flare and she was admitted to the hospital.  Patient was admitted April 10 through April 12, 2025.  She was seen by neurology and had CT scan of the head C-spine T-spine L-spine without any significant findings.  Patient also had MRI of the brain and the cervical and thoracic spine.  They were all negative    Patient presents to the clinic today for an evaluation and further recommendations.  She was last seen in 2023 for primary fibromyalgia.  She was referred to Dr. Castro.  Patient states she did speak with her and was told that there was no additional recommendations through the pain psychologist.    Patient utilizes hydrocodone 10/325 4 times daily from her primary care provider.  She also utilizes Lyrica, baclofen and Celebrex  Patient reports she has been on hydrocodone for quite some time and does not feel that it is effective.  Patient states that she was in the hospital and they gave her Dilaudid which was much more helpful    She did see her neurologist today he did not provide any additional input or recommendations    The pain is described as moderate aching, grabbing, soreness that is constant .  The patient’s activity level has remained the same since last visit.  The pain is worst unrelated to time of day.    Changes in condition/history since last visit: See note above    The following activities will increase the patient’s pain:  All activity    The following activities decrease the patient’s pain: limiting activity level    Functional Assessment: Patient reports that they are able to complete all of their ADL's such as eating, bathing, using the toilet,  dressing and getting up from a bed or a chair independently.    Current Medications:  Current Medications[1]   Patient requires assistance with: driving    Reviewed Patient History Dated: 4/10/2023 the following changes are noted: See note above regarding recent hospitalization  Have you recently had any feelings of harming yourself or others? The patient's response was no.  Patient was seeing psychiatrist however no longer sees psychiatry patient lives with her son.  Patient states her daughter is a nurse at [employer]    Physical Exam:   /82   Pulse 66   LMP  (LMP Unknown)   SpO2 95%   VAS Pain Score:  8/10  General Appearance: Well developed, Well nourished, Normal build, Ambulates with cane    Neurological Exam: WNL-Orientation to time, place and person, normal mood & effect, normal concentration & attention span  Patient stuttering while speaking  Inspection:   Slightly overreactive pain response to gentle palpation to posterior aspect of skull and lumbar paraspinals approximately L4-5 L5-S1 level  Patient slowly ambulating heel-toe with cane  No gross motor or sensory deficits noted  Radiology/Lab Test Reviewed: Reviewed CT scan of CT and L-spine  Lab Results   Component Value Date    WBC 6.4 04/11/2025    WBC 7.5 04/10/2025    WBC 6.6 01/01/2025   No results found for: \"HEMOGLOBIN\"  Lab Results   Component Value Date    .0 (L) 04/11/2025    .0 04/10/2025    .0 (L) 01/01/2025           Patient Education: Patient was advised to continue normal activities as tolerated and was advised against any form of bedrest, since recent guidelines promote and encourage physical activity for improvment of functionality and overall pain.  (Family Practice Vol. 16, No. 1, 66-01Â© [employer] 1999 ).  Patient educated and verbalized understanding.  Medical Decision Making:   Diagnosis:    Encounter Diagnoses   Name Primary?    Low back pain without sciatica, unspecified back pain laterality,  unspecified chronicity Yes    Occipital pain     Multiple sclerosis (HCC)     Fibromyalgia      Impression:   Patient is a 55-year-old female with a history of MS and fibromyalgia who had been seen in our pain clinic in 2020 by Dr. STAN MARTINEZ for her neuropathic pain in her legs, MS and fibromyalgia.  Patient had indicated that Dr. Huerta that was managing her oral opioids.  Patient had indicated that she had been on morphine products in the past and it was discussed with her that she most likely would have periods of pain flares.  Dr. STAN MARTINEZ did discuss utilizing long-acting and short acting opioid medications and potentially even an intrathecal pump however patient opted to continue working with her primary care provider for ongoing pain management.    She did return to the clinic in April 2023 again for fibromyalgia pain.  At that time patient was again encouraged to maximize fibromyalgia and neuropathic treatments including the possibility of acupuncture.  She was also referred to Dr. Castro for further evaluation as she carried a chronic disease diagnosis along with anxiety.  Patient does report that she followed up with Dr. Castro and was not provided any additional resources.    She did not follow-up with acupuncture reporting it was cost prohibitive.    Today she is presenting to the clinic after falling out of bed.  Patient was hospitalized and evaluated.  Her primary pain is in the occipital region of her skull and in her low back.  After reviewing images there is no evidence of injuries or fractures that would allow us to consider any type of injection therapy.  Patient continues on her hydrocodone 10/325 4 times daily from Dr. Huerta, Celebrex 100 mg twice daily, pregabalin 50 mg daily and baclofen 10 mg twice daily.  Discussed with patient that she is on multiple medications all can aid in her recovery from this incident.  We also discussed adding a course of physical therapy which she would like to talk  to Dr. Huerta about at her visit with him today at 4 PM.    Patient did ask questions regarding opioid medications.  We reviewed the morphine equivalency and tolerance that she most likely has buildup utilizing opioid medications for many years.  She was encouraged to speak with Dr. Huerta as he may consider doing an opioid titration where she could potentially use an oxycodone product for a month to 2 months and then revert back to hydrocodone to reduce her tolerance and improve her response to this medication.  She will discuss with him today    Patient was also encouraged to consider utilization of magnesium supplement along with vitamin D3    She was also introduced to the pain psychologist empowered relief course.  Patient was open to participating in the upcoming session.  Will place an order for that    Total visit time: 60 minutes  More than 50% spent coordinating care, providing patient education, reviewing imaging, discussing conservative vs surgical treatment options and counseling.           Plan:   > See primary care today for evaluation and ongoing medication management: We did discuss potentially optimizing her baclofen to 10 mg 3 times daily rather than twice daily dosing to help with the myofascial pain that she is having, additionally we discussed opioid switching due to her chronic use and most likely tolerance see note above  > Patient was encouraged to speak with primary regarding obtaining an order for physical therapy to help reduce her lower lumbar myofascial pain and consider treatments such as TENS unit and topical agents such as lidocaine patches, Salonpas and moist heat  > Will refer patient to empowered relief course  > No injections recommended  No orders of the defined types were placed in this encounter.      Meds & Refills for this Visit:  Requested Prescriptions      No prescriptions requested or ordered in this encounter       Imaging & Consults:  OP REFERRAL TO SANJAY CHAVEZ    The  patient indicates understanding of these issues and agrees to the plan.      ID#[id number]             [1]   Current Outpatient Medications   Medication Sig Dispense Refill    HYDROcodone-acetaminophen  MG Oral Tab Take 1 tablet by mouth 4 (four) times daily as needed for Pain. 15 tablet 0    pregabalin 50 MG Oral Cap Take 1 capsule (50 mg total) by mouth every morning.      Insulin Regular Human 100 UNIT/ML Injection Solution 100 Units by Insulin pump route daily.      GVOKE HYPOPEN 2-PACK 1 MG/0.2ML Subcutaneous injection Inject 0.2 mL (1 mg total) into the skin as needed.      albuterol 108 (90 Base) MCG/ACT Inhalation Aero Soln Inhale 2 puffs into the lungs every 4 (four) hours as needed. inhale 2 puff by inhalation route  every 4 - 6 hours as needed 1 each 11    Respiratory Therapy Supplies (FULL KIT NEBULIZER SET) Does not apply Misc 1 kit As Directed. 1 each 0    ipratropium-albuterol 0.5-2.5 (3) MG/3ML Inhalation Solution Take 3 mL by nebulization every 6 (six) hours as needed. 120 mL 2    linaCLOtide (LINZESS) 145 MCG Oral Cap Take 145 mcg by mouth every morning before breakfast. (Patient taking differently: Take 145 mcg by mouth as needed.)      natalizumab (TYSABRI) 300 mg/15mL Intravenous Conc Inject 15 mL (300 mg total) into the vein Every 8 (eight) weeks.      carvedilol 3.125 MG Oral Tab Take 1 tablet (3.125 mg total) by mouth in the morning and 1 tablet (3.125 mg total) in the evening. Take with meals.      montelukast 10 MG Oral Tab Take 1 tablet (10 mg total) by mouth nightly.      levothyroxine 88 MCG Oral Tab Take 1 tablet (88 mcg total) by mouth every morning before breakfast.      pantoprazole 20 MG Oral Tab EC Take 1 tablet (20 mg total) by mouth in the morning and 1 tablet (20 mg total) in the evening. Take before meals.      fluticasone furoate-vilanterol 200-25 MCG/ACT Inhalation Aerosol Powder, Breath Activated Inhale 1 puff into the lungs in the morning.      LYLLANA 0.025 MG/24HR  Transdermal Patch Biweekly Place 1 patch onto the skin twice a week.      CELEBREX 100 MG Oral Cap Take 1 capsule (100 mg total) by mouth 2 (two) times daily. in afternoon and at bedtime      pregabalin 200 MG Oral Cap Take 1 capsule (200 mg total) by mouth 2 (two) times daily. in the afternoon and at bedtime      Ciclopirox 8 % External Solution Apply topically daily. to affected toenail(s) as directed      baclofen 10 MG Oral Tab Take 1 tablet (10 mg total) by mouth 2 (two) times daily.      NON FORMULARY Tandem insulin pump settings (Bolus IQ)  Mdnt 3.1 units/hr  0400 3.00 units/hr  0900 3.2 units/hr    Correction:   MN 1:35/110  1 PM 1:40  7 pm 1:35    CHO ratio:  MN 1:5g    Target glucose level - 100 and 80      FARXIGA 10 MG Oral Tab Take 1 tablet (10 mg total) by mouth daily. in the afternoon      DULoxetine 60 MG Oral Cap DR Particles Take 1 capsule (60 mg total) by mouth 2 (two) times daily. in the afternoon and at bedtime      atorvastatin 40 MG Oral Tab Take 1 tablet (40 mg total) by mouth every evening.      EPINEPHrine 0.15 MG/0.3ML Injection Solution Auto-injector Inject 0.3 mL (0.15 mg total) into the muscle as needed for Anaphylaxis. Jani pen/lower dose because pt is allergic to epinephrine      Ondansetron HCl (ZOFRAN) 4 mg tablet Take 1 tablet (4 mg total) by mouth 3 (three) times daily as needed for Nausea.      Benralizumab (FASENRA PEN) 30 MG/ML Subcutaneous Solution Auto-injector Inject 30 mg into the skin Every 8 (eight) weeks.      Thiamine HCl 100 MG Oral Tab Take 1 tablet (100 mg total) by mouth daily. 30 tablet 3    docusate sodium 100 MG Oral Cap Take 1 capsule (100 mg total) by mouth in the morning and 1 capsule (100 mg total) in the evening.      denosumab 60 MG/ML Subcutaneous Solution Inject 1 mL (60 mg total) into the skin every 6 (six) months.      Cholecalciferol (VITAMIN D-3) 5000 UNITS Oral Tab Take 1 tablet (5,000 Units total) by mouth in the morning.

## 2025-04-21 NOTE — PATIENT INSTRUCTIONS
Refill policies:    Allow 2-3 business days for refills; controlled substances may take longer.  Contact your pharmacy at least 5 days prior to running out of medication and have them send an electronic request or submit request through the “request refill” option in your [employer] account.  Refills are not addressed on weekends; covering physicians do not authorize routine medications on weekends.  No narcotics or controlled substances are refilled after noon on Fridays or by on call physicians.  By law, narcotics must be electronically prescribed.  A 30 day supply with no refills is the maximum allowed.  If your prescription is due for a refill, you may be due for a follow up appointment.  To best provide you care, patients receiving routine medications need to be seen at least once a year.  Patients receiving narcotic/controlled substance medications need to be seen at least once every 3 months.  In the event that your preferred pharmacy does not have the requested medication in stock (e.g. Backordered), it is your responsibility to find another pharmacy that has the requested medication available.  We will gladly send a new prescription to that pharmacy at your request.    Scheduling Tests:    If your physician has ordered radiology tests such as MRI or CT scans, please contact Central Scheduling at [phone] right away to schedule the test.  Once scheduled, the [facility] Centralized Referral Team will work with your insurance carrier to obtain pre-certification or prior authorization.  Depending on your insurance carrier, approval may take 3-10 days.  It is highly recommended patients assure they have received an authorization before having a test performed.  If test is done without insurance authorization, patient may be responsible for the entire amount billed.      Precertification and Prior Authorizations:  If your physician has recommended that you have a procedure or additional testing performed the [facility]  Centralized Referral Team will contact your insurance carrier to obtain pre-certification or prior authorization.    You are strongly encouraged to contact your insurance carrier to verify that your procedure/test has been approved and is a COVERED benefit.  Although the [facility] Centralized Referral Team does its due diligence, the insurance carrier gives the disclaimer that \"Although the procedure is authorized, this does not guarantee payment.\"    Ultimately the patient is responsible for payment.   Thank you for your understanding in this matter.  Paperwork Completion:  If you require FMLA or disability paperwork for your recovery, please make sure to either drop it off or have it faxed to our office at [phone]. Be sure the form has your name and date of birth on it.  The form will be faxed to our Forms Department and they will complete it for you.  There is a 25$ fee for all forms that need to be filled out.  Please be aware there is a 10-14 day turnaround time.  You will need to sign a release of information (DAYAN) form if your paperwork does not come with one.  You may call the Forms Department with any questions at [phone].  Their fax number is [phone].

## 2025-04-21 NOTE — PROGRESS NOTES
Patient presents in office today with reported pain in back of head and back (originated from tailbone after fall), pt also has fibromyalgia    Current pain level reported = 8/10    Last reported dose of NA      Narcotic Contract renewal NA    Urine Drug screen NA

## 2025-04-21 NOTE — PATIENT INSTRUCTIONS
Refill policies:    Allow 2-3 business days for refills; controlled substances may take longer.  Contact your pharmacy at least 5 days prior to running out of medication and have them send an electronic request or submit request through the “request refill” option in your [employer] account.  Refills are not addressed on weekends; covering physicians do not authorize routine medications on weekends.  No narcotics or controlled substances are refilled after noon on Fridays or by on call physicians.  By law, narcotics must be electronically prescribed.  A 30 day supply with no refills is the maximum allowed.  If your prescription is due for a refill, you may be due for a follow up appointment.  To best provide you care, patients receiving routine medications need to be seen at least once a year.  Patients receiving narcotic/controlled substance medications need to be seen at least once every 3 months.  In the event that your preferred pharmacy does not have the requested medication in stock (e.g. Backordered), it is your responsibility to find another pharmacy that has the requested medication available.  We will gladly send a new prescription to that pharmacy at your request.    Scheduling Tests:    If your physician has ordered radiology tests such as MRI or CT scans, please contact Central Scheduling at [phone] right away to schedule the test.  Once scheduled, the [facility] Centralized Referral Team will work with your insurance carrier to obtain pre-certification or prior authorization.  Depending on your insurance carrier, approval may take 3-10 days.  It is highly recommended patients assure they have received an authorization before having a test performed.  If test is done without insurance authorization, patient may be responsible for the entire amount billed.      Precertification and Prior Authorizations:  If your physician has recommended that you have a procedure or additional testing performed the [facility]  Centralized Referral Team will contact your insurance carrier to obtain pre-certification or prior authorization.    You are strongly encouraged to contact your insurance carrier to verify that your procedure/test has been approved and is a COVERED benefit.  Although the [facility] Centralized Referral Team does its due diligence, the insurance carrier gives the disclaimer that \"Although the procedure is authorized, this does not guarantee payment.\"    Ultimately the patient is responsible for payment.   Thank you for your understanding in this matter.  Paperwork Completion:  If you require FMLA or disability paperwork for your recovery, please make sure to either drop it off or have it faxed to our office at [phone]. Be sure the form has your name and date of birth on it.  The form will be faxed to our Forms Department and they will complete it for you.  There is a 25$ fee for all forms that need to be filled out.  Please be aware there is a 10-14 day turnaround time.  You will need to sign a release of information (DAYAN) form if your paperwork does not come with one.  You may call the Forms Department with any questions at [phone].  Their fax number is [phone].

## 2025-04-21 NOTE — PROGRESS NOTES
NEUROLOGY  [facility]       Kat Crook  3/11/1970  Primary Care Provider:  Justin Huerta MD    4/21/2025  55 year old yo,  was last seen on:: March 2025    Seen for/plans last visit:  MS    Previous visit and existing record notes reviewed in preparation for the face to face visit.  Relevant labs and studies reviewed and will be noted in relevant areas of this record.  Accompanied visit:     (x) No.      Present condition:  She was asleep on Saturday, April 12 and acted out at dream where she was rolling and falling down and she indeed fell off the bed.  She hurt her back and the back of her head subsequently noted that there is increasing foot drop in both feet which she thinks was a flareup.  She was seen by neurology service and it was decided that it was not a liz galilea relapse but perhaps just a pseudo relapse.    She also hit the back of her head and has some visual changes and is scheduled to see eye doctors.    Meanwhile, when I last saw her in March she was doing relatively well and we scheduled her for several blood test including Tysabri concentration level because she is on an extended schedule Tysabri every 8 weeks.  This was not done around the time of hospitalization.    With the improvement of pain in the back she also seems to be improving with her foot.    Past History update/new problem(s): as above    Review of Systems:  Review of Systems:  Denies systemic symptoms     No CP or SOB.  No GI or  symptoms. Relevant Neuro as noted above.      Medications:    Medications - Current[1]  PRN: PRN Medications[2]    Allergies:  Allergies[3]       EXAM:  /70 (BP Location: Left arm, Patient Position: Sitting, Cuff Size: adult)   Pulse 68   Resp 18   LMP  (LMP Unknown)   SpO2 98%   Looks stated age  General Exam:  HENT:  pink conjunctiva anicteric sclerae  Neck no adenopathy, thyroid normal  Heart and Lungs:  normal  Extremities: no cyanosis, skin changes    NEURO  Alert  and stuttering a bit  Generally fluent  CN grossly normal  Uppers no drift  Lowers 5/5 except still with foot drop bilaterally which affects her gait       INTERPRETATION of RELEVANT LABS and other DATA:    Hospital notes reviewed in Epic      Problem/s Identified this visit:   1. MS (multiple sclerosis) (HCC)    2. Neuropathic pain of both legs    3. Foot drop, bilateral          Discussion plus Diagnostics & Treatment Orders:  Get biomarkers         Procedures    MISCELLANEOUS LAB TESTING [[facility] ONLY]    NEUROFILAMENT LIGHT CHAIN, SERUM ([facility])    Immunoglobulin free LT chains blood    Complement C4, Serum     Misc test is for TYSABRI concentration (Level)    HIV test requested      (x) Discussed potential side effects of any treatment relevant to above.  Includes explanation of tests as necessary.    Return in about 3 months (around 7/21/2025).      Patient understands that if needed, based on condition and or test results, follow up will be readjusted      Rao Payne MD  Vascular & General Neurology  Director, Multiple Sclerosis Program  [facility]  4/21/2025, Time completed 10:44 AM    Decision making:  ( x ) labs reviewed/ordered - 1  (  ) new diagnosis: - 1  ( x) Images & studies independently reviewed -non F2F  (  ) Case/studies discussed with other caregivers - -non F2F  (  ) Telephone time with patiern or authorized Fam member--non F2F  ( x ) other records reviewed --non F2F including consultations  (  ) [facility] meetings - patient not present --non F2F  (  ) Independent Historian obtained    Non Face to Face CPT code 42513/93105 applies as documented above    PROCEDURE DONE     (   ) see notes        After visit, patient was escorted out and handed-off discharge process and instructions to the check out desk.  No additional issues relevant to visit were raised to staff at this time interval.        This document is to be interpreted as my current opinion regarding the case as of  the stated date of service based on the information available to me at this time and may supersedes any prior opinion expressed either orally or in writing.  Services rendered are only within the scope of direct medical care  Sometimes, reports may have been prepared partially using a speech recognition software technology.  If a word or phrase is confusing or out of context, please do not hesitate to call for clarification.                [1]   Current Outpatient Medications:     HYDROcodone-acetaminophen  MG Oral Tab, Take 1 tablet by mouth 4 (four) times daily as needed for Pain., Disp: 15 tablet, Rfl: 0    pregabalin 50 MG Oral Cap, Take 1 capsule (50 mg total) by mouth every morning., Disp: , Rfl:     Insulin Regular Human 100 UNIT/ML Injection Solution, 100 Units by Insulin pump route daily., Disp: , Rfl:     GVOKE HYPOPEN 2-PACK 1 MG/0.2ML Subcutaneous injection, Inject 0.2 mL (1 mg total) into the skin as needed., Disp: , Rfl:     albuterol 108 (90 Base) MCG/ACT Inhalation Aero Soln, Inhale 2 puffs into the lungs every 4 (four) hours as needed. inhale 2 puff by inhalation route  every 4 - 6 hours as needed, Disp: 1 each, Rfl: 11    budesonide 0.5 MG/2ML Inhalation Suspension, Take 2 mL (0.5 mg total) by nebulization 2 (two) times daily., Disp: 120 mL, Rfl: 3    ipratropium-albuterol 0.5-2.5 (3) MG/3ML Inhalation Solution, Take 3 mL by nebulization every 6 (six) hours as needed., Disp: 120 mL, Rfl: 2    linaCLOtide (LINZESS) 145 MCG Oral Cap, Take 145 mcg by mouth every morning before breakfast. (Patient taking differently: Take 145 mcg by mouth as needed.), Disp: , Rfl:     natalizumab (TYSABRI) 300 mg/15mL Intravenous Conc, Inject 15 mL (300 mg total) into the vein Every 8 (eight) weeks., Disp: , Rfl:     carvedilol 3.125 MG Oral Tab, Take 1 tablet (3.125 mg total) by mouth in the morning and 1 tablet (3.125 mg total) in the evening. Take with meals., Disp: , Rfl:     montelukast 10 MG Oral Tab, Take  1 tablet (10 mg total) by mouth nightly., Disp: , Rfl:     levothyroxine 88 MCG Oral Tab, Take 1 tablet (88 mcg total) by mouth every morning before breakfast., Disp: , Rfl:     pantoprazole 20 MG Oral Tab EC, Take 1 tablet (20 mg total) by mouth in the morning and 1 tablet (20 mg total) in the evening. Take before meals., Disp: , Rfl:     fluticasone furoate-vilanterol 200-25 MCG/ACT Inhalation Aerosol Powder, Breath Activated, Inhale 1 puff into the lungs in the morning., Disp: , Rfl:     LYLLANA 0.025 MG/24HR Transdermal Patch Biweekly, Place 1 patch onto the skin twice a week., Disp: , Rfl:     CELEBREX 100 MG Oral Cap, Take 1 capsule (100 mg total) by mouth 2 (two) times daily. in afternoon and at bedtime, Disp: , Rfl:     pregabalin 200 MG Oral Cap, Take 1 capsule (200 mg total) by mouth 2 (two) times daily. in the afternoon and at bedtime, Disp: , Rfl:     Ciclopirox 8 % External Solution, Apply topically daily. to affected toenail(s) as directed, Disp: , Rfl:     baclofen 10 MG Oral Tab, Take 1 tablet (10 mg total) by mouth 2 (two) times daily., Disp: , Rfl:     NON FORMULARY, Tandem insulin pump settings (Bolus IQ) Mdnt 3.1 units/hr 0400 3.00 units/hr 0900 3.2 units/hr  Correction:  MN 1:35/110 1 PM 1:40 7 pm 1:35  CHO ratio: MN 1:5g  Target glucose level - 100 and 80, Disp: , Rfl:     FARXIGA 10 MG Oral Tab, Take 1 tablet (10 mg total) by mouth daily. in the afternoon, Disp: , Rfl:     DULoxetine 60 MG Oral Cap DR Particles, Take 1 capsule (60 mg total) by mouth 2 (two) times daily. in the afternoon and at bedtime, Disp: , Rfl:     atorvastatin 40 MG Oral Tab, Take 1 tablet (40 mg total) by mouth every evening., Disp: , Rfl:     EPINEPHrine 0.15 MG/0.3ML Injection Solution Auto-injector, Inject 0.3 mL (0.15 mg total) into the muscle as needed for Anaphylaxis. Jani pen/lower dose because pt is allergic to epinephrine, Disp: , Rfl:     Ondansetron HCl (ZOFRAN) 4 mg tablet, Take 1 tablet (4 mg total) by  mouth 3 (three) times daily as needed for Nausea., Disp: , Rfl:     Benralizumab (FASENRA PEN) 30 MG/ML Subcutaneous Solution Auto-injector, Inject 30 mg into the skin Every 8 (eight) weeks., Disp: , Rfl:     Thiamine HCl 100 MG Oral Tab, Take 1 tablet (100 mg total) by mouth daily., Disp: 30 tablet, Rfl: 3    docusate sodium 100 MG Oral Cap, Take 1 capsule (100 mg total) by mouth in the morning and 1 capsule (100 mg total) in the evening., Disp: , Rfl:     denosumab 60 MG/ML Subcutaneous Solution, Inject 1 mL (60 mg total) into the skin every 6 (six) months., Disp: , Rfl:     Cholecalciferol (VITAMIN D-3) 5000 UNITS Oral Tab, Take 1 tablet (5,000 Units total) by mouth in the morning., Disp: , Rfl:     Respiratory Therapy Supplies (FULL KIT NEBULIZER SET) Does not apply Misc, 1 kit As Directed., Disp: 1 each, Rfl: 0    albuterol (2.5 MG/3ML) 0.083% Inhalation Nebu Soln, Take 3 mL (2.5 mg total) by nebulization 3 (three) times daily. (Patient not taking: Reported on 4/10/2025), Disp: 90 each, Rfl: 11  [2] [3]   Allergies  Allergen Reactions    Epinephrine OTHER (SEE COMMENTS)     Cardiac issues    Immune Globulin ANAPHYLAXIS    Latex SHORTNESS OF BREATH and OTHER (SEE COMMENTS)     WELTS    Methylprednisolone SWELLING     Swelling of neck, throat and tongue  Injection, throat and tongue swelling      Ocrelizumab ANAPHYLAXIS and OTHER (SEE COMMENTS)     Difficulty breathing    Other SHORTNESS OF BREATH     ENVIRONMENTAL, ASTHMA EXACERBATION    Urea Tightness in Throat     Urea C-13 in Pranactin for H. Pylori test kit.    Pcn [Penicillamine]     Penicillins OTHER (SEE COMMENTS)    Midodrine ITCHING     Pt reports mouth pain and itching possibly r/t midodrine

## 2025-04-23 ENCOUNTER — HOSPITAL ENCOUNTER (OUTPATIENT)
Dept: GENERAL RADIOLOGY | Facility: HOSPITAL | Age: 55
Discharge: HOME | End: 2025-04-23
Attending: Other
Payer: MEDICARE

## 2025-04-23 DIAGNOSIS — M21.372 FOOT DROP, BILATERAL: ICD-10-CM

## 2025-04-23 DIAGNOSIS — G57.93 NEUROPATHIC PAIN OF BOTH LEGS: ICD-10-CM

## 2025-04-23 DIAGNOSIS — G35 MS (MULTIPLE SCLEROSIS) (HCC): ICD-10-CM

## 2025-04-23 DIAGNOSIS — M21.371 FOOT DROP, BILATERAL: ICD-10-CM

## 2025-04-23 LAB — C4 SERPL-MCNC: 41.5 MG/DL (ref 12–36)

## 2025-04-23 PROCEDURE — 87389 HIV-1 AG W/HIV-1&-2 AB AG IA: CPT

## 2025-04-23 PROCEDURE — 36591 DRAW BLOOD OFF VENOUS DEVICE: CPT

## 2025-04-23 PROCEDURE — 83521 IG LIGHT CHAINS FREE EACH: CPT

## 2025-04-23 PROCEDURE — 86160 COMPLEMENT ANTIGEN: CPT

## 2025-04-23 PROCEDURE — 80299 QUANTITATIVE ASSAY DRUG: CPT

## 2025-04-23 PROCEDURE — 83529 ASAY OF INTERLEUKIN-6 (IL-6): CPT

## 2025-04-23 PROCEDURE — 82397 CHEMILUMINESCENT ASSAY: CPT

## 2025-04-24 LAB
KAPPA LC FREE SER-MCNC: 1.44 MG/DL (ref 0.33–1.94)
KAPPA LC FREE/LAMBDA FREE SER NEPH: 1.44 {RATIO} (ref 0.26–1.65)
LAMBDA LC FREE SERPL-MCNC: 1 MG/DL (ref 0.57–2.63)

## 2025-04-25 ENCOUNTER — TELEPHONE (OUTPATIENT)
Facility: CLINIC | Age: 55
End: 2025-04-25

## 2025-04-25 RX ORDER — BENRALIZUMAB 30 MG/ML
30 INJECTION, SOLUTION SUBCUTANEOUS
Qty: 0.28 ML | Refills: 0 | Status: SHIPPED | OUTPATIENT
Start: 2025-04-25

## 2025-04-25 NOTE — TELEPHONE ENCOUNTER
Received request for: Fasenra Pen.     Patient last seen by: Dr. Hoyt 2/25/25    Has scheduled appointment: 06/03/25    Physician recommendation: Remains on fasnera and breo - using albuterol Consulted with Dr. Hoyt verbal order given to Refill Fasenra 30 mg auto injector.

## 2025-04-28 LAB
NEUROFILAMENT LIGHT CHAIN: 1.17 PG/ML
NFL, SERUM Z SCORE: -0.97 PG/ML

## 2025-05-07 ENCOUNTER — TELEPHONE (OUTPATIENT)
Dept: NEUROLOGY | Facility: CLINIC | Age: 55
End: 2025-05-07

## 2025-05-07 DIAGNOSIS — G35 MS (MULTIPLE SCLEROSIS) (HCC): Primary | ICD-10-CM

## 2025-05-07 NOTE — TELEPHONE ENCOUNTER
FDA mandated Tysabri reauthorization questionnaire completed online. Patient is JCV Positive, Index = 1.09.  Last test date 11/8/2024, next test date due May 2025.  Has completed 31 Tysabri infusions with 0 courses of steroids in last 6 months.  Reauthorized to continue receiving infusion; this is not a prior authorization for medication.    Patient receives infusions at [facility] in [place].  Re-authorized from 6/7/2025 - 12/6/2025 through the touch program.    Tysabri authorized through insurance. Infusion center will maintain her insurance authorization.    Tysabri is buy and bill for this patient.    Patient infuses every 8 weeks.    Request for JCV index mailed to patient home address.    Information discussed with prior to submission.

## 2025-05-08 ENCOUNTER — TELEPHONE (OUTPATIENT)
Dept: NEUROLOGY | Facility: CLINIC | Age: 55
End: 2025-05-08

## 2025-05-08 NOTE — TELEPHONE ENCOUNTER
Tysabri infusion record received from [facility] center for physician review.  No signs/symptoms of infusion reaction noted.  Patient infused on 05/08/2025. No observation period required.  Lot Number [id number] x1, Exp 11/2027  No new orders requested.  Orders active for 5 additional infusions.  Next infusion scheduled for 07/06/2025. Patient infusing every 8 weeks.  Placed in physician's box for review.     Recent JCV index = 1.09 (11/8/2024)

## 2025-05-15 DIAGNOSIS — M54.16 LUMBAR RADICULOPATHY: Primary | ICD-10-CM

## 2025-05-15 LAB
INDEX VALUE: 0.97 INDEX
JCV ANTIBODY: POSITIVE

## 2025-05-19 ENCOUNTER — TELEPHONE (OUTPATIENT)
Dept: NEUROLOGY | Facility: CLINIC | Age: 55
End: 2025-05-19

## 2025-05-19 ENCOUNTER — PATIENT MESSAGE (OUTPATIENT)
Dept: NEUROLOGY | Facility: CLINIC | Age: 55
End: 2025-05-19

## 2025-05-23 ENCOUNTER — OFFICE VISIT (OUTPATIENT)
Dept: PHYSICAL THERAPY | Age: 55
End: 2025-05-23
Attending: SPECIALIST

## 2025-05-23 DIAGNOSIS — M54.16 LUMBAR RADICULOPATHY: Primary | ICD-10-CM

## 2025-05-23 ASSESSMENT — ENCOUNTER SYMPTOMS
QUALITY: RADIATING
ALLEVIATING FACTORS: HEAT
PAIN SCALE AT HIGHEST: 10
QUALITY: PINS AND NEEDLES
ALLEVIATING FACTORS: REST
ALLEVIATING FACTORS: CHANGE IN POSITION
ALLEVIATING FACTORS: PRESCRIPTION MEDICATIONS
PAIN SEVERITY NOW: 7
QUALITY: NUMBNESS
QUALITY: SHARP
PAIN SCALE AT LOWEST: 5
QUALITY: TINGLING
QUALITY: SHOOTING
QUALITY: DISCOMFORT
PAIN FREQUENCY: CONSTANT
ALLEVIATING FACTORS: SUPPORTIVE DEVICE
SUBJECTIVE PAIN PROGRESSION: WORSENING

## 2025-05-29 ENCOUNTER — APPOINTMENT (OUTPATIENT)
Dept: PHYSICAL THERAPY | Age: 55
End: 2025-05-29

## 2025-05-29 DIAGNOSIS — M54.16 LUMBAR RADICULOPATHY: Primary | ICD-10-CM

## 2025-05-29 ASSESSMENT — ENCOUNTER SYMPTOMS: PAIN SEVERITY NOW: 6

## 2025-06-03 ENCOUNTER — OFFICE VISIT (OUTPATIENT)
Facility: CLINIC | Age: 55
End: 2025-06-03
Payer: MEDICARE

## 2025-06-03 ENCOUNTER — PATIENT MESSAGE (OUTPATIENT)
Facility: CLINIC | Age: 55
End: 2025-06-03

## 2025-06-03 VITALS
WEIGHT: 171 LBS | OXYGEN SATURATION: 98 % | DIASTOLIC BLOOD PRESSURE: 74 MMHG | SYSTOLIC BLOOD PRESSURE: 108 MMHG | RESPIRATION RATE: 16 BRPM | HEIGHT: 67 IN | BODY MASS INDEX: 26.84 KG/M2 | HEART RATE: 76 BPM

## 2025-06-03 DIAGNOSIS — J45.901 EXACERBATION OF ASTHMA, UNSPECIFIED ASTHMA SEVERITY, UNSPECIFIED WHETHER PERSISTENT (HCC): Primary | ICD-10-CM

## 2025-06-03 DIAGNOSIS — R07.9 CHEST PAIN AT REST: ICD-10-CM

## 2025-06-03 DIAGNOSIS — J45.40 MODERATE PERSISTENT ASTHMA, UNSPECIFIED WHETHER COMPLICATED (HCC): ICD-10-CM

## 2025-06-03 PROCEDURE — 99214 OFFICE O/P EST MOD 30 MIN: CPT | Performed by: INTERNAL MEDICINE

## 2025-06-03 RX ORDER — CALCIUM CARBONATE 500(1250)
TABLET ORAL
COMMUNITY
Start: 2025-05-23

## 2025-06-03 RX ORDER — OXYCODONE HYDROCHLORIDE 5 MG/1
TABLET ORAL
COMMUNITY
Start: 2025-04-21

## 2025-06-03 NOTE — PROGRESS NOTES
Pulmonary Consult Note    History of Present Illness:  Kat Crook is a 55 year old female presenting to pulmonary clinic today for beginning of summer bad attack at [facility] -   6 weeks ago- then to [facility]- admitted one night - left   related to weather in summer -  Trigger - burning garbage and leaves-   Allergic to steroids-- swells up - mouth and throat - reaction - last 3 yrs ago -   Remains on fasnerra every other month - at home   Treated with bipap   Now feels OK_ always with dyspnea - walks - slows down - breaths hard with stairs - uses albuterol prior to activity -- able to exercise   Rare cough-- chest tightness and wheezing   Had allergy testing then gets asthma- -- reacts to \"everything\"   Has dog and cats at home     Flare of MS-- pain and difficulty - walking -  In hosp with PLEX x 5 April/May- then to [facility]  Pt at home now   Some shortness of breath - prior to hosp and during the hosp -   Ongoing throat   Walking to parking lot - using albuterol and helps some - not preactivity   Remains on fascerna every 8 weeks -   Coughing is bad at night -   Recent endo with [facility]- dysphagia is still bad- chokes on liquids and solids - no swallow study - puts food in cheek   breo - daily and rescue once every other day   sats all good at [facility]- was given oxygen at night - -  A lot problems at night- coughing worse   Nightmares with melatonin   Follows with Dr. Becker with recent visit    8.23 Had kidney stone that got bigger and then a second one and needs repeat lithotripsy -with stent - and repeat lithrotrispy  and dilation - -- increased pain- -now on the 6th    Remains with dyspnea- thinks sl better with Breo 200 -   Back on breo 100 - rescue - 2-3 times per day 3-4 times per week - - beneiftting   Asthma is so good- fascenra-- every 8 weeks   Not much  coughing -- mucus from sinus   Swallowing told fine on UGI with [facility]- - normal -- but coughed a lot after test and with increased mucus -- to see in  follow up- -   Norco 4 times a day for years-does not think helping much  Atypical pain pattern thought perhaps related to MS unclear  Today with significant increase in right anterior axillary line  Worse with expiration     11.23- - sick for awhile - 3 weeks - neg covid- - antibiotics helped - no fever - not well- no energy - not eating better now   Remains on breo 100 -   Now with mucus - heavy - from sinuses more than chest - - was green - now yellow - occasionally  blood -   Some rescue use and nebulizer - increased to twice a day   Remains with chest pain -- left side and right side left ant chest - not palpable pain - - some with deep breathing -   Remains with fatigue - ? Thyroid - to see primary   Remains on fasenra without issues - every 8 weeks - at home     Got flu shot - covid not yet   Neuro recommended PLEX - for weakness and so tired   Needed O2 during the night while in   Awakenings ongoing with some ocughing --     2.24- on vumerity  -- Mobilligy went up  not working as well - -increased can't think well and legs are weaker - to see echerverri-  Foot drop left and right ? Starting -   No recent plex -   Gets more short of breath-- walking in here - noted dypsnea -   Spacing out now -   No O2 use - has none   Not sleeping well- - not supine - not flat-- can't breath - had an epsiode during sleep study - legs hurt   No coughing - some in middle of night -   Increased nasal drainage-- all clear - flonase no help   Remains on fasenra- doing well - occasionally  nasal spray - singular - no dreams     6/24- in hosp with flare and needed PLEX_ - was shock ?post plex   Had PT after - still in speech - has been affected - in past but now worse - - in speech   Gets tired with talking   Has been feeling good -- some coughing- rain and the hot-   Told in hosp has sleep apnea - with desats and started on cpap - told better -  Awaknes in the night   Chest pain hurting - across front -   No gerd noted - throat clearing  - - maybe hard to talk and using muscle   Thinks slept better with cpap -   Remains on singular nightly and thinks needs   Some rescue  when hot  10/24-  started with abd pain - in hosp with pancreatic abnormality - for EUS- with pandolfi - -atrophy - to look -   Mild upper to lower below belly button - when active its bad- -   BM help - linzess - helped the pain - better now   - ? Nerve pain as well   Breathing not good- change in weather - tighter and coughing - - needed O2 at night -   Snoring is worse - - and using O2 at night in the hosp - not during the day   Remains on fasnera and breo - using albuterol   2/25-was in the hospital in January with a flare of asthma related to rhinovirus-increased ICS avoiding prednisone--did well and now much improved  Notes dyspnea in the evenings most evenings usually at rest  Questionable minimal benefit from albuterol  Notes some dyspnea at night in bed prior to falling asleep denies any awakenings after falling asleep unless noting GERD    6/25 on fasenra every other month and breo   Tries to not take   Recent increase in tysabri now every 6 weeks   Worse foot drop and worse speech   Fell in April - out of bed   Now in PT with nerve stim -   No steroids now   Remains exhaustion sleeping well- sleeping now in recliner- dreaming   Dyspnea - better doing the day -- worse the later it gets-   Shortness of breath walking in here -  To try scooter -   Rescue   Chest pains increased -- seeing dr munoz -- this week                   Past Medical History:   Past Medical History:    Abdominal pain    Allergic rhinitis    Anemia    Anxiety    Anxiety state    Arthritis    Asthma (HCC)    Atypical mole    Autoimmune disease (HCC)    Back pain    Bad breath    Black stools    Bloating    Blood disorder    Thalassemia alpha, Sickle cell trait    Blood in urine    Blurred vision    Calculus of kidney    Cardiomyopathy (HCC)    last echo 12/2021: EF 55%    Chest pain    Constipation     Decorative tattoo    Depression    Diarrhea, unspecified    Dizziness    Easy bruising    Elevated liver enzymes    Endocrine disorder    Esophageal reflux    Fatigue    Fibromyalgia    Food intolerance    Frequent urination    Frequent UTI    Gastroparesis    Glaucoma    H. pylori infection    H/O  section    Headache disorder    Heart attack (HCC)    Heart palpitations    Heartburn    Hemorrhoids    Herniation of cervical intervertebral disc    High blood pressure    High cholesterol    History of blood clots    Unprovoked    History of blood transfusion    History of cardiac murmur    History of depression    Hoarseness, chronic    Hyperlipidemia    IBS (irritable bowel syndrome)    Irregular bowel habits    Leaking of urine    Liver disease    GIORDANO and Stage 2 fibrosis    Loss of appetite    Migraines    Multiple sclerosis (HCC)    dx 8 years ago    Muscle weakness    cane or walker prn    Myocardial infarction (HCC)    Nausea    Neuropathy    legs, hands, feet; bilateral    Night sweats    Osteoarthritis    Osteoporosis    KAMALJIT in her 30s    Osteoporosis    Pain with bowel movements    Painful urination    Problems with swallowing    Due to MS    Pulmonary embolism (HCC)    Scoliosis of lumbar spine    Shortness of breath    Sickle cell trait    Sleep apnea    no treatment    Sleep disturbance    Stress    Type II or unspecified type diabetes mellitus without mention of complication, not stated as uncontrolled    Uncomfortable fullness after meals    Unspecified disorder of thyroid    total thyroidectomy    Visual impairment    glasses    Wears glasses    Weight loss    Wheezing    cardiac -- one heart attack - - during allergic reaction to steriods - epin-   Dr munoz- diastolic dys and pul htn   HX PE-- told unprovoked - no dvt- completed AC - remains on asa   No cancers   4 surgeries this year - abd - cysto with urethal dilation - stent for kidney stone- with removal - dr ball        Past  Surgical History:   Past Surgical History:   Procedure Laterality Date      1986    Cataract      Cath angio  2014    Cholecystectomy      Colonoscopy N/A 2015    Procedure: COLONOSCOPY;  Surgeon: Liz Tong DO;  Location:  ENDOSCOPY    Colonoscopy N/A 2021    Procedure: COLONOSCOPY;  Surgeon: Cedrick Da Silva MD;  Location:  ENDOSCOPY    Colonoscopy N/A 10/08/2021    Procedure: COLONOSCOPY, ESOPHAGOGASTRODUODENOSCOPY (EGD);  Surgeon: Darvin Richardson MD;  Location:  ENDOSCOPY    D & c  1990    Blighted ovum    Egd      Hysterectomy      total hystero.    Ir port a cath insertion exchnge check  2018    Lithotripsy      x 2    Lysis of adhesions      Needle biopsy liver  2016    Oophorectomy Bilateral     total hystero.    Other      dialysis catheter- left chest for plasmaphoresis    Other surgical history       x 2    Port, indwelling, imp  2019    power port    Thyroidectomy  2013    benign MNG    Total abdom hysterectomy      Upper gi endoscopy performed  2014       Family Medical History:   Family History   Problem Relation Age of Onset    Hypertension Mother         Needs to be deleted    Cancer Mother         stomach cancer    Ovarian Cancer Mother         30'S    Colon Polyps Mother     Anemia Mother     Heart Attack Father     Other (Other) Father         COPD, emphysema    Asthma Father     Pulmonary Disease Father     Other (Other) Sister         rectal CA\    Ovarian Cancer Maternal Grandmother         30'S    Colon Polyps Maternal Grandmother     Diabetes Maternal Grandmother     Colon Cancer Maternal Grandmother     Anemia Maternal Grandmother     Cancer Maternal Grandmother     Anemia Daughter     Breast Cancer Maternal Aunt         60'S    Breast Cancer Paternal Cousin Female 64       Social History: Social History    Socioeconomic History      Marital status: Single    Tobacco Use      Smoking status:  Never      Smokeless tobacco: Never    Vaping Use      Vaping Use: Never used    Substance and Sexual Activity      Alcohol use: Never      Drug use: Never    Other Topics      Concerns:        Caffeine Concern: No        Exercise: Yes          walks alot  No working - no chemicals  One dog and 2 cats - allergic to them -- not too bad     Allergies: Epinephrine, Immune globulin, Latex, Methylprednisolone, Ocrelizumab, Other, Urea, Pcn [penicillamine], Penicillins, and Midodrine     Medications:   Current Outpatient Medications   Medication Sig Dispense Refill    calcium carbonate 500 MG Oral Tab 1 tablet with food Orally Twice a day for 100 days      oxyCODONE 5 MG Oral Tab 1 tablet as needed Orally qid prn for 30 days      FASENRA PEN 30 MG/ML Subcutaneous Solution Auto-injector Inject 30 mg into the skin Every 8 (eight) weeks. 0.28 mL 0    HYDROcodone-acetaminophen  MG Oral Tab Take 1 tablet by mouth 4 (four) times daily as needed for Pain. 15 tablet 0    pregabalin 50 MG Oral Cap Take 1 capsule (50 mg total) by mouth every morning.      Insulin Regular Human 100 UNIT/ML Injection Solution 100 Units by Insulin pump route daily.      GVOKE HYPOPEN 2-PACK 1 MG/0.2ML Subcutaneous injection Inject 0.2 mL (1 mg total) into the skin as needed.      albuterol 108 (90 Base) MCG/ACT Inhalation Aero Soln Inhale 2 puffs into the lungs every 4 (four) hours as needed. inhale 2 puff by inhalation route  every 4 - 6 hours as needed 1 each 11    Respiratory Therapy Supplies (FULL KIT NEBULIZER SET) Does not apply Misc 1 kit As Directed. 1 each 0    ipratropium-albuterol 0.5-2.5 (3) MG/3ML Inhalation Solution Take 3 mL by nebulization every 6 (six) hours as needed. 120 mL 2    linaCLOtide (LINZESS) 145 MCG Oral Cap Take 145 mcg by mouth every morning before breakfast. (Patient taking differently: Take 145 mcg by mouth as needed.)      natalizumab (TYSABRI) 300 mg/15mL Intravenous Conc Inject 15 mL (300 mg total) into the  vein Every 8 (eight) weeks.      carvedilol 3.125 MG Oral Tab Take 1 tablet (3.125 mg total) by mouth in the morning and 1 tablet (3.125 mg total) in the evening. Take with meals.      montelukast 10 MG Oral Tab Take 1 tablet (10 mg total) by mouth nightly.      levothyroxine 88 MCG Oral Tab Take 1 tablet (88 mcg total) by mouth every morning before breakfast.      pantoprazole 20 MG Oral Tab EC Take 1 tablet (20 mg total) by mouth in the morning and 1 tablet (20 mg total) in the evening. Take before meals.      fluticasone furoate-vilanterol 200-25 MCG/ACT Inhalation Aerosol Powder, Breath Activated Inhale 1 puff into the lungs in the morning.      LYLLANA 0.025 MG/24HR Transdermal Patch Biweekly Place 1 patch onto the skin twice a week.      CELEBREX 100 MG Oral Cap Take 1 capsule (100 mg total) by mouth 2 (two) times daily. in afternoon and at bedtime      pregabalin 200 MG Oral Cap Take 1 capsule (200 mg total) by mouth 2 (two) times daily. in the afternoon and at bedtime      Ciclopirox 8 % External Solution Apply topically daily. to affected toenail(s) as directed      baclofen 10 MG Oral Tab Take 1 tablet (10 mg total) by mouth 2 (two) times daily.      NON FORMULARY Tandem insulin pump settings (Bolus IQ)  Mdnt 3.1 units/hr  0400 3.00 units/hr  0900 3.2 units/hr    Correction:   MN 1:35/110  1 PM 1:40  7 pm 1:35    CHO ratio:  MN 1:5g    Target glucose level - 100 and 80      FARXIGA 10 MG Oral Tab Take 1 tablet (10 mg total) by mouth daily. in the afternoon      DULoxetine 60 MG Oral Cap DR Particles Take 1 capsule (60 mg total) by mouth 2 (two) times daily. in the afternoon and at bedtime      atorvastatin 40 MG Oral Tab Take 1 tablet (40 mg total) by mouth every evening.      EPINEPHrine 0.15 MG/0.3ML Injection Solution Auto-injector Inject 0.3 mL (0.15 mg total) into the muscle as needed for Anaphylaxis. Jani pen/lower dose because pt is allergic to epinephrine      Ondansetron HCl (ZOFRAN) 4 mg tablet  Take 1 tablet (4 mg total) by mouth 3 (three) times daily as needed for Nausea.      Thiamine HCl 100 MG Oral Tab Take 1 tablet (100 mg total) by mouth daily. 30 tablet 3    docusate sodium 100 MG Oral Cap Take 1 capsule (100 mg total) by mouth in the morning and 1 capsule (100 mg total) in the evening.      denosumab 60 MG/ML Subcutaneous Solution Inject 1 mL (60 mg total) into the skin every 6 (six) months.      Cholecalciferol (VITAMIN D-3) 5000 UNITS Oral Tab Take 1 tablet (5,000 Units total) by mouth in the morning.         Review of Systems: weight down- loss of appettie -questionably related to increased pain-  now weight is up   Not hungery- remains without appettie - not taking supplements - likes ensure but increases her blood sugar - eating OK - one meal a day food gets stuck   No gerd at all on ppi bid   Notes some dysphagia -- stable but is careful with neg study -- chews a lot to get food softer - -- biting tongue -     Sleeping - insomnia - recently not sleeping well-remains with increasing awakenings and this may be related to pain continues to report having to sleep sitting more upright  Told MOI in the past - awakens at night at times - choking at night - in the past  -    And had a machine- recent neg study sleeps poorly  Neurology -- MS is questionably worse may need treatment- plasma paresis -- speech and cant think well and generlized pain all over as flare and hard to walk -now worsening       Physical Exam:  /74   Pulse 76   Resp 16   Ht 5' 7\" (1.702 m)   Wt 171 lb (77.6 kg)   LMP  (LMP Unknown)   SpO2 98%   BMI 26.78 kg/m²    Constitutional: comfortable .  Currently denies any chest pain or tenderness  HEENT: Head NC/AT. PEERL. Throat is clear no thrush   Cardio: Regular rate and rhythm. Normal S1 and S2. No murmurs.regualr   Respiratory: Thorax symmetrical with no labored breathing.- no wheeze with good air entry sl crackles rt base - now all clear   GI:  Abd soft,  non-tender.  Extremities: No clubbing or cyanosis. No LE edema. No calf tenderness.  Neurologic: A&Ox3. No gross motor deficits.  Skin: Warm, dry.no rashes or hives noted   Lymphatic: No cervical or supraclavicular lymphadenopathy.no jvd   Psych: Calm, cooperative. Pleasant affect.    Results:  Reviewed     Assessment/Plan:  #Pulmonary hypertension  -History of diastolic dysfunction  Last echo 12/21 with diastolic dysfunction and PAS 30 to 35 mmHg-stable fluid status without diuretic use   history of pulmonary emboli-12/21 seemingly unprovoked completed 3 months with negative follow-up CT decision made for aspirin no recurrence  8/23 no fluid issues  11.23- canceled recent visit - stable fluids   2/24 no fluid issues  6/24- echo in hosp with PAS 30-35mmHg   10/24- no swelling   2/25 fluid status remains stable  6/25 - no fluid issues       #Asthma  Eosinophilic and severe-reports long history of allergies/testing  Reports ongoing recurrent flares requiring ambulances to the hospital-early summer and 6 weeks ago  PFTs 4/21 with normal flows and volumes minimally reduced DLCO 64% corrects to 86%.-Compared to 2018 slight decrease in TLC and residual volume.  Reports swelling questionable angioedema with any form of steroids  Reports requiring BiPAP during episodes  Overall remained stable at this time on MDIs  Notes improvement with Fasenra  Discussed questionable role of animals at home  5/23 clinically stable-recent visit with Dr. Willis  8/23 asthma is doing well on Fasenra-continue Breo for now  11.23- stable despite recent illness- cpm   2.24- remains stable - to see dr willis -remains on Fasenra and ICS/LABA  6/24- remains on fascnera and on breo- hesitant to stop Singulair- cpm   10/24 tighter now with change in weather - breo and singular and rescue and fasenra - recent smoke exposure   2/25 hospitalized with rhinovirus now much improved on baseline medication and Fasenra  6/25 states had stopped singular for 2  months and no change in dreams -  and allergies flared   Remains on breo - jittery with neb - twice a day remains limited            #GERD/chronic dysphagia  Remains on daily PPI denies any breakthrough symptoms  Recent upper scope without reported active issue-Dr. Richardson had suggested motility study  Had seen speech years ago with plans to revisit-abnormal swallow pattern-keeps food in her cheek  8/23 had esophagram reported as normal-to follow-up with GI  11.23 - seems all stable   2.24- all good on ppi BID -denies an active issue  10/24 remains on ppi BID   2/25 questionable episodes of GERD at night-remains on PPI 20mg  twice daily  6/25 - remains without sx     #Multiple sclerosis  Follows with Dr. Jeimy Cline  As unable to take steroids plans reportedly in place for PLEX-Will need line access reportedly  5/23 acute flare with ambulatory difficulties and pain prompted admission in April underwent Plex x5 with good response then to [facility]-remains with pain but ambulation is better -to see Jorge Luis  8.23- stopped tysabri- increased jcv - so had to stop-  Vermerity -- now started -   11.23- remains on vermerity -- may need PLEX   2.24- encouraged to call - increased foot drop and increased pain with increased brain fog thinks needs Plex  6/24- flare in April required PLEX in hosp   Speech issue as new issue-- not much better   10/24 stable - yvonne drop foot is worse-- L300 trying to get - - speech is better - still some stutter - swallowing off now related to throat pain after smoke exposure   2/25 speech overall is improved swallowing not an issue overall she think she is stable  6/25 worsening speech and recent increase in meds   With ongoing adjustments  Worsening foot drop-now in PT        #Dyspnea  Currently reports stability able to walk slowly  Able to keep up with some exercise  Reports preactivity albuterol helpful  5/23 notes more dyspnea when walking though tolerating physical therapy pretty  well-denies any desaturation with activity noted  8/23 overall about the same  11.23 recent illness- denies any dyspnea now   2.24- at times -slight increase with walking though more difficult to walk walking now - occasionally  at rest needs to take deep breaths  10.24 recent worse - with change in weather and recent smoke -   2/25-dyspnea during the day is much improved-remains with dyspnea at rest in the evenings etiology unclear  6/25 - the same -- to increase PT     # chest pain- anterior-   Had holter -   4/21 with questionably abnormal stress test denies any further assessment  Follows with Dr. Forrest  5/23 continues-with significant increase with associated flare  Remains tender to touch parasternally-up to lower part of her neck  8.23- remains on issues -- hurts under rt breast -- and upper chest - left side upper and rt side lower - - lower belly pain is constant and all the time without change --plan for plain CT chest if not better after procedure  11.23 -- neg CT chest questionable inflammatory  2.24- had MRI spine and seeing april for lesion - told not related to pain process  6/24 now chest pain overall seems more diffuse some bony tenderness  10/24- for CTA r/o CAD - normal stress in 2022 - echo with mild pul htn 40mmHg   6/25 to see alexander for chest pain       #History of Woody  Follows with hepatology at [facility]  Thought related to metabolic syndrome      #History of alpha thalassemia minor      # 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 Related to OSA_-  To try to recheck home study   10/24 reports needed O2 in the hosp and as above  2/25 continues to be an issue will review hospitalization if able-plan for overnight oximetry  6/25 no change - better in chair overnight with 30 minutes less than 90%-RANCHO 9.65 with spikes noted-had been 0.1% low on study last year now 7%--remains without significant change  Patient will use and benefit from nocturnal O2    # recent kidney stones   Thinks pain may be really related to kidney stones  Plans for repeat procedure  11.23 still with stones - watching   2.24- all stable     # Abdominal pain/pancreatic abnormality  Admitted to the ER  some pancreatic dilatation with plans for EUS with Pandolfi--denies association with bowel movements      Plan- -- continue Breo inhaler once a day          -- rescue inhaler as needed -           - use albuterol nebulizer or duoneb as needed every 4 hours    as discussed                 - Plan to assess for possible role of nocturnal O2 for comfort          - see me in 4 months     Raisa Hoyt MD  Pulmonary Medicine  6/3/25

## 2025-06-03 NOTE — PATIENT INSTRUCTIONS
Plan- -- continue Breo inhaler once a day          -- rescue inhaler as needed -           - use albuterol nebulizer or duoneb as needed every 4 hours    as discussed                - see me in 3 months     Raisa Hoyt MD  Pulmonary Medicine  6/3/25

## 2025-06-04 ENCOUNTER — APPOINTMENT (OUTPATIENT)
Dept: PHYSICAL THERAPY | Age: 55
End: 2025-06-04

## 2025-06-04 DIAGNOSIS — M54.16 LUMBAR RADICULOPATHY: Primary | ICD-10-CM

## 2025-06-06 ENCOUNTER — APPOINTMENT (OUTPATIENT)
Dept: PHYSICAL THERAPY | Age: 55
End: 2025-06-06

## 2025-06-06 DIAGNOSIS — M54.16 LUMBAR RADICULOPATHY: Primary | ICD-10-CM

## 2025-06-13 ENCOUNTER — APPOINTMENT (OUTPATIENT)
Dept: PHYSICAL THERAPY | Age: 55
End: 2025-06-13

## 2025-06-13 DIAGNOSIS — M54.16 LUMBAR RADICULOPATHY: Primary | ICD-10-CM

## 2025-06-15 ENCOUNTER — APPOINTMENT (OUTPATIENT)
Dept: CT IMAGING | Facility: HOSPITAL | Age: 55
End: 2025-06-15
Attending: EMERGENCY MEDICINE
Payer: MEDICARE

## 2025-06-15 ENCOUNTER — APPOINTMENT (OUTPATIENT)
Dept: GENERAL RADIOLOGY | Facility: HOSPITAL | Age: 55
End: 2025-06-15
Payer: MEDICARE

## 2025-06-15 ENCOUNTER — HOSPITAL ENCOUNTER (EMERGENCY)
Facility: HOSPITAL | Age: 55
Discharge: HOME | End: 2025-06-15
Attending: EMERGENCY MEDICINE
Payer: MEDICARE

## 2025-06-15 VITALS
HEIGHT: 66 IN | OXYGEN SATURATION: 100 % | DIASTOLIC BLOOD PRESSURE: 75 MMHG | WEIGHT: 170 LBS | BODY MASS INDEX: 27.32 KG/M2 | RESPIRATION RATE: 18 BRPM | SYSTOLIC BLOOD PRESSURE: 120 MMHG | HEART RATE: 73 BPM | TEMPERATURE: 98 F

## 2025-06-15 DIAGNOSIS — S30.1XXA CONTUSION OF ABDOMINAL WALL, INITIAL ENCOUNTER: ICD-10-CM

## 2025-06-15 DIAGNOSIS — S20.211A CONTUSION OF RIGHT CHEST WALL, INITIAL ENCOUNTER: ICD-10-CM
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
## 2025-06-15 LAB
ALBUMIN SERPL-MCNC: 4 G/DL (ref 3.2–4.8)
ALBUMIN/GLOB SERPL: 1.7 {RATIO} (ref 1–2)
ALP LIVER SERPL-CCNC: 72 U/L (ref 46–118)
ALT SERPL-CCNC: 18 U/L (ref 10–49)
ANION GAP SERPL CALC-SCNC: 8 MMOL/L (ref 0–18)
AST SERPL-CCNC: 22 U/L (ref ?–34)
BASOPHILS # BLD AUTO: 0.01 X10(3) UL (ref 0–0.2)
BASOPHILS NFR BLD AUTO: 0.1 %
BILIRUB SERPL-MCNC: 0.3 MG/DL (ref 0.3–1.2)
BUN BLD-MCNC: 8 MG/DL (ref 9–23)
CALCIUM BLD-MCNC: 8.6 MG/DL (ref 8.7–10.6)
CHLORIDE SERPL-SCNC: 108 MMOL/L (ref 98–112)
CO2 SERPL-SCNC: 28 MMOL/L (ref 21–32)
CREAT BLD-MCNC: 1.07 MG/DL (ref 0.55–1.02)
EGFRCR SERPLBLD CKD-EPI 2021: 61 ML/MIN/1.73M2 (ref 60–?)
EOSINOPHIL # BLD AUTO: 0 X10(3) UL (ref 0–0.7)
EOSINOPHIL NFR BLD AUTO: 0 %
ERYTHROCYTE [DISTWIDTH] IN BLOOD BY AUTOMATED COUNT: 16.7 %
GLOBULIN PLAS-MCNC: 2.4 G/DL (ref 2–3.5)
GLUCOSE BLD-MCNC: 159 MG/DL (ref 70–99)
HCT VFR BLD AUTO: 34.2 % (ref 35–48)
HGB BLD-MCNC: 10.4 G/DL (ref 12–16)
IMM GRANULOCYTES # BLD AUTO: 0.02 X10(3) UL (ref 0–1)
IMM GRANULOCYTES NFR BLD: 0.2 %
LIPASE SERPL-CCNC: 33 U/L (ref 12–53)
LYMPHOCYTES # BLD AUTO: 3.94 X10(3) UL (ref 1–4)
LYMPHOCYTES NFR BLD AUTO: 49.1 %
MCH RBC QN AUTO: 22.1 PG (ref 26–34)
MCHC RBC AUTO-ENTMCNC: 30.4 G/DL (ref 31–37)
MCV RBC AUTO: 72.6 FL (ref 80–100)
MONOCYTES # BLD AUTO: 0.66 X10(3) UL (ref 0.1–1)
MONOCYTES NFR BLD AUTO: 8.2 %
NEUTROPHILS # BLD AUTO: 3.39 X10 (3) UL (ref 1.5–7.7)
NEUTROPHILS # BLD AUTO: 3.39 X10(3) UL (ref 1.5–7.7)
NEUTROPHILS NFR BLD AUTO: 42.4 %
OSMOLALITY SERPL CALC.SUM OF ELEC: 300 MOSM/KG (ref 275–295)
PLATELET # BLD AUTO: 140 10(3)UL (ref 150–450)
PLATELETS.RETICULATED NFR BLD AUTO: 5.1 % (ref 0–7)
POTASSIUM SERPL-SCNC: 3.5 MMOL/L (ref 3.5–5.1)
PROT SERPL-MCNC: 6.4 G/DL (ref 5.7–8.2)
RBC # BLD AUTO: 4.71 X10(6)UL (ref 3.8–5.3)
SODIUM SERPL-SCNC: 144 MMOL/L (ref 136–145)
WBC # BLD AUTO: 8 X10(3) UL (ref 4–11)

## 2025-06-15 PROCEDURE — 74177 CT ABD & PELVIS W/CONTRAST: CPT | Performed by: EMERGENCY MEDICINE

## 2025-06-15 PROCEDURE — 71101 X-RAY EXAM UNILAT RIBS/CHEST: CPT | Performed by: EMERGENCY MEDICINE

## 2025-06-15 PROCEDURE — 99284 EMERGENCY DEPT VISIT MOD MDM: CPT

## 2025-06-15 PROCEDURE — 96375 TX/PRO/DX INJ NEW DRUG ADDON: CPT

## 2025-06-15 PROCEDURE — 85025 COMPLETE CBC W/AUTO DIFF WBC: CPT | Performed by: EMERGENCY MEDICINE

## 2025-06-15 PROCEDURE — 96374 THER/PROPH/DIAG INJ IV PUSH: CPT

## 2025-06-15 PROCEDURE — 80053 COMPREHEN METABOLIC PANEL: CPT | Performed by: EMERGENCY MEDICINE

## 2025-06-15 PROCEDURE — 83690 ASSAY OF LIPASE: CPT | Performed by: EMERGENCY MEDICINE

## 2025-06-15 RX ORDER — ONDANSETRON 2 MG/ML
4 INJECTION INTRAMUSCULAR; INTRAVENOUS ONCE
Status: COMPLETED | OUTPATIENT
Start: 2025-06-15 | End: 2025-06-15

## 2025-06-15 RX ORDER — HYDROMORPHONE HYDROCHLORIDE 1 MG/ML
0.5 INJECTION, SOLUTION INTRAMUSCULAR; INTRAVENOUS; SUBCUTANEOUS EVERY 30 MIN PRN
Refills: 0 | Status: DISCONTINUED | OUTPATIENT
Start: 2025-06-15 | End: 2025-06-15

## 2025-06-15 NOTE — ED INITIAL ASSESSMENT (HPI)
pt with hx of MS, fell today and struck chest. pt denies Head pain, unsure if she hit her head. pt states she was walking without her cane. pt c/o chest pain where she fell

## 2025-06-15 NOTE — ED INITIAL ASSESSMENT (HPI)
Pt to ER via wheelchair, states has bilateral dropped foot. Was walking on sidewalk w/o cane when she tripped and fell forward on sidewalk, falling on hands and then chest.. States unknown LOC, denies thinners. States 10/10 rt side chest pain with difficulty breathing r/t pain.

## 2025-06-16 NOTE — ED PROVIDER NOTES
Patient Seen in: [facility] Emergency Department        History  Chief Complaint   Patient presents with    Trauma     Stated Complaint: pt with hx of MS, fell today and struck chest.  pt denies Head pain, unsure if *    Subjective:   HPI            55-year-old female with a history of MS states that she lost her balance and fell today.  She walks with a cane.  She states that she has some minor injuries to her hand and her knee but she is mostly concerned about the right side of her chest.  She states that that is where she is having most of her pain.  She states it hurts with movement and it hurts when she takes a deep breath ever since the fall.  No nausea or vomiting.  No other acute complaints.  She did not strike her head neck or back.  She is not on any anticoagulants.      Objective:     Past Medical History:    Abdominal pain    Allergic rhinitis    Anemia    Anxiety    Anxiety state    Arthritis    Asthma (HCC)    Atypical mole    Autoimmune disease (HCC)    Back pain    Bad breath    Black stools    Bloating    Blood disorder    Thalassemia alpha, Sickle cell trait    Blood in urine    Blurred vision    Calculus of kidney    Cardiomyopathy (HCC)    last echo 2021: EF 55%    Chest pain    Constipation    Decorative tattoo    Depression    Diarrhea, unspecified    Dizziness    Easy bruising    Elevated liver enzymes    Endocrine disorder    Esophageal reflux    Fatigue    Fibromyalgia    Food intolerance    Frequent urination    Frequent UTI    Gastroparesis    Glaucoma    H. pylori infection    H/O  section    Headache disorder    Heart attack (HCC)    Heart palpitations    Heartburn    Hemorrhoids    Herniation of cervical intervertebral disc    High blood pressure    High cholesterol    History of blood clots    Unprovoked    History of blood transfusion    History of cardiac murmur    History of depression    Hoarseness, chronic    Hyperlipidemia    IBS (irritable bowel syndrome)     Irregular bowel habits    Leaking of urine    Liver disease    GIORDANO and Stage 2 fibrosis    Loss of appetite    Migraines    Multiple sclerosis (HCC)    dx 8 years ago    Multiple sclerosis (HCC)    Muscle weakness    cane or walker prn    Myocardial infarction (HCC)    Nausea    Neuropathy    legs, hands, feet; bilateral    Night sweats    Osteoarthritis    Osteoporosis    KAMALJIT in her 30s    Osteoporosis    Pain with bowel movements    Painful urination    Problems with swallowing    Due to MS    Pulmonary embolism (HCC)    Scoliosis of lumbar spine    Shortness of breath    Sickle cell trait    Sleep apnea    no treatment    Sleep disturbance    Stress    Type II or unspecified type diabetes mellitus without mention of complication, not stated as uncontrolled    Uncomfortable fullness after meals    Unspecified disorder of thyroid    total thyroidectomy    Visual impairment    glasses    Wears glasses    Weight loss    Wheezing              Past Surgical History:   Procedure Laterality Date      1986    Cataract      Cath angio  2014    Cholecystectomy      Colonoscopy N/A 2015    Procedure: COLONOSCOPY;  Surgeon: Liz Tong DO;  Location:  ENDOSCOPY    Colonoscopy N/A 2021    Procedure: COLONOSCOPY;  Surgeon: Cedrick Da Silva MD;  Location:  ENDOSCOPY    Colonoscopy N/A 10/08/2021    Procedure: COLONOSCOPY, ESOPHAGOGASTRODUODENOSCOPY (EGD);  Surgeon: Darvin Richardson MD;  Location:  ENDOSCOPY    D & c  1990    Blighted ovum    Egd      Hysterectomy      total hystero.    Ir port a cath insertion exchnge check  2018    Lithotripsy      x 2    Lysis of adhesions      Needle biopsy liver  2016    Oophorectomy Bilateral 1993    total hystero.    Other      dialysis catheter- left chest for plasmaphoresis    Other surgical history       x 2    Port, indwelling, imp  2019    power port    Thyroidectomy  2013    benign MNG    Total  abdom hysterectomy      Upper gi endoscopy performed  05/2014                Social History     Socioeconomic History    Marital status: Single   Tobacco Use    Smoking status: Never     Passive exposure: Never    Smokeless tobacco: Never    Tobacco comments:     Never used it   Vaping Use    Vaping status: Never Used   Substance and Sexual Activity    Alcohol use: Never    Drug use: Never   Other Topics Concern    Caffeine Concern Yes     Comment: 1 can 2 x week    Exercise Yes     Comment: walking     Social Drivers of Health     Food Insecurity: No Food Insecurity (4/10/2025)    NCSS - Food Insecurity     Worried About Running Out of Food in the Last Year: No     Ran Out of Food in the Last Year: No   Transportation Needs: No Transportation Needs (4/10/2025)    NCSS - Transportation     Lack of Transportation: No   Housing Stability: Not At Risk (4/10/2025)    NCSS - Housing/Utilities     Has Housing: Yes     Worried About Losing Housing: No     Unable to Get Utilities: No                                Physical Exam    ED Triage Vitals   BP 06/15/25 1733 116/70   Pulse 06/15/25 1733 76   Resp 06/15/25 1733 18   Temp 06/15/25 1735 98.1 °F (36.7 °C)   Temp src 06/15/25 1735 Oral   SpO2 06/15/25 1733 100 %   O2 Device 06/15/25 1733 None (Room air)       Current Vitals:   Vital Signs  BP: 120/75  Pulse: 73  Resp: 18  Temp: 98.1 °F (36.7 °C)  Temp src: Oral    Oxygen Therapy  SpO2: 100 %  O2 Device: None (Room air)            Physical Exam  Vitals and nursing note reviewed.   Constitutional:       Appearance: Normal appearance. She is well-developed.   HENT:      Head: Normocephalic and atraumatic.   Cardiovascular:      Rate and Rhythm: Normal rate and regular rhythm.      Heart sounds: Normal heart sounds.   Pulmonary:      Effort: Pulmonary effort is normal.      Breath sounds: Normal breath sounds. No stridor. No wheezing.   Chest:      Chest wall: Tenderness present.   Abdominal:      General: Bowel sounds are  normal.      Palpations: Abdomen is soft.      Tenderness: There is abdominal tenderness. There is guarding. There is no rebound.   Musculoskeletal:         General: Signs of injury present. No tenderness or deformity.      Cervical back: Normal range of motion and neck supple.   Lymphadenopathy:      Cervical: No cervical adenopathy.   Skin:     General: Skin is warm and dry.      Coloration: Skin is not pale.   Neurological:      Mental Status: She is alert and oriented to person, place, and time. Mental status is at baseline.      Cranial Nerves: No cranial nerve deficit.      Coordination: Coordination normal.                ED Course  Labs Reviewed   COMP METABOLIC PANEL (14) - Abnormal; Notable for the following components:       Result Value    Glucose 159 (*)     BUN 8 (*)     Creatinine 1.07 (*)     Calcium, Total 8.6 (*)     Calculated Osmolality 300 (*)     All other components within normal limits   CBC WITH DIFFERENTIAL WITH PLATELET - Abnormal; Notable for the following components:    HGB 10.4 (*)     HCT 34.2 (*)     .0 (*)     MCV 72.6 (*)     MCH 22.1 (*)     MCHC 30.4 (*)     All other components within normal limits   LIPASE - Normal          CT ABDOMEN+PELVIS(CONTRAST ONLY)(CPT=74177)  Result Date: 6/15/2025  PROCEDURE:  CT ABDOMEN+PELVIS (CONTRAST ONLY) (CPT=74177)  COMPARISON:  [facility] , CT, CT ABDOMEN+PELVIS(CONTRAST ONLY)(CPT=74177), 9/19/2024, 3:57 AM.  INDICATIONS:  pt with hx of MS, fell today and struck chest.  pt denies Head pain, unsure if she hit her head.  pt states she was walking without her cane. pt c/o chest pain  TECHNIQUE:  CT scanning was performed from the dome of the diaphragm to the pubic symphysis with non-ionic intravenous contrast material. Post contrast coronal MPR imaging was performed.  Dose reduction techniques were used. Dose information is transmitted to the ACR (American College of Radiology) NRDR (National Radiology Data Registry) which includes the Dose  Index Registry.  PATIENT STATED HISTORY:(As transcribed by Technologist)  Pt c/o epigastric pain with nausea. Pt states she fell today and struck her chest.   CONTRAST USED:  100cc of Isovue 370  FINDINGS:  LIVER:  No enlargement, atrophy, abnormal density, or significant focal lesion.  BILIARY:  Cholecystectomy.  Normal bile ducts. PANCREAS:  No lesion, fluid collection, ductal dilatation, or atrophy.  SPLEEN:  No enlargement or focal lesion.  KIDNEYS:  Normal morphology with bilateral subcentimeter cortical hypodensities that are too small to characterize.  No obstructive uropathy. ADRENALS:  No mass or enlargement.  AORTA/VASCULAR:  No aneurysm or dissection.  RETROPERITONEUM:  No mass or adenopathy.  BOWEL/MESENTERY:  No visible mass, obstruction, or bowel wall thickening.  ABDOMINAL WALL:  No mass or hernia.  URINARY BLADDER:  No visible focal wall thickening, lesion, or calculus.  PELVIC NODES:  No adenopathy.  PELVIC ORGANS:  Hysterectomy. BONES:  Chronic osteonecrosis of the left femoral head with flattening of the superior femoral articular surface, similar to prior. LUNG BASES:  No visible pulmonary or pleural disease.  OTHER:  Negative.             CONCLUSION:  No acute process identified within the abdomen or pelvis.  No acute osseous injury.  Chronic osteonecrosis of the left femoral head.   LOCATION:  [facility]   Dictated by (CST): Santiago Garcia MD on 6/15/2025 at 7:52 PM     Finalized by (CST): Santiago Garcia MD on 6/15/2025 at 7:59 PM       XR RIBS WITH CHEST (3 VIEWS), RIGHT  (CPT=71101)  Result Date: 6/15/2025  PROCEDURE:  XR RIBS WITH CHEST (3 VIEWS), RIGHT  (CPT=71101)  TECHNIQUE:  PA Chest and three views of the ribs were obtained  COMPARISON:  [facility] , XR, XR CHEST AP PORTABLE  (CPT=71045), 12/30/2024, 3:15 PM.  INDICATIONS:  pt with hx of MS, fell today and struck chest.  pt denies Head pain, unsure if she hit her head.  pt states she was walking without her cane. pt c/o chest pain  PATIENT STATED  HISTORY: (As transcribed by Technologist)  patient states she fell today hitting the anterior chest. has chest pain and right sided rib pain. central chest pain worse with movement or deep breath               CONCLUSION:  Stable cardiac and mediastinal contours.  Lungs and pleural spaces are clear.  Right subclavian chest port terminates near the cavoatrial junction.  No displaced rib fracture or other acute osseous findings.  Incidentally noted cholecystectomy clips of the right upper quadrant.   LOCATION:  [facility]     Dictated by (CST): Santiago Garcia MD on 6/15/2025 at 6:07 PM     Finalized by (CST): Santiago Garcia MD on 6/15/2025 at 6:09 PM                        MDM     Patient an IV established and blood work obtained.  She is placed on a monitor.  I reviewed her blood work.  She does have some anemia but this is her baseline she has had no precipitous drop in her hemoglobin.  She has normal renal function.  She not have an elevated lipase to be concerned about pancreatitis.  She had a chest x-ray that personally reviewed there is no evidence of pneumothorax or pleural effusion that could appreciate reviewed radiology report they did not note any abnormalities not appreciate any rib fractures on her rib films.  Patient does seem to have quite a bit of tenderness of her abdomen and therefore she had a CT scan of her abdomen with IV contrast.  This was reviewed by me as well I do not appreciate a free air or any obvious bowel obstruction reviewed radiology report and they felt that it was overall unremarkable.  There is no evidence of any concerning intra-abdominal trauma.  I reviewed the findings with the patient and we discussed chest wall and abdominal wall contusions and that she should use ice and not heat.  Patient is chronically on Lyrica and Celebrex and she is to continue her current medications.  Patient was discharged in good condition        Medical Decision Making      Disposition and Plan     Clinical  Impression:  1. Fall, initial encounter    2. Contusion of right chest wall, initial encounter    3. Contusion of abdominal wall, initial encounter         Disposition:  Discharge  6/15/2025  8:27 pm    Follow-up:  Justin Huerta MD  [address] [facility] [address]  [phone]    Schedule an appointment as soon as possible for a visit  As needed          Medications Prescribed:  Current Discharge Medication List                Supplementary Documentation:

## 2025-06-17 ENCOUNTER — APPOINTMENT (OUTPATIENT)
Dept: PHYSICAL THERAPY | Age: 55
End: 2025-06-17

## 2025-06-17 DIAGNOSIS — M54.16 LUMBAR RADICULOPATHY: Primary | ICD-10-CM

## 2025-06-17 ASSESSMENT — ENCOUNTER SYMPTOMS: PAIN SEVERITY NOW: 6

## 2025-06-19 ENCOUNTER — APPOINTMENT (OUTPATIENT)
Dept: PHYSICAL THERAPY | Age: 55
End: 2025-06-19

## 2025-06-19 DIAGNOSIS — M54.16 LUMBAR RADICULOPATHY: Primary | ICD-10-CM

## 2025-06-19 ASSESSMENT — ENCOUNTER SYMPTOMS: PAIN SEVERITY NOW: 5

## 2025-06-23 NOTE — TELEPHONE ENCOUNTER
Pts sister in calling on behalf of her sister (pts sister states that she (pt) has Dementia). She's inquiring about test results for a Mammogram that was done on 01/20/25; pts sister stated no one reached out them for the results. Pts sister was made aware that she's not on the pts PHI and she will need to have the pt consent to her being added to obtain her PHI. Pts sister will still like a nurse call and can be reached at [phone].ccm   Residential home health certification placed in nurses bin awaiting provider signature on several pages. Please sign and fax to .

## 2025-06-24 ENCOUNTER — APPOINTMENT (OUTPATIENT)
Dept: PHYSICAL THERAPY | Age: 55
End: 2025-06-24

## 2025-06-24 DIAGNOSIS — M54.16 LUMBAR RADICULOPATHY: Primary | ICD-10-CM

## 2025-06-25 ENCOUNTER — TELEPHONE (OUTPATIENT)
Dept: PHYSICAL THERAPY | Age: 55
End: 2025-06-25

## 2025-06-26 ENCOUNTER — APPOINTMENT (OUTPATIENT)
Dept: PHYSICAL THERAPY | Age: 55
End: 2025-06-26

## 2025-06-26 ENCOUNTER — TELEPHONE (OUTPATIENT)
Dept: PHYSICAL THERAPY | Age: 55
End: 2025-06-26

## 2025-06-26 DIAGNOSIS — M54.16 LUMBAR RADICULOPATHY: Primary | ICD-10-CM

## 2025-07-01 ENCOUNTER — APPOINTMENT (OUTPATIENT)
Dept: PHYSICAL THERAPY | Age: 55
End: 2025-07-01

## 2025-07-01 DIAGNOSIS — M54.16 LUMBAR RADICULOPATHY: Primary | ICD-10-CM

## 2025-07-02 ENCOUNTER — TELEPHONE (OUTPATIENT)
Facility: CLINIC | Age: 55
End: 2025-07-02

## 2025-07-02 RX ORDER — BENRALIZUMAB 30 MG/ML
30 INJECTION, SOLUTION SUBCUTANEOUS
Qty: 0.28 ML | Refills: 5 | Status: SHIPPED | OUTPATIENT
Start: 2025-07-02

## 2025-07-02 RX ORDER — BENRALIZUMAB 30 MG/ML
30 INJECTION, SOLUTION SUBCUTANEOUS
Qty: 0.28 ML | Refills: 5 | Status: SHIPPED | OUTPATIENT
Start: 2025-07-02 | End: 2025-07-02 | Stop reason: CLARIF
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
## 2025-07-02 NOTE — TELEPHONE ENCOUNTER
[facility] called for       Disp Refills Start End    FASENRA PEN 30 MG/ML Subcutaneous Solution Auto-injector          Refill please contact pharmacy.

## 2025-07-03 ENCOUNTER — TELEPHONE (OUTPATIENT)
Dept: NEUROLOGY | Facility: CLINIC | Age: 55
End: 2025-07-03

## 2025-07-03 ENCOUNTER — APPOINTMENT (OUTPATIENT)
Dept: PHYSICAL THERAPY | Age: 55
End: 2025-07-03

## 2025-07-03 DIAGNOSIS — M54.16 LUMBAR RADICULOPATHY: Primary | ICD-10-CM

## 2025-07-03 NOTE — TELEPHONE ENCOUNTER
tysabri infusion record received from [facility] for physician review.  No signs/symptoms of infusion reaction noted.  Patient infused on 7/3/2025. No observation period required.  Lot Number [id number], Exp 11/2028    Next infusion scheduled for 8/14/2025. Patient infusing every 6 weeks.  Placed in physician's box for review.

## 2025-07-09 ENCOUNTER — APPOINTMENT (OUTPATIENT)
Dept: PHYSICAL THERAPY | Age: 55
End: 2025-07-09

## 2025-07-09 DIAGNOSIS — M54.16 LUMBAR RADICULOPATHY: Primary | ICD-10-CM

## 2025-07-11 ENCOUNTER — LAB ENCOUNTER (OUTPATIENT)
Dept: LAB | Facility: HOSPITAL | Age: 55
End: 2025-07-11
Attending: NURSE PRACTITIONER
Payer: MEDICARE

## 2025-07-11 ENCOUNTER — APPOINTMENT (OUTPATIENT)
Dept: PHYSICAL THERAPY | Age: 55
End: 2025-07-11

## 2025-07-11 DIAGNOSIS — I51.9 MYXEDEMA HEART DISEASE: ICD-10-CM

## 2025-07-11 DIAGNOSIS — E03.9 HYPOTHYROIDISM: ICD-10-CM

## 2025-07-11 DIAGNOSIS — D64.9 ANEMIA: ICD-10-CM

## 2025-07-11 DIAGNOSIS — E03.9 MYXEDEMA HEART DISEASE: ICD-10-CM

## 2025-07-11 DIAGNOSIS — E10.69 TYPE I DIABETES MELLITUS WITH HYPEROSMOLAR COMA (HCC): Primary | ICD-10-CM

## 2025-07-11 DIAGNOSIS — E11.65 INADEQUATELY CONTROLLED DIABETES MELLITUS (HCC): ICD-10-CM

## 2025-07-11 DIAGNOSIS — E78.2 MIXED HYPERLIPIDEMIA: ICD-10-CM

## 2025-07-11 DIAGNOSIS — I10 ESSENTIAL HYPERTENSION, MALIGNANT: ICD-10-CM

## 2025-07-11 DIAGNOSIS — M54.16 LUMBAR RADICULOPATHY: Primary | ICD-10-CM

## 2025-07-11 DIAGNOSIS — E87.0 TYPE I DIABETES MELLITUS WITH HYPEROSMOLAR COMA (HCC): Primary | ICD-10-CM

## 2025-07-11 DIAGNOSIS — E10.65 TYPE I DIABETES MELLITUS WITH HYPEROSMOLAR COMA (HCC): Primary | ICD-10-CM

## 2025-07-11 DIAGNOSIS — M81.0 SENILE OSTEOPOROSIS: ICD-10-CM

## 2025-07-11 LAB
ALBUMIN SERPL-MCNC: 4.3 G/DL (ref 3.2–4.8)
ALBUMIN/GLOB SERPL: 1.5 {RATIO} (ref 1–2)
ALP LIVER SERPL-CCNC: 75 U/L (ref 46–118)
ALT SERPL-CCNC: 19 U/L (ref 10–49)
ANION GAP SERPL CALC-SCNC: 5 MMOL/L (ref 0–18)
AST SERPL-CCNC: 23 U/L (ref ?–34)
BASOPHILS # BLD AUTO: 0.02 X10(3) UL (ref 0–0.2)
BASOPHILS NFR BLD AUTO: 0.3 %
BILIRUB SERPL-MCNC: 0.5 MG/DL (ref 0.3–1.2)
BUN BLD-MCNC: 10 MG/DL (ref 9–23)
CALCIUM BLD-MCNC: 8.8 MG/DL (ref 8.7–10.6)
CHLORIDE SERPL-SCNC: 107 MMOL/L (ref 98–112)
CHOLEST SERPL-MCNC: 153 MG/DL (ref ?–200)
CO2 SERPL-SCNC: 33 MMOL/L (ref 21–32)
CREAT BLD-MCNC: 1.15 MG/DL (ref 0.55–1.02)
CREAT UR-SCNC: 164.8 MG/DL
EGFRCR SERPLBLD CKD-EPI 2021: 56 ML/MIN/1.73M2 (ref 60–?)
EOSINOPHIL # BLD AUTO: 0 X10(3) UL (ref 0–0.7)
EOSINOPHIL NFR BLD AUTO: 0 %
ERYTHROCYTE [DISTWIDTH] IN BLOOD BY AUTOMATED COUNT: 16.4 %
FASTING PATIENT LIPID ANSWER: YES
FASTING STATUS PATIENT QL REPORTED: YES
GLOBULIN PLAS-MCNC: 2.8 G/DL (ref 2–3.5)
GLUCOSE BLD-MCNC: 93 MG/DL (ref 70–99)
HCT VFR BLD AUTO: 37.4 % (ref 35–48)
HDLC SERPL-MCNC: 38 MG/DL (ref 40–59)
HGB BLD-MCNC: 11.5 G/DL (ref 12–16)
IMM GRANULOCYTES # BLD AUTO: 0.02 X10(3) UL (ref 0–1)
IMM GRANULOCYTES NFR BLD: 0.3 %
LDLC SERPL CALC-MCNC: 101 MG/DL (ref ?–100)
LYMPHOCYTES # BLD AUTO: 3.47 X10(3) UL (ref 1–4)
LYMPHOCYTES NFR BLD AUTO: 47.6 %
MCH RBC QN AUTO: 22.4 PG (ref 26–34)
MCHC RBC AUTO-ENTMCNC: 30.7 G/DL (ref 31–37)
MCV RBC AUTO: 72.8 FL (ref 80–100)
MICROALBUMIN UR-MCNC: 0.8 MG/DL
MICROALBUMIN/CREAT 24H UR-RTO: 4.9 UG/MG (ref ?–30)
MONOCYTES # BLD AUTO: 0.58 X10(3) UL (ref 0.1–1)
MONOCYTES NFR BLD AUTO: 8 %
NEUTROPHILS # BLD AUTO: 3.2 X10 (3) UL (ref 1.5–7.7)
NEUTROPHILS # BLD AUTO: 3.2 X10(3) UL (ref 1.5–7.7)
NEUTROPHILS NFR BLD AUTO: 43.8 %
NONHDLC SERPL-MCNC: 115 MG/DL (ref ?–130)
OSMOLALITY SERPL CALC.SUM OF ELEC: 299 MOSM/KG (ref 275–295)
PLATELET # BLD AUTO: 143 10(3)UL (ref 150–450)
PLATELETS.RETICULATED NFR BLD AUTO: 6.4 % (ref 0–7)
POTASSIUM SERPL-SCNC: 3.6 MMOL/L (ref 3.5–5.1)
PROT SERPL-MCNC: 7.1 G/DL (ref 5.7–8.2)
RBC # BLD AUTO: 5.14 X10(6)UL (ref 3.8–5.3)
SODIUM SERPL-SCNC: 145 MMOL/L (ref 136–145)
T4 FREE SERPL-MCNC: 1.5 NG/DL (ref 0.8–1.7)
TRIGL SERPL-MCNC: 70 MG/DL (ref 30–149)
TSI SER-ACNC: 0.01 UIU/ML (ref 0.55–4.78)
VIT D+METAB SERPL-MCNC: 101.4 NG/ML (ref 30–100)
VLDLC SERPL CALC-MCNC: 12 MG/DL (ref 0–30)
WBC # BLD AUTO: 7.3 X10(3) UL (ref 4–11)

## 2025-07-11 PROCEDURE — 80061 LIPID PANEL: CPT

## 2025-07-11 PROCEDURE — 84443 ASSAY THYROID STIM HORMONE: CPT

## 2025-07-11 PROCEDURE — 82043 UR ALBUMIN QUANTITATIVE: CPT

## 2025-07-11 PROCEDURE — 36415 COLL VENOUS BLD VENIPUNCTURE: CPT

## 2025-07-11 PROCEDURE — 84439 ASSAY OF FREE THYROXINE: CPT

## 2025-07-11 PROCEDURE — 80053 COMPREHEN METABOLIC PANEL: CPT

## 2025-07-11 PROCEDURE — 85025 COMPLETE CBC W/AUTO DIFF WBC: CPT

## 2025-07-11 PROCEDURE — 82306 VITAMIN D 25 HYDROXY: CPT

## 2025-07-11 PROCEDURE — 82570 ASSAY OF URINE CREATININE: CPT

## 2025-07-15 PROBLEM — M54.16 LUMBAR RADICULOPATHY: Status: ACTIVE | Noted: 2025-05-23

## 2025-07-18 ENCOUNTER — APPOINTMENT (OUTPATIENT)
Dept: PHYSICAL THERAPY | Age: 55
End: 2025-07-18

## 2025-07-18 DIAGNOSIS — M54.16 LUMBAR RADICULOPATHY: Primary | ICD-10-CM

## 2025-07-24 ENCOUNTER — TELEPHONE (OUTPATIENT)
Dept: PHYSICAL THERAPY | Age: 55
End: 2025-07-24

## 2025-08-14 ENCOUNTER — TELEPHONE (OUTPATIENT)
Dept: NEUROLOGY | Facility: CLINIC | Age: 55
End: 2025-08-14

## 2025-08-14 DIAGNOSIS — G35 MS (MULTIPLE SCLEROSIS) (HCC): Primary | ICD-10-CM

## 2025-08-18 ENCOUNTER — TELEPHONE (OUTPATIENT)
Dept: NEUROLOGY | Facility: CLINIC | Age: 55
End: 2025-08-18

## (undated) DIAGNOSIS — N39.0 URINARY TRACT INFECTION, SITE NOT SPECIFIED: ICD-10-CM

## (undated) DIAGNOSIS — G35 MS (MULTIPLE SCLEROSIS) (HCC): ICD-10-CM

## (undated) DEVICE — Device: Brand: DEFENDO AIR/WATER/SUCTION AND BIOPSY VALVE

## (undated) DEVICE — CATH URET CONE TIP 8FR 138008

## (undated) DEVICE — CHLORAPREP 26ML APPLICATOR

## (undated) DEVICE — BITEBLOCK ENDOSCP 60FR MAXI STRP

## (undated) DEVICE — FORCEP BIOPSY RJ4 LG CAP W/ND

## (undated) DEVICE — Device

## (undated) DEVICE — ENDOPATH XCEL DILATING TIP TROCARS WITH STABILITY SLEEVES: Brand: ENDOPATH XCEL

## (undated) DEVICE — GAUZE SPONGES,8 PLY: Brand: CURITY

## (undated) DEVICE — ENDOSCOPY PACK UPPER: Brand: MEDLINE INDUSTRIES, INC.

## (undated) DEVICE — 3M™ RED DOT™ MONITORING ELECTRODE WITH FOAM TAPE AND STICKY GEL, 50/BAG, 20/CASE, 72/PLT 2570: Brand: RED DOT™

## (undated) DEVICE — KIT VLV 5 PC AIR H2O SUCT BX ENDOGATOR CONN

## (undated) DEVICE — STERILE POLYISOPRENE POWDER-FREE SURGICAL GLOVES: Brand: PROTEXIS

## (undated) DEVICE — MONOFILAMENT ABSORBABLE SUTURE: Brand: MAXON

## (undated) DEVICE — GLOVE ORTHO ALOETOUCH SZ 8-1/2

## (undated) DEVICE — REM POLYHESIVE ADULT PATIENT RETURN ELECTRODE: Brand: VALLEYLAB

## (undated) DEVICE — SOLUTION  .9 3000ML

## (undated) DEVICE — ENDOPATH ULTRA VERESS INSUFFLATION NEEDLES WITH LUER LOCK CONNECTORS: Brand: ENDOPATH

## (undated) DEVICE — KENDALL SCD EXPRESS SLEEVES, KNEE LENGTH, MEDIUM: Brand: KENDALL SCD

## (undated) DEVICE — SLEEVE KENDALL SCD EXPRESS MED

## (undated) DEVICE — #11 STERILE BLADE: Brand: POLYMER COATED BLADES

## (undated) DEVICE — V2 SPECIMEN COLLECTION MANIFOLD KIT: Brand: NEPTUNE

## (undated) DEVICE — KIT CUSTOM ENDOPROCEDURE STERIS

## (undated) DEVICE — FILTERLINE NASAL ADULT O2/CO2

## (undated) DEVICE — SUTURE VICRYL 3-0 SH

## (undated) DEVICE — DRAPE C-ARM UNIVERSAL

## (undated) DEVICE — ZIPWIRE GUIDEWIRE .035X150 STR

## (undated) DEVICE — SUTURE MONOCRYL 4-0 PS-2

## (undated) DEVICE — 3M™ TEGADERM™ TRANSPARENT FILM DRESSING, 1626W, 4 IN X 4-3/4 IN (10 CM X 12 CM), 50 EACH/CARTON, 4 CARTON/CASE: Brand: 3M™ TEGADERM™

## (undated) DEVICE — APPLICATOR CHLORAPREP 26ML

## (undated) DEVICE — 1200CC GUARDIAN II: Brand: GUARDIAN

## (undated) DEVICE — NEEDLE CONTRAST INTERJECT 25G

## (undated) DEVICE — TRAP 4 CPTR CHMBR N EZ INLN

## (undated) DEVICE — SOLUTION  .9 1000ML BTL

## (undated) DEVICE — KIT ENDO ORCAPOD 160/180/190

## (undated) DEVICE — 2, DISPOSABLE SUCTION/IRRIGATOR WITHOUT DISPOSABLE TIP: Brand: STRYKEFLOW

## (undated) DEVICE — CYSTO CDS-LF: Brand: MEDLINE INDUSTRIES, INC.

## (undated) DEVICE — GENERAL LAPAROS CDS-LF: Brand: MEDLINE INDUSTRIES, INC.

## (undated) DEVICE — SINGLE-USE DIGITAL FLEXIBLE URETEROSCOPE: Brand: LITHOVUE

## (undated) DEVICE — 10FT COMBINED O2 DELIVERY/CO2 MONITORING. FILTER WITH MICROSTREAM TYPE LUER: Brand: DUAL ADULT NASAL CANNULA

## (undated) DEVICE — SOL  .9 1000ML BTL

## (undated) DEVICE — SUTURE PROLENE 2-0 CT-2

## (undated) DEVICE — ENDOPATH XCEL UNIVERSAL TROCAR STABLILITY SLEEVES: Brand: ENDOPATH XCEL

## (undated) DEVICE — CLIP LGT 11MM OPEN 2.8MM 235CM

## (undated) DEVICE — SPONGE STICK WITH PVP-I: Brand: KENDALL

## (undated) DEVICE — TRAY SURESTEP 16 BARDEX UMETR

## (undated) DEVICE — TROCAR: Brand: KII SHIELDED BLADED ACCESS SYSTEM

## (undated) DEVICE — BLOCK BITE MAXI 60FR

## (undated) DEVICE — MINI LAP PACK-LF: Brand: MEDLINE INDUSTRIES, INC.

## (undated) DEVICE — DUAL LUMEN URETERAL CATHETER

## (undated) DEVICE — ENDOSCOPY PACK - LOWER: Brand: MEDLINE INDUSTRIES, INC.

## (undated) DEVICE — SNARE 9MM 230CM 2.4MM EXACTO

## (undated) DEVICE — LIGHT HANDLE

## (undated) DEVICE — TIGERTAIL 5F FLXTIP 70CM

## (undated) DEVICE — BAG URINE LEG W/VELCRO

## (undated) DEVICE — 3M™ STERI-STRIP™ REINFORCED ADHESIVE SKIN CLOSURES, R1547, 1/2 IN X 4 IN (12 MM X 100 MM), 6 STRIPS/ENVELOPE: Brand: 3M™ STERI-STRIP™

## (undated) DEVICE — SUTURE SILK 2-0

## (undated) DEVICE — BALLOON HEMOSTATIC EUS LINEAR

## (undated) DEVICE — ZIPWIRE GUIDEWIRE .038X150 ANG

## (undated) DEVICE — ORISE GEL

## (undated) DEVICE — NET SPECIMEN RETRIEVAL BLUE

## (undated) DEVICE — BREAST-HERNIA-PORT CDS-LF: Brand: MEDLINE INDUSTRIES, INC.

## (undated) DEVICE — URETERAL ACCESS SHEATH SET: Brand: NAVIGATOR HD

## (undated) DEVICE — SOL H2O 3000ML IRRIG

## (undated) DEVICE — WATER STERILE AQUALITE 1000ML

## (undated) DEVICE — FORCEP RADIAL JAW 4

## (undated) DEVICE — BIOGUARD CLEANING ADAPTER

## (undated) DEVICE — ADHESIVE MASTISOL 2/3ML

## (undated) DEVICE — TROCAR: Brand: KII® SLEEVE

## (undated) DEVICE — SUTURE VICRYL 5-0 PC-1

## (undated) DEVICE — SEAL Y BIOPSY PORT P6R SCOPE

## (undated) DEVICE — GIJAW SINGLE-USE BIOPSY FORCEPS WITH NEEDLE: Brand: GIJAW

## (undated) DEVICE — SEAL BIOPSY PORT ACMI

## (undated) DEVICE — SNARE CAPTI HEX STIFF SMALL

## (undated) DEVICE — SUT VICRYL 5-0 PC-1 J834G

## (undated) NOTE — LETTER
Consent to Procedure/Sedation    Date: __________________    Time: _______________    1. I authorize the performance upon Verdia Eisenmenger the following:  PLASMAPHERESIS     2.  I authorize Dr. _________________________ (and whomever is designated as the doct ___________________________    ___________________    Witness: ____________________     Date: ______________    Printed: 2018   3:09 PM    Patient Name: Alisson Garcia        : 3/11/1970       Medical Record #: [id number]

## (undated) NOTE — LETTER
Last Revised 02/07/06  Obstructive Sleep Apnea Questionnaire    Clinical signs and symptoms suggesting the possibility of MOI    1. Predisposing physical characteristics (positive with any of the following present)  ? BMI 35kg/m²  ?  Craniofacial abnormalit pauses which are frightening to the observer, patient regularly falls asleep within minutes after being left unstimulated) in which case they should be treated as though they have severe sleep apnea.     The sleep laboratory’s assessment (none, mild, modera Point Total for B           C. Requirement for postoperative opioids.                Opioid requirement             Points   None 0    Low dose oral opiod 1    High dose oral opioids, parenteral or neuraxial opiods 3      Point Total for C        Estimation

## (undated) NOTE — LETTER
[facility] [address], [address] [place] [address]  Consent for Procedure/Sedation  Date: 4/20/23         Time: 1330    I hereby authorize Dr Elizabeth Patton, my physician and his/her assistants (if applicable), which may include medical students, residents, and/or fellows, to perform the following surgical operation/ procedure and administer such anesthesia as may be determined necessary by my physician: Temporary Dialysis Catheter Insertion on [address]  2. I recognize that during the surgical operation/procedure, unforeseen conditions may necessitate additional or different procedures than those listed above. I, therefore, further authorize and request that the above-named surgeon, assistants, or designees perform such procedures as are, in their judgment, necessary and desirable. 3.   My surgeon/physician has discussed prior to my surgery the potential benefits, risks and side effects of this procedure; the likelihood of achieving goals; and potential problems that might occur during recuperation. They also discussed reasonable alternatives to the procedure, including risks, benefits, and side effects related to the alternatives and risks related to not receiving this procedure. I have had all my questions answered and I acknowledge that no guarantee has been made as to the result that may be obtained. 4.   Should the need arise during my operation/procedure, which includes change of level of care prior to discharge, I also consent to the administration of blood and/or blood products. Further, I understand that despite careful testing and screening of blood or blood products by collecting agencies, I may still be subject to ill effects as a result of receiving a blood transfusion and/or blood products.   The following are some, but not all, of the potential risks that can occur: fever and allergic reactions, hemolytic reactions, transmission of diseases such as Hepatitis, AIDS and Cytomegalovirus (CMV) and fluid overload. In the event that I wish to have an autologous transfusion of my own blood, or a directed donor transfusion, I will discuss this with my physician. Check only if Refusing Blood or Blood Products  I understand refusal of blood or blood products as deemed necessary by my physician may have serious consequences to my condition to include possible death. I hereby assume responsibility for my refusal and release the hospital, its personnel, and my physicians from any responsibility for the consequences of my refusal.         o  Refuse         5. I authorize the use of any specimen, organs, tissues, body parts or foreign objects that may be removed from my body during the operation/procedure for diagnosis, research or teaching purposes and their subsequent disposal by hospital authorities. I also authorize the release of specimen test results and/or written reports to my treating physician on the hospital medical staff or other referring or consulting physicians involved in my care, at the discretion of the Pathologist or my treating physician. 6.   I consent to the photographing or videotaping of the operations or procedures to be performed, including appropriate portions of my body for medical, scientific, or educational purposes, provided my identity is not revealed by the pictures or by descriptive texts accompanying them. If the procedure has been photographed/videotaped, the surgeon will obtain the original picture, image, videotape or CD. The hospital will not be responsible for storage, release or maintenance of the picture, image, tape or CD.    7.   I consent to the presence of a  or observers in the operating room as deemed necessary by my physician or their designees. 8.   I recognize that in the event my procedure results in extended X-Ray/fluoroscopy time, I may develop a skin reaction. 9.  If I have a Do Not Attempt Resuscitation (DNAR) order in place, that status will be suspended while in the operating room, procedural suite, and during the recovery period unless otherwise explicitly stated by me (or a person authorized to consent on my behalf). The surgeon or my attending physician will determine when the applicable recovery period ends for purposes of reinstating the DNAR order. 10. Patients having a sterilization procedure: I understand that if the procedure is successful the results will be permanent and it will therefore be impossible for me to inseminate, conceive, or bear children. I also understand that the procedure is intended to result in sterility, although the result has not been guaranteed. 11. I acknowledge that my physician has explained sedation/analgesia administration to me including the risk and benefits I consent to the administration of sedation/analgesia as may be necessary or desirable in the judgment of my physician.     I CERTIFY THAT I HAVE READ AND FULLY UNDERSTAND THE ABOVE CONSENT TO OPERATION and/or OTHER PROCEDURE.        ____________________________________       _________________________________      ______________________________  Signature of Patient         Signature of Responsible Person        Printed Name of Responsible Person        ____________________________________      _________________________________      ______________________________       Signature of Witness          Relationship to Patient                       Date                                       Time  Patient Name: Jorge Montgomery     : 3/11/1970                 Printed: 2023      Medical Record #: [id number]                      Page 1 of 1

## (undated) NOTE — LETTER
Consent to Procedure/Sedation    Date: __________________    Time: _______________    1. I authorize the performance upon Skip Greene the following:  PLASMAPHERESIS     2.  I authorize DrDom _________________________ (and whomever is designated as the doct ___________________________    ___________________    Witness: ____________________     Date: ______________    Printed: 3/20/2017   7:55 AM    Patient Name: Daniel Horn        : 3/11/1970       Medical Record #: [id number]

## (undated) NOTE — LETTER
[place] [address]  Authorization for Invasive Procedures  Date:            Time:     I hereby authorize  Dr. Hunter, my physician and his/her assistants (if applicable), which may include medical students, residents, and/or fellows, to perform the following surgical operation/ procedure and administer such anesthesia as may be determined necessary by my physician:  Operation/Procedure name (s)  plasmapheresis on Kat A Cheeks   2.   I recognize that during the surgical operation/procedure, unforeseen conditions may necessitate additional or different procedures than those listed above.  I, therefore, further authorize and request that the above-named surgeon, assistants, or designees perform such procedures as are, in their judgment, necessary and desirable.    3.   My surgeon/physician has discussed prior to my surgery the potential benefits, risks and side effects of this procedure; the likelihood of achieving goals; and potential problems that might occur during recuperation.  They also discussed reasonable alternatives to the procedure, including risks, benefits, and side effects related to the alternatives and risks related to not receiving this procedure.  I have had all my questions answered and I acknowledge that no guarantee has been made as to the result that may be obtained.    4.   Should the need arise during my operation/procedure, which includes change of level of care prior to discharge, I also consent to the administration of blood and/or blood products.  Further, I understand that despite careful testing and screening of blood or blood products by collecting agencies, I may still be subject to ill effects as a result of receiving a blood transfusion and/or blood products.  The following are some, but not all, of the potential risks that can occur: fever and allergic reactions, hemolytic reactions, transmission of diseases such as Hepatitis, AIDS and Cytomegalovirus  (CMV) and fluid overload.  In the event that I wish to have an autologous transfusion of my own blood, or a directed donor transfusion, I will discuss this with my physician.  Check only if Refusing Blood or Blood Products  I understand refusal of blood or blood products as deemed necessary by my physician may have serious consequences to my condition to include possible death. I hereby assume responsibility for my refusal and release the hospital, its personnel, and my physicians from any responsibility for the consequences of my refusal.          o  Refuse      5.   I authorize the use of any specimen, organs, tissues, body parts or foreign objects that may be removed from my body during the operation/procedure for diagnosis, research or teaching purposes and their subsequent disposal by hospital authorities.  I also authorize the release of specimen test results and/or written reports to my treating physician on the hospital medical staff or other referring or consulting physicians involved in my care, at the discretion of the Pathologist or my treating physician.    6.   I consent to the photographing or videotaping of the operations or procedures to be performed, including appropriate portions of my body for medical, scientific, or educational purposes, provided my identity is not revealed by the pictures or by descriptive texts accompanying them.  If the procedure has been photographed/videotaped, the surgeon will obtain the original picture, image, videotape or CD.  The hospital will not be responsible for storage, release or maintenance of the picture, image, tape or CD.    7.   I consent to the presence of a  or observers in the operating room as deemed necessary by my physician or their designees.    8.   I recognize that in the event my procedure results in extended X-Ray/fluoroscopy time, I may develop a skin reaction.    9. If I have a Do Not Attempt Resuscitation (DNAR) order in  place, that status will be suspended while in the operating room, procedural suite, and during the recovery period unless otherwise explicitly stated by me (or a person authorized to consent on my behalf). The surgeon or my attending physician will determine when the applicable recovery period ends for purposes of reinstating the DNAR order.  10. Patients having a sterilization procedure: I understand that if the procedure is successful the results will be permanent and it will therefore be impossible for me to inseminate, conceive, or bear children.  I also understand that the procedure is intended to result in sterility, although the result has not been guaranteed.   11. I acknowledge that my physician has explained sedation/analgesia administration to me including the risk and benefits I consent to the administration of sedation/analgesia as may be necessary or desirable in the judgment of my physician.    I CERTIFY THAT I HAVE READ AND FULLY UNDERSTAND THE ABOVE CONSENT TO OPERATION and/or OTHER PROCEDURE.        ____________________________________       _________________________________      ______________________________  Signature of Patient         Signature of Responsible Person        Printed Name of Responsible Person    ____________________________________      _________________________________      ______________________________       Signature of Witness          Relationship to Patient                       Date                                       Time  Patient Name: Kat Crook  : 3/11/1970    Reviewed: 2024   Printed: 2024  Medical Record #: [id number] Page 1 of 2           STATEMENT OF PHYSICIAN My signature below affirms that prior to the time of the procedure; I have explained to the patient and/or his/her legal representative, the risks and benefits involved in the proposed treatment and any reasonable alternative to the proposed treatment. I have also explained the risks  and benefits involved in refusal of the proposed treatment and alternatives to the proposed treatment and have answered the patient's questions. If I have a significant financial interest in a co-management agreement or a significant financial interest in any product or implant, or other significant relationship used in this procedure/surgery, I have disclosed this and had a discussion with my patient.     _______________________________________________________________ _____________________________  (Signature of Physician)                                                                                         (Date)                                   (Time)  Patient Name: Kat Crook  : 3/11/1970    Reviewed: 2024   Printed: 2024  Medical Record #: [id number] Page 2 of 2

## (undated) NOTE — LETTER
Consent to Procedure/Sedation    Date: __________________    Time: _______________    1. I authorize the performance upon Inga Mckenzie the following:  PLASMAPHERESIS     2.  I authorize DroDm _________________________ (and whomever is designated as the doct ___________________________    ___________________    Witness: ____________________     Date: ______________    Printed: 2018   10:30 AM    Patient Name: Daniel Horn        : 3/11/1970       Medical Record #: [id number]

## (undated) NOTE — LETTER
Consent to Procedure/Sedation    Date: __________________    Time: _______________    1. I authorize the performance upon Inga Mckenzie the following:  PLASMAPHERESIS     2.  I authorize DrDom _________________________ (and whomever is designated as the doct ___________________________    ___________________    Witness: ____________________     Date: ______________    Printed: 10/15/2018   8:25 AM    Patient Name: Darshan Gudino        : 3/11/1970       Medical Record #: [id number]

## (undated) NOTE — LETTER
04/18/2018      Corrine Crook  [address] [place] [address]      Dear Ashleigh Bartlett,     We are contacting you from Dr. Jayashree Sadler office. Your health is important to us.  We have not received test results for additional tests that your provider recommended a

## (undated) NOTE — LETTER
Consent to Procedure/Sedation    Date: __________________    Time: _______________    1. I authorize the performance upon Jerel Mancera the following:  PLASMAPHERESIS     2.  I authorize DrDom _________________________ (and whomever is designated as the doct ___________________________    ___________________    Witness: ____________________     Date: ______________    Printed: 2017   7:29 AM    Patient Name: Donniedipti Dennis        : 3/11/1970       Medical Record #: [id number]

## (undated) NOTE — LETTER
[facility] [address], [address]  Consent for Procedure/Sedation    Date:     Time:       1.  I authorize the performance upon Jordan Wallis the following:  CENTRAL VENOUS CATHETER CHANGE    2. I authorize Dr. Anabella Noguera  (and whomever i ________________________________    ___________________    Witness: _________________________      Date: ___________________    Printed: 2018   10:47 AM  Patient Name: Amna Woodward        : 3/11/1970       Medical Record #: [id number]

## (undated) NOTE — LETTER
Consent to Procedure/Sedation    Date: __________________    Time: _______________    1. I authorize the performance upon Amna Woodward the following:  PLASMAPHERESIS     2.  I authorize DrDom _________________________ (and whomever is designated as the doct ___________________________    ___________________    Witness: ____________________     Date: ______________    Printed: 10/2/2017   12:01 PM    Patient Name: Melissa Kong        : 3/11/1970       Medical Record #: [id number]

## (undated) NOTE — LETTER
23    Patient: Marlene Senior  : 3/11/1970 Visit date: 2023    Dear  Alicia Soliman MD    Thank you for referring Marlene Senior to my practice. Please find my assessment and plan below. Assessment   Diffuse abdominal pain  (primary encounter diagnosis)  Generalized abdominal pain  Weakness generalized  Essential hypertension  S/P laparoscopic surgery  Intra-abdominal adhesions  Dilated idiopathic cardiomyopathy (HCC)  Calculus of kidney  Sickle cell trait (HCC)  GIORDANO (nonalcoholic steatohepatitis)  Interstitial cystitis  Controlled type 1 diabetes mellitus with hyperglycemia (HCC)  Generalized abdominal tenderness with rebound tenderness  Polyneuropathy due to type 2 diabetes mellitus ([address]  Hypothyroidism, unspecified type  Alpha-thalassemia ([address]  Multiple sclerosis ([address]  Hyperparathyroidism ([address]    Plan   I am seeing this patient in acute consultation for severe abdominal pain. It is associated with nausea. There is no vomiting. She is having daily bowel movements. She has been in the ER multiple times, including on 2023. This pain has been going on for some time, several months. It is exacerbating and is at a very bad state even on today's interview and examination. Her pain is diffuse. She has no fever or chills. The nausea is severe, no vomiting, but definitely debilitating. It happens on a near hourly basis, lasting for hours. It is worse on motion. The patient actually had pain as I change the position of the bed. Her worst pain is across the lower abdomen, but the pain is definitely generalized without specific localization. She has had right lower quadrant pain in the past.    She does suffer from chronic interstitial cystitis. She has a narrow right ureter according to prior cystoscopy and ureterogram.  She has a right renal calculus that is slightly larger in size from previous examinations to her most recent CT scan.     I have performed laparoscopic lysis of adhesions on 2 occasions for this patient. My last 1 was on Eunice 10, 2022. She actually got significant relief of her abdominal pain at that time. Her other laparoscopic lysis of adhesions was in 2017. She has had a prior laparoscopic total abdominal hysterectomy with bilateral salpingo oophorectomy for menorrhagia in 1993. She has had 2 C-sections. She has had a laparoscopic cholecystectomy. The patient appears in moderate distress today but is ambulatory. I personally reviewed the CT scan myself from [facility] dated March 16, 2023. There are no acute findings. The patient has stool from the cecum all the way to the rectosigmoid, but the colon is nondilated. There is diverticulosis at the rectosigmoid junction, small pocket diverticuli, numbering less than 20. There is no luminal narrowing or evidence of acute diverticulitis. She definitely had the same pain that day that she has today, and it has been continuous since that visit to the emergency department as well. There is no free fluid, free air, abscess, or any other indications of the abdominal pain on that CT scan. Please see the radiologist findings for all other incidentals. This patient is on for an EGD with Dr. Kirk Wilde on May 17, 2023. The patient's mother had gastric cancer. The patient's last colonoscopy was done with Dr. Kirk Wilde in 2021. He did remove a polyp, however he did not mention any diverticulosis, but it is visible on current CAT scan. This patient has diabetes with neuropathy. She has sickle cell trait, and thalassemia. She has multiple sclerosis. She has a cardiomyopathy. She has Cari Chatters. Abdominal exam is currently soft, very tender diffusely. Possibly some guarding. Possibly some slight rebound. Bowel sounds are present with normal activity and normal pitch. There is no evidence of ascites. There is no tympany. Her tenderness does not localize.   Liver and spleen are not palpable. There are no inguinal, umbilical, or incisional hernias present. The only incisions are some laparoscopic incisions including one above the umbilicus. She has a very concerning examination. Even though her symptoms match her symptoms from the emergency department on March 16, 2023, I would like her to get a stat CT scan of the abdomen pelvis with oral and IV contrast.  The last CAT scan did not have oral contrast.  I have informed the patient to go ahead and schedule that as a stat for today, but I told her to make sure she does not go right from drinking the contrast right into the scanner. We definitely want the contrast to have an opportunity to at least reach the colon. With her sickle cell trait and thalassemia, we definitely want her to get a CBC. I want a make sure she is not suddenly in sickle cell crisis. She is to wait at [facility] until she hears back from you the radiologist, myself, or the surgeon on-call. We need to see the stat results before she goes home. If she is discharged, I will see her back on April 13, 2023.        Sincerely,       Dominic Toribio MD   CC: No Recipients

## (undated) NOTE — ED AVS SNAPSHOT
Margy Armour   MRN: [id number]    Department:  [facility] Emergency Department   Date of Visit:  5/28/2019           Disclosure     Insurance plans vary and the physician(s) referred by the ER may not be covered by your plan.  Please contact your tell this physician (or your personal doctor if your instructions are to return to your personal doctor) about any new or lasting problems. The primary care or specialist physician will see patients referred from the [facility] Emergency Department.  Prince Garcia

## (undated) NOTE — LETTER
[facility] [address], [address] [place] [address]  Consent for Procedure/Sedation    Date: ***    Time: ***      {edw ivs consent:1315}

## (undated) NOTE — LETTER
[facility] [address], [address]  Consent for Procedure/Sedation    Date:     Time:       1. I authorize the performance upon Lasha Collins the following:  PERMANENT CENTRAL VENOUS CATHETER INSERTION     2.  I authorize Dr. Davion Wisdom ________________________________    ___________________    Witness: _________________________      Date: ___________________    Printed: 2018   2:37 PM  Patient Name: Kai Nascimento        : 3/11/1970       Medical Record #: [id number]

## (undated) NOTE — ED AVS SNAPSHOT
Esperanza Khanna   MRN: [id number]    Department:  [facility] Emergency Department   Date of Visit:  8/30/2018           Disclosure     Insurance plans vary and the physician(s) referred by the ER may not be covered by your plan.  Please contact your tell this physician (or your personal doctor if your instructions are to return to your personal doctor) about any new or lasting problems. The primary care or specialist physician will see patients referred from the [facility] Emergency Department.  Cheryle Levins

## (undated) NOTE — MR AVS SNAPSHOT
[address] [place] [address] [phone]               Thank you for choosing us for your health care visit with Ifrah Cabral MD.  We are glad to serve you and happy to provide you with this summary of your v prescription must be signed by the provider, picked up in our office and physically brought to the pharmacy. A 30 day supply with no refills is the maximum allowed. ? If your prescription is due for a refill, you may be due for a follow up appointment. diligence, the insurance carrier gives the disclaimer that \"Although the procedure is authorized, this does not guarantee payment. \"    Ultimately the patient is responsible for payment. Thank you for your understanding in the matter.          Allergies lidocaine 5 % Oint   Apply 1 Dose topically as needed (for feet). Commonly known as:  XYLOCAINE           Mometasone Furo-Formoterol Fum 200-5 MCG/ACT Aero   Inhale 2 puffs into the lungs 2 (two) times daily.            Montelukast Sodium 10 MG Tabs   Ta Enjoy your food, but eat less. Fully enjoy your food when eating. Don’t eat while distracted and slow down. Avoid over sized portions. Don’t eat while when you’re bored.      EAT THESE FOODS MORE OFTEN: EAT THESE FOODS LESS OFTEN:   Make half your pl

## (undated) NOTE — LETTER
Consent to Procedure/Sedation    Date: __________________    Time: _______________    1. I authorize the performance upon David Rick the following:  PLASMAPHERESIS     2.  I authorize Dr. _________________________ (and whomever is designated as the doct ___________________________    ___________________    Witness: ____________________     Date: ______________    Printed: 1/15/2018   11:09 AM    Patient Name: Jerel Smithanabel        : 3/11/1970       Medical Record #: [id number]

## (undated) NOTE — MR AVS SNAPSHOT
After Visit Summary   6/6/2017    Karyna Patrick    MRN: [id number]           Diagnoses this Visit     Acute respiratory distress    -  Primary     Fatigue         Dizziness and giddiness         Diabetes mellitus (HCC)         Pure hypercholesterol Same-day results are only available Monday through Friday when the appointment is completed by 2:00 pm AND you bring all prior mammography films to the appointment AND you are signed up for MyChart.  Otherwise the exam will be read by a radiologist within 2 [place] ([phone]. [phone]       Monday July 24, 2017 9:00 AM     Appointment with [facility] RM 05 at [facility] ([phone].  One [address] [address] Chloride 106  101-111  mmol/L Final    CO2 32.0  22.0-32.0  mmol/L Final         Result Summary for LIPID PANEL      Component Results     Component Value Flag Ref Range Units Status    Cholesterol, Total 155  <200  mg/dL Final    Triglycerides 62  <150 Result Summary for ASSAY, THYROID STIM HORMONE      Component Results     Component Value Flag Ref Range Units Status    TSH 0.631  0.350-5.500  mIU/mL Final         Result Summary for FERRITIN      Component Results     Component Value Flag Ref Range Unit Call [phone] for help. MePleasehart is NOT to be used for urgent needs. For medical emergencies, dial 911.

## (undated) NOTE — MR AVS SNAPSHOT
[facility]  [address] [address] [phone]               Thank you for choosing us for your health care visit with Afshan Bowers MD.  We are glad to serve you and happy to provide you with this summary of you Return in about 3 weeks (around 5/25/2017).       Scheduling Instructions     Thursday May 04, 2017     Imaging:  CT ABDOMEN PELVIS IV AND ORAL CONTRAST (ER)    Instructions:  IMPORTANT    Your physician has ordered a radiology test that may require author At her last visit we offered her Bentyl, she Zeus Luo. Clinical examination reveals her abdomen remains soft, nondistended, tender in both lower quadrants. She appears in moderate distress to manage her complaints of 7/10 abdominal pain.   The abdomen is d Penicillins Other (See Comments)    Solu-Medrol [Methylprednisolone] Other (See Comments)    Swelling of neck, throat and tongue    Urea Tightness in Throat    Urea C-13 in Pranactin for H. Pylori test kit.                 Today's Vital Signs     BP Pulse Take 500 mg by mouth daily. Commonly known as:  GLUCOPHAGE           Montelukast Sodium 10 MG Tabs   Take 10 mg by mouth nightly. Commonly known as:  SINGULAIR           omeprazole 20 MG Cpdr   Take 20 mg by mouth 2 (two) times daily before meals.    Co iPowerUp questions? Call [phone] for help. iPowerUp is NOT to be used for urgent needs. For medical emergencies, dial 911.            Visit [facility] online at  [email or web address]

## (undated) NOTE — LETTER
Nalini Toussaint M.D., F.A.C.S.,F.A.C.R.S. Surgical Clearance Needed    Date: 5/19/2022                                                                       From: Moy Cox    Attn: Dr Nii Sandoval                                                                             Fax:     RE: Surgical Clearance               # of Pages: 1        Patient Name: Shena Tony    Patient YOB: 1970    Consult For: CARDIAC CLEARANCE    Surgery: DIAGNOSTIC LYSIS OF ADHESIONS IN THE RIGHT LOWER QUADRANT    Date of Surgery: 6/10/2022  [place]        This facsimile transmission is provided for your internal use only. If the reader of this message is not the intended recipient, you are hereby notified that you have received this document in error, and that any review, dissemination, distribution, or copying of this message is strictly prohibited. If you have received this communication in error, please notify us immediately by telephone and return the original message to us by mail.

## (undated) NOTE — LETTER
[facility]  [address] [facility], [address] [place] [address]    Consent for Operation    Date: __________________    Time: _______________    1.  I authorize the performance upon Valeri Peña the following operation:    Procedure(s):  REMOVAL OF PORT A CAT procedure has been videotaped, the surgeon will obtain the original videotape. The hospital will not be responsible for storage or maintenance of this tape.     6. For the purpose of advancing medical education, I consent to the admittance of observers to t STATEMENTS REQUIRING INSERTION OR COMPLETION WERE FILLED IN.     Signature of Patient:   ___________________________    When the patient is a minor or mentally incompetent to give consent:  Signature of person authorized to consent for patient: ____________ drugs/illegal medications). Failure to inform my anesthesiologist about these medicines may increase my risk of anesthetic complications. · If I am allergic to anything or have had a reaction to anesthesia before.     3. I understand how the anesthesia med I have discussed the procedure and information above with the patient (or patient’s representative) and answered their questions. The patient or their representative has agreed to have anesthesia services.     _______________________________________________

## (undated) NOTE — LETTER
Violeta Courtney [address] 6 [address]  [facility], [address] [facility]    Consent for Operation  Date: __________________    Time: _______________    1. I authorize the performance upon Jerel Mancera the following operation:    Procedure(s) PLASMA PHERESIS     2.  I procedure has been videotaped, the surgeon will obtain the original videotape. The hospital will not be responsible for storage or maintenance of this tape.     6. For the purpose of advancing medical education, I consent to the admittance of observers to t STATEMENTS REQUIRING INSERTION OR COMPLETION WERE FILLED IN.     Signature of Patient:   ___________________________    When the patient is a minor or mentally incompetent to give consent:  Signature of person authorized to consent for patient: ____________

## (undated) NOTE — LETTER
Date & Time: 10/21/2019, 9:51 AM  Patient: Gely Sanon  Attending Provider: Melanie Valle MD      This certifies that Bernardo Pino, a patient at an [facility], am leaving the facility voluntarily and against the advi

## (undated) NOTE — LETTER
23    Patient: Clive Martinez  : 3/11/1970 Visit date: 2023    Dear  Rowena Rosario MD    Thank you for referring Clive Martinez to my practice. Please find my assessment and plan below. Assessment   Diffuse abdominal pain  Status post laparoscopic surgery  Intra-abdominal adhesions  Calculus of kidney  Polyneuropathy  Type 1 diabetes mellitus  Multiple sclerosis    Plan   The patient presents for continued care and evaluation regarding her severe abdominal pain and nausea. We saw this patient on 2023 for this issue and sent her for stat CT scan. I have personally reviewed the CT scan and provided my own interpretation. There is a prominent redundancy of the sigmoid colon present on more than on CT scan. There is no evidence of obstruction. All other significant findings may be seen in the radiologist report. At today's visit, the patient states her pain is about the same. She states she is having constant lower abdominal pain. She rates her constant pain at a 3/10. Her pain waxes and wanes in intensity. She rates her current pain at a 7/10. She has nausea associated with her pain, but no vomiting. She has a decreased appetite. The patient had 1 episode of diarrhea yesterday. She had a soft formed stool today. She is passing flatus. She denies fevers, chills. She denies gastroesophageal reflux symptoms. The patient has seen a pain specialist in the past.  She follows with Dr. Shubham Driscoll. They have discussed a pain pump, but she states she did not want one at the time. Additionally, the patient has a lithotripsy planned for her right renal calculus. Clinical examination of the abdomen reveals it to be soft, nondistended, bowel sounds are normal activity normal pitch. Very tender diffusely throughout the abdomen, but improved from previous exam.  There  is no rebounding tenderness or guarding. There are no signs of ascites or peritonitis.   The liver and spleen are nonpalpable. There are no palpable masses. There are no umbilical or inguinal hernias. I informed the patient that her CT scan has no abnormal findings. I suspect that her abdominal pain is related to neuropathy from her diabetes. She does have neuropathy in her hands and feet. I would like her to see Dr. Audrey Joy again to discuss pain management. They can discuss a trial block and revisit the option of a pain pump. The patient is currently scheduled for an EGD with Dr. Artie Rosado in May of this year. The patient expressed understanding with the above plan. I have no further follow-up with this patient. She may see me on an as-needed basis.        Sincerely,       Andrea Bethea MD   CC: Neeru Howard MD

## (undated) NOTE — Clinical Note
Hi, Dr. Fe Clinton. Hope you're doing well! Thank you for referring Ms. Darshan Rogers for rheumatologic evaluation. Please see the discussion portion of my note and let me know if you have any questions.      Dante Kimball, DO  [facility]  3/23/2021

## (undated) NOTE — LETTER
Consent to Procedure/Sedation    Date: 9/7/2018    Time: _______________    1. I authorize the performance upon Gely Sanon the following:    Port Check    2.  I authorize Dr. Ryne Thomas whomever is designated as the doctor’s assistant), to perform th Witness: ____________________     Date: ______________    Printed: 2018   10:01 AM    Patient Name: Valeri Peña        : 3/11/1970       Medical Record #: [id number]

## (undated) NOTE — LETTER
Consent to Procedure/Sedation    Date: __________________    Time: _______________    1. I authorize the performance upon Nate Wakefield the following:  PLASMAPHERESIS     2.  I authorize Dr. _________________________ (and whomever is designated as the doct ___________________________    ___________________    Witness: ____________________     Date: ______________    Printed: 2018   9:54 AM    Patient Name: Raven Smith        : 3/11/1970       Medical Record #: [id number]

## (undated) NOTE — LETTER
Consent to Procedure/Sedation    Date: __________________    Time: _______________    1. I authorize the performance upon Amna Woodward the following:  PLASMAPHERESIS     2.  I authorize  ____Margy / Amie_________ (and whomever is designated as the ___________________________    ___________________    Witness: ____________________     Date: ______________    Printed: 2018   4:19 PM    Patient Name: Song Ferris        : 3/11/1970       Medical Record #: [id number]

## (undated) NOTE — LETTER
Consent to Procedure/Sedation    Date: __________________    Time: _______________    1. I authorize the performance upon Johnny Sanchez the following:  PLASMAPHERESIS     2.  I authorize Dr. _________________________ (and whomever is designated as the doct ___________________________    ___________________    Witness: ____________________     Date: ______________    Printed: 2017   8:10 AM    Patient Name: Margy Pickard        : 3/11/1970       Medical Record #: [id number]

## (undated) NOTE — LETTER
[facility]  Laminebubba Foremanmaria a [address] [facility], [address] [place] [address]    Consent for Operation    Date: __________________    Time: _______________    1.  I authorize the performance upon Verdia Eisenmenger the following operation:    Procedure(s):  REMOVAL OF PORT A CAT procedure has been videotaped, the surgeon will obtain the original videotape. The hospital will not be responsible for storage or maintenance of this tape.     6. For the purpose of advancing medical education, I consent to the admittance of observers to t STATEMENTS REQUIRING INSERTION OR COMPLETION WERE FILLED IN.     Signature of Patient:   ___________________________    When the patient is a minor or mentally incompetent to give consent:  Signature of person authorized to consent for patient: ____________ drugs/illegal medications). Failure to inform my anesthesiologist about these medicines may increase my risk of anesthetic complications. · If I am allergic to anything or have had a reaction to anesthesia before.     3. I understand how the anesthesia med I have discussed the procedure and information above with the patient (or patient’s representative) and answered their questions. The patient or their representative has agreed to have anesthesia services.     _______________________________________________

## (undated) NOTE — LETTER
Consent to Procedure/Sedation    Date: __________________    Time: _______________    1. I authorize the performance upon Jerel Mancera the following:  PLASMAPHERESIS     2.  I authorize DrDom _________________________ (and whomever is designated as the doct ___________________________    ___________________    Witness: ____________________     Date: ______________    Printed: 2018   2:28 PM    Patient Name: Kvng Tai        : 3/11/1970       Medical Record #: [id number]

## (undated) NOTE — LETTER
Consent to Procedure/Sedation    Date: __________________    Time: _______________    1. I authorize the performance upon Song Ferris the following:  PLASMAPHERESIS     2.  I authorize DrDom _________________________ (and whomever is designated as the doct ___________________________    ___________________    Witness: ____________________     Date: ______________    Printed: 2017   5:36 PM    Patient Name: Johnny Sanchez        : 3/11/1970       Medical Record #: [id number]

## (undated) NOTE — LETTER
22    Patient: Master Diez  : 3/11/1970 Visit date: 2022    Dear  Ilan Duggan MD    Thank you for referring Master Diez to my practice. Please find my assessment and plan below. Assessment   Right lower quadrant pain  (primary encounter diagnosis)    Plan   This patient presents for further care and treatment regarding severe right lower quadrant abdominal pain. She has had multiple abdominal operations, please see her original consultation. Since her last visit she obtained a CT scan of the abdomen and pelvis with oral and IV contrast.  The procedure was performed at [facility] on 2022. I personally viewed the CT scan myself and provided my own interpretation. There are no current signs of bowel obstruction, free fluid, free air, complications within the pelvis, or any complications within the right lower quadrant. She still has 7/10 pain in the right lower quadrant. She has no nausea, vomiting, constipation, or diarrhea. Clinical exam reveals her abdomen to be soft, tender to right lower quadrant, slight guarding, no rebound. No evidence of peritonitis. Bowel sounds have normal activity normal pitch. There are no inguinal or umbilical hernias present. Liver and spleen are not palpable. We will be performing laparoscopic lysis of adhesions on this patient. The procedure will take place on Eunice 10, 2022. I fully reviewed the operation, its indications, and the conduct of the procedure.       Sincerely,       Shreya Emerson MD   CC: No Recipients

## (undated) NOTE — Clinical Note
2017    Patient: Skip Greene  : 3/11/1970 Visit date: 4/10/2017    Dear  Dr. Justice Ibarra MD,    Thank you for referring Skip Greene to my practice. Please find my assessment and plan below.            Assessment   Abdominal pain, acute, bilater She points to her lower abdomen. She appears in distress in my office. She winces with crampy abdominal symptoms. Clinical exam today reveals her to be exquisitely tender in the left lower quadrant. There is no guarding or rebound.   She has active ketan

## (undated) NOTE — LETTER
Consent to Procedure/Sedation    Date: __________________    Time: _______________    1. I authorize the performance upon Skip Greene the following:  PLASMAPHERESIS     2.  I authorize DrDom _________________________ (and whomever is designated as the doct ___________________________    ___________________    Witness: ____________________     Date: ______________    Printed: 10/23/2017   10:47 AM    Patient Name: Nicole Colon        : 3/11/1970       Medical Record #: [id number]

## (undated) NOTE — MR AVS SNAPSHOT
[facility]  [address] [address] [phone]               Thank you for choosing us for your health care visit with Ora Cowden, MD.  We are glad to serve you and happy to provide you with this summary of you I am seeing this patient in consultation from the primary care service for acute and chronic bilateral lower quadrant abdominal pain. This patient has multiple medical problems including multiple sclerosis.   She has a PowerPort in place for chronic veno umbilical, or incisional hernias present. There is no evidence of ascites. Her liver and spleen are not palpable. This patient is in moderate acute distress. I have asked her to take fleets enemas twice today at least 1 hour apart.   She should also t Take 1 tablet by mouth daily.  May use over the counter coated aspirin           baclofen 20 MG Tabs   TAKE ONE TABLET BY MOUTH 4 TIMES DAILY   Commonly known as:  LIORESAL           BREO ELLIPTA 100-25 MCG/INH Aepb   Generic drug:  Fluticasone Furoate-Christina PROLIA 60 MG/ML Soln   Generic drug:  denosumab   Inject 60 mg into the skin every 6 (six) months. VENTOLIN  (90 Base) MCG/ACT Aers   Generic drug:  Albuterol Sulfate HFA   Inhale 2 puffs into the lungs every 6 (six) hours as needed.  PRN

## (undated) NOTE — Clinical Note
2017    Patient: Song Ferris  : 3/11/1970 Visit date: 2017    Dear  Dr. Danielle Michaels MD,    Thank you for referring Song Ferris to my practice. Please find my assessment and plan below.         Assessment   Lower abdominal pain  (primary enc colon.  She has no palpable masses. Liver and spleen are not palpable. There are no inguinal or umbilical hernias present. Bowel sounds continue to have normal activity with normal pitch.   There are no high-pitched tinkles, there is no increase in activ

## (undated) NOTE — LETTER
[facility] [address], [address]  Consent for Procedure/Sedation    Date: 3-4-21    Time: 1250      1. I authorize the performance upon Tiana Callejas the following:  transesphogeal echocardiogram     2.  I authorize Dr. Adilia Shirley (and Medical Record #: [id number]

## (undated) NOTE — LETTER
[address] [facility] FOR BLOOD OR BLOOD COMPONENTS      In the course of your treatment, it may become necessary to administer a transfusion of blood or blood components. This form provides basic information concerning this procedure and, if signed by you, authorizes its performance by qualified medical personnel. DESCRIPTION OF PROCEDURE:  Blood is introduced into one of your veins, commonly in the arm, using a sterilized disposable needle. The amount of blood transfused, and whether the transfusion will be of blood or blood components is a judgment the physician will make based on your particular needs. RISKS:  The transfusion is a common procedure of low risk. MINOR AND TEMPORARY REACTIONS ARE NOT UNCOMMON, including a slight bruise, swelling or local reaction in the area where the needle pierces your skin, or a non-serious reaction to the transfused material itself, including headache, fever or a mild skin reaction, such as rash. Serious reactions are possible, though very unlikely and include severe allergic reaction (shock)  and destruction (hemolysis) of transfused blood cells. Infectious diseases which are known to be transmitted by blood transfusion include CERTAIN TYPES OF VIRAL HEPATITIS, a viral infection of the liver, HUMAN IMMUNODEFICIENCY VIRUS (HIV-1,2) infection, a viral infection known to cause ACQUIRED IMMUNODEFICIENCY SYNDROME (AIDS) AS WELL AS CERTAIN OTHER BACTERIAL, VIRAL AND PARASITIC DISEASES. While a minimal risk of acquiring an infectious disease from transfused blood exists, in accordance with Federal and State law all due care has been taken in donor selection and testing to avoid transmission of disease. ALTERNATIVES:  If loss of blood poses serious threats in the course of your treatment, THERE IS NO EFFECTIVE ALTERNATIVE TO BLOOD TRANSFUSION.  However, if you have any further questions on this matter, your physician will fully explain the alternatives to you if it has not already been done. I,Kat Crook, have read/had read to me the above. I understand the matters bearing on the decision whether or not to authorize a transfusion of blood or blood components. I have no questions which have not been answered to my full satisfaction.  I hereby consent to such transfusion as  my physician may deem necessary or advisable in the course of my treatment.        _______________   __________________________________________________  Date     Signature of Patient, Parent or Legal Guardian      ([employer])      __________________________________________  Witness to Signature (title or relationship to patient)    Patient Name: Katerina Persaud     : 3/11/1970                 Printed: 2023     Medical Record #: [id number]                    Page 1 of 1

## (undated) NOTE — LETTER
Delbert King M.D., F.A.C.S.,F.A.C.R.S. Surgical Clearance Needed    Date: 5/19/2022                                                                       From: Hugh Mcginnis    Attn: Dr Gaurang Sweet                                                                                Fax:     RE: Surgical Clearance               # of Pages: 1        Patient Name: Nancy Barksdale    Patient YOB: 1970    Consult For: Medical Clearance    Surgery: DIAGNOSTIC LYSIS OF ADHESIONS IN THE RIGHT LOWER QUADRANT    Date of Surgery: 6/10/2022 AT [facility]        This facsimile transmission is provided for your internal use only. If the reader of this message is not the intended recipient, you are hereby notified that you have received this document in error, and that any review, dissemination, distribution, or copying of this message is strictly prohibited. If you have received this communication in error, please notify us immediately by telephone and return the original message to us by mail.

## (undated) NOTE — LETTER
Violeta Courtney 182  [address] [facility] [address], [address] [facility]  Authorization for Surgical Operation and Procedure     Date:_7/18/2021__________                                                                                                         Time:_16 risks that can occur: fever and allergic reactions, hemolytic reactions, transmission of diseases such as Hepatitis, AIDS and Cytomegalovirus (CMV) and fluid overload.   In the event that I wish to have an autologous transfusion of my own blood, or a direct determine when the applicable recovery period ends for purposes of reinstating the DNAR order.   10. Patients having a sterilization procedure: I understand that if the procedure is successful the results will be permanent and it will therefore be impossibl doctor) to give me medicine and do additional procedures as necessary.  Some examples are: Starting or using an “IV” to give me medicine, fluids or blood during my procedure, and having a breathing tube placed to help me breathe when I’m asleep (intubation) understand that rare but potential complications include headache, bleeding, infection, seizure, irregular heart rhythms, and nerve injury.     I can change my mind about having anesthesia services at any time before I get the medicine.    _________________

## (undated) NOTE — LETTER
To: Dr. Gerhard Valencia  Date:  5/25/2022  Fax #: [phone]   Patient Name: Niharika Monteiro / Sex: 3/11/1970-A: 45 y  female  Phone:  [phone] CSN: [id number]     Medical Records: [id number]    Clearance for Surgery Requested by Surgeon    Request for:  Cardiac Clearance    Requested by Surgeon: Dr. Joby Moss    Surgical Date: 6/10/2022    Procedure: DIAGNOSTIC LYSIS OF ADHESIONS IN THE RIGHT LOWER QUADRANT , N/A      Please fax the clearance note to the [place] [phone]. Thank you.

## (undated) NOTE — LETTER
[address]  [address]  Consent for Procedure/Sedation  Date: 4/6/24         Time: 1104    I hereby authorize Dr STAN Thapa, my physician and his/her assistants (if applicable), which may include medical students, residents, and/or fellows, to perform the following surgical operation/ procedure and administer such anesthesia as may be determined necessary by my physician: Temporary Dialysis Catheter Insertion on Kat A Cheeks  2.   I recognize that during the surgical operation/procedure, unforeseen conditions may necessitate additional or different procedures than those listed above.  I, therefore, further authorize and request that the above-named surgeon, assistants, or designees perform such procedures as are, in their judgment, necessary and desirable.    3.   My surgeon/physician has discussed prior to my surgery the potential benefits, risks and side effects of this procedure; the likelihood of achieving goals; and potential problems that might occur during recuperation.  They also discussed reasonable alternatives to the procedure, including risks, benefits, and side effects related to the alternatives and risks related to not receiving this procedure.  I have had all my questions answered and I acknowledge that no guarantee has been made as to the result that may be obtained.    4.   Should the need arise during my operation/procedure, which includes change of level of care prior to discharge, I also consent to the administration of blood and/or blood products.  Further, I understand that despite careful testing and screening of blood or blood products by collecting agencies, I may still be subject to ill effects as a result of receiving a blood transfusion and/or blood products.  The following are some, but not all, of the potential risks that can occur: fever and allergic reactions, hemolytic reactions, transmission of diseases such as Hepatitis, AIDS and Cytomegalovirus  (CMV) and fluid overload.  In the event that I wish to have an autologous transfusion of my own blood, or a directed donor transfusion, I will discuss this with my physician.   Check only if Refusing Blood or Blood Products  I understand refusal of blood or blood products as deemed necessary by my physician may have serious consequences to my condition to include possible death. I hereby assume responsibility for my refusal and release the hospital, its personnel, and my physicians from any responsibility for the consequences of my refusal.         o  Refuse         5.   I authorize the use of any specimen, organs, tissues, body parts or foreign objects that may be removed from my body during the operation/procedure for diagnosis, research or teaching purposes and their subsequent disposal by hospital authorities.  I also authorize the release of specimen test results and/or written reports to my treating physician on the hospital medical staff or other referring or consulting physicians involved in my care, at the discretion of the Pathologist or my treating physician.    6.   I consent to the photographing or videotaping of the operations or procedures to be performed, including appropriate portions of my body for medical, scientific, or educational purposes, provided my identity is not revealed by the pictures or by descriptive texts accompanying them.  If the procedure has been photographed/videotaped, the surgeon will obtain the original picture, image, videotape or CD.  The hospital will not be responsible for storage, release or maintenance of the picture, image, tape or CD.    7.   I consent to the presence of a  or observers in the operating room as deemed necessary by my physician or their designees.    8.   I recognize that in the event my procedure results in extended X-Ray/fluoroscopy time, I may develop a skin reaction.    9. If I have a Do Not Attempt Resuscitation (DNAR) order in  place, that status will be suspended while in the operating room, procedural suite, and during the recovery period unless otherwise explicitly stated by me (or a person authorized to consent on my behalf). The surgeon or my attending physician will determine when the applicable recovery period ends for purposes of reinstating the DNAR order.  10. Patients having a sterilization procedure: I understand that if the procedure is successful the results will be permanent and it will therefore be impossible for me to inseminate, conceive, or bear children.  I also understand that the procedure is intended to result in sterility, although the result has not been guaranteed.   11. I acknowledge that my physician has explained sedation/analgesia administration to me including the risk and benefits I consent to the administration of sedation/analgesia as may be necessary or desirable in the judgment of my physician.    I CERTIFY THAT I HAVE READ AND FULLY UNDERSTAND THE ABOVE CONSENT TO OPERATION and/or OTHER PROCEDURE.        ____________________________________       _________________________________      ______________________________  Signature of Patient         Signature of Responsible Person        Printed Name of Responsible Person        ____________________________________      _________________________________      ______________________________       Signature of Witness          Relationship to Patient                       Date                                       Time  Patient Name: Kat Crook  : 3/11/1970    Reviewed: 2024   Printed: 2024  Medical Record #: [id number] Page 1 of 1

## (undated) NOTE — LETTER
17    Patient: Lasha Collins  : 3/11/1970 Visit date: 2017    Dear  Dr. Dorn Dandy, MD,    Thank you for referring Lasha Collins to my practice. Please find my assessment and plan below.            Assessment   Lower abdominal pain  (primary e with her irritable bowel syndrome. She gets severe lower abdominal pain after bowel movements. They are worse after she urinates at 7/10. They are most severe after bowel movement at 10/10. Her normal cycle is to go 3 days without a bowel movement.   Gareth Logan She has minimal abdominal laparoscopic incisions present. She is slightly distended in the abdomen, again chronic. Liver and spleen are not palpable. Patient's BMI is 24.21.     We will be taking this patient to the operating room for laparoscopic lysis

## (undated) NOTE — LETTER
[facility]  Kelly Lynn [address] [facility], [address] [place] [address]    Consent for Operation    Date: __3/20/17________________    Time: ___1600____________    1. I authorize the performance upon Melissa Kong the following operation:     PLASMAPHARESIS    2.  I a videotape. [facility] will not be responsible for storage or maintenance of this tape. 6. For the purpose of advancing medical education, I consent to the admittance of observers to the Operating Room.     7. I authorize the use of any specimen, organs Signature of Patient:   ___________________________    When the patient is a minor or mentally incompetent to give consent:  Signature of person authorized to consent for patient: ___________________________   Relationship to patient: _____________________ drugs/illegal medications). Failure to inform my anesthesiologist about these medicines may increase my risk of anesthetic complications. · If I am allergic to anything or have had a reaction to anesthesia before.     3. I understand how the anesthesia med I have discussed the procedure and information above with the patient (or patient’s representative) and answered their questions. The patient or their representative has agreed to have anesthesia services.     _______________________________________________

## (undated) NOTE — LETTER
To: Dr. Alissa Carson  Date:  5/25/2022  Fax #: [phone]   Patient Name: Kate Settler / Sex: 3/11/1970-A: 45 y  female  Phone:  [phone] CSN: [id number]     Medical Records: [id number]    Clearance for Surgery Requested by Surgeon    Request for:  Medical Clearance    Requested by Surgeon: Dr. Peyton Modi    Surgical Date: 6/10/2022    Procedure: DIAGNOSTIC LYSIS OF ADHESIONS IN THE RIGHT LOWER QUADRANT , N/A      Please fax the clearance note to the [place] [phone]. Thank you.

## (undated) NOTE — LETTER
Consent to Procedure/Sedation    Date: __________________    Time: _______________    1. I authorize the performance upon Chelita Robles the following:  PLASMAPHERESIS     2.  I authorize Dr. _________________________ (and whomever is designated as the doct ___________________________    ___________________    Witness: ____________________     Date: ______________    Printed: 2017   12:14 PM    Patient Name: Gely Sanon        : 3/11/1970       Medical Record #: [id number]

## (undated) NOTE — LETTER
[facility]  [address]  Consent for Procedure/Sedation  Date: 4/27/23         Time: 0830    I hereby authorize Dr. Mason Vicente, my physician and his/her assistants (if applicable), which may include medical students, residents, and/or fellows, to perform the following surgical operation/ procedure and administer such anesthesia as may be determined necessary by my physician: Temporary dialysis catheter insertion on [address]  2. I recognize that during the surgical operation/procedure, unforeseen conditions may necessitate additional or different procedures than those listed above. I, therefore, further authorize and request that the above-named surgeon, assistants, or designees perform such procedures as are, in their judgment, necessary and desirable. 3.   My surgeon/physician has discussed prior to my surgery the potential benefits, risks and side effects of this procedure; the likelihood of achieving goals; and potential problems that might occur during recuperation. They also discussed reasonable alternatives to the procedure, including risks, benefits, and side effects related to the alternatives and risks related to not receiving this procedure. I have had all my questions answered and I acknowledge that no guarantee has been made as to the result that may be obtained. 4.   Should the need arise during my operation/procedure, which includes change of level of care prior to discharge, I also consent to the administration of blood and/or blood products. Further, I understand that despite careful testing and screening of blood or blood products by collecting agencies, I may still be subject to ill effects as a result of receiving a blood transfusion and/or blood products.   The following are some, but not all, of the potential risks that can occur: fever and allergic reactions, hemolytic reactions, transmission of diseases such as Hepatitis, AIDS and Cytomegalovirus (CMV) and fluid overload. In the event that I wish to have an autologous transfusion of my own blood, or a directed donor transfusion, I will discuss this with my physician. Check only if Refusing Blood or Blood Products  I understand refusal of blood or blood products as deemed necessary by my physician may have serious consequences to my condition to include possible death. I hereby assume responsibility for my refusal and release the hospital, its personnel, and my physicians from any responsibility for the consequences of my refusal.         o  Refuse         5. I authorize the use of any specimen, organs, tissues, body parts or foreign objects that may be removed from my body during the operation/procedure for diagnosis, research or teaching purposes and their subsequent disposal by hospital authorities. I also authorize the release of specimen test results and/or written reports to my treating physician on the hospital medical staff or other referring or consulting physicians involved in my care, at the discretion of the Pathologist or my treating physician. 6.   I consent to the photographing or videotaping of the operations or procedures to be performed, including appropriate portions of my body for medical, scientific, or educational purposes, provided my identity is not revealed by the pictures or by descriptive texts accompanying them. If the procedure has been photographed/videotaped, the surgeon will obtain the original picture, image, videotape or CD. The hospital will not be responsible for storage, release or maintenance of the picture, image, tape or CD.    7.   I consent to the presence of a  or observers in the operating room as deemed necessary by my physician or their designees. 8.   I recognize that in the event my procedure results in extended X-Ray/fluoroscopy time, I may develop a skin reaction. 9.  If I have a Do Not Attempt Resuscitation (DNAR) order in place, that status will be suspended while in the operating room, procedural suite, and during the recovery period unless otherwise explicitly stated by me (or a person authorized to consent on my behalf). The surgeon or my attending physician will determine when the applicable recovery period ends for purposes of reinstating the DNAR order. 10. Patients having a sterilization procedure: I understand that if the procedure is successful the results will be permanent and it will therefore be impossible for me to inseminate, conceive, or bear children. I also understand that the procedure is intended to result in sterility, although the result has not been guaranteed. 11. I acknowledge that my physician has explained sedation/analgesia administration to me including the risk and benefits I consent to the administration of sedation/analgesia as may be necessary or desirable in the judgment of my physician.     I CERTIFY THAT I HAVE READ AND FULLY UNDERSTAND THE ABOVE CONSENT TO OPERATION and/or OTHER PROCEDURE.        ____________________________________       _________________________________      ______________________________  Signature of Patient         Signature of Responsible Person        Printed Name of Responsible Person        ____________________________________      _________________________________      ______________________________       Signature of Witness          Relationship to Patient                       Date                                       Time  Patient Name: Kristy Ventura     : 3/11/1970                 Printed: 2023      Medical Record #: [id number]                      Page 1 of 1

## (undated) NOTE — LETTER
Consent to Procedure/Sedation    Date: __________________    Time: _______________    1. I authorize the performance upon Alisson Garcia the following:  PLASMAPHERESIS     2.  I authorize Dr. _________________________ (and whomever is designated as the doct ___________________________    ___________________    Witness: ____________________     Date: ______________    Printed: 2/3/2017   2:00 PM    Patient Name: Esperanza Khanna        : 3/11/1970       Medical Record #: [id number]

## (undated) NOTE — LETTER
Consent to Procedure/Sedation    Date: __________________    Time: _______________    1. I authorize the performance upon Lasha Collins the following:  PLASMAPHERESIS     2.  I authorize Dr. _________________________ (and whomever is designated as the doct ___________________________    ___________________    Witness: ____________________     Date: ______________    Printed: 10/18/2019   9:37 AM    Patient Name: Nieves Brooks        : 3/11/1970       Medical Record #: [id number]

## (undated) NOTE — LETTER
Consent to Procedure/Sedation    Date: __________________    Time: _______________    1. I authorize the performance upon Arch Banister the following:  PLASMAPHERESIS     2.  I authorize Dr. _________________________ (and whomever is designated as the doct ___________________________    ___________________    Witness: ____________________     Date: ______________    Printed: 8/10/2018   2:08 PM    Patient Name: Ladariusflavia Margaret        : 3/11/1970       Medical Record #: [id number]

## (undated) NOTE — LETTER
Consent to Procedure/Sedation    Date: __________________    Time: _______________    1. I authorize the performance upon Jenn Adhikari the following:  PLASMAPHERESIS     2.  I authorize Dr. _________________________ (and whomever is designated as the doct ___________________________    ___________________    Witness: ____________________     Date: ______________    Printed: 2018   8:12 AM    Patient Name: Darwin Roman        : 3/11/1970       Medical Record #: [id number]

## (undated) NOTE — LETTER
Consent to Procedure/Sedation    Date: __________________    Time: _______________    1. I authorize the performance upon Skip Greene the following:  PLASMAPHERESIS     2.  I authorize DrDom _________________________ (and whomever is designated as the doct ___________________________    ___________________    Witness: ____________________     Date: ______________    Printed: 2017   12:33 PM    Patient Name: Jenn Adhikari        : 3/11/1970       Medical Record #: [id number]

## (undated) NOTE — Clinical Note
Can you check in with me on Monday about this patient and her ability to get a home study paid for?

## (undated) NOTE — LETTER
Consent to Procedure/Sedation    Date: __________________    Time: _______________    1. I authorize the performance upon Duana Expose the following:  PLASMAPHERESIS     2.  I authorize Dr. _________________________ (and whomever is designated as the doct ___________________________    ___________________    Witness: ____________________     Date: ______________    Printed: 2017   8:25 AM    Patient Name: David Cabrera        : 3/11/1970       Medical Record #: [id number]

## (undated) NOTE — LETTER
Consent to Procedure/Sedation    Date: __________________    Time: _______________    1. I authorize the performance upon Radames Johanna the following:  PLASMAPHERESIS     2.  I authorize Dr. _________________________ (and whomever is designated as the doct ___________________________    ___________________    Witness: ____________________     Date: ______________    Printed: 2018   9:31 AM    Patient Name: Amna Woodward        : 3/11/1970       Medical Record #: [id number]

## (undated) NOTE — LETTER
Violeta Courtney [address] [address]  [place], [address] [facility]    Consent for Operation  Date: __________________                                Time: _______________    1.  I authorize the performance upon Alisson Garcia the following operation:  Procedure( procedure has been videotaped, the surgeon will obtain the original videotape. The hospital will not be responsible for storage or maintenance of this tape.   7. For the purpose of advancing medical education, I consent to the admittance of observers to the STATEMENTS REQUIRING INSERTION OR COMPLETION WERE FILLED IN.     Signature of Patient:   ___________________________    When the patient is a minor or mentally incompetent to give consent:  Signature of person authorized to consent for patient: ____________ drugs/illegal medications). Failure to inform my anesthesiologist about these medicines may increase my risk of anesthetic complications. iv. If I am allergic to anything or have had a reaction to anesthesia before.   3. I understand how the anesthesia med I have discussed the procedure and information above with the patient (or patient’s representative) and answered their questions. The patient or their representative has agreed to have anesthesia services.     _______________________________________________

## (undated) NOTE — LETTER
Violeta Courtney [address] [address]  [place], [address] [place] [address]    Consent for Operation  Date: ______9/5/2018____________                                Time: ____1740________    1.  I authorize the performance upon Lasha Collins the following operation:  P videotape. [facility] will not be responsible for storage or maintenance of this tape. 6. For the purpose of advancing medical education, I consent to the admittance of observers to the Operating Room.     7. I authorize the use of any specimen, organs Signature of Patient:   ___________________________    When the patient is a minor or mentally incompetent to give consent:  Signature of person authorized to consent for patient: ___________________________   Relationship to patient: _____________________

## (undated) NOTE — LETTER
Consent to Procedure/Sedation    Date: __________________    Time: _______________    1. I authorize the performance upon Nate Wakefield the following:  PLASMAPHERESIS     2.  I authorize Dr. _________________________ (and whomever is designated as the doct ___________________________    ___________________    Witness: ____________________     Date: ______________    Printed: 2019   8:51 AM    Patient Name: Song Ferris        : 3/11/1970       Medical Record #: [id number]

## (undated) NOTE — LETTER
24    Patient: Kat Crook  : 3/11/1970 Visit date: 2024    Dear  Darvin Richardson MD    Thank you for referring Kat Crook to my practice.  Please find my assessment and plan below.        Good morning.  Thank you for the referral.  I saw Kat in my clinic last week.  I reviewed her images and did a full exam.  Kat does have a loop of sigmoid that is mildly distended on CT imaging that corresponds to the location of her pain.  On her last operation with Dr. Arriaga for lysis of adhesions, the sigmoid was described as frozen in place.  I do not recommend any surgical intervention for this as I think her MS plays a part in the amount of pain that she has as well.  I discussed with Kat that her appendix appears normal and that I do not think any intervention is needed for it.  If she has any worsening symptoms, she can contact my office to be evaluated again.  Thank you once again for the referral and please let me know if you have any questions.    Sincerely,       Suzy Soto MD

## (undated) NOTE — LETTER
Consent to Procedure/Sedation    Date: __________________    Time: _______________    1. I authorize the performance upon Tiana Callejas the following:  PLASMAPHERESIS     2.  I authorize DrDom _________________________ (and whomever is designated as the doct ___________________________    ___________________    Witness: ____________________     Date: ______________    Printed: 2019   3:33 PM    Patient Name: Jeff Marcos        : 3/11/1970       Medical Record #: [id number]

## (undated) NOTE — ED AVS SNAPSHOT
Raven Smith   MRN: [id number]    Department:  [facility] Emergency Department   Date of Visit:  11/19/2018           Disclosure     Insurance plans vary and the physician(s) referred by the ER may not be covered by your plan.  Please contact you tell this physician (or your personal doctor if your instructions are to return to your personal doctor) about any new or lasting problems. The primary care or specialist physician will see patients referred from the [facility] Emergency Department.  Shelton Lombard

## (undated) NOTE — LETTER
Consent to Procedure/Sedation    Date: __________________    Time: _______________    1. I authorize the performance upon Jeff Marcos the following:  PLASMAPHERESIS     2.  I authorize DrDom _________________________ (and whomever is designated as the doct ___________________________    ___________________    Witness: ____________________     Date: ______________    Printed: 2017   4:33 PM    Patient Name: Tiana Callejas        : 3/11/1970       Medical Record #: [id number]

## (undated) NOTE — LETTER
Consent to Procedure/Sedation    Date: __________________    Time: _______________    1. I authorize the performance upon Chelita Robles the following:  PLASMAPHERESIS     2.  I authorize Dr. _________________________ (and whomever is designated as the doct ___________________________    ___________________    Witness: ____________________     Date: ______________    Printed: 2018   1:55 PM    Patient Name: Darwin Roman        : 3/11/1970       Medical Record #: [id number]

## (undated) NOTE — LETTER
Consent to Procedure/Sedation    Date: __________________    Time: _______________    1. I authorize the performance upon Verdia Eisenmenger the following:  PLASMAPHERESIS     2.  I authorize Dr. _________________________ (and whomever is designated as the doct ___________________________    ___________________    Witness: ____________________     Date: ______________    Printed: 2018   9:32 AM    Patient Name: Nieves Brooks        : 3/11/1970       Medical Record #: [id number]